# Patient Record
Sex: FEMALE | Race: WHITE | NOT HISPANIC OR LATINO | Employment: OTHER | URBAN - METROPOLITAN AREA
[De-identification: names, ages, dates, MRNs, and addresses within clinical notes are randomized per-mention and may not be internally consistent; named-entity substitution may affect disease eponyms.]

---

## 2017-01-03 RX ORDER — NADOLOL 40 MG/1
TABLET ORAL
Qty: 180 TABLET | Refills: 2 | Status: SHIPPED | OUTPATIENT
Start: 2017-01-03 | End: 2017-01-11 | Stop reason: ALTCHOICE

## 2017-01-11 ENCOUNTER — OFFICE VISIT (OUTPATIENT)
Dept: INTERNAL MEDICINE | Facility: CLINIC | Age: 67
End: 2017-01-11
Payer: MEDICARE

## 2017-01-11 ENCOUNTER — HOSPITAL ENCOUNTER (OUTPATIENT)
Dept: CARDIOLOGY | Facility: CLINIC | Age: 67
Discharge: HOME | End: 2017-01-11
Payer: MEDICARE

## 2017-01-11 VITALS
TEMPERATURE: 97 F | WEIGHT: 180.31 LBS | BODY MASS INDEX: 31.95 KG/M2 | OXYGEN SATURATION: 96 % | DIASTOLIC BLOOD PRESSURE: 75 MMHG | RESPIRATION RATE: 18 BRPM | HEART RATE: 53 BPM | SYSTOLIC BLOOD PRESSURE: 177 MMHG

## 2017-01-11 DIAGNOSIS — R00.2 PALPITATIONS: ICD-10-CM

## 2017-01-11 DIAGNOSIS — R00.2 PALPITATION: ICD-10-CM

## 2017-01-11 DIAGNOSIS — M35.3 POLYMYALGIA: ICD-10-CM

## 2017-01-11 DIAGNOSIS — I10 ESSENTIAL HYPERTENSION: ICD-10-CM

## 2017-01-11 DIAGNOSIS — E28.319 PREMATURE MENOPAUSE: ICD-10-CM

## 2017-01-11 DIAGNOSIS — H66.001 ACUTE SUPPURATIVE OTITIS MEDIA OF RIGHT EAR WITHOUT SPONTANEOUS RUPTURE OF TYMPANIC MEMBRANE, RECURRENCE NOT SPECIFIED: Primary | ICD-10-CM

## 2017-01-11 PROCEDURE — 99999 PR PBB SHADOW E&M-EST. PATIENT-LVL IV: CPT | Mod: PBBFAC,,, | Performed by: NURSE PRACTITIONER

## 2017-01-11 PROCEDURE — 93227 XTRNL ECG REC<48 HR R&I: CPT | Mod: S$PBB,,, | Performed by: NUCLEAR MEDICINE

## 2017-01-11 PROCEDURE — 99214 OFFICE O/P EST MOD 30 MIN: CPT | Mod: S$PBB,,, | Performed by: NURSE PRACTITIONER

## 2017-01-11 PROCEDURE — 93226 XTRNL ECG REC<48 HR SCAN A/R: CPT | Mod: PBBFAC,PO | Performed by: NUCLEAR MEDICINE

## 2017-01-11 RX ORDER — METOPROLOL TARTRATE 50 MG/1
50 TABLET ORAL 2 TIMES DAILY
Qty: 60 TABLET | Refills: 1 | Status: SHIPPED | OUTPATIENT
Start: 2017-01-11 | End: 2017-02-02 | Stop reason: SDUPTHER

## 2017-01-11 RX ORDER — DULOXETIN HYDROCHLORIDE 20 MG/1
20 CAPSULE, DELAYED RELEASE ORAL DAILY
Qty: 90 CAPSULE | Refills: 0 | Status: SHIPPED | OUTPATIENT
Start: 2017-01-11 | End: 2017-02-03 | Stop reason: SDUPTHER

## 2017-01-11 RX ORDER — AMOXICILLIN 875 MG/1
875 TABLET, FILM COATED ORAL 2 TIMES DAILY
Qty: 20 TABLET | Refills: 0 | Status: SHIPPED | OUTPATIENT
Start: 2017-01-11 | End: 2017-01-21

## 2017-01-11 NOTE — PATIENT INSTRUCTIONS
1. Call insurance company. Use the number for pharmacy benefits on your card    2. Tell them Janumet is too expensive and ask them what they do cover that is a cheaper option.     3. Call our office and let us know which medications they suggest      [facility]

## 2017-01-11 NOTE — MR AVS SNAPSHOT
[facility]  [address] [address] [address]  Phone: [phone]                  Kelsie Bustamante   2017 9:20 AM   Office Visit    Description:  Female : 1950   Provider:  LATESHA Rocha,YI-C   Department:  [facility]           Reason for Visit     Otalgia           Diagnoses this Visit        Comments    Acute suppurative otitis media of right ear without spontaneous rupture of tympanic membrane, recurrence not specified    -  Primary     Essential hypertension         Palpitations         Polymyalgia         Premature menopause                To Do List           Future Appointments        Provider Department Dept Phone    2017 1:30 PM [facility] BMD1 [facility] [phone]    3/20/2017 8:40 AM IB LABORATORY [facility] [phone]    3/20/2017 8:50 AM [facility] SPECIMEN LABORATORY [facility] [phone]      Goals (5 Years of Data)     None       These Medications        Disp Refills Start End    amoxicillin (AMOXIL) 875 MG tablet 20 tablet 0 2017    Take 1 tablet (875 mg total) by mouth 2 (two) times daily. - Oral    Pharmacy: [facility] [place] [address] AT [facility] Ph #: [phone]       duloxetine (CYMBALTA) 20 MG capsule 90 capsule 0 2017    Take 1 capsule (20 mg total) by mouth once daily. - Oral    Pharmacy: [facility] [address] [place] - [address] AT [facility] Ph #: [phone]       metoprolol tartrate (LOPRESSOR) 50 MG tablet 60 tablet 1 2017    Take 1 tablet (50 mg total) by mouth 2 (two) times daily. - Oral    Pharmacy: [facility] [address] [place] - [address] AT [facility] Ph #: [phone]         [facility]     [facility] Care [facility] -  Assistance  Registered nurses in the [facility] On Call  Center provide clinical advisement, health education, appointment booking, and other advisory services.  Call for this free service at [phone].             Medications           START taking these NEW medications        Refills    amoxicillin (AMOXIL) 875 MG tablet 0    Sig: Take 1 tablet (875 mg total) by mouth 2 (two) times daily.    Class: Normal    Route: Oral    metoprolol tartrate (LOPRESSOR) 50 MG tablet 1    Sig: Take 1 tablet (50 mg total) by mouth 2 (two) times daily.    Class: Normal    Route: Oral      STOP taking these medications     nadolol (CORGARD) 40 MG tablet TAKE 1 TABLET TWICE A DAY           Verify that the below list of medications is an accurate representation of the medications you are currently taking.  If none reported, the list may be blank. If incorrect, please contact your healthcare provider. Carry this list with you in case of emergency.           Current Medications     amoxicillin (AMOXIL) 875 MG tablet Take 1 tablet (875 mg total) by mouth 2 (two) times daily.    aspirin (ECOTRIN) 81 MG EC tablet Take 81 mg by mouth once daily.    blood sugar diagnostic (BLOOD GLUCOSE TEST) Strp Test 2 times daily    blood-glucose meter kit Test twice daily    budesonide (PULMICORT) 0.5 mg/2 mL nebulizer solution     duloxetine (CYMBALTA) 20 MG capsule Take 1 capsule (20 mg total) by mouth once daily.    lancets Misc Test twice daily    losartan-hydrochlorothiazide 50-12.5 mg (HYZAAR) 50-12.5 mg per tablet Take 1 tablet by mouth once daily.    metoprolol tartrate (LOPRESSOR) 50 MG tablet Take 1 tablet (50 mg total) by mouth 2 (two) times daily.    montelukast (SINGULAIR) 10 mg tablet Take 1 tablet (10 mg total) by mouth every evening.    omeprazole (PRILOSEC) 20 MG capsule Take 1 capsule (20 mg total) by mouth 2 (two) times daily.    SITagliptan-metformin (JANUMET) 50-1,000 mg per tablet Take 1 tablet by mouth 2 (two) times daily with meals.    zolpidem (AMBIEN) 10 mg Tab 1/2 to 1 qhs prn            Clinical Reference Information           Vital Signs - Last Recorded  Most recent update: 1/11/2017  9:17 AM by Charles Cross MA    BP Pulse Temp Resp    (!) 177/75 (BP Location: Left arm, Patient Position: Sitting, BP Method: Automatic) (!) 53 96.9 °F (36.1 °C) (Tympanic) 18    Wt SpO2 BMI    81.8 kg (180 lb 5.4 oz) 96% 31.95 kg/m2      Blood Pressure          Most Recent Value    BP  (!)  177/75      Allergies as of 1/11/2017     Floxin [Ofloxacin]    Fluvastatin    Pravastatin      Immunizations Administered on Date of Encounter - 1/11/2017     None      Orders Placed During Today's Visit     Future Labs/Procedures Expected by Expires    DXA Bone Density Spine And Hip  1/11/2017 1/11/2018      Instructions    1. Call insurance company. Use the number for pharmacy benefits on your card    2. Tell them Janumet is too expensive and ask them what they do cover that is a cheaper option.     3. Call our office and let us know which medications they suggest      Shingles shot at [facility]

## 2017-01-11 NOTE — PROGRESS NOTES
Subjective:       Patient ID: Kelsie Bustamante is a 66 y.o. female.    Chief Complaint: Otalgia (right )    Otalgia    There is pain in the right ear. This is a new problem. The current episode started yesterday. The problem occurs constantly. The problem has been gradually worsening. There has been no fever. The pain is moderate. Associated symptoms include headaches (right side). Pertinent negatives include no coughing, ear discharge, hearing loss, rhinorrhea or sore throat. She has tried nothing for the symptoms.   Palpitations    This is a chronic problem. Episode onset: over 20 years. The problem occurs 2 to 4 times per day. The problem has been unchanged. Nothing aggravates the symptoms. Associated symptoms include near-syncope (in past, not recently). Pertinent negatives include no anxiety, chest pain, coughing, diaphoresis, fever, shortness of breath or weakness. She has tried beta blockers (nadalol working ok, but is too expensive with new insurance plan. Needs to change to cheaper alternative) for the symptoms. The treatment provided significant relief. Risk factors include obesity, diabetes mellitus and dyslipidemia.     Review of Systems   Constitutional: Negative for chills, diaphoresis, fatigue and fever.   HENT: Positive for ear pain. Negative for ear discharge, hearing loss, postnasal drip, rhinorrhea, sinus pressure and sore throat.    Eyes: Negative.    Respiratory: Negative for cough, chest tightness, shortness of breath and wheezing.    Cardiovascular: Positive for palpitations and near-syncope (in past, not recently). Negative for chest pain.   Neurological: Positive for headaches (right side). Negative for weakness.   Psychiatric/Behavioral: Negative for dysphoric mood and sleep disturbance. The patient is not nervous/anxious.        Objective:      Physical Exam   Constitutional: She is oriented to person, place, and time.   HENT:   Head: Normocephalic and atraumatic.   Right Ear: Hearing,  external ear and ear canal normal. Tympanic membrane is erythematous and bulging.   Left Ear: Hearing, tympanic membrane, external ear and ear canal normal.   Nose: Nose normal.   Mouth/Throat: Oropharynx is clear and moist. No oropharyngeal exudate.   Eyes: Conjunctivae and EOM are normal. Pupils are equal, round, and reactive to light.   Cardiovascular: Normal rate, regular rhythm, normal heart sounds and intact distal pulses.    Pulmonary/Chest: Effort normal and breath sounds normal.   Neurological: She is alert and oriented to person, place, and time.   Skin: Skin is warm and dry.   Psychiatric: She has a normal mood and affect. Her behavior is normal. Judgment and thought content normal.       Assessment:       1. Acute suppurative otitis media of right ear without spontaneous rupture of tympanic membrane, recurrence not specified    2. Essential hypertension    3. Palpitations    4. Polymyalgia    5. Premature menopause        Plan:       *Acute suppurative otitis media of right ear without spontaneous rupture of tympanic membrane, recurrence not specified  -     amoxicillin (AMOXIL) 875 MG tablet; Take 1 tablet (875 mg total) by mouth 2 (two) times daily.  Dispense: 20 tablet; Refill: 0  - F/u if not improving over next 2 days  - Take tylenol or ibp sparingly for pain    Essential hypertension  -     metoprolol tartrate (LOPRESSOR) 50 MG tablet; Take 1 tablet (50 mg total) by mouth 2 (two) times daily.  Dispense: 60 tablet; Refill: 1  - Has not taken meds this AM, has been normal at home    Palpitations  -     metoprolol tartrate (LOPRESSOR) 50 MG tablet; Take 1 tablet (50 mg total) by mouth 2 (two) times daily.  Dispense: 60 tablet; Refill: 1  - Needs to change nadalol r/t cost    Polymyalgia  -     duloxetine (CYMBALTA) 20 MG capsule; Take 1 capsule (20 mg total) by mouth once daily.  Dispense: 90 capsule; Refill: 0  - Working well for muscle and joint pain    Premature menopause  -     DXA Bone Density  Spine And Hip; Future; Expected date: 1/11/17    **  shingles shot at [employer] due to cost  F/u with Dr Parks for [facility] per [facility]

## 2017-01-25 ENCOUNTER — APPOINTMENT (OUTPATIENT)
Dept: RADIOLOGY | Facility: CLINIC | Age: 67
End: 2017-01-25
Attending: NURSE PRACTITIONER
Payer: MEDICARE

## 2017-01-25 DIAGNOSIS — E28.319 PREMATURE MENOPAUSE: ICD-10-CM

## 2017-01-25 PROCEDURE — 77080 DXA BONE DENSITY AXIAL: CPT | Mod: 26,,, | Performed by: INTERNAL MEDICINE

## 2017-01-25 PROCEDURE — 77080 DXA BONE DENSITY AXIAL: CPT | Mod: TC,PO

## 2017-01-31 NOTE — TELEPHONE ENCOUNTER
----- Message from Jossy Rudd sent at 1/31/2017  1:12 PM CST -----  Please call pt back at [phone] in regards to a prescription for janumet for 90 day supply.and also a refill on zoltidem 90 day supply and also a refill on [facility]elukast [facility]

## 2017-02-02 DIAGNOSIS — I10 ESSENTIAL HYPERTENSION: ICD-10-CM

## 2017-02-02 DIAGNOSIS — R00.2 PALPITATIONS: ICD-10-CM

## 2017-02-02 RX ORDER — MONTELUKAST SODIUM 10 MG/1
10 TABLET ORAL NIGHTLY
Qty: 90 TABLET | Refills: 3 | Status: SHIPPED | OUTPATIENT
Start: 2017-02-02 | End: 2018-01-09 | Stop reason: SDUPTHER

## 2017-02-02 RX ORDER — ZOLPIDEM TARTRATE 10 MG/1
TABLET ORAL
Qty: 90 TABLET | Refills: 1 | Status: SHIPPED | OUTPATIENT
Start: 2017-02-02 | End: 2018-01-09

## 2017-02-03 DIAGNOSIS — R00.2 PALPITATIONS: ICD-10-CM

## 2017-02-03 DIAGNOSIS — I10 ESSENTIAL HYPERTENSION: ICD-10-CM

## 2017-02-03 DIAGNOSIS — M35.3 POLYMYALGIA: ICD-10-CM

## 2017-02-03 RX ORDER — METOPROLOL TARTRATE 50 MG/1
50 TABLET ORAL 2 TIMES DAILY
Qty: 180 TABLET | Refills: 3 | Status: SHIPPED | OUTPATIENT
Start: 2017-02-03 | End: 2018-04-09 | Stop reason: SDUPTHER

## 2017-02-03 RX ORDER — METOPROLOL TARTRATE 50 MG/1
TABLET ORAL
Qty: 180 TABLET | Refills: 1 | Status: SHIPPED | OUTPATIENT
Start: 2017-02-03 | End: 2017-02-03 | Stop reason: SDUPTHER

## 2017-02-03 RX ORDER — DULOXETIN HYDROCHLORIDE 20 MG/1
20 CAPSULE, DELAYED RELEASE ORAL DAILY
Qty: 90 CAPSULE | Refills: 3 | Status: SHIPPED | OUTPATIENT
Start: 2017-02-03 | End: 2018-04-09 | Stop reason: SDUPTHER

## 2017-02-03 RX ORDER — METOPROLOL TARTRATE 50 MG/1
50 TABLET ORAL 2 TIMES DAILY
Qty: 180 TABLET | Refills: 1 | Status: CANCELLED | OUTPATIENT
Start: 2017-02-03

## 2017-02-10 ENCOUNTER — TELEPHONE (OUTPATIENT)
Dept: INTERNAL MEDICINE | Facility: CLINIC | Age: 67
End: 2017-02-10

## 2017-02-10 NOTE — TELEPHONE ENCOUNTER
----- Message from Jimmie Durham sent at 2/10/2017  9:18 AM CST -----  Pt is requesting a call from nurse to discuss a prior authorization             Please call pt back at [phone]

## 2017-03-21 ENCOUNTER — TELEPHONE (OUTPATIENT)
Dept: INTERNAL MEDICINE | Facility: CLINIC | Age: 67
End: 2017-03-21

## 2017-03-21 NOTE — TELEPHONE ENCOUNTER
----- Message from Kasia Hartman sent at 3/21/2017 10:06 AM CDT -----  Contact: pt  Please call pt @ [phone] regarding lab/urine order

## 2017-03-22 ENCOUNTER — TELEPHONE (OUTPATIENT)
Dept: INTERNAL MEDICINE | Facility: CLINIC | Age: 67
End: 2017-03-22

## 2017-03-22 NOTE — TELEPHONE ENCOUNTER
----- Message from Bonnie Garcia sent at 3/21/2017  4:08 PM CDT -----  Contact: Pt   States she is calling to speak with Ariadna hayes an appt and can be reached at [phone]//chandra/dbady

## 2017-03-26 DIAGNOSIS — M35.3 POLYMYALGIA: ICD-10-CM

## 2017-03-27 DIAGNOSIS — M35.3 POLYMYALGIA: ICD-10-CM

## 2017-03-27 RX ORDER — DULOXETIN HYDROCHLORIDE 20 MG/1
CAPSULE, DELAYED RELEASE ORAL
Qty: 90 CAPSULE | Refills: 0 | OUTPATIENT
Start: 2017-03-27

## 2017-03-27 RX ORDER — DULOXETIN HYDROCHLORIDE 20 MG/1
CAPSULE, DELAYED RELEASE ORAL
Qty: 90 CAPSULE | Refills: 0 | Status: SHIPPED | OUTPATIENT
Start: 2017-03-27 | End: 2017-05-09 | Stop reason: SDUPTHER

## 2017-03-27 RX ORDER — DULOXETIN HYDROCHLORIDE 20 MG/1
CAPSULE, DELAYED RELEASE ORAL
Qty: 90 CAPSULE | Refills: 0 | Status: SHIPPED | OUTPATIENT
Start: 2017-03-27 | End: 2017-03-27

## 2017-04-05 ENCOUNTER — TELEPHONE (OUTPATIENT)
Dept: INTERNAL MEDICINE | Facility: CLINIC | Age: 67
End: 2017-04-05

## 2017-04-05 NOTE — TELEPHONE ENCOUNTER
----- Message from Maryan Roach sent at 4/5/2017 12:52 PM CDT -----  Contact: Patient  Patient stated she received a call that Dr Parks had several openings tomorrow, patient needs a pre-op clearance, but we don't show any openings until 5/18/17. Please call patient back at [phone]. Thank you

## 2017-04-05 NOTE — TELEPHONE ENCOUNTER
----- Message from Khushbu Hammer sent at 4/5/2017 11:13 AM CDT -----  Contact: pt  Pt states needs to get in to see the dr at either location for eye surgery clearance,the surgery is 4/20 so needs to seen as soon as possible.Will be interested this Monday in the afternoon if can....[phone] (zqfe)

## 2017-04-06 ENCOUNTER — LAB VISIT (OUTPATIENT)
Dept: LAB | Facility: HOSPITAL | Age: 67
End: 2017-04-06
Attending: FAMILY MEDICINE
Payer: MEDICARE

## 2017-04-06 ENCOUNTER — OFFICE VISIT (OUTPATIENT)
Dept: INTERNAL MEDICINE | Facility: CLINIC | Age: 67
End: 2017-04-06
Payer: MEDICARE

## 2017-04-06 VITALS
SYSTOLIC BLOOD PRESSURE: 128 MMHG | DIASTOLIC BLOOD PRESSURE: 70 MMHG | HEART RATE: 60 BPM | WEIGHT: 175.69 LBS | OXYGEN SATURATION: 96 % | BODY MASS INDEX: 31.13 KG/M2 | HEIGHT: 63 IN | TEMPERATURE: 97 F

## 2017-04-06 DIAGNOSIS — E04.1 THYROID NODULE: ICD-10-CM

## 2017-04-06 DIAGNOSIS — I10 ESSENTIAL HYPERTENSION: ICD-10-CM

## 2017-04-06 DIAGNOSIS — E11.49 TYPE 2 DIABETES WITH NEURAL COMPLICATION: ICD-10-CM

## 2017-04-06 DIAGNOSIS — E11.49 TYPE 2 DIABETES WITH NEURAL COMPLICATION: Primary | ICD-10-CM

## 2017-04-06 LAB
ANION GAP SERPL CALC-SCNC: 8 MMOL/L
BUN SERPL-MCNC: 13 MG/DL
CALCIUM SERPL-MCNC: 9.3 MG/DL
CHLORIDE SERPL-SCNC: 102 MMOL/L
CHOLEST/HDLC SERPL: 4.9 {RATIO}
CO2 SERPL-SCNC: 30 MMOL/L
CREAT SERPL-MCNC: 1.1 MG/DL
EST. GFR  (AFRICAN AMERICAN): >60 ML/MIN/1.73 M^2
EST. GFR  (NON AFRICAN AMERICAN): 52.8 ML/MIN/1.73 M^2
GLUCOSE SERPL-MCNC: 151 MG/DL
HDL/CHOLESTEROL RATIO: 20.3 %
HDLC SERPL-MCNC: 177 MG/DL
HDLC SERPL-MCNC: 36 MG/DL
LDLC SERPL CALC-MCNC: 110.4 MG/DL
NONHDLC SERPL-MCNC: 141 MG/DL
POTASSIUM SERPL-SCNC: 4.1 MMOL/L
SODIUM SERPL-SCNC: 140 MMOL/L
TRIGL SERPL-MCNC: 153 MG/DL

## 2017-04-06 PROCEDURE — 99499 UNLISTED E&M SERVICE: CPT | Mod: S$PBB,,, | Performed by: FAMILY MEDICINE

## 2017-04-06 PROCEDURE — 99999 PR PBB SHADOW E&M-EST. PATIENT-LVL III: CPT | Mod: PBBFAC,,, | Performed by: FAMILY MEDICINE

## 2017-04-06 PROCEDURE — 99214 OFFICE O/P EST MOD 30 MIN: CPT | Mod: S$PBB,,, | Performed by: FAMILY MEDICINE

## 2017-04-06 PROCEDURE — 83036 HEMOGLOBIN GLYCOSYLATED A1C: CPT

## 2017-04-06 PROCEDURE — 80048 BASIC METABOLIC PNL TOTAL CA: CPT

## 2017-04-06 PROCEDURE — 36415 COLL VENOUS BLD VENIPUNCTURE: CPT | Mod: PO

## 2017-04-06 PROCEDURE — 80061 LIPID PANEL: CPT

## 2017-04-06 NOTE — PROGRESS NOTES
Subjective:       Patient ID: Kelsie Bustamante is a female.    Chief Complaint: Multiple issues see below    HPI Type 2 diabetesw neurl manif: a1c pnd. Tolerating medicine. 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  Hypertension: blood pressures at home normal. Tolerating medicine.   Hypercholesterolemia: controlled. .stopped statin sec to aches 2 mos after starting. Ok w trying crestor but didn't start  Arthralgias much less with cymbalta          Thy nod spring 16 subcm due couple yr    Past Medical History   Diagnosis Date    Hypertension     Morbid obesity     Gastroesophageal reflux     Sinusitis     Hiatal hernia     Type 2 diabetes mellitus with neurological manifestations     Peripheral neuropathy     Achalasia     Postmenopausal HRT (hormone replacement therapy)      failed tapering off     Past Surgical History   Procedure Laterality Date    Hysterectomy       nonca    Tonsillectomy      Cholecystectomy       section       Family History   Problem Relation Age of Onset    Diabetes Father     Coronary artery disease Father     Aneurysm Sister     Coronary artery disease Brother     Parkinsonism Brother      History     Social History    Marital Status:      Spouse Name: N/A     Number of Children: N/A    Years of Education: N/A     Social History Main Topics    Smoking status: Never Smoker     Smokeless tobacco: Never Used    Alcohol Use: No    Drug Use: No    Sexual Activity: None     Other Topics Concern    None     Social History Narrative         Cardiovascular: no chest pain  Chest: no shortness of breath  Abd: no abd pain  Remainder review of systems negative      Objective:      Physical Exam   Constitutional: She is oriented to person, place, and time. She appears well-developed and well-nourished.   HENT:    Head: Normocephalic and atraumatic.   Neck: Normal range of motion. Neck supple. Carotid bruit is not present.   Cardiovascular: Normal rate and regular rhythm.    Pulmonary/Chest: Effort normal and breath sounds normal.   Lymphadenopathy:     She has no cervical adenopathy.   Neurological: She is alert and oriented to person, place, and time.   Skin: Skin is warm and dry.   Psychiatric: She has a normal mood and affect. Her behavior is normal. Judgment normal.   Nursing note and vitals reviewed.      Assessment:     type 2 dm w neural complic   hyperchol   2. Essential hypertension      statin side eff  Plan:    *  Lab 3 m andf/u ; monitor wt loss  D/w crestor more at a f/u-has been reluct sec to other statin myalgias  Clear for cataract surg. Form given to her  Type 2 diabetes with neural complication  -     Hemoglobin A1c; Future; Expected date: 7/5/17    Essential hypertension    Thyroid nodule

## 2017-04-06 NOTE — MR AVS SNAPSHOT
[facility]  [address] [facility] [place] [address]  Phone: [phone]  Fax: [phone]                  Kelsie Bustamante   2017 8:20 AM   Office Visit    Description:  Female : 1950   Provider:  Cody Parks MD   Department:  [facility]           Diagnoses this Visit        Comments    Type 2 diabetes with neural complication    -  Primary     Essential hypertension         Thyroid nodule                To Do List           Future Appointments        Provider Department Dept Phone    2017 11:05 AM LABORATORY, [facility] [phone]    2017 11:20 AM SPECIMEN, [facility] [phone]    2017 9:40 AM LABORATORY, [facility] [phone]    2017 9:00 AM Cody Parks MD [facility] [place] [phone]      Goals (5 Years of Data)     None      Follow-Up and Disposition     Return in about 3 months (around 2017).    Follow-up and Disposition History      [facility]     [facility] On Call Nurse Care Line -  Assistance  Unless otherwise directed by your provider, please contact [facility], our nurse care line that is available for  assistance.     Registered nurses in the [facility] Center provide: appointment scheduling, clinical advisement, health education, and other advisory services.  Call: [phone] (toll free)               Medications                Verify that the below list of medications is an accurate representation of the medications you are currently taking.  If none reported, the list may be blank. If incorrect, please contact your healthcare provider. Carry this list with you in case of emergency.           Current Medications     aspirin (ECOTRIN) 81 MG EC tablet Take 81 mg by mouth once daily.    blood sugar diagnostic (BLOOD GLUCOSE TEST) Strp Test 2 times daily    blood-glucose meter kit Test twice daily    budesonide  "(PULMICORT) 0.5 mg/2 mL nebulizer solution     duloxetine (CYMBALTA) 20 MG capsule Take 1 capsule (20 mg total) by mouth once daily.    duloxetine (CYMBALTA) 20 MG capsule TAKE 1 CAPSULE BY MOUTH ONCE DAILY    lancets Misc Test twice daily    losartan-hydrochlorothiazide 50-12.5 mg (HYZAAR) 50-12.5 mg per tablet Take 1 tablet by mouth once daily.    metoprolol tartrate (LOPRESSOR) 50 MG tablet Take 1 tablet (50 mg total) by mouth 2 (two) times daily.    montelukast (SINGULAIR) 10 mg tablet Take 1 tablet (10 mg total) by mouth every evening.    omeprazole (PRILOSEC) 20 MG capsule Take 1 capsule (20 mg total) by mouth 2 (two) times daily.    SITagliptan-metformin (JANUMET) 50-1,000 mg per tablet Take 1 tablet by mouth 2 (two) times daily with meals.    zolpidem (AMBIEN) 10 mg Tab 1/2 to 1 qhs prn           Clinical Reference Information           Your Vitals Were     BP Pulse Temp Height    128/70 (BP Location: Right arm, Patient Position: Sitting, BP Method: Manual) 60 96.6 °F (35.9 °C) (Tympanic) 5' 3" (1.6 m)    Weight SpO2 BMI    79.7 kg (175 lb 11.3 oz) 96% 31.13 kg/m2      Blood Pressure          Most Recent Value    BP  128/70      Allergies as of 4/6/2017     Floxin [Ofloxacin]    Fluvastatin    Pravastatin      Immunizations Administered on Date of Encounter - 4/6/2017     None      Orders Placed During Today's Visit     Future Labs/Procedures Expected by Expires    Hemoglobin A1c  7/5/2017 4/6/2018      Language Assistance Services     ATTENTION: Language assistance services are available, free of charge. Please call [phone].      ATENCIÓN: Si habla jessica, tiene a candelario disposición servicios gratuitos de asistencia lingüística. Llame al [phone].     CHÚ Ý: N?u b?n nói Ti?ng Vi?t, có các d?ch v? h? tr? ngôn ng? mi?n phí dành cho b?n. G?i s? [phone].         [facility] complies with applicable Federal civil rights laws and does not discriminate on the basis of race, color, " national origin, age, disability, or sex.

## 2017-04-07 LAB
ESTIMATED AVG GLUCOSE: 137 MG/DL
HBA1C MFR BLD HPLC: 6.4 %

## 2017-05-09 ENCOUNTER — OFFICE VISIT (OUTPATIENT)
Dept: INTERNAL MEDICINE | Facility: CLINIC | Age: 67
End: 2017-05-09
Payer: MEDICARE

## 2017-05-09 VITALS
SYSTOLIC BLOOD PRESSURE: 120 MMHG | TEMPERATURE: 98 F | BODY MASS INDEX: 31.02 KG/M2 | HEIGHT: 63 IN | DIASTOLIC BLOOD PRESSURE: 76 MMHG | WEIGHT: 175.06 LBS

## 2017-05-09 DIAGNOSIS — L29.8 CHRONIC PRURITIC RASH IN ADULT: Primary | ICD-10-CM

## 2017-05-09 PROBLEM — E66.9 OBESITY, CLASS I, BMI 30-34.9: Status: ACTIVE | Noted: 2017-05-09

## 2017-05-09 PROBLEM — E66.811 OBESITY, CLASS I, BMI 30-34.9: Status: ACTIVE | Noted: 2017-05-09

## 2017-05-09 PROCEDURE — 99999 PR PBB SHADOW E&M-EST. PATIENT-LVL III: CPT | Mod: PBBFAC,,, | Performed by: PHYSICIAN ASSISTANT

## 2017-05-09 PROCEDURE — 99213 OFFICE O/P EST LOW 20 MIN: CPT | Mod: S$PBB,,, | Performed by: PHYSICIAN ASSISTANT

## 2017-05-09 PROCEDURE — 99213 OFFICE O/P EST LOW 20 MIN: CPT | Mod: PBBFAC,PO | Performed by: PHYSICIAN ASSISTANT

## 2017-05-09 RX ORDER — CEFADROXIL 1000 MG/1
1 TABLET ORAL DAILY
Qty: 21 TABLET | Refills: 0 | Status: SHIPPED | OUTPATIENT
Start: 2017-05-09 | End: 2017-07-13

## 2017-05-09 RX ORDER — HYDROXYZINE HYDROCHLORIDE 10 MG/1
TABLET, FILM COATED ORAL
Qty: 40 TABLET | Refills: 2 | Status: SHIPPED | OUTPATIENT
Start: 2017-05-09 | End: 2017-07-13

## 2017-05-09 NOTE — PROGRESS NOTES
Subjective:       Patient ID: Kelsie Bustamante is a 66 y.o.W/ female.    Chief Complaint: Rash (in various spots)    HPI         She comes in today by herself and has the above problem.  In December 2016 a broom handle cut her across the left lower pretibial surface.  In the healing process she developed some little red bumps surrounding it.  Then about 3 weeks later she fell and abraded her left knee causing it to bleed.  Then she developed red bumps all around it and he's stayed that way for almost 4 months.  She has also developed lesions underneath her breasts on both of her forearms and also her right leg.  It seems to be highly present in her groin.  It itches horribly and she can't seem to hardly control it.  Hot baths make it feel worse.  There does not seem to be any specific time of day when the itch is worse and she hasn't had any problems with nighttime itching being worse than the daytime.  She also has not had any webspace involvement in either upper or lower extremity.  Her  does not have any rash at all but they don't have all that much physical contact either.  He is away a lot at their camp doing repair work.  She has tried using a triple OTC antibiotic cream on this but it doesn't seem to help.        Her blood sugars have been running 150-160 and have never been higher except around the holiday season.  Her most recent tetanus shot was July 2016 so she is up-to-date on that.    Review of Systems    Otherwise negative concerning the INTEGUMENT, INFECTIOUS DISEASE, IMMUNE, system review.    Objective:      Physical Exam    LEFT LEG: She doesn't seem to have much redness or bumpiness in the site of the original injury on the lower pretibial surface.  The left knee is heavily involved with red bumps and some scabbing and it measures about 3.5 cm diameter.  There is no lymphangitis present on any of her lesions.  The rest of the spots seem to be very faint in her upper extremities and also her  right leg.    Assessment:       1. Chronic pruritic rash in adult        Plan:     1.  It is possible she is having a scabies problem but I will first treat her as if this is a bacterial spread infection.  Start with Atarax 10 mg to be taken 1-3 tablets every 6 hours when necessary itch.  She is also not to get exposed to heat and humidity in the form of the outside whether or her bathtub.  2.  Start Duricef 1 g daily for the next 20 days.  Report back here if the rash hasn't sufficiently disappeared by that time.  At that time we'll probably treat her for scabies.

## 2017-07-06 ENCOUNTER — LAB VISIT (OUTPATIENT)
Dept: LAB | Facility: HOSPITAL | Age: 67
End: 2017-07-06
Attending: FAMILY MEDICINE
Payer: MEDICARE

## 2017-07-06 ENCOUNTER — OFFICE VISIT (OUTPATIENT)
Dept: INTERNAL MEDICINE | Facility: CLINIC | Age: 67
End: 2017-07-06
Payer: MEDICARE

## 2017-07-06 VITALS
DIASTOLIC BLOOD PRESSURE: 60 MMHG | WEIGHT: 171.75 LBS | TEMPERATURE: 98 F | BODY MASS INDEX: 30.43 KG/M2 | HEART RATE: 60 BPM | OXYGEN SATURATION: 98 % | HEIGHT: 63 IN | SYSTOLIC BLOOD PRESSURE: 98 MMHG

## 2017-07-06 DIAGNOSIS — E11.49 TYPE 2 DIABETES WITH NEURAL COMPLICATION: ICD-10-CM

## 2017-07-06 DIAGNOSIS — J01.90 ACUTE SINUSITIS, RECURRENCE NOT SPECIFIED, UNSPECIFIED LOCATION: ICD-10-CM

## 2017-07-06 DIAGNOSIS — R23.8 VESICULAR RASH: Primary | ICD-10-CM

## 2017-07-06 DIAGNOSIS — B37.2 CANDIDIASIS, INTERTRIGO: ICD-10-CM

## 2017-07-06 PROCEDURE — 99999 PR PBB SHADOW E&M-EST. PATIENT-LVL V: CPT | Mod: PBBFAC,,, | Performed by: PHYSICIAN ASSISTANT

## 2017-07-06 PROCEDURE — 99215 OFFICE O/P EST HI 40 MIN: CPT | Mod: PBBFAC,PO | Performed by: PHYSICIAN ASSISTANT

## 2017-07-06 PROCEDURE — 1159F MED LIST DOCD IN RCRD: CPT | Mod: ,,, | Performed by: PHYSICIAN ASSISTANT

## 2017-07-06 PROCEDURE — 99213 OFFICE O/P EST LOW 20 MIN: CPT | Mod: S$PBB,,, | Performed by: PHYSICIAN ASSISTANT

## 2017-07-06 PROCEDURE — 1126F AMNT PAIN NOTED NONE PRSNT: CPT | Mod: ,,, | Performed by: PHYSICIAN ASSISTANT

## 2017-07-06 RX ORDER — CICLOPIROX OLAMINE 7.7 MG/G
CREAM TOPICAL 2 TIMES DAILY
Qty: 30 G | Refills: 0 | Status: SHIPPED | OUTPATIENT
Start: 2017-07-06 | End: 2017-08-10

## 2017-07-06 RX ORDER — DOXYCYCLINE 100 MG/1
100 CAPSULE ORAL 2 TIMES DAILY
Qty: 20 CAPSULE | Refills: 0 | Status: SHIPPED | OUTPATIENT
Start: 2017-07-06 | End: 2017-07-16

## 2017-07-06 NOTE — PROGRESS NOTES
Subjective:      Patient ID: Kelsie Bustamante is a 67 y.o. female.    Chief Complaint: Fever; Sore Throat; and Generalized Body Aches    URI    This is a recurrent problem. The current episode started in the past 7 days. The problem has been gradually worsening. Maximum temperature: subjective. Associated symptoms include congestion, coughing, headaches, joint pain, neck pain, a plugged ear sensation, a rash, rhinorrhea, sinus pain, sneezing and a sore throat. Pertinent negatives include no abdominal pain, chest pain, diarrhea, dysuria, ear pain, joint swelling, nausea, swollen glands, vomiting or wheezing. Treatments tried: nyquil. The treatment provided moderate relief.   Rash   This is a new problem. Episode onset: 7 months. The problem has been waxing and waning since onset. Location: bilateral lower legs, left arm, bilateral hands. The rash is characterized by itchiness, redness, swelling and blistering. She was exposed to nothing. Associated symptoms include congestion, coughing, fatigue, joint pain, rhinorrhea and a sore throat. Pertinent negatives include no anorexia, diarrhea, facial edema, fever, shortness of breath or vomiting. Past treatments include antibiotics. The treatment provided significant relief. There is no history of allergies, asthma, eczema or varicella.   Rash is always in the same spots, never has cleared up completely. Has been treated with cephalosporin and ancef. Which did cause significant improvement but the rash never completely went away.   Trauma does initiate the rash.     Review of Systems   Constitutional: Positive for fatigue. Negative for activity change, appetite change, chills, diaphoresis, fever and unexpected weight change.   HENT: Positive for congestion, rhinorrhea, sneezing and sore throat. Negative for ear pain, hearing loss, postnasal drip, trouble swallowing and voice change.    Eyes: Negative.  Negative for visual disturbance.   Respiratory: Positive for cough.  "Negative for choking, chest tightness, shortness of breath and wheezing.    Cardiovascular: Negative for chest pain, palpitations and leg swelling.   Gastrointestinal: Negative for abdominal distention, abdominal pain, anorexia, blood in stool, constipation, diarrhea, nausea and vomiting.   Endocrine: Negative for cold intolerance, heat intolerance, polydipsia and polyuria.   Genitourinary: Negative.  Negative for difficulty urinating, dysuria and frequency.   Musculoskeletal: Positive for joint pain and neck pain. Negative for arthralgias, back pain, gait problem, joint swelling and myalgias.   Skin: Positive for rash. Negative for color change, pallor and wound.   Neurological: Positive for headaches. Negative for dizziness, tremors, weakness, light-headedness and numbness.   Hematological: Negative for adenopathy.   Psychiatric/Behavioral: Negative for behavioral problems, confusion, self-injury, sleep disturbance and suicidal ideas. The patient is not nervous/anxious.      Objective:   BP 98/60   Pulse 60   Temp 97.6 °F (36.4 °C) (Tympanic)   Ht 5' 3" (1.6 m)   Wt 77.9 kg (171 lb 11.8 oz)   SpO2 98%   BMI 30.42 kg/m²     Physical Exam   Constitutional: She is oriented to person, place, and time. She appears well-developed and well-nourished.   HENT:   Head: Normocephalic and atraumatic.   Right Ear: Hearing, tympanic membrane, external ear and ear canal normal. No tenderness.   Left Ear: Hearing, tympanic membrane, external ear and ear canal normal. No tenderness.   Nose: Mucosal edema and rhinorrhea present. No sinus tenderness. Right sinus exhibits maxillary sinus tenderness and frontal sinus tenderness. Left sinus exhibits maxillary sinus tenderness and frontal sinus tenderness.   Mouth/Throat: Uvula is midline and mucous membranes are normal. No oral lesions. Normal dentition. No dental abscesses, uvula swelling or dental caries. Oropharyngeal exudate and posterior oropharyngeal erythema present. No " posterior oropharyngeal edema or tonsillar abscesses. No tonsillar exudate.   Eyes: Conjunctivae and EOM are normal. Pupils are equal, round, and reactive to light. Right eye exhibits no discharge. Left eye exhibits no discharge.   Neck: Normal range of motion. Neck supple.   Cardiovascular: Normal rate, regular rhythm and normal heart sounds.  Exam reveals no gallop and no friction rub.    No murmur heard.  Pulmonary/Chest: Effort normal and breath sounds normal. No respiratory distress. She has no wheezes. She has no rales.   Lymphadenopathy:     She has no cervical adenopathy.   Neurological: She is alert and oriented to person, place, and time.   Skin: Skin is warm. Capillary refill takes less than 2 seconds. Rash noted. Rash is vesicular and urticarial. No erythema. No pallor.        Psychiatric: She has a normal mood and affect. Her behavior is normal. Judgment and thought content normal.   Nursing note and vitals reviewed.              Assessment:     1. Vesicular rash    2. Acute sinusitis, recurrence not specified, unspecified location    3. Candidiasis, intertrigo      Plan:   Vesicular rash  -     Ambulatory referral to Dermatology  -     CBC auto differential; Future; Expected date: 07/06/2017  -     doxycycline (MONODOX) 100 MG capsule; Take 1 capsule (100 mg total) by mouth 2 (two) times daily. Do not take with milk or yogurt  Dispense: 20 capsule; Refill: 0    Acute sinusitis, recurrence not specified, unspecified location  -     doxycycline (MONODOX) 100 MG capsule; Take 1 capsule (100 mg total) by mouth 2 (two) times daily. Do not take with milk or yogurt  Dispense: 20 capsule; Refill: 0    Candidiasis, intertrigo  -     ciclopirox (LOPROX) 0.77 % Crea; Apply topically 2 (two) times daily.  Dispense: 30 g; Refill: 0     -Educational handout on over-the-counter medications and at-home conservative care, pertinent to the patients diagnosis today, was handed to the patient and discussed in  detail.    Return if symptoms worsen or fail to improve.

## 2017-07-06 NOTE — PATIENT INSTRUCTIONS
-Over-the-counter supportive tx for colds and cough:   -start flonase nasal spray (fluticasone) 1 spray each nostril once/day. Continue use for 2-3 weeks.   - refrain from smoking, drink plenty of fluids, hot tea with honey, rest, take medications as prescribed, and use a humidifier or steam in the bathroom.   -Shower in the morning and evening to wash away any allergens and help reduce the production of mucus.   -Try OTC saline nasal spray or nedi-pot using warm filtered water.   -Take tylenol or Advil for fever, sore throat, and muscle aches.  -warm salt water gargles or Listerine gargles for sore throat frequently throughout the day.  - Try OTC Mucinex (guafenesin) for cough with thick mucous.   - Zinc lozenge, Emergen-C, or Airborne,  to boost immune system.     -No more than 10 in 24 hours.  -Phenylephrine/pseudophedrine or anything labeled with a D after it is for congestion.   Avoid decongestants if diagnosed with hypertension or arrhythmias. To avoid rebound congestion, refrain from taking decongestant for longer than 5 day.    -DM is for dextromethorphan. This is a cough suppressant.   -For prevention,extremely important to quit smoking, get annual influenza vaccines, reduce exposure to air pollution, and to frequently wash hands to avoid spread of infection.       Candida Skin Infection (Adult)  Candida is type of yeast. It grows naturally on the skin and in the mouth. If it grows out of control, it can cause an infection. Candida can cause infections in the genital area, skin folds, in the mouth, and under the breasts. Anyone can get this infection. It is more common in a person with a weak immune system, such as from diabetes, HIV, or cancer. Its also more common in someone who has been on antibiotic therapy. And its more common people who are overweight or who have incontinence. Wearing tight-fitting clothing and taking part in activities with lots of skin-to-skin contact can also put you at  risk.  Candida causes the skin to become bright red and inflamed. The border of the infected part of the skin is often raised. The infection causes pain and itching. Sometimes the skin peels and bleeds. In the mouth, candida is called thrush, and may cause white thickened areas.  A Candida rash is most often treated with an antifungal cream or ointment. The rash will clear a few days after starting the medicine. Infections that dont go away may need a prescription medicine. In rare cases, a bacterial infection can also occur.  Home care  Your healthcare provider will recommend an antifungal cream or ointment for the rash. He or she may also prescribe a medicine for the itch. Follow all instructions for using these medicines. Dont use cornstarch powder. Cornstarch can cause the Candida infection to get worse.  General care:  · Keep your skin clean by washing the area twice a day.  · Use the cream as directed until your rash is gone. Once the skin has healed, keep it dry to prevent another infection.   · If you are overweight, talk with your healthcare provider about a plan to lose excess weight.  · Avoid clothes that fit tightly.  Follow-up care  Follow up with your healthcare provider, or as advised. Your rash will clear in 7 to 14 days. Call your healthcare provider if the rash is not gone after 14 days.  When to seek medical advice  Call your healthcare provider right away if any of these occur:  · Pain or redness that gets worse or spreads  · Fluid coming from the skin  · Yellow crusts on the skin  · Fever of 100.4°F (38°C) or higher, or as directed by your healthcare provider  Date Last Reviewed: 9/1/2016 © 2000-2016 [employer]. [address] [place] [address], [place] [address]. All rights reserved. This information is not intended as a substitute for professional medical care. Always follow your healthcare professional's instructions.        Fungal Skin Infection (Tinea)  A fungal infection is when  too much fungus grows on or in the body. Fungus normally lives on the skin in small amounts and does not cause harm. But when too much grows on the skin, it causes an infection. This is also known as tinea. Fungal skin infections are common and not often serious.  The infection often starts as a small red area the size of a pea. The skin may turn dry and flaky. The area may itch. As the fungus grows, it spreads out in a red [place]. Because of how it looks, fungal skin infection is often called ringworm, but it is not caused by a worm. Fungal skin infections can occur on many parts of the body. They can grow on the head, chest, arms, or legs. They can occur on the buttocks. On the feet, fungal infection is known as athletes foot. It causes itchy, sometimes painful sores between the toes and the bottom or sides of the feet. In the groin, the rash is called jock itch.  People with weakened immune systems can get a fungal infection more easily. This includes people with diabetes or HIV, or who are being treated for cancer. In these cases, the fungal infection can spread and cause severe illness. Fungal infections are also more common in people who are obese.  In most cases, treatment is done with antifungal cream or ointment. If the infection is on your scalp, you may take oral medication. In some cases, a tiny piece of the skin (biopsy) may be taken. This is so it can be tested in a lab.  Common fungal infections are treated with creams on the skin or oral medicine.  Home care  Follow all instructions when using antifungal cream or ointment on your skin. The health care provider may advise using cornstarch powder to keep your skin dry or petroleum jelly to provide a barrier.  General care:  · If you were prescribed an oral medicine, read the patient information. Talk with the health care provider about the risks and side effects.  · Let your skin dry completely after bathing. Carefully dry your feet and between  your toes.  · Dress in loose cotton clothing.  · Dont scratch the affected area. This can delay healing and may spread the infection. It can also cause a bacterial infection.  · Keep your skin clean, but dont wash the skin too much. This can irritate your skin.  · Keep in mind that it may take a week before the fungus starts to go away. It can take 2 to 4 weeks to fully clear. To prevent it from coming back, use the medicine until the rash is all gone.  Follow-up care  Follow up with your health care provider if the rash does not get better after 10 days of treatment. Also follow up if the rash spreads to other parts of your body.  When to seek medical advice  Call your health care provider right away if any of these occur:  · Fever of 100.4°F (38°C) or higher  · Redness or swelling that gets worse  · Pain that gets worse  · Foul-smelling fluid leaking from the skin  Date Last Reviewed: 7/23/2014  © 3380-9281 [employer]. [address] [place] [address], [place] [address]. All rights reserved. This information is not intended as a substitute for professional medical care. Always follow your healthcare professional's instructions.

## 2017-07-07 LAB
ESTIMATED AVG GLUCOSE: 128 MG/DL
HBA1C MFR BLD HPLC: 6.1 %

## 2017-07-13 ENCOUNTER — OFFICE VISIT (OUTPATIENT)
Dept: INTERNAL MEDICINE | Facility: CLINIC | Age: 67
End: 2017-07-13
Payer: MEDICARE

## 2017-07-13 VITALS
WEIGHT: 171.06 LBS | OXYGEN SATURATION: 96 % | HEART RATE: 54 BPM | HEIGHT: 63 IN | TEMPERATURE: 96 F | SYSTOLIC BLOOD PRESSURE: 118 MMHG | DIASTOLIC BLOOD PRESSURE: 72 MMHG | BODY MASS INDEX: 30.31 KG/M2

## 2017-07-13 DIAGNOSIS — R05.9 COUGH: Primary | ICD-10-CM

## 2017-07-13 PROCEDURE — 99213 OFFICE O/P EST LOW 20 MIN: CPT | Mod: PBBFAC,PO | Performed by: FAMILY MEDICINE

## 2017-07-13 PROCEDURE — 1159F MED LIST DOCD IN RCRD: CPT | Mod: ,,, | Performed by: FAMILY MEDICINE

## 2017-07-13 PROCEDURE — 1126F AMNT PAIN NOTED NONE PRSNT: CPT | Mod: ,,, | Performed by: FAMILY MEDICINE

## 2017-07-13 PROCEDURE — 99499 UNLISTED E&M SERVICE: CPT | Mod: S$PBB,,, | Performed by: FAMILY MEDICINE

## 2017-07-13 PROCEDURE — 99214 OFFICE O/P EST MOD 30 MIN: CPT | Mod: S$PBB,,, | Performed by: FAMILY MEDICINE

## 2017-07-13 PROCEDURE — 99999 PR PBB SHADOW E&M-EST. PATIENT-LVL III: CPT | Mod: PBBFAC,,, | Performed by: FAMILY MEDICINE

## 2017-07-13 RX ORDER — BUDESONIDE 0.5 MG/2ML
INHALANT ORAL
Qty: 60 ML | Refills: 5 | Status: SHIPPED | OUTPATIENT
Start: 2017-07-13 | End: 2017-10-03

## 2017-07-13 RX ORDER — MUPIROCIN 20 MG/G
OINTMENT TOPICAL 2 TIMES DAILY
Qty: 1 TUBE | Refills: 2 | Status: SHIPPED | OUTPATIENT
Start: 2017-07-13 | End: 2017-07-23

## 2017-07-13 NOTE — PROGRESS NOTES
Subjective:       Patient ID: Kelsie Bustamante is a female.    Chief Complaint: Multiple issues see below    HPI Type 2 diabetesw neurl manif: a1c6.1. Tolerating medicine. 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 She notices appet decreases temporarily after janumet. She likes it but more costly;wt loss started after starting     Hypertension: blood pressures at home normal. Tolerating medicine.   Hypercholesterolemia: controlled. .stopped statin sec to aches 2 mos after starting. Ok w trying crestor but didn't start  Arthralgias much less with cymbalta    Few months pruritic rash left forearm , left antknee and left ant lower ext; no steroids tried. Oral abx did almost resolve but returned after d/c;has late aug derm appt      Couple months clear nasal ellie, nonprod cough subj fver . No sob. intermitt achey; no sneezing;'no hx similar ;no sinus pressure(no change in sympt w duracef and currenty on doxycy);mostly cough occurs overnight. Thru day ok;no reflux;no longer taking pulmicort;last yr very similar symptoms dr rodriguez and pulmicort neb resolved;diff with neb machine working    Thy nod spring 16 subcm due couple yr    Past Medical History   Diagnosis Date    Hypertension     Morbid obesity     Gastroesophageal reflux     Sinusitis     Hiatal hernia     Type 2 diabetes mellitus with neurological manifestations     Peripheral neuropathy     Achalasia     Postmenopausal HRT (hormone replacement therapy)      failed tapering off     Past Surgical History   Procedure Laterality Date    Hysterectomy       nonca    Tonsillectomy      Cholecystectomy       section       Family History   Problem Relation Age of Onset    Diabetes Father     Coronary artery disease Father     Aneurysm Sister     Coronary artery disease Brother      Parkinsonism Brother      History     Social History    Marital Status:      Spouse Name: N/A     Number of Children: N/A    Years of Education: N/A     Social History Main Topics    Smoking status: Never Smoker     Smokeless tobacco: Never Used    Alcohol Use: No    Drug Use: No    Sexual Activity: None     Other Topics Concern    None     Social History Narrative         Cardiovascular: no chest pain  Chest: no shortness of breath  Abd: no abd pain  Remainder review of systems negative      Objective:      Physical Exam   Constitutional: She is oriented to person, place, and time. She appears well-developed and well-nourished.   HENT: o/p and ears clear  Head: Normocephalic and atraumatic.   Neck: Normal range of motion. Neck supple. Carotid bruit is not present.   Cardiovascular: Normal rate and regular rhythm.    Pulmonary/Chest: Effort normal and breath sounds normal.   Lymphadenopathy:     She has no cervical adenopathy.   Neurological: She is alert and oriented to person, place, and time.   Skin: Skin is warm and dry. left forearm , left antknee and left ant lower ext withpapular (few follicular areas) rash  Psychiatric: She has a normal mood and affect. Her behavior is normal. Judgment normal.   Nursing note and vitals reviewed.    Bilateral feet with decreased monofilament testing and no lesions.    Assessment:     type 2 dm w neural complic   hyperchol   2. Essential hypertension      statin side eff  rash  Plan:    *since responded to po abx can try bactroban until sees derm w/in next few weeks  Complete abx  Can try otc alleg  Check temp at home. Notify if over 100  Cough  -     Cancel: X-Ray Chest PA And Lateral; Future; Expected date: 07/13/2017  -     NEBULIZER FOR HOME USE  -     NEBULIZER KIT (SUPPLIES) FOR HOME USE    Other orders  -     mupirocin (BACTROBAN) 2 % ointment; Apply topically 2 (two) times daily.  Dispense: 1 Tube; Refill: 2  -     budesonide (PULMICORT) 0.5 mg/2 mL  nebulizer solution; 2 mls (0.5 mg)  twice a day via nebulizer  Dispense: 60 mL; Refill: 5      F/u 4 weeks:check cough/wt   sched kathleen a1c after that

## 2017-07-19 ENCOUNTER — TELEPHONE (OUTPATIENT)
Dept: INTERNAL MEDICINE | Facility: CLINIC | Age: 67
End: 2017-07-19

## 2017-07-19 NOTE — TELEPHONE ENCOUNTER
----- Message from Samanta Hussein sent at 7/19/2017  2:43 PM CDT -----  Contact: the Dermatology clinic   Call caller regarding questions about one of pt med. Caller states that they need to see when pt started Janumet cause she might be having a allergic reaction to it.    ..call caller at [phone] and have them to savana [facility].

## 2017-07-21 ENCOUNTER — TELEPHONE (OUTPATIENT)
Dept: INTERNAL MEDICINE | Facility: CLINIC | Age: 67
End: 2017-07-21

## 2017-07-21 NOTE — TELEPHONE ENCOUNTER
----- Message from Giulia Alvarez sent at 7/21/2017 10:25 AM CDT -----  1. What is the name of the medication you are requesting? Janumet  2. What is the dose? 50/1000  3. How do you take the medication? Orally, topically, etc? orally  4. How often do you take this medication? Takes twice daily  5. Do you need a 30 day or 90 day supply? 90 day  6. How many refills are you requesting? 3  7. What is your preferred pharmacy and location of the pharmacy? [facility]  Fax is [phone]  8. Who can we contact with further questions? Her at [phone]

## 2017-07-21 NOTE — TELEPHONE ENCOUNTER
Last visit 4/6/17. Last refill 2/2/17. Pt needs refill for Janumet but needs printed prescription to be faxed to [facility] online. Told pt that I would discuss with on call doctor and return her call. Pt verbalized understanding.

## 2017-07-24 ENCOUNTER — TELEPHONE (OUTPATIENT)
Dept: INTERNAL MEDICINE | Facility: CLINIC | Age: 67
End: 2017-07-24

## 2017-07-24 NOTE — TELEPHONE ENCOUNTER
----- Message from Payal Gracia sent at 7/24/2017 12:41 PM CDT -----  Contact: pt  The pt states she needs a 3 mth prescription on Janumet faxed to [facility] at [phone] order# [id number], pt can be reached at

## 2017-08-10 ENCOUNTER — OFFICE VISIT (OUTPATIENT)
Dept: INTERNAL MEDICINE | Facility: CLINIC | Age: 67
End: 2017-08-10
Payer: MEDICARE

## 2017-08-10 VITALS
DIASTOLIC BLOOD PRESSURE: 60 MMHG | BODY MASS INDEX: 30.2 KG/M2 | HEART RATE: 53 BPM | TEMPERATURE: 97 F | SYSTOLIC BLOOD PRESSURE: 110 MMHG | WEIGHT: 170.44 LBS | HEIGHT: 63 IN

## 2017-08-10 DIAGNOSIS — E04.1 THYROID NODULE: ICD-10-CM

## 2017-08-10 DIAGNOSIS — E11.49 TYPE 2 DIABETES WITH NEURAL COMPLICATION: Primary | Chronic | ICD-10-CM

## 2017-08-10 DIAGNOSIS — I10 ESSENTIAL HYPERTENSION: Chronic | ICD-10-CM

## 2017-08-10 PROCEDURE — 99214 OFFICE O/P EST MOD 30 MIN: CPT | Mod: S$PBB,,, | Performed by: FAMILY MEDICINE

## 2017-08-10 PROCEDURE — 1159F MED LIST DOCD IN RCRD: CPT | Mod: ,,, | Performed by: FAMILY MEDICINE

## 2017-08-10 PROCEDURE — 3074F SYST BP LT 130 MM HG: CPT | Mod: ,,, | Performed by: FAMILY MEDICINE

## 2017-08-10 PROCEDURE — 4010F ACE/ARB THERAPY RXD/TAKEN: CPT | Mod: ,,, | Performed by: FAMILY MEDICINE

## 2017-08-10 PROCEDURE — 3044F HG A1C LEVEL LT 7.0%: CPT | Mod: ,,, | Performed by: FAMILY MEDICINE

## 2017-08-10 PROCEDURE — 99499 UNLISTED E&M SERVICE: CPT | Mod: S$PBB,,, | Performed by: FAMILY MEDICINE

## 2017-08-10 PROCEDURE — 3078F DIAST BP <80 MM HG: CPT | Mod: ,,, | Performed by: FAMILY MEDICINE

## 2017-08-10 PROCEDURE — 1126F AMNT PAIN NOTED NONE PRSNT: CPT | Mod: ,,, | Performed by: FAMILY MEDICINE

## 2017-08-10 PROCEDURE — 99999 PR PBB SHADOW E&M-EST. PATIENT-LVL III: CPT | Mod: PBBFAC,,, | Performed by: FAMILY MEDICINE

## 2017-08-10 PROCEDURE — 3008F BODY MASS INDEX DOCD: CPT | Mod: ,,, | Performed by: FAMILY MEDICINE

## 2017-08-10 PROCEDURE — 99213 OFFICE O/P EST LOW 20 MIN: CPT | Mod: PBBFAC,PO | Performed by: FAMILY MEDICINE

## 2017-08-10 RX ORDER — TRIAMCINOLONE ACETONIDE 1 MG/G
CREAM TOPICAL
Refills: 1 | COMMUNITY
Start: 2017-07-19 | End: 2017-10-03 | Stop reason: DRUGHIGH

## 2017-08-10 NOTE — PROGRESS NOTES
Subjective:       Patient ID: Kelsie Bustamante is a female.    Chief Complaint: Multiple issues see below    HPI Type 2 diabetesw neurl manif: a1cok. Tolerating medicine. 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 She notices appet decreases temporarily after janumet. She likes it but more costly;wt loss started after starting janumt    Hypertension: blood pressures at home normal. Tolerating medicine.   Hypercholesterolemia: controlled. .stopped statin sec to aches 2 mos after starting. Ok w trying crestor but didn't start  Arthralgias much less with cymbalta    Rash better thought to be allergy. Saw dr chacon derm and bx done    Cough resolved          Thy nod spring 16 subcm due couple yr    Past Medical History   Diagnosis Date    Hypertension     Morbid obesity     Gastroesophageal reflux     Sinusitis     Hiatal hernia     Type 2 diabetes mellitus with neurological manifestations     Peripheral neuropathy     Achalasia     Postmenopausal HRT (hormone replacement therapy)      failed tapering off     Past Surgical History   Procedure Laterality Date    Hysterectomy       nonca    Tonsillectomy      Cholecystectomy       section       Family History   Problem Relation Age of Onset    Diabetes Father     Coronary artery disease Father     Aneurysm Sister     Coronary artery disease Brother     Parkinsonism Brother      History     Social History    Marital Status:      Spouse Name: N/A     Number of Children: N/A    Years of Education: N/A     Social History Main Topics    Smoking status: Never Smoker     Smokeless tobacco: Never Used    Alcohol Use: No    Drug Use: No    Sexual Activity: None     Other Topics Concern    None     Social History Narrative         Cardiovascular: no chest pain  Chest: no shortness of  breath  Abd: no abd pain  Remainder review of systems negative      Objective:      Physical Exam   Constitutional: She is oriented to person, place, and time. She appears well-developed and well-nourished.   HENT: o/p and ears clear  Head: Normocephalic and atraumatic.   Neck: Normal range of motion. Neck supple. Carotid bruit is not present.   Cardiovascular: Normal rate and regular rhythm.    Pulmonary/Chest: Effort normal and breath sounds normal.   Lymphadenopathy:     She has no cervical adenopathy.   Neurological: She is alert and oriented to person, place, and time.   Skin: Skin is warm and dry.   Psychiatric: She has a normal mood and affect. Her behavior is normal. Judgment normal.   Nursing note and vitals reviewed.        Assessment:     type 2 dm w neural complic   hyperchol   2. Essential hypertension      statin side eff  Stable wt loss  Plan:    has appt dr bucio  Lab and follow up after in 6 months  Nurse wt check 8 weeks  Type 2 diabetes with neural complication  -     Lipid panel; Future; Expected date: 02/06/2018  -     Basic metabolic panel; Future; Expected date: 02/06/2018  -     Microalbumin/creatinine urine ratio; Future; Expected date: 02/06/2018  -     Hemoglobin A1c; Future; Expected date: 02/06/2018    Essential hypertension    Thyroid nodule

## 2017-09-18 ENCOUNTER — OFFICE VISIT (OUTPATIENT)
Dept: INTERNAL MEDICINE | Facility: CLINIC | Age: 67
End: 2017-09-18
Payer: MEDICARE

## 2017-09-18 VITALS
HEIGHT: 63 IN | DIASTOLIC BLOOD PRESSURE: 69 MMHG | RESPIRATION RATE: 16 BRPM | TEMPERATURE: 97 F | SYSTOLIC BLOOD PRESSURE: 163 MMHG | OXYGEN SATURATION: 100 % | HEART RATE: 57 BPM | WEIGHT: 170.19 LBS | BODY MASS INDEX: 30.16 KG/M2

## 2017-09-18 DIAGNOSIS — M54.6 ACUTE RIGHT-SIDED THORACIC BACK PAIN: Primary | ICD-10-CM

## 2017-09-18 DIAGNOSIS — H81.11 BPPV (BENIGN PAROXYSMAL POSITIONAL VERTIGO), RIGHT: ICD-10-CM

## 2017-09-18 DIAGNOSIS — I10 ESSENTIAL HYPERTENSION: Chronic | ICD-10-CM

## 2017-09-18 PROCEDURE — 99999 PR PBB SHADOW E&M-EST. PATIENT-LVL III: CPT | Mod: PBBFAC,,, | Performed by: FAMILY MEDICINE

## 2017-09-18 PROCEDURE — 3077F SYST BP >= 140 MM HG: CPT | Mod: ,,, | Performed by: FAMILY MEDICINE

## 2017-09-18 PROCEDURE — 3078F DIAST BP <80 MM HG: CPT | Mod: ,,, | Performed by: FAMILY MEDICINE

## 2017-09-18 PROCEDURE — 99499 UNLISTED E&M SERVICE: CPT | Mod: S$PBB,,, | Performed by: FAMILY MEDICINE

## 2017-09-18 PROCEDURE — 1159F MED LIST DOCD IN RCRD: CPT | Mod: ,,, | Performed by: FAMILY MEDICINE

## 2017-09-18 PROCEDURE — 99214 OFFICE O/P EST MOD 30 MIN: CPT | Mod: S$PBB,,, | Performed by: FAMILY MEDICINE

## 2017-09-18 PROCEDURE — 1125F AMNT PAIN NOTED PAIN PRSNT: CPT | Mod: ,,, | Performed by: FAMILY MEDICINE

## 2017-09-18 PROCEDURE — 99213 OFFICE O/P EST LOW 20 MIN: CPT | Mod: PBBFAC,PO | Performed by: FAMILY MEDICINE

## 2017-09-18 RX ORDER — MECLIZINE HCL 12.5 MG 12.5 MG/1
12.5 TABLET ORAL 3 TIMES DAILY PRN
Qty: 30 TABLET | Refills: 1 | Status: SHIPPED | OUTPATIENT
Start: 2017-09-18 | End: 2018-05-29

## 2017-09-18 RX ORDER — NAPROXEN 500 MG/1
500 TABLET ORAL 2 TIMES DAILY WITH MEALS
Qty: 14 TABLET | Refills: 0 | Status: SHIPPED | OUTPATIENT
Start: 2017-09-18 | End: 2017-09-25

## 2017-09-18 RX ORDER — ESTERIFIED ESTROGEN AND METHYLTESTOSTERONE .625; 1.25 MG/1; MG/1
TABLET ORAL
Qty: 90 TABLET | Refills: 1 | Status: SHIPPED | OUTPATIENT
Start: 2017-09-18 | End: 2017-09-29

## 2017-09-18 NOTE — PATIENT INSTRUCTIONS
If not improving after a week, please let us know. Would consider xray      Benign Paroxysmal Positional Vertigo     Your health care provider may move your head in certain ways to treat your BPPV.     Benign paroxysmal positional vertigo (BPPV) is a problem with the inner ear. The inner ear contains the vestibular system. This system is what helps you keep your balance. BPPV causes a feeling of spinning. It is a common problem of the vestibular system.  Understanding the vestibular system  The vestibular system of the ear is made up of very tiny parts. They include the utricle, saccule, and semicircular canals. The utricle is a tiny organ that contains calcium crystals. In some people, the crystals can move into the semicircular canals. When this happens, the system no longer works as it should. This causes BPPV. Benign means it is not life-threatening. Paroxysmal means it happens suddenly. Positional means that it happens when you move your head. Vertigo is a feeling of spinning.  What causes BPPV?  Causes include injury to your head or neck. Other problems with the vestibular system may cause BPPV. In many people, the cause of BPPV is not known.  Symptoms of BPPV  You many have repeated feelings of spinning (vertigo). The vertigo usually lasts less than 1 minute. Some movements, suchas rolling over in bed, can bring on vertigo.  Diagnosing BPPV  Your primary health care provider may diagnose and treat your BPPV. Or you may see an ear, nose, and throat doctor (otolaryngologist). In some cases, you may see a nervous system doctor (neurologist).  The health care provider will ask about your symptoms and your medical history. He or she will examine you. You may have hearing and balance tests. As part of the exam, your health care provider may have you move your head and body in certain ways. If you have BPPV, the movements can bring on vertigo. Your provider will also look for abnormal movements of your eyes. You may  have other tests to check your vestibular or nervous systems.  Treatment for BPPV  Your health care provider may try to move the calcium crystals. This is done by having you move your head and neck in certain ways. This treatment is safe and often works well. You may also be told to do these movements at home. You may still have vertigo for a few weeks. Your health care provider will recheck your symptoms, usually in about a month. Special physical therapy may also be part of treatment. In rare cases surgery may be needed for BPPV that does not go away.     When to call the health care provider  Call your health care provider right away if you have any of these:  · Symptoms that do not go away with treatment  · Symptoms that get worse  · New symptoms   Date Last Reviewed: 3/19/2015  © 5680-9914 [employer]. [address] [place] [address], [place] [address]. All rights reserved. This information is not intended as a substitute for professional medical care. Always follow your healthcare professional's instructions.

## 2017-09-18 NOTE — PROGRESS NOTES
Subjective:       Patient ID: Kelsie Bustamante is a 67 y.o. female.    Chief Complaint: Medication Refill; Dizziness; and Back Pain    HPI  Here today for concerns in regards to back pain and that started suddenly about 3 weeks ago.  She did not have any particular injury or other inciting event.  Since that time she has been doing some heavy lifting to help clear out a camper but she was having issues before that.  She has tried heating pad and ibuprofen which has not helped very much.  She says that the pain is in her middle back and sometimes will shoot laterally towards the right side.  Not have any numbness or tingling nor radiation down her legs.  Also for the last 3 weeks has been having intermittent vertigo.  The first day that it started she was having sensation that the room was spinning but since that time has not had the severity of symptoms but when changing position or turning her head to the right side she has worsening of symptoms.  She had a previous episode of vertigo about 20 years ago which was severe enough that she had to go to the emergency room.    Of note, she has been doing very well with Cymbalta which was started last year for diffuse muscle aches and joint aches.  She is very thankful for this medication.    Family History   Problem Relation Age of Onset    Aneurysm Sister     Diabetes Father     Coronary artery disease Father     Coronary artery disease Brother     Parkinsonism Brother        Current Outpatient Prescriptions:     aspirin (ECOTRIN) 81 MG EC tablet, Take 81 mg by mouth once daily., Disp: , Rfl:     blood sugar diagnostic (BLOOD GLUCOSE TEST) Strp, Test 2 times daily, Disp: 200 strip, Rfl: 3    blood-glucose meter kit, Test twice daily, Disp: 1 each, Rfl: 0    duloxetine (CYMBALTA) 20 MG capsule, Take 1 capsule (20 mg total) by mouth once daily., Disp: 90 capsule, Rfl: 3    lancets Misc, Test twice daily, Disp: 200 each, Rfl: 3    losartan-hydrochlorothiazide  50-12.5 mg (HYZAAR) 50-12.5 mg per tablet, Take 1 tablet by mouth once daily., Disp: 90 tablet, Rfl: 3    metoprolol tartrate (LOPRESSOR) 50 MG tablet, Take 1 tablet (50 mg total) by mouth 2 (two) times daily., Disp: 180 tablet, Rfl: 3    SITagliptan-metformin (JANUMET) 50-1,000 mg per tablet, Take 1 tablet by mouth 2 (two) times daily with meals., Disp: 180 tablet, Rfl: 0    zolpidem (AMBIEN) 10 mg Tab, 1/2 to 1 qhs prn (Patient taking differently: Take 10 mg by mouth every evening. 1/2 to 1 qhs prn), Disp: 90 tablet, Rfl: 1    budesonide (PULMICORT) 0.5 mg/2 mL nebulizer solution, 2 mls (0.5 mg)  twice a day via nebulizer, Disp: 60 mL, Rfl: 5    estrogens,conjugated,-methyltestosterone 0.625-1.25mg (ESTRATEST HS) 0.625-1.25 mg per tablet, Take one tablet 3 times per week, Disp: 90 tablet, Rfl: 1    meclizine (ANTIVERT) 12.5 mg tablet, Take 1 tablet (12.5 mg total) by mouth 3 (three) times daily as needed for Dizziness., Disp: 30 tablet, Rfl: 1    montelukast (SINGULAIR) 10 mg tablet, Take 1 tablet (10 mg total) by mouth every evening., Disp: 90 tablet, Rfl: 3    naproxen (EC NAPROSYN) 500 MG EC tablet, Take 1 tablet (500 mg total) by mouth 2 (two) times daily with meals., Disp: 14 tablet, Rfl: 0    omeprazole (PRILOSEC) 20 MG capsule, Take 1 capsule (20 mg total) by mouth 2 (two) times daily., Disp: 90 capsule, Rfl: 3    triamcinolone acetonide 0.1% (KENALOG) 0.1 % cream, APPLY ON THE SKIN BID FOR 2 WEEKS DO NOT APPLY TO FACE OR GROIN, Disp: , Rfl: 1    Review of Systems   Constitutional: Negative for chills and fever.   Respiratory: Negative for cough and shortness of breath.    Cardiovascular: Negative for chest pain.   Gastrointestinal: Negative for abdominal pain.   Musculoskeletal: Positive for back pain.   Skin: Negative for rash.   Neurological: Positive for dizziness.       Objective:   BP (!) 163/69 (BP Location: Left arm, Patient Position: Sitting, BP Method: Large (Automatic))   Pulse (!) 57   " Temp 96.7 °F (35.9 °C) (Tympanic)   Resp 16   Ht 5' 3" (1.6 m)   Wt 77.2 kg (170 lb 3.1 oz)   SpO2 100%   BMI 30.15 kg/m²      Physical Exam   Constitutional: She is oriented to person, place, and time. She appears well-developed and well-nourished.   HENT:   Head: Normocephalic and atraumatic.   Eyes: Conjunctivae are normal.   Cardiovascular: Normal rate.    Pulmonary/Chest: Effort normal. No respiratory distress.   Musculoskeletal: She exhibits no edema.        Back:    Neurological: She is alert and oriented to person, place, and time. Coordination normal.   Lateral gaze test positive for the right side.   Skin: Skin is warm and dry. No rash noted.   Psychiatric: She has a normal mood and affect. Her behavior is normal.   Vitals reviewed.      Assessment & Plan     1. Acute right-sided thoracic back pain  Her symptoms seem consistent with lower thoracic facet inflammation or something else that is causing the shooting pain in certain positions.  I recommended a short course of naproxen to be taken along with omeprazole.  If symptoms not improving at all would consider plain film imaging initially.  Recommended she do range of motion exercises and gentle stretches to prevent stiffness    2. BPPV (benign paroxysmal positional vertigo), right  Demonstrated how to do Epley maneuver and provided her with hand out to be taken at home.  Also recommended use of meclizine for the next few days.    3. Essential hypertension  Later today however she is having acute back pain.  Was well-controlled a few weeks ago with Dr. Parks      "

## 2017-09-19 ENCOUNTER — TELEPHONE (OUTPATIENT)
Dept: INTERNAL MEDICINE | Facility: CLINIC | Age: 67
End: 2017-09-19

## 2017-09-19 NOTE — TELEPHONE ENCOUNTER
Spoke with pt and she said that her pharmacy wants to charge her $400 for the estrogen that  prescribed her and she cant pay that. She found a [facility] pharmacy that said she can get a Rx called eshari .625mg which is like the generic version of premarin that will be $39 for a 3 month supply. The pt does not know the name of the pharmacy and went off to a camp. She will try to call us back and give us more info on the pharmacy.

## 2017-09-19 NOTE — TELEPHONE ENCOUNTER
----- Message from Randy Arellano sent at 9/19/2017 12:17 PM CDT -----  Contact: Pt   Pt called in regards to prescription that she received on yesterday pt states she can not get it filled would like call back to discuss...[phone]

## 2017-09-28 ENCOUNTER — TELEPHONE (OUTPATIENT)
Dept: INTERNAL MEDICINE | Facility: CLINIC | Age: 67
End: 2017-09-28

## 2017-09-28 NOTE — TELEPHONE ENCOUNTER
----- Message from Giulia Alvarez sent at 9/28/2017 12:12 PM CDT -----  Pt at [phone]//states she was given a prescription that her insurance does not cover///med is Estrogen////would like to change script to Premarin .625mg//will use a [facility]//fax is [phone]//the order number is [id number]//please call//rosanna/marbin

## 2017-09-29 ENCOUNTER — TELEPHONE (OUTPATIENT)
Dept: INTERNAL MEDICINE | Facility: CLINIC | Age: 67
End: 2017-09-29

## 2017-09-29 NOTE — TELEPHONE ENCOUNTER
We are not allowed to send rxs to a [place] pharmacy.   I can print the premarin and we can give to her  I also recommend trying 1/2 tab of the 0.625  To see if that prevents her postmenopausal symptoms

## 2017-09-29 NOTE — TELEPHONE ENCOUNTER
I don't know if I have ever discussed the use of this medication with Kelsie. I am reluctant to refill it due to known potential for negative side effects from long term use.   Additionally, I would not support getting it from [facility] pharmacy. She should come in for further discussion so we can come up with a plan.

## 2017-09-29 NOTE — TELEPHONE ENCOUNTER
Pt ask if you can send rx to [facility] premarin 0.625 [id number] order #[id number]. Pt no longer take estratest HS because the [facility] pharmacy did not have it. pls print the new rx so it can be faxed.

## 2017-09-29 NOTE — TELEPHONE ENCOUNTER
----- Message from Talon Yun sent at 9/29/2017 10:17 AM CDT -----  Contact: Self- [phone]  Pt would like a Rx for  premarin and would like to consult with the nurse, please call  Back at [phone]. Thx-AMH

## 2017-09-29 NOTE — TELEPHONE ENCOUNTER
Spoke with pt and informed her per       He does not know if I have ever discussed the use of this medication with her. He is reluctant to refill it due to known potential for negative side effects from long term use.   Additionally, He would not support getting it from a [facility] pharmacy. She should come in for further discussion so we can come up with a plan.           Pt said she is upset with  that he does not want to use the [facility] pharmacy that it cost $400 dollars here for her Rx. That she has been taking Premarin since she was 33 years old. She said she wanted to see . I told her his next appt was 10/27/17. She might want to call his office. Pt said she would.

## 2017-10-02 NOTE — TELEPHONE ENCOUNTER
----- Message from Julio Gordon sent at 10/2/2017 10:52 AM CDT -----  Contact: [phone]  Would like to consult with nurse about script Hillary .625 was faxed to [facility]. Would like script to be sent to [facility] online. Please call pt back at [phone]. To confirm new pharmacy. Thx. lj

## 2017-10-02 NOTE — TELEPHONE ENCOUNTER
Spoke with pt and will get her rx at nurse appt tomorrow. Pt did state she only take the premarin 3 days a week.

## 2017-10-03 ENCOUNTER — OFFICE VISIT (OUTPATIENT)
Dept: INTERNAL MEDICINE | Facility: CLINIC | Age: 67
End: 2017-10-03
Payer: MEDICARE

## 2017-10-03 ENCOUNTER — TELEPHONE (OUTPATIENT)
Dept: INTERNAL MEDICINE | Facility: CLINIC | Age: 67
End: 2017-10-03

## 2017-10-03 ENCOUNTER — HOSPITAL ENCOUNTER (OUTPATIENT)
Dept: RADIOLOGY | Facility: HOSPITAL | Age: 67
Discharge: HOME | End: 2017-10-03
Attending: FAMILY MEDICINE
Payer: MEDICARE

## 2017-10-03 ENCOUNTER — CLINICAL SUPPORT (OUTPATIENT)
Dept: INTERNAL MEDICINE | Facility: CLINIC | Age: 67
End: 2017-10-03
Payer: MEDICARE

## 2017-10-03 VITALS
BODY MASS INDEX: 29.54 KG/M2 | TEMPERATURE: 98 F | RESPIRATION RATE: 16 BRPM | OXYGEN SATURATION: 95 % | HEART RATE: 65 BPM | SYSTOLIC BLOOD PRESSURE: 151 MMHG | WEIGHT: 166.69 LBS | DIASTOLIC BLOOD PRESSURE: 67 MMHG | HEIGHT: 63 IN

## 2017-10-03 VITALS — WEIGHT: 166.69 LBS | BODY MASS INDEX: 29.52 KG/M2

## 2017-10-03 DIAGNOSIS — J40 BRONCHITIS: Primary | ICD-10-CM

## 2017-10-03 DIAGNOSIS — J40 BRONCHITIS: ICD-10-CM

## 2017-10-03 DIAGNOSIS — L30.9 DERMATITIS: ICD-10-CM

## 2017-10-03 PROCEDURE — 99999 PR PBB SHADOW E&M-EST. PATIENT-LVL III: CPT | Mod: PBBFAC,,, | Performed by: FAMILY MEDICINE

## 2017-10-03 PROCEDURE — 99214 OFFICE O/P EST MOD 30 MIN: CPT | Mod: S$PBB,,, | Performed by: FAMILY MEDICINE

## 2017-10-03 PROCEDURE — 99213 OFFICE O/P EST LOW 20 MIN: CPT | Mod: PBBFAC,25,PO | Performed by: FAMILY MEDICINE

## 2017-10-03 PROCEDURE — 71020 XR CHEST PA AND LATERAL: CPT | Mod: 26,,, | Performed by: RADIOLOGY

## 2017-10-03 PROCEDURE — 71020 XR CHEST PA AND LATERAL: CPT | Mod: TC,PO

## 2017-10-03 RX ORDER — ALBUTEROL SULFATE 90 UG/1
2 AEROSOL, METERED RESPIRATORY (INHALATION) EVERY 6 HOURS PRN
Qty: 18 G | Refills: 0 | Status: SHIPPED | OUTPATIENT
Start: 2017-10-03 | End: 2018-08-13

## 2017-10-03 RX ORDER — TRIAMCINOLONE ACETONIDE 5 MG/G
CREAM TOPICAL 2 TIMES DAILY
Qty: 15 G | Refills: 1 | Status: CANCELLED | OUTPATIENT
Start: 2017-10-03 | End: 2017-10-13

## 2017-10-03 RX ORDER — METHYLPREDNISOLONE 4 MG/1
TABLET ORAL
Qty: 1 PACKAGE | Refills: 0 | Status: SHIPPED | OUTPATIENT
Start: 2017-10-03 | End: 2017-10-24

## 2017-10-03 RX ORDER — TRIAMCINOLONE ACETONIDE 5 MG/G
OINTMENT TOPICAL 2 TIMES DAILY
Qty: 15 G | Refills: 0 | Status: SHIPPED | OUTPATIENT
Start: 2017-10-03 | End: 2018-05-29 | Stop reason: SDUPTHER

## 2017-10-03 NOTE — TELEPHONE ENCOUNTER
----- Message from Pat Hooker LPN sent at 10/3/2017  9:56 AM CDT -----  Pt came in for weight check. Pt weight 75.6kg (166lbs)

## 2017-10-03 NOTE — TELEPHONE ENCOUNTER
Spoke with pt and informed her per  that her xray is normal and he sent in the medrol dosepack and inhaler to her pharmcy.Pt verbalized understanding

## 2017-10-03 NOTE — MEDICAL/APP STUDENT
Subjective:       Patient ID: Kelsie Bustamante is a 67 y.o. female.    Chief Complaint: Cough; Nasal Congestion; and Fever    Pt presents with complaints of fevers (101), chills, malaise, congestion, slightly productive cough, sore throat, shortness of breath, wheezing - predominantly at night, general fatigue and weakness with headache and decreased activity. Patient has tried mucinex, ibuprofen, and nyquil - has helped some but patient is still feeling poor.     Skin rash with ulcerations and pruritis on anterior lower left leg. Pt previously had success with area after using triamcinolone ointment 0.1%      Review of Systems   Constitutional: Positive for activity change, appetite change, chills, diaphoresis, fatigue and fever.   HENT: Positive for congestion, rhinorrhea and sore throat. Negative for postnasal drip, sinus pain, sinus pressure and sneezing.    Eyes: Negative for photophobia, pain and itching.   Respiratory: Positive for cough, shortness of breath and wheezing.    Cardiovascular: Negative for chest pain and palpitations.   Gastrointestinal: Negative for abdominal pain, constipation, diarrhea, nausea and vomiting.   Genitourinary: Negative for dysuria, frequency and urgency.   Musculoskeletal: Positive for myalgias. Negative for arthralgias.   Neurological: Positive for weakness and headaches.       Objective:      Physical Exam   Constitutional: She is oriented to person, place, and time. She appears well-developed and well-nourished.   HENT:   Head: Normocephalic and atraumatic.   Eyes: EOM are normal. Pupils are equal, round, and reactive to light.   Neck: Normal range of motion. Neck supple.   Cardiovascular: Normal rate, regular rhythm and normal heart sounds.    Pulmonary/Chest: Effort normal. She has wheezes.   Abdominal: Soft. Bowel sounds are normal.   Musculoskeletal: Normal range of motion.   Neurological: She is alert and oriented to person, place, and time.   Skin: Skin is warm and  dry. Rash noted.   Psychiatric: She has a normal mood and affect. Her behavior is normal. Thought content normal.       Assessment:       No diagnosis found.    Plan:       # Bronchitis  - Chest XR  - Albuterol   - Zyrtec and Flonase     # Skin Rash  - Triamcinolone     # Health Maintenance  - Flu vaccination once symptoms resolve      Pt discussed with and seen by Dr. Arlene Ernst, MS4

## 2017-10-03 NOTE — PROGRESS NOTES
Subjective:       Patient ID: Kelsie Bustamante is a 67 y.o. female.    Chief Complaint: Cough; Nasal Congestion; and Fever    Cough   This is a new problem. The current episode started in the past 7 days. The problem has been waxing and waning. The problem occurs every few minutes. The cough is productive of sputum. Associated symptoms include a fever, a rash, shortness of breath and wheezing. Pertinent negatives include no chest pain or chills. Exacerbated by: deep breathing. She has tried OTC cough suppressant for the symptoms. The treatment provided no relief. Her past medical history is significant for bronchitis.     Patient also has a rash on her left lower leg.  She has been treating this in the past with some triamcinolone and had mild improvement with this.  Unfortunately she ran out of the medication so did not get very long.  Was seen by dermatologist and had a biopsy done but there was no definitive diagnosis after that.    Family History   Problem Relation Age of Onset    Aneurysm Sister     Diabetes Father     Coronary artery disease Father     Coronary artery disease Brother     Parkinsonism Brother        Current Outpatient Prescriptions:     aspirin (ECOTRIN) 81 MG EC tablet, Take 81 mg by mouth once daily., Disp: , Rfl:     blood sugar diagnostic (BLOOD GLUCOSE TEST) Strp, Test 2 times daily, Disp: 200 strip, Rfl: 3    blood-glucose meter kit, Test twice daily, Disp: 1 each, Rfl: 0    budesonide (PULMICORT) 0.5 mg/2 mL nebulizer solution, 2 mls (0.5 mg)  twice a day via nebulizer, Disp: 60 mL, Rfl: 5    duloxetine (CYMBALTA) 20 MG capsule, Take 1 capsule (20 mg total) by mouth once daily., Disp: 90 capsule, Rfl: 3    estrogens, conjugated, (PREMARIN) 0.625 MG tablet, Take 1 tablet (0.625 mg total) by mouth once daily., Disp: 90 tablet, Rfl: 3    lancets Misc, Test twice daily, Disp: 200 each, Rfl: 3    losartan-hydrochlorothiazide 50-12.5 mg (HYZAAR) 50-12.5 mg per tablet, Take 1  "tablet by mouth once daily., Disp: 90 tablet, Rfl: 3    meclizine (ANTIVERT) 12.5 mg tablet, Take 1 tablet (12.5 mg total) by mouth 3 (three) times daily as needed for Dizziness., Disp: 30 tablet, Rfl: 1    metoprolol tartrate (LOPRESSOR) 50 MG tablet, Take 1 tablet (50 mg total) by mouth 2 (two) times daily., Disp: 180 tablet, Rfl: 3    montelukast (SINGULAIR) 10 mg tablet, Take 1 tablet (10 mg total) by mouth every evening., Disp: 90 tablet, Rfl: 3    omeprazole (PRILOSEC) 20 MG capsule, Take 1 capsule (20 mg total) by mouth 2 (two) times daily., Disp: 90 capsule, Rfl: 3    SITagliptan-metformin (JANUMET) 50-1,000 mg per tablet, Take 1 tablet by mouth 2 (two) times daily with meals., Disp: 180 tablet, Rfl: 0    zolpidem (AMBIEN) 10 mg Tab, 1/2 to 1 qhs prn (Patient taking differently: Take 10 mg by mouth every evening. 1/2 to 1 qhs prn), Disp: 90 tablet, Rfl: 1    Review of Systems   Constitutional: Positive for fatigue and fever. Negative for chills.   Respiratory: Positive for cough, shortness of breath and wheezing.    Cardiovascular: Negative for chest pain.   Gastrointestinal: Negative for abdominal pain.   Skin: Positive for rash.   Neurological: Negative for dizziness.       Objective:   BP (!) 151/67 (BP Location: Left arm, Patient Position: Sitting, BP Method: Medium (Automatic))   Pulse 65   Temp 98.2 °F (36.8 °C) (Oral)   Resp 16   Ht 5' 3" (1.6 m)   Wt 75.6 kg (166 lb 10.7 oz)   SpO2 95%   BMI 29.52 kg/m²      Physical Exam   Constitutional: She is oriented to person, place, and time. She appears well-developed and well-nourished.   HENT:   Head: Normocephalic and atraumatic.   Nose: Mucosal edema present.   Eyes: Conjunctivae are normal.   Cardiovascular: Normal rate.    Pulmonary/Chest: Effort normal. No respiratory distress. She has wheezes. She has rales.   Musculoskeletal: She exhibits no edema.   Neurological: She is alert and oriented to person, place, and time. Coordination normal. "   Skin: Skin is warm and dry. No rash noted.   Psychiatric: She has a normal mood and affect. Her behavior is normal.   Vitals reviewed.      Assessment & Plan     1. Bronchitis  Opted to do a chest x-ray because she is going out of town and she has had fever recently.  Wanted to rule out pneumonia, which upon review of x-ray, there was no signs of that.  We'll treat with albuterol as needed for wheezing and cough.  Also recommended using Flonase and Zyrtec for upper respiratory symptoms.  Provided with a Medrol Dosepak to help with symptoms.  Encouraged hydration and to follow-up if symptoms are worsening  - X-Ray Chest PA And Lateral; Future    2. Dermatitis  Refilled the triamcinolone ointment since that was helpful in the past.  I recommended that she use it twice a day for the next couple weeks.  If her symptoms are not improving completely would also consider possible fungal source since she does have some itching.  We'll consider using Chlortrimazole or other antifungal if not improving.

## 2017-10-16 NOTE — TELEPHONE ENCOUNTER
----- Message from Suzie Rodriguez sent at 10/16/2017  2:17 PM CDT -----  Would like a new rx. Please call back at [phone]. thanks

## 2017-10-21 RX ORDER — LOSARTAN POTASSIUM AND HYDROCHLOROTHIAZIDE 12.5; 5 MG/1; MG/1
TABLET ORAL
Qty: 90 TABLET | Refills: 2 | Status: SHIPPED | OUTPATIENT
Start: 2017-10-21 | End: 2018-07-16 | Stop reason: SDUPTHER

## 2017-11-02 ENCOUNTER — TELEPHONE (OUTPATIENT)
Dept: INTERNAL MEDICINE | Facility: CLINIC | Age: 67
End: 2017-11-02

## 2017-11-02 NOTE — TELEPHONE ENCOUNTER
Pt cancelled weight check.  Stated the [facility] is too out of her way and will try to get appt in [place].

## 2017-11-02 NOTE — TELEPHONE ENCOUNTER
----- Message from Amber Bautista sent at 11/2/2017 10:18 AM CDT -----  Contact: PT   Pt needs reschedule her appointment for nuha. .[phone] (home)

## 2017-12-21 ENCOUNTER — OFFICE VISIT (OUTPATIENT)
Dept: INTERNAL MEDICINE | Facility: CLINIC | Age: 67
End: 2017-12-21
Payer: MEDICARE

## 2017-12-21 ENCOUNTER — LAB VISIT (OUTPATIENT)
Dept: LAB | Facility: HOSPITAL | Age: 67
End: 2017-12-21
Attending: FAMILY MEDICINE
Payer: MEDICARE

## 2017-12-21 ENCOUNTER — PATIENT OUTREACH (OUTPATIENT)
Dept: ADMINISTRATIVE | Facility: HOSPITAL | Age: 67
End: 2017-12-21

## 2017-12-21 VITALS
SYSTOLIC BLOOD PRESSURE: 120 MMHG | WEIGHT: 163.56 LBS | HEART RATE: 59 BPM | DIASTOLIC BLOOD PRESSURE: 70 MMHG | HEIGHT: 63 IN | BODY MASS INDEX: 28.98 KG/M2 | RESPIRATION RATE: 18 BRPM | OXYGEN SATURATION: 98 % | TEMPERATURE: 97 F

## 2017-12-21 DIAGNOSIS — M10.9 ACUTE GOUT OF RIGHT WRIST, UNSPECIFIED CAUSE: ICD-10-CM

## 2017-12-21 DIAGNOSIS — I10 ESSENTIAL HYPERTENSION: Chronic | ICD-10-CM

## 2017-12-21 DIAGNOSIS — M10.9 ACUTE GOUT OF RIGHT WRIST, UNSPECIFIED CAUSE: Primary | ICD-10-CM

## 2017-12-21 DIAGNOSIS — Z12.31 ENCOUNTER FOR SCREENING MAMMOGRAM FOR BREAST CANCER: ICD-10-CM

## 2017-12-21 DIAGNOSIS — E11.49 TYPE 2 DIABETES WITH NEURAL COMPLICATION: Chronic | ICD-10-CM

## 2017-12-21 PROBLEM — E66.811 OBESITY, CLASS I, BMI 30-34.9: Status: RESOLVED | Noted: 2017-05-09 | Resolved: 2017-12-21

## 2017-12-21 PROBLEM — E66.9 OBESITY, CLASS I, BMI 30-34.9: Status: RESOLVED | Noted: 2017-05-09 | Resolved: 2017-12-21

## 2017-12-21 LAB — URATE SERPL-MCNC: 7.1 MG/DL

## 2017-12-21 PROCEDURE — 99213 OFFICE O/P EST LOW 20 MIN: CPT | Mod: PBBFAC,PO | Performed by: FAMILY MEDICINE

## 2017-12-21 PROCEDURE — 84550 ASSAY OF BLOOD/URIC ACID: CPT | Mod: PO

## 2017-12-21 PROCEDURE — 99999 PR PBB SHADOW E&M-EST. PATIENT-LVL III: CPT | Mod: PBBFAC,,, | Performed by: FAMILY MEDICINE

## 2017-12-21 PROCEDURE — 99214 OFFICE O/P EST MOD 30 MIN: CPT | Mod: S$PBB,,, | Performed by: FAMILY MEDICINE

## 2017-12-21 PROCEDURE — 36415 COLL VENOUS BLD VENIPUNCTURE: CPT | Mod: PO

## 2017-12-21 PROCEDURE — G0008 ADMIN INFLUENZA VIRUS VAC: HCPCS | Mod: PBBFAC,PO

## 2017-12-21 RX ORDER — METHYLPREDNISOLONE 4 MG/1
TABLET ORAL
Qty: 1 PACKAGE | Refills: 0 | Status: SHIPPED | OUTPATIENT
Start: 2017-12-21 | End: 2018-01-09 | Stop reason: ALTCHOICE

## 2017-12-21 NOTE — PROGRESS NOTES
Subjective:       Patient ID: Kelsie Bustamante is a 67 y.o. female.    Chief Complaint: Joint Swelling (rt- with pain )    AIDA Iglesias presents today with acute onset of right-sided wrist pain that started 2 days ago.  She did not have any trauma and has no history of previous problems with her wrist.  There is swelling and redness to her wrist and the pain causes discomfort whenever she moves her finger and radiates up her arm.  She has started taking ibuprofen and has done so the last couple days at a dose of 800 mg every 8 hours.  This has not given her much relief.  In regards to diabetes, she is up to date and has labs coming up in January ordered by Dr. Parks    Family History   Problem Relation Age of Onset    Aneurysm Sister     Diabetes Father     Coronary artery disease Father     Coronary artery disease Brother     Parkinsonism Brother        Current Outpatient Prescriptions:     albuterol 90 mcg/actuation inhaler, Inhale 2 puffs into the lungs every 6 (six) hours as needed for Wheezing. Rescue, Disp: 18 g, Rfl: 0    aspirin (ECOTRIN) 81 MG EC tablet, Take 81 mg by mouth once daily., Disp: , Rfl:     blood sugar diagnostic (BLOOD GLUCOSE TEST) Strp, Test 2 times daily, Disp: 200 strip, Rfl: 3    blood-glucose meter kit, Test twice daily, Disp: 1 each, Rfl: 0    duloxetine (CYMBALTA) 20 MG capsule, Take 1 capsule (20 mg total) by mouth once daily., Disp: 90 capsule, Rfl: 3    estrogens, conjugated, (PREMARIN) 0.625 MG tablet, Take 1 tablet (0.625 mg total) by mouth once daily., Disp: 90 tablet, Rfl: 3    lancets Misc, Test twice daily, Disp: 200 each, Rfl: 3    losartan-hydrochlorothiazide 50-12.5 mg (HYZAAR) 50-12.5 mg per tablet, TAKE 1 TABLET DAILY, Disp: 90 tablet, Rfl: 2    metoprolol tartrate (LOPRESSOR) 50 MG tablet, Take 1 tablet (50 mg total) by mouth 2 (two) times daily., Disp: 180 tablet, Rfl: 3    montelukast (SINGULAIR) 10 mg tablet, Take 1 tablet (10 mg total) by mouth every  "evening., Disp: 90 tablet, Rfl: 3    omeprazole (PRILOSEC) 20 MG capsule, Take 1 capsule (20 mg total) by mouth 2 (two) times daily., Disp: 90 capsule, Rfl: 3    SITagliptan-metformin (JANUMET) 50-1,000 mg per tablet, Take 1 tablet by mouth 2 (two) times daily with meals., Disp: 180 tablet, Rfl: 1    zolpidem (AMBIEN) 10 mg Tab, 1/2 to 1 qhs prn (Patient taking differently: Take 10 mg by mouth every evening. 1/2 to 1 qhs prn), Disp: 90 tablet, Rfl: 1    meclizine (ANTIVERT) 12.5 mg tablet, Take 1 tablet (12.5 mg total) by mouth 3 (three) times daily as needed for Dizziness., Disp: 30 tablet, Rfl: 1    methylPREDNISolone (MEDROL DOSEPACK) 4 mg tablet, use as directed, Disp: 1 Package, Rfl: 0    triamcinolone (KENALOG) 0.5 % ointment, Apply topically 2 (two) times daily., Disp: 15 g, Rfl: 0    Review of Systems   Constitutional: Negative for chills and fever.   Respiratory: Negative for cough and shortness of breath.    Cardiovascular: Negative for chest pain.   Gastrointestinal: Negative for abdominal pain.   Musculoskeletal: Positive for arthralgias.   Skin: Negative for rash.   Neurological: Negative for dizziness.       Objective:   /70 (BP Location: Left arm, Patient Position: Sitting, BP Method: Large (Manual))   Pulse (!) 59   Temp 96.7 °F (35.9 °C) (Tympanic)   Resp 18   Ht 5' 3" (1.6 m)   Wt 74.2 kg (163 lb 9.3 oz)   SpO2 98%   BMI 28.98 kg/m²      Physical Exam   Constitutional: She is oriented to person, place, and time. She appears well-developed and well-nourished.   HENT:   Head: Normocephalic and atraumatic.   Eyes: Conjunctivae are normal.   Cardiovascular: Normal rate.    Pulmonary/Chest: Effort normal. No respiratory distress.   Musculoskeletal: She exhibits no edema.        Hands:  Neurological: She is alert and oriented to person, place, and time. Coordination normal.   Skin: Skin is warm and dry. No rash noted.   Psychiatric: She has a normal mood and affect. Her behavior is " normal.   Vitals reviewed.      Assessment & Plan   Acute gout of right wrist  Her symptoms and presentation are consistent with gout.  I recommended that she continue using ibuprofen 800 mg 3 times a day and to take omeprazole while she is on this strong anti-inflammatory.  Also given a Medrol Dosepak given that she has not had significant relief with ibuprofen so far.    Encounter for screening mammogram for breast cancer  Due for repeat mammogram.    Hypertension  Currently well controlled.  Continue same medication that she is on.     Type 2 diabetes with neural complication  Last A1c was at goal.  Flu shot given today.        Return if symptoms worsen or fail to improve.

## 2017-12-21 NOTE — ASSESSMENT & PLAN NOTE
Her symptoms and presentation are consistent with gout.  I recommended that she continue using ibuprofen 800 mg 3 times a day and to take omeprazole while she is on this strong anti-inflammatory.  Also given a Medrol Dosepak given that she has not had significant relief with ibuprofen so far.

## 2017-12-22 ENCOUNTER — TELEPHONE (OUTPATIENT)
Dept: INTERNAL MEDICINE | Facility: CLINIC | Age: 67
End: 2017-12-22

## 2017-12-22 NOTE — TELEPHONE ENCOUNTER
----- Message from Jw Jacobs DO sent at 12/21/2017 12:57 PM CST -----  Elevated uric acid level supports diagnosis of gout.  May want to consider starting on allopurinol for prevention.  This should not be done during an acute flareup but we can discuss this at the next visit or when he see Dr. Parks.

## 2017-12-22 NOTE — TELEPHONE ENCOUNTER
Pt contacted, results given as per Dr. Jacobs. Pt voiced understanding and will discuss with Dr. Parks

## 2018-01-09 ENCOUNTER — OFFICE VISIT (OUTPATIENT)
Dept: INTERNAL MEDICINE | Facility: CLINIC | Age: 68
End: 2018-01-09
Payer: MEDICARE

## 2018-01-09 ENCOUNTER — HOSPITAL ENCOUNTER (OUTPATIENT)
Dept: RADIOLOGY | Facility: HOSPITAL | Age: 68
Discharge: HOME | End: 2018-01-09
Attending: FAMILY MEDICINE
Payer: MEDICARE

## 2018-01-09 VITALS
BODY MASS INDEX: 28.82 KG/M2 | HEART RATE: 61 BPM | RESPIRATION RATE: 16 BRPM | OXYGEN SATURATION: 98 % | SYSTOLIC BLOOD PRESSURE: 118 MMHG | TEMPERATURE: 98 F | DIASTOLIC BLOOD PRESSURE: 64 MMHG | WEIGHT: 162.69 LBS | HEIGHT: 63 IN

## 2018-01-09 DIAGNOSIS — G47.09 OTHER INSOMNIA: ICD-10-CM

## 2018-01-09 DIAGNOSIS — Z12.31 ENCOUNTER FOR SCREENING MAMMOGRAM FOR BREAST CANCER: ICD-10-CM

## 2018-01-09 DIAGNOSIS — N30.01 ACUTE CYSTITIS WITH HEMATURIA: Primary | ICD-10-CM

## 2018-01-09 LAB
BILIRUB SERPL-MCNC: 1 MG/DL
BLOOD URINE, POC: 250
COLOR, POC UA: ABNORMAL
GLUCOSE UR QL STRIP: NORMAL
KETONES UR QL STRIP: NEGATIVE
LEUKOCYTE ESTERASE URINE, POC: 2
NITRITE, POC UA: NEGATIVE
PH, POC UA: 9
PROTEIN, POC: 30
SPECIFIC GRAVITY, POC UA: 1
UROBILINOGEN, POC UA: NORMAL

## 2018-01-09 PROCEDURE — 81002 URINALYSIS NONAUTO W/O SCOPE: CPT | Mod: PBBFAC,PO | Performed by: FAMILY MEDICINE

## 2018-01-09 PROCEDURE — 77067 SCR MAMMO BI INCL CAD: CPT | Mod: 26,,, | Performed by: RADIOLOGY

## 2018-01-09 PROCEDURE — 77063 BREAST TOMOSYNTHESIS BI: CPT | Mod: 26,,, | Performed by: RADIOLOGY

## 2018-01-09 PROCEDURE — 99213 OFFICE O/P EST LOW 20 MIN: CPT | Mod: PBBFAC,PO | Performed by: FAMILY MEDICINE

## 2018-01-09 PROCEDURE — 99214 OFFICE O/P EST MOD 30 MIN: CPT | Mod: S$PBB,,, | Performed by: FAMILY MEDICINE

## 2018-01-09 PROCEDURE — 77067 SCR MAMMO BI INCL CAD: CPT | Mod: TC,PO

## 2018-01-09 PROCEDURE — 99999 PR PBB SHADOW E&M-EST. PATIENT-LVL III: CPT | Mod: PBBFAC,,, | Performed by: FAMILY MEDICINE

## 2018-01-09 RX ORDER — MONTELUKAST SODIUM 10 MG/1
10 TABLET ORAL NIGHTLY
Qty: 90 TABLET | Refills: 3 | Status: SHIPPED | OUTPATIENT
Start: 2018-01-09 | End: 2019-01-03

## 2018-01-09 RX ORDER — PHENAZOPYRIDINE HYDROCHLORIDE 100 MG/1
100 TABLET, FILM COATED ORAL 3 TIMES DAILY PRN
Qty: 9 TABLET | Refills: 2 | Status: SHIPPED | OUTPATIENT
Start: 2018-01-09 | End: 2018-01-12

## 2018-01-09 RX ORDER — NITROFURANTOIN 25; 75 MG/1; MG/1
100 CAPSULE ORAL 2 TIMES DAILY
Qty: 10 CAPSULE | Refills: 0 | Status: SHIPPED | OUTPATIENT
Start: 2018-01-09 | End: 2018-01-14

## 2018-01-09 RX ORDER — TRAZODONE HYDROCHLORIDE 50 MG/1
50 TABLET ORAL NIGHTLY
Qty: 30 TABLET | Refills: 11 | Status: SHIPPED | OUTPATIENT
Start: 2018-01-09 | End: 2018-05-29

## 2018-01-09 NOTE — ASSESSMENT & PLAN NOTE
Symptoms as well as urine dipstick are consistent with bladder infection.  We'll treat with Macrobid for 5 days twice daily.  Also recommended her using Pyridium since she is having pretty significant symptoms.  If anything worsens or is not improving we'll have her come back in for urine culture.  Has not had any bladder infection nor antibiotics recently at all.

## 2018-01-09 NOTE — ASSESSMENT & PLAN NOTE
Has been using Ambien 10 mg for quite some time now, was informed that her insurance is normal at 1 to cover this.  I recommended a trial of trazodone 50 mg nightly but she may titrate up to 100 mg if she feels that the 50 is not acceptable.

## 2018-01-09 NOTE — PROGRESS NOTES
Subjective:       Patient ID: Kelsie Bustamante is a 67 y.o. female.    Chief Complaint: Dysuria and Hematuria    Dysuria    This is a new problem. The current episode started in the past 7 days. The problem occurs every urination. The problem has been gradually worsening. The quality of the pain is described as aching and burning. The pain is moderate. There has been no fever. There is no history of pyelonephritis. Associated symptoms include hematuria, nausea and urgency. Pertinent negatives include no chills, discharge, flank pain, sweats, vomiting, rash or withholding. Treatments tried: cranberry. The treatment provided no relief.       Family History   Problem Relation Age of Onset    Aneurysm Sister     Diabetes Father     Coronary artery disease Father     Coronary artery disease Brother     Parkinsonism Brother        Current Outpatient Prescriptions:     albuterol 90 mcg/actuation inhaler, Inhale 2 puffs into the lungs every 6 (six) hours as needed for Wheezing. Rescue, Disp: 18 g, Rfl: 0    aspirin (ECOTRIN) 81 MG EC tablet, Take 81 mg by mouth once daily., Disp: , Rfl:     blood sugar diagnostic (BLOOD GLUCOSE TEST) Strp, Test 2 times daily, Disp: 200 strip, Rfl: 3    blood-glucose meter kit, Test twice daily, Disp: 1 each, Rfl: 0    duloxetine (CYMBALTA) 20 MG capsule, Take 1 capsule (20 mg total) by mouth once daily., Disp: 90 capsule, Rfl: 3    estrogens, conjugated, (PREMARIN) 0.625 MG tablet, Take 1 tablet (0.625 mg total) by mouth once daily., Disp: 90 tablet, Rfl: 3    lancets Misc, Test twice daily, Disp: 200 each, Rfl: 3    losartan-hydrochlorothiazide 50-12.5 mg (HYZAAR) 50-12.5 mg per tablet, TAKE 1 TABLET DAILY, Disp: 90 tablet, Rfl: 2    metoprolol tartrate (LOPRESSOR) 50 MG tablet, Take 1 tablet (50 mg total) by mouth 2 (two) times daily., Disp: 180 tablet, Rfl: 3    montelukast (SINGULAIR) 10 mg tablet, Take 1 tablet (10 mg total) by mouth every evening., Disp: 90 tablet,  "Rfl: 3    omeprazole (PRILOSEC) 20 MG capsule, Take 1 capsule (20 mg total) by mouth 2 (two) times daily., Disp: 90 capsule, Rfl: 3    SITagliptan-metformin (JANUMET) 50-1,000 mg per tablet, Take 1 tablet by mouth 2 (two) times daily with meals., Disp: 180 tablet, Rfl: 1    meclizine (ANTIVERT) 12.5 mg tablet, Take 1 tablet (12.5 mg total) by mouth 3 (three) times daily as needed for Dizziness., Disp: 30 tablet, Rfl: 1    nitrofurantoin, macrocrystal-monohydrate, (MACROBID) 100 MG capsule, Take 1 capsule (100 mg total) by mouth 2 (two) times daily., Disp: 10 capsule, Rfl: 0    phenazopyridine (PYRIDIUM) 100 MG tablet, Take 1 tablet (100 mg total) by mouth 3 (three) times daily as needed for Pain., Disp: 9 tablet, Rfl: 2    traZODone (DESYREL) 50 MG tablet, Take 1 tablet (50 mg total) by mouth every evening., Disp: 30 tablet, Rfl: 11    triamcinolone (KENALOG) 0.5 % ointment, Apply topically 2 (two) times daily., Disp: 15 g, Rfl: 0    Review of Systems   Constitutional: Negative for chills and fever.   Respiratory: Negative for cough and shortness of breath.    Cardiovascular: Negative for chest pain.   Gastrointestinal: Positive for nausea. Negative for abdominal pain and vomiting.   Genitourinary: Positive for dysuria, hematuria and urgency. Negative for flank pain.   Skin: Negative for rash.   Neurological: Negative for dizziness.       Objective:   /64   Pulse 61   Temp 97.9 °F (36.6 °C) (Tympanic)   Resp 16   Ht 5' 3" (1.6 m)   Wt 73.8 kg (162 lb 11.2 oz)   SpO2 98%   BMI 28.82 kg/m²      Physical Exam   Constitutional: She is oriented to person, place, and time. She appears well-developed and well-nourished.   HENT:   Head: Normocephalic and atraumatic.   Eyes: Conjunctivae are normal.   Cardiovascular: Normal rate.    Pulmonary/Chest: Effort normal. No respiratory distress.   Abdominal: She exhibits no distension. There is no tenderness.   Musculoskeletal: She exhibits no edema. "   Neurological: She is alert and oriented to person, place, and time. Coordination normal.   Skin: Skin is warm and dry. No rash noted.   Psychiatric: She has a normal mood and affect. Her behavior is normal.   Vitals reviewed.      Assessment & Plan     Problem List Items Addressed This Visit        Renal/    Encounter for screening mammogram for breast cancer    Relevant Orders    Mammo Digital Screening Bilat with CAD    Acute cystitis with hematuria - Primary    Current Assessment & Plan     Symptoms as well as urine dipstick are consistent with bladder infection.  We'll treat with Macrobid for 5 days twice daily.  Also recommended her using Pyridium since she is having pretty significant symptoms.  If anything worsens or is not improving we'll have her come back in for urine culture.  Has not had any bladder infection nor antibiotics recently at all.         Relevant Orders    POCT URINE DIPSTICK WITHOUT MICROSCOPE (Completed)       Other    Other insomnia    Current Assessment & Plan     Has been using Ambien 10 mg for quite some time now, was informed that her insurance is normal at 1 to cover this.  I recommended a trial of trazodone 50 mg nightly but she may titrate up to 100 mg if she feels that the 50 is not acceptable.                 Return if symptoms worsen or fail to improve.

## 2018-02-02 ENCOUNTER — LAB VISIT (OUTPATIENT)
Dept: LAB | Facility: HOSPITAL | Age: 68
End: 2018-02-02
Attending: FAMILY MEDICINE
Payer: MEDICARE

## 2018-02-02 DIAGNOSIS — E11.49 TYPE 2 DIABETES WITH NEURAL COMPLICATION: Chronic | ICD-10-CM

## 2018-02-02 LAB
ANION GAP SERPL CALC-SCNC: 9 MMOL/L
BUN SERPL-MCNC: 17 MG/DL
CALCIUM SERPL-MCNC: 9.6 MG/DL
CHLORIDE SERPL-SCNC: 105 MMOL/L
CHOLEST SERPL-MCNC: 190 MG/DL
CHOLEST/HDLC SERPL: 3.9 {RATIO}
CO2 SERPL-SCNC: 27 MMOL/L
CREAT SERPL-MCNC: 0.9 MG/DL
EST. GFR  (AFRICAN AMERICAN): >60 ML/MIN/1.73 M^2
EST. GFR  (NON AFRICAN AMERICAN): >60 ML/MIN/1.73 M^2
ESTIMATED AVG GLUCOSE: 114 MG/DL
GLUCOSE SERPL-MCNC: 114 MG/DL
HBA1C MFR BLD HPLC: 5.6 %
HDLC SERPL-MCNC: 49 MG/DL
HDLC SERPL: 25.8 %
LDLC SERPL CALC-MCNC: 105 MG/DL
NONHDLC SERPL-MCNC: 141 MG/DL
POTASSIUM SERPL-SCNC: 4.6 MMOL/L
SODIUM SERPL-SCNC: 141 MMOL/L
TRIGL SERPL-MCNC: 180 MG/DL

## 2018-02-02 PROCEDURE — 83036 HEMOGLOBIN GLYCOSYLATED A1C: CPT

## 2018-02-02 PROCEDURE — 80061 LIPID PANEL: CPT

## 2018-02-02 PROCEDURE — 36415 COLL VENOUS BLD VENIPUNCTURE: CPT | Mod: PO

## 2018-02-02 PROCEDURE — 80048 BASIC METABOLIC PNL TOTAL CA: CPT

## 2018-04-09 DIAGNOSIS — M35.3 POLYMYALGIA: ICD-10-CM

## 2018-04-09 DIAGNOSIS — R00.2 PALPITATIONS: ICD-10-CM

## 2018-04-09 DIAGNOSIS — I10 ESSENTIAL HYPERTENSION: ICD-10-CM

## 2018-04-09 RX ORDER — METOPROLOL TARTRATE 50 MG/1
TABLET ORAL
Qty: 180 TABLET | Refills: 0 | Status: SHIPPED | OUTPATIENT
Start: 2018-04-09 | End: 2018-07-16 | Stop reason: SDUPTHER

## 2018-04-09 RX ORDER — DULOXETIN HYDROCHLORIDE 20 MG/1
CAPSULE, DELAYED RELEASE ORAL
Qty: 90 CAPSULE | Refills: 0 | Status: SHIPPED | OUTPATIENT
Start: 2018-04-09 | End: 2018-07-16 | Stop reason: SDUPTHER

## 2018-04-09 NOTE — TELEPHONE ENCOUNTER
Pt requested refills. Pt is due for a follow up. 30 days supply was sent to pharmacy. Pt will need to f/u with PCP for additional refills. Please help pt schedule.

## 2018-04-11 RX ORDER — SITAGLIPTIN AND METFORMIN HYDROCHLORIDE 1000; 50 MG/1; MG/1
TABLET, FILM COATED ORAL
Qty: 180 TABLET | Refills: 3 | Status: SHIPPED | OUTPATIENT
Start: 2018-04-11 | End: 2018-10-30 | Stop reason: SDUPTHER

## 2018-05-29 ENCOUNTER — OFFICE VISIT (OUTPATIENT)
Dept: INTERNAL MEDICINE | Facility: CLINIC | Age: 68
End: 2018-05-29
Payer: MEDICARE

## 2018-05-29 VITALS
DIASTOLIC BLOOD PRESSURE: 60 MMHG | TEMPERATURE: 97 F | WEIGHT: 169.31 LBS | HEIGHT: 63 IN | BODY MASS INDEX: 30 KG/M2 | OXYGEN SATURATION: 97 % | SYSTOLIC BLOOD PRESSURE: 112 MMHG | HEART RATE: 59 BPM

## 2018-05-29 DIAGNOSIS — Z78.9 STATIN INTOLERANCE: ICD-10-CM

## 2018-05-29 DIAGNOSIS — E04.1 THYROID NODULE: ICD-10-CM

## 2018-05-29 DIAGNOSIS — I10 ESSENTIAL HYPERTENSION: Chronic | ICD-10-CM

## 2018-05-29 DIAGNOSIS — E11.49 TYPE 2 DIABETES WITH NEURAL COMPLICATION: Primary | Chronic | ICD-10-CM

## 2018-05-29 PROCEDURE — 99214 OFFICE O/P EST MOD 30 MIN: CPT | Mod: S$PBB,,, | Performed by: FAMILY MEDICINE

## 2018-05-29 PROCEDURE — 99213 OFFICE O/P EST LOW 20 MIN: CPT | Mod: PBBFAC,PO | Performed by: FAMILY MEDICINE

## 2018-05-29 PROCEDURE — 99999 PR PBB SHADOW E&M-EST. PATIENT-LVL III: CPT | Mod: PBBFAC,,, | Performed by: FAMILY MEDICINE

## 2018-05-29 RX ORDER — TRIAMCINOLONE ACETONIDE 1 MG/G
CREAM TOPICAL
Refills: 1 | COMMUNITY
Start: 2018-04-28 | End: 2018-05-29

## 2018-05-29 RX ORDER — ROSUVASTATIN CALCIUM 5 MG/1
5 TABLET, COATED ORAL DAILY
Qty: 30 TABLET | Refills: 11 | Status: SHIPPED | OUTPATIENT
Start: 2018-05-29 | End: 2019-01-03 | Stop reason: SDUPTHER

## 2018-05-29 RX ORDER — TRIAMCINOLONE ACETONIDE 5 MG/G
OINTMENT TOPICAL 2 TIMES DAILY
Qty: 15 G | Refills: 5 | Status: SHIPPED | OUTPATIENT
Start: 2018-05-29 | End: 2019-01-21 | Stop reason: SDUPTHER

## 2018-05-29 NOTE — PROGRESS NOTES
Subjective:       Patient ID: Kelsie Bustamante is a female.    Chief Complaint: Multiple issues see below    HPI Type 2 diabetesw neurl manif: a1cok. Tolerating medicine. No hypoglycemia;;Hypertension: blood pressures at home normal. Tolerating medicine.   Hypercholesterolemia: controlled. .intol was . Ok w trying crestor but didn't start  Arthralgias much less with cymbalta    Chronic insomnia: medicare no longer covered;tried traz and revved her up; melatonin no help;has been using husb prn zolpidem. Has tried otc sleep aids no help          Thy nod spring 16 subcm kathleen    Past Medical History   Diagnosis Date    Hypertension     Morbid obesity     Gastroesophageal reflux     Sinusitis     Hiatal hernia     Type 2 diabetes mellitus with neurological manifestations     Peripheral neuropathy     Achalasia     Postmenopausal HRT (hormone replacement therapy)      failed tapering off     Past Surgical History   Procedure Laterality Date    Hysterectomy       nonca    Tonsillectomy      Cholecystectomy       section       Family History   Problem Relation Age of Onset    Diabetes Father     Coronary artery disease Father     Aneurysm Sister     Coronary artery disease Brother     Parkinsonism Brother      History     Social History    Marital Status:      Spouse Name: N/A     Number of Children: N/A    Years of Education: N/A     Social History Main Topics    Smoking status: Never Smoker     Smokeless tobacco: Never Used    Alcohol Use: No    Drug Use: No    Sexual Activity: None     Other Topics Concern    None     Social History Narrative         Cardiovascular: no chest pain  Chest: no shortness of breath  Abd: no abd pain  Remainder review of systems negative      Objective:      Physical Exam   Constitutional: She is oriented to person, place, and time. She appears well-developed and well-nourished.   HENT: o/p and ears clear  Head: Normocephalic and atraumatic.   Neck:  Normal range of motion. Neck supple. Carotid bruit is not present.   Cardiovascular: Normal rate and regular rhythm.    Pulmonary/Chest: Effort normal and breath sounds normal.   Lymphadenopathy:     She has no cervical adenopathy.   Neurological: She is alert and oriented to person, place, and time.   Skin: Skin is warm and dry.   Psychiatric: She has a normal mood and affect. Her behavior is normal. Judgment normal.   Nursing note and vitals reviewed.    Bilateral feet with normal monofilament testing and no lesions.      Assessment:     type 2 dm w neural complic   hyperchol   2. Essential hypertension      statin side eff    Plan:    Lab and follow up after in 6 months  Shingrix new shingles vaccine  via a pharmacy  F/u eye dr tomlin. She will call    She will n otify me which sleep aid is covered w her plan    Plans to try crestor. Myalgia precaut  Type 2 diabetes with neural complication  -     Hemoglobin A1c; Future; Expected date: 11/25/2018    Statin intolerance    Essential hypertension    Thyroid nodule  -     US Soft Tissue Head Neck Thyroid; Future; Expected date: 05/29/2018    Other orders  -     triamcinolone (KENALOG) 0.5 % ointment; Apply topically 2 (two) times daily.  Dispense: 15 g; Refill: 5  -     rosuvastatin (CRESTOR) 5 MG tablet; Take 1 tablet (5 mg total) by mouth once daily.  Dispense: 30 tablet; Refill: 11

## 2018-06-06 ENCOUNTER — HOSPITAL ENCOUNTER (OUTPATIENT)
Dept: RADIOLOGY | Facility: HOSPITAL | Age: 68
Discharge: HOME | End: 2018-06-06
Attending: FAMILY MEDICINE
Payer: MEDICARE

## 2018-06-06 DIAGNOSIS — E04.1 THYROID NODULE: ICD-10-CM

## 2018-06-06 PROCEDURE — 76536 US EXAM OF HEAD AND NECK: CPT | Mod: 26,,, | Performed by: RADIOLOGY

## 2018-06-06 PROCEDURE — 76536 US EXAM OF HEAD AND NECK: CPT | Mod: TC,PO

## 2018-07-16 DIAGNOSIS — R00.2 PALPITATIONS: ICD-10-CM

## 2018-07-16 DIAGNOSIS — I10 ESSENTIAL HYPERTENSION: ICD-10-CM

## 2018-07-16 DIAGNOSIS — M35.3 POLYMYALGIA: ICD-10-CM

## 2018-07-16 RX ORDER — METOPROLOL TARTRATE 50 MG/1
TABLET ORAL
Qty: 180 TABLET | Refills: 0 | Status: SHIPPED | OUTPATIENT
Start: 2018-07-16 | End: 2018-10-30 | Stop reason: SDUPTHER

## 2018-07-16 RX ORDER — LOSARTAN POTASSIUM AND HYDROCHLOROTHIAZIDE 12.5; 5 MG/1; MG/1
TABLET ORAL
Qty: 90 TABLET | Refills: 2 | Status: SHIPPED | OUTPATIENT
Start: 2018-07-16 | End: 2018-08-22 | Stop reason: ALTCHOICE

## 2018-07-16 RX ORDER — DULOXETIN HYDROCHLORIDE 20 MG/1
CAPSULE, DELAYED RELEASE ORAL
Qty: 90 CAPSULE | Refills: 0 | Status: SHIPPED | OUTPATIENT
Start: 2018-07-16 | End: 2018-10-03 | Stop reason: SDUPTHER

## 2018-08-10 ENCOUNTER — TELEPHONE (OUTPATIENT)
Dept: INTERNAL MEDICINE | Facility: CLINIC | Age: 68
End: 2018-08-10

## 2018-08-10 NOTE — TELEPHONE ENCOUNTER
----- Message from Samanta Hussein sent at 8/10/2018 11:49 AM CDT -----  Contact: pt   Call pt regarding getting fitted in to see the doctor. Pt states that she has been having a lot of dizzy spells and need some test ran.    [phone] (home)

## 2018-08-13 ENCOUNTER — OFFICE VISIT (OUTPATIENT)
Dept: INTERNAL MEDICINE | Facility: CLINIC | Age: 68
End: 2018-08-13
Payer: MEDICARE

## 2018-08-13 ENCOUNTER — LAB VISIT (OUTPATIENT)
Dept: LAB | Facility: HOSPITAL | Age: 68
End: 2018-08-13
Attending: FAMILY MEDICINE
Payer: MEDICARE

## 2018-08-13 VITALS
DIASTOLIC BLOOD PRESSURE: 72 MMHG | HEIGHT: 63 IN | BODY MASS INDEX: 30.59 KG/M2 | TEMPERATURE: 98 F | HEART RATE: 68 BPM | WEIGHT: 172.63 LBS | OXYGEN SATURATION: 94 % | SYSTOLIC BLOOD PRESSURE: 116 MMHG

## 2018-08-13 DIAGNOSIS — E11.49 TYPE 2 DIABETES WITH NEURAL COMPLICATION: Chronic | ICD-10-CM

## 2018-08-13 DIAGNOSIS — R00.0 TACHYCARDIA: ICD-10-CM

## 2018-08-13 DIAGNOSIS — R53.83 FATIGUE, UNSPECIFIED TYPE: ICD-10-CM

## 2018-08-13 DIAGNOSIS — R55 VASOVAGAL SYNCOPE: Primary | ICD-10-CM

## 2018-08-13 LAB
T3 SERPL-MCNC: 115 NG/DL
T4 FREE SERPL-MCNC: 1.01 NG/DL
TSH SERPL DL<=0.005 MIU/L-ACNC: 2.32 UIU/ML

## 2018-08-13 PROCEDURE — 36415 COLL VENOUS BLD VENIPUNCTURE: CPT | Mod: PO

## 2018-08-13 PROCEDURE — 99214 OFFICE O/P EST MOD 30 MIN: CPT | Mod: S$PBB,,, | Performed by: FAMILY MEDICINE

## 2018-08-13 PROCEDURE — 84480 ASSAY TRIIODOTHYRONINE (T3): CPT

## 2018-08-13 PROCEDURE — 84439 ASSAY OF FREE THYROXINE: CPT

## 2018-08-13 PROCEDURE — 84443 ASSAY THYROID STIM HORMONE: CPT

## 2018-08-13 PROCEDURE — 99999 PR PBB SHADOW E&M-EST. PATIENT-LVL V: CPT | Mod: PBBFAC,,, | Performed by: FAMILY MEDICINE

## 2018-08-13 PROCEDURE — 99215 OFFICE O/P EST HI 40 MIN: CPT | Mod: PBBFAC,PO | Performed by: FAMILY MEDICINE

## 2018-08-13 NOTE — PATIENT INSTRUCTIONS
Fainting: Vagal Reaction  Fainting (syncope) is a temporary loss of consciousness that is associated with a loss of postural tone. Its also called passing out. It occurs when blood flow to the brain is less than normal. Your healthcare provider believes that your fainting was because of a vagal reaction. This condition is not a sign of serious disease.  A vagal reaction is a response in your body that causes your pulse to slow down or the blood vessels to expand. This causes your blood pressure to fall. And this sends less blood to your brain if you are standing or sitting. That results in dizziness, near-fainting, or fainting. Lying down usually stops the reaction within 60 seconds.  This response can occur during sudden fear, severe pain, emotional stress, overexertion, overheating, hunger, nausea or vomiting, prolonged standing, or standing up after sitting or lying for a long time.  Home care  Follow these guidelines when caring for yourself at home:  · Rest today. Go back to your normal activities as soon as you are feeling back to normal.  · Stay hydrated and avoid skipping meals.  · If you feel lightheaded or dizzy, lie down right away. Or sit with your head lowered between your knees.  Follow-up care  Follow up with your healthcare provider, or as advised.  When to seek medical advice  Call your healthcare provider right away if any of these occur:  · Another fainting spell thats not explained by the common causes listed above  · Pain in your chest, arm, neck, jaw, back, or abdomen  · Shortness of breath  · Severe headache or seizure  · Your heart beats very rapidly, very slowly, or irregularly (palpitations)  Date Last Reviewed: 12/1/2016 © 2000-2017 [employer]. [address] [place] [address], [place] [address]. All rights reserved. This information is not intended as a substitute for professional medical care. Always follow your healthcare professional's instructions.

## 2018-08-13 NOTE — PROGRESS NOTES
Subjective:       Patient ID: Kelsie Bustamante is a 68 y.o. female.    Chief Complaint: Dizziness    67 yo female here with complaint of increased frequency of chronic issue of vasovagal syncope diagnosed about 25 years ago with tilt table test. She states that over the past 2 months it is getting much worse. She tells me that she had 1 syncopal episode about 2 weeks ago just walking from her living room to her kitchen. She tells me that lately she feels lightheaded every time she goes from sitting to standing. She does not have a cardiologist. Her BP has been stable and no recent medication changes. She complains of feeling like she has no energy. Denies feeling short of breath. Denies any chest pain, diaphoresis. She is on metoprolol 50 BID and tells me she never misses a dose because if she does miss it,her heart begins racing and she does not feel well. +FH of heart disease. Mother  at 69 with a massive MI; brother had heart disease diagnosed in his early 60s, sister  at 44 due to aneurysm in brain.     She also complains of increased frequency of headaches. Headaches are accompanied by nausea and photophobia. No recent illnesses; no systemic symptoms, fever, chills, dysuria, cough, GI disturbance.      does not have any pertinent problems on file.  Past Medical History:   Diagnosis Date    Achalasia     demetrius    Cataract     Gastroesophageal reflux     Hiatal hernia     Hypertension     Peripheral neuropathy     Postmenopausal HRT (hormone replacement therapy)     failed tapering off    Sinusitis     Thyroid nodule 3/16 subcm; kathleen 2 yrs    Type 2 diabetes mellitus with neurological manifestations      Past Surgical History:   Procedure Laterality Date    cataract Left      SECTION      CHOLECYSTECTOMY      HYSTERECTOMY      nonca    OOPHORECTOMY      TONSILLECTOMY       Family History   Problem Relation Age of Onset    Aneurysm Sister     Diabetes Father     Coronary  artery disease Father     Coronary artery disease Brother     Parkinsonism Brother      Social History     Socioeconomic History    Marital status:      Spouse name: Not on file    Number of children: 2    Years of education: Not on file    Highest education level: Not on file   Social Needs    Financial resource strain: Not on file    Food insecurity - worry: Not on file    Food insecurity - inability: Not on file    Transportation needs - medical: Not on file    Transportation needs - non-medical: Not on file   Occupational History    Not on file   Tobacco Use    Smoking status: Never Smoker    Smokeless tobacco: Never Used   Substance and Sexual Activity    Alcohol use: No    Drug use: No    Sexual activity: No   Other Topics Concern    Not on file   Social History Narrative    Not on file     Review of Systems   Constitutional: Positive for activity change. Negative for appetite change, fatigue and unexpected weight change.   HENT: Negative for hearing loss and sore throat.    Eyes: Negative for pain and visual disturbance.   Respiratory: Negative for cough and shortness of breath.    Cardiovascular: Negative for chest pain, palpitations and leg swelling.        Tachycardia   Gastrointestinal: Negative for abdominal pain, constipation and diarrhea.   Genitourinary: Negative for difficulty urinating, dysuria and vaginal discharge.   Musculoskeletal: Negative for arthralgias and myalgias.   Skin: Negative for rash and wound.   Neurological: Positive for syncope and light-headedness. Negative for dizziness, tremors, seizures, speech difficulty, weakness, numbness and headaches.   Hematological: Negative.    Psychiatric/Behavioral: Negative.        Objective:     Vitals:    08/13/18 1003   BP: 116/72   Pulse: 68   Temp: 98.2 °F (36.8 °C)        Physical Exam   Constitutional: She is oriented to person, place, and time. She appears well-developed and well-nourished.   HENT:   Head:  Normocephalic and atraumatic.   Eyes: EOM are normal. Pupils are equal, round, and reactive to light.   Neck: Normal range of motion. Neck supple. No thyromegaly present.   Cardiovascular: Normal rate, regular rhythm, normal heart sounds and intact distal pulses. Exam reveals no friction rub.   No murmur heard.  Pulmonary/Chest: Effort normal and breath sounds normal. No respiratory distress. She has no wheezes. She has no rales.   Musculoskeletal: Normal range of motion. She exhibits no deformity.   Lymphadenopathy:     She has no cervical adenopathy.   Neurological: She is alert and oriented to person, place, and time.   Skin: Skin is warm and dry.   Psychiatric: She has a normal mood and affect. Her behavior is normal.   Nursing note and vitals reviewed.      Assessment:       1. Vasovagal syncope    2. Tachycardia    3. Fatigue, unspecified type    4. Type 2 diabetes with neural complication        Plan:           Problem List Items Addressed This Visit     Type 2 diabetes with neural complication (Chronic)      Other Visit Diagnoses     Vasovagal syncope    -  Primary    Relevant Orders    Ambulatory referral to Cardiology    Tachycardia        Relevant Orders    TSH    T3    T4, free    Fatigue, unspecified type        Relevant Orders    TSH    T3    T4, free      Referral made to cardiology for syncope work up as well as risk assessment in light of family history. Thyroid labs ordered for eval of tachycardia symptoms; last done in 2016 wnl.

## 2018-08-22 ENCOUNTER — CLINICAL SUPPORT (OUTPATIENT)
Dept: CARDIOLOGY | Facility: CLINIC | Age: 68
End: 2018-08-22
Payer: MEDICARE

## 2018-08-22 ENCOUNTER — OFFICE VISIT (OUTPATIENT)
Dept: CARDIOLOGY | Facility: CLINIC | Age: 68
End: 2018-08-22
Payer: MEDICARE

## 2018-08-22 VITALS
WEIGHT: 169 LBS | SYSTOLIC BLOOD PRESSURE: 122 MMHG | DIASTOLIC BLOOD PRESSURE: 74 MMHG | HEIGHT: 63 IN | HEART RATE: 64 BPM | BODY MASS INDEX: 29.95 KG/M2

## 2018-08-22 DIAGNOSIS — K21.9 GASTROESOPHAGEAL REFLUX DISEASE WITHOUT ESOPHAGITIS: ICD-10-CM

## 2018-08-22 DIAGNOSIS — I10 ESSENTIAL HYPERTENSION: Chronic | ICD-10-CM

## 2018-08-22 DIAGNOSIS — E11.49 TYPE 2 DIABETES WITH NEURAL COMPLICATION: Chronic | ICD-10-CM

## 2018-08-22 DIAGNOSIS — I10 ESSENTIAL HYPERTENSION, MALIGNANT: ICD-10-CM

## 2018-08-22 DIAGNOSIS — R42 DIZZINESS: Primary | ICD-10-CM

## 2018-08-22 DIAGNOSIS — R55 VASODEPRESSOR SYNCOPE: ICD-10-CM

## 2018-08-22 DIAGNOSIS — R00.2 PALPITATIONS: ICD-10-CM

## 2018-08-22 DIAGNOSIS — I10 ESSENTIAL HYPERTENSION, MALIGNANT: Primary | ICD-10-CM

## 2018-08-22 DIAGNOSIS — Z78.9 STATIN INTOLERANCE: ICD-10-CM

## 2018-08-22 PROCEDURE — 93010 ELECTROCARDIOGRAM REPORT: CPT | Mod: S$PBB,,, | Performed by: INTERNAL MEDICINE

## 2018-08-22 PROCEDURE — 99204 OFFICE O/P NEW MOD 45 MIN: CPT | Mod: S$PBB,,, | Performed by: INTERNAL MEDICINE

## 2018-08-22 PROCEDURE — 93005 ELECTROCARDIOGRAM TRACING: CPT | Mod: PBBFAC,PO | Performed by: INTERNAL MEDICINE

## 2018-08-22 PROCEDURE — 99999 PR PBB SHADOW E&M-EST. PATIENT-LVL III: CPT | Mod: PBBFAC,,, | Performed by: INTERNAL MEDICINE

## 2018-08-22 PROCEDURE — 99213 OFFICE O/P EST LOW 20 MIN: CPT | Mod: PBBFAC,PO,25 | Performed by: INTERNAL MEDICINE

## 2018-08-22 RX ORDER — LOSARTAN POTASSIUM 50 MG/1
50 TABLET ORAL DAILY
Qty: 30 TABLET | Refills: 11 | Status: SHIPPED | OUTPATIENT
Start: 2018-08-22 | End: 2019-01-03

## 2018-08-22 NOTE — PROGRESS NOTES
Subjective:   Patient ID:  Kelsie Bustamante is a 68 y.o. female who presents for evaluation of Loss of Consciousness (PASSED OUT 2 WEEKS AGO) and Dizziness      HPI  A 67 yo female with vasodepressor syncope more than 15 years ago on b blockers  Peripheral neuropathy diabetes is here for evaluation of dizziness. The patient  Has noticed over the past few months dizziness occurring when she tries to stand up from sitting position and walks few steps she feels dizzy and had passed out one time when she was trying to reach above her head. She feels weak usually feels her heart erratic her legs are going to go out underneath her. dwaine turpin tried to get off b blockers but cannot tolerate it due to palpitation. She  has no chest pain. No shortness of breath. She usually aborts these episodes by sitting down  And get her head down.she does not exercise.she has no leg swelling she drinks water but probably not enough she drinks coffee once daily. she drinks two glasses of tea daily.    Past Medical History:   Diagnosis Date    Achalasia     demetrius    Cataract     Gastroesophageal reflux     Hiatal hernia     Hypertension     Peripheral neuropathy     Postmenopausal HRT (hormone replacement therapy)     failed tapering off    Sinusitis     Thyroid nodule 3/16 subcm; kathleen 2 yrs    Type 2 diabetes mellitus with neurological manifestations     Vasodepressor syncope 2018       Past Surgical History:   Procedure Laterality Date    cataract Left      SECTION      CHOLECYSTECTOMY      HYSTERECTOMY      nonca    OOPHORECTOMY      TONSILLECTOMY         Social History     Tobacco Use    Smoking status: Never Smoker    Smokeless tobacco: Never Used   Substance Use Topics    Alcohol use: No    Drug use: No       Family History   Problem Relation Age of Onset    Aneurysm Sister     Diabetes Father     Coronary artery disease Father     Coronary artery disease Brother     Parkinsonism Brother         Current Outpatient Medications   Medication Sig    aspirin (ECOTRIN) 81 MG EC tablet Take 81 mg by mouth once daily.    DULoxetine (CYMBALTA) 20 MG capsule TAKE 1 CAPSULE DAILY    estrogens, conjugated, (PREMARIN) 0.625 MG tablet Take 1 tablet (0.625 mg total) by mouth once daily.    JANUMET 50-1,000 mg per tablet TAKE 1 TABLET TWICE A DAY WITH MEALS    losartan-hydrochlorothiazide 50-12.5 mg (HYZAAR) 50-12.5 mg per tablet TAKE 1 TABLET DAILY    metoprolol tartrate (LOPRESSOR) 50 MG tablet TAKE 1 TABLET TWICE A DAY    montelukast (SINGULAIR) 10 mg tablet Take 1 tablet (10 mg total) by mouth every evening.    rosuvastatin (CRESTOR) 5 MG tablet Take 1 tablet (5 mg total) by mouth once daily.    blood sugar diagnostic (BLOOD GLUCOSE TEST) Strp Test 2 times daily    blood-glucose meter kit Test twice daily    lancets Misc Test twice daily    triamcinolone (KENALOG) 0.5 % ointment Apply topically 2 (two) times daily.     No current facility-administered medications for this visit.      Current Outpatient Medications on File Prior to Visit   Medication Sig    aspirin (ECOTRIN) 81 MG EC tablet Take 81 mg by mouth once daily.    DULoxetine (CYMBALTA) 20 MG capsule TAKE 1 CAPSULE DAILY    estrogens, conjugated, (PREMARIN) 0.625 MG tablet Take 1 tablet (0.625 mg total) by mouth once daily.    JANUMET 50-1,000 mg per tablet TAKE 1 TABLET TWICE A DAY WITH MEALS    losartan-hydrochlorothiazide 50-12.5 mg (HYZAAR) 50-12.5 mg per tablet TAKE 1 TABLET DAILY    metoprolol tartrate (LOPRESSOR) 50 MG tablet TAKE 1 TABLET TWICE A DAY    montelukast (SINGULAIR) 10 mg tablet Take 1 tablet (10 mg total) by mouth every evening.    rosuvastatin (CRESTOR) 5 MG tablet Take 1 tablet (5 mg total) by mouth once daily.    blood sugar diagnostic (BLOOD GLUCOSE TEST) Strp Test 2 times daily    blood-glucose meter kit Test twice daily    lancets Misc Test twice daily    triamcinolone (KENALOG) 0.5 % ointment Apply  topically 2 (two) times daily.     No current facility-administered medications on file prior to visit.        Review of patient's allergies indicates:   Allergen Reactions    Floxin [ofloxacin]     Fluvastatin     Pravastatin Other (See Comments)     myalgias    Trazodone      Racing heart       Review of Systems   Constitution: Negative for diaphoresis, weakness, malaise/fatigue and weight gain.   HENT: Negative for hoarse voice.    Eyes: Negative for double vision and visual disturbance.   Cardiovascular: Positive for palpitations. Negative for chest pain, claudication, cyanosis, dyspnea on exertion, irregular heartbeat, leg swelling, near-syncope, orthopnea, paroxysmal nocturnal dyspnea and syncope.   Respiratory: Negative for cough, hemoptysis, shortness of breath and snoring.    Hematologic/Lymphatic: Negative for bleeding problem. Does not bruise/bleed easily.   Skin: Negative for color change and poor wound healing.   Musculoskeletal: Negative for muscle cramps, muscle weakness and myalgias.   Gastrointestinal: Negative for bloating, abdominal pain, change in bowel habit, diarrhea, heartburn, hematemesis, hematochezia, melena and nausea.   Neurological: Positive for dizziness and light-headedness. Negative for excessive daytime sleepiness, headaches, loss of balance and numbness.   Psychiatric/Behavioral: Negative for memory loss. The patient does not have insomnia.    Allergic/Immunologic: Negative for hives.       Objective:   Physical Exam   Constitutional: She is oriented to person, place, and time. She appears well-developed and well-nourished. She does not appear ill. No distress.   HENT:   Head: Normocephalic and atraumatic.   Eyes: EOM are normal. Pupils are equal, round, and reactive to light. No scleral icterus.   Neck: Normal range of motion. Neck supple. Normal carotid pulses, no hepatojugular reflux and no JVD present. Carotid bruit is not present. No tracheal deviation present. No  "thyromegaly present.   Cardiovascular: Normal rate, regular rhythm, normal heart sounds and normal pulses. Exam reveals no gallop and no friction rub.   No murmur heard.  Pulses:       Carotid pulses are 2+ on the right side, and 2+ on the left side.       Radial pulses are 2+ on the right side, and 2+ on the left side.        Femoral pulses are 2+ on the right side, and 2+ on the left side.       Popliteal pulses are 2+ on the right side, and 2+ on the left side.        Dorsalis pedis pulses are 2+ on the right side, and 2+ on the left side.        Posterior tibial pulses are 2+ on the right side, and 2+ on the left side.   Pulmonary/Chest: Effort normal and breath sounds normal. No respiratory distress. She has no wheezes. She has no rhonchi. She has no rales. She exhibits no tenderness.   Abdominal: Soft. Normal appearance, normal aorta and bowel sounds are normal. She exhibits no abdominal bruit, no ascites and no pulsatile midline mass. There is no hepatomegaly. There is no tenderness.   Musculoskeletal: She exhibits no edema.        Right shoulder: She exhibits no deformity.   Neurological: She is alert and oriented to person, place, and time. She has normal strength. No cranial nerve deficit. Coordination normal.   Skin: Skin is warm and dry. No rash noted. She is not diaphoretic. No cyanosis or erythema. Nails show no clubbing.   Psychiatric: She has a normal mood and affect. Her speech is normal and behavior is normal.   Nursing note and vitals reviewed.    Vitals:    08/22/18 1516 08/22/18 1517   BP: 126/70 122/74   BP Location: Right arm Left arm   Patient Position: Sitting Sitting   BP Method: Medium (Manual) Medium (Manual)   Pulse: 64    Weight: 76.7 kg (169 lb)    Height: 5' 3" (1.6 m)      Lab Results   Component Value Date    CHOL 190 02/02/2018    CHOL 177 04/06/2017    CHOL 171 10/17/2016     Lab Results   Component Value Date    HDL 49 02/02/2018    HDL 36 (L) 04/06/2017    HDL 36 (L) 10/17/2016 "     Lab Results   Component Value Date    LDLCALC 105.0 02/02/2018    LDLCALC 110.4 04/06/2017    LDLCALC 76.2 10/17/2016     Lab Results   Component Value Date    TRIG 180 (H) 02/02/2018    TRIG 153 (H) 04/06/2017    TRIG 294 (H) 10/17/2016     Lab Results   Component Value Date    CHOLHDL 25.8 02/02/2018    CHOLHDL 20.3 04/06/2017    CHOLHDL 21.1 10/17/2016       Chemistry        Component Value Date/Time     02/02/2018 0757    K 4.6 02/02/2018 0757     02/02/2018 0757    CO2 27 02/02/2018 0757    BUN 17 02/02/2018 0757    CREATININE 0.9 02/02/2018 0757     (H) 02/02/2018 0757        Component Value Date/Time    CALCIUM 9.6 02/02/2018 0757    ALKPHOS 88 04/09/2014 0930    AST 24 04/09/2014 0930    ALT 26 05/11/2016 0741    BILITOT 0.3 04/09/2014 0930    ESTGFRAFRICA >60.0 02/02/2018 0757    EGFRNONAA >60.0 02/02/2018 0757          Lab Results   Component Value Date    TSH 2.319 08/13/2018     No results found for: INR, PROTIME  Lab Results   Component Value Date    WBC 10.62 07/06/2017    HGB 13.0 07/06/2017    HCT 39.4 07/06/2017    MCV 90 07/06/2017     07/06/2017     BNP  @LABRCNTIP(BNP,BNPTRIAGEBLO)@  CrCl cannot be calculated (Patient's most recent lab result is older than the maximum 7 days allowed.).    holter monitor from last year 2017 no significant arruythmias.  Assessment:     1. Dizziness    2. Essential hypertension    3. Type 2 diabetes with neural complication    4. Gastroesophageal reflux disease without esophagitis    5. Palpitations    6. Statin intolerance    7. Vasodepressor syncope      Her symptoms are suggestive of autonomic insufficiency  vs orthostatic hypotension. She has a drop of 18 mmhg between sitting and standing position .  Plan:   Stop hctz and keep losartan only   Tilt test   Lower body strengthening exercise   Support stocking   F/u in 1 month.

## 2018-08-22 NOTE — LETTER
August 22, 2018      Christina Tillman MD  [address] [facility] [place] [address]           [facility] - Cardiology  [address] [place] [address]  Phone: [phone]  Fax: [phone]          Patient: Kelsie Bustamante   MR Number: [id number]   YOB: 1950   Date of Visit: 8/22/2018       Dear Dr. Christina Tillman:    Thank you for referring Kelsie Bustamante to me for evaluation. Attached you will find relevant portions of my assessment and plan of care.    If you have questions, please do not hesitate to call me. I look forward to following Kelsie Bustamante along with you.    Sincerely,    Joshua Tim MD    Enclosure  CC:  No Recipients    If you would like to receive this communication electronically, please contact [email or web address] or [phone] to request more information on [employer] access.    For providers and/or their staff who would like to refer a patient to [facility], please contact us through our one-stop-shop provider referral line, [facility], at [phone].    If you feel you have received this communication in error or would no longer like to receive these types of communications, please e-mail [email or web address]

## 2018-08-28 ENCOUNTER — TELEPHONE (OUTPATIENT)
Dept: ELECTROPHYSIOLOGY | Facility: CLINIC | Age: 68
End: 2018-08-28

## 2018-08-28 NOTE — PROGRESS NOTES
TILT TABLE TEST EDUCATION CHECKLIST    10-31-18 @ 7 AM  REPORT TO CARDIOLOGY WAITING ROOM ON 3RD FLOOR OF HOSPITAL  (DO NOT REPORT TO CLINIC)  If you park in the Parking Garage:  Take elevators to the 2nd floor  Walk up ramp and turn right by Gold Elevators  Take elevator to the 3rd floor  Upon exiting the elevator, turn away from the clinic areas  Walk long bucio around to front of hospital to area with windows overlooking [facility]  Check in at Reception Desk  OR  If family is dropping you off:  Have them drop you off at the front of the Hospital  (Near the ER, where all the flags are hung).  Take the E elevators to the 3rd floor.  Check in at the Reception Desk in the waiting room.        DO NOT EAT OR DRINK ANYTHING AFTER MIDNIGHT ON THE NIGHT BEFORE YOUR TEST    DO NOT TAKE JANUMET ON THE MORNING OF YOUR PROCEDURE(10-31-18)  YOU SHOULD TAKE ALL YOUR OTHER USUAL MORNING MEDICATIONS WITH A SIP OF WATER    YOU WILL NEED SOMEONE TO DRIVE YOU HOME AFTER YOUR TEST    THE ABOVE INSTRUCTIONS WERE GIVEN TO THE PATIENT VERBALLY AND THEY VERBALIZED UNDERSTANDING. THEY DO NOT REQUIRE ANY SPECIAL NEEDS AND DO NOT HAVE ANY LEARNING BARRIERS.     Any need to reschedule or cancel procedures, or any questions regarding your procedures should be addressed directly with the Arrhythmia Department Nurses at the following phone number: [phone]

## 2018-08-28 NOTE — TELEPHONE ENCOUNTER
Spoke with patient provided potential date for EP procedure. Patient in agreement. Will sent instructions via portal and mail.     Mallory SHANKAR CCM

## 2018-09-24 ENCOUNTER — TELEPHONE (OUTPATIENT)
Dept: ELECTROPHYSIOLOGY | Facility: CLINIC | Age: 68
End: 2018-09-24

## 2018-09-24 NOTE — TELEPHONE ENCOUNTER
----- Message from Ale Wilkes sent at 9/24/2018 10:44 AM CDT -----  Contact: pt  Pt is returning your call to r/s her procedure and can be reached at [phone].    Thank you

## 2018-09-24 NOTE — TELEPHONE ENCOUNTER
Attempt to call patient, called all phone numbers listed on the patient's demographics, no answer- left a message notifying that we need to re-schedule Tilt table test.    Mallory SHANKAR CCM

## 2018-09-24 NOTE — PROGRESS NOTES
TILT TABLE TEST EDUCATION CHECKLIST     10-25-18 @ 7 AM  REPORT TO CARDIOLOGY WAITING ROOM ON 3RD FLOOR OF HOSPITAL--[facility] [address] [facility] [address]  (DO NOT REPORT TO CLINIC)  If you park in the Parking Garage:  Take elevators to the 2nd floor  Walk up ramp and turn right by Gold Elevators  Take elevator to the 3rd floor  Upon exiting the elevator, turn away from the clinic areas  Walk long bucio around to front of hospital to area with windows overlooking [facility]  Check in at Reception Desk  OR  If family is dropping you off:  Have them drop you off at the front of the Hospital  (Near the ER, where all the flags are hung).  Take the E elevators to the 3rd floor.  Check in at the Reception Desk in the waiting room.           DO NOT EAT OR DRINK ANYTHING AFTER MIDNIGHT ON THE NIGHT BEFORE YOUR TEST     DO NOT TAKE JANUMET ON THE MORNING OF YOUR PROCEDURE(10-25-18)  YOU SHOULD TAKE ALL YOUR OTHER USUAL MORNING MEDICATIONS WITH A SIP OF WATER     YOU WILL NEED SOMEONE TO DRIVE YOU HOME AFTER YOUR TEST     THE ABOVE INSTRUCTIONS WERE GIVEN TO THE PATIENT VERBALLY AND THEY VERBALIZED UNDERSTANDING. THEY DO NOT REQUIRE ANY SPECIAL NEEDS AND DO NOT HAVE ANY LEARNING BARRIERS.      Any need to reschedule or cancel procedures, or any questions regarding your procedures should be addressed directly with the Arrhythmia Department Nurses at the following phone number: [phone]

## 2018-09-24 NOTE — TELEPHONE ENCOUNTER
Patient agrees to new Tilt table test date of 10-25-18. Instruciotns to follow.      Mallory SHANKAR CCM

## 2018-10-03 DIAGNOSIS — M35.3 POLYMYALGIA: ICD-10-CM

## 2018-10-04 RX ORDER — DULOXETIN HYDROCHLORIDE 20 MG/1
CAPSULE, DELAYED RELEASE ORAL
Qty: 90 CAPSULE | Refills: 0 | Status: SHIPPED | OUTPATIENT
Start: 2018-10-04 | End: 2018-10-30 | Stop reason: SDUPTHER

## 2018-10-24 ENCOUNTER — TELEPHONE (OUTPATIENT)
Dept: ELECTROPHYSIOLOGY | Facility: CLINIC | Age: 68
End: 2018-10-24

## 2018-10-24 NOTE — TELEPHONE ENCOUNTER
Called pt to review HUT instructions for tomorrow AM. No answer. Left voicemail requesting pt call back to clinic.

## 2018-10-25 ENCOUNTER — HOSPITAL ENCOUNTER (OUTPATIENT)
Facility: HOSPITAL | Age: 68
Discharge: HOME | End: 2018-10-25
Attending: INTERNAL MEDICINE | Admitting: INTERNAL MEDICINE
Payer: MEDICARE

## 2018-10-25 DIAGNOSIS — R55 VASODEPRESSOR SYNCOPE: Primary | ICD-10-CM

## 2018-10-25 DIAGNOSIS — R55 SYNCOPE: ICD-10-CM

## 2018-10-25 PROCEDURE — 93660 TILT TABLE EVALUATION: CPT

## 2018-10-25 PROCEDURE — 93660 TILT TABLE EVALUATION: CPT | Mod: 26,,, | Performed by: INTERNAL MEDICINE

## 2018-10-25 RX ORDER — SODIUM CHLORIDE 9 MG/ML
INJECTION, SOLUTION INTRAVENOUS CONTINUOUS
Status: DISCONTINUED | OUTPATIENT
Start: 2018-10-25 | End: 2018-10-25 | Stop reason: HOSPADM

## 2018-10-30 ENCOUNTER — TELEPHONE (OUTPATIENT)
Dept: CARDIOLOGY | Facility: CLINIC | Age: 68
End: 2018-10-30

## 2018-10-30 DIAGNOSIS — I10 ESSENTIAL HYPERTENSION: ICD-10-CM

## 2018-10-30 DIAGNOSIS — M35.3 POLYMYALGIA: ICD-10-CM

## 2018-10-30 DIAGNOSIS — R00.2 PALPITATIONS: ICD-10-CM

## 2018-10-30 NOTE — TELEPHONE ENCOUNTER
----- Message from Neha Powell sent at 10/30/2018  1:54 PM CDT -----  Contact: pt  1. What is the name of the medication you are requesting? Duloxetine  2. What is the dose? 20  3. How do you take the medication? Orally, topically, etc? mouth  4. How often do you take this medication? Once daily  5. Do you need a 30 day or 90 day supply? 90  6. How many refills are you requesting? 1  7. What is your preferred pharmacy and location of the pharmacy?  [facility] fax # [phone] and ph# [phone]  8. Who can we contact with further questions?   [phone] (home)       1. What is the name of the medication you are requesting? Sitheliptin Phos/ Metformin  2. What is the dose? 50/1000  3. How do you take the medication? Orally, topically, etc? mouth  4. How often do you take this medication? Twice daily  5. Do you need a 30 day or 90 day supply? 90  6. How many refills are you requesting? 1  7. What is your preferred pharmacy and location of the pharmacy?   [facility] fax # [phone] and ph# [phone]    8. Who can we contact with further questions? [phone] (home)     1. What is the name of the medication you are requesting? Metoprolol  2. What is the dose? 50mg  3. How do you take the medication? Orally, topically, etc? mouth  4. How often do you take this medication? twice daily  5. Do you need a 30 day or 90 day supply? 90  6. How many refills are you requesting? 1  7. What is your preferred pharmacy and location of the pharmacy?  [facility] fax # [phone] and ph# [phone]    8. Who can we contact with further questions? [phone] (home)

## 2018-10-30 NOTE — TELEPHONE ENCOUNTER
----- Message from Joshua Tim MD sent at 10/29/2018 10:22 AM CDT -----  The tilt test come out satisfactory she just needs office f/u.

## 2018-10-31 RX ORDER — DULOXETIN HYDROCHLORIDE 20 MG/1
20 CAPSULE, DELAYED RELEASE ORAL DAILY
Qty: 90 CAPSULE | Refills: 3 | Status: SHIPPED | OUTPATIENT
Start: 2018-10-31 | End: 2019-01-03 | Stop reason: SDUPTHER

## 2018-10-31 RX ORDER — METOPROLOL TARTRATE 50 MG/1
50 TABLET ORAL 2 TIMES DAILY
Qty: 180 TABLET | Refills: 0 | Status: SHIPPED | OUTPATIENT
Start: 2018-10-31 | End: 2019-01-03 | Stop reason: SDUPTHER

## 2018-11-09 ENCOUNTER — TELEPHONE (OUTPATIENT)
Dept: CARDIOLOGY | Facility: CLINIC | Age: 68
End: 2018-11-09

## 2018-11-09 NOTE — TELEPHONE ENCOUNTER
Returned call to patient after receiving voicemail requesting call back regarding Tilt test.  Patient expressed complaints and dissatisfaction with lack of thoroughness or challenge from Tilt test process also from not receiving her results.  Scheduled appointment to come in and discuss with Dr. Tim on 12/5/18.

## 2018-11-12 ENCOUNTER — TELEPHONE (OUTPATIENT)
Dept: INTERNAL MEDICINE | Facility: CLINIC | Age: 68
End: 2018-11-12

## 2018-11-12 NOTE — TELEPHONE ENCOUNTER
----- Message from July Busby LPN sent at 11/9/2018  4:07 PM CST -----  Contact: pt      ----- Message -----  From: Isela Stockton MA  Sent: 11/9/2018  10:33 AM  To: July Busby LPN    I didn't handle this prescription, what would you like me to do with this  ----- Message -----  From: Cody Parks MD  Sent: 11/6/2018   5:08 PM  To: July Busby LPN, Isela Stockton MA     The next step would be a [facility] contact [facility] to explain error to see what can be done to remedy this situation. July if you have any other ideas.  ----- Message -----  From: Isela Stockton MA  Sent: 11/5/2018   4:13 PM  To: Cody Parks MD        ----- Message -----  From: Kasia Hartman  Sent: 11/5/2018  11:20 AM  To: Charly ALVARADO Staff    Please call pt @ [phone] regarding medication, pt states meds was sent to wrong pharmacy, pt states pharmacy have charge credit card over $400.00, pt did not want meds sent there, pt states she gave nurse pharmacy and fax number, pt need to know why [facility] to [facility], pt states it should have went to [facility]. Pt states she can not afford med again, pt need to know who going to fix this issue.

## 2018-11-15 ENCOUNTER — PATIENT OUTREACH (OUTPATIENT)
Dept: ADMINISTRATIVE | Facility: HOSPITAL | Age: 68
End: 2018-11-15

## 2018-11-15 NOTE — PROGRESS NOTES
Health Maintenance reviewed. E-Faxed record request for patient medical records sent to [facility]od[facility]. PREVISIT CHART AUDIT LETTER SENT VIA PATIENT PORTAL

## 2018-11-15 NOTE — LETTER
November 15, 2018        We are seeing Kelsie Bustamante, 1950, at [facility]. Cody Parks MD is their primary care physician. To help with our [place] maintenance records could you please send the following:     A copy of the most current Diabetic/Insulin Resistant Eye Exam sheet/report,   (including clear determination of Retinopathy status)    Please fax to [facility] at [phone], attention Ilsa Gordon LPN.    Thank-you in advance for your assistance. If you have any questions or concerns please contact me at [phone].     Ilsa Gordon LPN  Care Coordination Department  [facility]

## 2018-11-16 ENCOUNTER — TELEPHONE (OUTPATIENT)
Dept: INTERNAL MEDICINE | Facility: CLINIC | Age: 68
End: 2018-11-16

## 2018-11-16 NOTE — TELEPHONE ENCOUNTER
Please check with patient on status of this and if not fixed then please contact her mail order pharmacy to explain rx should have gone to local pharmacy and if she can be refunded

## 2018-11-16 NOTE — TELEPHONE ENCOUNTER
----- Message from Isela Stockton MA sent at 11/5/2018  4:13 PM CST -----  Contact: pt      ----- Message -----  From: Kasia Hartman  Sent: 11/5/2018  11:20 AM  To: Charly ALVARADO Staff    Please call pt @ [phone] regarding medication, pt states meds was sent to wrong pharmacy, pt states pharmacy have charge credit card over $400.00, pt did not want meds sent there, pt states she gave nurse pharmacy and fax number, pt need to know why [facility] was [facility], pt states it should have went to [facility]. Pt states she can not afford med again, pt need to know who going to fix this issue.

## 2018-12-05 ENCOUNTER — OFFICE VISIT (OUTPATIENT)
Dept: CARDIOLOGY | Facility: CLINIC | Age: 68
End: 2018-12-05
Payer: MEDICARE

## 2018-12-05 VITALS
WEIGHT: 171.5 LBS | HEART RATE: 66 BPM | SYSTOLIC BLOOD PRESSURE: 122 MMHG | DIASTOLIC BLOOD PRESSURE: 66 MMHG | HEIGHT: 63 IN | BODY MASS INDEX: 30.39 KG/M2

## 2018-12-05 DIAGNOSIS — E66.9 OBESITY, CLASS I, BMI 30-34.9: ICD-10-CM

## 2018-12-05 DIAGNOSIS — Z78.9 STATIN INTOLERANCE: ICD-10-CM

## 2018-12-05 DIAGNOSIS — E11.49 TYPE 2 DIABETES WITH NEURAL COMPLICATION: Chronic | ICD-10-CM

## 2018-12-05 DIAGNOSIS — R55 VASODEPRESSOR SYNCOPE: Primary | ICD-10-CM

## 2018-12-05 DIAGNOSIS — R00.2 PALPITATIONS: ICD-10-CM

## 2018-12-05 DIAGNOSIS — I10 ESSENTIAL HYPERTENSION: Chronic | ICD-10-CM

## 2018-12-05 PROCEDURE — 99214 OFFICE O/P EST MOD 30 MIN: CPT | Mod: S$PBB,,, | Performed by: INTERNAL MEDICINE

## 2018-12-05 PROCEDURE — 99999 PR PBB SHADOW E&M-EST. PATIENT-LVL III: CPT | Mod: PBBFAC,,, | Performed by: INTERNAL MEDICINE

## 2018-12-05 PROCEDURE — 99213 OFFICE O/P EST LOW 20 MIN: CPT | Mod: PBBFAC,PO | Performed by: INTERNAL MEDICINE

## 2018-12-05 RX ORDER — LEVOFLOXACIN 750 MG/1
750 TABLET ORAL DAILY
COMMUNITY
End: 2019-01-03

## 2018-12-05 RX ORDER — MIDODRINE HYDROCHLORIDE 2.5 MG/1
2.5 TABLET ORAL 3 TIMES DAILY
Qty: 90 TABLET | Refills: 11 | Status: SHIPPED | OUTPATIENT
Start: 2018-12-05 | End: 2019-01-03

## 2018-12-05 NOTE — PROGRESS NOTES
Subjective:   Patient ID:  Kelsie Bustamante is a 68 y.o. female who presents for follow up of Loss of Consciousness and Hypertension      HPI  A 67 yo female with neuropathy diabetes h/o vasodepressor syncope is here for f/u had a tilt test unrevealing . She ahs been having very frequent body postion changes form lyin g down to upright episodes of dizziness near faintin g happening when standing up and trying to walk.has no lightheadedness otherwise. Has no vertigo. She is wearing stockings w/o any effect on the symptoms. She is staying well hydrated. She ahs orthostatic drop in bp to 90 systolic reproducing her symptoms. She eats salt she has a [place] she is on antibiotics.she ahs a cough wheezes she feels better however./   Past Medical History:   Diagnosis Date    Achalasia     demetrius    Cataract     Gastroesophageal reflux     Hiatal hernia     Hypertension     Peripheral neuropathy     Postmenopausal HRT (hormone replacement therapy)     failed tapering off    Sinusitis     Thyroid nodule 3/16 subcm; kathleen 2 yrs    Type 2 diabetes mellitus with neurological manifestations     Vasodepressor syncope 2018       Past Surgical History:   Procedure Laterality Date    cataract Left      SECTION      CHOLECYSTECTOMY      COLONOSCOPY N/A 8/15/2014    Performed by Osman Multani MD at [facility] ENDO    ESOPHAGOGASTRODUODENOSCOPY (EGD) N/A 8/15/2014    Performed by Osman Multani MD at [facility] ENDO    HYSTERECTOMY      nonca    MANOMETRY-ESOPHAGEAL-HIGH RESOLUTION N/A 10/9/2014    Performed by Yoli Mayorga RN at [facility] ENDO    OOPHORECTOMY      TILT TABLE TEST N/A 10/25/2018    Procedure: TILT TABLE TEST;  Surgeon: Daryl Walker MD;  Location: [facility] CATH LAB;  Service: Cardiology;  Laterality: N/A;  VAS.DEP.SYN,HUT,FAS,3PREP    TILT TABLE TEST N/A 10/25/2018    Performed by Daryl Walker MD at [facility] CATH LAB     TONSILLECTOMY         Social History     Tobacco Use    Smoking status: Never Smoker    Smokeless tobacco: Never Used   Substance Use Topics    Alcohol use: No    Drug use: No       Family History   Problem Relation Age of Onset    Aneurysm Sister     Diabetes Father     Coronary artery disease Father     Coronary artery disease Brother     Parkinsonism Brother        Current Outpatient Medications   Medication Sig    aspirin (ECOTRIN) 81 MG EC tablet Take 81 mg by mouth once daily.    blood sugar diagnostic (BLOOD GLUCOSE TEST) Strp Test 2 times daily    blood-glucose meter kit Test twice daily    DULoxetine (CYMBALTA) 20 MG capsule Take 1 capsule (20 mg total) by mouth once daily.    lancets Misc Test twice daily    levoFLOXacin (LEVAQUIN) 750 MG tablet Take 750 mg by mouth once daily.    losartan (COZAAR) 50 MG tablet Take 1 tablet (50 mg total) by mouth once daily.    metoprolol tartrate (LOPRESSOR) 50 MG tablet Take 1 tablet (50 mg total) by mouth 2 (two) times daily.    montelukast (SINGULAIR) 10 mg tablet Take 1 tablet (10 mg total) by mouth every evening.    rosuvastatin (CRESTOR) 5 MG tablet Take 1 tablet (5 mg total) by mouth once daily.    SITagliptan-metformin (JANUMET) 50-1,000 mg per tablet Take 1 tablet by mouth 2 (two) times daily with meals.    estrogens, conjugated, (PREMARIN) 0.625 MG tablet Take 1 tablet (0.625 mg total) by mouth once daily.    triamcinolone (KENALOG) 0.5 % ointment Apply topically 2 (two) times daily.     No current facility-administered medications for this visit.      Current Outpatient Medications on File Prior to Visit   Medication Sig    aspirin (ECOTRIN) 81 MG EC tablet Take 81 mg by mouth once daily.    blood sugar diagnostic (BLOOD GLUCOSE TEST) Strp Test 2 times daily    blood-glucose meter kit Test twice daily    DULoxetine (CYMBALTA) 20 MG capsule Take 1 capsule (20 mg total) by mouth once daily.    lancets Misc Test twice daily     levoFLOXacin (LEVAQUIN) 750 MG tablet Take 750 mg by mouth once daily.    losartan (COZAAR) 50 MG tablet Take 1 tablet (50 mg total) by mouth once daily.    metoprolol tartrate (LOPRESSOR) 50 MG tablet Take 1 tablet (50 mg total) by mouth 2 (two) times daily.    montelukast (SINGULAIR) 10 mg tablet Take 1 tablet (10 mg total) by mouth every evening.    rosuvastatin (CRESTOR) 5 MG tablet Take 1 tablet (5 mg total) by mouth once daily.    SITagliptan-metformin (JANUMET) 50-1,000 mg per tablet Take 1 tablet by mouth 2 (two) times daily with meals.    estrogens, conjugated, (PREMARIN) 0.625 MG tablet Take 1 tablet (0.625 mg total) by mouth once daily.    triamcinolone (KENALOG) 0.5 % ointment Apply topically 2 (two) times daily.     No current facility-administered medications on file prior to visit.        Review of Systems   Constitution: Negative for diaphoresis, weakness, malaise/fatigue and weight gain.   HENT: Negative for hoarse voice.    Eyes: Negative for double vision and visual disturbance.   Cardiovascular: Positive for near-syncope. Negative for chest pain, claudication, cyanosis, dyspnea on exertion, irregular heartbeat, leg swelling, orthopnea, palpitations, paroxysmal nocturnal dyspnea and syncope.   Respiratory: Positive for cough and wheezing. Negative for hemoptysis, shortness of breath and snoring.    Hematologic/Lymphatic: Negative for bleeding problem. Does not bruise/bleed easily.   Skin: Negative for color change and poor wound healing.   Musculoskeletal: Negative for muscle cramps, muscle weakness and myalgias.   Gastrointestinal: Negative for bloating, abdominal pain, change in bowel habit, diarrhea, heartburn, hematemesis, hematochezia, melena and nausea.   Neurological: Positive for dizziness and light-headedness. Negative for excessive daytime sleepiness, headaches, loss of balance and numbness.   Psychiatric/Behavioral: Negative for memory loss. The patient does not have insomnia.   "  Allergic/Immunologic: Negative for hives.       Objective:   Physical Exam   Constitutional: She is oriented to person, place, and time. She appears well-developed and well-nourished. She does not appear ill. No distress.   HENT:   Head: Normocephalic and atraumatic.   Eyes: EOM are normal. Pupils are equal, round, and reactive to light. No scleral icterus.   Neck: Normal range of motion. Neck supple. Normal carotid pulses, no hepatojugular reflux and no JVD present. Carotid bruit is not present. No tracheal deviation present. No thyromegaly present.   Cardiovascular: Normal rate, regular rhythm, normal heart sounds, intact distal pulses and normal pulses. Exam reveals no gallop and no friction rub.   No murmur heard.  Pulmonary/Chest: Effort normal. No respiratory distress. She has wheezes. She has no rhonchi. She has no rales. She exhibits no tenderness.   Abdominal: Soft. Normal appearance, normal aorta and bowel sounds are normal. She exhibits no distension, no abdominal bruit, no ascites and no pulsatile midline mass. There is no hepatomegaly. There is no tenderness.   Musculoskeletal: She exhibits no edema.        Right shoulder: She exhibits no deformity.   Neurological: She is alert and oriented to person, place, and time. She has normal strength. No cranial nerve deficit. Coordination normal.   Skin: Skin is warm and dry. No rash noted. She is not diaphoretic. No cyanosis or erythema. Nails show no clubbing.   Psychiatric: She has a normal mood and affect. Her speech is normal and behavior is normal.   Nursing note and vitals reviewed.    Vitals:    12/05/18 1456   BP: 122/66   Pulse: 66   Weight: 77.8 kg (171 lb 8.3 oz)   Height: 5' 3" (1.6 m)     Lab Results   Component Value Date    CHOL 190 02/02/2018    CHOL 177 04/06/2017    CHOL 171 10/17/2016     Lab Results   Component Value Date    HDL 49 02/02/2018    HDL 36 (L) 04/06/2017    HDL 36 (L) 10/17/2016     Lab Results   Component Value Date    " LDLCALC 105.0 02/02/2018    LDLCALC 110.4 04/06/2017    LDLCALC 76.2 10/17/2016     Lab Results   Component Value Date    TRIG 180 (H) 02/02/2018    TRIG 153 (H) 04/06/2017    TRIG 294 (H) 10/17/2016     Lab Results   Component Value Date    CHOLHDL 25.8 02/02/2018    CHOLHDL 20.3 04/06/2017    CHOLHDL 21.1 10/17/2016       Chemistry        Component Value Date/Time     02/02/2018 0757    K 4.6 02/02/2018 0757     02/02/2018 0757    CO2 27 02/02/2018 0757    BUN 17 02/02/2018 0757    CREATININE 0.9 02/02/2018 0757     (H) 02/02/2018 0757        Component Value Date/Time    CALCIUM 9.6 02/02/2018 0757    ALKPHOS 88 04/09/2014 0930    AST 24 04/09/2014 0930    ALT 26 05/11/2016 0741    BILITOT 0.3 04/09/2014 0930    ESTGFRAFRICA >60.0 02/02/2018 0757    EGFRNONAA >60.0 02/02/2018 0757          Lab Results   Component Value Date    TSH 2.319 08/13/2018     No results found for: INR, PROTIME  Lab Results   Component Value Date    WBC 10.62 07/06/2017    HGB 13.0 07/06/2017    HCT 39.4 07/06/2017    MCV 90 07/06/2017     07/06/2017     BMP  Sodium   Date Value Ref Range Status   02/02/2018 141 136 - 145 mmol/L Final     Potassium   Date Value Ref Range Status   02/02/2018 4.6 3.5 - 5.1 mmol/L Final     Chloride   Date Value Ref Range Status   02/02/2018 105 95 - 110 mmol/L Final     CO2   Date Value Ref Range Status   02/02/2018 27 23 - 29 mmol/L Final     BUN, Bld   Date Value Ref Range Status   02/02/2018 17 8 - 23 mg/dL Final     Creatinine   Date Value Ref Range Status   02/02/2018 0.9 0.5 - 1.4 mg/dL Final     Calcium   Date Value Ref Range Status   02/02/2018 9.6 8.7 - 10.5 mg/dL Final     Anion Gap   Date Value Ref Range Status   02/02/2018 9 8 - 16 mmol/L Final     eGFR if    Date Value Ref Range Status   02/02/2018 >60.0 >60 mL/min/1.73 m^2 Final     eGFR if non    Date Value Ref Range Status   02/02/2018 >60.0 >60 mL/min/1.73 m^2 Final     Comment:      Calculation used to obtain the estimated glomerular filtration  rate (eGFR) is the CKD-EPI equation.        CrCl cannot be calculated (Patient's most recent lab result is older than the maximum 7 days allowed.).    Assessment:     1. Vasodepressor syncope    2. Type 2 diabetes with neural complication    3. Essential hypertension    4. Palpitations    5. Statin intolerance    6. Obesity, Class I, BMI 30-34.9      Reviewed her tilt. I think she has an element of autonomic insufficieny counseled about smoking cessation hydration compression stockings and midodrine  Plan:     Midodrine 2.5 mg po bid   Hydration   Stockings   If worse symptoms go to er   F/u in 2 weeks

## 2018-12-18 ENCOUNTER — TELEPHONE (OUTPATIENT)
Dept: INTERNAL MEDICINE | Facility: CLINIC | Age: 68
End: 2018-12-18

## 2018-12-18 NOTE — TELEPHONE ENCOUNTER
----- Message from Flores Izquierdo LPN sent at 12/18/2018 10:32 AM CST -----  Contact: sarkis       ----- Message -----  From: Portia Alarcon  Sent: 12/18/2018  10:18 AM  To: [facility] Staff    Requesting a call back regarding rx medication.she is switching pharmacies and would like Dr. Parks to rewrite all rx's.she also has questions about medication she is concerned about.please call back at [phone].      Thanks,  Portia Alarcon

## 2019-01-03 ENCOUNTER — OFFICE VISIT (OUTPATIENT)
Dept: INTERNAL MEDICINE | Facility: CLINIC | Age: 69
End: 2019-01-03
Payer: MEDICARE

## 2019-01-03 VITALS
HEART RATE: 55 BPM | SYSTOLIC BLOOD PRESSURE: 128 MMHG | HEIGHT: 63 IN | DIASTOLIC BLOOD PRESSURE: 64 MMHG | WEIGHT: 173.94 LBS | BODY MASS INDEX: 30.82 KG/M2 | TEMPERATURE: 98 F | OXYGEN SATURATION: 98 %

## 2019-01-03 DIAGNOSIS — E04.1 THYROID NODULE: ICD-10-CM

## 2019-01-03 DIAGNOSIS — E11.49 TYPE 2 DIABETES WITH NEURAL COMPLICATION: Primary | Chronic | ICD-10-CM

## 2019-01-03 DIAGNOSIS — R00.2 PALPITATIONS: ICD-10-CM

## 2019-01-03 DIAGNOSIS — Z12.31 ENCOUNTER FOR SCREENING MAMMOGRAM FOR MALIGNANT NEOPLASM OF BREAST: ICD-10-CM

## 2019-01-03 DIAGNOSIS — M35.3 POLYMYALGIA: ICD-10-CM

## 2019-01-03 DIAGNOSIS — I10 ESSENTIAL HYPERTENSION: ICD-10-CM

## 2019-01-03 PROCEDURE — 99213 OFFICE O/P EST LOW 20 MIN: CPT | Mod: PBBFAC,PO | Performed by: FAMILY MEDICINE

## 2019-01-03 PROCEDURE — 99999 PR PBB SHADOW E&M-EST. PATIENT-LVL III: CPT | Mod: PBBFAC,,, | Performed by: FAMILY MEDICINE

## 2019-01-03 PROCEDURE — 99999 PR PBB SHADOW E&M-EST. PATIENT-LVL III: ICD-10-PCS | Mod: PBBFAC,,, | Performed by: FAMILY MEDICINE

## 2019-01-03 PROCEDURE — 99214 OFFICE O/P EST MOD 30 MIN: CPT | Mod: S$PBB,,, | Performed by: FAMILY MEDICINE

## 2019-01-03 PROCEDURE — 99214 PR OFFICE/OUTPT VISIT, EST, LEVL IV, 30-39 MIN: ICD-10-PCS | Mod: S$PBB,,, | Performed by: FAMILY MEDICINE

## 2019-01-03 RX ORDER — ZOLPIDEM TARTRATE 10 MG/1
10 TABLET ORAL NIGHTLY PRN
Qty: 90 TABLET | Refills: 1 | Status: SHIPPED | OUTPATIENT
Start: 2019-01-03 | End: 2019-06-25 | Stop reason: SDUPTHER

## 2019-01-03 RX ORDER — ACETAMINOPHEN AND PHENYLEPHRINE HCL 325; 5 MG/1; MG/1
1 TABLET ORAL DAILY
Status: ON HOLD | COMMUNITY
End: 2022-10-19 | Stop reason: HOSPADM

## 2019-01-03 RX ORDER — ASPIRIN 81 MG/1
81 TABLET ORAL DAILY
Qty: 90 TABLET | Refills: 3 | Status: SHIPPED | OUTPATIENT
Start: 2019-01-03 | End: 2020-01-23 | Stop reason: SDUPTHER

## 2019-01-03 RX ORDER — WITCH HAZEL 50 %
2500 PADS, MEDICATED (EA) TOPICAL DAILY
COMMUNITY
End: 2020-01-16

## 2019-01-03 RX ORDER — METOPROLOL TARTRATE 50 MG/1
50 TABLET ORAL 2 TIMES DAILY
Qty: 180 TABLET | Refills: 3 | Status: SHIPPED | OUTPATIENT
Start: 2019-01-03 | End: 2020-01-20 | Stop reason: SDUPTHER

## 2019-01-03 RX ORDER — DULOXETIN HYDROCHLORIDE 20 MG/1
20 CAPSULE, DELAYED RELEASE ORAL DAILY
Qty: 90 CAPSULE | Refills: 3 | Status: SHIPPED | OUTPATIENT
Start: 2019-01-03 | End: 2020-01-20 | Stop reason: SDUPTHER

## 2019-01-03 RX ORDER — ZOLPIDEM TARTRATE 10 MG/1
10 TABLET ORAL NIGHTLY PRN
COMMUNITY
End: 2019-01-03 | Stop reason: SDUPTHER

## 2019-01-03 RX ORDER — ROSUVASTATIN CALCIUM 5 MG/1
5 TABLET, COATED ORAL DAILY
Qty: 90 TABLET | Refills: 3 | Status: SHIPPED | OUTPATIENT
Start: 2019-01-03 | End: 2020-01-20 | Stop reason: SDUPTHER

## 2019-01-03 NOTE — PATIENT INSTRUCTIONS
Try stopping daily midodrine and losartan  Allow a couple weeks to see if blood pressure stays normal without feeling dizzy standing up

## 2019-01-03 NOTE — PROGRESS NOTES
Subjective:       Patient ID: Kelsie Bustamante is a female.    Chief Complaint: Multiple issues see below    HPI Type 2 diabetesw neurl manif: a1c due. Tolerating medicine. No hypoglycemia  ;;Hypertension: blood pressures at home normal. Tolerating medicine.   Hypercholesterolemia: controlled. Doing ok on crestor  Arthralgias much less with cymbalta    Chronic insomnia: medicare no longer covered;tried traz and revved her up; melatonin no help;had been using husb prn zolpidem/needs rf. Has tried otc sleep aids no help      Orthostasis:saw dr saucedo and tilt table ok; los/hctz chnged to just losart and sympt cont. metoprol cannot be decreased per patient b/c palpit will come back;currently taking just one midodrine    Thy nod hx    Past Medical History   Diagnosis Date    Hypertension     Morbid obesity     Gastroesophageal reflux     Sinusitis     Hiatal hernia     Type 2 diabetes mellitus with neurological manifestations     Peripheral neuropathy     Achalasia     Postmenopausal HRT (hormone replacement therapy)      failed tapering off     Past Surgical History   Procedure Laterality Date    Hysterectomy       nonca    Tonsillectomy      Cholecystectomy       section       Family History   Problem Relation Age of Onset    Diabetes Father     Coronary artery disease Father     Aneurysm Sister     Coronary artery disease Brother     Parkinsonism Brother      History     Social History    Marital Status:      Spouse Name: N/A     Number of Children: N/A    Years of Education: N/A     Social History Main Topics    Smoking status: Never Smoker     Smokeless tobacco: Never Used    Alcohol Use: No    Drug Use: No    Sexual Activity: None     Other Topics Concern    None     Social History Narrative         Cardiovascular: no chest pain  Chest: no shortness of breath  Abd: no abd pain  Remainder review of systems negative      Objective:      Physical Exam   Constitutional: She is  oriented to person, place, and time. She appears well-developed and well-nourished.   HENT: o/p and ears clear  Head: Normocephalic and atraumatic.   Neck: Normal range of motion. Neck supple. Carotid bruit is not present.   Cardiovascular: Normal rate and regular rhythm.    Pulmonary/Chest: Effort normal and breath sounds normal.   Lymphadenopathy:     She has no cervical adenopathy.   Neurological: She is alert and oriented to person, place, and time.   Skin: Skin is warm and dry.   Psychiatric: She has a normal mood and affect. Her behavior is normal. Judgment normal.   Nursing note and vitals reviewed.          Assessment:     type 2 dm w neural complic   hyperchol   2. Essential hypertension      statin side eff  orthostasis  Thy nodule hx  Plan:   Try stopping daily midodrine and losartan;notify if bp too high  Allow a couple weeks to see if blood pressure stays normal without feeling dizzy standing up    F/u couple weeks    Check thy u/s status at f/u    Update hm at f/u      Type 2 diabetes with neural complication  -     Comprehensive metabolic panel; Future; Expected date: 01/03/2019  -     TSH; Future; Expected date: 01/03/2019  -     Lipid panel; Future; Expected date: 01/03/2019  -     Microalbumin/creatinine urine ratio; Future; Expected date: 01/03/2019  -     Hemoglobin A1c; Future; Expected date: 01/03/2019    Polymyalgia  -     DULoxetine (CYMBALTA) 20 MG capsule; Take 1 capsule (20 mg total) by mouth once daily.  Dispense: 90 capsule; Refill: 3    Essential hypertension  -     metoprolol tartrate (LOPRESSOR) 50 MG tablet; Take 1 tablet (50 mg total) by mouth 2 (two) times daily.  Dispense: 180 tablet; Refill: 3    Palpitations  -     metoprolol tartrate (LOPRESSOR) 50 MG tablet; Take 1 tablet (50 mg total) by mouth 2 (two) times daily.  Dispense: 180 tablet; Refill: 3    Thyroid nodule    Encounter for screening mammogram for malignant neoplasm of breast  -     Mammo Digital Screening Bilat;  Future; Expected date: 01/03/2019    Other orders  -     estrogens, conjugated, (PREMARIN) 0.625 MG tablet; Take 1 tablet (0.625 mg total) by mouth once daily.  Dispense: 90 tablet; Refill: 3  -     rosuvastatin (CRESTOR) 5 MG tablet; Take 1 tablet (5 mg total) by mouth once daily.  Dispense: 90 tablet; Refill: 3  -     SITagliptan-metformin (JANUMET) 50-1,000 mg per tablet; Take 1 tablet by mouth 2 (two) times daily with meals.  Dispense: 180 tablet; Refill: 3  -     zolpidem (AMBIEN) 10 mg Tab; Take 1 tablet (10 mg total) by mouth nightly as needed.  Dispense: 90 tablet; Refill: 1  -     aspirin (ECOTRIN) 81 MG EC tablet; Take 1 tablet (81 mg total) by mouth once daily.  Dispense: 90 tablet; Refill: 3

## 2019-01-10 ENCOUNTER — OFFICE VISIT (OUTPATIENT)
Dept: INTERNAL MEDICINE | Facility: CLINIC | Age: 69
End: 2019-01-10
Payer: MEDICARE

## 2019-01-10 ENCOUNTER — LAB VISIT (OUTPATIENT)
Dept: LAB | Facility: HOSPITAL | Age: 69
End: 2019-01-10
Attending: FAMILY MEDICINE
Payer: MEDICARE

## 2019-01-10 VITALS
HEIGHT: 63 IN | RESPIRATION RATE: 18 BRPM | OXYGEN SATURATION: 98 % | HEART RATE: 65 BPM | SYSTOLIC BLOOD PRESSURE: 134 MMHG | WEIGHT: 175.5 LBS | DIASTOLIC BLOOD PRESSURE: 65 MMHG | TEMPERATURE: 96 F | BODY MASS INDEX: 31.1 KG/M2

## 2019-01-10 DIAGNOSIS — H92.01 OTALGIA OF RIGHT EAR: Primary | ICD-10-CM

## 2019-01-10 DIAGNOSIS — E11.49 TYPE 2 DIABETES WITH NEURAL COMPLICATION: Chronic | ICD-10-CM

## 2019-01-10 LAB
ALBUMIN SERPL BCP-MCNC: 3.9 G/DL
ALP SERPL-CCNC: 60 U/L
ALT SERPL W/O P-5'-P-CCNC: 15 U/L
ANION GAP SERPL CALC-SCNC: 9 MMOL/L
AST SERPL-CCNC: 18 U/L
BILIRUB SERPL-MCNC: 0.2 MG/DL
BUN SERPL-MCNC: 16 MG/DL
CALCIUM SERPL-MCNC: 9.9 MG/DL
CHLORIDE SERPL-SCNC: 105 MMOL/L
CO2 SERPL-SCNC: 27 MMOL/L
CREAT SERPL-MCNC: 0.9 MG/DL
EST. GFR  (AFRICAN AMERICAN): >60 ML/MIN/1.73 M^2
EST. GFR  (NON AFRICAN AMERICAN): >60 ML/MIN/1.73 M^2
GLUCOSE SERPL-MCNC: 127 MG/DL
POTASSIUM SERPL-SCNC: 5 MMOL/L
PROT SERPL-MCNC: 7.1 G/DL
SODIUM SERPL-SCNC: 141 MMOL/L
TSH SERPL DL<=0.005 MIU/L-ACNC: 2.28 UIU/ML

## 2019-01-10 PROCEDURE — 99999 PR PBB SHADOW E&M-EST. PATIENT-LVL V: ICD-10-PCS | Mod: PBBFAC,,, | Performed by: NURSE PRACTITIONER

## 2019-01-10 PROCEDURE — 80053 COMPREHEN METABOLIC PANEL: CPT | Mod: PO,ER

## 2019-01-10 PROCEDURE — 99213 OFFICE O/P EST LOW 20 MIN: CPT | Mod: S$PBB,,, | Performed by: NURSE PRACTITIONER

## 2019-01-10 PROCEDURE — 99215 OFFICE O/P EST HI 40 MIN: CPT | Mod: PBBFAC,PO | Performed by: NURSE PRACTITIONER

## 2019-01-10 PROCEDURE — 84443 ASSAY THYROID STIM HORMONE: CPT | Mod: PO

## 2019-01-10 PROCEDURE — 99213 PR OFFICE/OUTPT VISIT, EST, LEVL III, 20-29 MIN: ICD-10-PCS | Mod: S$PBB,,, | Performed by: NURSE PRACTITIONER

## 2019-01-10 PROCEDURE — 99999 PR PBB SHADOW E&M-EST. PATIENT-LVL V: CPT | Mod: PBBFAC,,, | Performed by: NURSE PRACTITIONER

## 2019-01-10 PROCEDURE — 36415 COLL VENOUS BLD VENIPUNCTURE: CPT | Mod: PO,ER

## 2019-01-10 PROCEDURE — 80061 LIPID PANEL: CPT

## 2019-01-10 RX ORDER — OFLOXACIN 3 MG/ML
5 SOLUTION AURICULAR (OTIC) DAILY
Qty: 5 ML | Refills: 0 | Status: SHIPPED | OUTPATIENT
Start: 2019-01-10 | End: 2019-01-17

## 2019-01-10 NOTE — PROGRESS NOTES
"Subjective:       Patient ID: Kelsie Bustamante is a 68 y.o. female.    Chief Complaint: Otalgia  pt reports to clinic with chief complaint of intermittent right ear pain.  Onset yesterday.  Reports sharp pain lasting for seconds.  Denies tinnitus.  Reports water entering ear with showering, "and I tried to get it out".  Notes mild rhinorrhea. No fever or ear drainage. No sore throat.   Otalgia    There is pain in the right ear. The current episode started yesterday. There has been no fever. Associated symptoms include rhinorrhea. Pertinent negatives include no coughing, ear discharge or sore throat. She has tried nothing for the symptoms.     Review of Systems   Constitutional: Negative for chills and fever.   HENT: Positive for ear pain and rhinorrhea. Negative for ear discharge and sore throat.    Respiratory: Negative for cough and shortness of breath.    Cardiovascular: Negative.    Gastrointestinal: Negative.    Genitourinary: Negative.        Objective:      Physical Exam   Constitutional: She is oriented to person, place, and time. She appears well-developed and well-nourished.   HENT:   Head: Normocephalic.   Right Ear: Tympanic membrane normal. There is mastoid tenderness. Tympanic membrane is not erythematous. No middle ear effusion.   Left Ear: Tympanic membrane normal. Tympanic membrane is not erythematous.  No middle ear effusion.   Small cerumen right canal   Eyes: EOM are normal.   Neck: Neck supple.   Cardiovascular: Normal rate and normal heart sounds.   Pulmonary/Chest: Effort normal and breath sounds normal.   Neurological: She is alert and oriented to person, place, and time.   Vitals reviewed.      Assessment:       1. Otalgia of right ear        Plan:   Otalgia of right ear  -     ofloxacin (FLOXIN) 0.3 % otic solution; Place 5 drops into the right ear once daily. for 7 days  Dispense: 5 mL; Refill: 0      No Follow-up on file.    "

## 2019-01-11 LAB
CHOLEST SERPL-MCNC: 149 MG/DL
CHOLEST/HDLC SERPL: 2.4 {RATIO}
HDLC SERPL-MCNC: 62 MG/DL
HDLC SERPL: 41.6 %
LDLC SERPL CALC-MCNC: 57.6 MG/DL
NONHDLC SERPL-MCNC: 87 MG/DL
TRIGL SERPL-MCNC: 147 MG/DL

## 2019-01-21 ENCOUNTER — LAB VISIT (OUTPATIENT)
Dept: LAB | Facility: HOSPITAL | Age: 69
End: 2019-01-21
Attending: FAMILY MEDICINE
Payer: MEDICARE

## 2019-01-21 ENCOUNTER — PATIENT MESSAGE (OUTPATIENT)
Dept: INTERNAL MEDICINE | Facility: CLINIC | Age: 69
End: 2019-01-21

## 2019-01-21 ENCOUNTER — OFFICE VISIT (OUTPATIENT)
Dept: INTERNAL MEDICINE | Facility: CLINIC | Age: 69
End: 2019-01-21
Payer: MEDICARE

## 2019-01-21 VITALS
HEIGHT: 63 IN | WEIGHT: 176.38 LBS | HEART RATE: 68 BPM | BODY MASS INDEX: 31.25 KG/M2 | DIASTOLIC BLOOD PRESSURE: 60 MMHG | SYSTOLIC BLOOD PRESSURE: 114 MMHG | TEMPERATURE: 98 F | OXYGEN SATURATION: 97 %

## 2019-01-21 DIAGNOSIS — I10 ESSENTIAL HYPERTENSION: Chronic | ICD-10-CM

## 2019-01-21 DIAGNOSIS — E11.49 TYPE 2 DIABETES WITH NEURAL COMPLICATION: Chronic | ICD-10-CM

## 2019-01-21 DIAGNOSIS — E11.9 TYPE 2 DIABETES MELLITUS WITHOUT COMPLICATION, WITHOUT LONG-TERM CURRENT USE OF INSULIN: Primary | ICD-10-CM

## 2019-01-21 DIAGNOSIS — Z12.31 ENCOUNTER FOR SCREENING MAMMOGRAM FOR MALIGNANT NEOPLASM OF BREAST: Primary | ICD-10-CM

## 2019-01-21 DIAGNOSIS — E04.1 THYROID NODULE: ICD-10-CM

## 2019-01-21 LAB
ESTIMATED AVG GLUCOSE: 123 MG/DL
HBA1C MFR BLD HPLC: 5.9 %

## 2019-01-21 PROCEDURE — 36415 COLL VENOUS BLD VENIPUNCTURE: CPT

## 2019-01-21 PROCEDURE — 99214 OFFICE O/P EST MOD 30 MIN: CPT | Mod: S$PBB,,, | Performed by: FAMILY MEDICINE

## 2019-01-21 PROCEDURE — 83036 HEMOGLOBIN GLYCOSYLATED A1C: CPT

## 2019-01-21 PROCEDURE — 99999 PR PBB SHADOW E&M-EST. PATIENT-LVL III: CPT | Mod: PBBFAC,,, | Performed by: FAMILY MEDICINE

## 2019-01-21 PROCEDURE — 90662 IIV NO PRSV INCREASED AG IM: CPT | Mod: PBBFAC,PN

## 2019-01-21 PROCEDURE — 99213 OFFICE O/P EST LOW 20 MIN: CPT | Mod: PBBFAC,PN | Performed by: FAMILY MEDICINE

## 2019-01-21 PROCEDURE — 99214 PR OFFICE/OUTPT VISIT, EST, LEVL IV, 30-39 MIN: ICD-10-PCS | Mod: S$PBB,,, | Performed by: FAMILY MEDICINE

## 2019-01-21 PROCEDURE — 99999 PR PBB SHADOW E&M-EST. PATIENT-LVL III: ICD-10-PCS | Mod: PBBFAC,,, | Performed by: FAMILY MEDICINE

## 2019-01-21 RX ORDER — TRIAMCINOLONE ACETONIDE 5 MG/G
OINTMENT TOPICAL 2 TIMES DAILY
Qty: 15 G | Refills: 5 | Status: SHIPPED | OUTPATIENT
Start: 2019-01-21 | End: 2019-06-11 | Stop reason: SDUPTHER

## 2019-01-21 NOTE — PROGRESS NOTES
Subjective:       Patient ID: Kelsie Bustamante is a female.    Chief Complaint: Multiple issues see below    HPI Type 2 diabetesw neurl manif: a1c due. Tolerating medicine. No hypoglycemia  ;;Hypertension: blood pressures at home normal. Off midodrine and losart. Orthostasis:saw dr suacedo and tilt table ok; los/hctz chnged to just losart and sympt cont. metoprol cannot be decreased per patient b/c palpit will come back  Hypercholesterolemia: controlled. Doing ok on crestor  Arthralgias much less with cymbalta    Chronic insomnia: medicare no longer covered;tried traz and revved her up; melatonin no help;had been using husb prn zolpidem/needs rf. Has tried otc sleep aids no help          Thy nod hx    Past Medical History   Diagnosis Date    Hypertension     Morbid obesity     Gastroesophageal reflux     Sinusitis     Hiatal hernia     Type 2 diabetes mellitus with neurological manifestations     Peripheral neuropathy     Achalasia     Postmenopausal HRT (hormone replacement therapy)      failed tapering off     Past Surgical History   Procedure Laterality Date    Hysterectomy       nonca    Tonsillectomy      Cholecystectomy       section       Family History   Problem Relation Age of Onset    Diabetes Father     Coronary artery disease Father     Aneurysm Sister     Coronary artery disease Brother     Parkinsonism Brother      History     Social History    Marital Status:      Spouse Name: N/A     Number of Children: N/A    Years of Education: N/A     Social History Main Topics    Smoking status: Never Smoker     Smokeless tobacco: Never Used    Alcohol Use: No    Drug Use: No    Sexual Activity: None     Other Topics Concern    None     Social History Narrative         Cardiovascular: no chest pain  Chest: no shortness of breath  Abd: no abd pain  Remainder review of systems negative      Objective:      Physical Exam   Constitutional: She is oriented to person, place, and  time. She appears well-developed and well-nourished.   HENT: o/p and ears clear  Head: Normocephalic and atraumatic.   Neck: Normal range of motion. Neck supple. Carotid bruit is not present.   Cardiovascular: Normal rate and regular rhythm.    Pulmonary/Chest: Effort normal and breath sounds normal.   Lymphadenopathy:     She has no cervical adenopathy.   Neurological: She is alert and oriented to person, place, and time.   Skin: Skin is warm and dry.   Psychiatric: She has a normal mood and affect. Her behavior is normal. Judgment normal.   Nursing note and vitals reviewed.          Assessment:     type 2 dm w neural complic   hyperchol   2. Essential hypertension      statin side eff    Thy nodule hx  Plan:   Lab today and fu per likely 6 months (minda, ambien)    Check thy u/s status at f/u    She will sched [facility] dr [place] for screening mammogram for malignant neoplasm of breast  -     Mammo Digital Screening Bilat; Future; Expected date: 01/21/2019    Type 2 diabetes with neural complication  -     Hemoglobin A1c; Future; Expected date: 01/21/2019  -     Microalbumin/creatinine urine ratio; Future; Expected date: 01/21/2019    Essential hypertension    Thyroid nodule    Other orders  -     Influenza - High Dose (65+) (PF) (IM)  -     triamcinolone (KENALOG) 0.5 % ointment; Apply topically 2 (two) times daily. for 10 days  Dispense: 15 g; Refill: 5;for intermitt eczema shins    [facility]

## 2019-05-01 ENCOUNTER — PATIENT OUTREACH (OUTPATIENT)
Dept: ADMINISTRATIVE | Facility: HOSPITAL | Age: 69
End: 2019-05-01

## 2019-05-07 ENCOUNTER — OFFICE VISIT (OUTPATIENT)
Dept: OPHTHALMOLOGY | Facility: CLINIC | Age: 69
End: 2019-05-07
Payer: MEDICARE

## 2019-05-07 DIAGNOSIS — Z96.1 PSEUDOPHAKIA OF BOTH EYES: ICD-10-CM

## 2019-05-07 DIAGNOSIS — E11.9 TYPE 2 DIABETES MELLITUS WITHOUT RETINOPATHY: Primary | ICD-10-CM

## 2019-05-07 DIAGNOSIS — H52.223 REGULAR ASTIGMATISM OF BOTH EYES: ICD-10-CM

## 2019-05-07 PROCEDURE — 92004 PR EYE EXAM, NEW PATIENT,COMPREHESV: ICD-10-PCS | Mod: S$PBB,,, | Performed by: OPTOMETRIST

## 2019-05-07 PROCEDURE — 92004 COMPRE OPH EXAM NEW PT 1/>: CPT | Mod: S$PBB,,, | Performed by: OPTOMETRIST

## 2019-05-07 PROCEDURE — 99211 OFF/OP EST MAY X REQ PHY/QHP: CPT | Mod: PBBFAC,PN | Performed by: OPTOMETRIST

## 2019-05-07 PROCEDURE — 99999 PR PBB SHADOW E&M-EST. PATIENT-LVL I: CPT | Mod: PBBFAC,,, | Performed by: OPTOMETRIST

## 2019-05-07 PROCEDURE — 99999 PR PBB SHADOW E&M-EST. PATIENT-LVL I: ICD-10-PCS | Mod: PBBFAC,,, | Performed by: OPTOMETRIST

## 2019-05-07 PROCEDURE — 99499 UNLISTED E&M SERVICE: CPT | Mod: S$PBB,,, | Performed by: OPTOMETRIST

## 2019-05-07 PROCEDURE — 99499 RISK ADDL DX/OHS AUDIT: ICD-10-PCS | Mod: S$PBB,,, | Performed by: OPTOMETRIST

## 2019-05-07 NOTE — PROGRESS NOTES
HPI     PTs last exam was 2 years ago. PT c/o blurred near [place] and wears otc   readers prn.   PCIOL OU per pt      Diabetic eye exam  Diagnosed with diabetes in 5+years  Recent vision fluctuations no  Blood sugar: 5.9  Any vision changes since last exam: no  Eye pain: no  Other ocular symptoms: no    Do you wear currently wear glasses or contacts? otc readers    Interested in contacts today? no    Do you plan on getting new glasses today? If needed      Last edited by Dalia Gonzalez MA on 5/7/2019  1:25 PM. (History)            Assessment /Plan     For exam results, see Encounter Report.    Type 2 diabetes mellitus without retinopathy    Pseudophakia of both eyes    Regular astigmatism of both eyes      No diabetic retinopathy OD, OS  Continue close care with PCP  Monitor 12 months      Pt does not want spec rx at this time, ok to c/w OTC readers    RTC 1 yr for dilated eye exam or PRN if any problems.   Discussed above and answered questions.

## 2019-05-07 NOTE — LETTER
May 7, 2019      Cody Parks MD  [address] [facility]  [address]  [place] [address]  Phone: [phone]  Fax: [phone]          Patient: Kelsie Bustamante   MR Number: [id number]   YOB: 1950   Date of Visit: 5/7/2019       Dear Dr. Cody Parks:    Thank you for referring Kelsie Bustamante to me for evaluation. Attached you will find relevant portions of my assessment and plan of care.    If you have questions, please do not hesitate to call me. I look forward to following Kelsie Bustamante along with you.    Sincerely,    Paul George, OD    Enclosure  CC:  No Recipients    If you would like to receive this communication electronically, please contact [email or web address] or [phone] to request more information on [employer] access.    For providers and/or their staff who would like to refer a patient to [facility], please contact us through our one-stop-shop provider referral line, [facility], at [phone].    If you feel you have received this communication in error or would no longer like to receive these types of communications, please e-mail [email or web address]

## 2019-05-24 ENCOUNTER — HOSPITAL ENCOUNTER (OUTPATIENT)
Dept: RADIOLOGY | Facility: HOSPITAL | Age: 69
Discharge: HOME | End: 2019-05-24
Attending: FAMILY MEDICINE
Payer: MEDICARE

## 2019-05-24 ENCOUNTER — OFFICE VISIT (OUTPATIENT)
Dept: INTERNAL MEDICINE | Facility: CLINIC | Age: 69
End: 2019-05-24
Payer: MEDICARE

## 2019-05-24 VITALS
HEART RATE: 58 BPM | SYSTOLIC BLOOD PRESSURE: 151 MMHG | WEIGHT: 177.94 LBS | DIASTOLIC BLOOD PRESSURE: 66 MMHG | BODY MASS INDEX: 31.53 KG/M2 | TEMPERATURE: 97 F | RESPIRATION RATE: 17 BRPM | OXYGEN SATURATION: 95 % | HEIGHT: 63 IN

## 2019-05-24 DIAGNOSIS — J40 BRONCHITIS: ICD-10-CM

## 2019-05-24 DIAGNOSIS — J40 BRONCHITIS: Primary | ICD-10-CM

## 2019-05-24 PROCEDURE — 71046 X-RAY EXAM CHEST 2 VIEWS: CPT | Mod: 26,,, | Performed by: RADIOLOGY

## 2019-05-24 PROCEDURE — 94640 AIRWAY INHALATION TREATMENT: CPT | Mod: PBBFAC,59,PO

## 2019-05-24 PROCEDURE — 99214 PR OFFICE/OUTPT VISIT, EST, LEVL IV, 30-39 MIN: ICD-10-PCS | Mod: S$PBB,,, | Performed by: FAMILY MEDICINE

## 2019-05-24 PROCEDURE — 71046 X-RAY EXAM CHEST 2 VIEWS: CPT | Mod: TC,PO

## 2019-05-24 PROCEDURE — 71046 XR CHEST PA AND LATERAL: ICD-10-PCS | Mod: 26,,, | Performed by: RADIOLOGY

## 2019-05-24 PROCEDURE — G0009 ADMIN PNEUMOCOCCAL VACCINE: HCPCS | Mod: PBBFAC,59,PO

## 2019-05-24 PROCEDURE — 99999 PR PBB SHADOW E&M-EST. PATIENT-LVL V: ICD-10-PCS | Mod: PBBFAC,,, | Performed by: FAMILY MEDICINE

## 2019-05-24 PROCEDURE — 99999 PR PBB SHADOW E&M-EST. PATIENT-LVL V: CPT | Mod: PBBFAC,,, | Performed by: FAMILY MEDICINE

## 2019-05-24 PROCEDURE — 99215 OFFICE O/P EST HI 40 MIN: CPT | Mod: PBBFAC,25,PO | Performed by: FAMILY MEDICINE

## 2019-05-24 PROCEDURE — 99214 OFFICE O/P EST MOD 30 MIN: CPT | Mod: S$PBB,,, | Performed by: FAMILY MEDICINE

## 2019-05-24 PROCEDURE — 96372 THER/PROPH/DIAG INJ SC/IM: CPT | Mod: PBBFAC,PO

## 2019-05-24 RX ORDER — PROMETHAZINE HYDROCHLORIDE AND DEXTROMETHORPHAN HYDROBROMIDE 6.25; 15 MG/5ML; MG/5ML
5 SYRUP ORAL NIGHTLY
Qty: 118 ML | Refills: 0 | Status: SHIPPED | OUTPATIENT
Start: 2019-05-24 | End: 2019-06-25 | Stop reason: SDUPTHER

## 2019-05-24 RX ORDER — ALBUTEROL SULFATE 0.83 MG/ML
2.5 SOLUTION RESPIRATORY (INHALATION)
Status: COMPLETED | OUTPATIENT
Start: 2019-05-24 | End: 2019-05-24

## 2019-05-24 RX ORDER — BENZONATATE 100 MG/1
100 CAPSULE ORAL 3 TIMES DAILY PRN
Qty: 30 CAPSULE | Refills: 0 | Status: SHIPPED | OUTPATIENT
Start: 2019-05-24 | End: 2019-06-03

## 2019-05-24 RX ORDER — IPRATROPIUM BROMIDE 0.5 MG/2.5ML
0.5 SOLUTION RESPIRATORY (INHALATION)
Status: COMPLETED | OUTPATIENT
Start: 2019-05-24 | End: 2019-05-24

## 2019-05-24 RX ORDER — BETAMETHASONE SODIUM PHOSPHATE AND BETAMETHASONE ACETATE 3; 3 MG/ML; MG/ML
6 INJECTION, SUSPENSION INTRA-ARTICULAR; INTRALESIONAL; INTRAMUSCULAR; SOFT TISSUE
Status: COMPLETED | OUTPATIENT
Start: 2019-05-24 | End: 2019-05-24

## 2019-05-24 RX ADMIN — ALBUTEROL SULFATE 2.5 MG: 2.5 SOLUTION INTRABRONCHIAL at 11:05

## 2019-05-24 RX ADMIN — BETAMETHASONE SODIUM PHOSPHATE AND BETAMETHASONE ACETATE 6 MG: 3; 3 INJECTION, SUSPENSION INTRA-ARTICULAR; INTRALESIONAL; INTRAMUSCULAR at 11:05

## 2019-05-24 RX ADMIN — IPRATROPIUM BROMIDE 0.5 MG: 0.5 SOLUTION RESPIRATORY (INHALATION) at 11:05

## 2019-05-24 NOTE — PROGRESS NOTES
Results have been reviewed . All labs are within normal range.   If you have any questions please feel free to contact me.        Stable chest radiograph.  No acute disease.

## 2019-05-24 NOTE — PROGRESS NOTES
Subjective:       Patient ID: Kelsie Bustamante is a 68 y.o. female.    Chief Complaint: Bronchitis    URI    This is a new problem. The current episode started more than 1 month ago. The problem has been gradually worsening. Maximum temperature: Fever 7-10 d ago. Associated symptoms include congestion, coughing, sinus pain and wheezing. Pertinent negatives include no abdominal pain, chest pain, headaches, nausea or vomiting. She has tried nothing for the symptoms. The treatment provided no relief.     Review of Systems   HENT: Positive for congestion and sinus pain.    Respiratory: Positive for cough and wheezing.    Cardiovascular: Negative for chest pain.   Gastrointestinal: Negative for abdominal pain, nausea and vomiting.   Neurological: Negative for headaches.       Objective:      Physical Exam   Constitutional: She appears well-developed and well-nourished. No distress.   HENT:   Head: Normocephalic and atraumatic.   Mouth/Throat: No oropharyngeal exudate.   Nasal congestion   Cardiovascular: Normal rate, regular rhythm, normal heart sounds and intact distal pulses.   Pulmonary/Chest: Effort normal. No stridor. No respiratory distress. She has wheezes.   Skin: Skin is warm and dry. No rash noted. She is not diaphoretic. No erythema.   Nursing note and vitals reviewed.      Assessment:       1. Bronchitis        Plan:     Problem List Items Addressed This Visit        Pulmonary    Bronchitis - Primary    Relevant Medications    promethazine-dextromethorphan (PROMETHAZINE-DM) 6.25-15 mg/5 mL Syrp    benzonatate (TESSALON) 100 MG capsule    betamethasone acetate-betamethasone sodium phosphate injection 6 mg (Start on 5/24/2019 11:15 AM)    albuterol nebulizer solution 2.5 mg (Start on 5/24/2019 11:15 AM)    ipratropium 0.02 % nebulizer solution 0.5 mg (Start on 5/24/2019 11:15 AM)    Other Relevant Orders    Pneumococcal Polysaccharide Vaccine (23 Valent) (SQ/IM)    X-Ray Chest PA And Lateral

## 2019-06-11 RX ORDER — TRIAMCINOLONE ACETONIDE 5 MG/G
OINTMENT TOPICAL 2 TIMES DAILY
Qty: 15 G | Refills: 5 | Status: SHIPPED | OUTPATIENT
Start: 2019-06-11 | End: 2021-04-15

## 2019-06-11 NOTE — TELEPHONE ENCOUNTER
----- Message from Heriberto Luu sent at 6/11/2019  8:22 AM CDT -----  Contact: self  Type:  RX Refill Request    Who Called: pt  Refill or New Rx:refill  RX Name and Strength:triamcinolone cream  How is the patient currently taking it? (ex. 1XDay):as needed  Is this a 30 day or 90 day RX:n/a  Preferred Pharmacy with phone number:  [facility] [address]  [address]  [address]  [place] [address]  Phone: [phone] Fax: [phone]  Local or Mail Order:local  Ordering Provider:jagruti  Would the patient rather a call back or a response via [facility]? Call back  Best Call Back Number:[phone]  Additional Information: pt is requesting cream refill before 11 due to going out of town.    Thanks,  Heriberto Luu

## 2019-06-11 NOTE — TELEPHONE ENCOUNTER
----- Message from Heather Uribe sent at 6/11/2019 10:24 AM CDT -----  Contact: self  duplicate message regarding cream refill, please send into [place] [place] [facility], phone is [phone], patient call back is [phone] (leaving to go out of town at 11)

## 2019-06-25 DIAGNOSIS — J40 BRONCHITIS: ICD-10-CM

## 2019-06-25 RX ORDER — PROMETHAZINE HYDROCHLORIDE AND DEXTROMETHORPHAN HYDROBROMIDE 6.25; 15 MG/5ML; MG/5ML
5 SYRUP ORAL NIGHTLY
Qty: 118 ML | Refills: 0 | Status: SHIPPED | OUTPATIENT
Start: 2019-06-25 | End: 2019-09-18 | Stop reason: SDUPTHER

## 2019-06-25 RX ORDER — ZOLPIDEM TARTRATE 10 MG/1
10 TABLET ORAL NIGHTLY PRN
Qty: 90 TABLET | Refills: 1 | Status: SHIPPED | OUTPATIENT
Start: 2019-06-25 | End: 2019-09-26 | Stop reason: SDUPTHER

## 2019-07-08 ENCOUNTER — PATIENT OUTREACH (OUTPATIENT)
Dept: ADMINISTRATIVE | Facility: HOSPITAL | Age: 69
End: 2019-07-08

## 2019-07-08 NOTE — PROGRESS NOTES
Health maintenance reviewed. Immunizations reviewed and reconciled.  PREVISIT CHART AUDIT LETTER SENT VIA PATIENT PORTAL

## 2019-07-30 ENCOUNTER — LAB VISIT (OUTPATIENT)
Dept: LAB | Facility: HOSPITAL | Age: 69
End: 2019-07-30
Attending: FAMILY MEDICINE
Payer: MEDICARE

## 2019-07-30 DIAGNOSIS — E11.9 TYPE 2 DIABETES MELLITUS WITHOUT COMPLICATION, WITHOUT LONG-TERM CURRENT USE OF INSULIN: ICD-10-CM

## 2019-07-30 LAB
ESTIMATED AVG GLUCOSE: 134 MG/DL (ref 68–131)
HBA1C MFR BLD HPLC: 6.3 % (ref 4–5.6)

## 2019-07-30 PROCEDURE — 83036 HEMOGLOBIN GLYCOSYLATED A1C: CPT

## 2019-07-30 PROCEDURE — 36415 COLL VENOUS BLD VENIPUNCTURE: CPT

## 2019-08-06 ENCOUNTER — PATIENT MESSAGE (OUTPATIENT)
Dept: ADMINISTRATIVE | Facility: OTHER | Age: 69
End: 2019-08-06

## 2019-08-06 ENCOUNTER — OFFICE VISIT (OUTPATIENT)
Dept: INTERNAL MEDICINE | Facility: CLINIC | Age: 69
End: 2019-08-06
Payer: MEDICARE

## 2019-08-06 VITALS
HEIGHT: 63 IN | OXYGEN SATURATION: 95 % | WEIGHT: 178.38 LBS | BODY MASS INDEX: 31.61 KG/M2 | HEART RATE: 72 BPM | SYSTOLIC BLOOD PRESSURE: 130 MMHG | TEMPERATURE: 96 F | DIASTOLIC BLOOD PRESSURE: 82 MMHG

## 2019-08-06 DIAGNOSIS — G47.30 SLEEP APNEA, UNSPECIFIED TYPE: ICD-10-CM

## 2019-08-06 DIAGNOSIS — E11.49 TYPE 2 DIABETES WITH NEURAL COMPLICATION: Primary | Chronic | ICD-10-CM

## 2019-08-06 DIAGNOSIS — I10 ESSENTIAL HYPERTENSION: Chronic | ICD-10-CM

## 2019-08-06 DIAGNOSIS — E04.1 THYROID NODULE: ICD-10-CM

## 2019-08-06 DIAGNOSIS — Z12.31 ENCOUNTER FOR SCREENING MAMMOGRAM FOR MALIGNANT NEOPLASM OF BREAST: ICD-10-CM

## 2019-08-06 PROCEDURE — 99214 OFFICE O/P EST MOD 30 MIN: CPT | Mod: PBBFAC | Performed by: FAMILY MEDICINE

## 2019-08-06 PROCEDURE — 99999 PR PBB SHADOW E&M-EST. PATIENT-LVL IV: CPT | Mod: PBBFAC,,, | Performed by: FAMILY MEDICINE

## 2019-08-06 PROCEDURE — 99499 UNLISTED E&M SERVICE: CPT | Mod: S$PBB,,, | Performed by: FAMILY MEDICINE

## 2019-08-06 PROCEDURE — 99214 PR OFFICE/OUTPT VISIT, EST, LEVL IV, 30-39 MIN: ICD-10-PCS | Mod: S$PBB,,, | Performed by: FAMILY MEDICINE

## 2019-08-06 PROCEDURE — 99214 OFFICE O/P EST MOD 30 MIN: CPT | Mod: S$PBB,,, | Performed by: FAMILY MEDICINE

## 2019-08-06 PROCEDURE — 99499 RISK ADDL DX/OHS AUDIT: ICD-10-PCS | Mod: S$PBB,,, | Performed by: FAMILY MEDICINE

## 2019-08-06 PROCEDURE — 99999 PR PBB SHADOW E&M-EST. PATIENT-LVL IV: ICD-10-PCS | Mod: PBBFAC,,, | Performed by: FAMILY MEDICINE

## 2019-08-06 RX ORDER — OMEPRAZOLE 40 MG/1
40 CAPSULE, DELAYED RELEASE ORAL NIGHTLY
Qty: 30 CAPSULE | Refills: 11 | Status: SHIPPED | OUTPATIENT
Start: 2019-08-06 | End: 2020-01-27 | Stop reason: SDUPTHER

## 2019-08-06 RX ORDER — ALBUTEROL SULFATE 90 UG/1
2 AEROSOL, METERED RESPIRATORY (INHALATION) EVERY 6 HOURS PRN
Qty: 1 EACH | Refills: 11 | Status: SHIPPED | OUTPATIENT
Start: 2019-08-06 | End: 2021-03-03 | Stop reason: SDUPTHER

## 2019-08-07 ENCOUNTER — PATIENT OUTREACH (OUTPATIENT)
Dept: OTHER | Facility: OTHER | Age: 69
End: 2019-08-07

## 2019-08-07 DIAGNOSIS — E11.9 TYPE 2 DIABETES MELLITUS: ICD-10-CM

## 2019-08-07 NOTE — PROGRESS NOTES
Last 5 Patient Entered Readings                                      Current 30 Day Average: 148/80     Recent Readings 8/6/2019 8/6/2019    SBP (mmHg) 148 205    DBP (mmHg) 79 80    Pulse 60 64

## 2019-08-08 NOTE — PROGRESS NOTES
Subjective:       Patient ID: Kelsie Bustamante is a female.    Chief Complaint: Multiple issues see below    HPI Type 2 diabetesw neurl manif: a1cok. Tolerating medicine. [facility]  ;;Hypertension: blood pressures at home normal. Off midodrine and losart. Orthostasis:saw dr saucedo and tilt table ok; los/hctz chnged to just losart and sympt cont. metoprol cannot be decreased per patient b/c palpit will come back  Hypercholesterolemia: controlled. Doing ok on crestor  Arthralgias much less with cymbalta    Chronic insomnia: medicare no longer covered;tried traz and revved her up; melatonin no help;had been using husb prn zolpidem/needs rf. Has tried otc sleep aids no help          Thy nod hx; 208 subcm and smaller than prior      Over 2 years awakened by cough w clear phlegm and gasping/sob. No leg swelling or sob while awake. W/u prev with nl bnp. Saw dr rodriguez and some thought related to reflux , bronchospasm. albut qhs did help reduce. So might have ppi. No current reflux. D/wd poss relationship; she also has daytime somnolence; no witnessed snoring but no one looking for    rcent nl cardiac w/u dr brown          Cardiovascular: no chest pain  Chest: no santos  Abd: no abd pain  Remainder review of systems negative      Objective:      Physical Exam   Constitutional: She is oriented to person, place, and time. She appears well-developed and well-nourished.   HENT: o/p and ears clear  Head: Normocephalic and atraumatic.   Neck: Normal range of motion. Neck supple. Carotid bruit is not present.   Cardiovascular: Normal rate and regular rhythm.    Pulmonary/Chest: Effort normal and breath sounds normal.   Lymphadenopathy:     She has no cervical adenopathy.   Neurological: She is alert and oriented to person, place, and time.   Skin: Skin is warm and dry.   Ext no edema  Psychiatric: She has a normal mood and affect. Her behavior is normal. Judgment normal.   Nursing note and vitals reviewed.    EPWORTH  SLEEPINESS SCALE 8/6/2019   Sitting and reading 3   Watching TV 0   Sitting, inactive in a public place (e.g. a theatre or a meeting) 0   As a passenger in a car for an hour without a break 3   Lying down to rest in the afternoon when circumstances permit 3   Sitting and talking to someone 0   Sitting quietly after a lunch without alcohol 0   In a car, while stopped for a few minutes in traffic 0   Total score 9           Assessment:     type 2 dm w neural complic   hyperchol   2. Essential hypertension    Poss mp  Poss reflux relatd bronchospasm       Plan:   Lab today and fu 6 months (dm, ambien)  Resume ppi    F/u michelle one month: nocturnal issue s/p resuming ppi and psg          Type 2 diabetes with neural complication  -     Comprehensive metabolic panel; Future; Expected date: 02/02/2020  -     Lipid panel; Future; Expected date: 02/02/2020  -     TSH; Future; Expected date: 02/02/2020  -     Hemoglobin A1c; Future; Expected date: 02/02/2020  -     Diabetes Digital Medicine (DDMP) Enrollment Order    Essential hypertension  -     Hypertension Digital Medicine (HDMP) Enrollment Order    Thyroid nodule    Sleep apnea, unspecified type  -     Home Sleep Studies; Future  -     Ambulatory consult to Pulmonology    Encounter for screening mammogram for malignant neoplasm of breast  -     Mammo Digital Screening Bilat; Future; Expected date: 08/06/2019    Other orders  -     omeprazole (PRILOSEC) 40 MG capsule; Take 1 capsule (40 mg total) by mouth every evening.  Dispense: 30 capsule; Refill: 11  -     albuterol (PROVENTIL/VENTOLIN HFA) 90 mcg/actuation inhaler; Inhale 2 puffs into the lungs every 6 (six) hours as needed for Wheezing.  Dispense: 1 each; Refill: 11

## 2019-08-20 ENCOUNTER — HOSPITAL ENCOUNTER (OUTPATIENT)
Dept: RADIOLOGY | Facility: HOSPITAL | Age: 69
Discharge: HOME | End: 2019-08-20
Attending: FAMILY MEDICINE
Payer: MEDICARE

## 2019-08-20 VITALS — WEIGHT: 178 LBS | HEIGHT: 63 IN | BODY MASS INDEX: 31.54 KG/M2

## 2019-08-20 DIAGNOSIS — Z12.31 ENCOUNTER FOR SCREENING MAMMOGRAM FOR MALIGNANT NEOPLASM OF BREAST: ICD-10-CM

## 2019-08-20 PROCEDURE — 77063 BREAST TOMOSYNTHESIS BI: CPT | Mod: 26,,, | Performed by: RADIOLOGY

## 2019-08-20 PROCEDURE — 77067 SCR MAMMO BI INCL CAD: CPT | Mod: 26,,, | Performed by: RADIOLOGY

## 2019-08-20 PROCEDURE — 77063 MAMMO DIGITAL SCREENING BILAT WITH TOMOSYNTHESIS_CAD: ICD-10-PCS | Mod: 26,,, | Performed by: RADIOLOGY

## 2019-08-20 PROCEDURE — 77067 SCR MAMMO BI INCL CAD: CPT | Mod: TC,PO

## 2019-08-20 PROCEDURE — 77067 MAMMO DIGITAL SCREENING BILAT WITH TOMOSYNTHESIS_CAD: ICD-10-PCS | Mod: 26,,, | Performed by: RADIOLOGY

## 2019-08-23 ENCOUNTER — PATIENT MESSAGE (OUTPATIENT)
Dept: RADIOLOGY | Facility: HOSPITAL | Age: 69
End: 2019-08-23

## 2019-09-05 ENCOUNTER — TELEPHONE (OUTPATIENT)
Dept: RADIOLOGY | Facility: HOSPITAL | Age: 69
End: 2019-09-05

## 2019-09-06 ENCOUNTER — HOSPITAL ENCOUNTER (OUTPATIENT)
Dept: RADIOLOGY | Facility: HOSPITAL | Age: 69
Discharge: HOME | End: 2019-09-06
Attending: FAMILY MEDICINE
Payer: MEDICARE

## 2019-09-06 DIAGNOSIS — R92.8 ABNORMAL MAMMOGRAM: ICD-10-CM

## 2019-09-06 PROCEDURE — 76642 ULTRASOUND BREAST LIMITED: CPT | Mod: TC,LT

## 2019-09-06 PROCEDURE — 76642 ULTRASOUND BREAST LIMITED: CPT | Mod: 26,LT,, | Performed by: RADIOLOGY

## 2019-09-06 PROCEDURE — 77061 BREAST TOMOSYNTHESIS UNI: CPT | Mod: 26,LT,, | Performed by: RADIOLOGY

## 2019-09-06 PROCEDURE — 76642 US BREAST LEFT LIMITED: ICD-10-PCS | Mod: 26,LT,, | Performed by: RADIOLOGY

## 2019-09-06 PROCEDURE — 77061 BREAST TOMOSYNTHESIS UNI: CPT | Mod: TC,LT

## 2019-09-06 PROCEDURE — 77065 DX MAMMO INCL CAD UNI: CPT | Mod: 26,LT,, | Performed by: RADIOLOGY

## 2019-09-06 PROCEDURE — 77061 MAMMO DIGITAL DIAGNOSTIC LEFT WITH TOMOSYNTHESIS_CAD: ICD-10-PCS | Mod: 26,LT,, | Performed by: RADIOLOGY

## 2019-09-06 PROCEDURE — 77065 DX MAMMO INCL CAD UNI: CPT | Mod: TC,LT

## 2019-09-06 PROCEDURE — 77065 MAMMO DIGITAL DIAGNOSTIC LEFT WITH TOMOSYNTHESIS_CAD: ICD-10-PCS | Mod: 26,LT,, | Performed by: RADIOLOGY

## 2019-09-18 ENCOUNTER — PATIENT MESSAGE (OUTPATIENT)
Dept: INTERNAL MEDICINE | Facility: CLINIC | Age: 69
End: 2019-09-18

## 2019-09-18 DIAGNOSIS — J40 BRONCHITIS: ICD-10-CM

## 2019-09-19 RX ORDER — PROMETHAZINE HYDROCHLORIDE AND DEXTROMETHORPHAN HYDROBROMIDE 6.25; 15 MG/5ML; MG/5ML
SYRUP ORAL
Qty: 118 ML | Refills: 0 | Status: SHIPPED | OUTPATIENT
Start: 2019-09-19 | End: 2020-08-10 | Stop reason: SDUPTHER

## 2019-09-25 ENCOUNTER — PATIENT MESSAGE (OUTPATIENT)
Dept: INTERNAL MEDICINE | Facility: CLINIC | Age: 69
End: 2019-09-25

## 2019-09-27 RX ORDER — ZOLPIDEM TARTRATE 10 MG/1
10 TABLET ORAL NIGHTLY PRN
Qty: 90 TABLET | Refills: 1 | Status: SHIPPED | OUTPATIENT
Start: 2019-09-27 | End: 2020-01-23 | Stop reason: SDUPTHER

## 2019-09-27 NOTE — TELEPHONE ENCOUNTER
----- Message from Lissy Noel sent at 9/27/2019 11:09 AM CDT -----  Contact: Pt   Type:  Patient Returning Call    Who Called:Pt   Who Left Message for Patient: antione   Does the patient know what this is regarding?:yes   Would the patient rather a call back or a response via [facility]? Call back   Best Call Back Number: [phone] ([place])   Additional Information: n/a

## 2019-10-03 RX ORDER — ZOLPIDEM TARTRATE 10 MG/1
10 TABLET ORAL NIGHTLY PRN
Qty: 90 TABLET | Refills: 1 | OUTPATIENT
Start: 2019-10-03

## 2019-10-07 NOTE — PROGRESS NOTES
In attempt to complete the enrollment call the pt states that she feels her BP is controlled and that she does not need to monitor so often. She states that she does not wish to participate in the HDMP or DDMP at this time. Pt advised that she may re-enroll at any time by contacting her MD or the digital medicine team directly. Removing pt from [employer] medicine registry.

## 2019-10-10 ENCOUNTER — PATIENT MESSAGE (OUTPATIENT)
Dept: INTERNAL MEDICINE | Facility: CLINIC | Age: 69
End: 2019-10-10

## 2019-10-10 DIAGNOSIS — R21 SKIN RASH: Primary | ICD-10-CM

## 2019-10-10 NOTE — TELEPHONE ENCOUNTER
See pt's mychart request, derm referral sent to Dr. Parks for authorization.    LV 08/06/19  [place] 02/06/20

## 2019-10-28 ENCOUNTER — PATIENT MESSAGE (OUTPATIENT)
Dept: DERMATOLOGY | Facility: CLINIC | Age: 69
End: 2019-10-28

## 2019-10-31 ENCOUNTER — EXTERNAL CHRONIC CARE MANAGEMENT (OUTPATIENT)
Dept: PRIMARY CARE CLINIC | Facility: CLINIC | Age: 69
End: 2019-10-31
Payer: MEDICARE

## 2019-10-31 PROCEDURE — 99490 CHRNC CARE MGMT STAFF 1ST 20: CPT | Mod: S$PBB,,, | Performed by: FAMILY MEDICINE

## 2019-10-31 PROCEDURE — 99490 CHRNC CARE MGMT STAFF 1ST 20: CPT | Mod: PBBFAC | Performed by: FAMILY MEDICINE

## 2019-10-31 PROCEDURE — 99490 PR CHRONIC CARE MGMT, 1ST 20 MIN: ICD-10-PCS | Mod: S$PBB,,, | Performed by: FAMILY MEDICINE

## 2019-11-30 ENCOUNTER — EXTERNAL CHRONIC CARE MANAGEMENT (OUTPATIENT)
Dept: PRIMARY CARE CLINIC | Facility: CLINIC | Age: 69
End: 2019-11-30
Payer: MEDICARE

## 2019-11-30 PROCEDURE — 99490 CHRNC CARE MGMT STAFF 1ST 20: CPT | Mod: PBBFAC | Performed by: FAMILY MEDICINE

## 2019-11-30 PROCEDURE — 99490 PR CHRONIC CARE MGMT, 1ST 20 MIN: ICD-10-PCS | Mod: S$PBB,,, | Performed by: FAMILY MEDICINE

## 2019-11-30 PROCEDURE — 99490 CHRNC CARE MGMT STAFF 1ST 20: CPT | Mod: S$PBB,,, | Performed by: FAMILY MEDICINE

## 2019-12-05 ENCOUNTER — IMMUNIZATION (OUTPATIENT)
Dept: INTERNAL MEDICINE | Facility: CLINIC | Age: 69
End: 2019-12-05
Payer: MEDICARE

## 2019-12-05 ENCOUNTER — OFFICE VISIT (OUTPATIENT)
Dept: DERMATOLOGY | Facility: CLINIC | Age: 69
End: 2019-12-05
Payer: MEDICARE

## 2019-12-05 DIAGNOSIS — L30.9 DERMATITIS: Primary | ICD-10-CM

## 2019-12-05 PROCEDURE — 99212 OFFICE O/P EST SF 10 MIN: CPT | Mod: PBBFAC,25 | Performed by: STUDENT IN AN ORGANIZED HEALTH CARE EDUCATION/TRAINING PROGRAM

## 2019-12-05 PROCEDURE — 99202 PR OFFICE/OUTPT VISIT, NEW, LEVL II, 15-29 MIN: ICD-10-PCS | Mod: S$PBB,,, | Performed by: STUDENT IN AN ORGANIZED HEALTH CARE EDUCATION/TRAINING PROGRAM

## 2019-12-05 PROCEDURE — 90662 IIV NO PRSV INCREASED AG IM: CPT | Mod: PBBFAC

## 2019-12-05 PROCEDURE — 1159F MED LIST DOCD IN RCRD: CPT | Mod: ,,, | Performed by: STUDENT IN AN ORGANIZED HEALTH CARE EDUCATION/TRAINING PROGRAM

## 2019-12-05 PROCEDURE — 99999 PR PBB SHADOW E&M-EST. PATIENT-LVL II: CPT | Mod: PBBFAC,,, | Performed by: STUDENT IN AN ORGANIZED HEALTH CARE EDUCATION/TRAINING PROGRAM

## 2019-12-05 PROCEDURE — 1126F PR PAIN SEVERITY QUANTIFIED, NO PAIN PRESENT: ICD-10-PCS | Mod: ,,, | Performed by: STUDENT IN AN ORGANIZED HEALTH CARE EDUCATION/TRAINING PROGRAM

## 2019-12-05 PROCEDURE — 1159F PR MEDICATION LIST DOCUMENTED IN MEDICAL RECORD: ICD-10-PCS | Mod: ,,, | Performed by: STUDENT IN AN ORGANIZED HEALTH CARE EDUCATION/TRAINING PROGRAM

## 2019-12-05 PROCEDURE — 99202 OFFICE O/P NEW SF 15 MIN: CPT | Mod: S$PBB,,, | Performed by: STUDENT IN AN ORGANIZED HEALTH CARE EDUCATION/TRAINING PROGRAM

## 2019-12-05 PROCEDURE — 1126F AMNT PAIN NOTED NONE PRSNT: CPT | Mod: ,,, | Performed by: STUDENT IN AN ORGANIZED HEALTH CARE EDUCATION/TRAINING PROGRAM

## 2019-12-05 PROCEDURE — 99999 PR PBB SHADOW E&M-EST. PATIENT-LVL II: ICD-10-PCS | Mod: PBBFAC,,, | Performed by: STUDENT IN AN ORGANIZED HEALTH CARE EDUCATION/TRAINING PROGRAM

## 2019-12-05 RX ORDER — BETAMETHASONE VALERATE 1.2 MG/G
CREAM TOPICAL 2 TIMES DAILY
Qty: 45 G | Refills: 1 | Status: SHIPPED | OUTPATIENT
Start: 2019-12-05 | End: 2021-05-11

## 2019-12-05 NOTE — PROGRESS NOTES
Subjective:       Patient ID:  Kelsie Bustamante is a 69 y.o. female who presents for   Chief Complaint   Patient presents with    Rash     On leg for about 2-3 years, pt states it itches and burns. Pt has tried topical creams. pt states rash is starting to spread     History of Present Illness: The patient presents with chief complaint of a rash.  Location: right lower anterior leg  Duration: 2-3 years  Signs/Symptoms: redness, itching, burning    Prior treatments: TAC cream, multiple OTC creams. Reports initally had rash on left leg, but it resolved, but the rash on the right leg has been persistent.         Review of Systems   Constitutional: Negative for fever and chills.        Objective:    Physical Exam   Constitutional: She appears well-developed and well-nourished. No distress.   Neurological: She is alert and oriented to person, place, and time. She is not disoriented.   Psychiatric: She has a normal mood and affect.   Skin:   Areas Examined (abnormalities noted in diagram):   Head / Face Inspection Performed  Neck Inspection Performed  RLE Inspected  LLE Inspection Performed              Diagram Legend     Erythematous scaling macule/papule c/w actinic keratosis       Vascular papule c/w angioma      Pigmented verrucoid papule/plaque c/w seborrheic keratosis      Yellow umbilicated papule c/w sebaceous hyperplasia      Irregularly shaped tan macule c/w lentigo     1-2 mm smooth white papules consistent with Milia      Movable subcutaneous cyst with punctum c/w epidermal inclusion cyst      Subcutaneous movable cyst c/w pilar cyst      Firm pink to brown papule c/w dermatofibroma      Pedunculated fleshy papule(s) c/w skin tag(s)      Evenly pigmented macule c/w junctional nevus     Mildly variegated pigmented, slightly irregular-bordered macule c/w mildly atypical nevus      Flesh colored to evenly pigmented papule c/w intradermal nevus       Pink pearly papule/plaque c/w basal cell carcinoma       Erythematous hyperkeratotic cursted plaque c/w SCC      Surgical scar with no sign of skin cancer recurrence      Open and closed comedones      Inflammatory papules and pustules      Verrucoid papule consistent consistent with wart     Erythematous eczematous patches and plaques     Dystrophic onycholytic nail with subungual debris c/w onychomycosis     Umbilicated papule    Erythematous-base heme-crusted tan verrucoid plaque consistent with inflamed seborrheic keratosis     Erythematous Silvery Scaling Plaque c/w Psoriasis     See annotation      Assessment / Plan:        Dermatitis  -     betamethasone valerate 0.1% (VALISONE) 0.1 % Crea; Apply topically 2 (two) times daily.  Dispense: 45 g; Refill: 1  -     Counseled patient on gentle skin care regimen, including need for sensitive soaps/detergents, as well as need for frequent use of sensitize moisturizers.                Follow up in about 6 weeks (around 1/16/2020).

## 2019-12-31 ENCOUNTER — EXTERNAL CHRONIC CARE MANAGEMENT (OUTPATIENT)
Dept: PRIMARY CARE CLINIC | Facility: CLINIC | Age: 69
End: 2019-12-31
Payer: MEDICARE

## 2019-12-31 PROCEDURE — 99490 CHRNC CARE MGMT STAFF 1ST 20: CPT | Mod: S$PBB,,, | Performed by: FAMILY MEDICINE

## 2019-12-31 PROCEDURE — 99490 CHRNC CARE MGMT STAFF 1ST 20: CPT | Mod: PBBFAC | Performed by: FAMILY MEDICINE

## 2019-12-31 PROCEDURE — 99490 PR CHRONIC CARE MGMT, 1ST 20 MIN: ICD-10-PCS | Mod: S$PBB,,, | Performed by: FAMILY MEDICINE

## 2020-01-16 ENCOUNTER — OFFICE VISIT (OUTPATIENT)
Dept: DERMATOLOGY | Facility: CLINIC | Age: 70
End: 2020-01-16
Payer: MEDICARE

## 2020-01-16 DIAGNOSIS — L29.9 PRURITUS: Primary | ICD-10-CM

## 2020-01-16 PROCEDURE — 1159F MED LIST DOCD IN RCRD: CPT | Mod: ,,, | Performed by: STUDENT IN AN ORGANIZED HEALTH CARE EDUCATION/TRAINING PROGRAM

## 2020-01-16 PROCEDURE — 99999 PR PBB SHADOW E&M-EST. PATIENT-LVL II: CPT | Mod: PBBFAC,,, | Performed by: STUDENT IN AN ORGANIZED HEALTH CARE EDUCATION/TRAINING PROGRAM

## 2020-01-16 PROCEDURE — 99213 OFFICE O/P EST LOW 20 MIN: CPT | Mod: S$PBB,,, | Performed by: STUDENT IN AN ORGANIZED HEALTH CARE EDUCATION/TRAINING PROGRAM

## 2020-01-16 PROCEDURE — 99212 OFFICE O/P EST SF 10 MIN: CPT | Mod: PBBFAC | Performed by: STUDENT IN AN ORGANIZED HEALTH CARE EDUCATION/TRAINING PROGRAM

## 2020-01-16 PROCEDURE — 1126F AMNT PAIN NOTED NONE PRSNT: CPT | Mod: ,,, | Performed by: STUDENT IN AN ORGANIZED HEALTH CARE EDUCATION/TRAINING PROGRAM

## 2020-01-16 PROCEDURE — 99213 PR OFFICE/OUTPT VISIT, EST, LEVL III, 20-29 MIN: ICD-10-PCS | Mod: S$PBB,,, | Performed by: STUDENT IN AN ORGANIZED HEALTH CARE EDUCATION/TRAINING PROGRAM

## 2020-01-16 PROCEDURE — 99999 PR PBB SHADOW E&M-EST. PATIENT-LVL II: ICD-10-PCS | Mod: PBBFAC,,, | Performed by: STUDENT IN AN ORGANIZED HEALTH CARE EDUCATION/TRAINING PROGRAM

## 2020-01-16 PROCEDURE — 1159F PR MEDICATION LIST DOCUMENTED IN MEDICAL RECORD: ICD-10-PCS | Mod: ,,, | Performed by: STUDENT IN AN ORGANIZED HEALTH CARE EDUCATION/TRAINING PROGRAM

## 2020-01-16 PROCEDURE — 1126F PR PAIN SEVERITY QUANTIFIED, NO PAIN PRESENT: ICD-10-PCS | Mod: ,,, | Performed by: STUDENT IN AN ORGANIZED HEALTH CARE EDUCATION/TRAINING PROGRAM

## 2020-01-16 RX ORDER — IBUPROFEN 800 MG/1
800 TABLET ORAL
Refills: 0 | Status: ON HOLD | COMMUNITY
Start: 2019-11-25 | End: 2022-10-19 | Stop reason: HOSPADM

## 2020-01-16 NOTE — PROGRESS NOTES
Subjective:       Patient ID:  Kelsie Bustamante is a 69 y.o. female who presents for   Chief Complaint   Patient presents with    Rash     History of Present Illness: The patient presents for follow up of dermatitis. Last seen on 12/5/19 where she was started on betamethasone with eczematous dermatitis on the right lower leg. Reports much improvement in the redness and dryness of the skin in the area, however, continues to have itching of the area. Denies any rash elsewhere or other concerning skin lesions.       Review of Systems   Constitutional: Negative for fever and chills.   Skin: Positive for itching. Negative for rash.        Objective:    Physical Exam   Constitutional: She appears well-developed and well-nourished. No distress.   Neurological: She is alert and oriented to person, place, and time. She is not disoriented.   Psychiatric: She has a normal mood and affect.   Skin:   Areas Examined (abnormalities noted in diagram):   Head / Face Inspection Performed  Neck Inspection Performed  RLE Inspected  LLE Inspection Performed              Diagram Legend     Erythematous scaling macule/papule c/w actinic keratosis       Vascular papule c/w angioma      Pigmented verrucoid papule/plaque c/w seborrheic keratosis      Yellow umbilicated papule c/w sebaceous hyperplasia      Irregularly shaped tan macule c/w lentigo     1-2 mm smooth white papules consistent with Milia      Movable subcutaneous cyst with punctum c/w epidermal inclusion cyst      Subcutaneous movable cyst c/w pilar cyst      Firm pink to brown papule c/w dermatofibroma      Pedunculated fleshy papule(s) c/w skin tag(s)      Evenly pigmented macule c/w junctional nevus     Mildly variegated pigmented, slightly irregular-bordered macule c/w mildly atypical nevus      Flesh colored to evenly pigmented papule c/w intradermal nevus       Pink pearly papule/plaque c/w basal cell carcinoma      Erythematous hyperkeratotic cursted plaque c/w SCC       Surgical scar with no sign of skin cancer recurrence      Open and closed comedones      Inflammatory papules and pustules      Verrucoid papule consistent consistent with wart     Erythematous eczematous patches and plaques     Dystrophic onycholytic nail with subungual debris c/w onychomycosis     Umbilicated papule    Erythematous-base heme-crusted tan verrucoid plaque consistent with inflamed seborrheic keratosis     Erythematous Silvery Scaling Plaque c/w Psoriasis     See annotation      Assessment / Plan:        Pruritus and dermatitis - significant improvement in symptoms since being on betamethasone, however, still with pruritus. Will add pramosone to regimen.   -     hydrocortisone-pramoxine (PRAMOSONE) 2.5-1 % Lotn lotion; Apply topically 2 (two) times daily.  Dispense: 118 mL; Refill: 1  -     Counseled patient on gentle skin care regimen, including need for sensitive soaps/detergents, as well as need for frequent use of sensitize moisturizers.                Follow up in about 8 weeks (around 3/12/2020).

## 2020-01-17 ENCOUNTER — TELEPHONE (OUTPATIENT)
Dept: INTERNAL MEDICINE | Facility: CLINIC | Age: 70
End: 2020-01-17

## 2020-01-17 NOTE — TELEPHONE ENCOUNTER
----- Message from Heriberto Luu sent at 1/16/2020  4:42 PM CST -----  Contact: self  Requesting call back regarding questions about scheduling mammogram. Pt states she was told by nurse she needed to have another one done in January due to her first one was abnormal. Please call back at [phone].    Thanks,  Heriberto Luu

## 2020-01-20 DIAGNOSIS — R00.2 PALPITATIONS: ICD-10-CM

## 2020-01-20 DIAGNOSIS — M35.3 POLYMYALGIA: ICD-10-CM

## 2020-01-20 DIAGNOSIS — J40 BRONCHITIS: ICD-10-CM

## 2020-01-20 DIAGNOSIS — I10 ESSENTIAL HYPERTENSION: ICD-10-CM

## 2020-01-22 RX ORDER — PROMETHAZINE HYDROCHLORIDE AND DEXTROMETHORPHAN HYDROBROMIDE 6.25; 15 MG/5ML; MG/5ML
SYRUP ORAL
Qty: 118 ML | Refills: 0 | OUTPATIENT
Start: 2020-01-22

## 2020-01-22 RX ORDER — DULOXETIN HYDROCHLORIDE 20 MG/1
20 CAPSULE, DELAYED RELEASE ORAL DAILY
Qty: 90 CAPSULE | Refills: 3 | Status: SHIPPED | OUTPATIENT
Start: 2020-01-22 | End: 2020-01-23 | Stop reason: SDUPTHER

## 2020-01-22 RX ORDER — ROSUVASTATIN CALCIUM 5 MG/1
5 TABLET, COATED ORAL DAILY
Qty: 90 TABLET | Refills: 3 | Status: SHIPPED | OUTPATIENT
Start: 2020-01-22 | End: 2020-01-23 | Stop reason: SDUPTHER

## 2020-01-22 RX ORDER — METOPROLOL TARTRATE 50 MG/1
50 TABLET ORAL 2 TIMES DAILY
Qty: 180 TABLET | Refills: 3 | Status: SHIPPED | OUTPATIENT
Start: 2020-01-22 | End: 2020-01-23 | Stop reason: SDUPTHER

## 2020-01-23 DIAGNOSIS — R00.2 PALPITATIONS: ICD-10-CM

## 2020-01-23 DIAGNOSIS — M35.3 POLYMYALGIA: ICD-10-CM

## 2020-01-23 DIAGNOSIS — I10 ESSENTIAL HYPERTENSION: ICD-10-CM

## 2020-01-24 ENCOUNTER — TELEPHONE (OUTPATIENT)
Dept: DERMATOLOGY | Facility: CLINIC | Age: 70
End: 2020-01-24

## 2020-01-24 ENCOUNTER — TELEPHONE (OUTPATIENT)
Dept: INTERNAL MEDICINE | Facility: CLINIC | Age: 70
End: 2020-01-24

## 2020-01-24 RX ORDER — METOPROLOL TARTRATE 50 MG/1
50 TABLET ORAL 2 TIMES DAILY
Qty: 180 TABLET | Refills: 3 | Status: SHIPPED | OUTPATIENT
Start: 2020-01-24 | End: 2020-01-27 | Stop reason: SDUPTHER

## 2020-01-24 RX ORDER — ROSUVASTATIN CALCIUM 5 MG/1
5 TABLET, COATED ORAL DAILY
Qty: 90 TABLET | Refills: 3 | Status: SHIPPED | OUTPATIENT
Start: 2020-01-24 | End: 2020-01-27 | Stop reason: SDUPTHER

## 2020-01-24 RX ORDER — ASPIRIN 81 MG/1
81 TABLET ORAL DAILY
Qty: 90 TABLET | Refills: 3 | Status: SHIPPED | OUTPATIENT
Start: 2020-01-24 | End: 2021-05-25

## 2020-01-24 RX ORDER — DULOXETIN HYDROCHLORIDE 20 MG/1
20 CAPSULE, DELAYED RELEASE ORAL DAILY
Qty: 90 CAPSULE | Refills: 3 | Status: SHIPPED | OUTPATIENT
Start: 2020-01-24 | End: 2020-01-27 | Stop reason: SDUPTHER

## 2020-01-24 RX ORDER — ZOLPIDEM TARTRATE 10 MG/1
10 TABLET ORAL NIGHTLY PRN
Qty: 90 TABLET | Refills: 1 | Status: SHIPPED | OUTPATIENT
Start: 2020-01-24 | End: 2020-01-27 | Stop reason: SDUPTHER

## 2020-01-24 NOTE — TELEPHONE ENCOUNTER
----- Message from Nadja Jarvis sent at 1/24/2020 12:47 PM CST -----  Contact: self [phone]  States that her medications was sent to the wrong pharm. States that she uses [facility]. States that she would like a message confirming that the medication was sent the [facility] on the [address]. Please call back at [phone]//thank you acc

## 2020-01-24 NOTE — TELEPHONE ENCOUNTER
----- Message from Nadja Jarvis sent at 1/24/2020 12:51 PM CST -----  Contact: self [phone]  States that she is calling to speak to nurse regarding the medication that was called in for her rash. States that the wrong medication was called in for her. Please call back at [phone]//thank you acc

## 2020-01-27 ENCOUNTER — TELEPHONE (OUTPATIENT)
Dept: INTERNAL MEDICINE | Facility: CLINIC | Age: 70
End: 2020-01-27

## 2020-01-27 DIAGNOSIS — L29.9 PRURITUS: ICD-10-CM

## 2020-01-27 DIAGNOSIS — R00.2 PALPITATIONS: ICD-10-CM

## 2020-01-27 DIAGNOSIS — M35.3 POLYMYALGIA: ICD-10-CM

## 2020-01-27 DIAGNOSIS — I10 ESSENTIAL HYPERTENSION: ICD-10-CM

## 2020-01-27 NOTE — TELEPHONE ENCOUNTER
I left the pt a vm to call the office back about her medications being sent to the wrong pharmacy.//LB----- Message from Jossy Marie sent at 1/27/2020 10:00 AM CST -----  Contact: pt  Pt states her medication was sent to the wrong pharmacy. Needs to be sent to [facility] pharmacy.

## 2020-01-28 ENCOUNTER — TELEPHONE (OUTPATIENT)
Dept: INTERNAL MEDICINE | Facility: CLINIC | Age: 70
End: 2020-01-28

## 2020-01-28 ENCOUNTER — PATIENT MESSAGE (OUTPATIENT)
Dept: DERMATOLOGY | Facility: CLINIC | Age: 70
End: 2020-01-28

## 2020-01-28 RX ORDER — METOPROLOL TARTRATE 50 MG/1
50 TABLET ORAL 2 TIMES DAILY
Qty: 180 TABLET | Refills: 3 | Status: SHIPPED | OUTPATIENT
Start: 2020-01-28 | End: 2021-02-19 | Stop reason: SDUPTHER

## 2020-01-28 RX ORDER — ROSUVASTATIN CALCIUM 5 MG/1
5 TABLET, COATED ORAL DAILY
Qty: 90 TABLET | Refills: 3 | Status: SHIPPED | OUTPATIENT
Start: 2020-01-28 | End: 2021-02-19 | Stop reason: SDUPTHER

## 2020-01-28 RX ORDER — OMEPRAZOLE 40 MG/1
40 CAPSULE, DELAYED RELEASE ORAL NIGHTLY
Qty: 30 CAPSULE | Refills: 11 | Status: ON HOLD | OUTPATIENT
Start: 2020-01-28 | End: 2022-10-19 | Stop reason: HOSPADM

## 2020-01-28 RX ORDER — ZOLPIDEM TARTRATE 10 MG/1
10 TABLET ORAL NIGHTLY PRN
Qty: 90 TABLET | Refills: 1 | Status: SHIPPED | OUTPATIENT
Start: 2020-01-28 | End: 2021-02-19 | Stop reason: SDUPTHER

## 2020-01-28 RX ORDER — DULOXETIN HYDROCHLORIDE 20 MG/1
20 CAPSULE, DELAYED RELEASE ORAL DAILY
Qty: 90 CAPSULE | Refills: 3 | Status: SHIPPED | OUTPATIENT
Start: 2020-01-28 | End: 2021-02-19 | Stop reason: SDUPTHER

## 2020-01-29 RX ORDER — HYDROCORTISONE ACETATE, PRAMOXINE HCL 2.5; 1 G/100G; G/100G
CREAM TOPICAL 2 TIMES DAILY
Qty: 28 G | Refills: 1 | Status: SHIPPED | OUTPATIENT
Start: 2020-01-29 | End: 2021-02-19

## 2020-01-30 ENCOUNTER — LAB VISIT (OUTPATIENT)
Dept: LAB | Facility: HOSPITAL | Age: 70
End: 2020-01-30
Attending: FAMILY MEDICINE
Payer: MEDICARE

## 2020-01-30 DIAGNOSIS — E11.49 TYPE 2 DIABETES WITH NEURAL COMPLICATION: Chronic | ICD-10-CM

## 2020-01-30 LAB
ALBUMIN SERPL BCP-MCNC: 3.6 G/DL (ref 3.5–5.2)
ALP SERPL-CCNC: 56 U/L (ref 55–135)
ALT SERPL W/O P-5'-P-CCNC: 15 U/L (ref 10–44)
ANION GAP SERPL CALC-SCNC: 9 MMOL/L (ref 8–16)
AST SERPL-CCNC: 22 U/L (ref 10–40)
BILIRUB SERPL-MCNC: 0.2 MG/DL (ref 0.1–1)
BUN SERPL-MCNC: 11 MG/DL (ref 8–23)
CALCIUM SERPL-MCNC: 9.3 MG/DL (ref 8.7–10.5)
CHLORIDE SERPL-SCNC: 106 MMOL/L (ref 95–110)
CHOLEST SERPL-MCNC: 136 MG/DL (ref 120–199)
CHOLEST/HDLC SERPL: 2.6 {RATIO} (ref 2–5)
CO2 SERPL-SCNC: 28 MMOL/L (ref 23–29)
CREAT SERPL-MCNC: 1 MG/DL (ref 0.5–1.4)
EST. GFR  (AFRICAN AMERICAN): >60 ML/MIN/1.73 M^2
EST. GFR  (NON AFRICAN AMERICAN): 58 ML/MIN/1.73 M^2
ESTIMATED AVG GLUCOSE: 131 MG/DL (ref 68–131)
GLUCOSE SERPL-MCNC: 131 MG/DL (ref 70–110)
HBA1C MFR BLD HPLC: 6.2 % (ref 4–5.6)
HDLC SERPL-MCNC: 53 MG/DL (ref 40–75)
HDLC SERPL: 39 % (ref 20–50)
LDLC SERPL CALC-MCNC: 48.8 MG/DL (ref 63–159)
NONHDLC SERPL-MCNC: 83 MG/DL
POTASSIUM SERPL-SCNC: 4.7 MMOL/L (ref 3.5–5.1)
PROT SERPL-MCNC: 6.7 G/DL (ref 6–8.4)
SODIUM SERPL-SCNC: 143 MMOL/L (ref 136–145)
TRIGL SERPL-MCNC: 171 MG/DL (ref 30–150)
TSH SERPL DL<=0.005 MIU/L-ACNC: 2.37 UIU/ML (ref 0.4–4)

## 2020-01-30 PROCEDURE — 84443 ASSAY THYROID STIM HORMONE: CPT

## 2020-01-30 PROCEDURE — 80053 COMPREHEN METABOLIC PANEL: CPT

## 2020-01-30 PROCEDURE — 36415 COLL VENOUS BLD VENIPUNCTURE: CPT

## 2020-01-30 PROCEDURE — 83036 HEMOGLOBIN GLYCOSYLATED A1C: CPT

## 2020-01-30 PROCEDURE — 80061 LIPID PANEL: CPT

## 2020-01-31 ENCOUNTER — EXTERNAL CHRONIC CARE MANAGEMENT (OUTPATIENT)
Dept: PRIMARY CARE CLINIC | Facility: CLINIC | Age: 70
End: 2020-01-31
Payer: MEDICARE

## 2020-01-31 PROCEDURE — 99490 CHRNC CARE MGMT STAFF 1ST 20: CPT | Mod: S$PBB,,, | Performed by: FAMILY MEDICINE

## 2020-01-31 PROCEDURE — 99490 CHRNC CARE MGMT STAFF 1ST 20: CPT | Mod: PBBFAC | Performed by: FAMILY MEDICINE

## 2020-01-31 PROCEDURE — 99490 PR CHRONIC CARE MGMT, 1ST 20 MIN: ICD-10-PCS | Mod: S$PBB,,, | Performed by: FAMILY MEDICINE

## 2020-02-03 ENCOUNTER — TELEPHONE (OUTPATIENT)
Dept: INTERNAL MEDICINE | Facility: CLINIC | Age: 70
End: 2020-02-03

## 2020-02-03 NOTE — TELEPHONE ENCOUNTER
I tried to reach the pt at 3:07pm and there was no answer the call was put through [phone] and her husbands mobile on file of [phone] there was no answer on either and her vm was full . //kah

## 2020-02-04 ENCOUNTER — TELEPHONE (OUTPATIENT)
Dept: INTERNAL MEDICINE | Facility: CLINIC | Age: 70
End: 2020-02-04

## 2020-02-04 NOTE — TELEPHONE ENCOUNTER
----- Message from Portia Alarcon sent at 2/3/2020 12:40 PM CST -----  Contact: patient   Pt requesting a call back regarding letter sent.PLease call back at [phone].      Thanks,  Portia Alarcon

## 2020-02-29 ENCOUNTER — EXTERNAL CHRONIC CARE MANAGEMENT (OUTPATIENT)
Dept: PRIMARY CARE CLINIC | Facility: CLINIC | Age: 70
End: 2020-02-29
Payer: MEDICARE

## 2020-02-29 PROCEDURE — 99490 PR CHRONIC CARE MGMT, 1ST 20 MIN: ICD-10-PCS | Mod: S$PBB,,, | Performed by: FAMILY MEDICINE

## 2020-02-29 PROCEDURE — 99490 CHRNC CARE MGMT STAFF 1ST 20: CPT | Mod: PBBFAC | Performed by: FAMILY MEDICINE

## 2020-02-29 PROCEDURE — 99490 CHRNC CARE MGMT STAFF 1ST 20: CPT | Mod: S$PBB,,, | Performed by: FAMILY MEDICINE

## 2020-03-04 ENCOUNTER — TELEPHONE (OUTPATIENT)
Dept: RADIOLOGY | Facility: HOSPITAL | Age: 70
End: 2020-03-04

## 2020-03-05 ENCOUNTER — HOSPITAL ENCOUNTER (OUTPATIENT)
Dept: RADIOLOGY | Facility: HOSPITAL | Age: 70
Discharge: HOME | End: 2020-03-05
Attending: FAMILY MEDICINE
Payer: MEDICARE

## 2020-03-05 VITALS — BODY MASS INDEX: 31.53 KG/M2 | WEIGHT: 177.94 LBS | HEIGHT: 63 IN

## 2020-03-05 DIAGNOSIS — R92.8 ABNORMAL MAMMOGRAM: ICD-10-CM

## 2020-03-05 PROCEDURE — 77061 MAMMO DIGITAL DIAGNOSTIC LEFT WITH TOMOSYNTHESIS_CAD: ICD-10-PCS | Mod: 26,LT,, | Performed by: RADIOLOGY

## 2020-03-05 PROCEDURE — 76642 ULTRASOUND BREAST LIMITED: CPT | Mod: TC,LT

## 2020-03-05 PROCEDURE — 77065 DX MAMMO INCL CAD UNI: CPT | Mod: TC,LT

## 2020-03-05 PROCEDURE — 77065 MAMMO DIGITAL DIAGNOSTIC LEFT WITH TOMOSYNTHESIS_CAD: ICD-10-PCS | Mod: 26,LT,, | Performed by: RADIOLOGY

## 2020-03-05 PROCEDURE — 77061 BREAST TOMOSYNTHESIS UNI: CPT | Mod: 26,LT,, | Performed by: RADIOLOGY

## 2020-03-05 PROCEDURE — 77065 DX MAMMO INCL CAD UNI: CPT | Mod: 26,LT,, | Performed by: RADIOLOGY

## 2020-03-05 PROCEDURE — 76642 US BREAST LEFT LIMITED: ICD-10-PCS | Mod: 26,LT,, | Performed by: RADIOLOGY

## 2020-03-05 PROCEDURE — 77061 BREAST TOMOSYNTHESIS UNI: CPT | Mod: TC,LT

## 2020-03-05 PROCEDURE — 76642 ULTRASOUND BREAST LIMITED: CPT | Mod: 26,LT,, | Performed by: RADIOLOGY

## 2020-03-11 ENCOUNTER — TELEPHONE (OUTPATIENT)
Dept: DERMATOLOGY | Facility: CLINIC | Age: 70
End: 2020-03-11

## 2020-03-23 ENCOUNTER — TELEPHONE (OUTPATIENT)
Dept: ADMINISTRATIVE | Facility: HOSPITAL | Age: 70
End: 2020-03-23

## 2020-03-23 NOTE — TELEPHONE ENCOUNTER
Due to Covid-19 left VM box message that appt has been canceled and to return call and reschedule at [facility] convience for Dr next available date and time slot. lw

## 2020-03-23 NOTE — TELEPHONE ENCOUNTER
Due to Covid-19 left VM box message that appt has been canceled and to return call and reschedule at [facility] convience for Dr next available date and time slot. lw

## 2020-03-31 ENCOUNTER — EXTERNAL CHRONIC CARE MANAGEMENT (OUTPATIENT)
Dept: PRIMARY CARE CLINIC | Facility: CLINIC | Age: 70
End: 2020-03-31
Payer: MEDICARE

## 2020-03-31 PROCEDURE — 99490 CHRNC CARE MGMT STAFF 1ST 20: CPT | Mod: PBBFAC | Performed by: FAMILY MEDICINE

## 2020-03-31 PROCEDURE — 99490 CHRNC CARE MGMT STAFF 1ST 20: CPT | Mod: S$PBB,,, | Performed by: FAMILY MEDICINE

## 2020-03-31 PROCEDURE — 99490 PR CHRONIC CARE MGMT, 1ST 20 MIN: ICD-10-PCS | Mod: S$PBB,,, | Performed by: FAMILY MEDICINE

## 2020-04-30 ENCOUNTER — EXTERNAL CHRONIC CARE MANAGEMENT (OUTPATIENT)
Dept: PRIMARY CARE CLINIC | Facility: CLINIC | Age: 70
End: 2020-04-30
Payer: MEDICARE

## 2020-04-30 PROCEDURE — 99490 CHRNC CARE MGMT STAFF 1ST 20: CPT | Mod: S$PBB,,, | Performed by: FAMILY MEDICINE

## 2020-04-30 PROCEDURE — 99490 CHRNC CARE MGMT STAFF 1ST 20: CPT | Mod: PBBFAC | Performed by: FAMILY MEDICINE

## 2020-04-30 PROCEDURE — 99490 PR CHRONIC CARE MGMT, 1ST 20 MIN: ICD-10-PCS | Mod: S$PBB,,, | Performed by: FAMILY MEDICINE

## 2020-05-12 ENCOUNTER — PATIENT OUTREACH (OUTPATIENT)
Dept: ADMINISTRATIVE | Facility: OTHER | Age: 70
End: 2020-05-12

## 2020-05-12 NOTE — PROGRESS NOTES
Patient's chart was reviewed.   Requested updates within Care Everywhere.  Immunizations reconciled.    Health Maintenance was updated.  Updated patient history.

## 2020-05-18 ENCOUNTER — PATIENT OUTREACH (OUTPATIENT)
Dept: ADMINISTRATIVE | Facility: OTHER | Age: 70
End: 2020-05-18

## 2020-05-18 ENCOUNTER — OFFICE VISIT (OUTPATIENT)
Dept: DERMATOLOGY | Facility: CLINIC | Age: 70
End: 2020-05-18
Payer: MEDICARE

## 2020-05-18 DIAGNOSIS — L30.9 DERMATITIS: Primary | ICD-10-CM

## 2020-05-18 DIAGNOSIS — L29.9 PRURITUS: ICD-10-CM

## 2020-05-18 DIAGNOSIS — D48.9 NEOPLASM OF UNCERTAIN BEHAVIOR: ICD-10-CM

## 2020-05-18 PROCEDURE — 88305 TISSUE EXAM BY PATHOLOGIST: CPT | Mod: 26,,, | Performed by: PATHOLOGY

## 2020-05-18 PROCEDURE — 11102 PR TANGENTIAL BIOPSY, SKIN, SINGLE LESION: ICD-10-PCS | Mod: S$PBB,,, | Performed by: PHYSICIAN ASSISTANT

## 2020-05-18 PROCEDURE — 99999 PR PBB SHADOW E&M-EST. PATIENT-LVL III: CPT | Mod: PBBFAC,,, | Performed by: PHYSICIAN ASSISTANT

## 2020-05-18 PROCEDURE — 99213 OFFICE O/P EST LOW 20 MIN: CPT | Mod: 25,S$PBB,, | Performed by: PHYSICIAN ASSISTANT

## 2020-05-18 PROCEDURE — 11102 TANGNTL BX SKIN SINGLE LES: CPT | Mod: PBBFAC | Performed by: PHYSICIAN ASSISTANT

## 2020-05-18 PROCEDURE — 11103 TANGNTL BX SKIN EA SEP/ADDL: CPT | Mod: PBBFAC | Performed by: PHYSICIAN ASSISTANT

## 2020-05-18 PROCEDURE — 11103 PR TANGENTIAL BIOPSY, SKIN, EA ADDTL LESION: ICD-10-PCS | Mod: S$PBB,,, | Performed by: PHYSICIAN ASSISTANT

## 2020-05-18 PROCEDURE — 11102 TANGNTL BX SKIN SINGLE LES: CPT | Mod: S$PBB,,, | Performed by: PHYSICIAN ASSISTANT

## 2020-05-18 PROCEDURE — 11103 TANGNTL BX SKIN EA SEP/ADDL: CPT | Mod: S$PBB,,, | Performed by: PHYSICIAN ASSISTANT

## 2020-05-18 PROCEDURE — 88305 TISSUE EXAM BY PATHOLOGIST: CPT | Performed by: PATHOLOGY

## 2020-05-18 PROCEDURE — 99213 PR OFFICE/OUTPT VISIT, EST, LEVL III, 20-29 MIN: ICD-10-PCS | Mod: 25,S$PBB,, | Performed by: PHYSICIAN ASSISTANT

## 2020-05-18 PROCEDURE — 88305 TISSUE EXAM BY PATHOLOGIST: ICD-10-PCS | Mod: 26,,, | Performed by: PATHOLOGY

## 2020-05-18 PROCEDURE — 99999 PR PBB SHADOW E&M-EST. PATIENT-LVL III: ICD-10-PCS | Mod: PBBFAC,,, | Performed by: PHYSICIAN ASSISTANT

## 2020-05-18 PROCEDURE — 99213 OFFICE O/P EST LOW 20 MIN: CPT | Mod: PBBFAC,25 | Performed by: PHYSICIAN ASSISTANT

## 2020-05-18 RX ORDER — PIMECROLIMUS 10 MG/G
CREAM TOPICAL 2 TIMES DAILY
Qty: 60 G | Refills: 2 | Status: SHIPPED | OUTPATIENT
Start: 2020-05-18 | End: 2020-05-18

## 2020-05-18 RX ORDER — TACROLIMUS 1 MG/G
OINTMENT TOPICAL
Qty: 60 G | Refills: 2 | Status: SHIPPED | OUTPATIENT
Start: 2020-05-18 | End: 2020-05-28 | Stop reason: SDUPTHER

## 2020-05-18 RX ORDER — CLOBETASOL PROPIONATE 0.5 MG/G
OINTMENT TOPICAL
Qty: 60 G | Refills: 0 | Status: SHIPPED | OUTPATIENT
Start: 2020-05-18 | End: 2020-05-28 | Stop reason: SDUPTHER

## 2020-05-18 NOTE — PROGRESS NOTES
Subjective:       Patient ID:  Kelsie Bustamante is a 69 y.o. female who presents for   Chief Complaint   Patient presents with    Rash     forehead and leg      HPI     History of Present Illness: The patient presents with chief complaint of rash.  Location: forehead and right leg  Duration: several years  Signs/Symptoms: itching and redness    Prior treatments: prescription and otc hydrocortisone creams      Review of Systems     Objective:    Physical Exam       Diagram Legend     Erythematous scaling macule/papule c/w actinic keratosis       Vascular papule c/w angioma      Pigmented verrucoid papule/plaque c/w seborrheic keratosis      Yellow umbilicated papule c/w sebaceous hyperplasia      Irregularly shaped tan macule c/w lentigo     1-2 mm smooth white papules consistent with Milia      Movable subcutaneous cyst with punctum c/w epidermal inclusion cyst      Subcutaneous movable cyst c/w pilar cyst      Firm pink to brown papule c/w dermatofibroma      Pedunculated fleshy papule(s) c/w skin tag(s)      Evenly pigmented macule c/w junctional nevus     Mildly variegated pigmented, slightly irregular-bordered macule c/w mildly atypical nevus      Flesh colored to evenly pigmented papule c/w intradermal nevus       Pink pearly papule/plaque c/w basal cell carcinoma      Erythematous hyperkeratotic cursted plaque c/w SCC      Surgical scar with no sign of skin cancer recurrence      Open and closed comedones      Inflammatory papules and pustules      Verrucoid papule consistent consistent with wart     Erythematous eczematous patches and plaques     Dystrophic onycholytic nail with subungual debris c/w onychomycosis     Umbilicated papule    Erythematous-base heme-crusted tan verrucoid plaque consistent with inflamed seborrheic keratosis     Erythematous Silvery Scaling Plaque c/w Psoriasis     See annotation      Assessment / Plan:        There are no diagnoses linked to this encounter.         No  follow-ups on file.

## 2020-05-18 NOTE — PATIENT INSTRUCTIONS
New Skin Care Regimen  Soap: Dove sensitive skin bar   Moisturizer: ceraVe or cetaphil cream  Detergent: Tide Free, All Free, or Cheer Free   Fabric softener: do not use  Colognes/Perfumes/Fragrances: do not use  Bathing: shower or bathe with lukewarm water < 10 minutes     Shave Biopsy Wound Care    Your PA has performed a shave biopsy today.  A band aid and vaseline ointment has been placed over the site.  This should remain in place for 24 hours.  It is recommended that you keep the area dry for the first 24 hours.  After 24 hours, you may remove the band aid and wash the area with warm soap and water and apply Vaseline jelly.  Many patients prefer to use Neosporin or Bacitracin ointment.  This is acceptable; however, know that you can develop an allergy to this medication even if you have used it safely for years.  It is important to keep the area moist.  Letting it dry out and get air slows healing time, and will worsen the scar.  Band aid is optional after first 24 hours.      If you notice increasing redness, tenderness, pain, or yellow drainage at the biopsy site, please notify your doctor.  These are signs of an infection.    If your biopsy site is bleeding, apply firm pressure for 15 minutes straight.  Repeat for another 15 minutes, if it is still bleeding.   If the surgical site continues to bleed, then please contact your doctor.      [facility]   DERMATOLOGY  [address]  [facility] [address]  Dept: [phone]   Dept Fax: [phone]

## 2020-05-18 NOTE — PROGRESS NOTES
"  Subjective:       Patient ID:  Kelsie Bustamante is a 69 y.o. female who presents for   Chief Complaint   Patient presents with    Rash     forehead and right leg      Hx of dermatitis and pruritus of right leg, last seen by Dr. Hedrick 1/16/2020, previous tx: betamethasone ointment with improvement, started on pramasone at last visit (no longer using). States it improves, but always has some pink discoloration. Today is 'the best it looks" but it itches intensely intermittently. Not using emollients. Uses body wash with fragrance. Denies any other rashes.      New c/o today.  History of Present Illness: The patient presents with chief complaint of rash.  Location: forehead   Duration: several years  Signs/Symptoms: itching and redness     Prior treatments: prescription and otc hydrocortisone creams    Denies FHX or personal hx of skin cancer or melanoma         Review of Systems     Objective:    Physical Exam   Constitutional: She appears well-developed and well-nourished. No distress.   Neurological: She is alert and oriented to person, place, and time. She is not disoriented.   Psychiatric: She has a normal mood and affect.   Skin:   Areas Examined (abnormalities noted in diagram):   Head / Face Inspection Performed  Neck Inspection Performed  Chest / Axilla Inspection Performed  Back Inspection Performed  RUE Inspected  LUE Inspection Performed                   Diagram Legend     Erythematous scaling macule/papule c/w actinic keratosis       Vascular papule c/w angioma      Pigmented verrucoid papule/plaque c/w seborrheic keratosis      Yellow umbilicated papule c/w sebaceous hyperplasia      Irregularly shaped tan macule c/w lentigo     1-2 mm smooth white papules consistent with Milia      Movable subcutaneous cyst with punctum c/w epidermal inclusion cyst      Subcutaneous movable cyst c/w pilar cyst      Firm pink to brown papule c/w dermatofibroma      Pedunculated fleshy papule(s) c/w skin tag(s)      " Evenly pigmented macule c/w junctional nevus     Mildly variegated pigmented, slightly irregular-bordered macule c/w mildly atypical nevus      Flesh colored to evenly pigmented papule c/w intradermal nevus       Pink pearly papule/plaque c/w basal cell carcinoma      Erythematous hyperkeratotic cursted plaque c/w SCC      Surgical scar with no sign of skin cancer recurrence      Open and closed comedones      Inflammatory papules and pustules      Verrucoid papule consistent consistent with wart     Erythematous eczematous patches and plaques     Dystrophic onycholytic nail with subungual debris c/w onychomycosis     Umbilicated papule    Erythematous-base heme-crusted tan verrucoid plaque consistent with inflamed seborrheic keratosis     Erythematous Silvery Scaling Plaque c/w Psoriasis     See annotation      Assessment / Plan:      Pathology Orders:     Normal Orders This Visit    Specimen to Pathology, Dermatology     Questions:    Procedure Type:  Dermatology and skin neoplasms    Number of Specimens:  2    ------------------------:  -------------------------    Spec 1 Procedure:  Biopsy    Spec 1 Clinical Impression:  r/o ISK vs. NMSC    Spec 1 Source:  forehead    ------------------------:  -------------------------    Spec 2 Procedure:  Biopsy    Spec 2 Clinical Impression:  r/o ISK vs. prurigo vs. NMSC    Spec 2 Source:  right leg    Clinical Information:  scaly hyperkeratotic plaque of the righ leg        Dermatitis  Pruritus  -     clobetasol 0.05% (TEMOVATE) 0.05 % Oint; AAA of rash of leg bid prn flares. Steroid- do NOT apply to face or folds of skin.  Dispense: 60 g; Refill: 0  -     tacrolimus (PROTOPIC) 0.1 % ointment; AAA bid prn rash or itching. Non-steroid med.  Dispense: 60 g;   Refill: 2  Ddx: [facility]   Encouraged use of emollients and sensitive soap without fragrance. Recommend trial of Dove soap and Cetaphil cream. Trial of clobetasol ointment and protopic bid prn flares. Discussed steroid vs.  Non steroid.     Neoplasm of uncertain behavior  -     Specimen to Pathology, Dermatology  PROCEDURE NOTE - SHAVE BIOPSY   Location: forehead    After risk, benefits, and alternatives were discussed with the patient, the patient agrees to the procedure by verbal informed consent.  The area(s) were cleansed with alcohol. 2 cc of lidocaine 1% with epinephrine was injected for local anesthesia into each lesion(s).  A sharp dermablade was used to remove part or all of the lesion(s).  The specimen(s) will be sent for tissue pathology.  Hemostasis was obtained with aluminum chloride and/or hyfrecation.  The area(s) were dressed with vaseline ointment and bandaged.  The patient tolerated the procedure well without adverse events.  Wound care instructions were given to the patient on the AVS.  The patient will be notified of pathology results once available. Results will also be available in Epic.    PROCEDURE NOTE - SHAVE BIOPSY   Location: right leg    After risk, benefits, and alternatives were discussed with the patient, the patient agrees to the procedure by verbal informed consent.  The area(s) were cleansed with alcohol. 1.5 cc of lidocaine 1% with epinephrine was injected for local anesthesia into each lesion(s).  A sharp dermablade was used to remove part or all of the lesion(s).  The specimen(s) will be sent for tissue pathology.  Hemostasis was obtained with aluminum chloride and/or hyfrecation.  The area(s) were dressed with vaseline ointment and bandaged.  The patient tolerated the procedure well without adverse events.  Wound care instructions were given to the patient on the AVS.  The patient will be notified of pathology results once available. Results will also be available in Epic.             Follow up in about 6 weeks (around 6/29/2020).

## 2020-05-19 ENCOUNTER — PATIENT MESSAGE (OUTPATIENT)
Dept: DERMATOLOGY | Facility: CLINIC | Age: 70
End: 2020-05-19

## 2020-05-21 ENCOUNTER — PATIENT MESSAGE (OUTPATIENT)
Dept: DERMATOLOGY | Facility: CLINIC | Age: 70
End: 2020-05-21

## 2020-05-21 LAB
FINAL PATHOLOGIC DIAGNOSIS: NORMAL
GROSS: NORMAL

## 2020-05-22 ENCOUNTER — TELEPHONE (OUTPATIENT)
Dept: DERMATOLOGY | Facility: CLINIC | Age: 70
End: 2020-05-22

## 2020-05-22 NOTE — TELEPHONE ENCOUNTER
----- Message from Micki Bautista sent at 5/22/2020 10:29 AM CDT -----  Contact: Pt  Patient is returning call, please call back at [phone] (voii)

## 2020-05-25 ENCOUNTER — PATIENT OUTREACH (OUTPATIENT)
Dept: ADMINISTRATIVE | Facility: HOSPITAL | Age: 70
End: 2020-05-25

## 2020-05-25 DIAGNOSIS — E11.49 TYPE 2 DIABETES WITH NEURAL COMPLICATION: Primary | Chronic | ICD-10-CM

## 2020-05-25 NOTE — PROGRESS NOTES
Outreached to patient regarding upcoming appt with PCP and overdue HM:  No answer, left message informing micro scheduled on same day as appt with Dr. Parks      [facility] Due   Topic    Colonoscopy     Urine Microalbumin     DEXA SCAN     Eye Exam

## 2020-05-26 ENCOUNTER — TELEPHONE (OUTPATIENT)
Dept: DERMATOLOGY | Facility: CLINIC | Age: 70
End: 2020-05-26

## 2020-05-26 NOTE — TELEPHONE ENCOUNTER
----- Message from Kelsie Wing sent at 5/26/2020 10:21 AM CDT -----  Contact: pt  Type:  Patient Returning Call    Who Called:pt  Who Left Message for Patient:jarred  Does the patient know what this is regarding?:  Would the patient rather a call back or a response via [facility]? Call back  Best Call Back Number:[phone]  Additional Information:

## 2020-05-27 ENCOUNTER — PATIENT MESSAGE (OUTPATIENT)
Dept: DERMATOLOGY | Facility: CLINIC | Age: 70
End: 2020-05-27

## 2020-05-27 ENCOUNTER — TELEPHONE (OUTPATIENT)
Dept: DERMATOLOGY | Facility: CLINIC | Age: 70
End: 2020-05-27

## 2020-05-27 NOTE — TELEPHONE ENCOUNTER
Attempted to contact pt on several occassions, it rings once then goes to voicemail. Left voicemail as well.

## 2020-05-27 NOTE — TELEPHONE ENCOUNTER
----- Message from Lissy Corona sent at 5/27/2020  2:36 PM CDT -----  Contact: PT   The patient is calling in regards to not receiving a call back for the last few days and needs immediate assistance, please advise ph#[phone] (home)

## 2020-05-28 ENCOUNTER — TELEPHONE (OUTPATIENT)
Dept: DERMATOLOGY | Facility: CLINIC | Age: 70
End: 2020-05-28

## 2020-05-28 DIAGNOSIS — L29.9 PRURITUS: ICD-10-CM

## 2020-05-28 DIAGNOSIS — L30.9 DERMATITIS: ICD-10-CM

## 2020-05-28 RX ORDER — CLOBETASOL PROPIONATE 0.5 MG/G
OINTMENT TOPICAL
Qty: 60 G | Refills: 0 | Status: SHIPPED | OUTPATIENT
Start: 2020-05-28 | End: 2021-05-11

## 2020-05-28 RX ORDER — TACROLIMUS 1 MG/G
OINTMENT TOPICAL
Qty: 60 G | Refills: 2 | Status: SHIPPED | OUTPATIENT
Start: 2020-05-28 | End: 2021-04-15

## 2020-05-28 NOTE — TELEPHONE ENCOUNTER
Pt notified of biopsy results for forehead lesion c/w AK with some lichenoid inflammation.  She understands the lesion is precancerous and requires additional treatment. She is willing to follow up with Dr. Carranza in 1 month to recheck the area and to discuss additional tx options with Picato vs. Efudex.  All patient questions were answered.

## 2020-05-28 NOTE — TELEPHONE ENCOUNTER
----- Message from Barbara Durham sent at 5/28/2020 11:02 AM CDT -----  Contact: Self- [phone]  .Type:  Patient Returning Call    Who Called:Kelsie Bustamante    Who Left Message for Patient:Unsure   Does the patient know what this is regarding?:yes   Would the patient rather a call back or a response via [facility]? Call back   Best Call Back Number:.[phone] (home)   Additional Information:       Thank You,   Barbara Durham

## 2020-05-28 NOTE — PROGRESS NOTES
Subjective:       Patient ID:  Kelsie Bustamante is a 69 y.o. female who presents for No chief complaint on file.    HPI    Review of Systems     Objective:    Physical Exam       Diagram Legend     Erythematous scaling macule/papule c/w actinic keratosis       Vascular papule c/w angioma      Pigmented verrucoid papule/plaque c/w seborrheic keratosis      Yellow umbilicated papule c/w sebaceous hyperplasia      Irregularly shaped tan macule c/w lentigo     1-2 mm smooth white papules consistent with Milia      Movable subcutaneous cyst with punctum c/w epidermal inclusion cyst      Subcutaneous movable cyst c/w pilar cyst      Firm pink to brown papule c/w dermatofibroma      Pedunculated fleshy papule(s) c/w skin tag(s)      Evenly pigmented macule c/w junctional nevus     Mildly variegated pigmented, slightly irregular-bordered macule c/w mildly atypical nevus      Flesh colored to evenly pigmented papule c/w intradermal nevus       Pink pearly papule/plaque c/w basal cell carcinoma      Erythematous hyperkeratotic cursted plaque c/w SCC      Surgical scar with no sign of skin cancer recurrence      Open and closed comedones      Inflammatory papules and pustules      Verrucoid papule consistent consistent with wart     Erythematous eczematous patches and plaques     Dystrophic onycholytic nail with subungual debris c/w onychomycosis     Umbilicated papule    Erythematous-base heme-crusted tan verrucoid plaque consistent with inflamed seborrheic keratosis     Erythematous Silvery Scaling Plaque c/w Psoriasis     See annotation      Assessment / Plan:        There are no diagnoses linked to this encounter.         No follow-ups on file.

## 2020-05-28 NOTE — TELEPHONE ENCOUNTER
----- Message from Lubna Hussein sent at 5/28/2020  3:23 PM CDT -----  Contact: Patient   Kelsie miss a call back and would like another call back at [phone].    Thanks  Td

## 2020-05-31 ENCOUNTER — EXTERNAL CHRONIC CARE MANAGEMENT (OUTPATIENT)
Dept: PRIMARY CARE CLINIC | Facility: CLINIC | Age: 70
End: 2020-05-31
Payer: MEDICARE

## 2020-05-31 PROCEDURE — 99490 CHRNC CARE MGMT STAFF 1ST 20: CPT | Mod: S$PBB,,, | Performed by: FAMILY MEDICINE

## 2020-05-31 PROCEDURE — 99490 PR CHRONIC CARE MGMT, 1ST 20 MIN: ICD-10-PCS | Mod: S$PBB,,, | Performed by: FAMILY MEDICINE

## 2020-05-31 PROCEDURE — 99490 CHRNC CARE MGMT STAFF 1ST 20: CPT | Mod: PBBFAC | Performed by: FAMILY MEDICINE

## 2020-06-02 ENCOUNTER — TELEPHONE (OUTPATIENT)
Dept: DERMATOLOGY | Facility: CLINIC | Age: 70
End: 2020-06-02

## 2020-06-02 NOTE — TELEPHONE ENCOUNTER
Pt talked with pharmacy and they state that they do not have the Pramosone and she has used it in the past and it has not worked for her, she would like another cream sent to [facility] by [facility].

## 2020-06-29 ENCOUNTER — OFFICE VISIT (OUTPATIENT)
Dept: DERMATOLOGY | Facility: CLINIC | Age: 70
End: 2020-06-29
Payer: MEDICARE

## 2020-06-29 ENCOUNTER — PATIENT OUTREACH (OUTPATIENT)
Dept: ADMINISTRATIVE | Facility: OTHER | Age: 70
End: 2020-06-29

## 2020-06-29 DIAGNOSIS — L57.0 ACTINIC KERATOSES: Primary | ICD-10-CM

## 2020-06-29 DIAGNOSIS — L82.1 SEBORRHEIC KERATOSIS: ICD-10-CM

## 2020-06-29 PROCEDURE — 99213 OFFICE O/P EST LOW 20 MIN: CPT | Mod: S$PBB,,, | Performed by: DERMATOLOGY

## 2020-06-29 PROCEDURE — 99213 OFFICE O/P EST LOW 20 MIN: CPT | Mod: PBBFAC | Performed by: DERMATOLOGY

## 2020-06-29 PROCEDURE — 99999 PR PBB SHADOW E&M-EST. PATIENT-LVL III: CPT | Mod: PBBFAC,,, | Performed by: DERMATOLOGY

## 2020-06-29 PROCEDURE — 99213 PR OFFICE/OUTPT VISIT, EST, LEVL III, 20-29 MIN: ICD-10-PCS | Mod: S$PBB,,, | Performed by: DERMATOLOGY

## 2020-06-29 PROCEDURE — 99999 PR PBB SHADOW E&M-EST. PATIENT-LVL III: ICD-10-PCS | Mod: PBBFAC,,, | Performed by: DERMATOLOGY

## 2020-06-29 RX ORDER — FLUOROURACIL 50 MG/G
CREAM TOPICAL
Qty: 40 G | Refills: 1 | Status: SHIPPED | OUTPATIENT
Start: 2020-06-29 | End: 2021-04-15

## 2020-06-29 NOTE — PROGRESS NOTES
Requested updates within Care Everywhere.  Patient's chart was reviewed for overdue ENE topics.  Immunizations reconciled.

## 2020-06-29 NOTE — PROGRESS NOTES
Subjective:       Patient ID:  Kelsie Bustamante is a 70 y.o. female who presents for   Chief Complaint   Patient presents with    Spot     Pt complains of spots on her face that she wants looked at      VV of the right leg and AK of the forehead (s/p biopsy on 5/18/20), here for f/u and definitive tx.  Pt c/o several scaly areas of the forehead and lateral cheeks.       Review of Systems   Constitutional: Negative for fever and chills.   Gastrointestinal: Negative for nausea and vomiting.   Skin: Positive for activity-related sunscreen use. Negative for daily sunscreen use and recent sunburn.   Hematologic/Lymphatic: Does not bruise/bleed easily.        Objective:    Physical Exam   Constitutional: She appears well-developed and well-nourished. No distress.   Neurological: She is alert and oriented to person, place, and time. She is not disoriented.   Psychiatric: She has a normal mood and affect.   Skin:   Areas Examined (abnormalities noted in diagram):   Head / Face Inspection Performed  Neck Inspection Performed  RUE Inspected  LUE Inspection Performed  Nails and Digits Inspection Performed            1.  Skin, forehead, shave biopsy:   - ACTINIC KERATOSIS WITH LICHENOID INFLAMMATION.   - THE ATYPICAL SQUAMOUS EPITHELIUM INVOLVING THE HAIR FOLLICLES EXTENDS TO   THE BASE OF THE BIOPSY, AND AN UNDERLYING INVASIVE SQUAMOUS CELL CARCINOMA   CANNOT BE ENTIRELY EXCLUDED.   MICROSCOPIC DESCRIPTION: Sections show atypia and maturation disarray within   the lowermost epidermal layers associated with hyper- and parakeratosis, a   band-like lymphocytic infiltrate producing variable epidermal injury and   solar elastosis.  The changes extended into the hair follicles, and the   atypical squamous epithelium is focally transected within the involved   follicular epithelium at the base of the biopsy, and therefore invasive   squamous cell carcinoma cannot be entirely excluded.   2.  Skin, right leg, shave biopsy:   -  VERRUCA VULGARIS WITH ADJACENT FEATURES OF LICHEN SIMPLEX CHRONICUS/   PRURIGO NODULARIS.   MICROSCOPIC DESCRIPTION: Sections show a verrucous keratosis with acanthosis,   papillomatosis, hyperkeratosis, hypergranulosis and focal suggestive   koilocytotic changes.  Adjacent areas show a zone of irregular epidermal   hyperplasia with hypergranulosis, hyperkeratosis and underlying, vertically   oriented papillary dermal fibrosis.     Assessment / Plan:        Actinic keratoses  -     fluorouraciL (EFUDEX) 5 % cream; AAA bid x 2 weeks  Dispense: 40 g; Refill: 1  -     Of the forehead, underlying invasive SCC could not be excluded per bx report. Will begin treatment with efudex applied to the efudex BID for 2 weeks.  Side effects discussed including blistering, irritation and redness.  If patient develops blisters, patient instructed to hold efudex and use cortisone cream BID x 2-3 days until blister resolves.  Once blister resolves, pt should resume treatment with efudex.  Patient acknowledged understanding.  AVS given with written instructions for efudex use.    Seborrheic keratosis  Reassurance given. Discussed diagnosis and that lesions are benign.  AAD handout given.              Follow up in about 2 months (around 8/29/2020).

## 2020-06-29 NOTE — PATIENT INSTRUCTIONS
How to use Efudex (5% fluorouracil cream)    · Apply a thin layer twice a day to the forehead for 2 weeks.  Avoid getting efudex on the eyelids or near the eyes.  · Efudex may increase your sensitivity to sunlight, so you should wear a daily sunscreen with SPF of 30 or greater while using efudex.    · If severe blistering and pain, then hold efudex for 2-3 days and use OTC cortisone cream twice a day.  Once blisters and pain resolve, restart using efudex for a full 2 week course of treatment.  · Full healing may take an additional 1-2 weeks after stopping efudex.    Please call the [facility] office with any questions or concerns.    [facility]  [address] [place] [id number]  Dept: [phone]  Dept Fax: [phone]      Patient Education     Fluorouracil Topical cream     What is this medicine?   FLUOROURACIL, 5-FU (flure oh YOOR a benson) is a chemotherapy agent. It is used on the skin to treat skin cancer and skin conditions that could become cancer.   This medicine may be used for other purposes; ask your health care provider or pharmacist if you have questions.     What should I tell my health care provider before I take this medicine?   They need to know if you have any of these conditions:   DPD enzyme deficiency   recent or current radiation therapy   swelling or open sores at the treatment site   an unusual or allergic reaction to fluorouracil, other chemotherapy, other medicines, foods, dyes, or preservatives   pregnant or trying to get pregnant   breast-feeding    How should I use this medicine?   This medicine is only for use on the skin. Follow the directions on the prescription label. Wash hands before and after use. Wash affected area and gently pat dry. To apply this medicine use a cotton-tipped applicator, or use gloves if applying with fingertips. If applied with unprotected fingertips, it is very important to wash your hands  well after you apply this medicine. Avoid applying to the eyes, nose, or mouth. Apply enough medicine to cover the affected area. You can cover the area with a light gauze dressing, but do not use tight or air-tight dressings. Finish the full course prescribed by your doctor or health care professional, even if you think your condition is better. Do not stop taking except on the advice of your doctor or health care professional.   Talk to your pediatrician regarding the use of this medicine in children. Special care may be needed.   Overdosage: If you think you have taken too much of this medicine contact a poison control center or emergency room at once.   NOTE: This medicine is only for you. Do not share this medicine with others.     What if I miss a dose?   If you miss a dose, apply it as soon as you can. If it is almost time for your next dose, only use that dose. Do not apply extra doses.     What may interact with this medicine?   Interactions are not expected. Do not use any other skin products without telling your doctor or health care professional.   This list may not describe all possible interactions. Give your health care provider a list of all the medicines, herbs, non-prescription drugs, or dietary supplements you use. Also tell them if you smoke, drink alcohol, or use illegal drugs. Some items may interact with your medicine.     What should I watch for while using this medicine?   Visit your doctor or health care professional for checks on your progress. You will need to use this medicine for 2 to 6 weeks. This may be longer depending on the condition being treated. You may not see full healing for another 1 to 2 months after you stop using the medicine.   Treated areas of skin can look unsightly during and for several weeks after treatment with this medicine.   This medicine can make you more sensitive to the sun. Keep out of the sun. If you cannot avoid being in the sun, wear protective clothing  and use sunscreen. Do not use sun lamps or tanning beds/booths.     What side effects may I notice from receiving this medicine?   Side effects that you should report to your doctor or health care professional as soon as possible:   severe redness and swelling of normal skin  Side effects that usually do not require medical attention (report to your doctor or health care professional if they continue or are bothersome):   dark colored skin   eye irritation including burning, itching, sensitivity, stinging, or watering   increased sensitivity of the skin to sun and ultraviolet light   pain and burning of the affected area   scaling or swelling of the affected area   skin rash, itching of the affected area   tenderness  This list may not describe all possible side effects. Call your doctor for medical advice about side effects. You may report side effects to FDA at 9-659-FDA-1152.     Where should I keep my medicine?   Keep out of the reach of children.   Store at room temperature between 15 and 30 degrees C (59 and 86 degrees F). Do not freeze. Keep container tightly closed. Throw away any unused medicine after the expiration date.   NOTE:This sheet is a summary. It may not cover all possible information. If you have questions about this medicine, talk to your doctor, pharmacist, or health care provider. Copyright© 2014 [employer]

## 2020-06-30 ENCOUNTER — EXTERNAL CHRONIC CARE MANAGEMENT (OUTPATIENT)
Dept: PRIMARY CARE CLINIC | Facility: CLINIC | Age: 70
End: 2020-06-30
Payer: MEDICARE

## 2020-06-30 PROCEDURE — 99490 CHRNC CARE MGMT STAFF 1ST 20: CPT | Mod: S$PBB,,, | Performed by: FAMILY MEDICINE

## 2020-06-30 PROCEDURE — 99490 PR CHRONIC CARE MGMT, 1ST 20 MIN: ICD-10-PCS | Mod: S$PBB,,, | Performed by: FAMILY MEDICINE

## 2020-06-30 PROCEDURE — 99490 CHRNC CARE MGMT STAFF 1ST 20: CPT | Mod: PBBFAC | Performed by: FAMILY MEDICINE

## 2020-07-31 ENCOUNTER — EXTERNAL CHRONIC CARE MANAGEMENT (OUTPATIENT)
Dept: PRIMARY CARE CLINIC | Facility: CLINIC | Age: 70
End: 2020-07-31
Payer: MEDICARE

## 2020-07-31 PROCEDURE — 99490 PR CHRONIC CARE MGMT, 1ST 20 MIN: ICD-10-PCS | Mod: S$PBB,,, | Performed by: FAMILY MEDICINE

## 2020-07-31 PROCEDURE — 99490 CHRNC CARE MGMT STAFF 1ST 20: CPT | Mod: PBBFAC | Performed by: FAMILY MEDICINE

## 2020-07-31 PROCEDURE — 99490 CHRNC CARE MGMT STAFF 1ST 20: CPT | Mod: S$PBB,,, | Performed by: FAMILY MEDICINE

## 2020-08-10 ENCOUNTER — LAB VISIT (OUTPATIENT)
Dept: LAB | Facility: HOSPITAL | Age: 70
End: 2020-08-10
Attending: FAMILY MEDICINE
Payer: MEDICARE

## 2020-08-10 ENCOUNTER — OFFICE VISIT (OUTPATIENT)
Dept: INTERNAL MEDICINE | Facility: CLINIC | Age: 70
End: 2020-08-10
Payer: MEDICARE

## 2020-08-10 VITALS
HEIGHT: 63 IN | OXYGEN SATURATION: 97 % | BODY MASS INDEX: 29.77 KG/M2 | TEMPERATURE: 97 F | DIASTOLIC BLOOD PRESSURE: 70 MMHG | WEIGHT: 168 LBS | HEART RATE: 63 BPM | SYSTOLIC BLOOD PRESSURE: 128 MMHG

## 2020-08-10 DIAGNOSIS — Z12.11 SCREEN FOR COLON CANCER: ICD-10-CM

## 2020-08-10 DIAGNOSIS — I10 ESSENTIAL HYPERTENSION: ICD-10-CM

## 2020-08-10 DIAGNOSIS — E11.49 TYPE 2 DIABETES WITH NEURAL COMPLICATION: Primary | ICD-10-CM

## 2020-08-10 DIAGNOSIS — R53.83 FATIGUE, UNSPECIFIED TYPE: ICD-10-CM

## 2020-08-10 DIAGNOSIS — E11.49 TYPE 2 DIABETES WITH NEURAL COMPLICATION: ICD-10-CM

## 2020-08-10 DIAGNOSIS — J40 BRONCHITIS: ICD-10-CM

## 2020-08-10 DIAGNOSIS — Z78.0 ASYMPTOMATIC POSTMENOPAUSAL STATUS: ICD-10-CM

## 2020-08-10 PROCEDURE — 36415 COLL VENOUS BLD VENIPUNCTURE: CPT

## 2020-08-10 PROCEDURE — 99214 PR OFFICE/OUTPT VISIT, EST, LEVL IV, 30-39 MIN: ICD-10-PCS | Mod: S$PBB,,, | Performed by: FAMILY MEDICINE

## 2020-08-10 PROCEDURE — 99999 PR PBB SHADOW E&M-EST. PATIENT-LVL III: CPT | Mod: PBBFAC,,, | Performed by: FAMILY MEDICINE

## 2020-08-10 PROCEDURE — 99214 OFFICE O/P EST MOD 30 MIN: CPT | Mod: S$PBB,,, | Performed by: FAMILY MEDICINE

## 2020-08-10 PROCEDURE — 99213 OFFICE O/P EST LOW 20 MIN: CPT | Mod: PBBFAC | Performed by: FAMILY MEDICINE

## 2020-08-10 PROCEDURE — 84443 ASSAY THYROID STIM HORMONE: CPT

## 2020-08-10 PROCEDURE — 80053 COMPREHEN METABOLIC PANEL: CPT

## 2020-08-10 PROCEDURE — 83036 HEMOGLOBIN GLYCOSYLATED A1C: CPT

## 2020-08-10 PROCEDURE — 85025 COMPLETE CBC W/AUTO DIFF WBC: CPT

## 2020-08-10 PROCEDURE — 99999 PR PBB SHADOW E&M-EST. PATIENT-LVL III: ICD-10-PCS | Mod: PBBFAC,,, | Performed by: FAMILY MEDICINE

## 2020-08-10 RX ORDER — PROMETHAZINE HYDROCHLORIDE AND DEXTROMETHORPHAN HYDROBROMIDE 6.25; 15 MG/5ML; MG/5ML
SYRUP ORAL
Qty: 118 ML | Refills: 0 | Status: SHIPPED | OUTPATIENT
Start: 2020-08-10 | End: 2020-10-05

## 2020-08-10 NOTE — PROGRESS NOTES
Subjective:       Patient ID: Kelsie Bustamante is a female.    Chief Complaint: Multiple issues see below    HPI Type 2 diabetesw neurl manif: a1cdue Tolerating medicine. [facility]  ;;Hypertension: blood pressures at home normal. Off midodrine and losart. Orthostasis:saw dr saucedo and tilt table ok; los/hctz chnged to just losart and sympt cont. metoprol cannot be decreased per patient b/c palpit will come back  Hypercholesterolemia: controlled. Doing ok on crestor  Arthralgias much less with cymbalta    Chronic insomnia: medicare no longer covered;tried traz and revved her up; melatonin no help;had been using husb prn zolpidem/needs rf. Has tried otc sleep aids no help    Some loss wt; bit dec appet. Mood ok. No abd pain or diff eating      Thy nod hx; 2018 subcm and smaller than prior      Return of cough.resolved on reqular ppi last fall but prn only now Last yr had had Over 2 years awakened by cough w clear phlegm and gasping/sob. No leg swelling or sob while awake. . Saw dr rodriguez and some thought related to reflux , bronchospasm. albut qhs did help reduce.occas reflux. D/wd poss relationship; she also has daytime somnolence; no witnessed snoring;l she deferred psg from last yr  t nl cardiac w/u dr brown      Past Medical History:   Diagnosis Date    Achalasia     demetrius    Cataract     Colon polyp     Gastroesophageal reflux     Hiatal hernia     Hx of colonic polyps 08/15/2014    per colonoscopy in      Hypertension     Peripheral neuropathy     Postmenopausal HRT (hormone replacement therapy)     failed tapering off    Sinusitis     Thyroid nodule 3/16 subcm; kathleen 2 yrs    Type 2 diabetes mellitus with neurological manifestations     Vasodepressor syncope 2018     Past Surgical History:   Procedure Laterality Date    cataract Left      SECTION      CHOLECYSTECTOMY      HYSTERECTOMY      nonca    OOPHORECTOMY      TILT TABLE TEST N/A 10/25/2018    Procedure: TILT  TABLE TEST;  Surgeon: Daryl Walker MD;  Location: [facility] CATH LAB;  Service: Cardiology;  Laterality: N/A;  VAS.DEP.SYN,HUT,FAS,3PREP    TONSILLECTOMY       Family History   Problem Relation Age of Onset    Aneurysm Sister     Heart disease Mother     Diabetes Father     Coronary artery disease Father     Coronary artery disease Brother     Parkinsonism Brother      Social History     Socioeconomic History    Marital status:      Spouse name: Not on file    Number of children: 2    Years of education: Not on file    Highest education level: Not on file   Occupational History    Not on file   Social Needs    Financial resource strain: Not very hard    Food insecurity     Worry: Never true     Inability: Never true    Transportation needs     Medical: No     Non-medical: No   Tobacco Use    Smoking status: Never Smoker    Smokeless tobacco: Never Used   Substance and Sexual Activity    Alcohol use: No     Frequency: Monthly or less     Drinks per session: 1 or 2     Binge frequency: Never    Drug use: No    Sexual activity: Never   Lifestyle    Physical activity     Days per week: 1 day     Minutes per session: 0 min    Stress: To some extent   Relationships    Social connections     Talks on phone: More than three times a week     Gets together: Twice a week     Attends Yazidi service: Not on file     Active member of club or organization: No     Attends meetings of clubs or organizations: Never     Relationship status:    Other Topics Concern    Not on file   Social History Narrative    Not on file             Cardiovascular: no chest pain  Chest: no santos  Abd: no abd pain  Remainder review of systems negative      Objective:      Physical Exam   Constitutional: She is oriented to person, place, and time. She appears well-developed and well-nourished.   HENT: o/p and ears clear  Head: Normocephalic and atraumatic.   Neck: Normal range of motion. Neck supple. Carotid  bruit is not present.   Cardiovascular: Normal rate and regular rhythm.    Pulmonary/Chest: Effort normal and breath sounds normal.   Lymphadenopathy:     She has no cervical adenopathy.   Neurological: She is alert and oriented to person, place, and time.   Skin: Skin is warm and dry.   Ext no edema  Psychiatric: She has a normal mood and affect. Her behavior is normal. Judgment normal.   Nursing note and vitals reviewed.    Bilateral feet with dec monofilament testing and no lesions.      EPWORTH SLEEPINESS SCALE 8/6/2019   Sitting and reading 3   Watching TV 0   Sitting, inactive in a public place (e.g. a theatre or a meeting) 0   As a passenger in a car for an hour without a break 3   Lying down to rest in the afternoon when circumstances permit 3   Sitting and talking to someone 0   Sitting quietly after a lunch without alcohol 0   In a car, while stopped for a few minutes in traffic 0   Total score 9           Assessment:     type 2 dm w neural complic   hyperchol   2. Essential hypertension    Poss mp  Poss reflux relatd bronchospasm   Wt loss  fatigue    Plan:   Lab today and fu 6 months (dm, ambien)  Resume every evening omeprazole for 8 weeks then can try as needed nightly    F/u  [facility] same day dr bucio later   this month;to f/u on nocturnal cough and mp eval: if cough gone and fatigue resolved then defer home sleep study. If cough gone but fatigue/sleepines cont then home sleep study and any other eval    F/u  [facility] same day dr bucio later   this month  Lab today   Also lab 6 months and f/u    Type 2 diabetes with neural complication  -     Hemoglobin A1C; Future; Expected date: 08/10/2020  -     Comprehensive metabolic panel; Future; Expected date: 08/10/2020  -     CBC auto differential; Future; Expected date: 08/10/2020  -     Hemoglobin A1C; Future; Expected date: 02/06/2021  -     Lipid Panel; Future; Expected date: 02/06/2021    Bronchitis  -     promethazine-dextromethorphan (PROMETHAZINE-DM)  6.25-15 mg/5 mL Syrp; TAKE 5 ML BY MOUTH  IN THE EVENING  Dispense: 118 mL; Refill: 0    Essential hypertension    Fatigue, unspecified type  -     TSH; Future; Expected date: 08/10/2020    Asymptomatic postmenopausal status  -     DXA Bone Density Spine And Hip; Future; Expected date: 08/10/2020    Screen for colon cancer  -     Case request GI: COLONOSCOPY

## 2020-08-11 LAB
ALBUMIN SERPL BCP-MCNC: 3.7 G/DL (ref 3.5–5.2)
ALP SERPL-CCNC: 61 U/L (ref 55–135)
ALT SERPL W/O P-5'-P-CCNC: 17 U/L (ref 10–44)
ANION GAP SERPL CALC-SCNC: 9 MMOL/L (ref 8–16)
AST SERPL-CCNC: 19 U/L (ref 10–40)
BASOPHILS # BLD AUTO: 0.04 K/UL (ref 0–0.2)
BASOPHILS NFR BLD: 0.4 % (ref 0–1.9)
BILIRUB SERPL-MCNC: 0.3 MG/DL (ref 0.1–1)
BUN SERPL-MCNC: 12 MG/DL (ref 8–23)
CALCIUM SERPL-MCNC: 9.9 MG/DL (ref 8.7–10.5)
CHLORIDE SERPL-SCNC: 105 MMOL/L (ref 95–110)
CO2 SERPL-SCNC: 27 MMOL/L (ref 23–29)
CREAT SERPL-MCNC: 1 MG/DL (ref 0.5–1.4)
DIFFERENTIAL METHOD: ABNORMAL
EOSINOPHIL # BLD AUTO: 0.4 K/UL (ref 0–0.5)
EOSINOPHIL NFR BLD: 3.4 % (ref 0–8)
ERYTHROCYTE [DISTWIDTH] IN BLOOD BY AUTOMATED COUNT: 12.4 % (ref 11.5–14.5)
EST. GFR  (AFRICAN AMERICAN): >60 ML/MIN/1.73 M^2
EST. GFR  (NON AFRICAN AMERICAN): 57.6 ML/MIN/1.73 M^2
ESTIMATED AVG GLUCOSE: 128 MG/DL (ref 68–131)
GLUCOSE SERPL-MCNC: 91 MG/DL (ref 70–110)
HBA1C MFR BLD HPLC: 6.1 % (ref 4–5.6)
HCT VFR BLD AUTO: 40 % (ref 37–48.5)
HGB BLD-MCNC: 12.3 G/DL (ref 12–16)
IMM GRANULOCYTES # BLD AUTO: 0.05 K/UL (ref 0–0.04)
IMM GRANULOCYTES NFR BLD AUTO: 0.5 % (ref 0–0.5)
LYMPHOCYTES # BLD AUTO: 2.5 K/UL (ref 1–4.8)
LYMPHOCYTES NFR BLD: 23.6 % (ref 18–48)
MCH RBC QN AUTO: 29.4 PG (ref 27–31)
MCHC RBC AUTO-ENTMCNC: 30.8 G/DL (ref 32–36)
MCV RBC AUTO: 96 FL (ref 82–98)
MONOCYTES # BLD AUTO: 0.7 K/UL (ref 0.3–1)
MONOCYTES NFR BLD: 6.8 % (ref 4–15)
NEUTROPHILS # BLD AUTO: 6.8 K/UL (ref 1.8–7.7)
NEUTROPHILS NFR BLD: 65.3 % (ref 38–73)
NRBC BLD-RTO: 0 /100 WBC
PLATELET # BLD AUTO: 332 K/UL (ref 150–350)
PMV BLD AUTO: 9.7 FL (ref 9.2–12.9)
POTASSIUM SERPL-SCNC: 4.6 MMOL/L (ref 3.5–5.1)
PROT SERPL-MCNC: 7.1 G/DL (ref 6–8.4)
RBC # BLD AUTO: 4.18 M/UL (ref 4–5.4)
SODIUM SERPL-SCNC: 141 MMOL/L (ref 136–145)
TSH SERPL DL<=0.005 MIU/L-ACNC: 2.15 UIU/ML (ref 0.4–4)
WBC # BLD AUTO: 10.45 K/UL (ref 3.9–12.7)

## 2020-08-30 ENCOUNTER — PATIENT OUTREACH (OUTPATIENT)
Dept: ADMINISTRATIVE | Facility: OTHER | Age: 70
End: 2020-08-30

## 2020-08-31 ENCOUNTER — OFFICE VISIT (OUTPATIENT)
Dept: DERMATOLOGY | Facility: CLINIC | Age: 70
End: 2020-08-31
Payer: MEDICARE

## 2020-08-31 ENCOUNTER — EXTERNAL CHRONIC CARE MANAGEMENT (OUTPATIENT)
Dept: PRIMARY CARE CLINIC | Facility: CLINIC | Age: 70
End: 2020-08-31
Payer: MEDICARE

## 2020-08-31 ENCOUNTER — HOSPITAL ENCOUNTER (OUTPATIENT)
Dept: RADIOLOGY | Facility: HOSPITAL | Age: 70
Discharge: HOME | End: 2020-08-31
Attending: PHYSICIAN ASSISTANT
Payer: MEDICARE

## 2020-08-31 ENCOUNTER — OFFICE VISIT (OUTPATIENT)
Dept: INTERNAL MEDICINE | Facility: CLINIC | Age: 70
End: 2020-08-31
Payer: MEDICARE

## 2020-08-31 VITALS
SYSTOLIC BLOOD PRESSURE: 110 MMHG | OXYGEN SATURATION: 96 % | DIASTOLIC BLOOD PRESSURE: 60 MMHG | WEIGHT: 169.06 LBS | TEMPERATURE: 98 F | BODY MASS INDEX: 29.95 KG/M2 | HEART RATE: 56 BPM | HEIGHT: 63 IN

## 2020-08-31 DIAGNOSIS — M25.552 HIP PAIN, ACUTE, LEFT: ICD-10-CM

## 2020-08-31 DIAGNOSIS — L57.0 ACTINIC KERATOSES: Primary | ICD-10-CM

## 2020-08-31 DIAGNOSIS — I10 ESSENTIAL HYPERTENSION: Chronic | ICD-10-CM

## 2020-08-31 DIAGNOSIS — K21.9 GASTROESOPHAGEAL REFLUX DISEASE WITHOUT ESOPHAGITIS: ICD-10-CM

## 2020-08-31 DIAGNOSIS — E11.49 TYPE 2 DIABETES WITH NEURAL COMPLICATION: Chronic | ICD-10-CM

## 2020-08-31 DIAGNOSIS — G47.09 OTHER INSOMNIA: ICD-10-CM

## 2020-08-31 DIAGNOSIS — M25.552 HIP PAIN, ACUTE, LEFT: Primary | ICD-10-CM

## 2020-08-31 PROCEDURE — 99490 PR CHRONIC CARE MGMT, 1ST 20 MIN: ICD-10-PCS | Mod: S$PBB,,, | Performed by: FAMILY MEDICINE

## 2020-08-31 PROCEDURE — 99999 PR PBB SHADOW E&M-EST. PATIENT-LVL V: CPT | Mod: PBBFAC,,, | Performed by: PHYSICIAN ASSISTANT

## 2020-08-31 PROCEDURE — 73502 X-RAY EXAM HIP UNI 2-3 VIEWS: CPT | Mod: TC,LT

## 2020-08-31 PROCEDURE — 99999 PR PBB SHADOW E&M-EST. PATIENT-LVL III: CPT | Mod: PBBFAC,,, | Performed by: DERMATOLOGY

## 2020-08-31 PROCEDURE — 73502 X-RAY EXAM HIP UNI 2-3 VIEWS: CPT | Mod: 26,LT,, | Performed by: RADIOLOGY

## 2020-08-31 PROCEDURE — 99499 UNLISTED E&M SERVICE: CPT | Mod: S$PBB,,, | Performed by: DERMATOLOGY

## 2020-08-31 PROCEDURE — 99490 CHRNC CARE MGMT STAFF 1ST 20: CPT | Mod: S$PBB,,, | Performed by: FAMILY MEDICINE

## 2020-08-31 PROCEDURE — 99999 PR PBB SHADOW E&M-EST. PATIENT-LVL V: ICD-10-PCS | Mod: PBBFAC,,, | Performed by: PHYSICIAN ASSISTANT

## 2020-08-31 PROCEDURE — 99999 PR PBB SHADOW E&M-EST. PATIENT-LVL III: ICD-10-PCS | Mod: PBBFAC,,, | Performed by: DERMATOLOGY

## 2020-08-31 PROCEDURE — 73502 XR PELVIS 3 VIEW INC HIP 2 VIEW LEFT: ICD-10-PCS | Mod: 26,LT,, | Performed by: RADIOLOGY

## 2020-08-31 PROCEDURE — 99499 NO LOS: ICD-10-PCS | Mod: S$PBB,,, | Performed by: DERMATOLOGY

## 2020-08-31 PROCEDURE — 99490 CHRNC CARE MGMT STAFF 1ST 20: CPT | Mod: PBBFAC | Performed by: FAMILY MEDICINE

## 2020-08-31 PROCEDURE — 99214 PR OFFICE/OUTPT VISIT, EST, LEVL IV, 30-39 MIN: ICD-10-PCS | Mod: S$PBB,,, | Performed by: PHYSICIAN ASSISTANT

## 2020-08-31 PROCEDURE — 99214 OFFICE O/P EST MOD 30 MIN: CPT | Mod: S$PBB,,, | Performed by: PHYSICIAN ASSISTANT

## 2020-08-31 PROCEDURE — 99215 OFFICE O/P EST HI 40 MIN: CPT | Mod: PBBFAC,25 | Performed by: PHYSICIAN ASSISTANT

## 2020-08-31 PROCEDURE — 99213 OFFICE O/P EST LOW 20 MIN: CPT | Mod: PBBFAC,25,27 | Performed by: DERMATOLOGY

## 2020-08-31 NOTE — PROGRESS NOTES
AK of the forehead c/ invasive SCC could not be r/o (s/p biopsy on 5/18/20 by PRITESH Oden), last seen in clinic on 6/29/20. Pt instructed to use efudex bid x 2 weeks at last visit Pt scheduled for appt today, however called pt since pt had to leave appt prior to being seen.  Pt states she completed 3 weeks of efudex.  Pt dc'd due to irritation.  She has allowed the area to heal and states the lesion has completely resolved.  Recommend pt send photo via pt portal.  The patient acknowledged understanding. Will send appt reminder for skin check in 3-4 months. The patient acknowledged understanding.

## 2020-08-31 NOTE — PROGRESS NOTES
Subjective:      Patient ID: Kelsie Bustamante is a 70 y.o. female.    Chief Complaint: Follow-up    Ms. Bustamante is here today for a follow up regarding her sleep issues and chronic cough. Sleep and cough have iproved with omeprazole and promethazine DM. Today she would like to discuss a new problem. She has been having severe left groin pain for the past week. The pain is worse by deep palpation and certain movements.   Groin Pain  The patient's primary symptoms include pelvic pain. The patient's pertinent negatives include no vaginal discharge. The current episode started in the past 7 days. The problem has been waxing and waning. The problem affects the left side. Associated symptoms include back pain. Pertinent negatives include no abdominal pain, chills, constipation, diarrhea, dysuria, fever, flank pain, frequency, headaches, hematuria, nausea, rash, sore throat, urgency or vomiting. Associated symptoms comments: Radiating towards bladder. There is no history of an abdominal surgery, a  section, an ectopic pregnancy, endometriosis, herpes simplex, metrorrhagia, miscarriage, ovarian cysts, perineal abscess, PID, an STD, a terminated pregnancy or vaginosis.   Felt feverish one day last week (Wednesday).     Denies any nausea, vomiting, abdominal pain, diarrhea, constipation, or blood in the stool. Denies any vaginal discharge or bleeding.   Has had a complete hysterectomy.   Denies any trauma or rashes       Patient Active Problem List   Diagnosis    Hypertension    Type 2 diabetes with neural complication    Peripheral neuropathy    Carpal tunnel syndrome    Localized primary carpometacarpal osteoarthritis    Synovitis of right ankle    Right ankle pain    Gastroesophageal reflux disease without esophagitis    Thyroid nodule    Premature menopause (surgery in 30s)    Palpitations    Encounter for screening mammogram for breast cancer    Other insomnia    Statin intolerance       Review  "of Systems   Constitutional: Negative for activity change, appetite change, chills, diaphoresis, fatigue, fever and unexpected weight change.   HENT: Negative.  Negative for congestion, hearing loss, postnasal drip, rhinorrhea, sore throat, trouble swallowing and voice change.    Eyes: Negative.  Negative for visual disturbance.   Respiratory: Negative.  Negative for cough, choking, chest tightness and shortness of breath.    Cardiovascular: Negative for chest pain, palpitations and leg swelling.   Gastrointestinal: Negative for abdominal distention, abdominal pain, blood in stool, constipation, diarrhea, nausea and vomiting.   Endocrine: Negative for cold intolerance, heat intolerance, polydipsia and polyuria.   Genitourinary: Positive for pelvic pain. Negative for decreased urine volume, difficulty urinating, dysuria, flank pain, frequency, genital sores, hematuria, menstrual problem, urgency, vaginal bleeding and vaginal discharge.   Musculoskeletal: Positive for arthralgias, back pain, gait problem and myalgias. Negative for joint swelling, neck pain and neck stiffness.   Skin: Negative for color change, pallor, rash and wound.   Neurological: Negative for dizziness, tremors, weakness, light-headedness, numbness and headaches.   Hematological: Negative for adenopathy.   Psychiatric/Behavioral: Negative for behavioral problems, confusion, self-injury, sleep disturbance and suicidal ideas. The patient is not nervous/anxious.      Objective:   /60 (BP Location: Right arm, Patient Position: Sitting, BP Method: Large (Manual))   Pulse (!) 56   Temp 98 °F (36.7 °C) (Tympanic)   Ht 5' 3" (1.6 m)   Wt 76.7 kg (169 lb 1.5 oz)   SpO2 96%   BMI 29.95 kg/m²     Physical Exam  Vitals signs reviewed.   Constitutional:       Appearance: She is well-developed.   HENT:      Head: Normocephalic and atraumatic.      Right Ear: External ear normal.      Left Ear: External ear normal.      Nose: Nose normal.   Eyes:      " Conjunctiva/sclera: Conjunctivae normal.      Pupils: Pupils are equal, round, and reactive to light.   Neck:      Musculoskeletal: Normal range of motion and neck supple.   Cardiovascular:      Rate and Rhythm: Normal rate and regular rhythm.      Heart sounds: Normal heart sounds. No murmur. No friction rub. No gallop.    Pulmonary:      Effort: Pulmonary effort is normal. No respiratory distress.      Breath sounds: Normal breath sounds. No wheezing or rales.   Chest:      Chest wall: No tenderness.   Abdominal:      General: There is no distension.      Palpations: Abdomen is soft.      Tenderness: There is abdominal tenderness in the suprapubic area. There is no guarding or rebound. Negative signs include Ugalde's sign and Rovsing's sign.   Musculoskeletal:      Left hip: She exhibits decreased range of motion (severe pain with left hip abduction) and tenderness. She exhibits normal strength, no bony tenderness, no swelling, no crepitus, no deformity and no laceration.   Lymphadenopathy:      Cervical: No cervical adenopathy.   Skin:     General: Skin is warm and dry.   Neurological:      Mental Status: She is alert and oriented to person, place, and time.   Psychiatric:         Behavior: Behavior normal.         Thought Content: Thought content normal.         Judgment: Judgment normal.       Lab Results   Component Value Date    HGBA1C 6.1 (H) 08/10/2020       Assessment:     1. Hip pain, acute, left    2. Essential hypertension    3. Type 2 diabetes with neural complication    4. Gastroesophageal reflux disease without esophagitis    5. Other insomnia      Plan:   Hip pain, acute, left  -     X-Ray Pelvis 3 View inc Hip 2 view Left; Future; Expected date: 08/31/2020  -     Urinalysis; Future; Expected date: 08/31/2020  -rest, alternate ice and heat  -pain seems to be coming from her left hip. Possible left hip abductor strain. Will do xray to rule out osteoarthritis/avn/fracture.     Essential  hypertension  -Stable and controlled. Continue current treatment plan as previously prescribed with your PCP.     Type 2 diabetes with neural complication  -Stable and controlled. Continue current treatment plan as previously prescribed with your PCP.     Gastroesophageal reflux disease without esophagitis  -Stable and controlled on omeprazole. Continue current treatment plan as previously prescribed with your PCP.     Other insomnia  -improved with promethazine DM and omeprazole      Follow up if symptoms worsen or fail to improve.

## 2020-08-31 NOTE — PATIENT INSTRUCTIONS
Hip Strain    You have a strain of the muscles around the hip joint. A muscle strain is a stretching or tearing of muscle fibers. This causes pain, especially when you move that muscle. There may also be some swelling and bruising.  Home care  · Stay off the injured leg as much as possible until you can walk on it without pain. If you have a lot of pain with walking, crutches or a walker may be prescribed. These can be rented or purchased at many pharmacies and surgical or orthopedic supply stores. Follow your healthcare provider's advice regarding when to begin putting weight on that leg.  · Apply an ice pack over the injured area for 15 to 20 minutes every 3 to 6 hours. You should do this for the first 24 to 48 hours. You can make an ice pack by filling a plastic bag that seals at the top with ice cubes and then wrapping it with a thin towel. Be careful not to injure your skin with the ice treatments. Ice should never be applied directly to skin. Continue the use of ice packs for relief of pain and swelling as needed. After 48 hours, apply heat (warm shower or warm bath) for 15 to 20 minutes several times a day, or alternate ice and heat.  · You may use over-the-counter pain medicine to control pain, unless another pain medicine was prescribed. If you have chronic liver or kidney disease or ever had a stomach ulcer or GI bleeding, talk with your healthcare provider beforeusing these medicines.  · If you play sports, you may resume these activities when you are able to hop and run on the injured leg without pain.  Follow-up care  Follow up with your healthcare provider, or as advised. If your symptoms do not begin to get better after a week, more tests may be needed.  If X-rays were taken, you will be told of any new findings that may affect your care.  When to seek medical advice  Call your healthcare provider right away if any of these occur:  · Increased swelling or bruising  · Increased pain  · Losing the  ability to put weight on the injured side  Date Last Reviewed: 11/19/2015  © 9635-9137 [employer]. [address] [place] [address], [place] [address]. All rights reserved. This information is not intended as a substitute for professional medical care. Always follow your healthcare professional's instructions.        Understanding Osteoarthritis of the Hip    A joint is a place where two bones meet. The hip is a ball-and-socket joint. It is formed where the ball at the top of the thighbone (femur) rests in the socket in the pelvic bone. The hip joint allows the leg to move freely in many directions.  Hip osteoarthritis is a condition where parts of the hip joint wear out. It can lead to pain, stiffness, and limited movement.   What is osteoarthritis?  All joints contain a smooth tissue called cartilage. Cartilage cushions the ends of bones, helping them glide against each other. Hip osteoarthritis occurs when cartilage in the hip joint begins to break down and wear away. The ball and socket bones may then become exposed and rub together. The cartilage may become rough and pitted and may begin to wear away. This prevents smooth movement of the joint and can lead to pain.  Causes of osteoarthritis of the hip  Causes can include:  · Wear and tear from normal use of the hip over time  · Overuse of the hip during sports or work activities  · Being overweight. This increases stress on the hip joint.  · Injury to the hip  · Infection of the hip joint  Symptoms of osteoarthritis of the hip  The main symptom of hip osteoarthritis is pain. The pain is most often felt in the groin area. It may also travel down the leg to the knee or to the back of the hip. The pain usually gets worse with activity, such as walking or climbing stairs. The pain may get better with rest. The joint may also be stiff first thing in the morning or after periods of sitting or lying down. Stiffness usually gets better with movement.  Treating  osteoarthritis of the hip  Osteoarthritis is a long-term condition. Treatment usually focuses on managing symptoms. Treatment may include:  · Over-the-counter or prescription medicines taken by mouth to help relieve pain and swelling  · Injections of medicine into the joint to help relieve symptoms for a time  · A weight-loss plan if you are overweight  · A plan of physical therapy and exercises to improve strength and flexibility around the joint  · Cold or heat packs help relieve pain and stiffness  · Assistive devices that help with movement, such as a cane or a walker  · Assistive devices that make activities of daily life easier, such as raised toilet seats or shower bars  If other treatments dont do enough to relieve severe symptoms, you may need surgery to replace the joint. This surgery replaces the hip joint with an artificial joint. It can help relieve pain and stiffness and improve function of the hip.     When to call your healthcare provider  Call your healthcare provider right away if you have any of these:  · Fever of 100.4°F (38°C) or higher, or as directed  · Symptoms that dont get better with prescribed medicines or get worse  · New symptoms   Date Last Reviewed: 3/10/2016  © 2018-4672 [employer]. [address] [place] [address], [place] [address]. All rights reserved. This information is not intended as a substitute for professional medical care. Always follow your healthcare professional's instructions.

## 2020-09-01 DIAGNOSIS — N30.90 CYSTITIS: Primary | ICD-10-CM

## 2020-09-01 DIAGNOSIS — M25.552 PAIN OF LEFT HIP JOINT: ICD-10-CM

## 2020-09-01 RX ORDER — NITROFURANTOIN 25; 75 MG/1; MG/1
100 CAPSULE ORAL 2 TIMES DAILY
Qty: 14 CAPSULE | Refills: 0 | Status: SHIPPED | OUTPATIENT
Start: 2020-09-01 | End: 2020-09-08

## 2020-09-01 RX ORDER — PREDNISONE 20 MG/1
TABLET ORAL
Qty: 9 TABLET | Refills: 0 | Status: SHIPPED | OUTPATIENT
Start: 2020-09-01 | End: 2020-10-20 | Stop reason: ALTCHOICE

## 2020-09-09 ENCOUNTER — HOSPITAL ENCOUNTER (OUTPATIENT)
Dept: RADIOLOGY | Facility: HOSPITAL | Age: 70
Discharge: HOME | End: 2020-09-09
Attending: FAMILY MEDICINE
Payer: MEDICARE

## 2020-09-09 VITALS — WEIGHT: 168.88 LBS | BODY MASS INDEX: 29.92 KG/M2 | HEIGHT: 63 IN

## 2020-09-09 DIAGNOSIS — R92.8 ABNORMAL MAMMOGRAM: ICD-10-CM

## 2020-09-09 PROCEDURE — 77066 MAMMO DIGITAL DIAGNOSTIC BILAT WITH TOMOSYNTHESIS_CAD: ICD-10-PCS | Mod: 26,,, | Performed by: RADIOLOGY

## 2020-09-09 PROCEDURE — 77066 DX MAMMO INCL CAD BI: CPT | Mod: 26,,, | Performed by: RADIOLOGY

## 2020-09-09 PROCEDURE — 77062 BREAST TOMOSYNTHESIS BI: CPT | Mod: TC

## 2020-09-09 PROCEDURE — 77062 BREAST TOMOSYNTHESIS BI: CPT | Mod: 26,,, | Performed by: RADIOLOGY

## 2020-09-09 PROCEDURE — 76642 ULTRASOUND BREAST LIMITED: CPT | Mod: TC,LT

## 2020-09-09 PROCEDURE — 76642 US BREAST LEFT LIMITED: ICD-10-PCS | Mod: 26,LT,, | Performed by: RADIOLOGY

## 2020-09-09 PROCEDURE — 76642 ULTRASOUND BREAST LIMITED: CPT | Mod: 26,LT,, | Performed by: RADIOLOGY

## 2020-09-09 PROCEDURE — 77062 MAMMO DIGITAL DIAGNOSTIC BILAT WITH TOMOSYNTHESIS_CAD: ICD-10-PCS | Mod: 26,,, | Performed by: RADIOLOGY

## 2020-09-29 ENCOUNTER — PATIENT MESSAGE (OUTPATIENT)
Dept: OTHER | Facility: OTHER | Age: 70
End: 2020-09-29

## 2020-09-30 ENCOUNTER — EXTERNAL CHRONIC CARE MANAGEMENT (OUTPATIENT)
Dept: PRIMARY CARE CLINIC | Facility: CLINIC | Age: 70
End: 2020-09-30
Payer: MEDICARE

## 2020-09-30 PROCEDURE — 99490 CHRNC CARE MGMT STAFF 1ST 20: CPT | Mod: PBBFAC | Performed by: FAMILY MEDICINE

## 2020-09-30 PROCEDURE — 99490 PR CHRONIC CARE MGMT, 1ST 20 MIN: ICD-10-PCS | Mod: S$PBB,,, | Performed by: FAMILY MEDICINE

## 2020-09-30 PROCEDURE — 99490 CHRNC CARE MGMT STAFF 1ST 20: CPT | Mod: S$PBB,,, | Performed by: FAMILY MEDICINE

## 2020-10-20 ENCOUNTER — OFFICE VISIT (OUTPATIENT)
Dept: INTERNAL MEDICINE | Facility: CLINIC | Age: 70
End: 2020-10-20
Payer: MEDICARE

## 2020-10-20 VITALS
OXYGEN SATURATION: 97 % | BODY MASS INDEX: 30.16 KG/M2 | HEIGHT: 63 IN | DIASTOLIC BLOOD PRESSURE: 70 MMHG | SYSTOLIC BLOOD PRESSURE: 132 MMHG | WEIGHT: 170.19 LBS | HEART RATE: 81 BPM | TEMPERATURE: 96 F

## 2020-10-20 DIAGNOSIS — M54.2 NECK PAIN ON RIGHT SIDE: ICD-10-CM

## 2020-10-20 DIAGNOSIS — I10 ESSENTIAL HYPERTENSION: Chronic | ICD-10-CM

## 2020-10-20 PROBLEM — Z12.31 ENCOUNTER FOR SCREENING MAMMOGRAM FOR BREAST CANCER: Status: RESOLVED | Noted: 2017-12-21 | Resolved: 2020-10-20

## 2020-10-20 PROCEDURE — 99999 PR PBB SHADOW E&M-EST. PATIENT-LVL IV: ICD-10-PCS | Mod: PBBFAC,,, | Performed by: NURSE PRACTITIONER

## 2020-10-20 PROCEDURE — 99214 OFFICE O/P EST MOD 30 MIN: CPT | Mod: PBBFAC,PO | Performed by: NURSE PRACTITIONER

## 2020-10-20 PROCEDURE — 99214 OFFICE O/P EST MOD 30 MIN: CPT | Mod: S$PBB,,, | Performed by: NURSE PRACTITIONER

## 2020-10-20 PROCEDURE — 99214 PR OFFICE/OUTPT VISIT, EST, LEVL IV, 30-39 MIN: ICD-10-PCS | Mod: S$PBB,,, | Performed by: NURSE PRACTITIONER

## 2020-10-20 PROCEDURE — 99999 PR PBB SHADOW E&M-EST. PATIENT-LVL IV: CPT | Mod: PBBFAC,,, | Performed by: NURSE PRACTITIONER

## 2020-10-20 RX ORDER — ORPHENADRINE CITRATE 100 MG/1
100 TABLET, EXTENDED RELEASE ORAL 2 TIMES DAILY
Qty: 20 TABLET | Refills: 0 | Status: SHIPPED | OUTPATIENT
Start: 2020-10-20 | End: 2020-10-30

## 2020-10-20 NOTE — PATIENT INSTRUCTIONS
Neck Exercises: Active Neck Rotation    To start, lie on your back, knees bent and feet flat on the floor. Keep your ears, shoulders, and hips aligned, but dont press your lower back to the floor. Rest your hands on your pelvis. Breathe deeply and relax.  · Use your neck muscles to turn your head to one side until you feel a stretch in the muscles.  · Hold for 5 seconds. Then turn to the other side.  · Repeat 5 times on each side.  Note: Keep your shoulders on the floor. Dont lift or tuck your chin as you turn your head.  Date Last Reviewed: 8/16/2015 © 2000-2017 [employer]. [address] [place] [address]. All rights reserved. This information is not intended as a substitute for professional medical care. Always follow your healthcare professional's instructions.        Neck Exercises: Passive Neck Rotation    To start, lie on your back, knees bent and feet flat on the floor. Keep your ears, shoulders, and hips aligned, but dont press your lower back to the floor. Rest your hands on your pelvis. Breathe deeply and relax.  · With your neck relaxed, place the palm of one hand on your forehead. Use your hand to turn your head to one side until you feel a stretch in the neck muscles. Do not push through pain.  · Hold for 5 seconds. Then turn to the other side.  · Repeat 5 times on each side.   Note: Keep your shoulders on the floor. Dont lift your chin as you turn your head.  Date Last Reviewed: 8/16/2015 © 2000-2017 [employer]. [address] [place] [address]. All rights reserved. This information is not intended as a substitute for professional medical care. Always follow your healthcare professional's instructions.        Exercises: Neck Isometrics  To start, sit in a chair with your feet flat on the floor. Your weight should be slightly forward so that youre balanced evenly on your buttocks. Relax your shoulders and keep your head level. Using a chair with  arms may help you keep your balance.       1. Press your palm against your forehead. Resist with your neck muscles. Hold for 10 seconds. Relax. Repeat 5 times.  2. Do the exercise again, pressing on the side of your head. Repeat 5 times. Switch sides.  3. Do the exercise again, pressing on the back of your head. Repeat 5 times.  For your safety, check with your healthcare provider before starting an exercise program.   Date Last Reviewed: 8/16/2015 © 2000-2017 [employer]. [address] [place] [address]. All rights reserved. This information is not intended as a substitute for professional medical care. Always follow your healthcare professional's instructions.        Neck Pain    There are several possible causes of neck pain when there is no injury:  · You can get a minor ligament sprain or muscle strain from a sudden minor neck movement. Sleeping with your neck in an awkward position can also cause this.  · Some people respond to emotional stress by tensing the muscles of their neck, shoulders, and upper back. Chronic spasm in these muscles can cause neck pain and sometimes headaches.  · Gradual wear and tear of the joints in the spine can cause degenerative arthritis. This can be a source of occasional or chronic neck pain.  · The spinal disks may bulge and put pressure on a nearby spinal nerve. This can happen as a natural result of aging or repeated small injuries to the neck. The spinal disks are the cushions between each spinal bone. This causes tingling, pain, or numbness that spreads from the neck to the shoulder, arm, or hand on one side.  Acute neck pain usually gets better in 1 to 2 weeks. Neck pain related to disk disease, arthritis in the spinal joints, or spinal stenosis can become chronic and last for months or years. Spinal stenosis is narrowing of the spinal canal.  X-rays are usually not ordered for the initial evaluation of neck pain. However, X-rays may be done if you  had a forceful physical injury, such as a car accident or fall. If pain continues and doesnt respond to medical treatment, X-rays and other tests may be done at a later time.  Home care  · Rest and relax the muscles. Use a comfortable pillow that supports the head. It should also help keep the spine in a neutral position. The position of the head should not be tilted forward or backward. A rolled up towel may help for a custom fit.  · Some people find relief with heat. Heat can be applied with either a warm shower or bath or a moist towel heated in the microwave and massage. Others prefer cold packs. You can make an ice pack by filling a plastic bag that seals at the top with ice cubes or crushed ice and then wrapping it with a thin towel. Try both and use the method that feels best for 15 to 20 minutes, several times a day.  · Whether using ice or heat, be careful that you do not injure your skin. Never put ice directly on the skin. Always wrap the ice in a towel or other type of cloth.This is very important, especially in people with poor skin sensations.   · Try to reduce your stress level. Emotional stress can lead to neck muscle tension and get in the way of or delay the healing process.  · You may use over-the-counter pain medicine to control pain, unless another medicine was prescribed. If you have chronic liver or kidney disease or ever had a stomach ulcer or GI bleeding, talk with your healthcare provider before using these medicines.  Follow-up care  Follow up with your healthcare provider if your symptoms do not show signs of improvement after one week. Physical therapy or further tests may be needed.  If X-rays, CT scans, or MRI scans were taken, you will be told of any new findings that may affect your care.  Call 911  Call 911 if you have:  · Sudden weakness or numbness in one or both arms  · Neck swelling, difficulty or painful swallowing  · Difficulty breathing  · Chest pain  When to seek medical  advice  Call your healthcare provider right away if any of these occur:  · Pain becomes worse or spreads into one or both arm  · Increasing headache  · Fever of 100.4°F (38°C) or above lasting for 24 to 48 hours  Date Last Reviewed: 7/1/2016  © 7854-7823 [employer]. [address] [place] [address]. All rights reserved. This information is not intended as a substitute for professional medical care. Always follow your healthcare professional's instructions.

## 2020-10-31 ENCOUNTER — EXTERNAL CHRONIC CARE MANAGEMENT (OUTPATIENT)
Dept: PRIMARY CARE CLINIC | Facility: CLINIC | Age: 70
End: 2020-10-31
Payer: MEDICARE

## 2020-10-31 PROCEDURE — 99490 CHRNC CARE MGMT STAFF 1ST 20: CPT | Mod: PBBFAC | Performed by: FAMILY MEDICINE

## 2020-10-31 PROCEDURE — 99490 PR CHRONIC CARE MGMT, 1ST 20 MIN: ICD-10-PCS | Mod: S$PBB,,, | Performed by: FAMILY MEDICINE

## 2020-10-31 PROCEDURE — 99490 CHRNC CARE MGMT STAFF 1ST 20: CPT | Mod: S$PBB,,, | Performed by: FAMILY MEDICINE

## 2020-11-30 ENCOUNTER — EXTERNAL CHRONIC CARE MANAGEMENT (OUTPATIENT)
Dept: PRIMARY CARE CLINIC | Facility: CLINIC | Age: 70
End: 2020-11-30
Payer: MEDICARE

## 2020-11-30 PROCEDURE — 99490 PR CHRONIC CARE MGMT, 1ST 20 MIN: ICD-10-PCS | Mod: S$PBB,,, | Performed by: FAMILY MEDICINE

## 2020-11-30 PROCEDURE — 99490 CHRNC CARE MGMT STAFF 1ST 20: CPT | Mod: S$PBB,,, | Performed by: FAMILY MEDICINE

## 2020-11-30 PROCEDURE — 99490 CHRNC CARE MGMT STAFF 1ST 20: CPT | Mod: PBBFAC | Performed by: FAMILY MEDICINE

## 2020-12-11 ENCOUNTER — PATIENT MESSAGE (OUTPATIENT)
Dept: OTHER | Facility: OTHER | Age: 70
End: 2020-12-11

## 2020-12-21 ENCOUNTER — TELEPHONE (OUTPATIENT)
Dept: INTERNAL MEDICINE | Facility: CLINIC | Age: 70
End: 2020-12-21

## 2020-12-21 NOTE — TELEPHONE ENCOUNTER
Received below info from care harmony nurse:     Pharmacy: [facility] - [phone] [facility] [phone] (Phone)   [phone] (Fax)   Lab: [facility]     Good morning,   This patient sees Dr. Parks. [facility] services spoke to the pt today for their monthly health check. The pt c/o arthritis pain in NERY thumbs which has gotten increasingly worse over the last 6 months. The pt has tried heat/ice therapy and Motrin 400mg at a time which the pt reports is ineffective. The pt is curious if there is something else they could try for the pain as it is starting to disrupt their ADL's.  If necessary you can contact the pt at the above number, or you can contact me via Epic messages or at the phone number and ext below.   Thank you,   GIOVANNI Riley   Care Coordinator   [facility]   [phone] ext [phone]       Sent to PCP and staff for review

## 2020-12-21 NOTE — TELEPHONE ENCOUNTER
Notified patient of results, medication, and recommendations. Patient verbalized understanding.Notified patient of results, medication, and recommendations. Patient verbalized understanding.//ddw

## 2020-12-31 ENCOUNTER — EXTERNAL CHRONIC CARE MANAGEMENT (OUTPATIENT)
Dept: PRIMARY CARE CLINIC | Facility: CLINIC | Age: 70
End: 2020-12-31
Payer: MEDICARE

## 2020-12-31 PROCEDURE — G2058 PR CHRON CARE MGMT, EA ADDTL 20 MINS: ICD-10-PCS | Mod: S$PBB,,, | Performed by: FAMILY MEDICINE

## 2020-12-31 PROCEDURE — 99490 PR CHRONIC CARE MGMT, 1ST 20 MIN: ICD-10-PCS | Mod: S$PBB,,, | Performed by: FAMILY MEDICINE

## 2020-12-31 PROCEDURE — 99490 CHRNC CARE MGMT STAFF 1ST 20: CPT | Mod: PBBFAC | Performed by: FAMILY MEDICINE

## 2020-12-31 PROCEDURE — G2058 CCM ADD 20MIN: HCPCS | Mod: PBBFAC | Performed by: FAMILY MEDICINE

## 2020-12-31 PROCEDURE — G2058 CCM ADD 20MIN: HCPCS | Mod: S$PBB,,, | Performed by: FAMILY MEDICINE

## 2020-12-31 PROCEDURE — 99490 CHRNC CARE MGMT STAFF 1ST 20: CPT | Mod: S$PBB,,, | Performed by: FAMILY MEDICINE

## 2021-01-08 ENCOUNTER — TELEPHONE (OUTPATIENT)
Dept: ADMINISTRATIVE | Facility: HOSPITAL | Age: 71
End: 2021-01-08

## 2021-01-15 ENCOUNTER — PES CALL (OUTPATIENT)
Dept: ADMINISTRATIVE | Facility: CLINIC | Age: 71
End: 2021-01-15

## 2021-01-21 ENCOUNTER — TELEPHONE (OUTPATIENT)
Dept: INTERNAL MEDICINE | Facility: CLINIC | Age: 71
End: 2021-01-21

## 2021-01-21 ENCOUNTER — TELEPHONE (OUTPATIENT)
Dept: ORTHOPEDICS | Facility: CLINIC | Age: 71
End: 2021-01-21

## 2021-01-21 DIAGNOSIS — M79.641 PAIN IN BOTH HANDS: Primary | ICD-10-CM

## 2021-01-21 DIAGNOSIS — M25.531 PAIN IN BOTH WRISTS: ICD-10-CM

## 2021-01-21 DIAGNOSIS — M25.532 PAIN IN BOTH WRISTS: ICD-10-CM

## 2021-01-21 DIAGNOSIS — M79.642 PAIN IN BOTH HANDS: Primary | ICD-10-CM

## 2021-01-21 DIAGNOSIS — M79.643 PAIN OF HAND, UNSPECIFIED LATERALITY: Primary | ICD-10-CM

## 2021-01-22 ENCOUNTER — HOSPITAL ENCOUNTER (OUTPATIENT)
Dept: RADIOLOGY | Facility: HOSPITAL | Age: 71
Discharge: HOME | End: 2021-01-22
Attending: STUDENT IN AN ORGANIZED HEALTH CARE EDUCATION/TRAINING PROGRAM
Payer: MEDICARE

## 2021-01-22 ENCOUNTER — OFFICE VISIT (OUTPATIENT)
Dept: ORTHOPEDICS | Facility: CLINIC | Age: 71
End: 2021-01-22
Payer: MEDICARE

## 2021-01-22 ENCOUNTER — PATIENT OUTREACH (OUTPATIENT)
Dept: ADMINISTRATIVE | Facility: OTHER | Age: 71
End: 2021-01-22

## 2021-01-22 VITALS — WEIGHT: 170 LBS | HEIGHT: 63 IN | BODY MASS INDEX: 30.12 KG/M2

## 2021-01-22 DIAGNOSIS — M79.643 PAIN OF HAND, UNSPECIFIED LATERALITY: ICD-10-CM

## 2021-01-22 DIAGNOSIS — M79.641 PAIN IN BOTH HANDS: ICD-10-CM

## 2021-01-22 DIAGNOSIS — Z12.11 SPECIAL SCREENING FOR MALIGNANT NEOPLASM OF COLON: Primary | ICD-10-CM

## 2021-01-22 DIAGNOSIS — M25.531 PAIN IN BOTH WRISTS: ICD-10-CM

## 2021-01-22 DIAGNOSIS — M18.0 ARTHRITIS OF CARPOMETACARPAL (CMC) JOINT OF BOTH THUMBS: Primary | ICD-10-CM

## 2021-01-22 DIAGNOSIS — M79.642 PAIN IN BOTH HANDS: ICD-10-CM

## 2021-01-22 DIAGNOSIS — M25.532 PAIN IN BOTH WRISTS: ICD-10-CM

## 2021-01-22 PROCEDURE — 99214 OFFICE O/P EST MOD 30 MIN: CPT | Mod: PBBFAC,25 | Performed by: STUDENT IN AN ORGANIZED HEALTH CARE EDUCATION/TRAINING PROGRAM

## 2021-01-22 PROCEDURE — 73130 X-RAY EXAM OF HAND: CPT | Mod: TC,50

## 2021-01-22 PROCEDURE — 99203 OFFICE O/P NEW LOW 30 MIN: CPT | Mod: S$PBB,,, | Performed by: STUDENT IN AN ORGANIZED HEALTH CARE EDUCATION/TRAINING PROGRAM

## 2021-01-22 PROCEDURE — 73110 X-RAY EXAM OF WRIST: CPT | Mod: TC,50

## 2021-01-22 PROCEDURE — 99999 PR PBB SHADOW E&M-EST. PATIENT-LVL IV: ICD-10-PCS | Mod: PBBFAC,,, | Performed by: STUDENT IN AN ORGANIZED HEALTH CARE EDUCATION/TRAINING PROGRAM

## 2021-01-22 PROCEDURE — 73110 X-RAY EXAM OF WRIST: CPT | Mod: 26,50,, | Performed by: RADIOLOGY

## 2021-01-22 PROCEDURE — 99999 PR PBB SHADOW E&M-EST. PATIENT-LVL IV: CPT | Mod: PBBFAC,,, | Performed by: STUDENT IN AN ORGANIZED HEALTH CARE EDUCATION/TRAINING PROGRAM

## 2021-01-22 PROCEDURE — 73130 X-RAY EXAM OF HAND: CPT | Mod: 26,50,, | Performed by: RADIOLOGY

## 2021-01-22 PROCEDURE — 99203 PR OFFICE/OUTPT VISIT, NEW, LEVL III, 30-44 MIN: ICD-10-PCS | Mod: S$PBB,,, | Performed by: STUDENT IN AN ORGANIZED HEALTH CARE EDUCATION/TRAINING PROGRAM

## 2021-01-22 PROCEDURE — 73110 XR WRIST COMPLETE 3 VIEWS BILATERAL: ICD-10-PCS | Mod: 26,50,, | Performed by: RADIOLOGY

## 2021-01-22 PROCEDURE — 73130 XR HAND COMPLETE 3 VIEWS BILATERAL: ICD-10-PCS | Mod: 26,50,, | Performed by: RADIOLOGY

## 2021-01-22 RX ORDER — INFLUENZA A VIRUS A/MICHIGAN/45/2015 X-275 (H1N1) ANTIGEN (FORMALDEHYDE INACTIVATED), INFLUENZA A VIRUS A/SINGAPORE/INFIMH-16-0019/2016 IVR-186 (H3N2) ANTIGEN (FORMALDEHYDE INACTIVATED), INFLUENZA B VIRUS B/PHUKET/3073/2013 ANTIGEN (FORMALDEHYDE INACTIVATED), AND INFLUENZA B VIRUS B/MARYLAND/15/2016 BX-69A ANTIGEN (FORMALDEHYDE INACTIVATED) 60; 60; 60; 60 UG/.7ML; UG/.7ML; UG/.7ML; UG/.7ML
INJECTION, SUSPENSION INTRAMUSCULAR
Status: ON HOLD | COMMUNITY
Start: 2020-11-14 | End: 2022-10-19 | Stop reason: HOSPADM

## 2021-01-22 RX ORDER — NITROFURANTOIN 25; 75 MG/1; MG/1
CAPSULE ORAL
COMMUNITY
Start: 2020-10-31 | End: 2021-02-19

## 2021-01-22 RX ORDER — NITROFURANTOIN 25; 75 MG/1; MG/1
1 CAPSULE ORAL
COMMUNITY
Start: 2020-10-31 | End: 2021-02-19

## 2021-01-31 ENCOUNTER — EXTERNAL CHRONIC CARE MANAGEMENT (OUTPATIENT)
Dept: PRIMARY CARE CLINIC | Facility: CLINIC | Age: 71
End: 2021-01-31
Payer: MEDICARE

## 2021-01-31 PROCEDURE — 99490 PR CHRONIC CARE MGMT, 1ST 20 MIN: ICD-10-PCS | Mod: S$PBB,,, | Performed by: FAMILY MEDICINE

## 2021-01-31 PROCEDURE — 99490 CHRNC CARE MGMT STAFF 1ST 20: CPT | Mod: PBBFAC | Performed by: FAMILY MEDICINE

## 2021-01-31 PROCEDURE — 99490 CHRNC CARE MGMT STAFF 1ST 20: CPT | Mod: S$PBB,,, | Performed by: FAMILY MEDICINE

## 2021-02-10 ENCOUNTER — LAB VISIT (OUTPATIENT)
Dept: LAB | Facility: HOSPITAL | Age: 71
End: 2021-02-10
Attending: FAMILY MEDICINE
Payer: MEDICARE

## 2021-02-10 DIAGNOSIS — I10 ESSENTIAL HYPERTENSION: ICD-10-CM

## 2021-02-10 DIAGNOSIS — R00.2 PALPITATIONS: ICD-10-CM

## 2021-02-10 DIAGNOSIS — E11.49 TYPE 2 DIABETES WITH NEURAL COMPLICATION: ICD-10-CM

## 2021-02-10 LAB
CHOLEST SERPL-MCNC: 122 MG/DL (ref 120–199)
CHOLEST/HDLC SERPL: 2.1 {RATIO} (ref 2–5)
ESTIMATED AVG GLUCOSE: 120 MG/DL (ref 68–131)
HBA1C MFR BLD: 5.8 % (ref 4–5.6)
HDLC SERPL-MCNC: 58 MG/DL (ref 40–75)
HDLC SERPL: 47.5 % (ref 20–50)
LDLC SERPL CALC-MCNC: 37 MG/DL (ref 63–159)
NONHDLC SERPL-MCNC: 64 MG/DL
TRIGL SERPL-MCNC: 135 MG/DL (ref 30–150)

## 2021-02-10 PROCEDURE — 36415 COLL VENOUS BLD VENIPUNCTURE: CPT

## 2021-02-10 PROCEDURE — 83036 HEMOGLOBIN GLYCOSYLATED A1C: CPT

## 2021-02-10 PROCEDURE — 80061 LIPID PANEL: CPT

## 2021-02-10 RX ORDER — METOPROLOL TARTRATE 50 MG/1
50 TABLET ORAL 2 TIMES DAILY
Qty: 180 TABLET | Refills: 3 | Status: CANCELLED | OUTPATIENT
Start: 2021-02-10

## 2021-02-19 ENCOUNTER — OFFICE VISIT (OUTPATIENT)
Dept: INTERNAL MEDICINE | Facility: CLINIC | Age: 71
End: 2021-02-19
Payer: MEDICARE

## 2021-02-19 VITALS
BODY MASS INDEX: 30.04 KG/M2 | WEIGHT: 169.56 LBS | SYSTOLIC BLOOD PRESSURE: 116 MMHG | HEIGHT: 63 IN | DIASTOLIC BLOOD PRESSURE: 62 MMHG | HEART RATE: 55 BPM | OXYGEN SATURATION: 97 % | TEMPERATURE: 98 F

## 2021-02-19 DIAGNOSIS — E11.49 TYPE 2 DIABETES WITH NEURAL COMPLICATION: Chronic | ICD-10-CM

## 2021-02-19 DIAGNOSIS — I10 ESSENTIAL HYPERTENSION: ICD-10-CM

## 2021-02-19 DIAGNOSIS — Z00.00 ROUTINE MEDICAL EXAM: Primary | ICD-10-CM

## 2021-02-19 DIAGNOSIS — M35.3 POLYMYALGIA: ICD-10-CM

## 2021-02-19 DIAGNOSIS — R00.2 PALPITATIONS: ICD-10-CM

## 2021-02-19 PROCEDURE — 99999 PR PBB SHADOW E&M-EST. PATIENT-LVL IV: ICD-10-PCS | Mod: PBBFAC,,, | Performed by: NURSE PRACTITIONER

## 2021-02-19 PROCEDURE — 99214 OFFICE O/P EST MOD 30 MIN: CPT | Mod: PBBFAC | Performed by: NURSE PRACTITIONER

## 2021-02-19 PROCEDURE — 99214 PR OFFICE/OUTPT VISIT, EST, LEVL IV, 30-39 MIN: ICD-10-PCS | Mod: S$PBB,,, | Performed by: NURSE PRACTITIONER

## 2021-02-19 PROCEDURE — 99999 PR PBB SHADOW E&M-EST. PATIENT-LVL IV: CPT | Mod: PBBFAC,,, | Performed by: NURSE PRACTITIONER

## 2021-02-19 PROCEDURE — 99214 OFFICE O/P EST MOD 30 MIN: CPT | Mod: S$PBB,,, | Performed by: NURSE PRACTITIONER

## 2021-02-19 RX ORDER — METOPROLOL TARTRATE 50 MG/1
50 TABLET ORAL 2 TIMES DAILY
Qty: 180 TABLET | Refills: 3 | OUTPATIENT
Start: 2021-02-19 | End: 2021-02-19 | Stop reason: SDUPTHER

## 2021-02-19 RX ORDER — ZOLPIDEM TARTRATE 10 MG/1
10 TABLET ORAL NIGHTLY PRN
Qty: 90 TABLET | Refills: 1 | Status: SHIPPED | OUTPATIENT
Start: 2021-02-19 | End: 2021-02-19 | Stop reason: SDUPTHER

## 2021-02-19 RX ORDER — METOPROLOL TARTRATE 50 MG/1
50 TABLET ORAL 2 TIMES DAILY
Qty: 14 TABLET | Refills: 0 | Status: SHIPPED | OUTPATIENT
Start: 2021-02-19 | End: 2021-02-23 | Stop reason: SDUPTHER

## 2021-02-19 RX ORDER — ZOLPIDEM TARTRATE 10 MG/1
10 TABLET ORAL NIGHTLY PRN
Qty: 90 TABLET | Refills: 1 | Status: SHIPPED | OUTPATIENT
Start: 2021-02-19 | End: 2022-04-06 | Stop reason: SDUPTHER

## 2021-02-19 RX ORDER — DULOXETIN HYDROCHLORIDE 20 MG/1
20 CAPSULE, DELAYED RELEASE ORAL DAILY
Qty: 90 CAPSULE | Refills: 3 | Status: SHIPPED | OUTPATIENT
Start: 2021-02-19 | End: 2022-04-06 | Stop reason: SDUPTHER

## 2021-02-19 RX ORDER — ROSUVASTATIN CALCIUM 5 MG/1
5 TABLET, COATED ORAL DAILY
Qty: 90 TABLET | Refills: 3 | Status: SHIPPED | OUTPATIENT
Start: 2021-02-19 | End: 2022-04-15 | Stop reason: SDUPTHER

## 2021-02-19 RX ORDER — SITAGLIPTIN AND METFORMIN HYDROCHLORIDE 1000; 50 MG/1; MG/1
1 TABLET, FILM COATED ORAL 2 TIMES DAILY WITH MEALS
Qty: 180 TABLET | Refills: 3 | Status: SHIPPED | OUTPATIENT
Start: 2021-02-19 | End: 2022-04-06 | Stop reason: SDUPTHER

## 2021-02-23 DIAGNOSIS — R00.2 PALPITATIONS: ICD-10-CM

## 2021-02-23 DIAGNOSIS — I10 ESSENTIAL HYPERTENSION: ICD-10-CM

## 2021-02-23 RX ORDER — METOPROLOL TARTRATE 50 MG/1
50 TABLET ORAL 2 TIMES DAILY
Qty: 180 TABLET | Refills: 3 | Status: SHIPPED | OUTPATIENT
Start: 2021-02-23 | End: 2021-03-03 | Stop reason: SDUPTHER

## 2021-02-28 ENCOUNTER — EXTERNAL CHRONIC CARE MANAGEMENT (OUTPATIENT)
Dept: PRIMARY CARE CLINIC | Facility: CLINIC | Age: 71
End: 2021-02-28
Payer: MEDICARE

## 2021-02-28 PROCEDURE — 99490 CHRNC CARE MGMT STAFF 1ST 20: CPT | Mod: S$PBB,,, | Performed by: FAMILY MEDICINE

## 2021-02-28 PROCEDURE — 99490 PR CHRONIC CARE MGMT, 1ST 20 MIN: ICD-10-PCS | Mod: S$PBB,,, | Performed by: FAMILY MEDICINE

## 2021-02-28 PROCEDURE — 99490 CHRNC CARE MGMT STAFF 1ST 20: CPT | Mod: PBBFAC | Performed by: FAMILY MEDICINE

## 2021-03-03 ENCOUNTER — PATIENT MESSAGE (OUTPATIENT)
Dept: CARDIOLOGY | Facility: HOSPITAL | Age: 71
End: 2021-03-03

## 2021-03-03 DIAGNOSIS — J40 BRONCHITIS: ICD-10-CM

## 2021-03-03 DIAGNOSIS — I10 ESSENTIAL HYPERTENSION: ICD-10-CM

## 2021-03-03 DIAGNOSIS — R00.2 PALPITATIONS: ICD-10-CM

## 2021-03-03 RX ORDER — ALBUTEROL SULFATE 90 UG/1
2 AEROSOL, METERED RESPIRATORY (INHALATION) EVERY 6 HOURS PRN
Qty: 6.7 G | Refills: 5 | Status: SHIPPED | OUTPATIENT
Start: 2021-03-03 | End: 2023-06-23 | Stop reason: SDUPTHER

## 2021-03-03 RX ORDER — METOPROLOL TARTRATE 50 MG/1
50 TABLET ORAL 2 TIMES DAILY
Qty: 14 TABLET | Refills: 0 | Status: ON HOLD | OUTPATIENT
Start: 2021-03-03 | End: 2021-05-13 | Stop reason: HOSPADM

## 2021-03-03 RX ORDER — PROMETHAZINE HYDROCHLORIDE AND DEXTROMETHORPHAN HYDROBROMIDE 6.25; 15 MG/5ML; MG/5ML
SYRUP ORAL
Qty: 118 ML | Refills: 0 | Status: SHIPPED | OUTPATIENT
Start: 2021-03-03 | End: 2021-03-23 | Stop reason: SDUPTHER

## 2021-03-09 ENCOUNTER — TELEPHONE (OUTPATIENT)
Dept: ADMINISTRATIVE | Facility: CLINIC | Age: 71
End: 2021-03-09

## 2021-03-17 ENCOUNTER — TELEPHONE (OUTPATIENT)
Dept: ADMINISTRATIVE | Facility: CLINIC | Age: 71
End: 2021-03-17

## 2021-03-22 ENCOUNTER — TELEPHONE (OUTPATIENT)
Dept: ADMINISTRATIVE | Facility: CLINIC | Age: 71
End: 2021-03-22

## 2021-03-23 ENCOUNTER — PATIENT MESSAGE (OUTPATIENT)
Dept: INTERNAL MEDICINE | Facility: CLINIC | Age: 71
End: 2021-03-23

## 2021-03-23 DIAGNOSIS — J40 BRONCHITIS: ICD-10-CM

## 2021-03-26 RX ORDER — PROMETHAZINE HYDROCHLORIDE AND DEXTROMETHORPHAN HYDROBROMIDE 6.25; 15 MG/5ML; MG/5ML
SYRUP ORAL
Qty: 118 ML | Refills: 0 | Status: SHIPPED | OUTPATIENT
Start: 2021-03-26 | End: 2021-05-11 | Stop reason: SDUPTHER

## 2021-03-29 ENCOUNTER — PATIENT MESSAGE (OUTPATIENT)
Dept: INTERNAL MEDICINE | Facility: CLINIC | Age: 71
End: 2021-03-29

## 2021-03-29 ENCOUNTER — OFFICE VISIT (OUTPATIENT)
Dept: INTERNAL MEDICINE | Facility: CLINIC | Age: 71
End: 2021-03-29
Payer: MEDICARE

## 2021-03-29 ENCOUNTER — HOSPITAL ENCOUNTER (OUTPATIENT)
Dept: RADIOLOGY | Facility: HOSPITAL | Age: 71
Discharge: HOME | End: 2021-03-29
Attending: PHYSICIAN ASSISTANT
Payer: MEDICARE

## 2021-03-29 VITALS
OXYGEN SATURATION: 95 % | DIASTOLIC BLOOD PRESSURE: 86 MMHG | BODY MASS INDEX: 29.3 KG/M2 | WEIGHT: 165.38 LBS | HEART RATE: 87 BPM | TEMPERATURE: 97 F | SYSTOLIC BLOOD PRESSURE: 132 MMHG | HEIGHT: 63 IN

## 2021-03-29 DIAGNOSIS — J18.9 PNEUMONIA OF BOTH LUNGS DUE TO INFECTIOUS ORGANISM, UNSPECIFIED PART OF LUNG: ICD-10-CM

## 2021-03-29 DIAGNOSIS — R06.02 SHORTNESS OF BREATH: Primary | ICD-10-CM

## 2021-03-29 DIAGNOSIS — I10 ESSENTIAL HYPERTENSION: Chronic | ICD-10-CM

## 2021-03-29 DIAGNOSIS — J18.9 PNEUMONIA OF BOTH LUNGS DUE TO INFECTIOUS ORGANISM, UNSPECIFIED PART OF LUNG: Primary | ICD-10-CM

## 2021-03-29 PROCEDURE — 71046 XR CHEST PA AND LATERAL: ICD-10-PCS | Mod: 26,,, | Performed by: RADIOLOGY

## 2021-03-29 PROCEDURE — 99214 PR OFFICE/OUTPT VISIT, EST, LEVL IV, 30-39 MIN: ICD-10-PCS | Mod: S$PBB,,, | Performed by: PHYSICIAN ASSISTANT

## 2021-03-29 PROCEDURE — 99215 OFFICE O/P EST HI 40 MIN: CPT | Mod: PBBFAC,25 | Performed by: PHYSICIAN ASSISTANT

## 2021-03-29 PROCEDURE — 71046 X-RAY EXAM CHEST 2 VIEWS: CPT | Mod: 26,,, | Performed by: RADIOLOGY

## 2021-03-29 PROCEDURE — 99214 OFFICE O/P EST MOD 30 MIN: CPT | Mod: S$PBB,,, | Performed by: PHYSICIAN ASSISTANT

## 2021-03-29 PROCEDURE — 99999 PR PBB SHADOW E&M-EST. PATIENT-LVL V: CPT | Mod: PBBFAC,,, | Performed by: PHYSICIAN ASSISTANT

## 2021-03-29 PROCEDURE — 71046 X-RAY EXAM CHEST 2 VIEWS: CPT | Mod: TC

## 2021-03-29 PROCEDURE — 99999 PR PBB SHADOW E&M-EST. PATIENT-LVL V: ICD-10-PCS | Mod: PBBFAC,,, | Performed by: PHYSICIAN ASSISTANT

## 2021-03-29 RX ORDER — AZITHROMYCIN 250 MG/1
TABLET, FILM COATED ORAL
Qty: 6 TABLET | Refills: 0 | Status: SHIPPED | OUTPATIENT
Start: 2021-03-29 | End: 2021-05-11

## 2021-03-29 RX ORDER — CODEINE PHOSPHATE AND GUAIFENESIN 10; 100 MG/5ML; MG/5ML
10 SOLUTION ORAL 3 TIMES DAILY PRN
COMMUNITY
End: 2021-05-11

## 2021-03-29 RX ORDER — AMOXICILLIN AND CLAVULANATE POTASSIUM 875; 125 MG/1; MG/1
1 TABLET, FILM COATED ORAL 2 TIMES DAILY
COMMUNITY
End: 2021-05-11

## 2021-03-30 ENCOUNTER — HOSPITAL ENCOUNTER (OUTPATIENT)
Dept: RADIOLOGY | Facility: HOSPITAL | Age: 71
Discharge: HOME | End: 2021-03-30
Attending: PHYSICIAN ASSISTANT
Payer: MEDICARE

## 2021-03-30 DIAGNOSIS — R79.89 ELEVATED D-DIMER: ICD-10-CM

## 2021-03-30 DIAGNOSIS — R07.9 ACUTE CHEST PAIN: ICD-10-CM

## 2021-03-30 DIAGNOSIS — R79.89 ELEVATED D-DIMER: Primary | ICD-10-CM

## 2021-03-30 PROCEDURE — 71275 CTA CHEST NON CORONARY: ICD-10-PCS | Mod: 26,,, | Performed by: RADIOLOGY

## 2021-03-30 PROCEDURE — 25500020 PHARM REV CODE 255: Mod: PO | Performed by: PHYSICIAN ASSISTANT

## 2021-03-30 PROCEDURE — 71275 CT ANGIOGRAPHY CHEST: CPT | Mod: 26,,, | Performed by: RADIOLOGY

## 2021-03-30 PROCEDURE — 71275 CT ANGIOGRAPHY CHEST: CPT | Mod: TC,PO

## 2021-03-30 RX ADMIN — IOHEXOL 100 ML: 350 INJECTION, SOLUTION INTRAVENOUS at 04:03

## 2021-03-31 ENCOUNTER — EXTERNAL CHRONIC CARE MANAGEMENT (OUTPATIENT)
Dept: PRIMARY CARE CLINIC | Facility: CLINIC | Age: 71
End: 2021-03-31
Payer: MEDICARE

## 2021-03-31 ENCOUNTER — PES CALL (OUTPATIENT)
Dept: ADMINISTRATIVE | Facility: CLINIC | Age: 71
End: 2021-03-31

## 2021-03-31 PROCEDURE — 99490 CHRNC CARE MGMT STAFF 1ST 20: CPT | Mod: S$PBB,,, | Performed by: FAMILY MEDICINE

## 2021-03-31 PROCEDURE — 99490 CHRNC CARE MGMT STAFF 1ST 20: CPT | Mod: PBBFAC | Performed by: FAMILY MEDICINE

## 2021-03-31 PROCEDURE — 99490 PR CHRONIC CARE MGMT, 1ST 20 MIN: ICD-10-PCS | Mod: S$PBB,,, | Performed by: FAMILY MEDICINE

## 2021-04-12 ENCOUNTER — TELEPHONE (OUTPATIENT)
Dept: INTERNAL MEDICINE | Facility: CLINIC | Age: 71
End: 2021-04-12

## 2021-04-15 ENCOUNTER — OFFICE VISIT (OUTPATIENT)
Dept: INTERNAL MEDICINE | Facility: CLINIC | Age: 71
End: 2021-04-15
Payer: MEDICARE

## 2021-04-15 ENCOUNTER — HOSPITAL ENCOUNTER (OUTPATIENT)
Dept: RADIOLOGY | Facility: HOSPITAL | Age: 71
Discharge: HOME | End: 2021-04-15
Attending: FAMILY MEDICINE
Payer: MEDICARE

## 2021-04-15 VITALS
HEIGHT: 63 IN | DIASTOLIC BLOOD PRESSURE: 70 MMHG | SYSTOLIC BLOOD PRESSURE: 150 MMHG | WEIGHT: 161.81 LBS | TEMPERATURE: 98 F | BODY MASS INDEX: 28.67 KG/M2 | OXYGEN SATURATION: 96 % | HEART RATE: 69 BPM | RESPIRATION RATE: 20 BRPM

## 2021-04-15 DIAGNOSIS — J40 BRONCHITIS: ICD-10-CM

## 2021-04-15 DIAGNOSIS — J40 BRONCHITIS: Primary | ICD-10-CM

## 2021-04-15 DIAGNOSIS — R06.01 ORTHOPNEA: ICD-10-CM

## 2021-04-15 PROCEDURE — 99214 PR OFFICE/OUTPT VISIT, EST, LEVL IV, 30-39 MIN: ICD-10-PCS | Mod: S$PBB,,, | Performed by: FAMILY MEDICINE

## 2021-04-15 PROCEDURE — 99215 OFFICE O/P EST HI 40 MIN: CPT | Mod: PBBFAC,25,PO | Performed by: FAMILY MEDICINE

## 2021-04-15 PROCEDURE — 99214 OFFICE O/P EST MOD 30 MIN: CPT | Mod: S$PBB,,, | Performed by: FAMILY MEDICINE

## 2021-04-15 PROCEDURE — 71046 X-RAY EXAM CHEST 2 VIEWS: CPT | Mod: 26,,, | Performed by: RADIOLOGY

## 2021-04-15 PROCEDURE — 71046 XR CHEST PA AND LATERAL: ICD-10-PCS | Mod: 26,,, | Performed by: RADIOLOGY

## 2021-04-15 PROCEDURE — 99999 PR PBB SHADOW E&M-EST. PATIENT-LVL V: CPT | Mod: PBBFAC,,, | Performed by: FAMILY MEDICINE

## 2021-04-15 PROCEDURE — 96372 THER/PROPH/DIAG INJ SC/IM: CPT | Mod: PBBFAC,PO

## 2021-04-15 PROCEDURE — 99999 PR PBB SHADOW E&M-EST. PATIENT-LVL V: ICD-10-PCS | Mod: PBBFAC,,, | Performed by: FAMILY MEDICINE

## 2021-04-15 PROCEDURE — 71046 X-RAY EXAM CHEST 2 VIEWS: CPT | Mod: TC,PO

## 2021-04-15 RX ORDER — BENZONATATE 100 MG/1
100 CAPSULE ORAL 3 TIMES DAILY PRN
Qty: 30 CAPSULE | Refills: 1 | Status: SHIPPED | OUTPATIENT
Start: 2021-04-15 | End: 2021-04-25

## 2021-04-15 RX ORDER — PREDNISONE 20 MG/1
40 TABLET ORAL DAILY
Qty: 10 TABLET | Refills: 0 | Status: SHIPPED | OUTPATIENT
Start: 2021-04-15 | End: 2021-04-20

## 2021-04-15 RX ORDER — BENZONATATE 100 MG/1
100 CAPSULE ORAL 3 TIMES DAILY PRN
Qty: 30 CAPSULE | Refills: 1 | Status: SHIPPED | OUTPATIENT
Start: 2021-04-15 | End: 2021-04-15 | Stop reason: SDUPTHER

## 2021-04-15 RX ORDER — PREDNISONE 20 MG/1
40 TABLET ORAL DAILY
Qty: 10 TABLET | Refills: 0 | Status: SHIPPED | OUTPATIENT
Start: 2021-04-15 | End: 2021-04-15 | Stop reason: SDUPTHER

## 2021-04-15 RX ADMIN — METHYLPREDNISOLONE SODIUM SUCCINATE 125 MG: 125 INJECTION, POWDER, FOR SOLUTION INTRAMUSCULAR; INTRAVENOUS at 10:04

## 2021-04-27 ENCOUNTER — HOSPITAL ENCOUNTER (EMERGENCY)
Facility: HOSPITAL | Age: 71
Discharge: HOME | End: 2021-04-27
Attending: EMERGENCY MEDICINE
Payer: MEDICARE

## 2021-04-27 ENCOUNTER — PATIENT OUTREACH (OUTPATIENT)
Dept: ADMINISTRATIVE | Facility: OTHER | Age: 71
End: 2021-04-27

## 2021-04-27 ENCOUNTER — OFFICE VISIT (OUTPATIENT)
Dept: OTOLARYNGOLOGY | Facility: CLINIC | Age: 71
End: 2021-04-27
Payer: MEDICARE

## 2021-04-27 ENCOUNTER — TELEPHONE (OUTPATIENT)
Dept: INTERNAL MEDICINE | Facility: CLINIC | Age: 71
End: 2021-04-27

## 2021-04-27 VITALS — HEIGHT: 63 IN | TEMPERATURE: 98 F | BODY MASS INDEX: 28.82 KG/M2 | WEIGHT: 162.69 LBS

## 2021-04-27 VITALS
SYSTOLIC BLOOD PRESSURE: 110 MMHG | HEIGHT: 63 IN | DIASTOLIC BLOOD PRESSURE: 53 MMHG | WEIGHT: 160.69 LBS | OXYGEN SATURATION: 96 % | HEART RATE: 58 BPM | RESPIRATION RATE: 20 BRPM | TEMPERATURE: 100 F | BODY MASS INDEX: 28.47 KG/M2

## 2021-04-27 DIAGNOSIS — K12.2 ABSCESS OF ORAL SPACE: ICD-10-CM

## 2021-04-27 DIAGNOSIS — M86.9 OSTEOMYELITIS OF OTHER SITE, UNSPECIFIED TYPE: Primary | ICD-10-CM

## 2021-04-27 DIAGNOSIS — L02.01 FACIAL ABSCESS: Primary | ICD-10-CM

## 2021-04-27 DIAGNOSIS — R68.83 CHILLS: ICD-10-CM

## 2021-04-27 DIAGNOSIS — J34.89 SINUS PAIN: ICD-10-CM

## 2021-04-27 LAB
ALBUMIN SERPL BCP-MCNC: 3.6 G/DL (ref 3.5–5.2)
ALP SERPL-CCNC: 100 U/L (ref 55–135)
ALT SERPL W/O P-5'-P-CCNC: 54 U/L (ref 10–44)
ANION GAP SERPL CALC-SCNC: 12 MMOL/L (ref 8–16)
AST SERPL-CCNC: 46 U/L (ref 10–40)
BASOPHILS # BLD AUTO: 0.04 K/UL (ref 0–0.2)
BASOPHILS NFR BLD: 0.2 % (ref 0–1.9)
BILIRUB SERPL-MCNC: 0.6 MG/DL (ref 0.1–1)
BUN SERPL-MCNC: 10 MG/DL (ref 8–23)
CALCIUM SERPL-MCNC: 9.3 MG/DL (ref 8.7–10.5)
CHLORIDE SERPL-SCNC: 99 MMOL/L (ref 95–110)
CO2 SERPL-SCNC: 22 MMOL/L (ref 23–29)
CREAT SERPL-MCNC: 0.9 MG/DL (ref 0.5–1.4)
CTP QC/QA: YES
DIFFERENTIAL METHOD: ABNORMAL
EOSINOPHIL # BLD AUTO: 0.1 K/UL (ref 0–0.5)
EOSINOPHIL NFR BLD: 0.3 % (ref 0–8)
ERYTHROCYTE [DISTWIDTH] IN BLOOD BY AUTOMATED COUNT: 12.7 % (ref 11.5–14.5)
EST. GFR  (AFRICAN AMERICAN): >60 ML/MIN/1.73 M^2
EST. GFR  (NON AFRICAN AMERICAN): >60 ML/MIN/1.73 M^2
GLUCOSE SERPL-MCNC: 183 MG/DL (ref 70–110)
HCT VFR BLD AUTO: 37.2 % (ref 37–48.5)
HGB BLD-MCNC: 12.5 G/DL (ref 12–16)
IMM GRANULOCYTES # BLD AUTO: 0.14 K/UL (ref 0–0.04)
IMM GRANULOCYTES NFR BLD AUTO: 0.6 % (ref 0–0.5)
LACTATE SERPL-SCNC: 2.2 MMOL/L (ref 0.5–2.2)
LYMPHOCYTES # BLD AUTO: 0.8 K/UL (ref 1–4.8)
LYMPHOCYTES NFR BLD: 3.4 % (ref 18–48)
MCH RBC QN AUTO: 29.8 PG (ref 27–31)
MCHC RBC AUTO-ENTMCNC: 33.6 G/DL (ref 32–36)
MCV RBC AUTO: 89 FL (ref 82–98)
MONOCYTES # BLD AUTO: 0.9 K/UL (ref 0.3–1)
MONOCYTES NFR BLD: 3.9 % (ref 4–15)
NEUTROPHILS # BLD AUTO: 20.3 K/UL (ref 1.8–7.7)
NEUTROPHILS NFR BLD: 91.6 % (ref 38–73)
NRBC BLD-RTO: 0 /100 WBC
PLATELET # BLD AUTO: 291 K/UL (ref 150–450)
PMV BLD AUTO: 9.4 FL (ref 9.2–12.9)
POCT GLUCOSE: 130 MG/DL (ref 70–110)
POTASSIUM SERPL-SCNC: 4.2 MMOL/L (ref 3.5–5.1)
PROT SERPL-MCNC: 7 G/DL (ref 6–8.4)
RBC # BLD AUTO: 4.2 M/UL (ref 4–5.4)
SARS-COV-2 RDRP RESP QL NAA+PROBE: NEGATIVE
SODIUM SERPL-SCNC: 133 MMOL/L (ref 136–145)
WBC # BLD AUTO: 22.15 K/UL (ref 3.9–12.7)

## 2021-04-27 PROCEDURE — 25500020 PHARM REV CODE 255: Mod: ER | Performed by: EMERGENCY MEDICINE

## 2021-04-27 PROCEDURE — 25000003 PHARM REV CODE 250: Mod: ER | Performed by: EMERGENCY MEDICINE

## 2021-04-27 PROCEDURE — 99999 PR PBB SHADOW E&M-EST. PATIENT-LVL IV: CPT | Mod: PBBFAC,,, | Performed by: PHYSICIAN ASSISTANT

## 2021-04-27 PROCEDURE — 87040 BLOOD CULTURE FOR BACTERIA: CPT | Performed by: EMERGENCY MEDICINE

## 2021-04-27 PROCEDURE — 99213 PR OFFICE/OUTPT VISIT, EST, LEVL III, 20-29 MIN: ICD-10-PCS | Mod: S$PBB,ICN,, | Performed by: PHYSICIAN ASSISTANT

## 2021-04-27 PROCEDURE — 99291 CRITICAL CARE FIRST HOUR: CPT | Mod: 25,ER

## 2021-04-27 PROCEDURE — 80053 COMPREHEN METABOLIC PANEL: CPT | Mod: ER | Performed by: EMERGENCY MEDICINE

## 2021-04-27 PROCEDURE — 96365 THER/PROPH/DIAG IV INF INIT: CPT | Mod: ER

## 2021-04-27 PROCEDURE — 63600175 PHARM REV CODE 636 W HCPCS: Mod: ER | Performed by: EMERGENCY MEDICINE

## 2021-04-27 PROCEDURE — 83605 ASSAY OF LACTIC ACID: CPT | Mod: ER | Performed by: EMERGENCY MEDICINE

## 2021-04-27 PROCEDURE — 82962 GLUCOSE BLOOD TEST: CPT | Mod: ER

## 2021-04-27 PROCEDURE — 99214 OFFICE O/P EST MOD 30 MIN: CPT | Mod: PBBFAC | Performed by: PHYSICIAN ASSISTANT

## 2021-04-27 PROCEDURE — 85025 COMPLETE CBC W/AUTO DIFF WBC: CPT | Mod: ER | Performed by: EMERGENCY MEDICINE

## 2021-04-27 PROCEDURE — 99213 OFFICE O/P EST LOW 20 MIN: CPT | Mod: S$PBB,ICN,, | Performed by: PHYSICIAN ASSISTANT

## 2021-04-27 PROCEDURE — 99999 PR PBB SHADOW E&M-EST. PATIENT-LVL IV: ICD-10-PCS | Mod: PBBFAC,,, | Performed by: PHYSICIAN ASSISTANT

## 2021-04-27 PROCEDURE — U0002 COVID-19 LAB TEST NON-CDC: HCPCS | Mod: ER | Performed by: EMERGENCY MEDICINE

## 2021-04-27 RX ORDER — PENICILLIN V POTASSIUM 500 MG/1
500 TABLET, FILM COATED ORAL 4 TIMES DAILY
COMMUNITY
Start: 2021-04-24 | End: 2021-05-11

## 2021-04-27 RX ORDER — HYDROCODONE BITARTRATE AND ACETAMINOPHEN 10; 325 MG/1; MG/1
TABLET ORAL
COMMUNITY
Start: 2021-04-26 | End: 2021-05-11

## 2021-04-27 RX ORDER — ACETAMINOPHEN 325 MG/1
650 TABLET ORAL
Status: COMPLETED | OUTPATIENT
Start: 2021-04-27 | End: 2021-04-27

## 2021-04-27 RX ADMIN — IOHEXOL 75 ML: 350 INJECTION, SOLUTION INTRAVENOUS at 02:04

## 2021-04-27 RX ADMIN — PIPERACILLIN AND TAZOBACTAM 4.5 G: 4; .5 INJECTION, POWDER, LYOPHILIZED, FOR SOLUTION INTRAVENOUS; PARENTERAL at 03:04

## 2021-04-27 RX ADMIN — ACETAMINOPHEN 650 MG: 325 TABLET ORAL at 03:04

## 2021-04-30 ENCOUNTER — EXTERNAL CHRONIC CARE MANAGEMENT (OUTPATIENT)
Dept: PRIMARY CARE CLINIC | Facility: CLINIC | Age: 71
End: 2021-04-30
Payer: MEDICARE

## 2021-04-30 PROCEDURE — 99490 PR CHRONIC CARE MGMT, 1ST 20 MIN: ICD-10-PCS | Mod: S$PBB,,, | Performed by: FAMILY MEDICINE

## 2021-04-30 PROCEDURE — 99490 CHRNC CARE MGMT STAFF 1ST 20: CPT | Mod: PBBFAC | Performed by: FAMILY MEDICINE

## 2021-04-30 PROCEDURE — 99490 CHRNC CARE MGMT STAFF 1ST 20: CPT | Mod: S$PBB,,, | Performed by: FAMILY MEDICINE

## 2021-05-03 LAB
BACTERIA BLD CULT: NORMAL
BACTERIA BLD CULT: NORMAL

## 2021-05-10 ENCOUNTER — TELEPHONE (OUTPATIENT)
Dept: PREADMISSION TESTING | Facility: HOSPITAL | Age: 71
End: 2021-05-10

## 2021-05-10 DIAGNOSIS — Z01.818 PRE-OP TESTING: ICD-10-CM

## 2021-05-11 ENCOUNTER — OFFICE VISIT (OUTPATIENT)
Dept: INTERNAL MEDICINE | Facility: CLINIC | Age: 71
DRG: 310 | End: 2021-05-11
Payer: MEDICARE

## 2021-05-11 ENCOUNTER — HOSPITAL ENCOUNTER (INPATIENT)
Facility: HOSPITAL | Age: 71
LOS: 1 days | Discharge: HOME | DRG: 310 | End: 2021-05-13
Attending: EMERGENCY MEDICINE | Admitting: FAMILY MEDICINE
Payer: MEDICARE

## 2021-05-11 VITALS
SYSTOLIC BLOOD PRESSURE: 98 MMHG | OXYGEN SATURATION: 92 % | HEART RATE: 143 BPM | DIASTOLIC BLOOD PRESSURE: 68 MMHG | BODY MASS INDEX: 27.54 KG/M2 | HEIGHT: 63 IN | TEMPERATURE: 96 F | WEIGHT: 155.44 LBS

## 2021-05-11 DIAGNOSIS — J40 BRONCHITIS: ICD-10-CM

## 2021-05-11 DIAGNOSIS — I48.92 ATRIAL FLUTTER: ICD-10-CM

## 2021-05-11 DIAGNOSIS — R00.0 TACHYCARDIA: Primary | ICD-10-CM

## 2021-05-11 DIAGNOSIS — I48.91 ATRIAL FIBRILLATION WITH RVR: Primary | ICD-10-CM

## 2021-05-11 DIAGNOSIS — I48.91 ATRIAL FIBRILLATION WITH RVR: ICD-10-CM

## 2021-05-11 DIAGNOSIS — R07.9 CHEST PAIN: ICD-10-CM

## 2021-05-11 DIAGNOSIS — I48.0 PAF (PAROXYSMAL ATRIAL FIBRILLATION): ICD-10-CM

## 2021-05-11 DIAGNOSIS — I48.91 A-FIB: ICD-10-CM

## 2021-05-11 PROBLEM — D64.9 NORMOCYTIC ANEMIA: Status: ACTIVE | Noted: 2021-05-11

## 2021-05-11 LAB
ABO + RH BLD: NORMAL
ALBUMIN SERPL BCP-MCNC: 2.6 G/DL (ref 3.5–5.2)
ALP SERPL-CCNC: 95 U/L (ref 55–135)
ALT SERPL W/O P-5'-P-CCNC: 20 U/L (ref 10–44)
ANION GAP SERPL CALC-SCNC: 9 MMOL/L (ref 8–16)
ANISOCYTOSIS BLD QL SMEAR: SLIGHT
AORTIC ROOT ANNULUS: 2.64 CM
ASCENDING AORTA: 2.3 CM
AST SERPL-CCNC: 26 U/L (ref 10–40)
AV INDEX (PROSTH): 0.94
AV MEAN GRADIENT: 3 MMHG
AV PEAK GRADIENT: 5 MMHG
AV VALVE AREA: 2.8 CM2
AV VELOCITY RATIO: 0.85
BASOPHILS # BLD AUTO: 0.02 K/UL (ref 0–0.2)
BASOPHILS # BLD AUTO: 0.03 K/UL (ref 0–0.2)
BASOPHILS NFR BLD: 0.2 % (ref 0–1.9)
BASOPHILS NFR BLD: 0.5 % (ref 0–1.9)
BILIRUB SERPL-MCNC: 0.2 MG/DL (ref 0.1–1)
BILIRUB UR QL STRIP: NEGATIVE
BLD GP AB SCN CELLS X3 SERPL QL: NORMAL
BNP SERPL-MCNC: 180 PG/ML (ref 0–99)
BSA FOR ECHO PROCEDURE: 1.68 M2
BUN SERPL-MCNC: 7 MG/DL (ref 8–23)
CALCIUM SERPL-MCNC: 9.1 MG/DL (ref 8.7–10.5)
CHLORIDE SERPL-SCNC: 108 MMOL/L (ref 95–110)
CLARITY UR: CLEAR
CO2 SERPL-SCNC: 27 MMOL/L (ref 23–29)
COLOR UR: YELLOW
CREAT SERPL-MCNC: 0.8 MG/DL (ref 0.5–1.4)
CTP QC/QA: YES
CV ECHO LV RWT: 0.33 CM
DIFFERENTIAL METHOD: ABNORMAL
DIFFERENTIAL METHOD: ABNORMAL
DOP CALC AO PEAK VEL: 1.17 M/S
DOP CALC AO VTI: 22.03 CM
DOP CALC LVOT AREA: 3 CM2
DOP CALC LVOT DIAMETER: 1.95 CM
DOP CALC LVOT PEAK VEL: 1 M/S
DOP CALC LVOT STROKE VOLUME: 61.67 CM3
DOP CALCLVOT PEAK VEL VTI: 20.66 CM
E WAVE DECELERATION TIME: 202.22 MSEC
E/A RATIO: 1.33
E/E' RATIO: 11.22 M/S
ECHO LV POSTERIOR WALL: 0.78 CM (ref 0.6–1.1)
EJECTION FRACTION: 55 %
EOSINOPHIL # BLD AUTO: 0.3 K/UL (ref 0–0.5)
EOSINOPHIL # BLD AUTO: 0.3 K/UL (ref 0–0.5)
EOSINOPHIL NFR BLD: 3.3 % (ref 0–8)
EOSINOPHIL NFR BLD: 4.3 % (ref 0–8)
ERYTHROCYTE [DISTWIDTH] IN BLOOD BY AUTOMATED COUNT: 12.9 % (ref 11.5–14.5)
ERYTHROCYTE [DISTWIDTH] IN BLOOD BY AUTOMATED COUNT: 13 % (ref 11.5–14.5)
EST. GFR  (AFRICAN AMERICAN): >60 ML/MIN/1.73 M^2
EST. GFR  (NON AFRICAN AMERICAN): >60 ML/MIN/1.73 M^2
FRACTIONAL SHORTENING: 32 % (ref 28–44)
GLUCOSE SERPL-MCNC: 118 MG/DL (ref 70–110)
GLUCOSE UR QL STRIP: NEGATIVE
HCT VFR BLD AUTO: 16.5 % (ref 37–48.5)
HCT VFR BLD AUTO: 36.3 % (ref 37–48.5)
HGB BLD-MCNC: 11.4 G/DL (ref 12–16)
HGB BLD-MCNC: 5.2 G/DL (ref 12–16)
HGB UR QL STRIP: NEGATIVE
HYPOCHROMIA BLD QL SMEAR: ABNORMAL
IMM GRANULOCYTES # BLD AUTO: 0.15 K/UL (ref 0–0.04)
IMM GRANULOCYTES # BLD AUTO: 0.26 K/UL (ref 0–0.04)
IMM GRANULOCYTES NFR BLD AUTO: 2.6 % (ref 0–0.5)
IMM GRANULOCYTES NFR BLD AUTO: 2.9 % (ref 0–0.5)
INTERVENTRICULAR SEPTUM: 0.55 CM (ref 0.6–1.1)
KETONES UR QL STRIP: NEGATIVE
LA MAJOR: 4.74 CM
LA MINOR: 4.7 CM
LA WIDTH: 3.5 CM
LACTATE SERPL-SCNC: 1.7 MMOL/L (ref 0.5–2.2)
LEFT ATRIUM SIZE: 3.88 CM
LEFT ATRIUM VOLUME INDEX: 32.8 ML/M2
LEFT ATRIUM VOLUME: 54.48 CM3
LEFT INTERNAL DIMENSION IN SYSTOLE: 3.2 CM (ref 2.1–4)
LEFT VENTRICLE DIASTOLIC VOLUME INDEX: 61.29 ML/M2
LEFT VENTRICLE DIASTOLIC VOLUME: 101.74 ML
LEFT VENTRICLE MASS INDEX: 58 G/M2
LEFT VENTRICLE SYSTOLIC VOLUME INDEX: 24.7 ML/M2
LEFT VENTRICLE SYSTOLIC VOLUME: 40.99 ML
LEFT VENTRICULAR INTERNAL DIMENSION IN DIASTOLE: 4.69 CM (ref 3.5–6)
LEFT VENTRICULAR MASS: 96.28 G
LEUKOCYTE ESTERASE UR QL STRIP: NEGATIVE
LIPASE SERPL-CCNC: 22 U/L (ref 4–60)
LV LATERAL E/E' RATIO: 10.1 M/S
LV SEPTAL E/E' RATIO: 12.63 M/S
LYMPHOCYTES # BLD AUTO: 1.5 K/UL (ref 1–4.8)
LYMPHOCYTES # BLD AUTO: 2.1 K/UL (ref 1–4.8)
LYMPHOCYTES NFR BLD: 23.8 % (ref 18–48)
LYMPHOCYTES NFR BLD: 24.8 % (ref 18–48)
MCH RBC QN AUTO: 28.4 PG (ref 27–31)
MCH RBC QN AUTO: 28.9 PG (ref 27–31)
MCHC RBC AUTO-ENTMCNC: 31.4 G/DL (ref 32–36)
MCHC RBC AUTO-ENTMCNC: 31.5 G/DL (ref 32–36)
MCV RBC AUTO: 91 FL (ref 82–98)
MCV RBC AUTO: 92 FL (ref 82–98)
MONOCYTES # BLD AUTO: 0.6 K/UL (ref 0.3–1)
MONOCYTES # BLD AUTO: 1 K/UL (ref 0.3–1)
MONOCYTES NFR BLD: 10.8 % (ref 4–15)
MONOCYTES NFR BLD: 11 % (ref 4–15)
MV PEAK A VEL: 0.76 M/S
MV PEAK E VEL: 1.01 M/S
MV STENOSIS PRESSURE HALF TIME: 58.64 MS
MV VALVE AREA P 1/2 METHOD: 3.75 CM2
NEUTROPHILS # BLD AUTO: 3.3 K/UL (ref 1.8–7.7)
NEUTROPHILS # BLD AUTO: 5.3 K/UL (ref 1.8–7.7)
NEUTROPHILS NFR BLD: 56.8 % (ref 38–73)
NEUTROPHILS NFR BLD: 59 % (ref 38–73)
NITRITE UR QL STRIP: NEGATIVE
NRBC BLD-RTO: 0 /100 WBC
NRBC BLD-RTO: 0 /100 WBC
OB PNL STL: NEGATIVE
PH UR STRIP: 6 [PH] (ref 5–8)
PISA TR MAX VEL: 2.69 M/S
PLATELET # BLD AUTO: 420 K/UL (ref 150–450)
PLATELET # BLD AUTO: 617 K/UL (ref 150–450)
PLATELET BLD QL SMEAR: ABNORMAL
PLATELET BLD QL SMEAR: ABNORMAL
PMV BLD AUTO: 8.7 FL (ref 9.2–12.9)
PMV BLD AUTO: 8.9 FL (ref 9.2–12.9)
POCT GLUCOSE: 139 MG/DL (ref 70–110)
POCT GLUCOSE: 145 MG/DL (ref 70–110)
POIKILOCYTOSIS BLD QL SMEAR: SLIGHT
POLYCHROMASIA BLD QL SMEAR: ABNORMAL
POTASSIUM SERPL-SCNC: 4 MMOL/L (ref 3.5–5.1)
PROT SERPL-MCNC: 6.4 G/DL (ref 6–8.4)
PROT UR QL STRIP: NEGATIVE
PULM VEIN S/D RATIO: 0.71
PV PEAK D VEL: 0.69 M/S
PV PEAK S VEL: 0.49 M/S
PV PEAK VELOCITY: 0.83 CM/S
RA MAJOR: 4.35 CM
RA PRESSURE: 3 MMHG
RA WIDTH: 3.42 CM
RBC # BLD AUTO: 1.8 M/UL (ref 4–5.4)
RBC # BLD AUTO: 4.01 M/UL (ref 4–5.4)
SARS-COV-2 RDRP RESP QL NAA+PROBE: NEGATIVE
SINUS: 2.66 CM
SODIUM SERPL-SCNC: 144 MMOL/L (ref 136–145)
SP GR UR STRIP: <=1.005 (ref 1–1.03)
STJ: 2.25 CM
TDI LATERAL: 0.1 M/S
TDI SEPTAL: 0.08 M/S
TDI: 0.09 M/S
TR MAX PG: 29 MMHG
TRICUSPID ANNULAR PLANE SYSTOLIC EXCURSION: 2.16 CM
TROPONIN I SERPL DL<=0.01 NG/ML-MCNC: 0.01 NG/ML (ref 0–0.03)
TROPONIN I SERPL DL<=0.01 NG/ML-MCNC: 0.01 NG/ML (ref 0–0.03)
TSH SERPL DL<=0.005 MIU/L-ACNC: 0.84 UIU/ML (ref 0.4–4)
TV REST PULMONARY ARTERY PRESSURE: 32 MMHG
URN SPEC COLLECT METH UR: ABNORMAL
UROBILINOGEN UR STRIP-ACNC: NEGATIVE EU/DL
WBC # BLD AUTO: 5.84 K/UL (ref 3.9–12.7)
WBC # BLD AUTO: 8.91 K/UL (ref 3.9–12.7)

## 2021-05-11 PROCEDURE — 83605 ASSAY OF LACTIC ACID: CPT | Performed by: EMERGENCY MEDICINE

## 2021-05-11 PROCEDURE — 25000003 PHARM REV CODE 250: Performed by: NURSE PRACTITIONER

## 2021-05-11 PROCEDURE — 96376 TX/PRO/DX INJ SAME DRUG ADON: CPT

## 2021-05-11 PROCEDURE — 86900 BLOOD TYPING SEROLOGIC ABO: CPT | Performed by: EMERGENCY MEDICINE

## 2021-05-11 PROCEDURE — 99223 1ST HOSP IP/OBS HIGH 75: CPT | Mod: ,,, | Performed by: INTERNAL MEDICINE

## 2021-05-11 PROCEDURE — U0002 COVID-19 LAB TEST NON-CDC: HCPCS | Performed by: EMERGENCY MEDICINE

## 2021-05-11 PROCEDURE — 82272 OCCULT BLD FECES 1-3 TESTS: CPT | Performed by: EMERGENCY MEDICINE

## 2021-05-11 PROCEDURE — 99213 OFFICE O/P EST LOW 20 MIN: CPT | Mod: PBBFAC,25 | Performed by: FAMILY MEDICINE

## 2021-05-11 PROCEDURE — 93010 EKG 12-LEAD: ICD-10-PCS | Mod: ,,, | Performed by: INTERNAL MEDICINE

## 2021-05-11 PROCEDURE — G0378 HOSPITAL OBSERVATION PER HR: HCPCS

## 2021-05-11 PROCEDURE — 93005 ELECTROCARDIOGRAM TRACING: CPT

## 2021-05-11 PROCEDURE — 85025 COMPLETE CBC W/AUTO DIFF WBC: CPT | Mod: 91 | Performed by: EMERGENCY MEDICINE

## 2021-05-11 PROCEDURE — 99999 PR PBB SHADOW E&M-EST. PATIENT-LVL III: CPT | Mod: PBBFAC,,, | Performed by: FAMILY MEDICINE

## 2021-05-11 PROCEDURE — 96375 TX/PRO/DX INJ NEW DRUG ADDON: CPT

## 2021-05-11 PROCEDURE — 83690 ASSAY OF LIPASE: CPT | Performed by: EMERGENCY MEDICINE

## 2021-05-11 PROCEDURE — 87040 BLOOD CULTURE FOR BACTERIA: CPT | Performed by: EMERGENCY MEDICINE

## 2021-05-11 PROCEDURE — 86920 COMPATIBILITY TEST SPIN: CPT | Performed by: EMERGENCY MEDICINE

## 2021-05-11 PROCEDURE — 84443 ASSAY THYROID STIM HORMONE: CPT | Performed by: FAMILY MEDICINE

## 2021-05-11 PROCEDURE — 99215 PR OFFICE/OUTPT VISIT, EST, LEVL V, 40-54 MIN: ICD-10-PCS | Mod: S$PBB,,, | Performed by: FAMILY MEDICINE

## 2021-05-11 PROCEDURE — 81003 URINALYSIS AUTO W/O SCOPE: CPT | Performed by: EMERGENCY MEDICINE

## 2021-05-11 PROCEDURE — 99223 PR INITIAL HOSPITAL CARE,LEVL III: ICD-10-PCS | Mod: ,,, | Performed by: INTERNAL MEDICINE

## 2021-05-11 PROCEDURE — 99291 CRITICAL CARE FIRST HOUR: CPT | Mod: 25

## 2021-05-11 PROCEDURE — 83880 ASSAY OF NATRIURETIC PEPTIDE: CPT | Performed by: EMERGENCY MEDICINE

## 2021-05-11 PROCEDURE — 25000003 PHARM REV CODE 250: Performed by: EMERGENCY MEDICINE

## 2021-05-11 PROCEDURE — 83036 HEMOGLOBIN GLYCOSYLATED A1C: CPT | Performed by: NURSE PRACTITIONER

## 2021-05-11 PROCEDURE — 93010 ELECTROCARDIOGRAM REPORT: CPT | Mod: ,,, | Performed by: INTERNAL MEDICINE

## 2021-05-11 PROCEDURE — 80053 COMPREHEN METABOLIC PANEL: CPT | Performed by: EMERGENCY MEDICINE

## 2021-05-11 PROCEDURE — 84484 ASSAY OF TROPONIN QUANT: CPT | Performed by: EMERGENCY MEDICINE

## 2021-05-11 PROCEDURE — 99999 PR PBB SHADOW E&M-EST. PATIENT-LVL III: ICD-10-PCS | Mod: PBBFAC,,, | Performed by: FAMILY MEDICINE

## 2021-05-11 PROCEDURE — 99215 OFFICE O/P EST HI 40 MIN: CPT | Mod: S$PBB,,, | Performed by: FAMILY MEDICINE

## 2021-05-11 RX ORDER — ZOLPIDEM TARTRATE 5 MG/1
10 TABLET ORAL NIGHTLY PRN
Status: DISCONTINUED | OUTPATIENT
Start: 2021-05-11 | End: 2021-05-13 | Stop reason: HOSPADM

## 2021-05-11 RX ORDER — WITCH HAZEL 50 %
2500 PADS, MEDICATED (EA) TOPICAL
Status: ON HOLD | COMMUNITY
End: 2022-10-19 | Stop reason: HOSPADM

## 2021-05-11 RX ORDER — BENZONATATE 100 MG/1
100 CAPSULE ORAL
COMMUNITY
Start: 2021-04-15 | End: 2021-05-11

## 2021-05-11 RX ORDER — DILTIAZEM HYDROCHLORIDE 5 MG/ML
20 INJECTION INTRAVENOUS
Status: COMPLETED | OUTPATIENT
Start: 2021-05-11 | End: 2021-05-11

## 2021-05-11 RX ORDER — ASPIRIN 81 MG/1
81 TABLET ORAL DAILY
Status: DISCONTINUED | OUTPATIENT
Start: 2021-05-12 | End: 2021-05-13 | Stop reason: HOSPADM

## 2021-05-11 RX ORDER — HYDROCODONE BITARTRATE AND ACETAMINOPHEN 500; 5 MG/1; MG/1
TABLET ORAL
Status: DISCONTINUED | OUTPATIENT
Start: 2021-05-11 | End: 2021-05-13 | Stop reason: HOSPADM

## 2021-05-11 RX ORDER — HYDRALAZINE HYDROCHLORIDE 20 MG/ML
10 INJECTION INTRAMUSCULAR; INTRAVENOUS EVERY 6 HOURS PRN
Status: DISCONTINUED | OUTPATIENT
Start: 2021-05-11 | End: 2021-05-13 | Stop reason: HOSPADM

## 2021-05-11 RX ORDER — CHLORHEXIDINE GLUCONATE ORAL RINSE 1.2 MG/ML
SOLUTION DENTAL
Status: ON HOLD | COMMUNITY
Start: 2021-04-30 | End: 2022-10-19 | Stop reason: HOSPADM

## 2021-05-11 RX ORDER — DILTIAZEM HCL/D5W 125 MG/125
5 PLASTIC BAG, INJECTION (ML) INTRAVENOUS CONTINUOUS
Status: DISCONTINUED | OUTPATIENT
Start: 2021-05-11 | End: 2021-05-12

## 2021-05-11 RX ORDER — PROMETHAZINE HYDROCHLORIDE AND DEXTROMETHORPHAN HYDROBROMIDE 6.25; 15 MG/5ML; MG/5ML
SYRUP ORAL
Qty: 118 ML | Refills: 0 | Status: SHIPPED | OUTPATIENT
Start: 2021-05-11 | End: 2021-05-11

## 2021-05-11 RX ORDER — ASPIRIN 325 MG
325 TABLET ORAL
Status: DISCONTINUED | OUTPATIENT
Start: 2021-05-11 | End: 2021-05-11

## 2021-05-11 RX ORDER — GLUCAGON 1 MG
1 KIT INJECTION
Status: DISCONTINUED | OUTPATIENT
Start: 2021-05-11 | End: 2021-05-13 | Stop reason: HOSPADM

## 2021-05-11 RX ORDER — SALINE NASAL SPRAY 1.5 OZ
SOLUTION NASAL
Status: ON HOLD | COMMUNITY
Start: 2021-04-30 | End: 2022-10-19 | Stop reason: HOSPADM

## 2021-05-11 RX ORDER — DULOXETIN HYDROCHLORIDE 20 MG/1
20 CAPSULE, DELAYED RELEASE ORAL DAILY
Status: DISCONTINUED | OUTPATIENT
Start: 2021-05-12 | End: 2021-05-13 | Stop reason: HOSPADM

## 2021-05-11 RX ORDER — INSULIN ASPART 100 [IU]/ML
0-5 INJECTION, SOLUTION INTRAVENOUS; SUBCUTANEOUS
Status: DISCONTINUED | OUTPATIENT
Start: 2021-05-11 | End: 2021-05-13 | Stop reason: HOSPADM

## 2021-05-11 RX ORDER — IBUPROFEN 200 MG
24 TABLET ORAL
Status: DISCONTINUED | OUTPATIENT
Start: 2021-05-11 | End: 2021-05-13 | Stop reason: HOSPADM

## 2021-05-11 RX ORDER — ACETAMINOPHEN 325 MG/1
650 TABLET ORAL EVERY 6 HOURS PRN
Status: DISCONTINUED | OUTPATIENT
Start: 2021-05-11 | End: 2021-05-13 | Stop reason: HOSPADM

## 2021-05-11 RX ORDER — IBUPROFEN 200 MG
16 TABLET ORAL
Status: DISCONTINUED | OUTPATIENT
Start: 2021-05-11 | End: 2021-05-13 | Stop reason: HOSPADM

## 2021-05-11 RX ORDER — PANTOPRAZOLE SODIUM 40 MG/1
40 TABLET, DELAYED RELEASE ORAL
COMMUNITY
Start: 2021-05-07 | End: 2021-05-11

## 2021-05-11 RX ORDER — CHLORHEXIDINE GLUCONATE ORAL RINSE 1.2 MG/ML
15 SOLUTION DENTAL 2 TIMES DAILY
Status: DISCONTINUED | OUTPATIENT
Start: 2021-05-11 | End: 2021-05-13 | Stop reason: HOSPADM

## 2021-05-11 RX ORDER — ROSUVASTATIN CALCIUM 5 MG/1
5 TABLET, COATED ORAL DAILY
Status: DISCONTINUED | OUTPATIENT
Start: 2021-05-12 | End: 2021-05-13 | Stop reason: HOSPADM

## 2021-05-11 RX ORDER — KETOROLAC TROMETHAMINE 30 MG/ML
15 INJECTION, SOLUTION INTRAMUSCULAR; INTRAVENOUS EVERY 6 HOURS PRN
Status: DISCONTINUED | OUTPATIENT
Start: 2021-05-11 | End: 2021-05-13 | Stop reason: HOSPADM

## 2021-05-11 RX ORDER — ONDANSETRON 2 MG/ML
4 INJECTION INTRAMUSCULAR; INTRAVENOUS EVERY 6 HOURS PRN
Status: DISCONTINUED | OUTPATIENT
Start: 2021-05-11 | End: 2021-05-13 | Stop reason: HOSPADM

## 2021-05-11 RX ORDER — DILTIAZEM HYDROCHLORIDE 5 MG/ML
10 INJECTION INTRAVENOUS ONCE
Status: COMPLETED | OUTPATIENT
Start: 2021-05-11 | End: 2021-05-11

## 2021-05-11 RX ADMIN — Medication 5 MG/HR: at 10:05

## 2021-05-11 RX ADMIN — DILTIAZEM HYDROCHLORIDE 20 MG: 5 INJECTION INTRAVENOUS at 02:05

## 2021-05-11 RX ADMIN — ACETAMINOPHEN 650 MG: 325 TABLET ORAL at 07:05

## 2021-05-11 RX ADMIN — DILTIAZEM HYDROCHLORIDE 10 MG: 5 INJECTION INTRAVENOUS at 08:05

## 2021-05-11 RX ADMIN — ZOLPIDEM TARTRATE 10 MG: 5 TABLET ORAL at 10:05

## 2021-05-11 RX ADMIN — DILTIAZEM HYDROCHLORIDE 20 MG: 5 INJECTION INTRAVENOUS at 01:05

## 2021-05-11 RX ADMIN — 0.12% CHLORHEXIDINE GLUCONATE 15 ML: 1.2 RINSE ORAL at 08:05

## 2021-05-12 PROBLEM — E87.6 HYPOKALEMIA: Status: ACTIVE | Noted: 2021-05-12

## 2021-05-12 LAB
ALBUMIN SERPL BCP-MCNC: 2.7 G/DL (ref 3.5–5.2)
ALP SERPL-CCNC: 88 U/L (ref 55–135)
ALT SERPL W/O P-5'-P-CCNC: 19 U/L (ref 10–44)
ANION GAP SERPL CALC-SCNC: 13 MMOL/L (ref 8–16)
AST SERPL-CCNC: 25 U/L (ref 10–40)
BASOPHILS # BLD AUTO: 0.03 K/UL (ref 0–0.2)
BASOPHILS NFR BLD: 0.3 % (ref 0–1.9)
BILIRUB SERPL-MCNC: 0.2 MG/DL (ref 0.1–1)
BUN SERPL-MCNC: 6 MG/DL (ref 8–23)
CALCIUM SERPL-MCNC: 8.6 MG/DL (ref 8.7–10.5)
CHLORIDE SERPL-SCNC: 106 MMOL/L (ref 95–110)
CO2 SERPL-SCNC: 23 MMOL/L (ref 23–29)
CREAT SERPL-MCNC: 0.8 MG/DL (ref 0.5–1.4)
DIFFERENTIAL METHOD: ABNORMAL
EOSINOPHIL # BLD AUTO: 0.2 K/UL (ref 0–0.5)
EOSINOPHIL NFR BLD: 2.6 % (ref 0–8)
ERYTHROCYTE [DISTWIDTH] IN BLOOD BY AUTOMATED COUNT: 12.7 % (ref 11.5–14.5)
EST. GFR  (AFRICAN AMERICAN): >60 ML/MIN/1.73 M^2
EST. GFR  (NON AFRICAN AMERICAN): >60 ML/MIN/1.73 M^2
ESTIMATED AVG GLUCOSE: 131 MG/DL (ref 68–131)
GLUCOSE SERPL-MCNC: 112 MG/DL (ref 70–110)
HBA1C MFR BLD: 6.2 % (ref 4–5.6)
HCT VFR BLD AUTO: 33.9 % (ref 37–48.5)
HGB BLD-MCNC: 10.9 G/DL (ref 12–16)
IMM GRANULOCYTES # BLD AUTO: 0.13 K/UL (ref 0–0.04)
IMM GRANULOCYTES NFR BLD AUTO: 1.4 % (ref 0–0.5)
LYMPHOCYTES # BLD AUTO: 1.7 K/UL (ref 1–4.8)
LYMPHOCYTES NFR BLD: 18.6 % (ref 18–48)
MAGNESIUM SERPL-MCNC: 1.8 MG/DL (ref 1.6–2.6)
MCH RBC QN AUTO: 28.7 PG (ref 27–31)
MCHC RBC AUTO-ENTMCNC: 32.2 G/DL (ref 32–36)
MCV RBC AUTO: 89 FL (ref 82–98)
MONOCYTES # BLD AUTO: 0.8 K/UL (ref 0.3–1)
MONOCYTES NFR BLD: 8.1 % (ref 4–15)
NEUTROPHILS # BLD AUTO: 6.4 K/UL (ref 1.8–7.7)
NEUTROPHILS NFR BLD: 69 % (ref 38–73)
NRBC BLD-RTO: 0 /100 WBC
PLATELET # BLD AUTO: 448 K/UL (ref 150–450)
PMV BLD AUTO: 8.7 FL (ref 9.2–12.9)
POCT GLUCOSE: 122 MG/DL (ref 70–110)
POCT GLUCOSE: 144 MG/DL (ref 70–110)
POCT GLUCOSE: 176 MG/DL (ref 70–110)
POCT GLUCOSE: 189 MG/DL (ref 70–110)
POTASSIUM SERPL-SCNC: 3.4 MMOL/L (ref 3.5–5.1)
PROT SERPL-MCNC: 6.2 G/DL (ref 6–8.4)
RBC # BLD AUTO: 3.8 M/UL (ref 4–5.4)
SODIUM SERPL-SCNC: 142 MMOL/L (ref 136–145)
WBC # BLD AUTO: 9.29 K/UL (ref 3.9–12.7)

## 2021-05-12 PROCEDURE — 93010 EKG 12-LEAD: ICD-10-PCS | Mod: 76,,, | Performed by: INTERNAL MEDICINE

## 2021-05-12 PROCEDURE — 93005 ELECTROCARDIOGRAM TRACING: CPT

## 2021-05-12 PROCEDURE — 94761 N-INVAS EAR/PLS OXIMETRY MLT: CPT

## 2021-05-12 PROCEDURE — 25000003 PHARM REV CODE 250: Performed by: FAMILY MEDICINE

## 2021-05-12 PROCEDURE — 80053 COMPREHEN METABOLIC PANEL: CPT | Performed by: NURSE PRACTITIONER

## 2021-05-12 PROCEDURE — 85025 COMPLETE CBC W/AUTO DIFF WBC: CPT | Performed by: NURSE PRACTITIONER

## 2021-05-12 PROCEDURE — 93010 ELECTROCARDIOGRAM REPORT: CPT | Mod: ,,, | Performed by: INTERNAL MEDICINE

## 2021-05-12 PROCEDURE — 96365 THER/PROPH/DIAG IV INF INIT: CPT

## 2021-05-12 PROCEDURE — 25000003 PHARM REV CODE 250: Performed by: NURSE PRACTITIONER

## 2021-05-12 PROCEDURE — 99900035 HC TECH TIME PER 15 MIN (STAT)

## 2021-05-12 PROCEDURE — 99233 SBSQ HOSP IP/OBS HIGH 50: CPT | Mod: ,,, | Performed by: INTERNAL MEDICINE

## 2021-05-12 PROCEDURE — 63600175 PHARM REV CODE 636 W HCPCS: Performed by: NURSE PRACTITIONER

## 2021-05-12 PROCEDURE — 21400001 HC TELEMETRY ROOM

## 2021-05-12 PROCEDURE — 36415 COLL VENOUS BLD VENIPUNCTURE: CPT | Performed by: NURSE PRACTITIONER

## 2021-05-12 PROCEDURE — 83735 ASSAY OF MAGNESIUM: CPT | Performed by: NURSE PRACTITIONER

## 2021-05-12 PROCEDURE — 96366 THER/PROPH/DIAG IV INF ADDON: CPT

## 2021-05-12 PROCEDURE — 99233 PR SUBSEQUENT HOSPITAL CARE,LEVL III: ICD-10-PCS | Mod: ,,, | Performed by: INTERNAL MEDICINE

## 2021-05-12 PROCEDURE — 93010 ELECTROCARDIOGRAM REPORT: CPT | Mod: 76,,, | Performed by: INTERNAL MEDICINE

## 2021-05-12 RX ORDER — DILTIAZEM HYDROCHLORIDE 180 MG/1
180 CAPSULE, COATED, EXTENDED RELEASE ORAL DAILY
Status: DISCONTINUED | OUTPATIENT
Start: 2021-05-12 | End: 2021-05-12

## 2021-05-12 RX ORDER — ALPRAZOLAM 0.5 MG/1
0.5 TABLET ORAL 2 TIMES DAILY PRN
Status: DISCONTINUED | OUTPATIENT
Start: 2021-05-12 | End: 2021-05-13 | Stop reason: HOSPADM

## 2021-05-12 RX ORDER — DILTIAZEM HCL/D5W 125 MG/125
15 PLASTIC BAG, INJECTION (ML) INTRAVENOUS CONTINUOUS
Status: DISCONTINUED | OUTPATIENT
Start: 2021-05-12 | End: 2021-05-12

## 2021-05-12 RX ORDER — METOPROLOL TARTRATE 50 MG/1
100 TABLET ORAL 2 TIMES DAILY
Status: DISCONTINUED | OUTPATIENT
Start: 2021-05-12 | End: 2021-05-13 | Stop reason: HOSPADM

## 2021-05-12 RX ORDER — DILTIAZEM HYDROCHLORIDE 240 MG/1
240 CAPSULE, COATED, EXTENDED RELEASE ORAL DAILY
Status: DISCONTINUED | OUTPATIENT
Start: 2021-05-12 | End: 2021-05-13 | Stop reason: HOSPADM

## 2021-05-12 RX ORDER — POTASSIUM CHLORIDE 20 MEQ/1
40 TABLET, EXTENDED RELEASE ORAL ONCE
Status: COMPLETED | OUTPATIENT
Start: 2021-05-12 | End: 2021-05-12

## 2021-05-12 RX ORDER — DILTIAZEM HCL/D5W 125 MG/125
5 PLASTIC BAG, INJECTION (ML) INTRAVENOUS CONTINUOUS
Status: DISCONTINUED | OUTPATIENT
Start: 2021-05-12 | End: 2021-05-13

## 2021-05-12 RX ADMIN — METOPROLOL TARTRATE 100 MG: 50 TABLET, FILM COATED ORAL at 09:05

## 2021-05-12 RX ADMIN — GUAIFENESIN AND DEXTROMETHORPHAN HYDROBROMIDE 1 TABLET: 600; 30 TABLET, EXTENDED RELEASE ORAL at 09:05

## 2021-05-12 RX ADMIN — KETOROLAC TROMETHAMINE 15 MG: 30 INJECTION, SOLUTION INTRAMUSCULAR at 09:05

## 2021-05-12 RX ADMIN — APIXABAN 5 MG: 2.5 TABLET, FILM COATED ORAL at 12:05

## 2021-05-12 RX ADMIN — POTASSIUM CHLORIDE 40 MEQ: 1500 TABLET, EXTENDED RELEASE ORAL at 11:05

## 2021-05-12 RX ADMIN — DULOXETINE HYDROCHLORIDE 20 MG: 20 CAPSULE, DELAYED RELEASE ORAL at 10:05

## 2021-05-12 RX ADMIN — APIXABAN 5 MG: 2.5 TABLET, FILM COATED ORAL at 09:05

## 2021-05-12 RX ADMIN — ASPIRIN 81 MG: 81 TABLET, COATED ORAL at 09:05

## 2021-05-12 RX ADMIN — GUAIFENESIN AND DEXTROMETHORPHAN HYDROBROMIDE 1 TABLET: 600; 30 TABLET, EXTENDED RELEASE ORAL at 10:05

## 2021-05-12 RX ADMIN — DILTIAZEM HYDROCHLORIDE 240 MG: 240 CAPSULE, COATED, EXTENDED RELEASE ORAL at 12:05

## 2021-05-12 RX ADMIN — 0.12% CHLORHEXIDINE GLUCONATE 15 ML: 1.2 RINSE ORAL at 09:05

## 2021-05-12 RX ADMIN — ROSUVASTATIN CALCIUM 5 MG: 5 TABLET, FILM COATED ORAL at 09:05

## 2021-05-12 RX ADMIN — Medication 15 MG/HR: at 11:05

## 2021-05-12 RX ADMIN — Medication 10 MG/HR: at 11:05

## 2021-05-12 RX ADMIN — Medication 10 MG/HR: at 06:05

## 2021-05-12 RX ADMIN — ACETAMINOPHEN 650 MG: 325 TABLET ORAL at 09:05

## 2021-05-12 RX ADMIN — ALPRAZOLAM 0.5 MG: 0.5 TABLET ORAL at 09:05

## 2021-05-12 RX ADMIN — ACETAMINOPHEN 650 MG: 325 TABLET ORAL at 05:05

## 2021-05-13 ENCOUNTER — TELEPHONE (OUTPATIENT)
Dept: ADMINISTRATIVE | Facility: HOSPITAL | Age: 71
End: 2021-05-13

## 2021-05-13 ENCOUNTER — TELEPHONE (OUTPATIENT)
Dept: CARDIOLOGY | Facility: CLINIC | Age: 71
End: 2021-05-13

## 2021-05-13 VITALS
DIASTOLIC BLOOD PRESSURE: 65 MMHG | BODY MASS INDEX: 26.95 KG/M2 | TEMPERATURE: 98 F | SYSTOLIC BLOOD PRESSURE: 143 MMHG | OXYGEN SATURATION: 93 % | HEART RATE: 58 BPM | RESPIRATION RATE: 14 BRPM | HEIGHT: 63 IN | WEIGHT: 152.13 LBS

## 2021-05-13 PROBLEM — I48.91 ATRIAL FIBRILLATION WITH RVR: Status: RESOLVED | Noted: 2021-05-11 | Resolved: 2021-05-13

## 2021-05-13 PROBLEM — E87.6 HYPOKALEMIA: Status: RESOLVED | Noted: 2021-05-12 | Resolved: 2021-05-13

## 2021-05-13 PROCEDURE — 25000003 PHARM REV CODE 250: Performed by: FAMILY MEDICINE

## 2021-05-13 PROCEDURE — 25000003 PHARM REV CODE 250: Performed by: NURSE PRACTITIONER

## 2021-05-13 RX ORDER — DILTIAZEM HYDROCHLORIDE 240 MG/1
240 CAPSULE, COATED, EXTENDED RELEASE ORAL DAILY
Qty: 30 CAPSULE | Refills: 11 | Status: SHIPPED | OUTPATIENT
Start: 2021-05-13 | End: 2021-05-19

## 2021-05-13 RX ORDER — METOPROLOL TARTRATE 100 MG/1
100 TABLET ORAL 2 TIMES DAILY
Qty: 60 TABLET | Refills: 11 | Status: SHIPPED | OUTPATIENT
Start: 2021-05-13 | End: 2021-05-18 | Stop reason: SDUPTHER

## 2021-05-13 RX ADMIN — APIXABAN 5 MG: 2.5 TABLET, FILM COATED ORAL at 08:05

## 2021-05-13 RX ADMIN — METOPROLOL TARTRATE 100 MG: 50 TABLET, FILM COATED ORAL at 08:05

## 2021-05-13 RX ADMIN — GUAIFENESIN AND DEXTROMETHORPHAN HYDROBROMIDE 1 TABLET: 600; 30 TABLET, EXTENDED RELEASE ORAL at 08:05

## 2021-05-13 RX ADMIN — ASPIRIN 81 MG: 81 TABLET, COATED ORAL at 08:05

## 2021-05-13 RX ADMIN — DULOXETINE HYDROCHLORIDE 20 MG: 20 CAPSULE, DELAYED RELEASE ORAL at 08:05

## 2021-05-13 RX ADMIN — 0.12% CHLORHEXIDINE GLUCONATE 15 ML: 1.2 RINSE ORAL at 08:05

## 2021-05-13 RX ADMIN — ACETAMINOPHEN 650 MG: 325 TABLET ORAL at 03:05

## 2021-05-13 RX ADMIN — ROSUVASTATIN CALCIUM 5 MG: 5 TABLET, FILM COATED ORAL at 08:05

## 2021-05-14 ENCOUNTER — TELEPHONE (OUTPATIENT)
Dept: ENDOSCOPY | Facility: HOSPITAL | Age: 71
End: 2021-05-14

## 2021-05-14 ENCOUNTER — PATIENT OUTREACH (OUTPATIENT)
Dept: ADMINISTRATIVE | Facility: CLINIC | Age: 71
End: 2021-05-14

## 2021-05-15 LAB
BLD PROD TYP BPU: NORMAL
BLD PROD TYP BPU: NORMAL
BLOOD UNIT EXPIRATION DATE: NORMAL
BLOOD UNIT EXPIRATION DATE: NORMAL
BLOOD UNIT TYPE CODE: 6200
BLOOD UNIT TYPE CODE: 6200
BLOOD UNIT TYPE: NORMAL
BLOOD UNIT TYPE: NORMAL
CODING SYSTEM: NORMAL
CODING SYSTEM: NORMAL
DISPENSE STATUS: NORMAL
DISPENSE STATUS: NORMAL
NUM UNITS TRANS PACKED RBC: NORMAL
NUM UNITS TRANS PACKED RBC: NORMAL

## 2021-05-17 LAB
BACTERIA BLD CULT: NORMAL
BACTERIA BLD CULT: NORMAL

## 2021-05-18 ENCOUNTER — LAB VISIT (OUTPATIENT)
Dept: LAB | Facility: HOSPITAL | Age: 71
End: 2021-05-18
Attending: NURSE PRACTITIONER
Payer: MEDICARE

## 2021-05-18 ENCOUNTER — TELEPHONE (OUTPATIENT)
Dept: CARDIOLOGY | Facility: CLINIC | Age: 71
End: 2021-05-18

## 2021-05-18 ENCOUNTER — OFFICE VISIT (OUTPATIENT)
Dept: INTERNAL MEDICINE | Facility: CLINIC | Age: 71
End: 2021-05-18
Payer: MEDICARE

## 2021-05-18 VITALS
SYSTOLIC BLOOD PRESSURE: 92 MMHG | TEMPERATURE: 96 F | BODY MASS INDEX: 26.32 KG/M2 | DIASTOLIC BLOOD PRESSURE: 58 MMHG | HEIGHT: 63 IN | WEIGHT: 148.56 LBS | HEART RATE: 61 BPM | OXYGEN SATURATION: 96 %

## 2021-05-18 DIAGNOSIS — I95.9 HYPOTENSION, UNSPECIFIED HYPOTENSION TYPE: ICD-10-CM

## 2021-05-18 DIAGNOSIS — I48.91 ATRIAL FIBRILLATION, UNSPECIFIED TYPE: ICD-10-CM

## 2021-05-18 DIAGNOSIS — R53.83 FATIGUE, UNSPECIFIED TYPE: ICD-10-CM

## 2021-05-18 DIAGNOSIS — Z09 HOSPITAL DISCHARGE FOLLOW-UP: Primary | ICD-10-CM

## 2021-05-18 LAB
ANION GAP SERPL CALC-SCNC: 10 MMOL/L (ref 8–16)
BASOPHILS # BLD AUTO: 0.05 K/UL (ref 0–0.2)
BASOPHILS NFR BLD: 0.4 % (ref 0–1.9)
BUN SERPL-MCNC: 10 MG/DL (ref 8–23)
CALCIUM SERPL-MCNC: 10 MG/DL (ref 8.7–10.5)
CHLORIDE SERPL-SCNC: 103 MMOL/L (ref 95–110)
CO2 SERPL-SCNC: 28 MMOL/L (ref 23–29)
CREAT SERPL-MCNC: 1 MG/DL (ref 0.5–1.4)
DIFFERENTIAL METHOD: ABNORMAL
EOSINOPHIL # BLD AUTO: 0.3 K/UL (ref 0–0.5)
EOSINOPHIL NFR BLD: 2.6 % (ref 0–8)
ERYTHROCYTE [DISTWIDTH] IN BLOOD BY AUTOMATED COUNT: 12.9 % (ref 11.5–14.5)
EST. GFR  (AFRICAN AMERICAN): >60 ML/MIN/1.73 M^2
EST. GFR  (NON AFRICAN AMERICAN): 58 ML/MIN/1.73 M^2
GLUCOSE SERPL-MCNC: 128 MG/DL (ref 70–110)
HCT VFR BLD AUTO: 37.4 % (ref 37–48.5)
HGB BLD-MCNC: 12.1 G/DL (ref 12–16)
IMM GRANULOCYTES # BLD AUTO: 0.16 K/UL (ref 0–0.04)
IMM GRANULOCYTES NFR BLD AUTO: 1.3 % (ref 0–0.5)
LYMPHOCYTES # BLD AUTO: 2.6 K/UL (ref 1–4.8)
LYMPHOCYTES NFR BLD: 21.1 % (ref 18–48)
MCH RBC QN AUTO: 28.3 PG (ref 27–31)
MCHC RBC AUTO-ENTMCNC: 32.4 G/DL (ref 32–36)
MCV RBC AUTO: 88 FL (ref 82–98)
MONOCYTES # BLD AUTO: 0.9 K/UL (ref 0.3–1)
MONOCYTES NFR BLD: 7.1 % (ref 4–15)
NEUTROPHILS # BLD AUTO: 8.4 K/UL (ref 1.8–7.7)
NEUTROPHILS NFR BLD: 67.5 % (ref 38–73)
NRBC BLD-RTO: 0 /100 WBC
PLATELET # BLD AUTO: 586 K/UL (ref 150–450)
PMV BLD AUTO: 8.3 FL (ref 9.2–12.9)
POTASSIUM SERPL-SCNC: 4.9 MMOL/L (ref 3.5–5.1)
RBC # BLD AUTO: 4.27 M/UL (ref 4–5.4)
SODIUM SERPL-SCNC: 141 MMOL/L (ref 136–145)
WBC # BLD AUTO: 12.39 K/UL (ref 3.9–12.7)

## 2021-05-18 PROCEDURE — 99999 PR PBB SHADOW E&M-EST. PATIENT-LVL V: ICD-10-PCS | Mod: PBBFAC,,, | Performed by: NURSE PRACTITIONER

## 2021-05-18 PROCEDURE — 36415 COLL VENOUS BLD VENIPUNCTURE: CPT | Performed by: NURSE PRACTITIONER

## 2021-05-18 PROCEDURE — 85025 COMPLETE CBC W/AUTO DIFF WBC: CPT | Performed by: NURSE PRACTITIONER

## 2021-05-18 PROCEDURE — 99999 PR PBB SHADOW E&M-EST. PATIENT-LVL V: CPT | Mod: PBBFAC,,, | Performed by: NURSE PRACTITIONER

## 2021-05-18 PROCEDURE — 99215 OFFICE O/P EST HI 40 MIN: CPT | Mod: PBBFAC | Performed by: NURSE PRACTITIONER

## 2021-05-18 PROCEDURE — 80048 BASIC METABOLIC PNL TOTAL CA: CPT | Performed by: NURSE PRACTITIONER

## 2021-05-18 PROCEDURE — 99214 PR OFFICE/OUTPT VISIT, EST, LEVL IV, 30-39 MIN: ICD-10-PCS | Mod: S$PBB,,, | Performed by: NURSE PRACTITIONER

## 2021-05-18 PROCEDURE — 99214 OFFICE O/P EST MOD 30 MIN: CPT | Mod: S$PBB,,, | Performed by: NURSE PRACTITIONER

## 2021-05-18 RX ORDER — METOPROLOL TARTRATE 100 MG/1
100 TABLET ORAL 2 TIMES DAILY
Qty: 180 TABLET | Refills: 3 | Status: SHIPPED | OUTPATIENT
Start: 2021-05-18 | End: 2021-05-25 | Stop reason: SDUPTHER

## 2021-05-19 ENCOUNTER — TELEPHONE (OUTPATIENT)
Dept: INTERNAL MEDICINE | Facility: CLINIC | Age: 71
End: 2021-05-19

## 2021-05-19 RX ORDER — DILTIAZEM HYDROCHLORIDE 180 MG/1
180 CAPSULE, COATED, EXTENDED RELEASE ORAL DAILY
Qty: 30 CAPSULE | Refills: 0 | Status: SHIPPED | OUTPATIENT
Start: 2021-05-19 | End: 2021-05-25 | Stop reason: SDUPTHER

## 2021-05-20 DIAGNOSIS — R79.89 ABNORMAL CBC: Primary | ICD-10-CM

## 2021-05-25 ENCOUNTER — LAB VISIT (OUTPATIENT)
Dept: LAB | Facility: HOSPITAL | Age: 71
End: 2021-05-25
Attending: NURSE PRACTITIONER
Payer: MEDICARE

## 2021-05-25 ENCOUNTER — OFFICE VISIT (OUTPATIENT)
Dept: CARDIOLOGY | Facility: CLINIC | Age: 71
End: 2021-05-25
Payer: MEDICARE

## 2021-05-25 VITALS
HEART RATE: 71 BPM | SYSTOLIC BLOOD PRESSURE: 110 MMHG | WEIGHT: 151.25 LBS | OXYGEN SATURATION: 97 % | DIASTOLIC BLOOD PRESSURE: 62 MMHG | RESPIRATION RATE: 16 BRPM | BODY MASS INDEX: 26.8 KG/M2 | HEIGHT: 63 IN

## 2021-05-25 DIAGNOSIS — R79.89 ABNORMAL CBC: ICD-10-CM

## 2021-05-25 DIAGNOSIS — I48.0 PAF (PAROXYSMAL ATRIAL FIBRILLATION): ICD-10-CM

## 2021-05-25 DIAGNOSIS — I10 ESSENTIAL HYPERTENSION: Primary | Chronic | ICD-10-CM

## 2021-05-25 DIAGNOSIS — E11.49 TYPE 2 DIABETES WITH NEURAL COMPLICATION: Chronic | ICD-10-CM

## 2021-05-25 LAB
BASOPHILS # BLD AUTO: 0.06 K/UL (ref 0–0.2)
BASOPHILS NFR BLD: 0.6 % (ref 0–1.9)
DIFFERENTIAL METHOD: ABNORMAL
EOSINOPHIL # BLD AUTO: 0.3 K/UL (ref 0–0.5)
EOSINOPHIL NFR BLD: 3 % (ref 0–8)
ERYTHROCYTE [DISTWIDTH] IN BLOOD BY AUTOMATED COUNT: 13.2 % (ref 11.5–14.5)
HCT VFR BLD AUTO: 37.7 % (ref 37–48.5)
HGB BLD-MCNC: 11.6 G/DL (ref 12–16)
IMM GRANULOCYTES # BLD AUTO: 0.07 K/UL (ref 0–0.04)
IMM GRANULOCYTES NFR BLD AUTO: 0.7 % (ref 0–0.5)
LYMPHOCYTES # BLD AUTO: 2.6 K/UL (ref 1–4.8)
LYMPHOCYTES NFR BLD: 24.8 % (ref 18–48)
MCH RBC QN AUTO: 28 PG (ref 27–31)
MCHC RBC AUTO-ENTMCNC: 30.8 G/DL (ref 32–36)
MCV RBC AUTO: 91 FL (ref 82–98)
MONOCYTES # BLD AUTO: 0.9 K/UL (ref 0.3–1)
MONOCYTES NFR BLD: 8.2 % (ref 4–15)
NEUTROPHILS # BLD AUTO: 6.6 K/UL (ref 1.8–7.7)
NEUTROPHILS NFR BLD: 62.7 % (ref 38–73)
NRBC BLD-RTO: 0 /100 WBC
PLATELET # BLD AUTO: 520 K/UL (ref 150–450)
PMV BLD AUTO: 9.8 FL (ref 9.2–12.9)
RBC # BLD AUTO: 4.15 M/UL (ref 4–5.4)
WBC # BLD AUTO: 10.58 K/UL (ref 3.9–12.7)

## 2021-05-25 PROCEDURE — 99999 PR PBB SHADOW E&M-EST. PATIENT-LVL IV: ICD-10-PCS | Mod: PBBFAC,,, | Performed by: INTERNAL MEDICINE

## 2021-05-25 PROCEDURE — 99214 PR OFFICE/OUTPT VISIT, EST, LEVL IV, 30-39 MIN: ICD-10-PCS | Mod: S$PBB,,, | Performed by: INTERNAL MEDICINE

## 2021-05-25 PROCEDURE — 99214 OFFICE O/P EST MOD 30 MIN: CPT | Mod: S$PBB,,, | Performed by: INTERNAL MEDICINE

## 2021-05-25 PROCEDURE — 36415 COLL VENOUS BLD VENIPUNCTURE: CPT | Performed by: NURSE PRACTITIONER

## 2021-05-25 PROCEDURE — 99999 PR PBB SHADOW E&M-EST. PATIENT-LVL IV: CPT | Mod: PBBFAC,,, | Performed by: INTERNAL MEDICINE

## 2021-05-25 PROCEDURE — 99214 OFFICE O/P EST MOD 30 MIN: CPT | Mod: PBBFAC | Performed by: INTERNAL MEDICINE

## 2021-05-25 PROCEDURE — 85025 COMPLETE CBC W/AUTO DIFF WBC: CPT | Performed by: NURSE PRACTITIONER

## 2021-05-25 RX ORDER — DILTIAZEM HYDROCHLORIDE 120 MG/1
120 CAPSULE, COATED, EXTENDED RELEASE ORAL DAILY
Qty: 90 CAPSULE | Refills: 3 | Status: SHIPPED | OUTPATIENT
Start: 2021-05-25 | End: 2022-03-14

## 2021-05-25 RX ORDER — METOPROLOL TARTRATE 50 MG/1
50 TABLET ORAL 2 TIMES DAILY
Qty: 180 TABLET | Refills: 3 | Status: SHIPPED | OUTPATIENT
Start: 2021-05-25 | End: 2021-12-16

## 2021-05-26 DIAGNOSIS — D75.839 THROMBOCYTOSIS: Primary | ICD-10-CM

## 2021-05-31 ENCOUNTER — EXTERNAL CHRONIC CARE MANAGEMENT (OUTPATIENT)
Dept: PRIMARY CARE CLINIC | Facility: CLINIC | Age: 71
End: 2021-05-31
Payer: MEDICARE

## 2021-05-31 PROCEDURE — 99490 CHRNC CARE MGMT STAFF 1ST 20: CPT | Mod: S$PBB,,, | Performed by: FAMILY MEDICINE

## 2021-05-31 PROCEDURE — 99490 CHRNC CARE MGMT STAFF 1ST 20: CPT | Mod: PBBFAC | Performed by: FAMILY MEDICINE

## 2021-05-31 PROCEDURE — 99490 PR CHRONIC CARE MGMT, 1ST 20 MIN: ICD-10-PCS | Mod: S$PBB,,, | Performed by: FAMILY MEDICINE

## 2021-06-03 ENCOUNTER — TELEPHONE (OUTPATIENT)
Dept: PULMONOLOGY | Facility: CLINIC | Age: 71
End: 2021-06-03

## 2021-06-09 ENCOUNTER — HOSPITAL ENCOUNTER (OUTPATIENT)
Dept: CARDIOLOGY | Facility: HOSPITAL | Age: 71
Discharge: HOME | End: 2021-06-09
Attending: FAMILY MEDICINE
Payer: MEDICARE

## 2021-06-09 DIAGNOSIS — R06.01 ORTHOPNEA: ICD-10-CM

## 2021-06-17 DIAGNOSIS — J40 BRONCHITIS: Primary | ICD-10-CM

## 2021-06-18 ENCOUNTER — HOSPITAL ENCOUNTER (OUTPATIENT)
Dept: RADIOLOGY | Facility: HOSPITAL | Age: 71
Discharge: HOME | End: 2021-06-18
Attending: INTERNAL MEDICINE
Payer: MEDICARE

## 2021-06-18 DIAGNOSIS — J40 BRONCHITIS: ICD-10-CM

## 2021-06-18 PROCEDURE — 71046 X-RAY EXAM CHEST 2 VIEWS: CPT | Mod: 26,,, | Performed by: RADIOLOGY

## 2021-06-18 PROCEDURE — 71046 XR CHEST PA AND LATERAL: ICD-10-PCS | Mod: 26,,, | Performed by: RADIOLOGY

## 2021-06-18 PROCEDURE — 71046 X-RAY EXAM CHEST 2 VIEWS: CPT | Mod: TC

## 2021-06-21 PROBLEM — A41.9 SEPSIS: Status: ACTIVE | Noted: 2021-05-05

## 2021-06-21 PROBLEM — Z87.01 PERSONAL HISTORY OF PNEUMONIA (RECURRENT): Status: ACTIVE | Noted: 2020-10-31

## 2021-06-21 PROBLEM — J98.11 ATELECTASIS: Status: ACTIVE | Noted: 2021-05-05

## 2021-06-21 PROBLEM — K20.90 ESOPHAGITIS: Status: ACTIVE | Noted: 2021-05-05

## 2021-06-21 PROBLEM — R09.1 PLEURISY: Status: ACTIVE | Noted: 2021-05-05

## 2021-06-21 PROBLEM — M79.7 FIBROMYALGIA: Status: ACTIVE | Noted: 2020-10-31

## 2021-06-21 PROBLEM — M79.10 MYALGIA: Status: ACTIVE | Noted: 2021-05-05

## 2021-06-21 PROBLEM — R53.81 DEBILITY: Status: ACTIVE | Noted: 2021-05-07

## 2021-06-21 PROBLEM — M27.40 MAXILLARY CYST: Status: ACTIVE | Noted: 2021-04-27

## 2021-06-21 PROBLEM — Z87.440 PERSONAL HISTORY OF URINARY (TRACT) INFECTIONS: Status: ACTIVE | Noted: 2020-10-31

## 2021-06-21 PROBLEM — E11.9 TYPE 2 DIABETES MELLITUS WITHOUT COMPLICATION, WITHOUT LONG-TERM CURRENT USE OF INSULIN: Status: ACTIVE | Noted: 2020-10-31

## 2021-06-22 ENCOUNTER — TELEPHONE (OUTPATIENT)
Dept: PULMONOLOGY | Facility: CLINIC | Age: 71
End: 2021-06-22

## 2021-06-25 ENCOUNTER — TELEPHONE (OUTPATIENT)
Dept: INTERNAL MEDICINE | Facility: CLINIC | Age: 71
End: 2021-06-25

## 2021-06-30 ENCOUNTER — EXTERNAL CHRONIC CARE MANAGEMENT (OUTPATIENT)
Dept: PRIMARY CARE CLINIC | Facility: CLINIC | Age: 71
End: 2021-06-30
Payer: MEDICARE

## 2021-06-30 PROCEDURE — 99490 PR CHRONIC CARE MGMT, 1ST 20 MIN: ICD-10-PCS | Mod: S$PBB,,, | Performed by: FAMILY MEDICINE

## 2021-06-30 PROCEDURE — 99490 CHRNC CARE MGMT STAFF 1ST 20: CPT | Mod: S$PBB,,, | Performed by: FAMILY MEDICINE

## 2021-06-30 PROCEDURE — 99490 CHRNC CARE MGMT STAFF 1ST 20: CPT | Mod: PBBFAC | Performed by: FAMILY MEDICINE

## 2021-07-02 ENCOUNTER — TELEPHONE (OUTPATIENT)
Dept: INTERNAL MEDICINE | Facility: CLINIC | Age: 71
End: 2021-07-02

## 2021-07-31 ENCOUNTER — EXTERNAL CHRONIC CARE MANAGEMENT (OUTPATIENT)
Dept: PRIMARY CARE CLINIC | Facility: CLINIC | Age: 71
End: 2021-07-31
Payer: MEDICARE

## 2021-07-31 PROCEDURE — 99490 PR CHRONIC CARE MGMT, 1ST 20 MIN: ICD-10-PCS | Mod: S$PBB,,, | Performed by: FAMILY MEDICINE

## 2021-07-31 PROCEDURE — 99490 CHRNC CARE MGMT STAFF 1ST 20: CPT | Mod: PBBFAC | Performed by: FAMILY MEDICINE

## 2021-07-31 PROCEDURE — 99490 CHRNC CARE MGMT STAFF 1ST 20: CPT | Mod: S$PBB,,, | Performed by: FAMILY MEDICINE

## 2021-08-12 ENCOUNTER — TELEPHONE (OUTPATIENT)
Dept: ADMINISTRATIVE | Facility: HOSPITAL | Age: 71
End: 2021-08-12

## 2021-08-16 ENCOUNTER — LAB VISIT (OUTPATIENT)
Dept: LAB | Facility: HOSPITAL | Age: 71
End: 2021-08-16
Attending: FAMILY MEDICINE
Payer: MEDICARE

## 2021-08-16 DIAGNOSIS — D75.839 THROMBOCYTOSIS: ICD-10-CM

## 2021-08-16 DIAGNOSIS — I10 ESSENTIAL HYPERTENSION: ICD-10-CM

## 2021-08-16 DIAGNOSIS — E11.49 TYPE 2 DIABETES WITH NEURAL COMPLICATION: Chronic | ICD-10-CM

## 2021-08-16 LAB
ALBUMIN SERPL BCP-MCNC: 3.2 G/DL (ref 3.5–5.2)
ALP SERPL-CCNC: 59 U/L (ref 55–135)
ALT SERPL W/O P-5'-P-CCNC: 15 U/L (ref 10–44)
ANION GAP SERPL CALC-SCNC: 7 MMOL/L (ref 8–16)
AST SERPL-CCNC: 17 U/L (ref 10–40)
BILIRUB SERPL-MCNC: 0.2 MG/DL (ref 0.1–1)
BUN SERPL-MCNC: 12 MG/DL (ref 8–23)
CALCIUM SERPL-MCNC: 9.8 MG/DL (ref 8.7–10.5)
CHLORIDE SERPL-SCNC: 103 MMOL/L (ref 95–110)
CO2 SERPL-SCNC: 29 MMOL/L (ref 23–29)
CREAT SERPL-MCNC: 1 MG/DL (ref 0.5–1.4)
EST. GFR  (AFRICAN AMERICAN): >60 ML/MIN/1.73 M^2
EST. GFR  (NON AFRICAN AMERICAN): 57.2 ML/MIN/1.73 M^2
ESTIMATED AVG GLUCOSE: 120 MG/DL (ref 68–131)
GLUCOSE SERPL-MCNC: 129 MG/DL (ref 70–110)
HBA1C MFR BLD: 5.8 % (ref 4–5.6)
PLATELET # BLD AUTO: 288 K/UL (ref 150–450)
PMV BLD AUTO: 9.7 FL (ref 9.2–12.9)
POTASSIUM SERPL-SCNC: 4.5 MMOL/L (ref 3.5–5.1)
PROT SERPL-MCNC: 6.3 G/DL (ref 6–8.4)
SODIUM SERPL-SCNC: 139 MMOL/L (ref 136–145)
TSH SERPL DL<=0.005 MIU/L-ACNC: 3.53 UIU/ML (ref 0.4–4)

## 2021-08-16 PROCEDURE — 80053 COMPREHEN METABOLIC PANEL: CPT | Performed by: NURSE PRACTITIONER

## 2021-08-16 PROCEDURE — 36415 COLL VENOUS BLD VENIPUNCTURE: CPT | Performed by: NURSE PRACTITIONER

## 2021-08-16 PROCEDURE — 84443 ASSAY THYROID STIM HORMONE: CPT | Performed by: NURSE PRACTITIONER

## 2021-08-16 PROCEDURE — 85049 AUTOMATED PLATELET COUNT: CPT | Performed by: NURSE PRACTITIONER

## 2021-08-16 PROCEDURE — 83036 HEMOGLOBIN GLYCOSYLATED A1C: CPT | Performed by: NURSE PRACTITIONER

## 2021-08-31 ENCOUNTER — EXTERNAL CHRONIC CARE MANAGEMENT (OUTPATIENT)
Dept: PRIMARY CARE CLINIC | Facility: CLINIC | Age: 71
End: 2021-08-31
Payer: MEDICARE

## 2021-08-31 PROCEDURE — 99490 CHRNC CARE MGMT STAFF 1ST 20: CPT | Mod: S$PBB,,, | Performed by: FAMILY MEDICINE

## 2021-08-31 PROCEDURE — 99490 CHRNC CARE MGMT STAFF 1ST 20: CPT | Mod: PBBFAC | Performed by: FAMILY MEDICINE

## 2021-08-31 PROCEDURE — 99490 PR CHRONIC CARE MGMT, 1ST 20 MIN: ICD-10-PCS | Mod: S$PBB,,, | Performed by: FAMILY MEDICINE

## 2021-09-06 ENCOUNTER — PATIENT MESSAGE (OUTPATIENT)
Dept: DERMATOLOGY | Facility: CLINIC | Age: 71
End: 2021-09-06

## 2021-09-07 ENCOUNTER — TELEPHONE (OUTPATIENT)
Dept: DERMATOLOGY | Facility: CLINIC | Age: 71
End: 2021-09-07

## 2021-09-08 ENCOUNTER — TELEPHONE (OUTPATIENT)
Dept: ADMINISTRATIVE | Facility: HOSPITAL | Age: 71
End: 2021-09-08

## 2021-09-09 ENCOUNTER — HOSPITAL ENCOUNTER (OUTPATIENT)
Dept: RADIOLOGY | Facility: HOSPITAL | Age: 71
Discharge: HOME | End: 2021-09-09
Attending: FAMILY MEDICINE
Payer: MEDICARE

## 2021-09-09 DIAGNOSIS — R92.8 ABNORMAL MAMMOGRAM: ICD-10-CM

## 2021-09-09 PROCEDURE — 77066 DX MAMMO INCL CAD BI: CPT | Mod: 26,,, | Performed by: RADIOLOGY

## 2021-09-09 PROCEDURE — 77062 BREAST TOMOSYNTHESIS BI: CPT | Mod: TC

## 2021-09-09 PROCEDURE — 77062 MAMMO DIGITAL DIAGNOSTIC BILAT WITH TOMO: ICD-10-PCS | Mod: 26,,, | Performed by: RADIOLOGY

## 2021-09-09 PROCEDURE — 76642 ULTRASOUND BREAST LIMITED: CPT | Mod: TC,LT

## 2021-09-09 PROCEDURE — 77062 BREAST TOMOSYNTHESIS BI: CPT | Mod: 26,,, | Performed by: RADIOLOGY

## 2021-09-09 PROCEDURE — 76642 US BREAST LEFT LIMITED: ICD-10-PCS | Mod: 26,LT,, | Performed by: RADIOLOGY

## 2021-09-09 PROCEDURE — 76642 ULTRASOUND BREAST LIMITED: CPT | Mod: 26,LT,, | Performed by: RADIOLOGY

## 2021-09-09 PROCEDURE — 77066 MAMMO DIGITAL DIAGNOSTIC BILAT WITH TOMO: ICD-10-PCS | Mod: 26,,, | Performed by: RADIOLOGY

## 2021-09-15 ENCOUNTER — PATIENT OUTREACH (OUTPATIENT)
Dept: ADMINISTRATIVE | Facility: OTHER | Age: 71
End: 2021-09-15

## 2021-09-15 DIAGNOSIS — E11.9 TYPE 2 DIABETES MELLITUS WITHOUT COMPLICATION, UNSPECIFIED WHETHER LONG TERM INSULIN USE: Primary | ICD-10-CM

## 2021-09-15 PROBLEM — A41.9 SEPSIS: Status: RESOLVED | Noted: 2021-05-05 | Resolved: 2021-09-15

## 2021-09-16 ENCOUNTER — OFFICE VISIT (OUTPATIENT)
Dept: SURGERY | Facility: CLINIC | Age: 71
End: 2021-09-16
Payer: MEDICARE

## 2021-09-16 VITALS
DIASTOLIC BLOOD PRESSURE: 57 MMHG | TEMPERATURE: 97 F | HEIGHT: 63 IN | BODY MASS INDEX: 28.75 KG/M2 | SYSTOLIC BLOOD PRESSURE: 125 MMHG | HEART RATE: 47 BPM | WEIGHT: 162.25 LBS

## 2021-09-16 DIAGNOSIS — R92.8 ABNORMAL MAMMOGRAM OF LEFT BREAST: Primary | ICD-10-CM

## 2021-09-16 PROCEDURE — 99999 PR PBB SHADOW E&M-EST. PATIENT-LVL IV: ICD-10-PCS | Mod: PBBFAC,,, | Performed by: SURGERY

## 2021-09-16 PROCEDURE — 99204 OFFICE O/P NEW MOD 45 MIN: CPT | Mod: S$PBB,,, | Performed by: SURGERY

## 2021-09-16 PROCEDURE — 99999 PR PBB SHADOW E&M-EST. PATIENT-LVL IV: CPT | Mod: PBBFAC,,, | Performed by: SURGERY

## 2021-09-16 PROCEDURE — 99214 OFFICE O/P EST MOD 30 MIN: CPT | Mod: PBBFAC | Performed by: SURGERY

## 2021-09-16 PROCEDURE — 99204 PR OFFICE/OUTPT VISIT, NEW, LEVL IV, 45-59 MIN: ICD-10-PCS | Mod: S$PBB,,, | Performed by: SURGERY

## 2021-09-22 ENCOUNTER — TELEPHONE (OUTPATIENT)
Dept: ADMINISTRATIVE | Facility: CLINIC | Age: 71
End: 2021-09-22

## 2021-09-29 ENCOUNTER — HOSPITAL ENCOUNTER (OUTPATIENT)
Dept: RADIOLOGY | Facility: HOSPITAL | Age: 71
Discharge: HOME | End: 2021-09-29
Attending: SURGERY
Payer: MEDICARE

## 2021-09-29 DIAGNOSIS — R92.8 ABNORMAL MAMMOGRAM OF LEFT BREAST: ICD-10-CM

## 2021-09-29 PROCEDURE — 19081 BX BREAST 1ST LESION STRTCTC: CPT | Mod: LT,,, | Performed by: RADIOLOGY

## 2021-09-29 PROCEDURE — 88305 TISSUE EXAM BY PATHOLOGIST: ICD-10-PCS | Mod: 26,,, | Performed by: PATHOLOGY

## 2021-09-29 PROCEDURE — 88305 TISSUE EXAM BY PATHOLOGIST: CPT | Performed by: PATHOLOGY

## 2021-09-29 PROCEDURE — 88305 TISSUE EXAM BY PATHOLOGIST: CPT | Mod: 26,,, | Performed by: PATHOLOGY

## 2021-09-29 PROCEDURE — 19081 MAMMO BREAST STEREOTACTIC BREAST BIOPSY LEFT: ICD-10-PCS | Mod: LT,,, | Performed by: RADIOLOGY

## 2021-09-29 PROCEDURE — 19081 BX BREAST 1ST LESION STRTCTC: CPT | Mod: LT

## 2021-09-30 ENCOUNTER — EXTERNAL CHRONIC CARE MANAGEMENT (OUTPATIENT)
Dept: PRIMARY CARE CLINIC | Facility: CLINIC | Age: 71
End: 2021-09-30
Payer: MEDICARE

## 2021-09-30 PROCEDURE — 99490 PR CHRONIC CARE MGMT, 1ST 20 MIN: ICD-10-PCS | Mod: S$PBB,,, | Performed by: FAMILY MEDICINE

## 2021-09-30 PROCEDURE — 99490 CHRNC CARE MGMT STAFF 1ST 20: CPT | Mod: S$PBB,,, | Performed by: FAMILY MEDICINE

## 2021-09-30 PROCEDURE — 99490 CHRNC CARE MGMT STAFF 1ST 20: CPT | Mod: PBBFAC | Performed by: FAMILY MEDICINE

## 2021-10-01 LAB
FINAL PATHOLOGIC DIAGNOSIS: NORMAL
GROSS: NORMAL
Lab: NORMAL

## 2021-10-14 ENCOUNTER — PATIENT MESSAGE (OUTPATIENT)
Dept: SURGERY | Facility: HOSPITAL | Age: 71
End: 2021-10-14

## 2021-10-14 DIAGNOSIS — R92.8 ABNORMAL MAMMOGRAM OF LEFT BREAST: Primary | ICD-10-CM

## 2021-10-15 ENCOUNTER — TELEPHONE (OUTPATIENT)
Dept: SURGERY | Facility: CLINIC | Age: 71
End: 2021-10-15

## 2021-10-18 ENCOUNTER — HOSPITAL ENCOUNTER (EMERGENCY)
Facility: HOSPITAL | Age: 71
Discharge: HOME | End: 2021-10-18
Attending: EMERGENCY MEDICINE
Payer: MEDICARE

## 2021-10-18 VITALS
HEIGHT: 63 IN | TEMPERATURE: 98 F | RESPIRATION RATE: 16 BRPM | DIASTOLIC BLOOD PRESSURE: 70 MMHG | SYSTOLIC BLOOD PRESSURE: 150 MMHG | OXYGEN SATURATION: 97 % | WEIGHT: 161.19 LBS | BODY MASS INDEX: 28.56 KG/M2 | HEART RATE: 71 BPM
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
## 2021-10-18 DIAGNOSIS — S70.02XA CONTUSION OF LEFT HIP, INITIAL ENCOUNTER: ICD-10-CM

## 2021-10-18 PROCEDURE — 99283 EMERGENCY DEPT VISIT LOW MDM: CPT | Mod: 25,ER

## 2021-10-18 RX ORDER — HYDROCODONE BITARTRATE AND ACETAMINOPHEN 5; 325 MG/1; MG/1
1 TABLET ORAL EVERY 4 HOURS PRN
Qty: 7 TABLET | Refills: 0 | Status: ON HOLD | OUTPATIENT
Start: 2021-10-18 | End: 2022-10-19 | Stop reason: HOSPADM

## 2021-10-19 ENCOUNTER — TELEPHONE (OUTPATIENT)
Dept: SURGERY | Facility: CLINIC | Age: 71
End: 2021-10-19

## 2021-10-31 ENCOUNTER — EXTERNAL CHRONIC CARE MANAGEMENT (OUTPATIENT)
Dept: PRIMARY CARE CLINIC | Facility: CLINIC | Age: 71
End: 2021-10-31
Payer: MEDICARE

## 2021-10-31 PROCEDURE — 99490 PR CHRONIC CARE MGMT, 1ST 20 MIN: ICD-10-PCS | Mod: S$PBB,,, | Performed by: FAMILY MEDICINE

## 2021-10-31 PROCEDURE — 99490 CHRNC CARE MGMT STAFF 1ST 20: CPT | Mod: S$PBB,,, | Performed by: FAMILY MEDICINE

## 2021-10-31 PROCEDURE — 99490 CHRNC CARE MGMT STAFF 1ST 20: CPT | Mod: PBBFAC | Performed by: FAMILY MEDICINE

## 2021-11-30 ENCOUNTER — EXTERNAL CHRONIC CARE MANAGEMENT (OUTPATIENT)
Dept: PRIMARY CARE CLINIC | Facility: CLINIC | Age: 71
End: 2021-11-30
Payer: MEDICARE

## 2021-11-30 PROCEDURE — 99490 CHRNC CARE MGMT STAFF 1ST 20: CPT | Mod: PBBFAC | Performed by: FAMILY MEDICINE

## 2021-11-30 PROCEDURE — 99490 CHRNC CARE MGMT STAFF 1ST 20: CPT | Mod: S$PBB,,, | Performed by: FAMILY MEDICINE

## 2021-11-30 PROCEDURE — 99490 PR CHRONIC CARE MGMT, 1ST 20 MIN: ICD-10-PCS | Mod: S$PBB,,, | Performed by: FAMILY MEDICINE

## 2021-12-16 ENCOUNTER — OFFICE VISIT (OUTPATIENT)
Dept: CARDIOLOGY | Facility: CLINIC | Age: 71
End: 2021-12-16
Payer: MEDICARE

## 2021-12-16 ENCOUNTER — OFFICE VISIT (OUTPATIENT)
Dept: INTERNAL MEDICINE | Facility: CLINIC | Age: 71
End: 2021-12-16
Payer: MEDICARE

## 2021-12-16 VITALS
DIASTOLIC BLOOD PRESSURE: 86 MMHG | SYSTOLIC BLOOD PRESSURE: 132 MMHG | HEART RATE: 72 BPM | BODY MASS INDEX: 28.94 KG/M2 | WEIGHT: 163.38 LBS | RESPIRATION RATE: 20 BRPM | TEMPERATURE: 98 F

## 2021-12-16 VITALS
SYSTOLIC BLOOD PRESSURE: 118 MMHG | DIASTOLIC BLOOD PRESSURE: 74 MMHG | HEART RATE: 66 BPM | WEIGHT: 163.38 LBS | BODY MASS INDEX: 28.94 KG/M2 | OXYGEN SATURATION: 94 %

## 2021-12-16 DIAGNOSIS — I10 PRIMARY HYPERTENSION: Chronic | ICD-10-CM

## 2021-12-16 DIAGNOSIS — I48.0 PAF (PAROXYSMAL ATRIAL FIBRILLATION): Primary | ICD-10-CM

## 2021-12-16 DIAGNOSIS — R42 DIZZINESS: ICD-10-CM

## 2021-12-16 DIAGNOSIS — E11.49 TYPE 2 DIABETES WITH NEURAL COMPLICATION: ICD-10-CM

## 2021-12-16 DIAGNOSIS — R00.2 PALPITATIONS: Primary | ICD-10-CM

## 2021-12-16 DIAGNOSIS — I10 ESSENTIAL HYPERTENSION: ICD-10-CM

## 2021-12-16 DIAGNOSIS — I48.0 PAF (PAROXYSMAL ATRIAL FIBRILLATION): ICD-10-CM

## 2021-12-16 PROCEDURE — 99999 PR PBB SHADOW E&M-EST. PATIENT-LVL IV: CPT | Mod: PBBFAC,HCWC,, | Performed by: INTERNAL MEDICINE

## 2021-12-16 PROCEDURE — 3066F NEPHROPATHY DOC TX: CPT | Mod: HCWC,CPTII,S$GLB, | Performed by: INTERNAL MEDICINE

## 2021-12-16 PROCEDURE — 99214 PR OFFICE/OUTPT VISIT, EST, LEVL IV, 30-39 MIN: ICD-10-PCS | Mod: HCWC,S$GLB,, | Performed by: NURSE PRACTITIONER

## 2021-12-16 PROCEDURE — 3066F PR DOCUMENTATION OF TREATMENT FOR NEPHROPATHY: ICD-10-PCS | Mod: HCWC,CPTII,S$GLB, | Performed by: NURSE PRACTITIONER

## 2021-12-16 PROCEDURE — 99999 PR PBB SHADOW E&M-EST. PATIENT-LVL IV: ICD-10-PCS | Mod: PBBFAC,HCWC,, | Performed by: INTERNAL MEDICINE

## 2021-12-16 PROCEDURE — 99214 PR OFFICE/OUTPT VISIT, EST, LEVL IV, 30-39 MIN: ICD-10-PCS | Mod: HCWC,S$GLB,, | Performed by: INTERNAL MEDICINE

## 2021-12-16 PROCEDURE — 3061F PR NEG MICROALBUMINURIA RESULT DOCUMENTED/REVIEW: ICD-10-PCS | Mod: HCWC,CPTII,S$GLB, | Performed by: INTERNAL MEDICINE

## 2021-12-16 PROCEDURE — 3061F NEG MICROALBUMINURIA REV: CPT | Mod: HCWC,CPTII,S$GLB, | Performed by: NURSE PRACTITIONER

## 2021-12-16 PROCEDURE — 3066F PR DOCUMENTATION OF TREATMENT FOR NEPHROPATHY: ICD-10-PCS | Mod: HCWC,CPTII,S$GLB, | Performed by: INTERNAL MEDICINE

## 2021-12-16 PROCEDURE — 99999 PR PBB SHADOW E&M-EST. PATIENT-LVL V: ICD-10-PCS | Mod: PBBFAC,HCWC,, | Performed by: NURSE PRACTITIONER

## 2021-12-16 PROCEDURE — 99214 OFFICE O/P EST MOD 30 MIN: CPT | Mod: HCWC,S$GLB,, | Performed by: NURSE PRACTITIONER

## 2021-12-16 PROCEDURE — 3066F NEPHROPATHY DOC TX: CPT | Mod: HCWC,CPTII,S$GLB, | Performed by: NURSE PRACTITIONER

## 2021-12-16 PROCEDURE — 99999 PR PBB SHADOW E&M-EST. PATIENT-LVL V: CPT | Mod: PBBFAC,HCWC,, | Performed by: NURSE PRACTITIONER

## 2021-12-16 PROCEDURE — 99214 OFFICE O/P EST MOD 30 MIN: CPT | Mod: HCWC,S$GLB,, | Performed by: INTERNAL MEDICINE

## 2021-12-16 PROCEDURE — 3061F NEG MICROALBUMINURIA REV: CPT | Mod: HCWC,CPTII,S$GLB, | Performed by: INTERNAL MEDICINE

## 2021-12-16 PROCEDURE — 3061F PR NEG MICROALBUMINURIA RESULT DOCUMENTED/REVIEW: ICD-10-PCS | Mod: HCWC,CPTII,S$GLB, | Performed by: NURSE PRACTITIONER

## 2021-12-16 RX ORDER — METOPROLOL SUCCINATE 50 MG/1
50 TABLET, EXTENDED RELEASE ORAL DAILY
Qty: 90 TABLET | Refills: 3 | Status: SHIPPED | OUTPATIENT
Start: 2021-12-16 | End: 2022-03-08 | Stop reason: SDUPTHER

## 2021-12-29 ENCOUNTER — IMMUNIZATION (OUTPATIENT)
Dept: INTERNAL MEDICINE | Facility: CLINIC | Age: 71
End: 2021-12-29
Payer: MEDICARE

## 2021-12-29 PROCEDURE — 90694 FLU VACCINE - QUADRIVALENT - ADJUVANTED: ICD-10-PCS | Mod: HCWC,S$GLB,, | Performed by: NURSE PRACTITIONER

## 2021-12-29 PROCEDURE — G0008 ADMIN INFLUENZA VIRUS VAC: HCPCS | Mod: HCWC,S$GLB,, | Performed by: NURSE PRACTITIONER

## 2021-12-29 PROCEDURE — 90694 VACC AIIV4 NO PRSRV 0.5ML IM: CPT | Mod: HCWC,S$GLB,, | Performed by: NURSE PRACTITIONER

## 2021-12-29 PROCEDURE — G0008 FLU VACCINE - QUADRIVALENT - ADJUVANTED: ICD-10-PCS | Mod: HCWC,S$GLB,, | Performed by: NURSE PRACTITIONER

## 2021-12-31 ENCOUNTER — EXTERNAL CHRONIC CARE MANAGEMENT (OUTPATIENT)
Dept: PRIMARY CARE CLINIC | Facility: CLINIC | Age: 71
End: 2021-12-31
Payer: MEDICARE

## 2021-12-31 PROCEDURE — 99490 PR CHRONIC CARE MGMT, 1ST 20 MIN: ICD-10-PCS | Mod: S$GLB,,, | Performed by: FAMILY MEDICINE

## 2021-12-31 PROCEDURE — 99439 CHRNC CARE MGMT STAF EA ADDL: CPT | Mod: S$GLB,,, | Performed by: FAMILY MEDICINE

## 2021-12-31 PROCEDURE — 99490 CHRNC CARE MGMT STAFF 1ST 20: CPT | Mod: S$GLB,,, | Performed by: FAMILY MEDICINE

## 2021-12-31 PROCEDURE — 99439 PR CHRONIC CARE MGMT, EA ADDTL 20 MIN: ICD-10-PCS | Mod: S$GLB,,, | Performed by: FAMILY MEDICINE

## 2022-01-31 ENCOUNTER — EXTERNAL CHRONIC CARE MANAGEMENT (OUTPATIENT)
Dept: PRIMARY CARE CLINIC | Facility: CLINIC | Age: 72
End: 2022-01-31
Payer: MEDICARE

## 2022-01-31 PROCEDURE — 99490 PR CHRONIC CARE MGMT, 1ST 20 MIN: ICD-10-PCS | Mod: S$GLB,,, | Performed by: FAMILY MEDICINE

## 2022-01-31 PROCEDURE — 99490 CHRNC CARE MGMT STAFF 1ST 20: CPT | Mod: S$GLB,,, | Performed by: FAMILY MEDICINE

## 2022-02-17 DIAGNOSIS — E11.9 TYPE 2 DIABETES MELLITUS WITHOUT COMPLICATION: ICD-10-CM

## 2022-02-28 ENCOUNTER — EXTERNAL CHRONIC CARE MANAGEMENT (OUTPATIENT)
Dept: PRIMARY CARE CLINIC | Facility: CLINIC | Age: 72
End: 2022-02-28
Payer: MEDICARE

## 2022-02-28 PROCEDURE — 99490 PR CHRONIC CARE MGMT, 1ST 20 MIN: ICD-10-PCS | Mod: S$GLB,,, | Performed by: FAMILY MEDICINE

## 2022-02-28 PROCEDURE — 99490 CHRNC CARE MGMT STAFF 1ST 20: CPT | Mod: S$GLB,,, | Performed by: FAMILY MEDICINE

## 2022-03-02 DIAGNOSIS — E11.9 TYPE 2 DIABETES MELLITUS WITHOUT COMPLICATION: ICD-10-CM

## 2022-03-08 DIAGNOSIS — I48.0 PAF (PAROXYSMAL ATRIAL FIBRILLATION): ICD-10-CM

## 2022-03-10 RX ORDER — METOPROLOL SUCCINATE 50 MG/1
50 TABLET, EXTENDED RELEASE ORAL DAILY
Qty: 90 TABLET | Refills: 3 | Status: SHIPPED | OUTPATIENT
Start: 2022-03-10 | End: 2022-03-14

## 2022-03-13 ENCOUNTER — PATIENT OUTREACH (OUTPATIENT)
Dept: ADMINISTRATIVE | Facility: OTHER | Age: 72
End: 2022-03-13
Payer: OTHER GOVERNMENT

## 2022-03-14 ENCOUNTER — OFFICE VISIT (OUTPATIENT)
Dept: CARDIOLOGY | Facility: CLINIC | Age: 72
End: 2022-03-14
Payer: MEDICARE

## 2022-03-14 ENCOUNTER — TELEPHONE (OUTPATIENT)
Dept: ADMINISTRATIVE | Facility: HOSPITAL | Age: 72
End: 2022-03-14
Payer: OTHER GOVERNMENT

## 2022-03-14 VITALS
OXYGEN SATURATION: 96 % | WEIGHT: 165.38 LBS | DIASTOLIC BLOOD PRESSURE: 70 MMHG | SYSTOLIC BLOOD PRESSURE: 112 MMHG | BODY MASS INDEX: 29.29 KG/M2 | HEART RATE: 96 BPM

## 2022-03-14 DIAGNOSIS — E11.49 TYPE 2 DIABETES WITH NEURAL COMPLICATION: ICD-10-CM

## 2022-03-14 DIAGNOSIS — G62.9 PERIPHERAL POLYNEUROPATHY: ICD-10-CM

## 2022-03-14 DIAGNOSIS — I10 ESSENTIAL HYPERTENSION: ICD-10-CM

## 2022-03-14 DIAGNOSIS — I48.0 PAF (PAROXYSMAL ATRIAL FIBRILLATION): ICD-10-CM

## 2022-03-14 DIAGNOSIS — I49.1 PAC (PREMATURE ATRIAL CONTRACTION): Primary | ICD-10-CM

## 2022-03-14 DIAGNOSIS — I10 PRIMARY HYPERTENSION: ICD-10-CM

## 2022-03-14 PROCEDURE — 99214 PR OFFICE/OUTPT VISIT, EST, LEVL IV, 30-39 MIN: ICD-10-PCS | Mod: S$GLB,,, | Performed by: INTERNAL MEDICINE

## 2022-03-14 PROCEDURE — 99999 PR PBB SHADOW E&M-EST. PATIENT-LVL IV: CPT | Mod: PBBFAC,,, | Performed by: INTERNAL MEDICINE

## 2022-03-14 PROCEDURE — 99499 UNLISTED E&M SERVICE: CPT | Mod: S$GLB,,, | Performed by: INTERNAL MEDICINE

## 2022-03-14 PROCEDURE — 99214 OFFICE O/P EST MOD 30 MIN: CPT | Mod: PBBFAC | Performed by: INTERNAL MEDICINE

## 2022-03-14 PROCEDURE — 99999 PR PBB SHADOW E&M-EST. PATIENT-LVL IV: ICD-10-PCS | Mod: PBBFAC,,, | Performed by: INTERNAL MEDICINE

## 2022-03-14 PROCEDURE — 99499 RISK ADDL DX/OHS AUDIT: ICD-10-PCS | Mod: S$GLB,,, | Performed by: INTERNAL MEDICINE

## 2022-03-14 PROCEDURE — 99214 OFFICE O/P EST MOD 30 MIN: CPT | Mod: S$GLB,,, | Performed by: INTERNAL MEDICINE

## 2022-03-14 RX ORDER — METOPROLOL SUCCINATE 100 MG/1
100 TABLET, EXTENDED RELEASE ORAL DAILY
Qty: 90 TABLET | Refills: 3 | Status: SHIPPED | OUTPATIENT
Start: 2022-03-14 | End: 2022-09-23 | Stop reason: SDUPTHER

## 2022-03-14 RX ORDER — FLECAINIDE ACETATE 50 MG/1
50 TABLET ORAL EVERY 12 HOURS
Qty: 60 TABLET | Refills: 11 | Status: SHIPPED | OUTPATIENT
Start: 2022-03-14 | End: 2022-07-07 | Stop reason: SDUPTHER

## 2022-03-23 ENCOUNTER — HOSPITAL ENCOUNTER (OUTPATIENT)
Dept: CARDIOLOGY | Facility: HOSPITAL | Age: 72
Discharge: HOME | End: 2022-03-23
Attending: INTERNAL MEDICINE
Payer: MEDICARE

## 2022-03-23 DIAGNOSIS — I48.0 PAF (PAROXYSMAL ATRIAL FIBRILLATION): ICD-10-CM

## 2022-03-23 DIAGNOSIS — R00.2 PALPITATIONS: ICD-10-CM

## 2022-03-23 PROCEDURE — 93227 HOLTER MONITOR - 48 HOUR (CUPID ONLY): ICD-10-PCS | Mod: ,,, | Performed by: INTERNAL MEDICINE

## 2022-03-23 PROCEDURE — 93225 XTRNL ECG REC<48 HRS REC: CPT | Mod: PO

## 2022-03-23 PROCEDURE — 93227 XTRNL ECG REC<48 HR R&I: CPT | Mod: ,,, | Performed by: INTERNAL MEDICINE

## 2022-03-25 ENCOUNTER — CLINICAL SUPPORT (OUTPATIENT)
Dept: INTERNAL MEDICINE | Facility: CLINIC | Age: 72
End: 2022-03-25
Payer: OTHER GOVERNMENT

## 2022-03-25 LAB
OHS CV EVENT MONITOR DAY: 0
OHS CV HOLTER LENGTH DECIMAL HOURS: 48
OHS CV HOLTER LENGTH HOURS: 48
OHS CV HOLTER LENGTH MINUTES: 0
OHS CV HOLTER SINUS AVERAGE HR: 62
OHS CV HOLTER SINUS MAX HR: 105
OHS CV HOLTER SINUS MIN HR: 44

## 2022-03-25 PROCEDURE — 99999 PR PBB SHADOW E&M-EST. PATIENT-LVL I: CPT | Mod: PBBFAC,,,

## 2022-03-25 PROCEDURE — 99211 OFF/OP EST MAY X REQ PHY/QHP: CPT | Mod: PBBFAC,PO

## 2022-03-25 PROCEDURE — 99999 PR PBB SHADOW E&M-EST. PATIENT-LVL I: ICD-10-PCS | Mod: PBBFAC,,,

## 2022-03-31 ENCOUNTER — EXTERNAL CHRONIC CARE MANAGEMENT (OUTPATIENT)
Dept: PRIMARY CARE CLINIC | Facility: CLINIC | Age: 72
End: 2022-03-31
Payer: OTHER GOVERNMENT

## 2022-03-31 ENCOUNTER — PATIENT MESSAGE (OUTPATIENT)
Dept: CARDIOLOGY | Facility: CLINIC | Age: 72
End: 2022-03-31
Payer: OTHER GOVERNMENT

## 2022-03-31 PROCEDURE — 99490 CHRNC CARE MGMT STAFF 1ST 20: CPT | Mod: PBBFAC | Performed by: FAMILY MEDICINE

## 2022-03-31 PROCEDURE — 99490 PR CHRONIC CARE MGMT, 1ST 20 MIN: ICD-10-PCS | Mod: S$PBB,,, | Performed by: FAMILY MEDICINE

## 2022-03-31 PROCEDURE — 99490 CHRNC CARE MGMT STAFF 1ST 20: CPT | Mod: S$PBB,,, | Performed by: FAMILY MEDICINE

## 2022-04-05 DIAGNOSIS — Z71.89 COMPLEX CARE COORDINATION: ICD-10-CM

## 2022-04-06 ENCOUNTER — PATIENT MESSAGE (OUTPATIENT)
Dept: CARDIOLOGY | Facility: CLINIC | Age: 72
End: 2022-04-06
Payer: OTHER GOVERNMENT

## 2022-04-06 ENCOUNTER — TELEPHONE (OUTPATIENT)
Dept: ADMINISTRATIVE | Facility: CLINIC | Age: 72
End: 2022-04-06
Payer: OTHER GOVERNMENT

## 2022-04-06 DIAGNOSIS — I49.3 PVC (PREMATURE VENTRICULAR CONTRACTION): ICD-10-CM

## 2022-04-06 DIAGNOSIS — I49.1 PAC (PREMATURE ATRIAL CONTRACTION): Primary | ICD-10-CM

## 2022-04-11 ENCOUNTER — PATIENT MESSAGE (OUTPATIENT)
Dept: CARDIOLOGY | Facility: CLINIC | Age: 72
End: 2022-04-11
Payer: OTHER GOVERNMENT

## 2022-04-14 NOTE — TELEPHONE ENCOUNTER
Care Due:                  Date            Visit Type   Department     Provider  --------------------------------------------------------------------------------                                MYCHART                              FOLLOWUP/OF  [facility] INTERNAL  Last Visit: 05-      [facility] VISIT   MEDICINE       Cody Parks  Next Visit: None Scheduled  None         None Found                                                            Last  Test          Frequency    Reason                     Performed    Due Date  --------------------------------------------------------------------------------    Office Visit  12 months..  DULoxetine,                05- 05-                             SITagliptan-metformin....    HBA1C.......  6 months...  SITagliptan-metformin....  08- 02-    Powered by [email or web address]. Reference number: [id number].   4/14/2022 4:21:14 PM CDT

## 2022-04-18 RX ORDER — ZOLPIDEM TARTRATE 10 MG/1
TABLET ORAL
Qty: 90 TABLET | Refills: 0 | Status: SHIPPED | OUTPATIENT
Start: 2022-04-18 | End: 2022-06-25 | Stop reason: SDUPTHER

## 2022-04-18 RX ORDER — ROSUVASTATIN CALCIUM 5 MG/1
5 TABLET, COATED ORAL DAILY
Qty: 90 TABLET | Refills: 3 | Status: SHIPPED | OUTPATIENT
Start: 2022-04-18 | End: 2023-07-28 | Stop reason: SDUPTHER

## 2022-04-26 ENCOUNTER — PATIENT MESSAGE (OUTPATIENT)
Dept: ADMINISTRATIVE | Facility: HOSPITAL | Age: 72
End: 2022-04-26
Payer: OTHER GOVERNMENT

## 2022-04-30 ENCOUNTER — EXTERNAL CHRONIC CARE MANAGEMENT (OUTPATIENT)
Dept: PRIMARY CARE CLINIC | Facility: CLINIC | Age: 72
End: 2022-04-30
Payer: MEDICARE

## 2022-04-30 PROCEDURE — 99490 CHRNC CARE MGMT STAFF 1ST 20: CPT | Mod: S$PBB,,, | Performed by: FAMILY MEDICINE

## 2022-04-30 PROCEDURE — 99490 PR CHRONIC CARE MGMT, 1ST 20 MIN: ICD-10-PCS | Mod: S$PBB,,, | Performed by: FAMILY MEDICINE

## 2022-04-30 PROCEDURE — 99490 CHRNC CARE MGMT STAFF 1ST 20: CPT | Mod: PBBFAC | Performed by: FAMILY MEDICINE

## 2022-05-18 DIAGNOSIS — I48.0 PAF (PAROXYSMAL ATRIAL FIBRILLATION): Primary | ICD-10-CM

## 2022-05-23 ENCOUNTER — TELEPHONE (OUTPATIENT)
Dept: CARDIOLOGY | Facility: CLINIC | Age: 72
End: 2022-05-23
Payer: OTHER GOVERNMENT

## 2022-05-31 ENCOUNTER — EXTERNAL CHRONIC CARE MANAGEMENT (OUTPATIENT)
Dept: PRIMARY CARE CLINIC | Facility: CLINIC | Age: 72
End: 2022-05-31
Payer: MEDICARE

## 2022-05-31 PROCEDURE — 99487 CPLX CHRNC CARE 1ST 60 MIN: CPT | Mod: PBBFAC | Performed by: FAMILY MEDICINE

## 2022-05-31 PROCEDURE — 99489 PR COMPLX CHRON CARE MGMT, EA ADDTL 30 MIN, PER MONTH: ICD-10-PCS | Mod: S$GLB,,, | Performed by: FAMILY MEDICINE

## 2022-05-31 PROCEDURE — 99489 CPLX CHRNC CARE EA ADDL 30: CPT | Mod: PBBFAC | Performed by: FAMILY MEDICINE

## 2022-05-31 PROCEDURE — 99487 CPLX CHRNC CARE 1ST 60 MIN: CPT | Mod: S$GLB,,, | Performed by: FAMILY MEDICINE

## 2022-05-31 PROCEDURE — 99489 CPLX CHRNC CARE EA ADDL 30: CPT | Mod: S$GLB,,, | Performed by: FAMILY MEDICINE

## 2022-05-31 PROCEDURE — 99487 PR COMPLX CHRON CARE MGMT, 1ST HR, PER MONTH: ICD-10-PCS | Mod: S$GLB,,, | Performed by: FAMILY MEDICINE

## 2022-06-14 ENCOUNTER — HOSPITAL ENCOUNTER (OUTPATIENT)
Dept: RADIOLOGY | Facility: HOSPITAL | Age: 72
Discharge: HOME | End: 2022-06-14
Attending: SURGERY
Payer: MEDICARE

## 2022-06-14 DIAGNOSIS — R92.8 ABNORMAL MAMMOGRAM OF LEFT BREAST: ICD-10-CM

## 2022-06-14 PROCEDURE — 77065 DX MAMMO INCL CAD UNI: CPT | Mod: TC,LT

## 2022-06-14 PROCEDURE — 77061 BREAST TOMOSYNTHESIS UNI: CPT | Mod: 26,LT,, | Performed by: RADIOLOGY

## 2022-06-14 PROCEDURE — 77065 MAMMO DIGITAL DIAGNOSTIC LEFT WITH TOMO: ICD-10-PCS | Mod: 26,LT,, | Performed by: RADIOLOGY

## 2022-06-14 PROCEDURE — 77065 DX MAMMO INCL CAD UNI: CPT | Mod: 26,LT,, | Performed by: RADIOLOGY

## 2022-06-14 PROCEDURE — 77061 MAMMO DIGITAL DIAGNOSTIC LEFT WITH TOMO: ICD-10-PCS | Mod: 26,LT,, | Performed by: RADIOLOGY

## 2022-06-24 ENCOUNTER — HOSPITAL ENCOUNTER (OUTPATIENT)
Facility: HOSPITAL | Age: 72
Discharge: HOME | End: 2022-06-25
Attending: EMERGENCY MEDICINE | Admitting: INTERNAL MEDICINE
Payer: MEDICARE

## 2022-06-24 DIAGNOSIS — U07.1 COVID-19: Primary | ICD-10-CM

## 2022-06-24 DIAGNOSIS — R09.02 HYPOXEMIA: ICD-10-CM

## 2022-06-24 PROBLEM — J96.00 ACUTE RESPIRATORY FAILURE DUE TO COVID-19: Status: ACTIVE | Noted: 2022-06-24

## 2022-06-24 PROBLEM — E78.5 HYPERLIPIDEMIA: Status: ACTIVE | Noted: 2022-06-24

## 2022-06-24 PROBLEM — I48.0 PAF (PAROXYSMAL ATRIAL FIBRILLATION): Chronic | Status: ACTIVE | Noted: 2021-05-25

## 2022-06-24 LAB
ALBUMIN SERPL BCP-MCNC: 3 G/DL (ref 3.5–5.2)
ALLENS TEST: ABNORMAL
ALP SERPL-CCNC: 59 U/L (ref 55–135)
ALT SERPL W/O P-5'-P-CCNC: 17 U/L (ref 10–44)
ANION GAP SERPL CALC-SCNC: 13 MMOL/L (ref 8–16)
APTT BLDCRRT: 31.5 SEC (ref 21–32)
AST SERPL-CCNC: 21 U/L (ref 10–40)
BASOPHILS # BLD AUTO: 0.01 K/UL (ref 0–0.2)
BASOPHILS NFR BLD: 0.2 % (ref 0–1.9)
BILIRUB SERPL-MCNC: 0.3 MG/DL (ref 0.1–1)
BNP SERPL-MCNC: 37 PG/ML (ref 0–99)
BUN SERPL-MCNC: 10 MG/DL (ref 8–23)
CALCIUM SERPL-MCNC: 9 MG/DL (ref 8.7–10.5)
CHLORIDE SERPL-SCNC: 102 MMOL/L (ref 95–110)
CK SERPL-CCNC: 58 U/L (ref 20–180)
CO2 SERPL-SCNC: 23 MMOL/L (ref 23–29)
CREAT SERPL-MCNC: 1 MG/DL (ref 0.5–1.4)
CRP SERPL-MCNC: 115.2 MG/L (ref 0–8.2)
CTP QC/QA: YES
CTP QC/QA: YES
D DIMER PPP IA.FEU-MCNC: 0.36 MG/L FEU
DELSYS: ABNORMAL
DIFFERENTIAL METHOD: ABNORMAL
EOSINOPHIL # BLD AUTO: 0.2 K/UL (ref 0–0.5)
EOSINOPHIL NFR BLD: 4.2 % (ref 0–8)
ERYTHROCYTE [DISTWIDTH] IN BLOOD BY AUTOMATED COUNT: 11.9 % (ref 11.5–14.5)
EST. GFR  (AFRICAN AMERICAN): >60 ML/MIN/1.73 M^2
EST. GFR  (NON AFRICAN AMERICAN): 56.8 ML/MIN/1.73 M^2
GLUCOSE SERPL-MCNC: 156 MG/DL (ref 70–110)
HCO3 UR-SCNC: 26.7 MMOL/L (ref 24–28)
HCT VFR BLD AUTO: 35.8 % (ref 37–48.5)
HGB BLD-MCNC: 11.7 G/DL (ref 12–16)
IMM GRANULOCYTES # BLD AUTO: 0.02 K/UL (ref 0–0.04)
IMM GRANULOCYTES NFR BLD AUTO: 0.4 % (ref 0–0.5)
INR PPP: 0.9 (ref 0.8–1.2)
LACTATE SERPL-SCNC: 1.1 MMOL/L (ref 0.5–2.2)
LDH SERPL L TO P-CCNC: 163 U/L (ref 110–260)
LYMPHOCYTES # BLD AUTO: 0.8 K/UL (ref 1–4.8)
LYMPHOCYTES NFR BLD: 18.3 % (ref 18–48)
MCH RBC QN AUTO: 29.5 PG (ref 27–31)
MCHC RBC AUTO-ENTMCNC: 32.7 G/DL (ref 32–36)
MCV RBC AUTO: 90 FL (ref 82–98)
MODE: ABNORMAL
MONOCYTES # BLD AUTO: 0.4 K/UL (ref 0.3–1)
MONOCYTES NFR BLD: 8.7 % (ref 4–15)
NEUTROPHILS # BLD AUTO: 3.1 K/UL (ref 1.8–7.7)
NEUTROPHILS NFR BLD: 68.2 % (ref 38–73)
NRBC BLD-RTO: 0 /100 WBC
PCO2 BLDA: 32.5 MMHG (ref 35–45)
PH SMN: 7.52 [PH] (ref 7.35–7.45)
PLATELET # BLD AUTO: 179 K/UL (ref 150–450)
PMV BLD AUTO: 9.3 FL (ref 9.2–12.9)
PO2 BLDA: 79 MMHG (ref 80–100)
POC BE: 4 MMOL/L
POC MOLECULAR INFLUENZA A AGN: NEGATIVE
POC MOLECULAR INFLUENZA B AGN: NEGATIVE
POC SATURATED O2: 97 % (ref 95–100)
POCT GLUCOSE: 157 MG/DL (ref 70–110)
POTASSIUM SERPL-SCNC: 4.2 MMOL/L (ref 3.5–5.1)
PROCALCITONIN SERPL IA-MCNC: 0.07 NG/ML
PROT SERPL-MCNC: 6.5 G/DL (ref 6–8.4)
PROTHROMBIN TIME: 9.8 SEC (ref 9–12.5)
RBC # BLD AUTO: 3.96 M/UL (ref 4–5.4)
SAMPLE: ABNORMAL
SARS-COV-2 RDRP RESP QL NAA+PROBE: POSITIVE
SITE: ABNORMAL
SODIUM SERPL-SCNC: 138 MMOL/L (ref 136–145)
TROPONIN I SERPL DL<=0.01 NG/ML-MCNC: <0.006 NG/ML (ref 0–0.03)
WBC # BLD AUTO: 4.48 K/UL (ref 3.9–12.7)

## 2022-06-24 PROCEDURE — 93005 ELECTROCARDIOGRAM TRACING: CPT | Mod: ER

## 2022-06-24 PROCEDURE — 85652 RBC SED RATE AUTOMATED: CPT | Performed by: NURSE PRACTITIONER

## 2022-06-24 PROCEDURE — U0002 COVID-19 LAB TEST NON-CDC: HCPCS | Mod: ER | Performed by: EMERGENCY MEDICINE

## 2022-06-24 PROCEDURE — G0378 HOSPITAL OBSERVATION PER HR: HCPCS

## 2022-06-24 PROCEDURE — 85025 COMPLETE CBC W/AUTO DIFF WBC: CPT | Mod: ER | Performed by: EMERGENCY MEDICINE

## 2022-06-24 PROCEDURE — 96365 THER/PROPH/DIAG IV INF INIT: CPT

## 2022-06-24 PROCEDURE — 84484 ASSAY OF TROPONIN QUANT: CPT | Mod: ER | Performed by: EMERGENCY MEDICINE

## 2022-06-24 PROCEDURE — 94761 N-INVAS EAR/PLS OXIMETRY MLT: CPT

## 2022-06-24 PROCEDURE — 25000242 PHARM REV CODE 250 ALT 637 W/ HCPCS: Performed by: NURSE PRACTITIONER

## 2022-06-24 PROCEDURE — 85730 THROMBOPLASTIN TIME PARTIAL: CPT | Performed by: STUDENT IN AN ORGANIZED HEALTH CARE EDUCATION/TRAINING PROGRAM

## 2022-06-24 PROCEDURE — 85379 FIBRIN DEGRADATION QUANT: CPT | Mod: ER | Performed by: NURSE PRACTITIONER

## 2022-06-24 PROCEDURE — G0378 HOSPITAL OBSERVATION PER HR: HCPCS | Mod: ER

## 2022-06-24 PROCEDURE — 27000221 HC OXYGEN, UP TO 24 HOURS: Mod: ER

## 2022-06-24 PROCEDURE — 94799 UNLISTED PULMONARY SVC/PX: CPT

## 2022-06-24 PROCEDURE — 94640 AIRWAY INHALATION TREATMENT: CPT

## 2022-06-24 PROCEDURE — 25000003 PHARM REV CODE 250: Mod: ER | Performed by: EMERGENCY MEDICINE

## 2022-06-24 PROCEDURE — 82803 BLOOD GASES ANY COMBINATION: CPT | Mod: ER

## 2022-06-24 PROCEDURE — 36600 WITHDRAWAL OF ARTERIAL BLOOD: CPT | Mod: ER

## 2022-06-24 PROCEDURE — 82550 ASSAY OF CK (CPK): CPT | Mod: ER | Performed by: EMERGENCY MEDICINE

## 2022-06-24 PROCEDURE — 99900035 HC TECH TIME PER 15 MIN (STAT)

## 2022-06-24 PROCEDURE — 99900031 HC PATIENT EDUCATION (STAT)

## 2022-06-24 PROCEDURE — 96375 TX/PRO/DX INJ NEW DRUG ADDON: CPT

## 2022-06-24 PROCEDURE — 80053 COMPREHEN METABOLIC PANEL: CPT | Mod: ER | Performed by: EMERGENCY MEDICINE

## 2022-06-24 PROCEDURE — 83880 ASSAY OF NATRIURETIC PEPTIDE: CPT | Mod: ER | Performed by: NURSE PRACTITIONER

## 2022-06-24 PROCEDURE — 83615 LACTATE (LD) (LDH) ENZYME: CPT | Mod: ER | Performed by: EMERGENCY MEDICINE

## 2022-06-24 PROCEDURE — 84145 PROCALCITONIN (PCT): CPT | Mod: ER | Performed by: NURSE PRACTITIONER

## 2022-06-24 PROCEDURE — 83605 ASSAY OF LACTIC ACID: CPT | Mod: ER | Performed by: EMERGENCY MEDICINE

## 2022-06-24 PROCEDURE — 93010 EKG 12-LEAD: ICD-10-PCS | Mod: ,,, | Performed by: INTERNAL MEDICINE

## 2022-06-24 PROCEDURE — 99900035 HC TECH TIME PER 15 MIN (STAT): Mod: ER

## 2022-06-24 PROCEDURE — 93010 ELECTROCARDIOGRAM REPORT: CPT | Mod: ,,, | Performed by: INTERNAL MEDICINE

## 2022-06-24 PROCEDURE — 87502 INFLUENZA DNA AMP PROBE: CPT | Mod: ER

## 2022-06-24 PROCEDURE — 36415 COLL VENOUS BLD VENIPUNCTURE: CPT | Performed by: STUDENT IN AN ORGANIZED HEALTH CARE EDUCATION/TRAINING PROGRAM

## 2022-06-24 PROCEDURE — 86140 C-REACTIVE PROTEIN: CPT | Mod: ER | Performed by: EMERGENCY MEDICINE

## 2022-06-24 PROCEDURE — 82728 ASSAY OF FERRITIN: CPT | Performed by: EMERGENCY MEDICINE

## 2022-06-24 PROCEDURE — 63600175 PHARM REV CODE 636 W HCPCS: Performed by: NURSE PRACTITIONER

## 2022-06-24 PROCEDURE — 85610 PROTHROMBIN TIME: CPT | Performed by: STUDENT IN AN ORGANIZED HEALTH CARE EDUCATION/TRAINING PROGRAM

## 2022-06-24 PROCEDURE — 25000003 PHARM REV CODE 250: Performed by: NURSE PRACTITIONER

## 2022-06-24 PROCEDURE — 99285 EMERGENCY DEPT VISIT HI MDM: CPT | Mod: 25,ER

## 2022-06-24 RX ORDER — ACETAMINOPHEN 325 MG/1
650 TABLET ORAL
Status: COMPLETED | OUTPATIENT
Start: 2022-06-24 | End: 2022-06-24

## 2022-06-24 RX ORDER — SODIUM CHLORIDE 0.9 % (FLUSH) 0.9 %
10 SYRINGE (ML) INJECTION
Status: DISCONTINUED | OUTPATIENT
Start: 2022-06-24 | End: 2022-06-24

## 2022-06-24 RX ORDER — DULOXETIN HYDROCHLORIDE 20 MG/1
20 CAPSULE, DELAYED RELEASE ORAL DAILY
Status: DISCONTINUED | OUTPATIENT
Start: 2022-06-25 | End: 2022-06-25 | Stop reason: HOSPADM

## 2022-06-24 RX ORDER — ATORVASTATIN CALCIUM 10 MG/1
20 TABLET, FILM COATED ORAL DAILY
Refills: 3 | Status: DISCONTINUED | OUTPATIENT
Start: 2022-06-25 | End: 2022-06-25 | Stop reason: HOSPADM

## 2022-06-24 RX ORDER — INSULIN ASPART 100 [IU]/ML
0-5 INJECTION, SOLUTION INTRAVENOUS; SUBCUTANEOUS
Status: DISCONTINUED | OUTPATIENT
Start: 2022-06-24 | End: 2022-06-25 | Stop reason: HOSPADM

## 2022-06-24 RX ORDER — ACETAMINOPHEN 325 MG/1
650 TABLET ORAL EVERY 6 HOURS PRN
Status: DISCONTINUED | OUTPATIENT
Start: 2022-06-24 | End: 2022-06-25 | Stop reason: HOSPADM

## 2022-06-24 RX ORDER — BISACODYL 10 MG
10 SUPPOSITORY, RECTAL RECTAL DAILY PRN
Status: DISCONTINUED | OUTPATIENT
Start: 2022-06-24 | End: 2022-06-25 | Stop reason: HOSPADM

## 2022-06-24 RX ORDER — DEXAMETHASONE SODIUM PHOSPHATE 4 MG/ML
8 INJECTION, SOLUTION INTRA-ARTICULAR; INTRALESIONAL; INTRAMUSCULAR; INTRAVENOUS; SOFT TISSUE
Status: COMPLETED | OUTPATIENT
Start: 2022-06-24 | End: 2022-06-24

## 2022-06-24 RX ORDER — METOPROLOL SUCCINATE 50 MG/1
100 TABLET, EXTENDED RELEASE ORAL DAILY
Status: DISCONTINUED | OUTPATIENT
Start: 2022-06-25 | End: 2022-06-25 | Stop reason: HOSPADM

## 2022-06-24 RX ORDER — CHOLECALCIFEROL (VITAMIN D3) 25 MCG
1000 TABLET ORAL DAILY
Status: DISCONTINUED | OUTPATIENT
Start: 2022-06-25 | End: 2022-06-25 | Stop reason: HOSPADM

## 2022-06-24 RX ORDER — FLECAINIDE ACETATE 50 MG/1
50 TABLET ORAL EVERY 12 HOURS
Status: DISCONTINUED | OUTPATIENT
Start: 2022-06-24 | End: 2022-06-25 | Stop reason: HOSPADM

## 2022-06-24 RX ORDER — ZINC SULFATE 50(220)MG
220 CAPSULE ORAL DAILY
Status: DISCONTINUED | OUTPATIENT
Start: 2022-06-25 | End: 2022-06-25 | Stop reason: HOSPADM

## 2022-06-24 RX ORDER — SODIUM CHLORIDE 0.9 % (FLUSH) 0.9 %
10 SYRINGE (ML) INJECTION
Status: DISCONTINUED | OUTPATIENT
Start: 2022-06-24 | End: 2022-06-25 | Stop reason: HOSPADM

## 2022-06-24 RX ORDER — ASCORBIC ACID 500 MG
500 TABLET ORAL 2 TIMES DAILY
Status: DISCONTINUED | OUTPATIENT
Start: 2022-06-24 | End: 2022-06-25 | Stop reason: HOSPADM

## 2022-06-24 RX ORDER — ONDANSETRON 2 MG/ML
4 INJECTION INTRAMUSCULAR; INTRAVENOUS EVERY 8 HOURS PRN
Status: DISCONTINUED | OUTPATIENT
Start: 2022-06-24 | End: 2022-06-25 | Stop reason: HOSPADM

## 2022-06-24 RX ORDER — ALBUTEROL SULFATE 90 UG/1
2 AEROSOL, METERED RESPIRATORY (INHALATION) EVERY 6 HOURS
Status: DISCONTINUED | OUTPATIENT
Start: 2022-06-24 | End: 2022-06-25 | Stop reason: HOSPADM

## 2022-06-24 RX ORDER — IBUPROFEN 200 MG
24 TABLET ORAL
Status: DISCONTINUED | OUTPATIENT
Start: 2022-06-24 | End: 2022-06-25 | Stop reason: HOSPADM

## 2022-06-24 RX ORDER — LOPERAMIDE HYDROCHLORIDE 2 MG/1
2 CAPSULE ORAL EVERY 6 HOURS PRN
Status: DISCONTINUED | OUTPATIENT
Start: 2022-06-24 | End: 2022-06-25 | Stop reason: HOSPADM

## 2022-06-24 RX ORDER — GLUCAGON 1 MG
1 KIT INJECTION
Status: DISCONTINUED | OUTPATIENT
Start: 2022-06-24 | End: 2022-06-25 | Stop reason: HOSPADM

## 2022-06-24 RX ORDER — PROMETHAZINE HYDROCHLORIDE 25 MG/1
25 TABLET ORAL EVERY 6 HOURS PRN
Status: DISCONTINUED | OUTPATIENT
Start: 2022-06-24 | End: 2022-06-25 | Stop reason: HOSPADM

## 2022-06-24 RX ORDER — IBUPROFEN 200 MG
16 TABLET ORAL
Status: DISCONTINUED | OUTPATIENT
Start: 2022-06-24 | End: 2022-06-25 | Stop reason: HOSPADM

## 2022-06-24 RX ORDER — GUAIFENESIN/DEXTROMETHORPHAN 100-10MG/5
10 SYRUP ORAL EVERY 4 HOURS PRN
Status: DISCONTINUED | OUTPATIENT
Start: 2022-06-24 | End: 2022-06-25 | Stop reason: HOSPADM

## 2022-06-24 RX ORDER — TALC
6 POWDER (GRAM) TOPICAL NIGHTLY PRN
Status: DISCONTINUED | OUTPATIENT
Start: 2022-06-24 | End: 2022-06-25 | Stop reason: HOSPADM

## 2022-06-24 RX ADMIN — ALBUTEROL SULFATE 2 PUFF: 90 AEROSOL, METERED RESPIRATORY (INHALATION) at 08:06

## 2022-06-24 RX ADMIN — ACETAMINOPHEN 650 MG: 325 TABLET ORAL at 06:06

## 2022-06-24 RX ADMIN — OXYCODONE HYDROCHLORIDE AND ACETAMINOPHEN 500 MG: 500 TABLET ORAL at 10:06

## 2022-06-24 RX ADMIN — DEXAMETHASONE SODIUM PHOSPHATE 8 MG: 4 INJECTION INTRA-ARTICULAR; INTRALESIONAL; INTRAMUSCULAR; INTRAVENOUS; SOFT TISSUE at 09:06

## 2022-06-24 RX ADMIN — REMDESIVIR 200 MG: 100 INJECTION, POWDER, LYOPHILIZED, FOR SOLUTION INTRAVENOUS at 10:06

## 2022-06-24 NOTE — ED PROVIDER NOTES
Encounter Date: 6/24/2022       History     Chief Complaint   Patient presents with    Fever     Fever, congestion, and cough for the last 3 days.     Today is day 5 of symptoms, she is testing positive for COVID-19 on arrival.  Never previously tested positive.  Two doses of COVID-19 vaccine, both over a year ago.  No history of lung disease, never a smoker.  Underlying history of hypertension, gastroesophageal reflux, diabetes, atrial fibrillation on chronic Eliquis, multiple incidental surgeries, others as listed.  Symptoms include fever, congestion, cough, small amounts of hemoptysis, generalized weakness, dizziness, a sense of wheezing and a sense of dyspnea.  No known ill contacts or COVID-19 exposures.  Initial pulse ox 95% in triage. At the beginning of my evaluation, 92% on room air witnessed; with further deep breathing - brief oxygen saturation noted between 94 & 97% on room air, but subsequently persistently 87-91% for well over an hour while resting.   No other specific complaints.      The history is provided by the patient. No  was used.     Review of patient's allergies indicates:   Allergen Reactions    Floxin [ofloxacin]     Fluvastatin     Pravastatin Other (See Comments)     myalgias    Trazodone      Racing heart     Past Medical History:   Diagnosis Date    Achalasia     demetrius    Atrial fibrillation with RVR 5/11/2021    Cataract     Colon polyp     Gastroesophageal reflux     Hiatal hernia     Hx of colonic polyps 08/15/2014    per colonoscopy in      Hypertension     Peripheral neuropathy     Postmenopausal HRT (hormone replacement therapy)     failed tapering off    Sepsis 5/5/2021    Last Assessment & Plan:  Formatting of this note might be different from the original. History & Physical Patient meets sepsis criteria with a white cell count of 15, and tachypnea.  Source of infection not clear at this time.  No evidence for meningitis.  Cover with  broad-spectrum antibiotics especially given invasive dental procedure done recently.  Check blood cultures and monitor for fever.  R    Sinusitis     Thyroid nodule 3/16 subcm; kathleen 2 yrs    Type 2 diabetes mellitus with neurological manifestations     Vasodepressor syncope 2018     Past Surgical History:   Procedure Laterality Date    BREAST BIOPSY Left 2021    cataract Left      SECTION      CHOLECYSTECTOMY      [facility]    HYSTERECTOMY      nonca    OOPHORECTOMY      TILT TABLE TEST N/A 10/25/2018    Procedure: TILT TABLE TEST;  Surgeon: Daryl Walker MD;  [facility] C[facility] LAB;  Service: Cardiology;  Laterality: N/A;  VAS.DEP.SYN,HUT,FAS,3PREP    TONSILLECTOMY       Family History   Problem Relation Age of Onset    Aneurysm Sister     Heart disease Mother     Diabetes Father     Coronary artery disease Father     Coronary artery disease Brother     Parkinsonism Brother      Social History     Tobacco Use    Smoking status: Never Smoker    Smokeless tobacco: Never Used   Substance Use Topics    Alcohol use: No    Drug use: No     Review of Systems   Constitutional: Negative for activity change, fatigue and fever.   HENT: Negative for congestion, ear pain, facial swelling, nosebleeds, sinus pressure and sore throat.    Eyes: Negative for pain, discharge, redness and visual disturbance.   Respiratory: Positive for cough and shortness of breath. Negative for choking, chest tightness and wheezing.    Cardiovascular: Negative for chest pain, palpitations and leg swelling.   Gastrointestinal: Negative for abdominal distention, abdominal pain, nausea and vomiting.   Endocrine: Negative for heat intolerance, polydipsia and polyuria.   Genitourinary: Negative for difficulty urinating, dysuria, flank pain, hematuria and urgency.   Musculoskeletal: Negative for back pain, gait problem, joint swelling and myalgias.    Skin: Negative for color change and rash.   Allergic/Immunologic: Negative for environmental allergies and food allergies.   Neurological: Positive for dizziness and weakness. Negative for numbness and headaches.   Hematological: Negative for adenopathy. Does not bruise/bleed easily.   Psychiatric/Behavioral: Negative for agitation and behavioral problems. The patient is not nervous/anxious.    All other systems reviewed and are negative.      Physical Exam     Initial Vitals [06/24/22 1312]   BP Pulse Resp Temp SpO2   (!) 115/51 62 18 98.4 °F (36.9 °C) 95 %      MAP       --         Physical Exam    Nursing note and vitals reviewed.  Constitutional: She appears well-developed and well-nourished. She is not diaphoretic. She appears distressed.   Intermittent coarse cough, mildly ill-appearing, initial pulse ox noted 92% on room air but corrects without intervention to 98 or 99%.   HENT:   Head: Normocephalic and atraumatic.   Mouth/Throat: No oropharyngeal exudate.   Eyes: Conjunctivae and EOM are normal. Pupils are equal, round, and reactive to light. Right eye exhibits no discharge. Left eye exhibits no discharge. No scleral icterus.   Neck: Neck supple. No thyromegaly present. No tracheal deviation present. No JVD present.   Normal range of motion.  Cardiovascular: Normal rate, regular rhythm, normal heart sounds and intact distal pulses. Exam reveals no gallop and no friction rub.    No murmur heard.  Pulmonary/Chest: No stridor. No respiratory distress. She has wheezes. She has rhonchi. She has no rales. She exhibits no tenderness.   Mild to moderate scattered coarse rhonchi and wheezing with mildly diminished air entry throughout, mild to moderate cough at times during exam, no visualized sputum or hemoptysis   Abdominal: Abdomen is soft. Bowel sounds are normal. She exhibits no distension and no mass. There is no abdominal tenderness. There is no rebound and no guarding.   Musculoskeletal:         General:  No tenderness or edema. Normal range of motion.      Cervical back: Normal range of motion and neck supple.     Neurological: She is alert and oriented to person, place, and time. She has normal strength.   Skin: Skin is warm and dry. No rash and no abscess noted. No erythema.   Psychiatric: She has a normal mood and affect. Her behavior is normal. Judgment and thought content normal.         ED Course   Procedures  Labs Reviewed   CBC W/ AUTO DIFFERENTIAL - Abnormal; Notable for the following components:       Result Value    RBC 3.96 (*)     Hemoglobin 11.7 (*)     Hematocrit 35.8 (*)     Lymph # 0.8 (*)     All other components within normal limits   COMPREHENSIVE METABOLIC PANEL - Abnormal; Notable for the following components:    Glucose 156 (*)     Albumin 3.0 (*)     eGFR if non  56.8 (*)     All other components within normal limits   C-REACTIVE PROTEIN - Abnormal; Notable for the following components:    .2 (*)     All other components within normal limits   SARS-COV-2 RDRP GENE - Abnormal; Notable for the following components:    POC Rapid COVID Positive (*)     All other components within normal limits   ISTAT PROCEDURE - Abnormal; Notable for the following components:    POC PH 7.523 (*)     POC PCO2 32.5 (*)     POC PO2 79 (*)     All other components within normal limits   LACTATE DEHYDROGENASE   CK   LACTIC ACID, PLASMA   TROPONIN I   FERRITIN   POCT INFLUENZA A/B MOLECULAR     EKG Readings: (Independently Interpreted)   Initial Reading: No STEMI. Rhythm: Normal Sinus Rhythm. Heart Rate: 61. Ectopy: PACs.   Normal sinus rhythm with PACs with aberrant conduction, slightly noisy baseline, no acute concerning change     ECG Results          EKG 12-lead (In process)  Result time 06/24/22 14:21:08    In process by Interface, Lab In [facility] (06/24/22 14:21:08)                 Narrative:    Test Reason : U07.1,    Vent. Rate : 061 BPM     Atrial Rate : 061 BPM     P-R Int : 160 ms           QRS Dur : 086 ms      QT Int : 426 ms       P-R-T Axes : 040 036 044 degrees     QTc Int : 428 ms    Sinus rhythm with Premature atrial complexes with Aberrant conduction  Otherwise normal ECG  When compared with ECG of 12-MAY-2021 09:01,  Sinus rhythm has replaced Atrial fibrillation  Vent. rate has decreased  BPM  ST no longer depressed in Inferior leads  ST no longer depressed in Anterior-lateral leads  T wave inversion no longer evident in Inferior leads  T wave inversion no longer evident in Anterior-lateral leads    Referred By: [email or web address]   SELF           Confirmed By:                             Imaging Results          X-Ray Chest AP Portable (Final result)  Result time 06/24/22 14:07:15    Final result by Herminio Hawkins III, MD (06/24/22 14:07:15)                 Impression:      No acute findings.      Electronically signed by: Herminio Hawkins MD  Date:    06/24/2022  Time:    14:07             Narrative:    EXAMINATION:  XR CHEST AP PORTABLE    CLINICAL HISTORY:  COVID-19;    FINDINGS:  Comparison is made with the most recent prior chest x-ray.  The cardiomediastinal silhouette is within normal limits for AP technique. The lungs appear clear of active disease. No acute appearing infiltrate, pleural effusion or pneumothorax identified.                              2:17 PM ABG reveals nearly normal oxygenation associated with mild hyperventilation and hypocarbia, respiratory alkalosis. O2 sat on RA noted to vary between 87% and 97%.    3:01 PM Oxygen saturation predominantly ranging in the upper 80s to low 90s.  Adding supplemental oxygen.    3:52 PM Maintaining normal oxygen saturation with 2 L nasal cannula.  Discussed with patient and Hospital Medicine, admit for supplemental oxygen and further evaluation.    All historical, clinical, radiographic, and laboratory findings were reviewed with the patient/family in detail along with the indications for transport to the facility in [place] in  order to receive Hospital Medicine evaluation and treatment.  All remaining questions and concerns were addressed at this time and the patient/family communicates understanding and agrees to proceed accordingly.  Similarly, all pertinent details of the encounter were discussed with Dr. Quintero who agrees to receive the patient at [facility] for further care as outlined above.  The patient will be transferred by [facility] secondary to a need for ongoing  O2/ monitoring en route.  Herminio Austin MD  3:53 PM            Medications - No data to display                       Clinical Impression:   Final diagnoses:  [U07.1] COVID-19 (Primary)  [R09.02] Hypoxemia          ED Disposition Condition    Observation               Herminio Austin MD  06/24/22 6025

## 2022-06-25 VITALS
BODY MASS INDEX: 29.69 KG/M2 | WEIGHT: 167.56 LBS | OXYGEN SATURATION: 91 % | HEART RATE: 70 BPM | HEIGHT: 63 IN | RESPIRATION RATE: 18 BRPM | SYSTOLIC BLOOD PRESSURE: 139 MMHG | DIASTOLIC BLOOD PRESSURE: 66 MMHG | TEMPERATURE: 98 F

## 2022-06-25 DIAGNOSIS — U07.1 COVID-19 VIRUS DETECTED: ICD-10-CM

## 2022-06-25 PROBLEM — J20.8 ACUTE BRONCHITIS DUE TO COVID-19 VIRUS: Status: ACTIVE | Noted: 2022-06-25

## 2022-06-25 LAB
ALBUMIN SERPL BCP-MCNC: 3.1 G/DL (ref 3.5–5.2)
ALP SERPL-CCNC: 70 U/L (ref 55–135)
ALT SERPL W/O P-5'-P-CCNC: 27 U/L (ref 10–44)
ANION GAP SERPL CALC-SCNC: 13 MMOL/L (ref 8–16)
AST SERPL-CCNC: 36 U/L (ref 10–40)
BASOPHILS # BLD AUTO: 0.01 K/UL (ref 0–0.2)
BASOPHILS NFR BLD: 0.3 % (ref 0–1.9)
BILIRUB SERPL-MCNC: 0.2 MG/DL (ref 0.1–1)
BUN SERPL-MCNC: 10 MG/DL (ref 8–23)
CALCIUM SERPL-MCNC: 8.8 MG/DL (ref 8.7–10.5)
CHLORIDE SERPL-SCNC: 106 MMOL/L (ref 95–110)
CO2 SERPL-SCNC: 20 MMOL/L (ref 23–29)
CREAT SERPL-MCNC: 1 MG/DL (ref 0.5–1.4)
DIFFERENTIAL METHOD: ABNORMAL
EOSINOPHIL # BLD AUTO: 0 K/UL (ref 0–0.5)
EOSINOPHIL NFR BLD: 0.3 % (ref 0–8)
ERYTHROCYTE [DISTWIDTH] IN BLOOD BY AUTOMATED COUNT: 11.9 % (ref 11.5–14.5)
ERYTHROCYTE [SEDIMENTATION RATE] IN BLOOD BY PHOTOMETRIC METHOD: 67 MM/HR (ref 0–36)
EST. GFR  (AFRICAN AMERICAN): >60 ML/MIN/1.73 M^2
EST. GFR  (NON AFRICAN AMERICAN): 57 ML/MIN/1.73 M^2
FERRITIN SERPL-MCNC: 226 NG/ML (ref 20–300)
GLUCOSE SERPL-MCNC: 285 MG/DL (ref 70–110)
HCT VFR BLD AUTO: 36.1 % (ref 37–48.5)
HGB BLD-MCNC: 11.9 G/DL (ref 12–16)
IMM GRANULOCYTES # BLD AUTO: 0.05 K/UL (ref 0–0.04)
IMM GRANULOCYTES NFR BLD AUTO: 1.5 % (ref 0–0.5)
LYMPHOCYTES # BLD AUTO: 0.7 K/UL (ref 1–4.8)
LYMPHOCYTES NFR BLD: 19.1 % (ref 18–48)
MAGNESIUM SERPL-MCNC: 1.9 MG/DL (ref 1.6–2.6)
MCH RBC QN AUTO: 29.6 PG (ref 27–31)
MCHC RBC AUTO-ENTMCNC: 33 G/DL (ref 32–36)
MCV RBC AUTO: 90 FL (ref 82–98)
MONOCYTES # BLD AUTO: 0.1 K/UL (ref 0.3–1)
MONOCYTES NFR BLD: 2.9 % (ref 4–15)
NEUTROPHILS # BLD AUTO: 2.6 K/UL (ref 1.8–7.7)
NEUTROPHILS NFR BLD: 75.9 % (ref 38–73)
NRBC BLD-RTO: 0 /100 WBC
PHOSPHATE SERPL-MCNC: 2.3 MG/DL (ref 2.7–4.5)
PLATELET # BLD AUTO: 189 K/UL (ref 150–450)
PMV BLD AUTO: 9.3 FL (ref 9.2–12.9)
POCT GLUCOSE: 255 MG/DL (ref 70–110)
POTASSIUM SERPL-SCNC: 5.2 MMOL/L (ref 3.5–5.1)
PROT SERPL-MCNC: 6.5 G/DL (ref 6–8.4)
RBC # BLD AUTO: 4.02 M/UL (ref 4–5.4)
SODIUM SERPL-SCNC: 139 MMOL/L (ref 136–145)
WBC # BLD AUTO: 3.4 K/UL (ref 3.9–12.7)

## 2022-06-25 PROCEDURE — 85025 COMPLETE CBC W/AUTO DIFF WBC: CPT | Performed by: NURSE PRACTITIONER

## 2022-06-25 PROCEDURE — 63600175 PHARM REV CODE 636 W HCPCS: Mod: TB | Performed by: NURSE PRACTITIONER

## 2022-06-25 PROCEDURE — 94640 AIRWAY INHALATION TREATMENT: CPT | Mod: XB

## 2022-06-25 PROCEDURE — 99900035 HC TECH TIME PER 15 MIN (STAT)

## 2022-06-25 PROCEDURE — 84100 ASSAY OF PHOSPHORUS: CPT | Performed by: NURSE PRACTITIONER

## 2022-06-25 PROCEDURE — 83735 ASSAY OF MAGNESIUM: CPT | Performed by: NURSE PRACTITIONER

## 2022-06-25 PROCEDURE — G0378 HOSPITAL OBSERVATION PER HR: HCPCS

## 2022-06-25 PROCEDURE — 25000003 PHARM REV CODE 250: Performed by: NURSE PRACTITIONER

## 2022-06-25 PROCEDURE — 80053 COMPREHEN METABOLIC PANEL: CPT | Performed by: NURSE PRACTITIONER

## 2022-06-25 PROCEDURE — 36415 COLL VENOUS BLD VENIPUNCTURE: CPT | Performed by: NURSE PRACTITIONER

## 2022-06-25 PROCEDURE — 94799 UNLISTED PULMONARY SVC/PX: CPT

## 2022-06-25 PROCEDURE — 96372 THER/PROPH/DIAG INJ SC/IM: CPT | Performed by: NURSE PRACTITIONER

## 2022-06-25 PROCEDURE — 94640 AIRWAY INHALATION TREATMENT: CPT

## 2022-06-25 PROCEDURE — 96366 THER/PROPH/DIAG IV INF ADDON: CPT

## 2022-06-25 RX ORDER — DEXAMETHASONE 6 MG/1
6 TABLET ORAL DAILY
Qty: 8 TABLET | Refills: 0 | Status: SHIPPED | OUTPATIENT
Start: 2022-06-26 | End: 2022-07-06

## 2022-06-25 RX ORDER — ASCORBIC ACID 500 MG
500 TABLET ORAL 2 TIMES DAILY
Status: ON HOLD
Start: 2022-06-25 | End: 2022-10-19 | Stop reason: HOSPADM

## 2022-06-25 RX ADMIN — INSULIN ASPART 2 UNITS: 100 INJECTION, SOLUTION INTRAVENOUS; SUBCUTANEOUS at 06:06

## 2022-06-25 RX ADMIN — FLECAINIDE ACETATE 50 MG: 50 TABLET ORAL at 08:06

## 2022-06-25 RX ADMIN — METOPROLOL SUCCINATE 100 MG: 50 TABLET, EXTENDED RELEASE ORAL at 08:06

## 2022-06-25 RX ADMIN — APIXABAN 5 MG: 2.5 TABLET, FILM COATED ORAL at 08:06

## 2022-06-25 RX ADMIN — DEXAMETHASONE 6 MG: 4 TABLET ORAL at 08:06

## 2022-06-25 RX ADMIN — ZINC SULFATE 220 MG (50 MG) CAPSULE 220 MG: CAPSULE at 08:06

## 2022-06-25 RX ADMIN — OXYCODONE HYDROCHLORIDE AND ACETAMINOPHEN 500 MG: 500 TABLET ORAL at 08:06

## 2022-06-25 RX ADMIN — DULOXETINE HYDROCHLORIDE 20 MG: 20 CAPSULE, DELAYED RELEASE ORAL at 08:06

## 2022-06-25 RX ADMIN — THERA TABS 1 TABLET: TAB at 08:06

## 2022-06-25 RX ADMIN — ATORVASTATIN CALCIUM 20 MG: 10 TABLET, FILM COATED ORAL at 08:06

## 2022-06-25 RX ADMIN — REMDESIVIR 100 MG: 100 INJECTION, POWDER, LYOPHILIZED, FOR SOLUTION INTRAVENOUS at 08:06

## 2022-06-25 RX ADMIN — ALBUTEROL SULFATE 2 PUFF: 90 AEROSOL, METERED RESPIRATORY (INHALATION) at 07:06

## 2022-06-25 RX ADMIN — ALBUTEROL SULFATE 2 PUFF: 90 AEROSOL, METERED RESPIRATORY (INHALATION) at 01:06

## 2022-06-25 RX ADMIN — Medication 1000 UNITS: at 08:06

## 2022-06-25 NOTE — DISCHARGE SUMMARY
[facility]  Discharge Summary      Patient Name: Kelsie Bustamante  MRN: [id number]  Patient Class: OP- Observation  Admission Date: 6/24/2022  Hospital Length of Stay: 0 days  Discharge Date and Time:  06/25/2022 12:24 PM  Attending Physician: No att. providers found   Discharging Provider: Teddy Piedra NP  Primary Care Provider: Cody Parks MD      HPI:   Ms. Bustamante is a 71 yo female with a PMHx of DM II, GERD, HLD, HTN, fibromyalgia, and PAF on Eliquis, who presented to [facility] ED in [facility] with c/o nonproductive cough x 3-4 days. Associated mild SOB, congestion, wheezing, subjective fever, dizziness, and generalized weakness. No aggravating or alleviating factors. Denies any CP, palpitations, rhinorrhea, sore throat, orthopnea, edema, ABD pain, N/V/D, HA, AMS, lightheadedness, syncope, myalgias, or chills. Patient was fully vaccinated in 3/2021, no booster. While in the ED, patient became hypoxic with O2 sat of 87% on RA. She was placed on 2L NC, which improved O2 sat to >92%. Work-up in the ED showed: Positive COVID-19, normal WBC and lactic, procalcitonin 0.07, , , CPK 58, troponin <0.006, BNP 37, D-Dimer 0.36. CXR was unremarkable. EKG unrevealing. ABG resulted pH 7.523, pCO2 32.5, pO2 79, and SaO2 97 on RA. Patient was transferred to [facility] and placed in Observation under Hospital Medicine services.    Patient is a Full Code. Her spouse is SDM.      * No surgery found *      Hospital Course:   72 year old female who was placed in observation for COVID-19. CXR did not show pneumonia. Was on 2L nasal cannula and weaned to room air. She received Remdesivir x 2 doses and dexamthesone for treatment. States she felt better with treatment. Hyperkalemia noted. Suspect hemolysis given normal BUN and creatinine. She will continue steroids x 8 days. Asked to continue to MVI, Zinc, And Vitamin C as well. She was asked to follow up with PCP in 3 days.  Stable for discharge.        Goals of Care Treatment Preferences:  Code Status: Full Code      Consults:     No new Assessment & Plan notes have been filed under this hospital service since the last note was generated.  Service: Hospital Medicine    Final Active Diagnoses:    Diagnosis Date Noted POA    PRINCIPAL PROBLEM:  Acute respiratory failure due to COVID-19 with acute bronchitis [U07.1, J96.00] 06/24/2022 Yes    PAF (paroxysmal atrial fibrillation) [I48.0] 05/25/2021 Yes     Chronic    Type 2 diabetes mellitus, without long-term current use of insulin [E11.9]  Yes     Chronic    Primary hypertension [I10]  Yes     Chronic      Problems Resolved During this Admission:       Discharged Condition: stable    Disposition: Home or Self Care    Follow Up:    Patient Instructions:   No discharge procedures on file.    Significant Diagnostic Studies: Labs:   CMP   Recent Labs   Lab 06/24/22  1413 06/25/22  0357    139   K 4.2 5.2*    106   CO2 23 20*   * 285*   BUN 10 10   CREATININE 1.0 1.0   CALCIUM 9.0 8.8   PROT 6.5 6.5   ALBUMIN 3.0* 3.1*   BILITOT 0.3 0.2   ALKPHOS 59 70   AST 21 36   ALT 17 27   ANIONGAP 13 13   ESTGFRAFRICA >60.0 >60   EGFRNONAA 56.8* 57*    and CBC   Recent Labs   Lab 06/24/22  1413 06/25/22  0504   WBC 4.48 3.40*   HGB 11.7* 11.9*   HCT 35.8* 36.1*    189       Pending Diagnostic Studies:     None         Medications:  Reconciled Home Medications:      Medication List      START taking these medications    ascorbic acid (vitamin C) 500 MG tablet  Commonly known as: VITAMIN C  Take 1 tablet (500 mg total) by mouth 2 (two) times daily.     dexAMETHasone 6 MG tablet  Commonly known as: DECADRON  Take 1 tablet (6 mg total) by mouth once daily. for 10 days  Start taking on: June 26, 2022        CONTINUE taking these medications    albuterol 90 mcg/actuation inhaler  Commonly known as: PROVENTIL/VENTOLIN HFA  Inhale 2 puffs into the lungs every 6 (six) hours as needed  for Wheezing.     apixaban 5 mg Tab  Commonly known as: ELIQUIS  Take 1 tablet (5 mg total) by mouth 2 (two) times daily.     biotin 10,000 mcg Cap  Take 1 capsule by mouth once daily.     blood sugar diagnostic Strp  Commonly known as: BLOOD GLUCOSE TEST  Test 2 times daily     blood-glucose meter kit  Test twice daily     chlorhexidine 0.12 % solution  Commonly known as: PERIDEX  SWISH AND SPIT 15MLS BY MOUTH TWICE DAILY     cyanocobalamin 2000 MCG tablet  Take 2,500 mcg by mouth.     DEEP SEA NASAL 0.65 % nasal spray  Generic drug: sodium chloride     DULoxetine 20 MG capsule  Commonly known as: CYMBALTA  Take 1 capsule (20 mg total) by mouth once daily.     estrogens (conjugated) 0.625 MG tablet  Commonly known as: PREMARIN  Take 1 tablet (0.625 mg total) by mouth once daily.     flecainide 50 MG Tab  Commonly known as: TAMBOCOR  Take 1 tablet (50 mg total) by mouth every 12 (twelve) hours.     FLUZONE HIGHDOSE QUAD 20-21  mcg/0.7 mL Syrg  Generic drug: flu vacc [id number](65yr up)-PF  ADM 0.7ML IM UTD     HYDROcodone-acetaminophen 5-325 mg per tablet  Commonly known as: NORCO  Take 1 tablet by mouth every 4 (four) hours as needed for Pain.     ibuprofen 800 MG tablet  Commonly known as: ADVIL,MOTRIN  Take 800 mg by mouth every 4 to 6 hours as needed.     JANUMET 50-1,000 mg per tablet  Generic drug: SITagliptan-metformin  Take 1 tablet by mouth 2 (two) times daily with meals.     lancets Misc  Test twice daily     levalbuterol 1.25 mg/3 mL nebulizer solution  Commonly known as: XOPENEX  USE 1 VIAL IN NEBULIZER THREE TIMES DAILY AS NEEDED FOR WHEEZING FOR 5 DAYS     metoprolol succinate 100 MG 24 hr tablet  Commonly known as: TOPROL-XL  Take 1 tablet (100 mg total) by mouth once daily.     omeprazole 40 MG capsule  Commonly known as: PRILOSEC  Take 1 capsule (40 mg total) by mouth every evening.     promethazine-dextromethorphan 6.25-15 mg/5 mL Syrp  Commonly known as: PROMETHAZINE-DM  TAKE 5 ML BY MOUTH IN  THE EVENING     rosuvastatin 5 MG tablet  Commonly known as: CRESTOR  Take 1 tablet (5 mg total) by mouth once daily.     zolpidem 10 mg Tab  Commonly known as: AMBIEN  1/2 po qhs prn insomnia            Indwelling Lines/Drains at time of discharge:   Lines/Drains/Airways     None                 Time spent on the discharge of patient: >35 minutes         Teddy Piedra NP  Department of Hospital Medicine  [facility] - Telemetry ([facility])

## 2022-06-25 NOTE — ASSESSMENT & PLAN NOTE
- Hold Janumet during hospital stay.   - Accuchecks with low dose SSI.  - Diabetic diet.  - Previous HbA1c of 5.8.

## 2022-06-25 NOTE — ASSESSMENT & PLAN NOTE
- O2 sat stable on 2 L NC. Continue oxygen supplementation as needed to keep sat >92%.  - Start Remdesivir.  - PO dexamethasone 6 mg daily x 10 days.  - Albuterol MDI Q6.  - Will hold off on antibiotics given normal procalcitonin.   - D-Dimer normal. Continue home Eliquis for AC.   - Antipyretics and antitussives as needed.  - MV, ascorbic acid, zinc, vitamin D.  - Continue isolation precautions.     - COVID-19 testing   - Infection Control notified     - Isolation:   - Airborne, Contact and Droplet Precautions  - Cohort patients into COVID units  - N95 mask, wear eye protection  - 20 second hand hygiene              - Limit visitors per hospital policy              - Consolidating lab draws, nursing care, provider visits, and interventions    - Diagnostics: (leukopenia, hyponatremia, hyperferritinemia, elevated troponin, elevated d-dimer, age, and comorbidities are significant predictors of poor clinical outcome)  CBC, CMP, Procalcitonin, Ferritin, CRP, LDH, BNP, Troponin, ECG, Rapid Flu and Portable CXR    - Management:  Supplemental O2 to maintain SpO2 >92%  Telemetry  Continuous/intermittent Pulse Ox  Albuterol treatment PRN

## 2022-06-25 NOTE — PLAN OF CARE
[facility] - Telemetry (Hospital)  Discharge Final Note    Primary Care Provider: Cody Parks MD    Expected Discharge Date: 6/25/2022    Final Discharge Note (most recent)     Final Note - 06/25/22 0943        Final Note    Assessment Type Final Discharge Note     Anticipated Discharge Disposition Home or Self Care        Post-Acute Status    Discharge Delays None known at this time               No needs at the time of chart review. KM,MSW    Important Message from Medicare

## 2022-06-25 NOTE — H&P
[facility]  History & Physical    Patient Name: Kelsie Bustamante  MRN: [id number]  Patient Class: OP- Observation  Admission Date: 6/24/2022  Attending Physician: Tl Gaspar MD   Primary Care Provider: Cody Parks MD         Patient information was obtained from patient, past medical records and ER records.     Subjective:     Principal Problem:Acute respiratory failure due to COVID-19    Chief Complaint:   Chief Complaint   Patient presents with    Fever     Fever, congestion, and cough for the last 3 days.    Cough        HPI: Ms. Bustamante is a 71 yo female with a PMHx of DM II, GERD, HLD, HTN, fibromyalgia, and PAF on Eliquis, who presented to [facility] ED in [facility] with c/o nonproductive cough x 3-4 days. Associated mild SOB, congestion, wheezing, subjective fever, dizziness, and generalized weakness. No aggravating or alleviating factors. Denies any CP, palpitations, rhinorrhea, sore throat, orthopnea, edema, ABD pain, N/V/D, HA, AMS, lightheadedness, syncope, myalgias, or chills. Patient was fully vaccinated in 3/2021, no booster. While in the ED, patient became hypoxic with O2 sat of 87% on RA. She was placed on 2L NC, which improved O2 sat to >92%. Work-up in the ED showed: Positive COVID-19, normal WBC and lactic, procalcitonin 0.07, , , CPK 58, troponin <0.006, BNP 37, D-Dimer 0.36. CXR was unremarkable. EKG unrevealing. ABG resulted pH 7.523, pCO2 32.5, pO2 79, and SaO2 97 on RA. Patient was transferred to [facility] and placed in Observation under Hospital Medicine services.    Patient is a Full Code. Her spouse is SDM.      Past Medical History:   Diagnosis Date    Achalasia     demetrius    Atrial fibrillation with RVR 5/11/2021    Cataract     Colon polyp     Gastroesophageal reflux     Hiatal hernia     Hx of colonic polyps 08/15/2014    per colonoscopy in      Hypertension     Peripheral neuropathy     Postmenopausal HRT (hormone  replacement therapy)     failed tapering off    Sepsis 2021    Last Assessment & Plan:  Formatting of this note might be different from the original. History & Physical Patient meets sepsis criteria with a white cell count of 15, and tachypnea.  Source of infection not clear at this time.  No evidence for meningitis.  Cover with broad-spectrum antibiotics especially given invasive dental procedure done recently.  Check blood cultures and monitor for fever.  R    Sinusitis     Thyroid nodule 3/16 subcm; kathleen 2 yrs    Type 2 diabetes mellitus with neurological manifestations     Vasodepressor syncope 2018       Past Surgical History:   Procedure Laterality Date    BREAST BIOPSY Left 2021    cataract Left      SECTION      CHOLECYSTECTOMY      CYST REMOVAL Right 2021    [facility] in [place]    HYSTERECTOMY      nonca    OOPHORECTOMY      TILT TABLE TEST N/A 10/25/2018    Procedure: TILT TABLE TEST;  Surgeon: Daryl Walker MD;  Location: [facility] CATH LAB;  Service: Cardiology;  Laterality: N/A;  VAS.DEP.SYN,HUT,FAS,3PREP    TONSILLECTOMY         Review of patient's allergies indicates:   Allergen Reactions    Floxin [ofloxacin]     Fluvastatin     Pravastatin Other (See Comments)     myalgias    Trazodone      Racing heart       No current facility-administered medications on file prior to encounter.     Current Outpatient Medications on File Prior to Encounter   Medication Sig    apixaban (ELIQUIS) 5 mg Tab Take 1 tablet (5 mg total) by mouth 2 (two) times daily.    biotin 10,000 mcg Cap Take 1 capsule by mouth once daily.    DULoxetine (CYMBALTA) 20 MG capsule Take 1 capsule (20 mg total) by mouth once daily.    estrogens, conjugated, (PREMARIN) 0.625 MG tablet Take 1 tablet (0.625 mg total) by mouth once daily.    flecainide (TAMBOCOR) 50 MG Tab Take 1 tablet (50 mg total) by mouth every 12 (twelve) hours.    metoprolol succinate (TOPROL-XL) 100  MG 24 hr tablet Take 1 tablet (100 mg total) by mouth once daily.    rosuvastatin (CRESTOR) 5 MG tablet Take 1 tablet (5 mg total) by mouth once daily.    SITagliptan-metformin (JANUMET) 50-1,000 mg per tablet Take 1 tablet by mouth 2 (two) times daily with meals.    zolpidem (AMBIEN) 10 mg Tab 1/2 po qhs prn insomnia    albuterol (PROVENTIL/VENTOLIN HFA) 90 mcg/actuation inhaler Inhale 2 puffs into the lungs every 6 (six) hours as needed for Wheezing.    blood sugar diagnostic (BLOOD GLUCOSE TEST) Strp Test 2 times daily    blood-glucose meter kit Test twice daily    chlorhexidine (PERIDEX) 0.12 % solution SWISH AND SPIT 15MLS BY MOUTH TWICE DAILY    cyanocobalamin 2000 MCG tablet Take 2,500 mcg by mouth.    DEEP SEA NASAL 0.65 % nasal spray     FLUZONE HIGHDOSE QUAD 20-21  mcg/0.7 mL Syrg ADM 0.7ML IM UTD    HYDROcodone-acetaminophen (NORCO) 5-325 mg per tablet Take 1 tablet by mouth every 4 (four) hours as needed for Pain.    ibuprofen (ADVIL,MOTRIN) 800 MG tablet Take 800 mg by mouth every 4 to 6 hours as needed.    lancets Misc Test twice daily    levalbuterol (XOPENEX) 1.25 mg/3 mL nebulizer solution USE 1 VIAL IN NEBULIZER THREE TIMES DAILY AS NEEDED FOR WHEEZING FOR 5 DAYS    omeprazole (PRILOSEC) 40 MG capsule Take 1 capsule (40 mg total) by mouth every evening.    promethazine-dextromethorphan (PROMETHAZINE-DM) 6.25-15 mg/5 mL Syrp TAKE 5 ML BY MOUTH IN THE EVENING     Family History       Problem Relation (Age of Onset)    Aneurysm Sister    Coronary artery disease Father, Brother    Diabetes Father    Heart disease Mother    Parkinsonism Brother          Tobacco Use    Smoking status: Never Smoker    Smokeless tobacco: Never Used   Substance and Sexual Activity    Alcohol use: No    Drug use: No    Sexual activity: Never     Review of Systems   Constitutional:  Positive for fever. Negative for chills, diaphoresis and fatigue.   HENT:  Positive for congestion. Negative for  postnasal drip, rhinorrhea, sinus pressure and sore throat.    Respiratory:  Positive for cough, shortness of breath and wheezing. Negative for chest tightness.    Cardiovascular:  Negative for chest pain, palpitations and leg swelling.   Gastrointestinal:  Negative for abdominal pain, constipation, diarrhea, nausea and vomiting.   Genitourinary:  Negative for dysuria and hematuria.   Musculoskeletal:  Negative for arthralgias, back pain and myalgias.   Skin:  Negative for pallor and rash.   Neurological:  Positive for dizziness and weakness. Negative for syncope, light-headedness, numbness and headaches.   Psychiatric/Behavioral:  Negative for confusion. The patient is not nervous/anxious.    All other systems reviewed and are negative.  Objective:     Vital Signs (Most Recent):  Temp: 98.7 °F (37.1 °C) (06/24/22 2025)  Pulse: 66 (06/24/22 2025)  Resp: 19 (06/24/22 2025)  BP: 110/76 (06/24/22 2025)  SpO2: 97 % (06/24/22 2025) Vital Signs (24h Range):  Temp:  [98.4 °F (36.9 °C)-98.7 °F (37.1 °C)] 98.7 °F (37.1 °C)  Pulse:  [51-76] 66  Resp:  [18-19] 19  SpO2:  [87 %-100 %] 97 %  BP: (110-156)/(51-76) 110/76     Weight: 74.1 kg (163 lb 5.8 oz)  Body mass index is 28.94 kg/m².    Physical Exam  Vitals and nursing note reviewed.   Constitutional:       General: She is awake. She is not in acute distress.     Appearance: Normal appearance. She is well-developed. She is not diaphoretic.   HENT:      Head: Normocephalic and atraumatic.   Eyes:      Conjunctiva/sclera: Conjunctivae normal.      Comments: PERRL; EOM intact.   Cardiovascular:      Rate and Rhythm: Normal rate and regular rhythm. No extrasystoles are present.     Heart sounds: S1 normal and S2 normal. No murmur heard.    No friction rub. No gallop.   Pulmonary:      Effort: Pulmonary effort is normal. No tachypnea, accessory muscle usage or respiratory distress.      Breath sounds: Normal air entry. Wheezing and rhonchi present. No rales.      Comments:  Scattered wheezes and rhonchi bilaterally.   Abdominal:      General: Bowel sounds are normal. There is no distension.      Palpations: Abdomen is soft.      Tenderness: There is no abdominal tenderness.   Musculoskeletal:         General: No tenderness or deformity. Normal range of motion.      Cervical back: Normal range of motion and neck supple.      Right lower leg: No edema.      Left lower leg: No edema.   Skin:     General: Skin is warm and dry.      Capillary Refill: Capillary refill takes less than 2 seconds.      Findings: No erythema or rash.   Neurological:      General: No focal deficit present.      Mental Status: She is alert and oriented to person, place, and time.   Psychiatric:         Mood and Affect: Mood and affect normal.         Behavior: Behavior normal. Behavior is cooperative.           Significant Labs:  Results for orders placed or performed during the hospital encounter of 06/24/22   CBC auto differential   Result Value Ref Range    WBC 4.48 3.90 - 12.70 K/uL    RBC 3.96 (L) 4.00 - 5.40 M/uL    Hemoglobin 11.7 (L) 12.0 - 16.0 g/dL    Hematocrit 35.8 (L) 37.0 - 48.5 %    MCV 90 82 - 98 fL    MCH 29.5 27.0 - 31.0 pg    MCHC 32.7 32.0 - 36.0 g/dL    RDW 11.9 11.5 - 14.5 %    Platelets 179 150 - 450 K/uL    MPV 9.3 9.2 - 12.9 fL    Immature Granulocytes 0.4 0.0 - 0.5 %    Gran # (ANC) 3.1 1.8 - 7.7 K/uL    Immature Grans (Abs) 0.02 0.00 - 0.04 K/uL    Lymph # 0.8 (L) 1.0 - 4.8 K/uL    Mono # 0.4 0.3 - 1.0 K/uL    Eos # 0.2 0.0 - 0.5 K/uL    Baso # 0.01 0.00 - 0.20 K/uL    nRBC 0 0 /100 WBC    Gran % 68.2 38.0 - 73.0 %    Lymph % 18.3 18.0 - 48.0 %    Mono % 8.7 4.0 - 15.0 %    Eosinophil % 4.2 0.0 - 8.0 %    Basophil % 0.2 0.0 - 1.9 %    Differential Method Automated    Comprehensive metabolic panel   Result Value Ref Range    Sodium 138 136 - 145 mmol/L    Potassium 4.2 3.5 - 5.1 mmol/L    Chloride 102 95 - 110 mmol/L    CO2 23 23 - 29 mmol/L    Glucose 156 (H) 70 - 110 mg/dL    BUN 10 8 -  23 mg/dL    Creatinine 1.0 0.5 - 1.4 mg/dL    Calcium 9.0 8.7 - 10.5 mg/dL    Total Protein 6.5 6.0 - 8.4 g/dL    Albumin 3.0 (L) 3.5 - 5.2 g/dL    Total Bilirubin 0.3 0.1 - 1.0 mg/dL    Alkaline Phosphatase 59 55 - 135 U/L    AST 21 10 - 40 U/L    ALT 17 10 - 44 U/L    Anion Gap 13 8 - 16 mmol/L    eGFR if African American >60.0 >60 mL/min/1.73 m^2    eGFR if non  56.8 (A) >60 mL/min/1.73 m^2   C-Reactive Protein   Result Value Ref Range    .2 (H) 0.0 - 8.2 mg/L   Lactate Dehydrogenase   Result Value Ref Range     110 - 260 U/L   CK   Result Value Ref Range    CPK 58 20 - 180 U/L   Lactic Acid, Plasma   Result Value Ref Range    Lactate (Lactic Acid) 1.1 0.5 - 2.2 mmol/L   Troponin I   Result Value Ref Range    Troponin I <0.006 0.000 - 0.026 ng/mL   Brain natriuretic peptide   Result Value Ref Range    BNP 37 0 - 99 pg/mL   D dimer, quantitative   Result Value Ref Range    D-Dimer 0.36 <0.50 mg/L FEU   Procalcitonin   Result Value Ref Range    Procalcitonin 0.07 <0.25 ng/mL   POCT COVID-19 Rapid Screening   Result Value Ref Range    POC Rapid COVID Positive (A) Negative     Acceptable Yes    POCT Influenza A/B Molecular   Result Value Ref Range    POC Molecular Influenza A Ag Negative Negative, Not Reported    POC Molecular Influenza B Ag Negative Negative, Not Reported     Acceptable Yes    ISTAT PROCEDURE   Result Value Ref Range    POC PH 7.523 (H) 7.35 - 7.45    POC PCO2 32.5 (L) 35 - 45 mmHg    POC PO2 79 (L) 80 - 100 mmHg    POC HCO3 26.7 24 - 28 mmol/L    POC BE 4 -2 to 2 mmol/L    POC SATURATED O2 97 95 - 100 %    Sample ARTERIAL     Site LR     Allens Test Pass     DelSys Room Air     Mode SPONT    POCT glucose   Result Value Ref Range    POCT Glucose 157 (H) 70 - 110 mg/dL      All pertinent labs within the past 24 hours have been reviewed.    Significant Imaging:  Imaging Results              X-Ray Chest AP Portable (Final result)  Result time  06/24/22 14:07:15      Final result by Herminio Hawkins III, MD (06/24/22 14:07:15)                   Impression:      No acute findings.      Electronically signed by: Herminio Hawkins MD  Date:    06/24/2022  Time:    14:07               Narrative:    EXAMINATION:  XR CHEST AP PORTABLE    CLINICAL HISTORY:  COVID-19;    FINDINGS:  Comparison is made with the most recent prior chest x-ray.  The cardiomediastinal silhouette is within normal limits for AP technique. The lungs appear clear of active disease. No acute appearing infiltrate, pleural effusion or pneumothorax identified.                                     I have reviewed all pertinent imaging results/findings within the past 24 hours.      Results for orders placed or performed during the hospital encounter of 06/24/22   EKG 12-lead    Collection Time: 06/24/22  1:50 PM    Narrative    Test Reason : U07.1,    Vent. Rate : 061 BPM     Atrial Rate : 061 BPM     P-R Int : 160 ms          QRS Dur : 086 ms      QT Int : 426 ms       P-R-T Axes : 040 036 044 degrees     QTc Int : 428 ms    Sinus rhythm with Premature atrial complexes with Aberrant conduction  Otherwise normal ECG  When compared with ECG of 12-MAY-2021 09:01,  Sinus rhythm has replaced Atrial fibrillation  Vent. rate has decreased  BPM  ST no longer depressed in Inferior leads  ST no longer depressed in Anterior-lateral leads  T wave inversion no longer evident in Inferior leads  T wave inversion no longer evident in Anterior-lateral leads    Referred By: [email or web address]   SELF           Confirmed By:                  Assessment/Plan:     * Acute respiratory failure due to COVID-19 with acute bronchitis  - O2 sat stable on 2 L NC. Continue oxygen supplementation as needed to keep sat >92%.  - Start Remdesivir.  - PO dexamethasone 6 mg daily x 10 days.  - Albuterol MDI Q6.  - Will hold off on antibiotics given normal procalcitonin.   - D-Dimer normal. Continue home Eliquis for AC.   - Antipyretics  and antitussives as needed.  - MV, ascorbic acid, zinc, vitamin D.  - Continue isolation precautions.     - COVID-19 testing   - Infection Control notified     - Isolation:   - Airborne, Contact and Droplet Precautions  - Cohort patients into COVID units  - N95 mask, wear eye protection  - 20 second hand hygiene              - Limit visitors per hospital policy              - Consolidating lab draws, nursing care, provider visits, and interventions    - Diagnostics: (leukopenia, hyponatremia, hyperferritinemia, elevated troponin, elevated d-dimer, age, and comorbidities are significant predictors of poor clinical outcome)  CBC, CMP, Procalcitonin, Ferritin, CRP, LDH, BNP, Troponin, ECG, Rapid Flu and Portable CXR    - Management:  Supplemental O2 to maintain SpO2 >92%  Telemetry  Continuous/intermittent Pulse Ox  Albuterol treatment PRN    PAF (paroxysmal atrial fibrillation)  - Remains in sinus rhythm on admission. Monitor telemetry.   - HR borderline low, but stable. Continue home BB and flecainide with HR parameters to prevent bradycardia.   - Continue Eliquis for AC.     Type 2 diabetes mellitus, without long-term current use of insulin  - Hold Janumet during hospital stay.   - Accuchecks with low dose SSI.  - Diabetic diet.  - Previous HbA1c of 5.8.     Primary hypertension  - Continue home antihypertensives.       VTE Risk Mitigation (From admission, onward)         Ordered     apixaban tablet 5 mg  2 times daily         06/24/22 1928     IP VTE HIGH RISK PATIENT  Once         06/24/22 1928     Place sequential compression device  Until discontinued         06/24/22 1928                   Yancy Cummins NP  Department of Hospital Medicine   [facility] - Telemetry ([facility])

## 2022-06-25 NOTE — HOSPITAL COURSE
72 year old female who was placed in observation for COVID-19. CXR did not show pneumonia. Was on 2L nasal cannula and weaned to room air. She received Remdesivir x 2 doses and dexamthesone for treatment. States she felt better with treatment. Hyperkalemia noted. Suspect hemolysis given normal BUN and creatinine. She will continue steroids x 8 days. Asked to continue to MVI, Zinc, And Vitamin C as well. She was asked to follow up with PCP in 3 days. Stable for discharge.

## 2022-06-25 NOTE — HPI
Ms. Bustamante is a 71 yo female with a PMHx of DM II, GERD, HLD, HTN, fibromyalgia, and PAF on Eliquis, who presented to [facility] ED in [facility] with c/o nonproductive cough x 3-4 days. Associated mild SOB, congestion, wheezing, subjective fever, dizziness, and generalized weakness. No aggravating or alleviating factors. Denies any CP, palpitations, rhinorrhea, sore throat, orthopnea, edema, ABD pain, N/V/D, HA, AMS, lightheadedness, syncope, myalgias, or chills. Patient was fully vaccinated in 3/2021, no booster. While in the ED, patient became hypoxic with O2 sat of 87% on RA. She was placed on 2L NC, which improved O2 sat to >92%. Work-up in the ED showed: Positive COVID-19, normal WBC and lactic, procalcitonin 0.07, , , CPK 58, troponin <0.006, BNP 37, D-Dimer 0.36. CXR was unremarkable. EKG unrevealing. ABG resulted pH 7.523, pCO2 32.5, pO2 79, and SaO2 97 on RA. Patient was transferred to [facility] and placed in Observation under Hospital Medicine services.    Patient is a Full Code. Her spouse is SDM.

## 2022-06-25 NOTE — SUBJECTIVE & OBJECTIVE
Past Medical History:   Diagnosis Date    Achalasia     demetrius    Atrial fibrillation with RVR 2021    Cataract     Colon polyp     Gastroesophageal reflux     Hiatal hernia     Hx of colonic polyps 08/15/2014    per colonoscopy in      Hypertension     Peripheral neuropathy     Postmenopausal HRT (hormone replacement therapy)     failed tapering off    Sepsis 2021    Last Assessment & Plan:  Formatting of this note might be different from the original. History & Physical Patient meets sepsis criteria with a white cell count of 15, and tachypnea.  Source of infection not clear at this time.  No evidence for meningitis.  Cover with broad-spectrum antibiotics especially given invasive dental procedure done recently.  Check blood cultures and monitor for fever.  R    Sinusitis     Thyroid nodule 3/16 subcm; kathleen 2 yrs    Type 2 diabetes mellitus with neurological manifestations     Vasodepressor syncope 2018       Past Surgical History:   Procedure Laterality Date    BREAST BIOPSY Left 2021    cataract Left      SECTION      CHOLECYSTECTOMY      CYST REMOVAL Right 2021    [facility] in [place]    HYSTERECTOMY      nonca    OOPHORECTOMY      TILT TABLE TEST N/A 10/25/2018    Procedure: TILT TABLE TEST;  Surgeon: Daryl Walker MD;  Location: [facility] CATH LAB;  Service: Cardiology;  Laterality: N/A;  VAS.DEP.SYN,HUT,FAS,3PREP    TONSILLECTOMY         Review of patient's allergies indicates:   Allergen Reactions    Floxin [ofloxacin]     Fluvastatin     Pravastatin Other (See Comments)     myalgias    Trazodone      Racing heart       No current facility-administered medications on file prior to encounter.     Current Outpatient Medications on File Prior to Encounter   Medication Sig    apixaban (ELIQUIS) 5 mg Tab Take 1 tablet (5 mg total) by mouth 2 (two) times daily.    biotin 10,000 mcg Cap Take 1 capsule by mouth once daily.    DULoxetine (CYMBALTA) 20 MG capsule  Take 1 capsule (20 mg total) by mouth once daily.    estrogens, conjugated, (PREMARIN) 0.625 MG tablet Take 1 tablet (0.625 mg total) by mouth once daily.    flecainide (TAMBOCOR) 50 MG Tab Take 1 tablet (50 mg total) by mouth every 12 (twelve) hours.    metoprolol succinate (TOPROL-XL) 100 MG 24 hr tablet Take 1 tablet (100 mg total) by mouth once daily.    rosuvastatin (CRESTOR) 5 MG tablet Take 1 tablet (5 mg total) by mouth once daily.    SITagliptan-metformin (JANUMET) 50-1,000 mg per tablet Take 1 tablet by mouth 2 (two) times daily with meals.    zolpidem (AMBIEN) 10 mg Tab 1/2 po qhs prn insomnia    albuterol (PROVENTIL/VENTOLIN HFA) 90 mcg/actuation inhaler Inhale 2 puffs into the lungs every 6 (six) hours as needed for Wheezing.    blood sugar diagnostic (BLOOD GLUCOSE TEST) Strp Test 2 times daily    blood-glucose meter kit Test twice daily    chlorhexidine (PERIDEX) 0.12 % solution SWISH AND SPIT 15MLS BY MOUTH TWICE DAILY    cyanocobalamin 2000 MCG tablet Take 2,500 mcg by mouth.    DEEP SEA NASAL 0.65 % nasal spray     FLUZONE HIGHDOSE QUAD 20-21  mcg/0.7 mL Syrg ADM 0.7ML IM UTD    HYDROcodone-acetaminophen (NORCO) 5-325 mg per tablet Take 1 tablet by mouth every 4 (four) hours as needed for Pain.    ibuprofen (ADVIL,MOTRIN) 800 MG tablet Take 800 mg by mouth every 4 to 6 hours as needed.    lancets Misc Test twice daily    levalbuterol (XOPENEX) 1.25 mg/3 mL nebulizer solution USE 1 VIAL IN NEBULIZER THREE TIMES DAILY AS NEEDED FOR WHEEZING FOR 5 DAYS    omeprazole (PRILOSEC) 40 MG capsule Take 1 capsule (40 mg total) by mouth every evening.    promethazine-dextromethorphan (PROMETHAZINE-DM) 6.25-15 mg/5 mL Syrp TAKE 5 ML BY MOUTH IN THE EVENING     Family History       Problem Relation (Age of Onset)    Aneurysm Sister    Coronary artery disease Father, Brother    Diabetes Father    Heart disease Mother    Parkinsonism Brother          Tobacco Use    Smoking status: Never Smoker    Smokeless  tobacco: Never Used   Substance and Sexual Activity    Alcohol use: No    Drug use: No    Sexual activity: Never     Review of Systems   Constitutional:  Positive for fever. Negative for chills, diaphoresis and fatigue.   HENT:  Positive for congestion. Negative for postnasal drip, rhinorrhea, sinus pressure and sore throat.    Respiratory:  Positive for cough, shortness of breath and wheezing. Negative for chest tightness.    Cardiovascular:  Negative for chest pain, palpitations and leg swelling.   Gastrointestinal:  Negative for abdominal pain, constipation, diarrhea, nausea and vomiting.   Genitourinary:  Negative for dysuria and hematuria.   Musculoskeletal:  Negative for arthralgias, back pain and myalgias.   Skin:  Negative for pallor and rash.   Neurological:  Positive for dizziness and weakness. Negative for syncope, light-headedness, numbness and headaches.   Psychiatric/Behavioral:  Negative for confusion. The patient is not nervous/anxious.    All other systems reviewed and are negative.  Objective:     Vital Signs (Most Recent):  Temp: 98.7 °F (37.1 °C) (06/24/22 2025)  Pulse: 66 (06/24/22 2025)  Resp: 19 (06/24/22 2025)  BP: 110/76 (06/24/22 2025)  SpO2: 97 % (06/24/22 2025) Vital Signs (24h Range):  Temp:  [98.4 °F (36.9 °C)-98.7 °F (37.1 °C)] 98.7 °F (37.1 °C)  Pulse:  [51-76] 66  Resp:  [18-19] 19  SpO2:  [87 %-100 %] 97 %  BP: (110-156)/(51-76) 110/76     Weight: 74.1 kg (163 lb 5.8 oz)  Body mass index is 28.94 kg/m².    Physical Exam  Vitals and nursing note reviewed.   Constitutional:       General: She is awake. She is not in acute distress.     Appearance: Normal appearance. She is well-developed. She is not diaphoretic.   HENT:      Head: Normocephalic and atraumatic.   Eyes:      Conjunctiva/sclera: Conjunctivae normal.      Comments: PERRL; EOM intact.   Cardiovascular:      Rate and Rhythm: Normal rate and regular rhythm. No extrasystoles are present.     Heart sounds: S1 normal and S2  normal. No murmur heard.    No friction rub. No gallop.   Pulmonary:      Effort: Pulmonary effort is normal. No tachypnea, accessory muscle usage or respiratory distress.      Breath sounds: Normal air entry. Wheezing and rhonchi present. No rales.      Comments: Scattered wheezes and rhonchi bilaterally.   Abdominal:      General: Bowel sounds are normal. There is no distension.      Palpations: Abdomen is soft.      Tenderness: There is no abdominal tenderness.   Musculoskeletal:         General: No tenderness or deformity. Normal range of motion.      Cervical back: Normal range of motion and neck supple.      Right lower leg: No edema.      Left lower leg: No edema.   Skin:     General: Skin is warm and dry.      Capillary Refill: Capillary refill takes less than 2 seconds.      Findings: No erythema or rash.   Neurological:      General: No focal deficit present.      Mental Status: She is alert and oriented to person, place, and time.   Psychiatric:         Mood and Affect: Mood and affect normal.         Behavior: Behavior normal. Behavior is cooperative.           Significant Labs:  Results for orders placed or performed during the hospital encounter of 06/24/22   CBC auto differential   Result Value Ref Range    WBC 4.48 3.90 - 12.70 K/uL    RBC 3.96 (L) 4.00 - 5.40 M/uL    Hemoglobin 11.7 (L) 12.0 - 16.0 g/dL    Hematocrit 35.8 (L) 37.0 - 48.5 %    MCV 90 82 - 98 fL    MCH 29.5 27.0 - 31.0 pg    MCHC 32.7 32.0 - 36.0 g/dL    RDW 11.9 11.5 - 14.5 %    Platelets 179 150 - 450 K/uL    MPV 9.3 9.2 - 12.9 fL    Immature Granulocytes 0.4 0.0 - 0.5 %    Gran # (ANC) 3.1 1.8 - 7.7 K/uL    Immature Grans (Abs) 0.02 0.00 - 0.04 K/uL    Lymph # 0.8 (L) 1.0 - 4.8 K/uL    Mono # 0.4 0.3 - 1.0 K/uL    Eos # 0.2 0.0 - 0.5 K/uL    Baso # 0.01 0.00 - 0.20 K/uL    nRBC 0 0 /100 WBC    Gran % 68.2 38.0 - 73.0 %    Lymph % 18.3 18.0 - 48.0 %    Mono % 8.7 4.0 - 15.0 %    Eosinophil % 4.2 0.0 - 8.0 %    Basophil % 0.2 0.0 -  1.9 %    Differential Method Automated    Comprehensive metabolic panel   Result Value Ref Range    Sodium 138 136 - 145 mmol/L    Potassium 4.2 3.5 - 5.1 mmol/L    Chloride 102 95 - 110 mmol/L    CO2 23 23 - 29 mmol/L    Glucose 156 (H) 70 - 110 mg/dL    BUN 10 8 - 23 mg/dL    Creatinine 1.0 0.5 - 1.4 mg/dL    Calcium 9.0 8.7 - 10.5 mg/dL    Total Protein 6.5 6.0 - 8.4 g/dL    Albumin 3.0 (L) 3.5 - 5.2 g/dL    Total Bilirubin 0.3 0.1 - 1.0 mg/dL    Alkaline Phosphatase 59 55 - 135 U/L    AST 21 10 - 40 U/L    ALT 17 10 - 44 U/L    Anion Gap 13 8 - 16 mmol/L    eGFR if African American >60.0 >60 mL/min/1.73 m^2    eGFR if non  56.8 (A) >60 mL/min/1.73 m^2   C-Reactive Protein   Result Value Ref Range    .2 (H) 0.0 - 8.2 mg/L   Lactate Dehydrogenase   Result Value Ref Range     110 - 260 U/L   CK   Result Value Ref Range    CPK 58 20 - 180 U/L   Lactic Acid, Plasma   Result Value Ref Range    Lactate (Lactic Acid) 1.1 0.5 - 2.2 mmol/L   Troponin I   Result Value Ref Range    Troponin I <0.006 0.000 - 0.026 ng/mL   Brain natriuretic peptide   Result Value Ref Range    BNP 37 0 - 99 pg/mL   D dimer, quantitative   Result Value Ref Range    D-Dimer 0.36 <0.50 mg/L FEU   Procalcitonin   Result Value Ref Range    Procalcitonin 0.07 <0.25 ng/mL   POCT COVID-19 Rapid Screening   Result Value Ref Range    POC Rapid COVID Positive (A) Negative     Acceptable Yes    POCT Influenza A/B Molecular   Result Value Ref Range    POC Molecular Influenza A Ag Negative Negative, Not Reported    POC Molecular Influenza B Ag Negative Negative, Not Reported     Acceptable Yes    ISTAT PROCEDURE   Result Value Ref Range    POC PH 7.523 (H) 7.35 - 7.45    POC PCO2 32.5 (L) 35 - 45 mmHg    POC PO2 79 (L) 80 - 100 mmHg    POC HCO3 26.7 24 - 28 mmol/L    POC BE 4 -2 to 2 mmol/L    POC SATURATED O2 97 95 - 100 %    Sample ARTERIAL     Site LR     Allens Test Pass     DelSys Room Air      Mode SPONT    POCT glucose   Result Value Ref Range    POCT Glucose 157 (H) 70 - 110 mg/dL      All pertinent labs within the past 24 hours have been reviewed.    Significant Imaging:  Imaging Results              X-Ray Chest AP Portable (Final result)  Result time 06/24/22 14:07:15      Final result by Herminio Hawkins III, MD (06/24/22 14:07:15)                   Impression:      No acute findings.      Electronically signed by: Herminio Hawkins MD  Date:    06/24/2022  Time:    14:07               Narrative:    EXAMINATION:  XR CHEST AP PORTABLE    CLINICAL HISTORY:  COVID-19;    FINDINGS:  Comparison is made with the most recent prior chest x-ray.  The cardiomediastinal silhouette is within normal limits for AP technique. The lungs appear clear of active disease. No acute appearing infiltrate, pleural effusion or pneumothorax identified.                                     I have reviewed all pertinent imaging results/findings within the past 24 hours.      Results for orders placed or performed during the hospital encounter of 06/24/22   EKG 12-lead    Collection Time: 06/24/22  1:50 PM    Narrative    Test Reason : U07.1,    Vent. Rate : 061 BPM     Atrial Rate : 061 BPM     P-R Int : 160 ms          QRS Dur : 086 ms      QT Int : 426 ms       P-R-T Axes : 040 036 044 degrees     QTc Int : 428 ms    Sinus rhythm with Premature atrial complexes with Aberrant conduction  Otherwise normal ECG  When compared with ECG of 12-MAY-2021 09:01,  Sinus rhythm has replaced Atrial fibrillation  Vent. rate has decreased  BPM  ST no longer depressed in Inferior leads  ST no longer depressed in Anterior-lateral leads  T wave inversion no longer evident in Inferior leads  T wave inversion no longer evident in Anterior-lateral leads    Referred By: [email or web address]   SELF           Confirmed By:

## 2022-06-25 NOTE — ASSESSMENT & PLAN NOTE
- Remains in sinus rhythm on admission. Monitor telemetry.   - HR borderline low, but stable. Continue home BB and flecainide with HR parameters to prevent bradycardia.   - Continue Eliquis for AC.

## 2022-06-25 NOTE — NURSING
Discharge orders received, AVS reviewed with pt and spouse. IV removed from RAC, tip intact. Tele monitor removed and returned. Denies any pain, no further questions at this time. Escorted to front lobby via wheelchair by staff. Stable at time of discharge.

## 2022-06-26 NOTE — TELEPHONE ENCOUNTER
Care Due:                  Date            Visit Type   Department     Provider  --------------------------------------------------------------------------------                                MYCHART                              FOLLOWUP/OF  [facility] INTERNAL  Last Visit: 05-      FICE VISIT   MEDICINE       Cody Parks  Next Visit: None Scheduled  None         None Found                                                            Last  Test          Frequency    Reason                     Performed    Due Date  --------------------------------------------------------------------------------    Office Visit  12 months..  DULoxetine,                05- 05-                             SITagliptan-metformin,                             rosuvastatin.............    HBA1C.......  6 months...  SITagliptan-metformin....  08- 02-    Lipid Panel.  12 months..  rosuvastatin.............  02-   02-    [facility] Embedded Care Gaps. Reference number: [id number]. 6/25/2022   9:41:09 PM CDT

## 2022-06-28 ENCOUNTER — TELEPHONE (OUTPATIENT)
Dept: INTERNAL MEDICINE | Facility: CLINIC | Age: 72
End: 2022-06-28
Payer: OTHER GOVERNMENT

## 2022-06-28 RX ORDER — ZOLPIDEM TARTRATE 10 MG/1
TABLET ORAL
Qty: 90 TABLET | Refills: 1 | Status: SHIPPED | OUTPATIENT
Start: 2022-06-28 | End: 2022-11-16 | Stop reason: SDUPTHER

## 2022-06-28 NOTE — TELEPHONE ENCOUNTER
Medicine approved for one REFILL. Needs follow up appointment with me or MANUEL in person or virtually

## 2022-07-01 ENCOUNTER — PATIENT MESSAGE (OUTPATIENT)
Dept: SURGERY | Facility: CLINIC | Age: 72
End: 2022-07-01
Payer: OTHER GOVERNMENT

## 2022-07-07 ENCOUNTER — OFFICE VISIT (OUTPATIENT)
Dept: CARDIOLOGY | Facility: CLINIC | Age: 72
End: 2022-07-07
Payer: OTHER GOVERNMENT

## 2022-07-07 VITALS
SYSTOLIC BLOOD PRESSURE: 136 MMHG | HEIGHT: 63 IN | HEART RATE: 58 BPM | BODY MASS INDEX: 28.29 KG/M2 | RESPIRATION RATE: 18 BRPM | DIASTOLIC BLOOD PRESSURE: 66 MMHG | WEIGHT: 159.63 LBS

## 2022-07-07 DIAGNOSIS — I49.1 PAC (PREMATURE ATRIAL CONTRACTION): ICD-10-CM

## 2022-07-07 DIAGNOSIS — I48.0 PAF (PAROXYSMAL ATRIAL FIBRILLATION): ICD-10-CM

## 2022-07-07 DIAGNOSIS — I49.3 PVC (PREMATURE VENTRICULAR CONTRACTION): Primary | ICD-10-CM

## 2022-07-07 DIAGNOSIS — M79.7 FIBROMYALGIA: ICD-10-CM

## 2022-07-07 DIAGNOSIS — E78.5 HYPERLIPIDEMIA, UNSPECIFIED HYPERLIPIDEMIA TYPE: ICD-10-CM

## 2022-07-07 DIAGNOSIS — I10 PRIMARY HYPERTENSION: ICD-10-CM

## 2022-07-07 PROCEDURE — 99215 OFFICE O/P EST HI 40 MIN: CPT | Mod: PBBFAC | Performed by: INTERNAL MEDICINE

## 2022-07-07 PROCEDURE — 99214 OFFICE O/P EST MOD 30 MIN: CPT | Mod: S$GLB,,, | Performed by: INTERNAL MEDICINE

## 2022-07-07 PROCEDURE — 99214 PR OFFICE/OUTPT VISIT, EST, LEVL IV, 30-39 MIN: ICD-10-PCS | Mod: S$GLB,,, | Performed by: INTERNAL MEDICINE

## 2022-07-07 PROCEDURE — 99499 RISK ADDL DX/OHS AUDIT: ICD-10-PCS | Mod: S$GLB,,, | Performed by: INTERNAL MEDICINE

## 2022-07-07 PROCEDURE — 99999 PR PBB SHADOW E&M-EST. PATIENT-LVL V: CPT | Mod: PBBFAC,,, | Performed by: INTERNAL MEDICINE

## 2022-07-07 PROCEDURE — 99499 UNLISTED E&M SERVICE: CPT | Mod: S$GLB,,, | Performed by: INTERNAL MEDICINE

## 2022-07-07 PROCEDURE — 99999 PR PBB SHADOW E&M-EST. PATIENT-LVL V: ICD-10-PCS | Mod: PBBFAC,,, | Performed by: INTERNAL MEDICINE

## 2022-07-07 RX ORDER — FLECAINIDE ACETATE 100 MG/1
100 TABLET ORAL EVERY 12 HOURS
Qty: 60 TABLET | Refills: 11 | Status: SHIPPED | OUTPATIENT
Start: 2022-07-07 | End: 2022-09-23 | Stop reason: SDUPTHER

## 2022-07-07 NOTE — PROGRESS NOTES
Subjective:   Patient ID:  Kelsie Bustamante is a 72 y.o. female who presents for follow up of Follow-up and Dizziness  7.7.2022  She had COVID 2 weeks ago.  She took steroids.  She states that her palpitations are better but she takes Toprol 100 mg in the morning and 50 mg in the evening.  Still on flecainide 50 mg b.i.d..  She states that yesterday she felt weak when she stood up and felt dizzy and had to sit down.  She denies any syncope however.    3.14.2022   She comes in still having a palpitations.  She states that she is having them all day.  She did not get her Holter monitor she thought that will be an event monitor or a bar the and she does not want to have a sticker on her chest as she states she had bad reaction to it in the past.  Patient homeless after conversation to get her Holter monitor.  On exam she has frequent PACs she has a regular rhythm with PACs.  She is taking Toprol XL 50 mg in the morning and metoprolol tartrate 50 mg in the evening.  She is not taking Cardizem.  She denies any chest pains, dyspnea, lightheadedness, weakness,.  She is compliant with her medications.  She is taking her Eliquis no bleeding.  She had a Holter monitor in the past which showed rare PACs.  And 2017    12.16.2021   1 cup of coffee, no alcohol , no smoking   Palpitations, 2 mins twice a day , mostly during the day between 10-2 pm   No chest pain, no santos,   Never smoker   States get her palpitaitons before it is time to take the next dose of toprol xl   NO CHEST PAIN   Normal ETT 2 years ago   Seen Dr Aguilar and other providers see notes below     5.2021  69 yo female,discharge f/u.  PMH PAF HTN DM HLD  [facility] with RVR. Converted to SR after IV cardizem,  troponin negative x2, Cr 0.8 and H&H 11/36.  Echo showed normal biv function.  F/u with Dr. Tim in 2018 for positional dizziness and subsequent tilt table test negative    Today states that   Occasional dizziness and faint at  home  Some SOB while doing something  No chest pain   Some coughing and fatigue  No active bleeding         Follow-up  Pertinent negatives include no abdominal pain, chest pain, coughing, diaphoresis, fever, headaches, joint swelling, nausea, neck pain, numbness, rash, vomiting or weakness.   Dizziness:    Associated symptoms: palpitations.no fever, no headaches, no nausea, no vomiting, no diaphoresis, no weakness, no light-headedness, no syncope and no chest pain.    8/2019   Resting HR: 77 bpm    Target HR: 129 bpm   Resting BP: 136/70 mmHg     Max HR: 135 bpm % Target: 109%   Max Systolic: 212 mmHg   Max Diastolic: 72 mmHg   Exercise Duration: 04:51 min:sec    METS: 6.2 METS     The test was terminated due to the patient requesting to stop;   experiencing fatigue and shortness of breath; .     The patient reported fatigue; shortness of breath; headache; no chest   pain; .     FINDINGS   Baseline ECG: Normal sinus rhythm     Stress ECG: Sinus tachycardia, no ischemic changes     Arrhythmias: No significant arrhythmias, 2 couplets were noted     CONCLUSION   1. Low risk treadmill stress electrocardiogram.   2. Normal blood pressure response.   3. Above Average exercise tolerance for age and gender.       [facility]  Specimen Collected: -- Last Resulted: 08/06/19 10:55 AM   Received From: [facility]  Result Received: 09/09/21  9:29 AM       Past Medical History:   Diagnosis Date    Achalasia     demetrius    Atrial fibrillation with RVR 5/11/2021    Cataract     Colon polyp     Gastroesophageal reflux     Hiatal hernia     Hx of colonic polyps 08/15/2014    per colonoscopy in      Hyperlipidemia 6/24/2022    Hypertension     Peripheral neuropathy     Postmenopausal HRT (hormone replacement therapy)     failed tapering off    Sepsis 5/5/2021    Last Assessment & Plan:  Formatting of this note might be different  from the original. History & Physical Patient meets sepsis criteria with a white cell count of 15, and tachypnea.  Source of infection not clear at this time.  No evidence for meningitis.  Cover with broad-spectrum antibiotics especially given invasive dental procedure done recently.  Check blood cultures and monitor for fever.  R    Sinusitis     Thyroid nodule 3/16 subcm; kathleen 2 yrs    Type 2 diabetes mellitus with neurological manifestations     Vasodepressor syncope 2018       Past Surgical History:   Procedure Laterality Date    BREAST BIOPSY Left 2021    cataract Left      SECTION      CHOLECYSTECTOMY      [facility] REMOVAL Right [facility]    [facility]    HYSTERECTOMY      nonca    OOPHORECTOMY      TILT TABLE TEST N/A 10/25/2018    Procedure: TILT TABLE TEST;  Surgeon: Daryl Walker MD;  Location: [facility] CATH LAB;  Service: Cardiology;  Laterality: N/A;  VAS.DEP.SYN,HUT,FAS,3PREP    TONSILLECTOMY         Social History     Tobacco Use    Smoking status: Never Smoker    Smokeless tobacco: Never Used   Substance Use Topics    Alcohol use: No    Drug use: No       Family History   Problem Relation Age of Onset    Aneurysm Sister     Heart disease Mother     Diabetes Father     Coronary artery disease Father     Coronary artery disease Brother     Parkinsonism Brother            Review of Systems   Constitutional: Positive for malaise/fatigue. Negative for diaphoresis and fever.   HENT: Negative for nosebleeds.    Eyes: Negative for blurred vision and double vision.   Respiratory: Negative for cough and shortness of breath.    Cardiovascular: Positive for palpitations. Negative for chest pain, orthopnea, claudication, leg swelling, syncope and PND.   Gastrointestinal: Negative for abdominal pain, heartburn, nausea and vomiting.   Genitourinary: Negative for dysuria, frequency and hematuria.   Musculoskeletal: Negative for back pain, falls, joint  pain, joint swelling and neck pain.   Skin: Negative for rash.   Neurological: Positive for dizziness. Negative for focal weakness, seizures, weakness, light-headedness, numbness and headaches.   Endo/Heme/Allergies: Does not bruise/bleed easily.   Psychiatric/Behavioral: Negative for depression, memory loss and substance abuse. The patient does not have insomnia.        Objective:   Physical Exam  HENT:      Head: Normocephalic.   Eyes:      Pupils: Pupils are equal, round, and reactive to light.   Neck:      Thyroid: No thyromegaly.      Vascular: Normal carotid pulses. No carotid bruit or JVD.   Cardiovascular:      Rate and Rhythm: Normal rate and regular rhythm.  No extrasystoles are present.     Chest Wall: PMI is not displaced.      Pulses: Normal pulses.      Heart sounds: Normal heart sounds. No murmur heard.    No gallop. No S3 sounds.   Pulmonary:      Effort: No respiratory distress.      Breath sounds: Normal breath sounds. No stridor.   Abdominal:      General: Bowel sounds are normal.      Palpations: Abdomen is soft.      Tenderness: There is no abdominal tenderness. There is no rebound.   Musculoskeletal:         General: Normal range of motion.   Skin:     Findings: No rash.   Neurological:      Mental Status: She is alert and oriented to person, place, and time.   Psychiatric:         Behavior: Behavior normal.         Lab Results   Component Value Date    CHOL 122 02/10/2021    CHOL 136 01/30/2020    CHOL 149 01/10/2019     Lab Results   Component Value Date    HDL 58 02/10/2021    HDL 53 01/30/2020    HDL 62 01/10/2019     Lab Results   Component Value Date    LDLCALC 37.0 (L) 02/10/2021    LDLCALC 48.8 (L) 01/30/2020    LDLCALC 57.6 (L) 01/10/2019     Lab Results   Component Value Date    TRIG 135 02/10/2021    TRIG 171 (H) 01/30/2020    TRIG 147 01/10/2019     Lab Results   Component Value Date    CHOLHDL 47.5 02/10/2021    CHOLHDL 39.0 01/30/2020    CHOLHDL 41.6 01/10/2019       Chemistry         Component Value Date/Time     06/25/2022 0357    K 5.2 (H) 06/25/2022 0357     06/25/2022 0357    CO2 20 (L) 06/25/2022 0357    BUN 10 06/25/2022 0357    CREATININE 1.0 06/25/2022 0357     (H) 06/25/2022 0357        Component Value Date/Time    CALCIUM 8.8 06/25/2022 0357    ALKPHOS 70 06/25/2022 0357    AST 36 06/25/2022 0357    ALT 27 06/25/2022 0357    BILITOT 0.2 06/25/2022 0357    ESTGFRAFRICA >60 06/25/2022 0357    EGFRNONAA 57 (A) 06/25/2022 0357          Lab Results   Component Value Date    HGBA1C 5.8 (H) 08/16/2021     Lab Results   Component Value Date    TSH 3.533 08/16/2021     Lab Results   Component Value Date    INR 0.9 06/24/2022     Lab Results   Component Value Date    WBC 3.40 (L) 06/25/2022    HGB 11.9 (L) 06/25/2022    HCT 36.1 (L) 06/25/2022    MCV 90 06/25/2022     06/25/2022     BMP  Sodium   Date Value Ref Range Status   06/25/2022 139 136 - 145 mmol/L Final     Potassium   Date Value Ref Range Status   06/25/2022 5.2 (H) 3.5 - 5.1 mmol/L Final     Chloride   Date Value Ref Range Status   06/25/2022 106 95 - 110 mmol/L Final     CO2   Date Value Ref Range Status   06/25/2022 20 (L) 23 - 29 mmol/L Final     BUN   Date Value Ref Range Status   06/25/2022 10 8 - 23 mg/dL Final     Creatinine   Date Value Ref Range Status   06/25/2022 1.0 0.5 - 1.4 mg/dL Final     Calcium   Date Value Ref Range Status   06/25/2022 8.8 8.7 - 10.5 mg/dL Final     Anion Gap   Date Value Ref Range Status   06/25/2022 13 8 - 16 mmol/L Final     eGFR if    Date Value Ref Range Status   06/25/2022 >60 >60 mL/min/1.73 m^2 Final     eGFR if non    Date Value Ref Range Status   06/25/2022 57 (A) >60 mL/min/1.73 m^2 Final     Comment:     Calculation used to obtain the estimated glomerular filtration  rate (eGFR) is the CKD-EPI equation.        BNP  @LABRCNTIP(BNP,BNPTRIAGEBLO)@  @LABRCNTIP(troponini)@  CrCl cannot be calculated (Patient's most recent lab result  is older than the maximum 7 days allowed.).  No results found in the last 24 hours.  No results found in the last 24 hours.  No results found in the last 24 hours.    Assessment:      1. PVC (premature ventricular contraction)    2. PAC (premature atrial contraction)    3. Primary hypertension    4. Hyperlipidemia, unspecified hyperlipidemia type    5. PAF (paroxysmal atrial fibrillation)    6. Fibromyalgia        Plan:   Decrease Toprol to 50 b.i.d and increase flecainide.  Given her borderline bradycardia and orthostatic dizziness.  More hydration given recent COVID.  Repeat Holter monitor for check on burden of PVCs after above adjustments.  ETT normal 2 years ago.  Continue eliquis 5 mg bid and Statin  Reviewed all tests and above medical conditions with patient in detail and formulated treatment plan.  Risk factor modification discussed.   Cardiac low salt diet discussed.  Maintaining healthy weight and weight loss goals were discussed in clinic.    rtc in 6 months

## 2022-07-13 ENCOUNTER — PATIENT OUTREACH (OUTPATIENT)
Dept: ADMINISTRATIVE | Facility: HOSPITAL | Age: 72
End: 2022-07-13
Payer: MEDICARE

## 2022-07-13 DIAGNOSIS — E11.9 TYPE 2 DIABETES MELLITUS WITHOUT COMPLICATION, WITHOUT LONG-TERM CURRENT USE OF INSULIN: Primary | ICD-10-CM

## 2022-07-13 NOTE — PROGRESS NOTES
DM Report: Called pt to schedule diabetic labs no answer, LVM. HA1c & microalbumin added to appt next week. Unable to schedule Fasting labs at this time.

## 2022-08-09 ENCOUNTER — TELEPHONE (OUTPATIENT)
Dept: PREADMISSION TESTING | Facility: HOSPITAL | Age: 72
End: 2022-08-09
Payer: MEDICARE

## 2022-08-09 ENCOUNTER — OFFICE VISIT (OUTPATIENT)
Dept: GASTROENTEROLOGY | Facility: CLINIC | Age: 72
End: 2022-08-09
Payer: OTHER GOVERNMENT

## 2022-08-09 VITALS
BODY MASS INDEX: 29.26 KG/M2 | SYSTOLIC BLOOD PRESSURE: 122 MMHG | HEIGHT: 63 IN | OXYGEN SATURATION: 99 % | DIASTOLIC BLOOD PRESSURE: 70 MMHG | HEART RATE: 60 BPM | WEIGHT: 165.13 LBS

## 2022-08-09 DIAGNOSIS — Z86.010 PERSONAL HISTORY OF COLONIC POLYPS: Primary | ICD-10-CM

## 2022-08-09 DIAGNOSIS — R13.10 ODYNOPHAGIA: Primary | ICD-10-CM

## 2022-08-09 DIAGNOSIS — R13.10 DYSPHAGIA, UNSPECIFIED TYPE: Primary | ICD-10-CM

## 2022-08-09 DIAGNOSIS — Z86.010 HISTORY OF COLON POLYPS: ICD-10-CM

## 2022-08-09 DIAGNOSIS — R13.10 DYSPHAGIA, UNSPECIFIED TYPE: ICD-10-CM

## 2022-08-09 DIAGNOSIS — R13.10 ODYNOPHAGIA: ICD-10-CM

## 2022-08-09 DIAGNOSIS — R11.10 REGURGITATION OF FOOD: ICD-10-CM

## 2022-08-09 PROCEDURE — 99499 UNLISTED E&M SERVICE: CPT | Mod: S$GLB,,, | Performed by: PHYSICIAN ASSISTANT

## 2022-08-09 PROCEDURE — 99999 PR PBB SHADOW E&M-EST. PATIENT-LVL V: ICD-10-PCS | Mod: PBBFAC,,, | Performed by: PHYSICIAN ASSISTANT

## 2022-08-09 PROCEDURE — 99215 OFFICE O/P EST HI 40 MIN: CPT | Mod: PBBFAC | Performed by: PHYSICIAN ASSISTANT

## 2022-08-09 PROCEDURE — 99204 OFFICE O/P NEW MOD 45 MIN: CPT | Mod: S$GLB,,, | Performed by: PHYSICIAN ASSISTANT

## 2022-08-09 PROCEDURE — 99204 PR OFFICE/OUTPT VISIT, NEW, LEVL IV, 45-59 MIN: ICD-10-PCS | Mod: S$GLB,,, | Performed by: PHYSICIAN ASSISTANT

## 2022-08-09 PROCEDURE — 99999 PR PBB SHADOW E&M-EST. PATIENT-LVL V: CPT | Mod: PBBFAC,,, | Performed by: PHYSICIAN ASSISTANT

## 2022-08-09 PROCEDURE — 99499 RISK ADDL DX/OHS AUDIT: ICD-10-PCS | Mod: S$GLB,,, | Performed by: PHYSICIAN ASSISTANT

## 2022-08-09 RX ORDER — POLYETHYLENE GLYCOL 3350, SODIUM SULFATE ANHYDROUS, SODIUM BICARBONATE, SODIUM CHLORIDE, POTASSIUM CHLORIDE 236; 22.74; 6.74; 5.86; 2.97 G/4L; G/4L; G/4L; G/4L; G/4L
4 POWDER, FOR SOLUTION ORAL ONCE
Qty: 4000 ML | Refills: 0 | Status: SHIPPED | OUTPATIENT
Start: 2022-08-09 | End: 2022-08-09

## 2022-08-09 NOTE — TELEPHONE ENCOUNTER
Please advise if patient may hold Eliquis 2 days prior to endoscopy procedure. Upon your approval, our team will inform patient of medication clearance prior to procedure.  Procedure is scheduled for 9-.  Thanks for your time.

## 2022-08-17 ENCOUNTER — PATIENT MESSAGE (OUTPATIENT)
Dept: PREADMISSION TESTING | Facility: HOSPITAL | Age: 72
End: 2022-08-17
Payer: MEDICARE

## 2022-08-31 NOTE — PROGRESS NOTES
Clinic Consult:  [facility] Consultation Note    Reason for Consult:  The primary encounter diagnosis was Dysphagia, unspecified type. Diagnoses of History of colon polyps, Odynophagia, and Regurgitation of food were also pertinent to this visit.    PCP: Cody Parks   No address on file    CC: Dysphagia      HPI:  This is a 72 y.o. female here for evaluation of   Food getting hung up at the base of the esophagus. Began years ago but progressing. Can't tolerate it anymore. Now causing pain when food gets stuck, will often regurgitate. Does not feel reflux and indigestion. Occurs with liquids and solids. Tries to drink something but this can make it worse. Nothing makes it better until it decides to digest. Not taking her Omeprazole. Reports she has taken it in the past and it does not help this issue at all. It does help her indigestion. Had an EGD years ago - had hypertonic LES. She was dilated. Last colonoscopy 2014 with 5 yr recall.    Review of Systems   Constitutional: Negative for activity change, appetite change, chills, diaphoresis, fatigue, fever and unexpected weight change.   HENT: Positive for trouble swallowing. Negative for sore throat and voice change.    Respiratory: Negative for cough and shortness of breath.    Cardiovascular: Negative for chest pain.   Gastrointestinal: Negative for abdominal distention, abdominal pain, anal bleeding, blood in stool, constipation, diarrhea, nausea and vomiting.   Genitourinary: Negative for dysuria and hematuria.   Musculoskeletal: Negative for back pain.   Skin: Negative for color change and pallor.   Neurological: Negative for dizziness, weakness and light-headedness.   Psychiatric/Behavioral: Negative for dysphoric mood. The patient is not nervous/anxious.         Medical History:   Past Medical History:   Diagnosis Date    Achalasia     demetrius    Atrial fibrillation with RVR 5/11/2021    Cataract     Colon polyp     Gastroesophageal  Plan of care reviewed with patient, discharge criteria met and instructions given. Verbalized understanding.    reflux     Hiatal hernia     Hx of colonic polyps 08/15/2014    per colonoscopy in      Hyperlipidemia 2022    Hypertension     Peripheral neuropathy     Postmenopausal HRT (hormone replacement therapy)     failed tapering off    Sepsis 2021    Last Assessment & Plan:  Formatting of this note might be different from the original. History & Physical Patient meets sepsis criteria with a white cell count of 15, and tachypnea.  Source of infection not clear at this time.  No evidence for meningitis.  Cover with broad-spectrum antibiotics especially given invasive dental procedure done recently.  Check blood cultures and monitor for fever.  R    Sinusitis     Thyroid nodule 3/16 subcm; kathleen 2 yrs    Type 2 diabetes mellitus with neurological manifestations     Vasodepressor syncope 2018       Surgical History:   Past Surgical History:   Procedure Laterality Date    BREAST BIOPSY Left 2021    cataract Left      SECTION      CHOLECYSTECTOMY      [facility]    HYSTERECTOMY      nonca    OOPHORECTOMY      TILT TABLE TEST N/A 10/25/2018    Procedure: TILT TABLE TEST;  Surgeon: Daryl Walker MD;  Location: [facility] CATH LAB;  Service: Cardiology;  Laterality: N/A;  VAS.DEP.SYN,HUT,FAS,3PREP    TONSILLECTOMY         Family History:    Family History   Problem Relation Age of Onset    Aneurysm Sister     Heart disease Mother     Diabetes Father     Coronary artery disease Father     Coronary artery disease Brother     Parkinsonism Brother        Social History:   Social History     Socioeconomic History    Marital status:     Number of children: 2   Tobacco Use    Smoking status: Never Smoker    Smokeless tobacco: Never Used   Substance and Sexual Activity    Alcohol use: No    Drug use: No    Sexual activity: Never     Social Determinants of Health     Financial Resource Strain: Medium Risk     Difficulty of Paying Living Expenses: Somewhat hard   Food Insecurity: No Food Insecurity    Worried About Running Out of Food in the Last Year: Never true    Ran Out of Food in the Last Year: Never true   Transportation Needs: No Transportation Needs    Lack of Transportation (Medical): No    Lack of Transportation (Non-Medical): No   Physical Activity: Unknown    Days of Exercise per Week: Patient refused   Stress: Stress Concern Present    Feeling of Stress : To some extent   Social Connections: Unknown    Frequency of Communication with Friends and Family: More than three times a week    Frequency of Social Gatherings with Friends and Family: Once a week    Active Member of Clubs or Organizations: No    Attends Club or Organization Meetings: Never    Marital Status:    Housing Stability: Low Risk     Unable to Pay for Housing in the Last Year: No    Number of Places Lived in the Last Year: 1    Unstable Housing in the Last Year: No       Allergies:   Review of patient's allergies indicates:   Allergen Reactions    Floxin [ofloxacin]     Fluvastatin     Pravastatin Other (See Comments)     myalgias    Trazodone      Racing heart       Home Medications:   Current Outpatient Medications on File Prior to Visit   Medication Sig Dispense Refill    albuterol (PROVENTIL/VENTOLIN HFA) 90 mcg/actuation inhaler Inhale 2 puffs into the lungs every 6 (six) hours as needed for Wheezing. 6.7 g 5    apixaban (ELIQUIS) 5 mg Tab Take 1 tablet (5 mg total) by mouth 2 (two) times daily. 180 tablet 0    ascorbic acid, vitamin C, (VITAMIN C) 500 MG tablet Take 1 tablet (500 mg total) by mouth 2 (two) times daily.      biotin 10,000 mcg Cap Take 1 capsule by mouth once daily.      blood sugar diagnostic (BLOOD GLUCOSE TEST) Strp Test 2 times daily 200 strip 3    blood-glucose meter kit Test twice daily 1 each 0    DULoxetine (CYMBALTA) 20 MG capsule Take 1 capsule (20 mg total) by mouth once daily. 90  "capsule 3    estrogens, conjugated, (PREMARIN) 0.625 MG tablet Take 1 tablet (0.625 mg total) by mouth once daily. 90 tablet 3    flecainide (TAMBOCOR) 100 MG Tab Take 1 tablet (100 mg total) by mouth every 12 (twelve) hours. 60 tablet 11    lancets Misc Test twice daily 200 each 3    metoprolol succinate (TOPROL-XL) 100 MG 24 hr tablet Take 1 tablet (100 mg total) by mouth once daily. 90 tablet 3    rosuvastatin (CRESTOR) 5 MG tablet Take 1 tablet (5 mg total) by mouth once daily. 90 tablet 3    SITagliptan-metformin (JANUMET) 50-1,000 mg per tablet Take 1 tablet by mouth 2 (two) times daily with meals. 180 tablet 0    zolpidem (AMBIEN) 10 mg Tab 1/2 po qhs prn insomnia 90 tablet 1    chlorhexidine (PERIDEX) 0.12 % solution SWISH AND SPIT 15MLS BY MOUTH TWICE DAILY      cyanocobalamin 2000 MCG tablet Take 2,500 mcg by mouth.      DEEP SEA NASAL 0.65 % nasal spray       FLUZONE HIGHDOSE QUAD 20-21  mcg/0.7 mL Syrg ADM 0.7ML IM UTD      HYDROcodone-acetaminophen (NORCO) 5-325 mg per tablet Take 1 tablet by mouth every 4 (four) hours as needed for Pain. 7 tablet 0    ibuprofen (ADVIL,MOTRIN) 800 MG tablet Take 800 mg by mouth every 4 to 6 hours as needed.  0    levalbuterol (XOPENEX) 1.25 mg/3 mL nebulizer solution USE 1 VIAL IN NEBULIZER THREE TIMES DAILY AS NEEDED FOR WHEEZING FOR 5 DAYS      omeprazole (PRILOSEC) 40 MG capsule Take 1 capsule (40 mg total) by mouth every evening. (Patient not taking: Reported on 8/9/2022) 30 capsule 11    promethazine-dextromethorphan (PROMETHAZINE-DM) 6.25-15 mg/5 mL Syrp TAKE 5 ML BY MOUTH IN THE EVENING       No current facility-administered medications on file prior to visit.       Physical Exam:  /70   Pulse 60   Ht 5' 3" (1.6 m)   Wt 74.9 kg (165 lb 2 oz)   SpO2 99%   BMI 29.25 kg/m²   Body mass index is 29.25 kg/m².  Physical Exam  Constitutional:       General: She is not in acute distress.     Appearance: Normal appearance. She is not " ill-appearing, toxic-appearing or diaphoretic.   HENT:      Head: Normocephalic and atraumatic.   Eyes:      General: No scleral icterus.     Extraocular Movements: Extraocular movements intact.   Cardiovascular:      Rate and Rhythm: Normal rate and regular rhythm.   Pulmonary:      Effort: Pulmonary effort is normal. No respiratory distress.      Breath sounds: Normal breath sounds.   Abdominal:      General: Bowel sounds are normal. There is no distension.      Palpations: Abdomen is soft.      Tenderness: There is no abdominal tenderness. There is no guarding.   Musculoskeletal:         General: Normal range of motion.      Cervical back: Normal range of motion.   Skin:     General: Skin is warm and dry.      Coloration: Skin is not pale.   Neurological:      Mental Status: She is alert and oriented to person, place, and time.           Labs: Pertinent labs reviewed.  Endoscopy:   CRC Screenin  Anticoagulation: Eliquis    Assessment:  1. Dysphagia, unspecified type    2. History of colon polyps    3. Odynophagia    4. Regurgitation of food         Recommendations:  -Recommend EGD and colonoscopy. EGD due to previous history of dysphagia requiring dilation due to hypertonic LES.   -Colonoscopy for surveillance.  -May require dilation on EGD.  -Omeprazole not helpful.  -Follow up after scopes.  -dysphagia precautions.  -will need cardiac clearance due to Eliquis for Afib.    Dysphagia, unspecified type  -     Ambulatory referral/consult to Endo Procedure ; Future; Expected date: 08/10/2022    History of colon polyps  -     Ambulatory referral/consult to Endo Procedure ; Future; Expected date: 08/10/2022    Odynophagia  -     Ambulatory referral/consult to Endo Procedure ; Future; Expected date: 08/10/2022    Regurgitation of food        No follow-ups on file.    Thank you so much for allowing me to participate in the care of Kelsie LEON Yoder PA-C

## 2022-09-14 ENCOUNTER — TELEPHONE (OUTPATIENT)
Dept: PREADMISSION TESTING | Facility: HOSPITAL | Age: 72
End: 2022-09-14
Payer: MEDICARE

## 2022-09-14 PROBLEM — R13.10 DYSPHAGIA: Status: ACTIVE | Noted: 2022-09-14

## 2022-09-19 DIAGNOSIS — K22.0 ACHALASIA: Primary | ICD-10-CM

## 2022-09-19 DIAGNOSIS — R13.19 ESOPHAGEAL DYSPHAGIA: ICD-10-CM

## 2022-09-20 ENCOUNTER — E-CONSULT (OUTPATIENT)
Dept: CARDIOLOGY | Facility: CLINIC | Age: 72
End: 2022-09-20
Payer: MEDICARE

## 2022-09-20 ENCOUNTER — TELEPHONE (OUTPATIENT)
Dept: ENDOSCOPY | Facility: HOSPITAL | Age: 72
End: 2022-09-20
Payer: MEDICARE

## 2022-09-20 DIAGNOSIS — I48.0 PAF (PAROXYSMAL ATRIAL FIBRILLATION): Chronic | ICD-10-CM

## 2022-09-20 DIAGNOSIS — Z01.810 PREOP CARDIOVASCULAR EXAM: Primary | ICD-10-CM

## 2022-09-20 PROCEDURE — 99451 PR INTERPROF, PHONE/INTERNET/EHR, CONSULT, >= 5MINS: ICD-10-PCS | Mod: S$PBB,,, | Performed by: INTERNAL MEDICINE

## 2022-09-20 PROCEDURE — 99451 NTRPROF PH1/NTRNET/EHR 5/>: CPT | Mod: S$PBB,,, | Performed by: INTERNAL MEDICINE

## 2022-09-20 NOTE — TELEPHONE ENCOUNTER
This patient is being scheduled for one of the following procedures: Endoscopy    Please indicate if the patient is cleared for surgery from a cardiac standpoint.    Patient gave verbal consent for e-consult Yes    Cardiac PMHx: A. Fib, w/ RVR,     Last Echo: Results for orders placed during the hospital encounter of 05/11/21    Echo Color Flow Doppler? Yes    Interpretation Summary  · Normal systolic function.  · The estimated ejection fraction is 55%.  · Normal left ventricular diastolic function.  · With normal right ventricular systolic function.  · Normal central venous pressure (3 mmHg).  · The estimated PA systolic pressure is 32 mmHg.      Anticoagulants: jazmine anticoag list: Eliquis (APIXABAN)  *    If you have questions or concerns, please call us at [phone].

## 2022-09-20 NOTE — PROGRESS NOTES
[facility]  Response for E-Consult     Patient Name: Kelsie Bustamante  MRN: [id number]  Primary Care Provider: Cody Parks MD   Requesting Provider: Herminio Pugh RN      Findings:   73 yo female, E consult for preop clearance of EGD.  PMH PMH PAF HTN DM HLD  The chart reviewed and f/u with Dr. Quintana at cardiology clinic of [facility].   ekg NSR and PVC   HOLTER SR frequent PACs and PVCs   echo showed normal biv function    Plan  Elevated periop risk of CV events for low risk procedure.  Ok to proceed the scheduled surgery without further cardiac study.  OK to hold Eliquis 2 days before the procedure and resume postop.      I did not speak to the requesting provider verbally about this.     Total time of Consultation: 21 minute    Percentage of time spent on verbal/written discussion: 50%     Thank you for your consult.     Brad Aguilar MD  [facility]

## 2022-09-23 ENCOUNTER — OFFICE VISIT (OUTPATIENT)
Dept: CARDIOLOGY | Facility: CLINIC | Age: 72
End: 2022-09-23
Payer: MEDICARE

## 2022-09-23 DIAGNOSIS — I48.0 PAF (PAROXYSMAL ATRIAL FIBRILLATION): ICD-10-CM

## 2022-09-23 DIAGNOSIS — R00.2 PALPITATION: Primary | ICD-10-CM

## 2022-09-23 DIAGNOSIS — I49.1 PAC (PREMATURE ATRIAL CONTRACTION): ICD-10-CM

## 2022-09-23 DIAGNOSIS — I10 PRIMARY HYPERTENSION: Chronic | ICD-10-CM

## 2022-09-23 PROCEDURE — 99214 PR OFFICE/OUTPT VISIT, EST, LEVL IV, 30-39 MIN: ICD-10-PCS | Mod: 95,,, | Performed by: INTERNAL MEDICINE

## 2022-09-23 PROCEDURE — 99214 OFFICE O/P EST MOD 30 MIN: CPT | Mod: 95,,, | Performed by: INTERNAL MEDICINE

## 2022-09-23 RX ORDER — METOPROLOL SUCCINATE 100 MG/1
100 TABLET, EXTENDED RELEASE ORAL 2 TIMES DAILY
Qty: 180 TABLET | Refills: 3 | Status: ON HOLD
Start: 2022-09-23 | End: 2022-10-19 | Stop reason: SDUPTHER

## 2022-09-23 RX ORDER — FLECAINIDE ACETATE 150 MG/1
150 TABLET ORAL EVERY 12 HOURS
Qty: 60 TABLET | Refills: 11 | Status: ON HOLD | OUTPATIENT
Start: 2022-09-23 | End: 2022-10-19 | Stop reason: HOSPADM

## 2022-09-23 NOTE — PROGRESS NOTES
Subjective:   Patient ID:  Kelsie Bustamante is a 72 y.o. female who presents for follow up of No chief complaint on file.      Tele visit  71 yo female called on for palpitation  PMH PAF HTn HLD and DM. F/u at cardiology service of [facility]   HOLTER NSR   echo normal EF.    Her /80 mmHG and pulse 90 bpm  C/o palpitation SOB at exertion today  No dizziness chest pain leg swelling, and orthopnea              Past Medical History:   Diagnosis Date    Achalasia     demetrius    Atrial fibrillation with RVR 2021    Cataract     Colon polyp     Gastroesophageal reflux     Hiatal hernia     Hx of colonic polyps 08/15/2014    per colonoscopy in      Hyperlipidemia 2022    Hypertension     Peripheral neuropathy     Postmenopausal HRT (hormone replacement therapy)     failed tapering off    Sepsis 2021    Last Assessment & Plan:  Formatting of this note might be different from the original. History & Physical Patient meets sepsis criteria with a white cell count of 15, and tachypnea.  Source of infection not clear at this time.  No evidence for meningitis.  Cover with broad-spectrum antibiotics especially given invasive dental procedure done recently.  Check blood cultures and monitor for fever.  R    Sinusitis     Thyroid nodule 3/16 subcm; kathleen 2 yrs    Type 2 diabetes mellitus with neurological manifestations     Vasodepressor syncope 2018       Past Surgical History:   Procedure Laterality Date    BREAST BIOPSY Left 2021    cataract Left      SECTION      CHOLECYSTECTOMY      CYST REMOVAL Right 2021    [facility]    HYSTERECTOMY      nonca    OOPHORECTOMY      TILT TABLE TEST N/A 10/25/2018    Procedure: TILT TABLE TEST;  Surgeon: Daryl Walker MD;  Location: [facility] CATH LAB;  Service: Cardiology;  Laterality: N/A;  VAS.DEP.SYN,HUT,FAS,3PREP    TONSILLECTOMY         Social History     Tobacco Use    Smoking status: Never    Smokeless  tobacco: Never   Substance Use Topics    Alcohol use: No    Drug use: No       Family History   Problem Relation Age of Onset    Aneurysm Sister     Heart disease Mother     Diabetes Father     Coronary artery disease Father     Coronary artery disease Brother     Parkinsonism Brother          ROS    Objective:   Physical Exam    Lab Results   Component Value Date    CHOL 122 02/10/2021    CHOL 136 01/30/2020    CHOL 149 01/10/2019     Lab Results   Component Value Date    HDL 58 02/10/2021    HDL 53 01/30/2020    HDL 62 01/10/2019     Lab Results   Component Value Date    LDLCALC 37.0 (L) 02/10/2021    LDLCALC 48.8 (L) 01/30/2020    LDLCALC 57.6 (L) 01/10/2019     Lab Results   Component Value Date    TRIG 135 02/10/2021    TRIG 171 (H) 01/30/2020    TRIG 147 01/10/2019     Lab Results   Component Value Date    CHOLHDL 47.5 02/10/2021    CHOLHDL 39.0 01/30/2020    CHOLHDL 41.6 01/10/2019       Chemistry        Component Value Date/Time     06/25/2022 0357    K 5.2 (H) 06/25/2022 0357     06/25/2022 0357    CO2 20 (L) 06/25/2022 0357    BUN 10 06/25/2022 0357    CREATININE 1.0 06/25/2022 0357     (H) 06/25/2022 0357        Component Value Date/Time    CALCIUM 8.8 06/25/2022 0357    ALKPHOS 70 06/25/2022 0357    AST 36 06/25/2022 0357    ALT 27 06/25/2022 0357    BILITOT 0.2 06/25/2022 0357    ESTGFRAFRICA >60 06/25/2022 0357    EGFRNONAA 57 (A) 06/25/2022 0357          Lab Results   Component Value Date    HGBA1C 5.8 (H) 08/16/2021     Lab Results   Component Value Date    TSH 3.533 08/16/2021     Lab Results   Component Value Date    INR 0.9 06/24/2022     Lab Results   Component Value Date    WBC 3.40 (L) 06/25/2022    HGB 11.9 (L) 06/25/2022    HCT 36.1 (L) 06/25/2022    MCV 90 06/25/2022     06/25/2022     BMP  Sodium   Date Value Ref Range Status   06/25/2022 139 136 - 145 mmol/L Final     Potassium   Date Value Ref Range Status   06/25/2022 5.2 (H) 3.5 - 5.1 mmol/L Final     Chloride    Date Value Ref Range Status   06/25/2022 106 95 - 110 mmol/L Final     CO2   Date Value Ref Range Status   06/25/2022 20 (L) 23 - 29 mmol/L Final     BUN   Date Value Ref Range Status   06/25/2022 10 8 - 23 mg/dL Final     Creatinine   Date Value Ref Range Status   06/25/2022 1.0 0.5 - 1.4 mg/dL Final     Calcium   Date Value Ref Range Status   06/25/2022 8.8 8.7 - 10.5 mg/dL Final     Anion Gap   Date Value Ref Range Status   06/25/2022 13 8 - 16 mmol/L Final     eGFR if    Date Value Ref Range Status   06/25/2022 >60 >60 mL/min/1.73 m^2 Final     eGFR if non    Date Value Ref Range Status   06/25/2022 57 (A) >60 mL/min/1.73 m^2 Final     Comment:     Calculation used to obtain the estimated glomerular filtration  rate (eGFR) is the CKD-EPI equation.        BNP  @LABRCNTIP(BNP,BNPTRIAGEBLO)@  @LABRCNTIP(troponini)@  CrCl cannot be calculated (Patient's most recent lab result is older than the maximum 7 days allowed.).  No results found in the last 24 hours.  No results found in the last 24 hours.  No results found in the last 24 hours.    Assessment:      1. Palpitation    2. PAF (paroxysmal atrial fibrillation)    3. PAC (premature atrial contraction)    4. Primary hypertension        Plan:   Increase Flecainide from 100 mg bid to 150 mg bid for palpitation   Holter for afib load  Continue torpolXL 100 mg bid and Eliquis for HTN and PAF  Avoidc affeine and alcohol  I have reviewed all pertinent labs and cardiac studies independently. Plans and recommendations have been formulated under my direct supervision. All questions answered and patient voiced understanding.   If symptoms persist go to the ED  RTC as needed    The patient location is: home  The chief complaint leading to consultation is: PAF and palpitation    Visit type: audiovisual    Face to Face time with patient: 18 minutes of total time spent on the encounter, which includes face to face time and non-face to face time  preparing to see the patient (eg, review of tests), Obtaining and/or reviewing separately obtained history, Documenting clinical information in the electronic or other health record, Independently interpreting results (not separately reported) and communicating results to the patient/family/caregiver, or Care coordination (not separately reported).         Each patient to whom he or she provides medical services by telemedicine is:  (1) informed of the relationship between the physician and patient and the respective role of any other health care provider with respect to management of the patient; and (2) notified that he or she may decline to receive medical services by telemedicine and may withdraw from such care at any time.    Notes:

## 2022-09-29 ENCOUNTER — PATIENT OUTREACH (OUTPATIENT)
Dept: ADMINISTRATIVE | Facility: HOSPITAL | Age: 72
End: 2022-09-29
Payer: MEDICARE

## 2022-10-05 ENCOUNTER — ANESTHESIA EVENT (OUTPATIENT)
Dept: ENDOSCOPY | Facility: HOSPITAL | Age: 72
End: 2022-10-05
Payer: MEDICARE

## 2022-10-05 NOTE — ANESTHESIA PREPROCEDURE EVALUATION
10/05/2022  Kelsie Bustamante is a 72 y.o., female.  Echo 5/2021  Summary     Normal systolic function.   The estimated ejection fraction is 55%.   Normal left ventricular diastolic function.   With normal right ventricular systolic function.   Normal central venous pressure (3 mmHg).   The estimated PA systolic pressure is 32 mmHg.       Sinus rhythm with Premature atrial complexes with Aberrant conduction   Otherwise normal ECG   When compared with ECG of 12-MAY-2021 09:01,   Sinus rhythm has replaced Atrial fibrillation   Vent. rate has decreased  BPM   ST no longer depressed in Inferior leads   ST no longer depressed in Anterior-lateral leads   T wave inversion no longer evident in Inferior leads   T wave inversion no longer evident in Anterior-lateral leads   Confirmed by NAYLA NIETO, MILTON AVILA ([id number]) on 6/25/2022 10:16:36 AM   Past Medical History:   Diagnosis Date    Achalasia     demetrius    Atrial fibrillation with RVR 5/11/2021    Cataract     Colon polyp     Gastroesophageal reflux     Hiatal hernia     Hx of colonic polyps 08/15/2014    per colonoscopy in      Hyperlipidemia 6/24/2022    Hypertension     Peripheral neuropathy     Postmenopausal HRT (hormone replacement therapy)     failed tapering off    Sepsis 5/5/2021    Last Assessment & Plan:  Formatting of this note might be different from the original. History & Physical Patient meets sepsis criteria with a white cell count of 15, and tachypnea.  Source of infection not clear at this time.  No evidence for meningitis.  Cover with broad-spectrum antibiotics especially given invasive dental procedure done recently.  Check blood cultures and monitor for fever.  R    Sinusitis     Thyroid nodule 3/16 subcm; kathleen 2 yrs    Type 2 diabetes mellitus with neurological manifestations     Vasodepressor syncope 8/22/2018      Past Surgical History:   Procedure Laterality Date    BREAST BIOPSY Left 2021    cataract Left      SECTION      CHOLECYSTECTOMY      [facility]    HYSTERECTOMY      nonca    OOPHORECTOMY      TILT TABLE TEST N/A 10/25/2018    Procedure: TILT TABLE TEST;  Surgeon: Daryl Walker MD;  Location: [facility] CATH LAB;  Service: Cardiology;  Laterality: N/A;  VAS.DEP.SYN,HUT,FAS,3PREP    TONSILLECTOMY       Current Outpatient Medications   Medication Instructions    albuterol (PROVENTIL/VENTOLIN HFA) 90 mcg/actuation inhaler 2 puffs, Inhalation, Every 6 hours PRN    apixaban (ELIQUIS) 5 mg, Oral, 2 times daily    ascorbic acid (vitamin C) (VITAMIN C) 500 mg, Oral, 2 times daily    biotin 10,000 mcg Cap 1 capsule, Oral, Daily    blood sugar diagnostic (BLOOD GLUCOSE TEST) Strp Test 2 times daily     blood-glucose meter kit Test twice daily    chlorhexidine (PERIDEX) 0.12 % solution SWISH AND SPIT 15MLS BY MOUTH TWICE DAILY    cyanocobalamin 2,500 mcg, Oral    DEEP SEA NASAL 0.65 % nasal spray No dose, route, or frequency recorded.    DULoxetine (CYMBALTA) 20 mg, Oral, Daily    estrogens (conjugated) (PREMARIN) 0.625 mg, Oral, Daily    flecainide (TAMBOCOR) 150 mg, Oral, Every 12 hours    FLUZONE HIGHDOSE QUAD 20-21  mcg/0.7 mL Syrg ADM 0.7ML IM UTD    HYDROcodone-acetaminophen (NORCO) 5-325 mg per tablet 1 tablet, Oral, Every 4 hours PRN    ibuprofen (ADVIL,MOTRIN) 800 mg, Oral, Every 4-6 hours PRN    lancets Misc Test twice daily    levalbuterol (XOPENEX) 1.25 mg/3 mL nebulizer solution USE 1 VIAL IN NEBULIZER THREE TIMES DAILY AS NEEDED FOR WHEEZING FOR 5 DAYS    metoprolol succinate (TOPROL-XL) 100 mg, Oral, 2 times daily    omeprazole (PRILOSEC) 40 mg, Oral, Nightly    promethazine-dextromethorphan (PROMETHAZINE-DM) 6.25-15 mg/5 mL Syrp TAKE 5 ML BY MOUTH IN THE EVENING    rosuvastatin (CRESTOR) 5 mg, Oral,  Daily    SITagliptan-metformin (JANUMET) 50-1,000 mg per tablet 1 tablet, Oral, 2 times daily with meals    zolpidem (AMBIEN) 10 mg Tab 1/2 po qhs prn insomnia             Pre-op Assessment    I have reviewed the Patient Summary Reports.     I have reviewed the Nursing Notes. I have reviewed the NPO Status.   I have reviewed the Medications.     Review of Systems  Anesthesia Hx:  No problems with previous Anesthesia  Neg history of prior surgery. Denies Family Hx of Anesthesia complications.   Denies Personal Hx of Anesthesia complications.   Social:  Non-Smoker, Social Alcohol Use    Cardiovascular:   Hypertension Dysrhythmias atrial fibrillation Orthopnea hyperlipidemia    Pulmonary:   Shortness of breath    Hepatic/GI:   Hiatal Hernia, GERD    Musculoskeletal:   Arthritis     Neurological:   Neuromuscular Disease,    Endocrine:   Diabetes        Physical Exam  General: Well nourished    Airway:  Mallampati: II / II  Mouth Opening: Normal  TM Distance: Normal  Tongue: Normal  Neck ROM: Normal ROM    Dental:  Intact        Anesthesia Plan  Type of Anesthesia, risks & benefits discussed:    Anesthesia Type: Gen Natural Airway  Intra-op Monitoring Plan: Standard ASA Monitors  Post Op Pain Control Plan: multimodal analgesia  Induction:  IV  Informed Consent: Informed consent signed with the Patient and all parties understand the risks and agree with anesthesia plan.  All questions answered.   ASA Score: 3  Day of Surgery Review of History & Physical: H&P Update referred to the surgeon/provider.    .

## 2022-10-06 ENCOUNTER — HOSPITAL ENCOUNTER (OUTPATIENT)
Facility: HOSPITAL | Age: 72
Discharge: HOME | End: 2022-10-06
Attending: INTERNAL MEDICINE | Admitting: INTERNAL MEDICINE
Payer: MEDICARE

## 2022-10-06 ENCOUNTER — ANESTHESIA (OUTPATIENT)
Dept: ENDOSCOPY | Facility: HOSPITAL | Age: 72
End: 2022-10-06
Payer: MEDICARE

## 2022-10-06 VITALS
TEMPERATURE: 98 F | RESPIRATION RATE: 18 BRPM | HEIGHT: 63 IN | BODY MASS INDEX: 28.64 KG/M2 | HEART RATE: 56 BPM | DIASTOLIC BLOOD PRESSURE: 74 MMHG | SYSTOLIC BLOOD PRESSURE: 182 MMHG | OXYGEN SATURATION: 95 % | WEIGHT: 161.63 LBS

## 2022-10-06 DIAGNOSIS — R13.19 ESOPHAGEAL DYSPHAGIA: Primary | ICD-10-CM

## 2022-10-06 DIAGNOSIS — K22.0 ACHALASIA OF ESOPHAGUS: ICD-10-CM

## 2022-10-06 DIAGNOSIS — R13.10 DYSPHAGIA: ICD-10-CM

## 2022-10-06 PROCEDURE — 43239 EGD BIOPSY SINGLE/MULTIPLE: CPT | Mod: ,,, | Performed by: INTERNAL MEDICINE

## 2022-10-06 PROCEDURE — 91037 ESOPH IMPED FUNCTION TEST: CPT | Mod: TC | Performed by: INTERNAL MEDICINE

## 2022-10-06 PROCEDURE — 43239 PR EGD, FLEX, W/BIOPSY, SGL/MULTI: ICD-10-PCS | Mod: ,,, | Performed by: INTERNAL MEDICINE

## 2022-10-06 PROCEDURE — 63600175 PHARM REV CODE 636 W HCPCS: Performed by: NURSE ANESTHETIST, CERTIFIED REGISTERED

## 2022-10-06 PROCEDURE — 37000009 HC ANESTHESIA EA ADD 15 MINS: Performed by: INTERNAL MEDICINE

## 2022-10-06 PROCEDURE — D9220A PRA ANESTHESIA: Mod: CRNA,,, | Performed by: NURSE ANESTHETIST, CERTIFIED REGISTERED

## 2022-10-06 PROCEDURE — 43239 EGD BIOPSY SINGLE/MULTIPLE: CPT | Performed by: INTERNAL MEDICINE

## 2022-10-06 PROCEDURE — 91010 ESOPHAGUS MOTILITY STUDY: CPT | Mod: TC | Performed by: INTERNAL MEDICINE

## 2022-10-06 PROCEDURE — D9220A PRA ANESTHESIA: ICD-10-PCS | Mod: CRNA,,, | Performed by: NURSE ANESTHETIST, CERTIFIED REGISTERED

## 2022-10-06 PROCEDURE — 88305 TISSUE EXAM BY PATHOLOGIST: ICD-10-PCS | Mod: 26,,, | Performed by: PATHOLOGY

## 2022-10-06 PROCEDURE — 88305 TISSUE EXAM BY PATHOLOGIST: CPT | Mod: 59 | Performed by: PATHOLOGY

## 2022-10-06 PROCEDURE — D9220A PRA ANESTHESIA: Mod: ANES,,, | Performed by: ANESTHESIOLOGY

## 2022-10-06 PROCEDURE — 37000008 HC ANESTHESIA 1ST 15 MINUTES: Performed by: INTERNAL MEDICINE

## 2022-10-06 PROCEDURE — 25000003 PHARM REV CODE 250: Performed by: INTERNAL MEDICINE

## 2022-10-06 PROCEDURE — 27201012 HC FORCEPS, HOT/COLD, DISP: Performed by: INTERNAL MEDICINE

## 2022-10-06 PROCEDURE — 25000003 PHARM REV CODE 250: Performed by: NURSE ANESTHETIST, CERTIFIED REGISTERED

## 2022-10-06 PROCEDURE — 88305 TISSUE EXAM BY PATHOLOGIST: CPT | Mod: 26,,, | Performed by: PATHOLOGY

## 2022-10-06 PROCEDURE — D9220A PRA ANESTHESIA: ICD-10-PCS | Mod: ANES,,, | Performed by: ANESTHESIOLOGY

## 2022-10-06 RX ORDER — PROPOFOL 10 MG/ML
VIAL (ML) INTRAVENOUS
Status: DISCONTINUED | OUTPATIENT
Start: 2022-10-06 | End: 2022-10-06

## 2022-10-06 RX ORDER — LIDOCAINE HYDROCHLORIDE 20 MG/ML
2 SOLUTION OROPHARYNGEAL ONCE
Status: COMPLETED | OUTPATIENT
Start: 2022-10-06 | End: 2022-10-06

## 2022-10-06 RX ORDER — LIDOCAINE HYDROCHLORIDE 20 MG/ML
INJECTION, SOLUTION EPIDURAL; INFILTRATION; INTRACAUDAL; PERINEURAL
Status: DISCONTINUED | OUTPATIENT
Start: 2022-10-06 | End: 2022-10-06

## 2022-10-06 RX ORDER — SODIUM CHLORIDE, SODIUM LACTATE, POTASSIUM CHLORIDE, CALCIUM CHLORIDE 600; 310; 30; 20 MG/100ML; MG/100ML; MG/100ML; MG/100ML
INJECTION, SOLUTION INTRAVENOUS CONTINUOUS
Status: DISCONTINUED | OUTPATIENT
Start: 2022-10-06 | End: 2022-10-06 | Stop reason: HOSPADM

## 2022-10-06 RX ADMIN — LIDOCAINE HYDROCHLORIDE 40 MG: 20 INJECTION, SOLUTION EPIDURAL; INFILTRATION; INTRACAUDAL; PERINEURAL at 12:10

## 2022-10-06 RX ADMIN — PROPOFOL 120 MG: 10 INJECTION, EMULSION INTRAVENOUS at 12:10

## 2022-10-06 RX ADMIN — LIDOCAINE HYDROCHLORIDE 2 ML: 20 SOLUTION ORAL; TOPICAL at 10:10

## 2022-10-06 NOTE — TRANSFER OF CARE
"Anesthesia Transfer of Care Note    Patient: Kelsie Bustamante    Procedure(s) Performed: Procedure(s) (LRB):  EGD (ESOPHAGOGASTRODUODENOSCOPY) (N/A)  MANOMETRY, ESOPHAGUS, WITH IMPEDANCE MEASUREMENT  Cardiac Clearance/Eliquis 2 day hold approval received per Dr. RABAI Aguilar, Cardiology on 09/20/22.  Note in encounters.  LB (N/A)    Patient location: PACU    Anesthesia Type: general    Transport from OR: Transported from OR on room air with adequate spontaneous ventilation    Post pain: adequate analgesia    Post assessment: no apparent anesthetic complications    Post vital signs: stable    Level of consciousness: awake and alert    Nausea/Vomiting: no nausea/vomiting    Complications: none    Transfer of care protocol was followed      Last vitals:   Visit Vitals  /64   Pulse (!) 52   Temp 36.5 °C (97.7 °F) (Temporal)   Resp 16   Ht 5' 3" (1.6 m)   Wt 73.3 kg (161 lb 9.6 oz)   SpO2 95%   Breastfeeding No   BMI 28.63 kg/m²     "

## 2022-10-06 NOTE — ANESTHESIA PREPROCEDURE EVALUATION
10/06/2022  Kelsie Bustamante is a 72 y.o., female.    Past Medical History:   Diagnosis Date    Achalasia     demetrius    Atrial fibrillation with RVR 2021    Cataract     Colon polyp     Gastroesophageal reflux     Hiatal hernia     Hx of colonic polyps 08/15/2014    per colonoscopy in      Hyperlipidemia 2022    Hypertension     Peripheral neuropathy     Postmenopausal HRT (hormone replacement therapy)     failed tapering off    Sepsis 2021    Last Assessment & Plan:  Formatting of this note might be different from the original. History & Physical Patient meets sepsis criteria with a white cell count of 15, and tachypnea.  Source of infection not clear at this time.  No evidence for meningitis.  Cover with broad-spectrum antibiotics especially given invasive dental procedure done recently.  Check blood cultures and monitor for fever.  R    Sinusitis     Thyroid nodule 3/16 subcm; kathleen 2 yrs    Type 2 diabetes mellitus with neurological manifestations     Vasodepressor syncope 2018       Past Surgical History:   Procedure Laterality Date    BREAST BIOPSY Left 2021    cataract Left      SECTION      CHOLECYSTECTOMY      [facility]    HYSTERECTOMY      nonca    OOPHORECTOMY      TILT TABLE TEST N/A 10/25/2018    Procedure: TILT TABLE TEST;  Surgeon: Daryl Walker MD;  Location: [facility] CATH LAB;  Service: Cardiology;  Laterality: N/A;  VAS.DEP.SYN,HUT,FAS,3PREP    TONSILLECTOMY             Pre-op Assessment    I have reviewed the Patient Summary Reports.     I have reviewed the Nursing Notes. I have reviewed the NPO Status.   I have reviewed the Medications.     Review of Systems  Anesthesia Hx:  No problems with previous Anesthesia    Social:  Non-Smoker, No Alcohol Use     Cardiovascular:   Hypertension Orthopnea ECG has been reviewed. PAF, currently on beta blockers, no recent issues    Vent. Rate : 061 BPM     Atrial Rate : 061 BPM      P-R Int : 160 ms          QRS Dur : 086 ms       QT Int : 426 ms       P-R-T Axes : 040 036 044 degrees      QTc Int : 428 ms     Sinus rhythm with Premature atrial complexes with Aberrant conduction   Otherwise normal ECG   When compared with ECG of 12-MAY-2021 09:01,   Sinus rhythm has replaced Atrial fibrillation   Vent. rate has decreased  BPM   ST no longer depressed in Inferior leads   ST no longer depressed in Anterior-lateral leads   T wave inversion no longer evident in Inferior leads   T wave inversion no longer evident in Anterior-lateral leads     ECHO 5/11/21    Vent. Rate : 061 BPM     Atrial Rate : 061 BPM      P-R Int : 160 ms          QRS Dur : 086 ms       QT Int : 426 ms       P-R-T Axes : 040 036 044 degrees      QTc Int : 428 ms     Sinus rhythm with Premature atrial complexes with Aberrant conduction   Otherwise normal ECG   When compared with ECG of 12-MAY-2021 09:01,   Sinus rhythm has replaced Atrial fibrillation   Vent. rate has decreased  BPM   ST no longer depressed in Inferior leads   ST no longer depressed in Anterior-lateral leads   T wave inversion no longer evident in Inferior leads   T wave inversion no longer evident in Anterior-lateral leads    Pulmonary:   Shortness of breath    Hepatic/GI:   Hiatal Hernia, GERD, poorly controlled    Musculoskeletal:   Arthritis     Neurological:   Neuromuscular Disease, fibomyalgia   Endocrine:   Diabetes, type 2        Physical Exam  General: Well nourished, Cooperative, Alert and Oriented    Airway:  Mallampati: II / II  Mouth Opening: Normal  Neck ROM: Normal ROM    Dental:  Dentures  Upper plate removed  Chest/Lungs:  Normal Respiratory Rate    Heart:  Rhythm: Regular Rhythm        Anesthesia Plan  Type of Anesthesia, risks & benefits discussed:    Anesthesia  Type: Gen Natural Airway  Intra-op Monitoring Plan: Standard ASA Monitors  Induction:  IV  Informed Consent: Informed consent signed with the Patient and all parties understand the risks and agree with anesthesia plan.  All questions answered.   ASA Score: 3  Day of Surgery Review of History & Physical: H&P Update referred to the surgeon/provider.I have interviewed and examined the patient. I have reviewed the patient's H&P dated: There are no significant changes.     Ready For Surgery From Anesthesia Perspective.     .

## 2022-10-06 NOTE — [employer] PATIENT INSTRUCTIONS
Discharge Summary/Instructions after an Endoscopic Procedure  Patient Name: Kelsie Bustamante  Patient MRN: [id number]  Patient YOB: 1950 Thursday, October 6, 2022  Bruna Fletcher MD  Dear patient,  As a result of recent federal legislation (The Federal Cures Act), you may   receive lab or pathology results from your procedure in your [facility]   account before your physician is able to contact you. Your physician or   their representative will relay the results to you with their   recommendations at their soonest availability.  Thank you,  RESTRICTIONS:  During your procedure today, you received medications for sedation.  These   medications may affect your judgment, balance and coordination.  Therefore,   for 24 hours, you have the following restrictions:   - DO NOT drive a car, operate machinery, make legal/financial decisions,   sign important papers or drink alcohol.    ACTIVITY:  Today: no heavy lifting, straining or running due to procedural   sedation/anesthesia.  The following day: return to full activity including work.  DIET:  Eat and drink normally unless instructed otherwise.     TREATMENT FOR COMMON SIDE EFFECTS:  - Mild abdominal pain, nausea, belching, bloating or excessive gas:  rest,   eat lightly and use a heating pad.  - Sore Throat: treat with throat lozenges and/or gargle with warm salt   water.  - Because air was used during the procedure, expelling large amounts of air   from your rectum or belching is normal.  - If a bowel prep was taken, you may not have a bowel movement for 1-3 days.    This is normal.  SYMPTOMS TO WATCH FOR AND REPORT TO YOUR PHYSICIAN:  1. Abdominal pain or bloating, other than gas cramps.  2. Chest pain.  3. Back pain.  4. Signs of infection such as: chills or fever occurring within 24 hours   after the procedure.  5. Rectal bleeding, which would show as bright red, maroon, or black stools.   (A tablespoon of blood from the rectum is not serious, especially  if   hemorrhoids are present.)  6. Vomiting.  7. Weakness or dizziness.  GO DIRECTLY TO THE NEAREST EMERGENCY ROOM IF YOU HAVE ANY OF THE FOLLOWING:      Difficulty breathing              Chills and/or fever over 101 F   Persistent vomiting and/or vomiting blood   Severe abdominal pain   Severe chest pain   Black, tarry stools   Bleeding- more than one tablespoon   Any other symptom or condition that you feel may need urgent attention  Your doctor recommends these additional instructions:  If any biopsies were taken, your doctors clinic will contact you in 1 to 2   weeks with any results.  - Discharge patient to home (via wheelchair).   - Full liquid diet.   - Continue present medications.   - Follow-up manometry results  - Referral to surgery service  - Patient has a contact number available for emergencies.  The signs and   symptoms of potential delayed complications were discussed with the   patient.  Return to normal activities tomorrow.  Written discharge   instructions were provided to the patient.   - Resume anticoagulant at prior dose.  For questions, problems or results please call your physician Bruna Fletcher MD at Work:  [phone]  If you have any questions about the above instructions, call the GI   department at [phone] or call the endoscopy unit at [phone]   from 7am until 3 pm.  [facility], EMERGENCY ROOM PHONE NUMBER:   [phone]  IF A COMPLICATION OR EMERGENCY SITUATION ARISES AND YOU ARE UNABLE TO REACH   YOUR PHYSICIAN - GO DIRECTLY TO THE EMERGENCY ROOM.  I have read or have had read to me these discharge instructions for my   procedure and have received a written copy.  I understand these   instructions and will follow-up with my physician if I have any questions.     __________________________________       _____________________________________  Nurse Signature                                          Patient/Designated   Responsible Party  Signature  MD Bruna Stoddard MD  10/6/2022 12:14:26 PM  [employer]

## 2022-10-06 NOTE — DISCHARGE SUMMARY
[facility] - Endoscopy 1st Fl  Discharge Note  Short Stay    Procedure(s) (LRB):  EGD (ESOPHAGOGASTRODUODENOSCOPY) (N/A)  MANOMETRY, ESOPHAGUS, WITH IMPEDANCE MEASUREMENT  Cardiac Clearance/Eliquis 2 day hold approval received per Dr. RABIA Aguilar, Cardiology on 09/20/22.  Note in encounters.  LB (N/A)      OUTCOME: Patient tolerated treatment/procedure well without complication and is now ready for discharge.    DISPOSITION: Home or Self Care    FINAL DIAGNOSIS:  Dysphagia    FOLLOWUP: With primary care provider    DISCHARGE INSTRUCTIONS:    Discharge Procedure Orders   Ambulatory referral/consult to General Surgery   Standing Status: Future   Referral Priority: Routine Referral Type: Consultation   Referral Reason: Specialty Services Required   Requested Specialty: General Surgery   Number of Visits Requested: 1

## 2022-10-06 NOTE — PLAN OF CARE
Carmita STRICKLAND at bedside, gave pt albuterol inhaler. Patient on 5 liters nasal canula, 93% O2 prior to albuterol. 95% O2 post inhaler administration.

## 2022-10-06 NOTE — ANESTHESIA POSTPROCEDURE EVALUATION
Anesthesia Post Evaluation    Patient: Kelsie Bustamante    Procedure(s) Performed: Procedure(s) (LRB):  EGD (ESOPHAGOGASTRODUODENOSCOPY) (N/A)  MANOMETRY, ESOPHAGUS, WITH IMPEDANCE MEASUREMENT  Cardiac Clearance/Eliquis 2 day hold approval received per Dr. RABIA Aguilar, Cardiology on 09/20/22.  Note in encounters.  LB (N/A)    Final Anesthesia Type: general      Patient location during evaluation: PACU  Patient participation: Yes- Able to Participate  Level of consciousness: awake and alert and oriented  Post-procedure vital signs: reviewed and stable  Pain management: adequate  Airway patency: patent    PONV status at discharge: No PONV  Anesthetic complications: no      Cardiovascular status: blood pressure returned to baseline, stable and hemodynamically stable  Respiratory status: unassisted  Hydration status: euvolemic  Follow-up not needed.          Vitals Value Taken Time   /72 10/06/22 1300     10/06/22 1306   Pulse 48 10/06/22 1300   Resp 17 10/06/22 1300   SpO2 93 % 10/06/22 1300         No case tracking events are documented in the log.      Pain/Brandyn Score: Brandyn Score: 10 (10/6/2022 12:45 PM)

## 2022-10-06 NOTE — PLAN OF CARE
EM Vizcarra assessed and auscultated patient's lungs at bedside, OK to discharge patient with 95% O2.

## 2022-10-06 NOTE — PLAN OF CARE
Discharge instructions reviewed with pt and spouse, handouts given, verbalized understanding with no further questions at this time. Dr. Fletcher spoke to pt at bedside, reviewed procedure and answered questions aware they are awaiting biopsy results with MD telephone number provided per AVS sheet. VSS on RA, no pain or nausea noted, tolerating po fluids without difficulty, no other complaints noted. Fall precautions reviewed, consents in chart, PIV to be removed at discharge.

## 2022-10-06 NOTE — H&P
Short Stay Endoscopy History and Physical    PCP - Cody Parks MD    Procedure - EGD  ASA - 2  Mallampati - per anesthesia  History of Anesthesia problems - no  Family history Anesthesia problems -  no     HPI:  This is a 72 y.o. female here for evaluation of :   Active Hospital Problems    Diagnosis  POA    *Dysphagia [R13.10]  Yes    Achalasia of esophagus [K22.0]  Yes      Resolved Hospital Problems   No resolved problems to display.       ROS:  CONSTITUTIONAL: Denies weight change,  fatigue, fevers, chills, night sweats.  CARDIOVASCULAR: Denies chest pain, shortness of breath, orthopnea and edema.  RESPIRATORY: Denies cough, hemoptysis, dyspnea, and wheezing.  GI: See HPI.    Medical History:   Past Medical History:   Diagnosis Date    Achalasia     demetrius    Atrial fibrillation with RVR 2021    Cataract     Colon polyp     Gastroesophageal reflux     Hiatal hernia     Hx of colonic polyps 08/15/2014    per colonoscopy in      Hyperlipidemia 2022    Hypertension     Peripheral neuropathy     Postmenopausal HRT (hormone replacement therapy)     failed tapering off    Sepsis 2021    Last Assessment & Plan:  Formatting of this note might be different from the original. History & Physical Patient meets sepsis criteria with a white cell count of 15, and tachypnea.  Source of infection not clear at this time.  No evidence for meningitis.  Cover with broad-spectrum antibiotics especially given invasive dental procedure done recently.  Check blood cultures and monitor for fever.  R    Sinusitis     Thyroid nodule 3/16 subcm; kathleen 2 yrs    Type 2 diabetes mellitus with neurological manifestations     Vasodepressor syncope 2018       Surgical History:   Past Surgical History:   Procedure Laterality Date    BREAST BIOPSY Left 2021    cataract Left      SECTION      CHOLECYSTECTOMY      CYST REMOVAL Right 2021    [facility]    HYSTERECTOMY      nonca     OOPHORECTOMY      TILT TABLE TEST N/A 10/25/2018    Procedure: TILT TABLE TEST;  Surgeon: Daryl Walker MD;  Location: [facility] CATH LAB;  Service: Cardiology;  Laterality: N/A;  VAS.DEP.SYN,HUT,FAS,3PREP    TONSILLECTOMY         Family History:  Family History   Problem Relation Age of Onset    Aneurysm Sister     Heart disease Mother     Diabetes Father     Coronary artery disease Father     Coronary artery disease Brother     Parkinsonism Brother        Social History:   Social History     Tobacco Use    Smoking status: Never    Smokeless tobacco: Never   Substance Use Topics    Alcohol use: No    Drug use: No       Allergy  Review of patient's allergies indicates:   Allergen Reactions    Floxin [ofloxacin]     Fluvastatin     Pravastatin Other (See Comments)     myalgias    Trazodone      Racing heart       Medications:   No current facility-administered medications on file prior to encounter.     Current Outpatient Medications on File Prior to Encounter   Medication Sig Dispense Refill    ascorbic acid, vitamin C, (VITAMIN C) 500 MG tablet Take 1 tablet (500 mg total) by mouth 2 (two) times daily.      biotin 10,000 mcg Cap Take 1 capsule by mouth once daily.      cyanocobalamin 2000 MCG tablet Take 2,500 mcg by mouth.      DULoxetine (CYMBALTA) 20 MG capsule Take 1 capsule (20 mg total) by mouth once daily. 90 capsule 3    estrogens, conjugated, (PREMARIN) 0.625 MG tablet Take 1 tablet (0.625 mg total) by mouth once daily. 90 tablet 3    omeprazole (PRILOSEC) 40 MG capsule Take 1 capsule (40 mg total) by mouth every evening. 30 capsule 11    rosuvastatin (CRESTOR) 5 MG tablet Take 1 tablet (5 mg total) by mouth once daily. 90 tablet 3    zolpidem (AMBIEN) 10 mg Tab 1/2 po qhs prn insomnia 90 tablet 1    albuterol (PROVENTIL/VENTOLIN HFA) 90 mcg/actuation inhaler Inhale 2 puffs into the lungs every 6 (six) hours as needed for Wheezing. 6.7 g 5    blood sugar diagnostic (BLOOD GLUCOSE TEST) Strp Test 2  times daily 200 strip 3    blood-glucose meter kit Test twice daily 1 each 0    chlorhexidine (PERIDEX) 0.12 % solution SWISH AND SPIT 15MLS BY MOUTH TWICE DAILY      DEEP SEA NASAL 0.65 % nasal spray       FLUZONE HIGHDOSE QUAD 20-21  mcg/0.7 mL Syrg ADM 0.7ML IM UTD      HYDROcodone-acetaminophen (NORCO) 5-325 mg per tablet Take 1 tablet by mouth every 4 (four) hours as needed for Pain. 7 tablet 0    ibuprofen (ADVIL,MOTRIN) 800 MG tablet Take 800 mg by mouth every 4 to 6 hours as needed.  0    lancets Misc Test twice daily 200 each 3    levalbuterol (XOPENEX) 1.25 mg/3 mL nebulizer solution USE 1 VIAL IN NEBULIZER THREE TIMES DAILY AS NEEDED FOR WHEEZING FOR 5 DAYS      promethazine-dextromethorphan (PROMETHAZINE-DM) 6.25-15 mg/5 mL Syrp TAKE 5 ML BY MOUTH IN THE EVENING         Physical Exam:  Vital Signs:   Vitals:    10/06/22 1012   BP: 135/64   Pulse:    Resp:    Temp:      General Appearance: Well appearing in no acute distress  ENT: OP clear  Chest: CTA B  CV: RRR, no m/r/g  Abd: s/nt/nd/nabs  Ext: no edema    Labs:  Reviewed    IMP:  Active Hospital Problems    Diagnosis  POA    *Dysphagia [R13.10]  Yes    Achalasia of esophagus [K22.0]  Yes      Resolved Hospital Problems   No resolved problems to display.         Plan:  I have explained the risks and benefits of endoscopy procedures to the patient including but not limited to bleeding, perforation, infection, and death. The patient wishes to proceed.

## 2022-10-11 LAB
FINAL PATHOLOGIC DIAGNOSIS: NORMAL
GROSS: NORMAL
Lab: NORMAL

## 2022-10-12 ENCOUNTER — HOSPITAL ENCOUNTER (OUTPATIENT)
Dept: CARDIOLOGY | Facility: HOSPITAL | Age: 72
Discharge: HOME | End: 2022-10-12
Attending: INTERNAL MEDICINE
Payer: OTHER GOVERNMENT

## 2022-10-12 DIAGNOSIS — I48.0 PAF (PAROXYSMAL ATRIAL FIBRILLATION): ICD-10-CM

## 2022-10-12 PROCEDURE — 93225 XTRNL ECG REC<48 HRS REC: CPT | Mod: PO

## 2022-10-12 PROCEDURE — 93227 XTRNL ECG REC<48 HR R&I: CPT | Mod: ,,, | Performed by: INTERNAL MEDICINE

## 2022-10-12 PROCEDURE — 93227 HOLTER MONITOR - 48 HOUR (CUPID ONLY): ICD-10-PCS | Mod: ,,, | Performed by: INTERNAL MEDICINE

## 2022-10-13 ENCOUNTER — TELEPHONE (OUTPATIENT)
Dept: GASTROENTEROLOGY | Facility: CLINIC | Age: 72
End: 2022-10-13
Payer: MEDICARE

## 2022-10-13 PROCEDURE — 91037 PR GERD TST W/ NASAL IMPEDENCE ELECTROD: ICD-10-PCS | Mod: 26,ICN,, | Performed by: INTERNAL MEDICINE

## 2022-10-13 PROCEDURE — 91010 PR ESOPHAGEAL MOTILITY STUDY, MA2METRY: ICD-10-PCS | Mod: 26,ICN,, | Performed by: INTERNAL MEDICINE

## 2022-10-13 PROCEDURE — 91010 ESOPHAGUS MOTILITY STUDY: CPT | Mod: 26,ICN,, | Performed by: INTERNAL MEDICINE

## 2022-10-13 PROCEDURE — 91037 ESOPH IMPED FUNCTION TEST: CPT | Mod: 26,ICN,, | Performed by: INTERNAL MEDICINE

## 2022-10-13 NOTE — [employer] PATIENT INSTRUCTIONS
Discharge Summary/Instructions after an Endoscopic Procedure  Patient Name: Kelsie Bustamante  Patient MRN: [id number]  Patient YOB: 1950 Thursday, October 6, 2022 Bruna Fletcher MD  Dear patient,  As a result of recent federal legislation (The Federal Cures Act), you may   receive lab or pathology results from your procedure in your [facility]   account before your physician is able to contact you. Your physician or   their representative will relay the results to you with their   recommendations at their soonest availability.  Thank you,  RESTRICTIONS:  During your procedure today, you received medications for sedation.  These   medications may affect your judgment, balance and coordination.  Therefore,   for 24 hours, you have the following restrictions:   - DO NOT drive a car, operate machinery, make legal/financial decisions,   sign important papers or drink alcohol.    ACTIVITY:  Today: no heavy lifting, straining or running due to procedural   sedation/anesthesia.  The following day: return to full activity including work.  DIET:  Eat and drink normally unless instructed otherwise.     TREATMENT FOR COMMON SIDE EFFECTS:  - Mild abdominal pain, nausea, belching, bloating or excessive gas:  rest,   eat lightly and use a heating pad.  - Sore Throat: treat with throat lozenges and/or gargle with warm salt   water.  - Because air was used during the procedure, expelling large amounts of air   from your rectum or belching is normal.  - If a bowel prep was taken, you may not have a bowel movement for 1-3 days.    This is normal.  SYMPTOMS TO WATCH FOR AND REPORT TO YOUR PHYSICIAN:  1. Abdominal pain or bloating, other than gas cramps.  2. Chest pain.  3. Back pain.  4. Signs of infection such as: chills or fever occurring within 24 hours   after the procedure.  5. Rectal bleeding, which would show as bright red, maroon, or black stools.   (A tablespoon of blood from the rectum is not serious, especially  if   hemorrhoids are present.)  6. Vomiting.  7. Weakness or dizziness.  GO DIRECTLY TO THE NEAREST EMERGENCY ROOM IF YOU HAVE ANY OF THE FOLLOWING:      Difficulty breathing              Chills and/or fever over 101 F   Persistent vomiting and/or vomiting blood   Severe abdominal pain   Severe chest pain   Black, tarry stools   Bleeding- more than one tablespoon   Any other symptom or condition that you feel may need urgent attention  Your doctor recommends these additional instructions:  If any biopsies were taken, your doctors clinic will contact you in 1 to 2   weeks with any results.  - Refer to a surgeon at appointment to be scheduled.   - Full liquid diet.  For questions, problems or results please call your physician Bruna Fletcher MD at Work:  [phone]  If you have any questions about the above instructions, call the GI   department at [phone] or call the endoscopy unit at [phone]   from 7am until 3 pm.  [facility] [place], EMERGENCY ROOM PHONE NUMBER:   [phone]  IF A COMPLICATION OR EMERGENCY SITUATION ARISES AND YOU ARE UNABLE TO REACH   YOUR PHYSICIAN - GO DIRECTLY TO THE EMERGENCY ROOM.  I have read or have had read to me these discharge instructions for my   procedure and have received a written copy.  I understand these   instructions and will follow-up with my physician if I have any questions.     __________________________________       _____________________________________  Nurse Signature                                          Patient/Designated   Responsible Party Signature  MD Bruna Stoddard MD  10/13/2022 2:25:24 PM  [employer]

## 2022-10-13 NOTE — TELEPHONE ENCOUNTER
----- Message from Bruna Fletcher MD sent at 10/13/2022  2:26 PM CDT -----  Regarding: Surgery Referral  Hi,     I've placed a surgery referral for this patient for achalasia. Please assist with scheduling.      Thanks,       Dr. Fletcher

## 2022-10-17 ENCOUNTER — HOSPITAL ENCOUNTER (INPATIENT)
Facility: HOSPITAL | Age: 72
LOS: 2 days | Discharge: HOME | DRG: 310 | End: 2022-10-19
Attending: EMERGENCY MEDICINE | Admitting: INTERNAL MEDICINE
Payer: MEDICARE

## 2022-10-17 DIAGNOSIS — Z86.79 HISTORY OF ATRIAL FIBRILLATION: ICD-10-CM

## 2022-10-17 DIAGNOSIS — I49.1 PAC (PREMATURE ATRIAL CONTRACTION): ICD-10-CM

## 2022-10-17 DIAGNOSIS — R07.9 CHEST PAIN: ICD-10-CM

## 2022-10-17 DIAGNOSIS — I48.0 PAF (PAROXYSMAL ATRIAL FIBRILLATION): ICD-10-CM

## 2022-10-17 DIAGNOSIS — R42 DIZZINESS: ICD-10-CM

## 2022-10-17 DIAGNOSIS — R42 VERTIGO: ICD-10-CM

## 2022-10-17 DIAGNOSIS — R00.1 SYMPTOMATIC SINUS BRADYCARDIA: ICD-10-CM

## 2022-10-17 DIAGNOSIS — Z79.01 CHRONIC ANTICOAGULATION: ICD-10-CM
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
## 2022-10-17 DIAGNOSIS — S09.90XA CLOSED HEAD INJURY, INITIAL ENCOUNTER: ICD-10-CM

## 2022-10-17 DIAGNOSIS — R51.9 ACUTE NONINTRACTABLE HEADACHE, UNSPECIFIED HEADACHE TYPE: ICD-10-CM

## 2022-10-17 DIAGNOSIS — I10 PRIMARY HYPERTENSION: Chronic | ICD-10-CM

## 2022-10-17 DIAGNOSIS — R00.1 BRADYCARDIA: ICD-10-CM

## 2022-10-17 DIAGNOSIS — R00.1 SYMPTOMATIC BRADYCARDIA: Primary | ICD-10-CM

## 2022-10-17 LAB
ALBUMIN SERPL BCP-MCNC: 3.7 G/DL (ref 3.5–5.2)
ALP SERPL-CCNC: 74 U/L (ref 55–135)
ALT SERPL W/O P-5'-P-CCNC: 22 U/L (ref 10–44)
ANION GAP SERPL CALC-SCNC: 13 MMOL/L (ref 8–16)
APTT BLDCRRT: 29.5 SEC (ref 21–32)
AST SERPL-CCNC: 27 U/L (ref 10–40)
BASOPHILS # BLD AUTO: 0.07 K/UL (ref 0–0.2)
BASOPHILS NFR BLD: 0.6 % (ref 0–1.9)
BILIRUB SERPL-MCNC: 0.5 MG/DL (ref 0.1–1)
BNP SERPL-MCNC: 78 PG/ML (ref 0–99)
BUN SERPL-MCNC: 15 MG/DL (ref 8–23)
CALCIUM SERPL-MCNC: 9.5 MG/DL (ref 8.7–10.5)
CHLORIDE SERPL-SCNC: 100 MMOL/L (ref 95–110)
CO2 SERPL-SCNC: 25 MMOL/L (ref 23–29)
CREAT SERPL-MCNC: 1.1 MG/DL (ref 0.5–1.4)
CTP QC/QA: YES
DIFFERENTIAL METHOD: ABNORMAL
EOSINOPHIL # BLD AUTO: 0.4 K/UL (ref 0–0.5)
EOSINOPHIL NFR BLD: 2.9 % (ref 0–8)
ERYTHROCYTE [DISTWIDTH] IN BLOOD BY AUTOMATED COUNT: 12.2 % (ref 11.5–14.5)
EST. GFR  (NO RACE VARIABLE): 53.7 ML/MIN/1.73 M^2
GLUCOSE SERPL-MCNC: 117 MG/DL (ref 70–110)
HCT VFR BLD AUTO: 40.2 % (ref 37–48.5)
HGB BLD-MCNC: 13.3 G/DL (ref 12–16)
IMM GRANULOCYTES # BLD AUTO: 0.06 K/UL (ref 0–0.04)
IMM GRANULOCYTES NFR BLD AUTO: 0.5 % (ref 0–0.5)
INR PPP: 1 (ref 0.8–1.2)
LYMPHOCYTES # BLD AUTO: 2.2 K/UL (ref 1–4.8)
LYMPHOCYTES NFR BLD: 18 % (ref 18–48)
MCH RBC QN AUTO: 29.8 PG (ref 27–31)
MCHC RBC AUTO-ENTMCNC: 33.1 G/DL (ref 32–36)
MCV RBC AUTO: 90 FL (ref 82–98)
MONOCYTES # BLD AUTO: 0.8 K/UL (ref 0.3–1)
MONOCYTES NFR BLD: 6.5 % (ref 4–15)
NEUTROPHILS # BLD AUTO: 8.6 K/UL (ref 1.8–7.7)
NEUTROPHILS NFR BLD: 71.5 % (ref 38–73)
NRBC BLD-RTO: 0 /100 WBC
PLATELET # BLD AUTO: 394 K/UL (ref 150–450)
PMV BLD AUTO: 9.2 FL (ref 9.2–12.9)
POCT GLUCOSE: 91 MG/DL (ref 70–110)
POTASSIUM SERPL-SCNC: 4.7 MMOL/L (ref 3.5–5.1)
PROT SERPL-MCNC: 7.5 G/DL (ref 6–8.4)
PROTHROMBIN TIME: 10.6 SEC (ref 9–12.5)
RBC # BLD AUTO: 4.46 M/UL (ref 4–5.4)
SARS-COV-2 RDRP RESP QL NAA+PROBE: NEGATIVE
SODIUM SERPL-SCNC: 138 MMOL/L (ref 136–145)
TROPONIN I SERPL DL<=0.01 NG/ML-MCNC: <0.006 NG/ML (ref 0–0.03)
TSH SERPL DL<=0.005 MIU/L-ACNC: 1.49 UIU/ML (ref 0.4–4)
WBC # BLD AUTO: 11.96 K/UL (ref 3.9–12.7)

## 2022-10-17 PROCEDURE — 93005 ELECTROCARDIOGRAM TRACING: CPT | Mod: ER

## 2022-10-17 PROCEDURE — 99285 EMERGENCY DEPT VISIT HI MDM: CPT | Mod: 25,ER

## 2022-10-17 PROCEDURE — 85610 PROTHROMBIN TIME: CPT | Performed by: NURSE PRACTITIONER

## 2022-10-17 PROCEDURE — 25000003 PHARM REV CODE 250: Mod: ER | Performed by: EMERGENCY MEDICINE

## 2022-10-17 PROCEDURE — 63600175 PHARM REV CODE 636 W HCPCS: Performed by: NURSE PRACTITIONER

## 2022-10-17 PROCEDURE — 25000003 PHARM REV CODE 250: Performed by: NURSE PRACTITIONER

## 2022-10-17 PROCEDURE — 11000001 HC ACUTE MED/SURG PRIVATE ROOM

## 2022-10-17 PROCEDURE — 80053 COMPREHEN METABOLIC PANEL: CPT | Mod: ER | Performed by: EMERGENCY MEDICINE

## 2022-10-17 PROCEDURE — 63600175 PHARM REV CODE 636 W HCPCS: Mod: ER | Performed by: EMERGENCY MEDICINE

## 2022-10-17 PROCEDURE — 86803 HEPATITIS C AB TEST: CPT | Performed by: EMERGENCY MEDICINE

## 2022-10-17 PROCEDURE — 36415 COLL VENOUS BLD VENIPUNCTURE: CPT | Performed by: NURSE PRACTITIONER

## 2022-10-17 PROCEDURE — 93010 ELECTROCARDIOGRAM REPORT: CPT | Mod: ,,, | Performed by: INTERNAL MEDICINE

## 2022-10-17 PROCEDURE — 84484 ASSAY OF TROPONIN QUANT: CPT | Mod: ER | Performed by: EMERGENCY MEDICINE

## 2022-10-17 PROCEDURE — 87389 HIV-1 AG W/HIV-1&-2 AB AG IA: CPT | Performed by: EMERGENCY MEDICINE

## 2022-10-17 PROCEDURE — 84443 ASSAY THYROID STIM HORMONE: CPT | Mod: ER | Performed by: EMERGENCY MEDICINE

## 2022-10-17 PROCEDURE — 85025 COMPLETE CBC W/AUTO DIFF WBC: CPT | Mod: ER | Performed by: EMERGENCY MEDICINE

## 2022-10-17 PROCEDURE — 83880 ASSAY OF NATRIURETIC PEPTIDE: CPT | Mod: ER | Performed by: EMERGENCY MEDICINE

## 2022-10-17 PROCEDURE — 85730 THROMBOPLASTIN TIME PARTIAL: CPT | Performed by: NURSE PRACTITIONER

## 2022-10-17 PROCEDURE — 93010 EKG 12-LEAD: ICD-10-PCS | Mod: ,,, | Performed by: INTERNAL MEDICINE

## 2022-10-17 PROCEDURE — 87635 SARS-COV-2 COVID-19 AMP PRB: CPT | Mod: ER | Performed by: EMERGENCY MEDICINE

## 2022-10-17 RX ORDER — HEPARIN SODIUM,PORCINE/D5W 25000/250
0-40 INTRAVENOUS SOLUTION INTRAVENOUS CONTINUOUS
Status: DISCONTINUED | OUTPATIENT
Start: 2022-10-17 | End: 2022-10-18

## 2022-10-17 RX ORDER — SODIUM CHLORIDE 0.9 % (FLUSH) 0.9 %
10 SYRINGE (ML) INJECTION EVERY 12 HOURS PRN
Status: DISCONTINUED | OUTPATIENT
Start: 2022-10-17 | End: 2022-10-19 | Stop reason: HOSPADM

## 2022-10-17 RX ORDER — ACETAMINOPHEN 325 MG/1
650 TABLET ORAL EVERY 6 HOURS PRN
Status: DISCONTINUED | OUTPATIENT
Start: 2022-10-17 | End: 2022-10-19 | Stop reason: HOSPADM

## 2022-10-17 RX ORDER — MECLIZINE HYDROCHLORIDE 25 MG/1
25 TABLET ORAL
Status: COMPLETED | OUTPATIENT
Start: 2022-10-17 | End: 2022-10-17

## 2022-10-17 RX ORDER — MAG HYDROX/ALUMINUM HYD/SIMETH 200-200-20
30 SUSPENSION, ORAL (FINAL DOSE FORM) ORAL 4 TIMES DAILY PRN
Status: DISCONTINUED | OUTPATIENT
Start: 2022-10-17 | End: 2022-10-19 | Stop reason: HOSPADM

## 2022-10-17 RX ORDER — IBUPROFEN 200 MG
24 TABLET ORAL
Status: DISCONTINUED | OUTPATIENT
Start: 2022-10-17 | End: 2022-10-19 | Stop reason: HOSPADM

## 2022-10-17 RX ORDER — ONDANSETRON 2 MG/ML
4 INJECTION INTRAMUSCULAR; INTRAVENOUS EVERY 8 HOURS PRN
Status: DISCONTINUED | OUTPATIENT
Start: 2022-10-17 | End: 2022-10-19 | Stop reason: HOSPADM

## 2022-10-17 RX ORDER — IPRATROPIUM BROMIDE AND ALBUTEROL SULFATE 2.5; .5 MG/3ML; MG/3ML
3 SOLUTION RESPIRATORY (INHALATION) EVERY 4 HOURS PRN
Status: DISCONTINUED | OUTPATIENT
Start: 2022-10-17 | End: 2022-10-19 | Stop reason: HOSPADM

## 2022-10-17 RX ORDER — IBUPROFEN 200 MG
16 TABLET ORAL
Status: DISCONTINUED | OUTPATIENT
Start: 2022-10-17 | End: 2022-10-19 | Stop reason: HOSPADM

## 2022-10-17 RX ORDER — SODIUM CHLORIDE, SODIUM LACTATE, POTASSIUM CHLORIDE, CALCIUM CHLORIDE 600; 310; 30; 20 MG/100ML; MG/100ML; MG/100ML; MG/100ML
1000 INJECTION, SOLUTION INTRAVENOUS
Status: COMPLETED | OUTPATIENT
Start: 2022-10-17 | End: 2022-10-17

## 2022-10-17 RX ORDER — INSULIN ASPART 100 [IU]/ML
0-5 INJECTION, SOLUTION INTRAVENOUS; SUBCUTANEOUS
Status: DISCONTINUED | OUTPATIENT
Start: 2022-10-17 | End: 2022-10-19 | Stop reason: HOSPADM

## 2022-10-17 RX ORDER — NALOXONE HCL 0.4 MG/ML
0.02 VIAL (ML) INJECTION
Status: DISCONTINUED | OUTPATIENT
Start: 2022-10-17 | End: 2022-10-19 | Stop reason: HOSPADM

## 2022-10-17 RX ORDER — SODIUM CHLORIDE 9 MG/ML
INJECTION, SOLUTION INTRAVENOUS CONTINUOUS
Status: DISCONTINUED | OUTPATIENT
Start: 2022-10-17 | End: 2022-10-18

## 2022-10-17 RX ORDER — SODIUM CHLORIDE 0.9 % (FLUSH) 0.9 %
10 SYRINGE (ML) INJECTION
Status: DISCONTINUED | OUTPATIENT
Start: 2022-10-17 | End: 2022-10-19 | Stop reason: HOSPADM

## 2022-10-17 RX ORDER — GLUCAGON 1 MG
1 KIT INJECTION
Status: DISCONTINUED | OUTPATIENT
Start: 2022-10-17 | End: 2022-10-19 | Stop reason: HOSPADM

## 2022-10-17 RX ORDER — TALC
6 POWDER (GRAM) TOPICAL NIGHTLY PRN
Status: DISCONTINUED | OUTPATIENT
Start: 2022-10-17 | End: 2022-10-19 | Stop reason: HOSPADM

## 2022-10-17 RX ORDER — DULOXETIN HYDROCHLORIDE 20 MG/1
20 CAPSULE, DELAYED RELEASE ORAL DAILY
Status: DISCONTINUED | OUTPATIENT
Start: 2022-10-18 | End: 2022-10-19 | Stop reason: HOSPADM

## 2022-10-17 RX ORDER — HYDRALAZINE HYDROCHLORIDE 25 MG/1
25 TABLET, FILM COATED ORAL EVERY 8 HOURS PRN
Status: DISCONTINUED | OUTPATIENT
Start: 2022-10-17 | End: 2022-10-18

## 2022-10-17 RX ORDER — ACETAMINOPHEN 500 MG
1000 TABLET ORAL
Status: COMPLETED | OUTPATIENT
Start: 2022-10-17 | End: 2022-10-17

## 2022-10-17 RX ORDER — ATORVASTATIN CALCIUM 10 MG/1
20 TABLET, FILM COATED ORAL DAILY
Status: DISCONTINUED | OUTPATIENT
Start: 2022-10-18 | End: 2022-10-19 | Stop reason: HOSPADM

## 2022-10-17 RX ADMIN — SODIUM CHLORIDE, POTASSIUM CHLORIDE, SODIUM LACTATE AND CALCIUM CHLORIDE 500 ML: 600; 310; 30; 20 INJECTION, SOLUTION INTRAVENOUS at 02:10

## 2022-10-17 RX ADMIN — MECLIZINE HYDROCHLORIDE 25 MG: 25 TABLET ORAL at 04:10

## 2022-10-17 RX ADMIN — ACETAMINOPHEN 650 MG: 325 TABLET ORAL at 10:10

## 2022-10-17 RX ADMIN — ACETAMINOPHEN 1000 MG: 500 TABLET ORAL at 04:10

## 2022-10-17 RX ADMIN — SODIUM CHLORIDE: 9 INJECTION, SOLUTION INTRAVENOUS at 08:10

## 2022-10-17 RX ADMIN — SODIUM CHLORIDE, POTASSIUM CHLORIDE, SODIUM LACTATE AND CALCIUM CHLORIDE 1000 ML: 600; 310; 30; 20 INJECTION, SOLUTION INTRAVENOUS at 03:10

## 2022-10-17 RX ADMIN — HEPARIN SODIUM AND DEXTROSE 12 UNITS/KG/HR: 10000; 5 INJECTION INTRAVENOUS at 10:10

## 2022-10-18 ENCOUNTER — PATIENT MESSAGE (OUTPATIENT)
Dept: ADMINISTRATIVE | Facility: HOSPITAL | Age: 72
End: 2022-10-18
Payer: MEDICARE

## 2022-10-18 LAB
ANION GAP SERPL CALC-SCNC: 8 MMOL/L (ref 8–16)
AORTIC ROOT ANNULUS: 2.57 CM
APTT BLDCRRT: 36.2 SEC (ref 21–32)
APTT BLDCRRT: 36.7 SEC (ref 21–32)
ASCENDING AORTA: 2.78 CM
AV INDEX (PROSTH): 0.79
AV MEAN GRADIENT: 3 MMHG
AV PEAK GRADIENT: 7 MMHG
AV VALVE AREA: 2.24 CM2
AV VELOCITY RATIO: 0.63
BASOPHILS # BLD AUTO: 0.05 K/UL (ref 0–0.2)
BASOPHILS NFR BLD: 0.3 % (ref 0–1.9)
BSA FOR ECHO PROCEDURE: 1.92 M2
BUN SERPL-MCNC: 12 MG/DL (ref 8–23)
CALCIUM SERPL-MCNC: 9.2 MG/DL (ref 8.7–10.5)
CHLORIDE SERPL-SCNC: 106 MMOL/L (ref 95–110)
CO2 SERPL-SCNC: 27 MMOL/L (ref 23–29)
CREAT SERPL-MCNC: 0.9 MG/DL (ref 0.5–1.4)
CV ECHO LV RWT: 0.27 CM
DIFFERENTIAL METHOD: ABNORMAL
DOP CALC AO PEAK VEL: 1.36 M/S
DOP CALC AO VTI: 27.5 CM
DOP CALC LVOT AREA: 2.8 CM2
DOP CALC LVOT DIAMETER: 1.9 CM
DOP CALC LVOT PEAK VEL: 0.85 M/S
DOP CALC LVOT STROKE VOLUME: 61.49 CM3
DOP CALCLVOT PEAK VEL VTI: 21.7 CM
E WAVE DECELERATION TIME: 283.44 MSEC
E/A RATIO: 1.04
E/E' RATIO: 7.79 M/S
ECHO LV POSTERIOR WALL: 0.68 CM (ref 0.6–1.1)
EJECTION FRACTION: 60 %
EOSINOPHIL # BLD AUTO: 0.4 K/UL (ref 0–0.5)
EOSINOPHIL NFR BLD: 2.5 % (ref 0–8)
ERYTHROCYTE [DISTWIDTH] IN BLOOD BY AUTOMATED COUNT: 11.9 % (ref 11.5–14.5)
EST. GFR  (NO RACE VARIABLE): >60 ML/MIN/1.73 M^2
FRACTIONAL SHORTENING: 45 % (ref 28–44)
GLUCOSE SERPL-MCNC: 114 MG/DL (ref 70–110)
HCT VFR BLD AUTO: 38.6 % (ref 37–48.5)
HCV AB SERPL QL IA: NEGATIVE
HEP C VIRUS HOLD SPECIMEN: NORMAL
HGB BLD-MCNC: 13.2 G/DL (ref 12–16)
HIV 1+2 AB+HIV1 P24 AG SERPL QL IA: NEGATIVE
IMM GRANULOCYTES # BLD AUTO: 0.08 K/UL (ref 0–0.04)
IMM GRANULOCYTES NFR BLD AUTO: 0.5 % (ref 0–0.5)
INTERVENTRICULAR SEPTUM: 0.64 CM (ref 0.6–1.1)
IVC DIAMETER: 1.47 CM
IVRT: 131.3 MSEC
LA MAJOR: 5.23 CM
LA MINOR: 5.09 CM
LA WIDTH: 4.1 CM
LEFT ATRIUM SIZE: 3.84 CM
LEFT ATRIUM VOLUME INDEX: 37.1 ML/M2
LEFT ATRIUM VOLUME: 69.04 CM3
LEFT INTERNAL DIMENSION IN SYSTOLE: 2.78 CM (ref 2.1–4)
LEFT VENTRICLE DIASTOLIC VOLUME INDEX: 63.78 ML/M2
LEFT VENTRICLE DIASTOLIC VOLUME: 118.64 ML
LEFT VENTRICLE MASS INDEX: 58 G/M2
LEFT VENTRICLE SYSTOLIC VOLUME INDEX: 15.5 ML/M2
LEFT VENTRICLE SYSTOLIC VOLUME: 28.92 ML
LEFT VENTRICULAR INTERNAL DIMENSION IN DIASTOLE: 5.01 CM (ref 3.5–6)
LEFT VENTRICULAR MASS: 107 G
LV LATERAL E/E' RATIO: 6.73 M/S
LV SEPTAL E/E' RATIO: 9.25 M/S
LVOT MG: 1.59 MMHG
LVOT MV: 0.6 CM/S
LYMPHOCYTES # BLD AUTO: 2.9 K/UL (ref 1–4.8)
LYMPHOCYTES NFR BLD: 19.3 % (ref 18–48)
MAGNESIUM SERPL-MCNC: 1.7 MG/DL (ref 1.6–2.6)
MCH RBC QN AUTO: 29.9 PG (ref 27–31)
MCHC RBC AUTO-ENTMCNC: 34.2 G/DL (ref 32–36)
MCV RBC AUTO: 88 FL (ref 82–98)
MONOCYTES # BLD AUTO: 0.7 K/UL (ref 0.3–1)
MONOCYTES NFR BLD: 4.9 % (ref 4–15)
MV PEAK A VEL: 0.71 M/S
MV PEAK E VEL: 0.74 M/S
MV STENOSIS PRESSURE HALF TIME: 82.2 MS
MV VALVE AREA P 1/2 METHOD: 2.68 CM2
NEUTROPHILS # BLD AUTO: 11 K/UL (ref 1.8–7.7)
NEUTROPHILS NFR BLD: 72.5 % (ref 38–73)
NRBC BLD-RTO: 0 /100 WBC
OHS CV EVENT MONITOR DAY: 0
OHS CV HOLTER LENGTH DECIMAL HOURS: 48
OHS CV HOLTER LENGTH HOURS: 48
OHS CV HOLTER LENGTH MINUTES: 0
OHS CV HOLTER SINUS AVERAGE HR: 52
OHS CV HOLTER SINUS MAX HR: 78
OHS CV HOLTER SINUS MIN HR: 34
PISA TR MAX VEL: 1.05 M/S
PLATELET # BLD AUTO: 335 K/UL (ref 150–450)
PMV BLD AUTO: 9 FL (ref 9.2–12.9)
POCT GLUCOSE: 126 MG/DL (ref 70–110)
POCT GLUCOSE: 126 MG/DL (ref 70–110)
POCT GLUCOSE: 127 MG/DL (ref 70–110)
POCT GLUCOSE: 164 MG/DL (ref 70–110)
POTASSIUM SERPL-SCNC: 4.2 MMOL/L (ref 3.5–5.1)
PULM VEIN S/D RATIO: 0.73
PV MV: 0.51 M/S
PV PEAK D VEL: 0.56 M/S
PV PEAK S VEL: 0.41 M/S
PV PEAK VELOCITY: 0.72 CM/S
RA MAJOR: 5.18 CM
RA PRESSURE: 3 MMHG
RA VOL SYS: 50.7 ML
RA WIDTH: 3.9 CM
RBC # BLD AUTO: 4.41 M/UL (ref 4–5.4)
RIGHT ATRIAL AREA: 17.5 CM2
RIGHT VENTRICULAR LENGTH IN DIASTOLE (APICAL 4-CHAMBER VIEW): 6.82 CM
RV MID DIAMA: 2.9 CM
RV TISSUE DOPPLER FREE WALL SYSTOLIC VELOCITY 1 (APICAL 4 CHAMBER VIEW): 16 CM/S
SINUS: 2.8 CM
SODIUM SERPL-SCNC: 141 MMOL/L (ref 136–145)
STJ: 2.38 CM
TDI LATERAL: 0.11 M/S
TDI SEPTAL: 0.08 M/S
TDI: 0.1 M/S
TR MAX PG: 4 MMHG
TRICUSPID ANNULAR PLANE SYSTOLIC EXCURSION: 2.3 CM
TV REST PULMONARY ARTERY PRESSURE: 7 MMHG
WBC # BLD AUTO: 15.21 K/UL (ref 3.9–12.7)

## 2022-10-18 PROCEDURE — 36415 COLL VENOUS BLD VENIPUNCTURE: CPT | Performed by: INTERNAL MEDICINE

## 2022-10-18 PROCEDURE — 25000003 PHARM REV CODE 250: Performed by: NURSE PRACTITIONER

## 2022-10-18 PROCEDURE — 11000001 HC ACUTE MED/SURG PRIVATE ROOM

## 2022-10-18 PROCEDURE — 36415 COLL VENOUS BLD VENIPUNCTURE: CPT | Performed by: STUDENT IN AN ORGANIZED HEALTH CARE EDUCATION/TRAINING PROGRAM

## 2022-10-18 PROCEDURE — 99223 PR INITIAL HOSPITAL CARE,LEVL III: ICD-10-PCS | Mod: 25,,, | Performed by: INTERNAL MEDICINE

## 2022-10-18 PROCEDURE — 85730 THROMBOPLASTIN TIME PARTIAL: CPT | Performed by: INTERNAL MEDICINE

## 2022-10-18 PROCEDURE — 25000003 PHARM REV CODE 250: Performed by: PHYSICIAN ASSISTANT

## 2022-10-18 PROCEDURE — 85730 THROMBOPLASTIN TIME PARTIAL: CPT | Mod: 91 | Performed by: STUDENT IN AN ORGANIZED HEALTH CARE EDUCATION/TRAINING PROGRAM

## 2022-10-18 PROCEDURE — 99223 1ST HOSP IP/OBS HIGH 75: CPT | Mod: 25,,, | Performed by: INTERNAL MEDICINE

## 2022-10-18 PROCEDURE — 80181 DRUG ASSAY FLECAINIDE: CPT | Performed by: NURSE PRACTITIONER

## 2022-10-18 PROCEDURE — 83735 ASSAY OF MAGNESIUM: CPT | Performed by: NURSE PRACTITIONER

## 2022-10-18 PROCEDURE — 85025 COMPLETE CBC W/AUTO DIFF WBC: CPT | Performed by: NURSE PRACTITIONER

## 2022-10-18 PROCEDURE — 36415 COLL VENOUS BLD VENIPUNCTURE: CPT | Performed by: NURSE PRACTITIONER

## 2022-10-18 PROCEDURE — 80048 BASIC METABOLIC PNL TOTAL CA: CPT | Performed by: NURSE PRACTITIONER

## 2022-10-18 PROCEDURE — 63600175 PHARM REV CODE 636 W HCPCS: Performed by: NURSE PRACTITIONER

## 2022-10-18 PROCEDURE — 21400001 HC TELEMETRY ROOM

## 2022-10-18 RX ORDER — LISINOPRIL 10 MG/1
10 TABLET ORAL DAILY
Status: DISCONTINUED | OUTPATIENT
Start: 2022-10-18 | End: 2022-10-19 | Stop reason: HOSPADM

## 2022-10-18 RX ORDER — HYDRALAZINE HYDROCHLORIDE 20 MG/ML
10 INJECTION INTRAMUSCULAR; INTRAVENOUS EVERY 8 HOURS PRN
Status: DISCONTINUED | OUTPATIENT
Start: 2022-10-18 | End: 2022-10-19 | Stop reason: HOSPADM

## 2022-10-18 RX ADMIN — APIXABAN 5 MG: 2.5 TABLET, FILM COATED ORAL at 09:10

## 2022-10-18 RX ADMIN — ATORVASTATIN CALCIUM 20 MG: 10 TABLET, FILM COATED ORAL at 09:10

## 2022-10-18 RX ADMIN — ACETAMINOPHEN 650 MG: 325 TABLET ORAL at 09:10

## 2022-10-18 RX ADMIN — ACETAMINOPHEN 650 MG: 325 TABLET ORAL at 04:10

## 2022-10-18 RX ADMIN — DULOXETINE HYDROCHLORIDE 20 MG: 20 CAPSULE, DELAYED RELEASE ORAL at 09:10

## 2022-10-18 RX ADMIN — HYDRALAZINE HYDROCHLORIDE 25 MG: 25 TABLET, FILM COATED ORAL at 12:10

## 2022-10-18 RX ADMIN — HYDRALAZINE HYDROCHLORIDE 10 MG: 20 INJECTION, SOLUTION INTRAMUSCULAR; INTRAVENOUS at 03:10

## 2022-10-18 RX ADMIN — LISINOPRIL 10 MG: 10 TABLET ORAL at 09:10

## 2022-10-18 NOTE — HPI
Ms. Bustamante is a 73 yo female with a PMHx of PAF on Eliquis, DM II, GERD, HTN, HLD, and hypothyroidism who presented to [facility] ED in [facility] with c/o generalized weakness, dizziness, near syncope, and possible symptomatic bradycardia. She had a Holter monitor which was completed today. She was returning the monitor to the cardiology clinic today when her symptoms occurred. Patient reports when she got out of the car and walk she felt dizzy, lightheaded, near syncopal, and that her legs were giving out in the space of only about 10 or 12 ft. She did gets somewhat diaphoretic but denies chest pain, dyspnea, palpitations, nausea, or vomiting. She has some degree of vertigo type symptoms with this as well. She has had these symptoms apparently on a somewhat ongoing basis at home as well, not recently evaluated. She sometimes does feel tachycardic palpitations as well. She has not discussed the possibility of a pacemaker as far she can recall but believes that she may need one. She feels better lying still and leaning back. Upon arrival to the ED, EKG revealed marked sinus bradycardia with 1st degree AV block, HR in the mid 30s to mid 40s. She remained hemodynamically stable. Labs resulted stable electrolytes and TSH, negative troponin. Patient is on Toprol-XL and flecainide, both taken this AM. Case discussed with Cardiology, who will see patient in consult. Patient was transferred to [facility] and admitted under Hospital Medicine services.

## 2022-10-18 NOTE — CONSULTS
[facility] S[facility]  Cardiology  Consult Note    Patient Name: Kelsie Bustamante  MRN: [id number]  Admission Date: 10/17/2022  Hospital Length of Stay: 1 days  Code Status: Full Code   Attending Provider: Tl Gaspar MD   Consulting Provider: Suzie Chu NP  Primary Care Physician: Cody Parks MD  Principal Problem:Symptomatic sinus bradycardia    Patient information was obtained from patient and ER records.     Inpatient consult to Cardiology  Consult performed by: Suzie Chu NP  Consult ordered by: Yancy Cummins NP        Subjective:     Chief Complaint:  Near syncope, bradycardia     HPI:   Ms. Bustamante is a 73 yo female with a PMHx of PAF on Eliquis, DM II, GERD, HTN, HLD, and hypothyroidism who presented to [facility] ED in [facility] with c/o generalized weakness, dizziness, near syncope, and possible symptomatic bradycardia. She had a Holter monitor which was completed today. She was returning the monitor to the cardiology clinic today when her symptoms occurred. Patient reports when she got out of the car and walk she felt dizzy, lightheaded, near syncopal, and that her legs were giving out in the space of only about 10 or 12 ft. She did gets somewhat diaphoretic but denies chest pain, dyspnea, palpitations, nausea, or vomiting. She has some degree of vertigo type symptoms with this as well. She has had these symptoms apparently on a somewhat ongoing basis at home as well, not recently evaluated. She sometimes does feel tachycardic palpitations as well. She has not discussed the possibility of a pacemaker as far she can recall but believes that she may need one. She feels better lying still and leaning back. Upon arrival to the ED, EKG revealed marked sinus bradycardia with 1st degree AV block, HR in the mid 30s to mid 40s. She remained hemodynamically stable. Labs resulted stable electrolytes and TSH, negative troponin. Patient is on Toprol-XL and flecainide, both taken this AM.  Case discussed with Cardiology, who will see patient in consult. Patient was transferred to [facility] and admitted under Hospital Medicine services.     Cardiology consulted for symptomatic bradycardia. Pt seen and examined today resting comfortably in bed. No CV complaints at this time. Pt states she has similar syncopal episodes daily but feels they are worse since her Flecanide dose was increased. She reports her HR at home is usually 45-50s. Pt sees Dr. Aguilar and take Toprol XL and Flecanide for Afib. Previous  holter showed PACs and PVCs. Dr. Aguilar increased Flecanide dose to 150mg. Labs reviewed WBC 15.21, EKG reviewed sinus kam w/ AV block,  Echo normal EF        Past Medical History:   Diagnosis Date    Achalasia     demetrius    Atrial fibrillation with RVR 2021    Cataract     Colon polyp     Gastroesophageal reflux     Hiatal hernia     Hx of colonic polyps 08/15/2014    per colonoscopy in      Hyperlipidemia 2022    Hypertension     Peripheral neuropathy     Postmenopausal HRT (hormone replacement therapy)     failed tapering off    Sepsis 2021    Last Assessment & Plan:  Formatting of this note might be different from the original. History & Physical Patient meets sepsis criteria with a white cell count of 15, and tachypnea.  Source of infection not clear at this time.  No evidence for meningitis.  Cover with broad-spectrum antibiotics especially given invasive dental procedure done recently.  Check blood cultures and monitor for fever.  R    Sinusitis     Thyroid nodule 3/16 subcm; kathleen 2 yrs    Type 2 diabetes mellitus with neurological manifestations     Vasodepressor syncope 2018       Past Surgical History:   Procedure Laterality Date    BREAST BIOPSY Left 2021    cataract Left      SECTION      CHOLECYSTECTOMY      CYST REMOVAL Right 2021    [facility] in [place]    ESOPHAGEAL MANOMETRY WITH MEASUREMENT OF IMPEDANCE  N/A 10/6/2022    Procedure: MANOMETRY, ESOPHAGUS, WITH IMPEDANCE MEASUREMENT  Cardiac Clearance/Eliquis 2 day hold approval received per Dr. RABIA Aguilar, Cardiology on 09/20/22.  Note in encounters.  LB;  Surgeon: Bruna Fletcher MD;  Location: [facility];  Service: Endoscopy;  Laterality: N/A;    ESOPHAGOGASTRODUODENOSCOPY N/A 10/6/2022    Procedure: EGD (ESOPHAGOGASTRODUODENOSCOPY);  Surgeon: Bruna Fletcher MD;  Location: [facility] ENDO;  Service: Endoscopy;  Laterality: N/A;    HYSTERECTOMY      nonca    OOPHORECTOMY      TILT TABLE TEST N/A 10/25/2018    Procedure: TILT TABLE TEST;  Surgeon: Daryl Walker MD;  Location: [facility] CATH LAB;  Service: Cardiology;  Laterality: N/A;  VAS.DEP.SYN,HUT,FAS,3PREP    TONSILLECTOMY         Review of patient's allergies indicates:   Allergen Reactions    Floxin [ofloxacin]     Fluvastatin     Pravastatin Other (See Comments)     myalgias    Trazodone      Racing heart       No current facility-administered medications on file prior to encounter.     Current Outpatient Medications on File Prior to Encounter   Medication Sig    albuterol (PROVENTIL/VENTOLIN HFA) 90 mcg/actuation inhaler Inhale 2 puffs into the lungs every 6 (six) hours as needed for Wheezing.    apixaban (ELIQUIS) 5 mg Tab Take 1 tablet (5 mg total) by mouth 2 (two) times daily.    ascorbic acid, vitamin C, (VITAMIN C) 500 MG tablet Take 1 tablet (500 mg total) by mouth 2 (two) times daily.    blood sugar diagnostic (BLOOD GLUCOSE TEST) Strp Test 2 times daily    blood-glucose meter kit Test twice daily    cyanocobalamin 2000 MCG tablet Take 2,500 mcg by mouth.    DULoxetine (CYMBALTA) 20 MG capsule Take 1 capsule (20 mg total) by mouth once daily.    estrogens, conjugated, (PREMARIN) 0.625 MG tablet Take 1 tablet (0.625 mg total) by mouth once daily.    flecainide (TAMBOCOR) 150 MG Tab Take 1 tablet (150 mg total) by mouth every 12 (twelve) hours.    lancets Misc Test twice daily     metoprolol succinate (TOPROL-XL) 100 MG 24 hr tablet Take 1 tablet (100 mg total) by mouth 2 (two) times daily.    rosuvastatin (CRESTOR) 5 MG tablet Take 1 tablet (5 mg total) by mouth once daily.    SITagliptan-metformin (JANUMET) 50-1,000 mg per tablet Take 1 tablet by mouth 2 (two) times daily with meals.    zolpidem (AMBIEN) 10 mg Tab 1/2 po qhs prn insomnia    biotin 10,000 mcg Cap Take 1 capsule by mouth once daily.    chlorhexidine (PERIDEX) 0.12 % solution SWISH AND SPIT 15MLS BY MOUTH TWICE DAILY    DEEP SEA NASAL 0.65 % nasal spray     FLUZONE HIGHDOSE QUAD 20-21  mcg/0.7 mL Syrg ADM 0.7ML IM UTD    HYDROcodone-acetaminophen (NORCO) 5-325 mg per tablet Take 1 tablet by mouth every 4 (four) hours as needed for Pain.    ibuprofen (ADVIL,MOTRIN) 800 MG tablet Take 800 mg by mouth every 4 to 6 hours as needed.    levalbuterol (XOPENEX) 1.25 mg/3 mL nebulizer solution USE 1 VIAL IN NEBULIZER THREE TIMES DAILY AS NEEDED FOR WHEEZING FOR 5 DAYS    omeprazole (PRILOSEC) 40 MG capsule Take 1 capsule (40 mg total) by mouth every evening.    promethazine-dextromethorphan (PROMETHAZINE-DM) 6.25-15 mg/5 mL Syrp TAKE 5 ML BY MOUTH IN THE EVENING     Family History       Problem Relation (Age of Onset)    Aneurysm Sister    Coronary artery disease Father, Brother    Diabetes Father    Heart disease Mother    Parkinsonism Brother          Tobacco Use    Smoking status: Never    Smokeless tobacco: Never   Substance and Sexual Activity    Alcohol use: No    Drug use: No    Sexual activity: Never     Review of Systems   Constitutional: Negative.   HENT: Negative.     Eyes: Negative.    Cardiovascular:  Positive for near-syncope.   Respiratory: Negative.     Skin: Negative.    Musculoskeletal: Negative.    Gastrointestinal: Negative.    Genitourinary: Negative.    Neurological:  Positive for dizziness, light-headedness and weakness.   Psychiatric/Behavioral: Negative.     Objective:     Vital Signs  (Most Recent):  Temp: 98.2 °F (36.8 °C) (10/18/22 0801)  Pulse: (!) 52 (10/18/22 0927)  Resp: 18 (10/18/22 0801)  BP: (!) 182/78 (10/18/22 0801)  SpO2: 96 % (10/18/22 0801)   Vital Signs (24h Range):  Temp:  [97.5 °F (36.4 °C)-98.2 °F (36.8 °C)] 98.2 °F (36.8 °C)  Pulse:  [47-57] 52  Resp:  [17-20] 18  SpO2:  [91 %-97 %] 96 %  BP: (159-208)/(74-90) 182/78     Weight: 82.9 kg (182 lb 12.2 oz)  Body mass index is 32.37 kg/m².    SpO2: 96 %  O2 Device (Oxygen Therapy): room air      Intake/Output Summary (Last 24 hours) at 10/18/2022 1321  Last data filed at 10/18/2022 0618  Gross per 24 hour   Intake 807.6 ml   Output --   Net 807.6 ml       Lines/Drains/Airways       Peripheral Intravenous Line  Duration                  Peripheral IV - Single Lumen 10/17/22 1305 20 G Right Antecubital 1 day                    Physical Exam  Vitals and nursing note reviewed.   Constitutional:       Appearance: Normal appearance.   HENT:      Head: Normocephalic.   Eyes:      Pupils: Pupils are equal, round, and reactive to light.   Cardiovascular:      Rate and Rhythm: Regular rhythm. Bradycardia present.      Heart sounds: Normal heart sounds, S1 normal and S2 normal. No murmur heard.    No S3 or S4 sounds.   Pulmonary:      Effort: Pulmonary effort is normal.      Breath sounds: Normal breath sounds.   Abdominal:      General: Bowel sounds are normal.      Palpations: Abdomen is soft.   Musculoskeletal:         General: Normal range of motion.      Cervical back: Normal range of motion.   Skin:     Capillary Refill: Capillary refill takes less than 2 seconds.   Neurological:      General: No focal deficit present.      Mental Status: She is alert and oriented to person, place, and time.   Psychiatric:         Mood and Affect: Mood normal.         Behavior: Behavior normal.         Thought Content: Thought content normal.       Significant Labs: CMP   Recent Labs   Lab 10/17/22  1308 10/18/22  0427    141   K 4.7 4.2     106   CO2 25 27   * 114*   BUN 15 12   CREATININE 1.1 0.9   CALCIUM 9.5 9.2   PROT 7.5  --    ALBUMIN 3.7  --    BILITOT 0.5  --    ALKPHOS 74  --    AST 27  --    ALT 22  --    ANIONGAP 13 8   , CBC   Recent Labs   Lab 10/17/22  1308 10/18/22  0427   WBC 11.96 15.21*   HGB 13.3 13.2   HCT 40.2 38.6    335   , INR   Recent Labs   Lab 10/17/22  2023   INR 1.0   , Lipid Panel No results for input(s): CHOL, HDL, LDLCALC, TRIG, CHOLHDL in the last 48 hours., Troponin   Recent Labs   Lab 10/17/22  1308   TROPONINI <0.006   , and All pertinent lab results from the last 24 hours have been reviewed.    Significant Imaging: Echocardiogram: Transthoracic echo (TTE) complete (Cupid Only):   Results for orders placed or performed during the hospital encounter of 05/11/21   Echo Color Flow Doppler? Yes   Result Value Ref Range    BSA 1.68 m2    TDI SEPTAL 0.08 m/s    LV LATERAL E/E' RATIO 10.10 m/s    LV SEPTAL E/E' RATIO 12.63 m/s    LA WIDTH 3.50 cm    TDI LATERAL 0.10 m/s    PV PEAK VELOCITY 0.83 cm/s    LVIDd 4.69 3.5 - 6.0 cm    IVS 0.55 (A) 0.6 - 1.1 cm    Posterior Wall 0.78 0.6 - 1.1 cm    Ao root annulus 2.64 cm    LVIDs 3.20 2.1 - 4.0 cm    FS 32 28 - 44 %    LA volume 54.48 cm3    Sinus 2.66 cm    STJ 2.25 cm    Ascending aorta 2.30 cm    LV mass 96.28 g    LA size 3.88 cm    TAPSE 2.16 cm    Left Ventricle Relative Wall Thickness 0.33 cm    AV mean gradient 3 mmHg    AV valve area 2.80 cm2    AV Velocity Ratio 0.85     AV index (prosthetic) 0.94     MV valve area p 1/2 method 3.75 cm2    E/A ratio 1.33     Mean e' 0.09 m/s    E wave deceleration time 202.22 msec    Pulm vein S/D ratio 0.71     LVOT diameter 1.95 cm    LVOT area 3.0 cm2    LVOT peak jagjit 1.00 m/s    LVOT peak VTI 20.66 cm    Ao peak jagjit 1.17 m/s    Ao VTI 22.03 cm    LVOT stroke volume 61.67 cm3    AV peak gradient 5 mmHg    E/E' ratio 11.22 m/s    MV Peak E Jagjit 1.01 m/s    TR Max Jagjit 2.69 m/s    MV stenosis pressure 1/2 time 58.64 ms     MV Peak A Jagjit 0.76 m/s    PV Peak S Jagjit 0.49 m/s    PV Peak D Jagjit 0.69 m/s    LV Systolic Volume 40.99 mL    LV Systolic Volume Index 24.7 mL/m2    LV Diastolic Volume 101.74 mL    LV Diastolic Volume Index 61.29 mL/m2    LA Volume Index 32.8 mL/m2    LV Mass Index 58 g/m2    RA Major Axis 4.35 cm    Left Atrium Minor Axis 4.70 cm    Left Atrium Major Axis 4.74 cm    Triscuspid Valve Regurgitation Peak Gradient 29 mmHg    RA Width 3.42 cm    Right Atrial Pressure (from IVC) 3 mmHg    EF 55 %    TV rest pulmonary artery pressure 32 mmHg    Narrative    · Normal systolic function.  · The estimated ejection fraction is 55%.  · Normal left ventricular diastolic function.  · With normal right ventricular systolic function.  · Normal central venous pressure (3 mmHg).  · The estimated PA systolic pressure is 32 mmHg.      , EKG: reviewed, and Stress Test: reviewed    Assessment and Plan:     * Symptomatic sinus bradycardia  Cont hold Toprol and flecanide  Recommend changing Flecanide dose back to 100mg pending level and HR trend    PAF (paroxysmal atrial fibrillation)  Cont to hold home Toprol and flecanide  Flecanide level pending, patient last took it yesterday morning  Cont hep gtt    Primary hypertension  Lisinopril 10mg        VTE Risk Mitigation (From admission, onward)         Ordered     heparin 25,000 units in dextrose 5% (100 units/ml) IV bolus from bag - ADDITIONAL PRN BOLUS - 60 units/kg  As needed (PRN)        Question:  Heparin Infusion Adjustment (DO NOT MODIFY ANSWER)  Answer:  [email or web address]\Images\Pharmacy\HeparinInfusions\heparin LOW INTENSITY nomogram for [facility] [id number]    10/17/22 1955     heparin 25,000 units in dextrose 5% (100 units/ml) IV bolus from bag - ADDITIONAL PRN BOLUS - 30 units/kg  As needed (PRN)        Question:  Heparin Infusion Adjustment (DO NOT MODIFY ANSWER)  Answer:  \\[email or web address]\Images\Pharmacy\HeparinInfusions\heparin LOW INTENSITY nomogram for [facility] [id number]     10/17/22 1955     heparin 25,000 units in dextrose 5% 250 mL (100 units/mL) infusion LOW INTENSITY nomogram - [facility]  Continuous        Question Answer Comment   Heparin Infusion Adjustment (DO NOT MODIFY ANSWER) [email or web address]\Images\Pharmacy\HeparinInfusions\heparin LOW INTENSITY nomogram for [facility] [id number]    Begin at (in units/kg/hr) 12        10/17/22 1955     IP VTE HIGH RISK PATIENT  Once         10/17/22 1938     Place sequential compression device  Until discontinued         10/17/22 1938                Thank you for your consult. I will follow-up with patient. Please contact us if you have any additional questions.    Suzie Chu, NP  Cardiology   [facility] Med S[facility]

## 2022-10-18 NOTE — ASSESSMENT & PLAN NOTE
Cont to hold home Toprol and flecanide  Flecanide level pending, patient last took it yesterday morning  Cont hep gtt

## 2022-10-18 NOTE — ASSESSMENT & PLAN NOTE
- Asymptomatic and hemodynamically stable on admission.  - Hold home BB and flecainide.  - Monitor telemetry.  - Cardiology consult.

## 2022-10-18 NOTE — ASSESSMENT & PLAN NOTE
- Asymptomatic and hemodynamically stable on admission.  - Hold home BB and flecainide.  - Monitor telemetry.  - Cardiology consult.   -Flecainide level pending

## 2022-10-18 NOTE — ASSESSMENT & PLAN NOTE
- BB and flecainide on hold as above. Monitor telemetry.  - Will hold Eliquis in case PPM is warranted.   -Continue heparin drip for AC.

## 2022-10-18 NOTE — ASSESSMENT & PLAN NOTE
- BB and flecainide on hold as above. Monitor telemetry.  - Will hold Eliquis in case PPM is warranted. Start heparin drip for AC.

## 2022-10-18 NOTE — HPI
Ms. Bustamante is a 73 yo female with a PMHx of PAF on Eliquis, DM II, GERD, HTN, HLD, and hypothyroidism who presented to [facility] in [facility] with c/o generalized weakness, dizziness, near syncope, and possible symptomatic bradycardia. She had a Holter monitor which was completed today. She was returning the monitor to the cardiology clinic today when her symptoms occurred. Patient reports when she got out of the car and walk she felt dizzy, lightheaded, near syncopal, and that her legs were giving out in the space of only about 10 or 12 ft. She did gets somewhat diaphoretic but denies chest pain, dyspnea, palpitations, nausea, or vomiting. She has some degree of vertigo type symptoms with this as well. She has had these symptoms apparently on a somewhat ongoing basis at home as well, not recently evaluated. She sometimes does feel tachycardic palpitations as well. She has not discussed the possibility of a pacemaker as far she can recall but believes that she may need one. She feels better lying still and leaning back. Upon arrival to the ED, EKG revealed marked sinus bradycardia with 1st degree AV block, HR in the mid 30s to mid 40s. She remained hemodynamically stable. Labs resulted stable electrolytes and TSH, negative troponin. Patient is on Toprol-XL and flecainide, both taken this AM. Case discussed with Cardiology, who will see patient in consult. Patient was transferred to [facility] and admitted under Hospital Medicine services.     Cardiology consulted for symptomatic bradycardia. Pt seen and examined today resting comfortably in bed. No CV complaints at this time. Pt states she has similar syncopal episodes daily but feels they are worse since her Flecanide dose was increased. She reports her HR at home is usually 45-50s. Pt sees Dr. Aguilar and take Toprol XL and Flecanide for Afib. Previous 2022 holter showed PACs and PVCs. Dr. Aguilar increased Flecanide dose to 150mg. Labs reviewed WBC 15.21, EKG reviewed  sinus kam w/ AV block, 2021 Echo normal EF

## 2022-10-18 NOTE — SUBJECTIVE & OBJECTIVE
Past Medical History:   Diagnosis Date    Achalasia     demetrius    Atrial fibrillation with RVR 2021    Cataract     Colon polyp     Gastroesophageal reflux     Hiatal hernia     Hx of colonic polyps 08/15/2014    per colonoscopy in      Hyperlipidemia 2022    Hypertension     Peripheral neuropathy     Postmenopausal HRT (hormone replacement therapy)     failed tapering off    Sepsis 2021    Last Assessment & Plan:  Formatting of this note might be different from the original. History & Physical Patient meets sepsis criteria with a white cell count of 15, and tachypnea.  Source of infection not clear at this time.  No evidence for meningitis.  Cover with broad-spectrum antibiotics especially given invasive dental procedure done recently.  Check blood cultures and monitor for fever.  R    Sinusitis     Thyroid nodule 3/16 subcm; kathelen 2 yrs    Type 2 diabetes mellitus with neurological manifestations     Vasodepressor syncope 2018       Past Surgical History:   Procedure Laterality Date    BREAST BIOPSY Left 2021    cataract Left      SECTION      CHOLECYSTECTOMY      CYST REMOVAL Right 2021    [facility] in [place]    ESOPHAGEAL MANOMETRY WITH MEASUREMENT OF IMPEDANCE N/A 10/6/2022    Procedure: MANOMETRY, ESOPHAGUS, WITH IMPEDANCE MEASUREMENT  Cardiac Clearance/Eliquis 2 day hold approval received per Dr. RABIA Aguilar, Cardiology on 22.  Note in encounters.  LB;  Surgeon: Bruna Fletcher MD;  Location: [facility] [place], [place];  Service: Endoscopy;  Laterality: N/A;    ESOPHAGOGASTRODUODENOSCOPY N/A 10/6/2022    Procedure: EGD (ESOPHAGOGASTRODUODENOSCOPY);  Surgeon: Bruna Fletcher MD;  Location: [facility] [place], [place];  Service: Endoscopy;  Laterality: N/A;    HYSTERECTOMY      nonca    OOPHORECTOMY      TILT TABLE TEST N/A 10/25/2018    Procedure: TILT TABLE TEST;  Surgeon: Daryl Walker MD;  Location: [facility] CATH LAB;  Service: Cardiology;  Laterality: N/A;   VAS.DEP.SYN,HUT,FAS,3PREP    TONSILLECTOMY         Review of patient's allergies indicates:   Allergen Reactions    Floxin [ofloxacin]     Fluvastatin     Pravastatin Other (See Comments)     myalgias    Trazodone      Racing heart       No current facility-administered medications on file prior to encounter.     Current Outpatient Medications on File Prior to Encounter   Medication Sig    albuterol (PROVENTIL/VENTOLIN HFA) 90 mcg/actuation inhaler Inhale 2 puffs into the lungs every 6 (six) hours as needed for Wheezing.    apixaban (ELIQUIS) 5 mg Tab Take 1 tablet (5 mg total) by mouth 2 (two) times daily.    ascorbic acid, vitamin C, (VITAMIN C) 500 MG tablet Take 1 tablet (500 mg total) by mouth 2 (two) times daily.    blood sugar diagnostic (BLOOD GLUCOSE TEST) Strp Test 2 times daily    blood-glucose meter kit Test twice daily    cyanocobalamin 2000 MCG tablet Take 2,500 mcg by mouth.    DULoxetine (CYMBALTA) 20 MG capsule Take 1 capsule (20 mg total) by mouth once daily.    estrogens, conjugated, (PREMARIN) 0.625 MG tablet Take 1 tablet (0.625 mg total) by mouth once daily.    flecainide (TAMBOCOR) 150 MG Tab Take 1 tablet (150 mg total) by mouth every 12 (twelve) hours.    lancets Misc Test twice daily    metoprolol succinate (TOPROL-XL) 100 MG 24 hr tablet Take 1 tablet (100 mg total) by mouth 2 (two) times daily.    rosuvastatin (CRESTOR) 5 MG tablet Take 1 tablet (5 mg total) by mouth once daily.    SITagliptan-metformin (JANUMET) 50-1,000 mg per tablet Take 1 tablet by mouth 2 (two) times daily with meals.    zolpidem (AMBIEN) 10 mg Tab 1/2 po qhs prn insomnia    biotin 10,000 mcg Cap Take 1 capsule by mouth once daily.    chlorhexidine (PERIDEX) 0.12 % solution SWISH AND SPIT 15MLS BY MOUTH TWICE DAILY    DEEP SEA NASAL 0.65 % nasal spray     FLUZONE HIGHDOSE QUAD 20-21  mcg/0.7 mL Syrg ADM 0.7ML IM UTD    HYDROcodone-acetaminophen (NORCO) 5-325 mg per tablet Take 1 tablet by mouth every 4 (four)  hours as needed for Pain.    ibuprofen (ADVIL,MOTRIN) 800 MG tablet Take 800 mg by mouth every 4 to 6 hours as needed.    levalbuterol (XOPENEX) 1.25 mg/3 mL nebulizer solution USE 1 VIAL IN NEBULIZER THREE TIMES DAILY AS NEEDED FOR WHEEZING FOR 5 DAYS    omeprazole (PRILOSEC) 40 MG capsule Take 1 capsule (40 mg total) by mouth every evening.    promethazine-dextromethorphan (PROMETHAZINE-DM) 6.25-15 mg/5 mL Syrp TAKE 5 ML BY MOUTH IN THE EVENING     Family History       Problem Relation (Age of Onset)    Aneurysm Sister    Coronary artery disease Father, Brother    Diabetes Father    Heart disease Mother    Parkinsonism Brother          Tobacco Use    Smoking status: Never    Smokeless tobacco: Never   Substance and Sexual Activity    Alcohol use: No    Drug use: No    Sexual activity: Never     Review of Systems   Constitutional: Negative.   HENT: Negative.     Eyes: Negative.    Cardiovascular:  Positive for near-syncope.   Respiratory: Negative.     Skin: Negative.    Musculoskeletal: Negative.    Gastrointestinal: Negative.    Genitourinary: Negative.    Neurological:  Positive for dizziness, light-headedness and weakness.   Psychiatric/Behavioral: Negative.     Objective:     Vital Signs (Most Recent):  Temp: 98.2 °F (36.8 °C) (10/18/22 0801)  Pulse: (!) 52 (10/18/22 0927)  Resp: 18 (10/18/22 0801)  BP: (!) 182/78 (10/18/22 0801)  SpO2: 96 % (10/18/22 0801)   Vital Signs (24h Range):  Temp:  [97.5 °F (36.4 °C)-98.2 °F (36.8 °C)] 98.2 °F (36.8 °C)  Pulse:  [47-57] 52  Resp:  [17-20] 18  SpO2:  [91 %-97 %] 96 %  BP: (159-208)/(74-90) 182/78     Weight: 82.9 kg (182 lb 12.2 oz)  Body mass index is 32.37 kg/m².    SpO2: 96 %  O2 Device (Oxygen Therapy): room air      Intake/Output Summary (Last 24 hours) at 10/18/2022 1321  Last data filed at 10/18/2022 0618  Gross per 24 hour   Intake 807.6 ml   Output --   Net 807.6 ml       Lines/Drains/Airways       Peripheral Intravenous Line  Duration                   Peripheral IV - Single Lumen 10/17/22 1305 20 G Right Antecubital 1 day                    Physical Exam  Vitals and nursing note reviewed.   Constitutional:       Appearance: Normal appearance.   HENT:      Head: Normocephalic.   Eyes:      Pupils: Pupils are equal, round, and reactive to light.   Cardiovascular:      Rate and Rhythm: Regular rhythm. Bradycardia present.      Heart sounds: Normal heart sounds, S1 normal and S2 normal. No murmur heard.    No S3 or S4 sounds.   Pulmonary:      Effort: Pulmonary effort is normal.      Breath sounds: Normal breath sounds.   Abdominal:      General: Bowel sounds are normal.      Palpations: Abdomen is soft.   Musculoskeletal:         General: Normal range of motion.      Cervical back: Normal range of motion.   Skin:     Capillary Refill: Capillary refill takes less than 2 seconds.   Neurological:      General: No focal deficit present.      Mental Status: She is alert and oriented to person, place, and time.   Psychiatric:         Mood and Affect: Mood normal.         Behavior: Behavior normal.         Thought Content: Thought content normal.       Significant Labs: CMP   Recent Labs   Lab 10/17/22  1308 10/18/22  0427    141   K 4.7 4.2    106   CO2 25 27   * 114*   BUN 15 12   CREATININE 1.1 0.9   CALCIUM 9.5 9.2   PROT 7.5  --    ALBUMIN 3.7  --    BILITOT 0.5  --    ALKPHOS 74  --    AST 27  --    ALT 22  --    ANIONGAP 13 8   , CBC   Recent Labs   Lab 10/17/22  1308 10/18/22  0427   WBC 11.96 15.21*   HGB 13.3 13.2   HCT 40.2 38.6    335   , INR   Recent Labs   Lab 10/17/22  2023   INR 1.0   , Lipid Panel No results for input(s): CHOL, HDL, LDLCALC, TRIG, CHOLHDL in the last 48 hours., Troponin   Recent Labs   Lab 10/17/22  1308   TROPONINI <0.006   , and All pertinent lab results from the last 24 hours have been reviewed.    Significant Imaging: Echocardiogram: Transthoracic echo (TTE) complete (Cupid Only):   Results for orders placed  or performed during the hospital encounter of 05/11/21   Echo Color Flow Doppler? Yes   Result Value Ref Range    BSA 1.68 m2    TDI SEPTAL 0.08 m/s    LV LATERAL E/E' RATIO 10.10 m/s    LV SEPTAL E/E' RATIO 12.63 m/s    LA WIDTH 3.50 cm    TDI LATERAL 0.10 m/s    PV PEAK VELOCITY 0.83 cm/s    LVIDd 4.69 3.5 - 6.0 cm    IVS 0.55 (A) 0.6 - 1.1 cm    Posterior Wall 0.78 0.6 - 1.1 cm    Ao root annulus 2.64 cm    LVIDs 3.20 2.1 - 4.0 cm    FS 32 28 - 44 %    LA volume 54.48 cm3    Sinus 2.66 cm    STJ 2.25 cm    Ascending aorta 2.30 cm    LV mass 96.28 g    LA size 3.88 cm    TAPSE 2.16 cm    Left Ventricle Relative Wall Thickness 0.33 cm    AV mean gradient 3 mmHg    AV valve area 2.80 cm2    AV Velocity Ratio 0.85     AV index (prosthetic) 0.94     MV valve area p 1/2 method 3.75 cm2    E/A ratio 1.33     Mean e' 0.09 m/s    E wave deceleration time 202.22 msec    Pulm vein S/D ratio 0.71     LVOT diameter 1.95 cm    LVOT area 3.0 cm2    LVOT peak jagjit 1.00 m/s    LVOT peak VTI 20.66 cm    Ao peak jagjit 1.17 m/s    Ao VTI 22.03 cm    LVOT stroke volume 61.67 cm3    AV peak gradient 5 mmHg    E/E' ratio 11.22 m/s    MV Peak E Jagjit 1.01 m/s    TR Max Jagjit 2.69 m/s    MV stenosis pressure 1/2 time 58.64 ms    MV Peak A Jagjit 0.76 m/s    PV Peak S Jagjit 0.49 m/s    PV Peak D Jagjit 0.69 m/s    LV Systolic Volume 40.99 mL    LV Systolic Volume Index 24.7 mL/m2    LV Diastolic Volume 101.74 mL    LV Diastolic Volume Index 61.29 mL/m2    LA Volume Index 32.8 mL/m2    LV Mass Index 58 g/m2    RA Major Axis 4.35 cm    Left Atrium Minor Axis 4.70 cm    Left Atrium Major Axis 4.74 cm    Triscuspid Valve Regurgitation Peak Gradient 29 mmHg    RA Width 3.42 cm    Right Atrial Pressure (from IVC) 3 mmHg    EF 55 %    TV rest pulmonary artery pressure 32 mmHg    Narrative    · Normal systolic function.  · The estimated ejection fraction is 55%.  · Normal left ventricular diastolic function.  · With normal right ventricular systolic  function.  · Normal central venous pressure (3 mmHg).  · The estimated PA systolic pressure is 32 mmHg.      , EKG: reviewed, and Stress Test: reviewed

## 2022-10-18 NOTE — HOSPITAL COURSE
71 y/o female admitted for symptomatic bradycardia. BB and flecainide on hold and cardiology consulted. Blood pressure elevated, lisinopril 10mg added. Echo pending. 10/19/22: Patient feeling better today. BP fluctuating, better controlled this AM. SR with HR in 60's-70's this AM. BB and Flecainide dosage adjusted. Ok to discharge from cardiology standpoint. Patient seen and examined on the date of discharge and found stable for discharge. Patient to follow-up with PCP and cardiology outpatient.

## 2022-10-18 NOTE — SUBJECTIVE & OBJECTIVE
Past Medical History:   Diagnosis Date    Achalasia     demetrius    Atrial fibrillation with RVR 2021    Cataract     Colon polyp     Gastroesophageal reflux     Hiatal hernia     Hx of colonic polyps 08/15/2014    per colonoscopy in      Hyperlipidemia 2022    Hypertension     Peripheral neuropathy     Postmenopausal HRT (hormone replacement therapy)     failed tapering off    Sepsis 2021    Last Assessment & Plan:  Formatting of this note might be different from the original. History & Physical Patient meets sepsis criteria with a white cell count of 15, and tachypnea.  Source of infection not clear at this time.  No evidence for meningitis.  Cover with broad-spectrum antibiotics especially given invasive dental procedure done recently.  Check blood cultures and monitor for fever.  R    Sinusitis     Thyroid nodule 3/16 subcm; kathleen 2 yrs    Type 2 diabetes mellitus with neurological manifestations     Vasodepressor syncope 2018       Past Surgical History:   Procedure Laterality Date    BREAST BIOPSY Left 2021    cataract Left      SECTION      CHOLECYSTECTOMY      CYST REMOVAL Right 2021    [facility] in [place]    ESOPHAGEAL MANOMETRY WITH MEASUREMENT OF IMPEDANCE N/A 10/6/2022    Procedure: MANOMETRY, ESOPHAGUS, WITH IMPEDANCE MEASUREMENT  Cardiac Clearance/Eliquis 2 day hold approval received per Dr. RABIA Aguilar, Cardiology on 22.  Note in encounters.  LB;  Surgeon: Bruna Fletcher MD;  Location: [facility];  Service: Endoscopy;  Laterality: N/A;    ESOPHAGOGASTRODUODENOSCOPY N/A 10/6/2022    Procedure: EGD (ESOPHAGOGASTRODUODENOSCOPY);  Surgeon: Bruna Fletcher MD;  Location: [facility];  Service: Endoscopy;  Laterality: N/A;    HYSTERECTOMY      nonca    OOPHORECTOMY      TILT TABLE TEST N/A 10/25/2018    Procedure: TILT TABLE TEST;  Surgeon: Daryl Walker MD;  Location: [facility] CATH LAB;  Service: Cardiology;  Laterality: N/A;   VAS.DEP.SYN,HUT,FAS,3PREP    TONSILLECTOMY         Review of patient's allergies indicates:   Allergen Reactions    Floxin [ofloxacin]     Fluvastatin     Pravastatin Other (See Comments)     myalgias    Trazodone      Racing heart       No current facility-administered medications on file prior to encounter.     Current Outpatient Medications on File Prior to Encounter   Medication Sig    albuterol (PROVENTIL/VENTOLIN HFA) 90 mcg/actuation inhaler Inhale 2 puffs into the lungs every 6 (six) hours as needed for Wheezing.    apixaban (ELIQUIS) 5 mg Tab Take 1 tablet (5 mg total) by mouth 2 (two) times daily.    ascorbic acid, vitamin C, (VITAMIN C) 500 MG tablet Take 1 tablet (500 mg total) by mouth 2 (two) times daily.    blood sugar diagnostic (BLOOD GLUCOSE TEST) Strp Test 2 times daily    blood-glucose meter kit Test twice daily    cyanocobalamin 2000 MCG tablet Take 2,500 mcg by mouth.    DULoxetine (CYMBALTA) 20 MG capsule Take 1 capsule (20 mg total) by mouth once daily.    estrogens, conjugated, (PREMARIN) 0.625 MG tablet Take 1 tablet (0.625 mg total) by mouth once daily.    flecainide (TAMBOCOR) 150 MG Tab Take 1 tablet (150 mg total) by mouth every 12 (twelve) hours.    lancets Misc Test twice daily    metoprolol succinate (TOPROL-XL) 100 MG 24 hr tablet Take 1 tablet (100 mg total) by mouth 2 (two) times daily.    rosuvastatin (CRESTOR) 5 MG tablet Take 1 tablet (5 mg total) by mouth once daily.    SITagliptan-metformin (JANUMET) 50-1,000 mg per tablet Take 1 tablet by mouth 2 (two) times daily with meals.    zolpidem (AMBIEN) 10 mg Tab 1/2 po qhs prn insomnia    biotin 10,000 mcg Cap Take 1 capsule by mouth once daily.    chlorhexidine (PERIDEX) 0.12 % solution SWISH AND SPIT 15MLS BY MOUTH TWICE DAILY    DEEP SEA NASAL 0.65 % nasal spray     FLUZONE HIGHDOSE QUAD 20-21  mcg/0.7 mL Syrg ADM 0.7ML IM UTD    HYDROcodone-acetaminophen (NORCO) 5-325 mg per tablet Take 1 tablet by mouth every 4 (four)  hours as needed for Pain.    ibuprofen (ADVIL,MOTRIN) 800 MG tablet Take 800 mg by mouth every 4 to 6 hours as needed.    levalbuterol (XOPENEX) 1.25 mg/3 mL nebulizer solution USE 1 VIAL IN NEBULIZER THREE TIMES DAILY AS NEEDED FOR WHEEZING FOR 5 DAYS    omeprazole (PRILOSEC) 40 MG capsule Take 1 capsule (40 mg total) by mouth every evening.    promethazine-dextromethorphan (PROMETHAZINE-DM) 6.25-15 mg/5 mL Syrp TAKE 5 ML BY MOUTH IN THE EVENING     Family History       Problem Relation (Age of Onset)    Aneurysm Sister    Coronary artery disease Father, Brother    Diabetes Father    Heart disease Mother    Parkinsonism Brother          Tobacco Use    Smoking status: Never    Smokeless tobacco: Never   Substance and Sexual Activity    Alcohol use: No    Drug use: No    Sexual activity: Never     Review of Systems   Constitutional:  Positive for diaphoresis. Negative for chills, fatigue and fever.   HENT:  Negative for congestion and sore throat.    Respiratory:  Negative for cough, shortness of breath and wheezing.    Cardiovascular:  Negative for chest pain, palpitations and leg swelling.   Gastrointestinal:  Negative for abdominal pain, constipation, diarrhea, nausea and vomiting.   Genitourinary:  Negative for dysuria and hematuria.   Musculoskeletal:  Negative for arthralgias, back pain and myalgias.   Skin:  Negative for pallor and rash.   Neurological:  Positive for dizziness, weakness (generalized) and light-headedness. Negative for syncope, numbness and headaches.   Psychiatric/Behavioral:  The patient is not nervous/anxious.    All other systems reviewed and are negative.  Objective:     Vital Signs (Most Recent):  Temp: 98.1 °F (36.7 °C) (10/17/22 1222)  Pulse: (!) 57 (10/17/22 1816)  Resp: 20 (10/17/22 1816)  BP: (!) 198/77 (10/17/22 1733)  SpO2: 95 % (10/17/22 1816)   Vital Signs (24h Range):  Temp:  [98.1 °F (36.7 °C)] 98.1 °F (36.7 °C)  Pulse:  [39-57] 57  Resp:  [16-21] 20  SpO2:  [95 %-97 %] 95  %  BP: (119-198)/(55-90) 198/77     Weight: 72.9 kg (160 lb 11.5 oz)  Body mass index is 28.47 kg/m².    Physical Exam  Vitals and nursing note reviewed.   Constitutional:       General: She is awake. She is not in acute distress.     Appearance: Normal appearance. She is well-developed. She is not diaphoretic.   HENT:      Head: Normocephalic and atraumatic.   Eyes:      Conjunctiva/sclera: Conjunctivae normal.      Comments: PERRL; EOM intact.   Cardiovascular:      Rate and Rhythm: Regular rhythm. Bradycardia present. No extrasystoles are present.     Heart sounds: S1 normal and S2 normal. No murmur heard.  Pulmonary:      Effort: Pulmonary effort is normal. No tachypnea.      Breath sounds: Normal breath sounds and air entry. No wheezing, rhonchi or rales.   Abdominal:      General: Bowel sounds are normal. There is no distension.      Palpations: Abdomen is soft.      Tenderness: There is no abdominal tenderness.   Musculoskeletal:         General: No tenderness or deformity. Normal range of motion.      Cervical back: Normal range of motion and neck supple.      Right lower leg: No edema.      Left lower leg: No edema.   Skin:     General: Skin is warm and dry.      Capillary Refill: Capillary refill takes less than 2 seconds.      Findings: No erythema or rash.   Neurological:      General: No focal deficit present.      Mental Status: She is alert and oriented to person, place, and time.   Psychiatric:         Mood and Affect: Mood and affect normal.         Behavior: Behavior normal. Behavior is cooperative.           Significant Labs:  Results for orders placed or performed during the hospital encounter of 10/17/22   CBC auto differential   Result Value Ref Range    WBC 11.96 3.90 - 12.70 K/uL    RBC 4.46 4.00 - 5.40 M/uL    Hemoglobin 13.3 12.0 - 16.0 g/dL    Hematocrit 40.2 37.0 - 48.5 %    MCV 90 82 - 98 fL    MCH 29.8 27.0 - 31.0 pg    MCHC 33.1 32.0 - 36.0 g/dL    RDW 12.2 11.5 - 14.5 %    Platelets  394 150 - 450 K/uL    MPV 9.2 9.2 - 12.9 fL    Immature Granulocytes 0.5 0.0 - 0.5 %    Gran # (ANC) 8.6 (H) 1.8 - 7.7 K/uL    Immature Grans (Abs) 0.06 (H) 0.00 - 0.04 K/uL    Lymph # 2.2 1.0 - 4.8 K/uL    Mono # 0.8 0.3 - 1.0 K/uL    Eos # 0.4 0.0 - 0.5 K/uL    Baso # 0.07 0.00 - 0.20 K/uL    nRBC 0 0 /100 WBC    Gran % 71.5 38.0 - 73.0 %    Lymph % 18.0 18.0 - 48.0 %    Mono % 6.5 4.0 - 15.0 %    Eosinophil % 2.9 0.0 - 8.0 %    Basophil % 0.6 0.0 - 1.9 %    Differential Method Automated    Comprehensive metabolic panel   Result Value Ref Range    Sodium 138 136 - 145 mmol/L    Potassium 4.7 3.5 - 5.1 mmol/L    Chloride 100 95 - 110 mmol/L    CO2 25 23 - 29 mmol/L    Glucose 117 (H) 70 - 110 mg/dL    BUN 15 8 - 23 mg/dL    Creatinine 1.1 0.5 - 1.4 mg/dL    Calcium 9.5 8.7 - 10.5 mg/dL    Total Protein 7.5 6.0 - 8.4 g/dL    Albumin 3.7 3.5 - 5.2 g/dL    Total Bilirubin 0.5 0.1 - 1.0 mg/dL    Alkaline Phosphatase 74 55 - 135 U/L    AST 27 10 - 40 U/L    ALT 22 10 - 44 U/L    Anion Gap 13 8 - 16 mmol/L    eGFR 53.7 (A) >60 mL/min/1.73 m^2   Troponin I   Result Value Ref Range    Troponin I <0.006 0.000 - 0.026 ng/mL   TSH   Result Value Ref Range    TSH 1.492 0.400 - 4.000 uIU/mL   B-Type natriuretic peptide (BNP)   Result Value Ref Range    BNP 78 0 - 99 pg/mL   POCT COVID-19 Rapid Screening   Result Value Ref Range    POC Rapid COVID Negative Negative     Acceptable Yes       All pertinent labs within the past 24 hours have been reviewed.    Significant Imaging:   Imaging Results              X-Ray Chest AP Portable (Final result)  Result time 10/17/22 13:56:47      Final result by Cody Mena MD (10/17/22 13:56:47)                   Impression:      1.  Negative for acute process involving the chest.    2.  Stable findings as noted above.      Electronically signed by: Cody Mena MD  Date:    10/17/2022  Time:    13:56               Narrative:    EXAMINATION:  XR CHEST AP PORTABLE    CLINICAL  HISTORY:  Chest Pain;    COMPARISON:  Studies dating back to May 24, 2019    FINDINGS:  Life-saving devices overlie the chest.  The lungs are clear. The cardiac silhouette size is normal. The trachea is midline and the mediastinal width is normal. Negative for focal infiltrate, effusion or pneumothorax. Pulmonary vasculature is normal. Negative for osseous abnormalities. Stable mildly tortuous aorta with aortic arch calcifications and degenerative changes of the spine and both shoulder girdles.  Left cardiophrenic fat pad again seen.  Stable eventration of the hemidiaphragms.                                       CT Head Without Contrast (Final result)  Result time 10/17/22 14:05:54      Final result by Blas Qureshi MD (10/17/22 14:05:54)                   Impression:      No CT evidence of acute intracranial abnormality.    All CT scans at this facility are performed  using dose modulation techniques as appropriate to performed exam including the following:  automated exposure control; adjustment of mA and/or kV according to the patients size (this includes techniques or standardized protocols for targeted exams where dose is matched to indication/reason for exam: i.e. extremities or head);  iterative reconstruction technique.      Electronically signed by: Blas Qureshi MD  Date:    10/17/2022  Time:    14:05               Narrative:    EXAMINATION:  CT HEAD WITHOUT CONTRAST    CLINICAL HISTORY:  Head trauma, minor (Age >= 65y);    TECHNIQUE:  Axial CT imaging was performed through the head without intravenous contrast.    COMPARISON:  06/25/2022    FINDINGS:  No hydrocephalus, midline shift, mass effect, or acute intracranial hemorrhage. Bifrontal atrophy.  Basilar cisterns and posterior fossa are normal. The visualized paranasal sinuses and mastoid air cells are clear. The skull is intact.                                     I have reviewed all pertinent imaging results/findings within the past 24  hours.      Results for orders placed or performed during the hospital encounter of 10/17/22   EKG 12-lead    Collection Time: 10/17/22 12:30 PM    Narrative    Test Reason : DIZZINESS    Vent. Rate : 040 BPM     Atrial Rate : 040 BPM     P-R Int : 256 ms          QRS Dur : 128 ms      QT Int : 516 ms       P-R-T Axes : 000 082 080 degrees     QTc Int : 420 ms    Marked sinus bradycardia with sinus arrhythmia with 1st degree A-V block  Nonspecific intraventricular block  Abnormal ECG  When compared with ECG of 20-SEP-2017 08:14,  Sinus rhythm has replaced Atrial fibrillation  Vent. rate has decreased BY  29 BPM  Questionable change in QRS duration    Referred By: [email or web address]   SELF           Confirmed By:

## 2022-10-18 NOTE — PROGRESS NOTES
[facility] Medicine  Progress Note    Patient Name: Kelsie Bustamante  MRN: [id number]  Patient Class: IP- Inpatient   Admission Date: 10/17/2022  Length of Stay: 1 days  Attending Physician: Tl Gaspar MD  Primary Care Provider: Cody Parks MD        Subjective:     Principal Problem:Symptomatic sinus bradycardia        HPI:  Ms. Bustamante is a 71 yo female with a PMHx of PAF on Eliquis, DM II, GERD, HTN, HLD, and hypothyroidism who presented to [facility] ED in [facility] with c/o generalized weakness, dizziness, near syncope, and possible symptomatic bradycardia. She had a Holter monitor which was completed today. She was returning the monitor to the cardiology clinic today when her symptoms occurred. Patient reports when she got out of the car and walk she felt dizzy, lightheaded, near syncopal, and that her legs were giving out in the space of only about 10 or 12 ft. She did gets somewhat diaphoretic but denies chest pain, dyspnea, palpitations, nausea, or vomiting. She has some degree of vertigo type symptoms with this as well. She has had these symptoms apparently on a somewhat ongoing basis at home as well, not recently evaluated. She sometimes does feel tachycardic palpitations as well. She has not discussed the possibility of a pacemaker as far she can recall but believes that she may need one. She feels better lying still and leaning back. Upon arrival to the ED, EKG revealed marked sinus bradycardia with 1st degree AV block, HR in the mid 30s to mid 40s. She remained hemodynamically stable. Labs resulted stable electrolytes and TSH, negative troponin. Patient is on Toprol-XL and flecainide, both taken this AM. Case discussed with Cardiology, who will see patient in consult. Patient was transferred to [facility] and admitted under Hospital Medicine services.       Overview/Hospital Course:  71 y/o female admitted for symptomatic bradycardia. BB and flecainide on hold and cardiology  consulted. Blood pressure elevated, lisinopril 10mg added. Echo pending.           Review of Systems   Constitutional:  Positive for diaphoresis. Negative for chills, fatigue and fever.   HENT:  Negative for congestion and sore throat.    Respiratory:  Negative for cough, shortness of breath and wheezing.    Cardiovascular:  Negative for chest pain, palpitations and leg swelling.   Gastrointestinal:  Negative for abdominal pain, constipation, diarrhea, nausea and vomiting.   Genitourinary:  Negative for dysuria and hematuria.   Musculoskeletal:  Negative for arthralgias, back pain and myalgias.   Skin:  Negative for pallor and rash.   Neurological:  Positive for dizziness, weakness (generalized) and light-headedness. Negative for syncope, numbness and headaches.   Psychiatric/Behavioral:  The patient is not nervous/anxious.    All other systems reviewed and are negative.  Objective:     Vital Signs (Most Recent):  Temp: 98.2 °F (36.8 °C) (10/18/22 0801)  Pulse: (!) 55 (10/18/22 1508)  Resp: 18 (10/18/22 0801)  BP: (!) 173/70 (10/18/22 1508)  SpO2: 96 % (10/18/22 0801)   Vital Signs (24h Range):  Temp:  [97.5 °F (36.4 °C)-98.2 °F (36.8 °C)] 98.2 °F (36.8 °C)  Pulse:  [47-57] 55  Resp:  [17-20] 18  SpO2:  [91 %-97 %] 96 %  BP: (173-208)/(70-90) 173/70     Weight: 82.6 kg (182 lb)  Body mass index is 32.24 kg/m².    Intake/Output Summary (Last 24 hours) at 10/18/2022 1508  Last data filed at 10/18/2022 1442  Gross per 24 hour   Intake 869.36 ml   Output --   Net 869.36 ml      Physical Exam  Vitals and nursing note reviewed.   Constitutional:       General: She is awake. She is not in acute distress.     Appearance: Normal appearance. She is well-developed. She is not diaphoretic.   HENT:      Head: Normocephalic and atraumatic.   Eyes:      Conjunctiva/sclera: Conjunctivae normal.      Comments: PERRL; EOM intact.   Cardiovascular:      Rate and Rhythm: Regular rhythm. Bradycardia present. No extrasystoles are  present.     Heart sounds: S1 normal and S2 normal. No murmur heard.  Pulmonary:      Effort: Pulmonary effort is normal. No tachypnea.      Breath sounds: Normal breath sounds and air entry. No wheezing, rhonchi or rales.   Abdominal:      General: Bowel sounds are normal. There is no distension.      Palpations: Abdomen is soft.      Tenderness: There is no abdominal tenderness.   Musculoskeletal:         General: No tenderness or deformity. Normal range of motion.      Cervical back: Normal range of motion and neck supple.      Right lower leg: No edema.      Left lower leg: No edema.   Skin:     General: Skin is warm and dry.      Capillary Refill: Capillary refill takes less than 2 seconds.      Findings: No erythema or rash.   Neurological:      General: No focal deficit present.      Mental Status: She is alert and oriented to person, place, and time.   Psychiatric:         Mood and Affect: Mood and affect normal.         Behavior: Behavior normal. Behavior is cooperative.       Significant Labs: All pertinent labs within the past 24 hours have been reviewed.  BMP:   Recent Labs   Lab 10/18/22  0427   *      K 4.2      CO2 27   BUN 12   CREATININE 0.9   CALCIUM 9.2   MG 1.7     CBC:   Recent Labs   Lab 10/17/22  1308 10/18/22  0427   WBC 11.96 15.21*   HGB 13.3 13.2   HCT 40.2 38.6    335     CMP:   Recent Labs   Lab 10/17/22  1308 10/18/22  0427    141   K 4.7 4.2    106   CO2 25 27   * 114*   BUN 15 12   CREATININE 1.1 0.9   CALCIUM 9.5 9.2   PROT 7.5  --    ALBUMIN 3.7  --    BILITOT 0.5  --    ALKPHOS 74  --    AST 27  --    ALT 22  --    ANIONGAP 13 8       Significant Imaging: I have reviewed all pertinent imaging results/findings within the past 24 hours.      Assessment/Plan:      * Symptomatic sinus bradycardia  - Asymptomatic and hemodynamically stable on admission.  - Hold home BB and flecainide.  - Monitor telemetry.  - Cardiology consult.   -Flecainide  level pending       PAF (paroxysmal atrial fibrillation)  - BB and flecainide on hold as above. Monitor telemetry.  - Will hold Eliquis in case PPM is warranted.   -Continue heparin drip for AC.      Primary hypertension  Bp elevated   Lisinopril started   IV hydralazine prn           VTE Risk Mitigation (From admission, onward)         Ordered     heparin 25,000 units in dextrose 5% (100 units/ml) IV bolus from bag - ADDITIONAL PRN BOLUS - 60 units/kg  As needed (PRN)        Question:  Heparin Infusion Adjustment (DO NOT MODIFY ANSWER)  Answer:  \\[email or web address]\epic\Images\Pharmacy\HeparinInfusions\heparin LOW INTENSITY nomogram for [facility] [id number]    10/17/22 1955     heparin 25,000 units in dextrose 5% (100 units/ml) IV bolus from bag - ADDITIONAL PRN BOLUS - 30 units/kg  As needed (PRN)        Question:  Heparin Infusion Adjustment (DO NOT MODIFY ANSWER)  Answer:  \\[email or web address]\epic\Images\Pharmacy\HeparinInfusions\heparin LOW INTENSITY nomogram for [facility] [id number]    10/17/22 1955     heparin 25,000 units in dextrose 5% 250 mL (100 units/mL) infusion LOW INTENSITY nomogram - [facility]  Continuous        Question Answer Comment   Heparin Infusion Adjustment (DO NOT MODIFY ANSWER) \\[email or web address]\epic\Images\Pharmacy\HeparinInfusions\heparin LOW INTENSITY nomogram for [facility] [id number]    Begin at (in units/kg/hr) 12        10/17/22 1955     IP VTE HIGH RISK PATIENT  Once         10/17/22 1938     Place sequential compression device  Until discontinued         10/17/22 1938                Discharge Planning   CAM:      Code Status: Full Code   Is the patient medically ready for discharge?:     Reason for patient still in hospital (select all that apply): Patient trending condition, Laboratory test and Treatment                     Kelsi Gordon NP  Department of Hospital Medicine   [facility] - Med Surg

## 2022-10-18 NOTE — H&P
[facility] Medicine  History & Physical    Patient Name: Kelsie Bustamante  MRN: [id number]  Patient Class: IP- Inpatient  Admission Date: 10/17/2022  Attending Physician: Souleymane Villa, *   Primary Care Provider: Cody Parks MD         Patient information was obtained from patient, past medical records and ER records.     Subjective:     Principal Problem:Symptomatic sinus bradycardia    Chief Complaint:   Chief Complaint   Patient presents with    Dizziness     Pt returning holter mon and when arrived became dizzy like room spinning        HPI: Ms. Bustamante is a 73 yo female with a PMHx of PAF on Eliquis, DM II, GERD, HTN, HLD, and hypothyroidism who presented to [facility] ED in [facility] with c/o generalized weakness, dizziness, near syncope, and possible symptomatic bradycardia. She had a Holter monitor which was completed today. She was returning the monitor to the cardiology clinic today when her symptoms occurred. Patient reports when she got out of the car and walk she felt dizzy, lightheaded, near syncopal, and that her legs were giving out in the space of only about 10 or 12 ft. She did gets somewhat diaphoretic but denies chest pain, dyspnea, palpitations, nausea, or vomiting. She has some degree of vertigo type symptoms with this as well. She has had these symptoms apparently on a somewhat ongoing basis at home as well, not recently evaluated. She sometimes does feel tachycardic palpitations as well. She has not discussed the possibility of a pacemaker as far she can recall but believes that she may need one. She feels better lying still and leaning back. Upon arrival to the ED, EKG revealed marked sinus bradycardia with 1st degree AV block, HR in the mid 30s to mid 40s. She remained hemodynamically stable. Labs resulted stable electrolytes and TSH, negative troponin. Patient is on Toprol-XL and flecainide, both taken this AM. Case discussed with Cardiology, who will see  patient in consult. Patient was transferred to [facility] and admitted under Hospital Medicine services.       Past Medical History:   Diagnosis Date    Achalasia     demetrius    Atrial fibrillation with RVR 2021    Cataract     Colon polyp     Gastroesophageal reflux     Hiatal hernia     Hx of colonic polyps 08/15/2014    per colonoscopy in      Hyperlipidemia 2022    Hypertension     Peripheral neuropathy     Postmenopausal HRT (hormone replacement therapy)     failed tapering off    Sepsis 2021    Last Assessment & Plan:  Formatting of this note might be different from the original. History & Physical Patient meets sepsis criteria with a white cell count of 15, and tachypnea.  Source of infection not clear at this time.  No evidence for meningitis.  Cover with broad-spectrum antibiotics especially given invasive dental procedure done recently.  Check blood cultures and monitor for fever.  R    Sinusitis     Thyroid nodule 3/16 subcm; kathleen 2 yrs    Type 2 diabetes mellitus with neurological manifestations     Vasodepressor syncope 2018       Past Surgical History:   Procedure Laterality Date    BREAST BIOPSY Left 2021    cataract Left      SECTION      CHOLECYSTECTOMY      CYST REMOVAL Right 2021    [facility]    ESOPHAGEAL MANOMETRY WITH MEASUREMENT OF IMPEDANCE N/A 10/6/2022    Procedure: MANOMETRY, ESOPHAGUS, WITH IMPEDANCE MEASUREMENT  Cardiac Clearance/Eliquis 2 day hold approval received per Dr. RABIA Aguilar, Cardiology on 22.  Note in encounters.  LB;  Surgeon: Bruna Fletcher MD;  Location: [facility];  Service: Endoscopy;  Laterality: N/A;    ESOPHAGOGASTRODUODENOSCOPY N/A 10/6/2022    Procedure: EGD (ESOPHAGOGASTRODUODENOSCOPY);  Surgeon: Bruna Fletcher MD;  Location: [facility];  Service: Endoscopy;  Laterality: N/A;    HYSTERECTOMY      nonca    OOPHORECTOMY      TILT TABLE TEST N/A 10/25/2018    Procedure:  TILT TABLE TEST;  Surgeon: Daryl Walker MD;  Location: [facility] CATH LAB;  Service: Cardiology;  Laterality: N/A;  VAS.DEP.SYN,HUT,FAS,3PREP    TONSILLECTOMY         Review of patient's allergies indicates:   Allergen Reactions    Floxin [ofloxacin]     Fluvastatin     Pravastatin Other (See Comments)     myalgias    Trazodone      Racing heart       No current facility-administered medications on file prior to encounter.     Current Outpatient Medications on File Prior to Encounter   Medication Sig    albuterol (PROVENTIL/VENTOLIN HFA) 90 mcg/actuation inhaler Inhale 2 puffs into the lungs every 6 (six) hours as needed for Wheezing.    apixaban (ELIQUIS) 5 mg Tab Take 1 tablet (5 mg total) by mouth 2 (two) times daily.    ascorbic acid, vitamin C, (VITAMIN C) 500 MG tablet Take 1 tablet (500 mg total) by mouth 2 (two) times daily.    blood sugar diagnostic (BLOOD GLUCOSE TEST) Strp Test 2 times daily    blood-glucose meter kit Test twice daily    cyanocobalamin 2000 MCG tablet Take 2,500 mcg by mouth.    DULoxetine (CYMBALTA) 20 MG capsule Take 1 capsule (20 mg total) by mouth once daily.    estrogens, conjugated, (PREMARIN) 0.625 MG tablet Take 1 tablet (0.625 mg total) by mouth once daily.    flecainide (TAMBOCOR) 150 MG Tab Take 1 tablet (150 mg total) by mouth every 12 (twelve) hours.    lancets Misc Test twice daily    metoprolol succinate (TOPROL-XL) 100 MG 24 hr tablet Take 1 tablet (100 mg total) by mouth 2 (two) times daily.    rosuvastatin (CRESTOR) 5 MG tablet Take 1 tablet (5 mg total) by mouth once daily.    SITagliptan-metformin (JANUMET) 50-1,000 mg per tablet Take 1 tablet by mouth 2 (two) times daily with meals.    zolpidem (AMBIEN) 10 mg Tab 1/2 po qhs prn insomnia    biotin 10,000 mcg Cap Take 1 capsule by mouth once daily.    chlorhexidine (PERIDEX) 0.12 % solution SWISH AND SPIT 15MLS BY MOUTH TWICE DAILY    DEEP SEA NASAL 0.65 % nasal spray     FLUZONE HIGHDOSE QUAD  20-21  mcg/0.7 mL Syrg ADM 0.7ML IM UTD    HYDROcodone-acetaminophen (NORCO) 5-325 mg per tablet Take 1 tablet by mouth every 4 (four) hours as needed for Pain.    ibuprofen (ADVIL,MOTRIN) 800 MG tablet Take 800 mg by mouth every 4 to 6 hours as needed.    levalbuterol (XOPENEX) 1.25 mg/3 mL nebulizer solution USE 1 VIAL IN NEBULIZER THREE TIMES DAILY AS NEEDED FOR WHEEZING FOR 5 DAYS    omeprazole (PRILOSEC) 40 MG capsule Take 1 capsule (40 mg total) by mouth every evening.    promethazine-dextromethorphan (PROMETHAZINE-DM) 6.25-15 mg/5 mL Syrp TAKE 5 ML BY MOUTH IN THE EVENING     Family History       Problem Relation (Age of Onset)    Aneurysm Sister    Coronary artery disease Father, Brother    Diabetes Father    Heart disease Mother    Parkinsonism Brother          Tobacco Use    Smoking status: Never    Smokeless tobacco: Never   Substance and Sexual Activity    Alcohol use: No    Drug use: No    Sexual activity: Never     Review of Systems   Constitutional:  Positive for diaphoresis. Negative for chills, fatigue and fever.   HENT:  Negative for congestion and sore throat.    Respiratory:  Negative for cough, shortness of breath and wheezing.    Cardiovascular:  Negative for chest pain, palpitations and leg swelling.   Gastrointestinal:  Negative for abdominal pain, constipation, diarrhea, nausea and vomiting.   Genitourinary:  Negative for dysuria and hematuria.   Musculoskeletal:  Negative for arthralgias, back pain and myalgias.   Skin:  Negative for pallor and rash.   Neurological:  Positive for dizziness, weakness (generalized) and light-headedness. Negative for syncope, numbness and headaches.   Psychiatric/Behavioral:  The patient is not nervous/anxious.    All other systems reviewed and are negative.  Objective:     Vital Signs (Most Recent):  Temp: 98.1 °F (36.7 °C) (10/17/22 1222)  Pulse: (!) 57 (10/17/22 1816)  Resp: 20 (10/17/22 1816)  BP: (!) 198/77 (10/17/22 1733)  SpO2: 95 %  (10/17/22 1816)   Vital Signs (24h Range):  Temp:  [98.1 °F (36.7 °C)] 98.1 °F (36.7 °C)  Pulse:  [39-57] 57  Resp:  [16-21] 20  SpO2:  [95 %-97 %] 95 %  BP: (119-198)/(55-90) 198/77     Weight: 72.9 kg (160 lb 11.5 oz)  Body mass index is 28.47 kg/m².    Physical Exam  Vitals and nursing note reviewed.   Constitutional:       General: She is awake. She is not in acute distress.     Appearance: Normal appearance. She is well-developed. She is not diaphoretic.   HENT:      Head: Normocephalic and atraumatic.   Eyes:      Conjunctiva/sclera: Conjunctivae normal.      Comments: PERRL; EOM intact.   Cardiovascular:      Rate and Rhythm: Regular rhythm. Bradycardia present. No extrasystoles are present.     Heart sounds: S1 normal and S2 normal. No murmur heard.  Pulmonary:      Effort: Pulmonary effort is normal. No tachypnea.      Breath sounds: Normal breath sounds and air entry. No wheezing, rhonchi or rales.   Abdominal:      General: Bowel sounds are normal. There is no distension.      Palpations: Abdomen is soft.      Tenderness: There is no abdominal tenderness.   Musculoskeletal:         General: No tenderness or deformity. Normal range of motion.      Cervical back: Normal range of motion and neck supple.      Right lower leg: No edema.      Left lower leg: No edema.   Skin:     General: Skin is warm and dry.      Capillary Refill: Capillary refill takes less than 2 seconds.      Findings: No erythema or rash.   Neurological:      General: No focal deficit present.      Mental Status: She is alert and oriented to person, place, and time.   Psychiatric:         Mood and Affect: Mood and affect normal.         Behavior: Behavior normal. Behavior is cooperative.           Significant Labs:  Results for orders placed or performed during the hospital encounter of 10/17/22   CBC auto differential   Result Value Ref Range    WBC 11.96 3.90 - 12.70 K/uL    RBC 4.46 4.00 - 5.40 M/uL    Hemoglobin 13.3 12.0 - 16.0 g/dL     Hematocrit 40.2 37.0 - 48.5 %    MCV 90 82 - 98 fL    MCH 29.8 27.0 - 31.0 pg    MCHC 33.1 32.0 - 36.0 g/dL    RDW 12.2 11.5 - 14.5 %    Platelets 394 150 - 450 K/uL    MPV 9.2 9.2 - 12.9 fL    Immature Granulocytes 0.5 0.0 - 0.5 %    Gran # (ANC) 8.6 (H) 1.8 - 7.7 K/uL    Immature Grans (Abs) 0.06 (H) 0.00 - 0.04 K/uL    Lymph # 2.2 1.0 - 4.8 K/uL    Mono # 0.8 0.3 - 1.0 K/uL    Eos # 0.4 0.0 - 0.5 K/uL    Baso # 0.07 0.00 - 0.20 K/uL    nRBC 0 0 /100 WBC    Gran % 71.5 38.0 - 73.0 %    Lymph % 18.0 18.0 - 48.0 %    Mono % 6.5 4.0 - 15.0 %    Eosinophil % 2.9 0.0 - 8.0 %    Basophil % 0.6 0.0 - 1.9 %    Differential Method Automated    Comprehensive metabolic panel   Result Value Ref Range    Sodium 138 136 - 145 mmol/L    Potassium 4.7 3.5 - 5.1 mmol/L    Chloride 100 95 - 110 mmol/L    CO2 25 23 - 29 mmol/L    Glucose 117 (H) 70 - 110 mg/dL    BUN 15 8 - 23 mg/dL    Creatinine 1.1 0.5 - 1.4 mg/dL    Calcium 9.5 8.7 - 10.5 mg/dL    Total Protein 7.5 6.0 - 8.4 g/dL    Albumin 3.7 3.5 - 5.2 g/dL    Total Bilirubin 0.5 0.1 - 1.0 mg/dL    Alkaline Phosphatase 74 55 - 135 U/L    AST 27 10 - 40 U/L    ALT 22 10 - 44 U/L    Anion Gap 13 8 - 16 mmol/L    eGFR 53.7 (A) >60 mL/min/1.73 m^2   Troponin I   Result Value Ref Range    Troponin I <0.006 0.000 - 0.026 ng/mL   TSH   Result Value Ref Range    TSH 1.492 0.400 - 4.000 uIU/mL   B-Type natriuretic peptide (BNP)   Result Value Ref Range    BNP 78 0 - 99 pg/mL   POCT COVID-19 Rapid Screening   Result Value Ref Range    POC Rapid COVID Negative Negative     Acceptable Yes       All pertinent labs within the past 24 hours have been reviewed.    Significant Imaging:   Imaging Results              X-Ray Chest AP Portable (Final result)  Result time 10/17/22 13:56:47      Final result by Cody Mena MD (10/17/22 13:56:47)                   Impression:      1.  Negative for acute process involving the chest.    2.  Stable findings as noted  above.      Electronically signed by: Cody Mena MD  Date:    10/17/2022  Time:    13:56               Narrative:    EXAMINATION:  XR CHEST AP PORTABLE    CLINICAL HISTORY:  Chest Pain;    COMPARISON:  Studies dating back to May 24, 2019    FINDINGS:  Life-saving devices overlie the chest.  The lungs are clear. The cardiac silhouette size is normal. The trachea is midline and the mediastinal width is normal. Negative for focal infiltrate, effusion or pneumothorax. Pulmonary vasculature is normal. Negative for osseous abnormalities. Stable mildly tortuous aorta with aortic arch calcifications and degenerative changes of the spine and both shoulder girdles.  Left cardiophrenic fat pad again seen.  Stable eventration of the hemidiaphragms.                                       CT Head Without Contrast (Final result)  Result time 10/17/22 14:05:54      Final result by Blas Qureshi MD (10/17/22 14:05:54)                   Impression:      No CT evidence of acute intracranial abnormality.    All CT scans at this facility are performed  using dose modulation techniques as appropriate to performed exam including the following:  automated exposure control; adjustment of mA and/or kV according to the patients size (this includes techniques or standardized protocols for targeted exams where dose is matched to indication/reason for exam: i.e. extremities or head);  iterative reconstruction technique.      Electronically signed by: Blas Qureshi MD  Date:    10/17/2022  Time:    14:05               Narrative:    EXAMINATION:  CT HEAD WITHOUT CONTRAST    CLINICAL HISTORY:  Head trauma, minor (Age >= 65y);    TECHNIQUE:  Axial CT imaging was performed through the head without intravenous contrast.    COMPARISON:  06/25/2022    FINDINGS:  No hydrocephalus, midline shift, mass effect, or acute intracranial hemorrhage. Bifrontal atrophy.  Basilar cisterns and posterior fossa are normal. The visualized paranasal sinuses and mastoid  air cells are clear. The skull is intact.                                     I have reviewed all pertinent imaging results/findings within the past 24 hours.      Results for orders placed or performed during the hospital encounter of 10/17/22   EKG 12-lead    Collection Time: 10/17/22 12:30 PM    Narrative    Test Reason : DIZZINESS    Vent. Rate : 040 BPM     Atrial Rate : 040 BPM     P-R Int : 256 ms          QRS Dur : 128 ms      QT Int : 516 ms       P-R-T Axes : 000 082 080 degrees     QTc Int : 420 ms    Marked sinus bradycardia with sinus arrhythmia with 1st degree A-V block  Nonspecific intraventricular block  Abnormal ECG  When compared with ECG of 20-SEP-2017 08:14,  Sinus rhythm has replaced Atrial fibrillation  Vent. rate has decreased BY  29 BPM  Questionable change in QRS duration    Referred By: AAAREFCHRISTIAN   SELF           Confirmed By:                  Assessment/Plan:     * Symptomatic sinus bradycardia  - Asymptomatic and hemodynamically stable on admission.  - Hold home BB and flecainide.  - Monitor telemetry.  - Cardiology consult.       PAF (paroxysmal atrial fibrillation)  - BB and flecainide on hold as above. Monitor telemetry.  - Will hold Eliquis in case PPM is warranted. Start heparin drip for AC.      Primary hypertension  - Will hold home antihypertensives to prevent hypotension with bradycardia.         VTE Risk Mitigation (From admission, onward)         Ordered     heparin 25,000 units in dextrose 5% (100 units/ml) IV bolus from bag - ADDITIONAL PRN BOLUS - 60 units/kg  As needed (PRN)        Question:  Heparin Infusion Adjustment (DO NOT MODIFY ANSWER)  Answer:  [email or web address]\epic\Images\Pharmacy\HeparinInfusions\heparin LOW INTENSITY nomogram for [facility] [id number]    10/17/22 1955     heparin 25,000 units in dextrose 5% (100 units/ml) IV bolus from bag - ADDITIONAL PRN BOLUS - 30 units/kg  As needed (PRN)        Question:  Heparin Infusion Adjustment (DO NOT MODIFY ANSWER)  Answer:   \\[email or web address]\epic\Images\Pharmacy\HeparinInfusions\heparin LOW INTENSITY nomogram for [facility] [id number]    10/17/22 1955     heparin 25,000 units in dextrose 5% 250 mL (100 units/mL) infusion LOW INTENSITY nomogram - [facility]  Continuous        Question Answer Comment   Heparin Infusion Adjustment (DO NOT MODIFY ANSWER) \\[email or web address]\epic\Images\Pharmacy\HeparinInfusions\heparin LOW INTENSITY nomogram for [facility] [id number]    Begin at (in units/kg/hr) 12        10/17/22 1955     IP VTE HIGH RISK PATIENT  Once         10/17/22 1938     Place sequential compression device  Until discontinued         10/17/22 1938                   Yancy Cummins NP  Department of Hospital Medicine   [facility] - Med Surg

## 2022-10-18 NOTE — NURSING
Pt does not want to be stuck again. She does not want another IV. I called pharmacy to verify if normal saline and Heparin can run together and was told yes. Informed charge nurse Amada.

## 2022-10-18 NOTE — SUBJECTIVE & OBJECTIVE
Review of Systems   Constitutional:  Positive for diaphoresis. Negative for chills, fatigue and fever.   HENT:  Negative for congestion and sore throat.    Respiratory:  Negative for cough, shortness of breath and wheezing.    Cardiovascular:  Negative for chest pain, palpitations and leg swelling.   Gastrointestinal:  Negative for abdominal pain, constipation, diarrhea, nausea and vomiting.   Genitourinary:  Negative for dysuria and hematuria.   Musculoskeletal:  Negative for arthralgias, back pain and myalgias.   Skin:  Negative for pallor and rash.   Neurological:  Positive for dizziness, weakness (generalized) and light-headedness. Negative for syncope, numbness and headaches.   Psychiatric/Behavioral:  The patient is not nervous/anxious.    All other systems reviewed and are negative.  Objective:     Vital Signs (Most Recent):  Temp: 98.2 °F (36.8 °C) (10/18/22 0801)  Pulse: (!) 55 (10/18/22 1508)  Resp: 18 (10/18/22 0801)  BP: (!) 173/70 (10/18/22 1508)  SpO2: 96 % (10/18/22 0801)   Vital Signs (24h Range):  Temp:  [97.5 °F (36.4 °C)-98.2 °F (36.8 °C)] 98.2 °F (36.8 °C)  Pulse:  [47-57] 55  Resp:  [17-20] 18  SpO2:  [91 %-97 %] 96 %  BP: (173-208)/(70-90) 173/70     Weight: 82.6 kg (182 lb)  Body mass index is 32.24 kg/m².    Intake/Output Summary (Last 24 hours) at 10/18/2022 1508  Last data filed at 10/18/2022 1442  Gross per 24 hour   Intake 869.36 ml   Output --   Net 869.36 ml      Physical Exam  Vitals and nursing note reviewed.   Constitutional:       General: She is awake. She is not in acute distress.     Appearance: Normal appearance. She is well-developed. She is not diaphoretic.   HENT:      Head: Normocephalic and atraumatic.   Eyes:      Conjunctiva/sclera: Conjunctivae normal.      Comments: PERRL; EOM intact.   Cardiovascular:      Rate and Rhythm: Regular rhythm. Bradycardia present. No extrasystoles are present.     Heart sounds: S1 normal and S2 normal. No murmur heard.  Pulmonary:       Effort: Pulmonary effort is normal. No tachypnea.      Breath sounds: Normal breath sounds and air entry. No wheezing, rhonchi or rales.   Abdominal:      General: Bowel sounds are normal. There is no distension.      Palpations: Abdomen is soft.      Tenderness: There is no abdominal tenderness.   Musculoskeletal:         General: No tenderness or deformity. Normal range of motion.      Cervical back: Normal range of motion and neck supple.      Right lower leg: No edema.      Left lower leg: No edema.   Skin:     General: Skin is warm and dry.      Capillary Refill: Capillary refill takes less than 2 seconds.      Findings: No erythema or rash.   Neurological:      General: No focal deficit present.      Mental Status: She is alert and oriented to person, place, and time.   Psychiatric:         Mood and Affect: Mood and affect normal.         Behavior: Behavior normal. Behavior is cooperative.       Significant Labs: All pertinent labs within the past 24 hours have been reviewed.  BMP:   Recent Labs   Lab 10/18/22  0427   *      K 4.2      CO2 27   BUN 12   CREATININE 0.9   CALCIUM 9.2   MG 1.7     CBC:   Recent Labs   Lab 10/17/22  1308 10/18/22  0427   WBC 11.96 15.21*   HGB 13.3 13.2   HCT 40.2 38.6    335     CMP:   Recent Labs   Lab 10/17/22  1308 10/18/22  0427    141   K 4.7 4.2    106   CO2 25 27   * 114*   BUN 15 12   CREATININE 1.1 0.9   CALCIUM 9.5 9.2   PROT 7.5  --    ALBUMIN 3.7  --    BILITOT 0.5  --    ALKPHOS 74  --    AST 27  --    ALT 22  --    ANIONGAP 13 8       Significant Imaging: I have reviewed all pertinent imaging results/findings within the past 24 hours.

## 2022-10-18 NOTE — ASSESSMENT & PLAN NOTE
Cont hold Toprol and flecanide  Recommend changing Flecanide dose back to 100mg pending level and HR trend

## 2022-10-19 VITALS
DIASTOLIC BLOOD PRESSURE: 74 MMHG | TEMPERATURE: 99 F | RESPIRATION RATE: 18 BRPM | HEIGHT: 63 IN | HEART RATE: 72 BPM | BODY MASS INDEX: 31.92 KG/M2 | SYSTOLIC BLOOD PRESSURE: 189 MMHG | OXYGEN SATURATION: 97 % | WEIGHT: 180.13 LBS

## 2022-10-19 LAB
BASOPHILS # BLD AUTO: 0.04 K/UL (ref 0–0.2)
BASOPHILS NFR BLD: 0.4 % (ref 0–1.9)
DIFFERENTIAL METHOD: NORMAL
EOSINOPHIL # BLD AUTO: 0.3 K/UL (ref 0–0.5)
EOSINOPHIL NFR BLD: 3 % (ref 0–8)
ERYTHROCYTE [DISTWIDTH] IN BLOOD BY AUTOMATED COUNT: 12.3 % (ref 11.5–14.5)
HCT VFR BLD AUTO: 37.5 % (ref 37–48.5)
HGB BLD-MCNC: 12.6 G/DL (ref 12–16)
IMM GRANULOCYTES # BLD AUTO: 0.04 K/UL (ref 0–0.04)
IMM GRANULOCYTES NFR BLD AUTO: 0.4 % (ref 0–0.5)
LYMPHOCYTES # BLD AUTO: 2.2 K/UL (ref 1–4.8)
LYMPHOCYTES NFR BLD: 24.7 % (ref 18–48)
MCH RBC QN AUTO: 29.8 PG (ref 27–31)
MCHC RBC AUTO-ENTMCNC: 33.6 G/DL (ref 32–36)
MCV RBC AUTO: 89 FL (ref 82–98)
MONOCYTES # BLD AUTO: 0.7 K/UL (ref 0.3–1)
MONOCYTES NFR BLD: 8 % (ref 4–15)
NEUTROPHILS # BLD AUTO: 5.6 K/UL (ref 1.8–7.7)
NEUTROPHILS NFR BLD: 63.5 % (ref 38–73)
NRBC BLD-RTO: 0 /100 WBC
PLATELET # BLD AUTO: 325 K/UL (ref 150–450)
PMV BLD AUTO: 9.2 FL (ref 9.2–12.9)
POCT GLUCOSE: 109 MG/DL (ref 70–110)
POCT GLUCOSE: 119 MG/DL (ref 70–110)
RBC # BLD AUTO: 4.23 M/UL (ref 4–5.4)
WBC # BLD AUTO: 8.9 K/UL (ref 3.9–12.7)

## 2022-10-19 PROCEDURE — 99232 PR SUBSEQUENT HOSPITAL CARE,LEVL II: ICD-10-PCS | Mod: ,,, | Performed by: PHYSICIAN ASSISTANT

## 2022-10-19 PROCEDURE — 93005 ELECTROCARDIOGRAM TRACING: CPT | Mod: ER

## 2022-10-19 PROCEDURE — 93010 ELECTROCARDIOGRAM REPORT: CPT | Mod: ,,, | Performed by: INTERNAL MEDICINE

## 2022-10-19 PROCEDURE — 25000003 PHARM REV CODE 250: Performed by: NURSE PRACTITIONER

## 2022-10-19 PROCEDURE — 93010 EKG 12-LEAD: ICD-10-PCS | Mod: ,,, | Performed by: INTERNAL MEDICINE

## 2022-10-19 PROCEDURE — 99232 SBSQ HOSP IP/OBS MODERATE 35: CPT | Mod: ,,, | Performed by: PHYSICIAN ASSISTANT

## 2022-10-19 PROCEDURE — 25000003 PHARM REV CODE 250: Performed by: PHYSICIAN ASSISTANT

## 2022-10-19 PROCEDURE — 36415 COLL VENOUS BLD VENIPUNCTURE: CPT | Performed by: NURSE PRACTITIONER

## 2022-10-19 PROCEDURE — 85025 COMPLETE CBC W/AUTO DIFF WBC: CPT | Performed by: NURSE PRACTITIONER

## 2022-10-19 RX ORDER — METOPROLOL SUCCINATE 50 MG/1
100 TABLET, EXTENDED RELEASE ORAL DAILY
Status: DISCONTINUED | OUTPATIENT
Start: 2022-10-19 | End: 2022-10-19 | Stop reason: HOSPADM

## 2022-10-19 RX ORDER — METOPROLOL SUCCINATE 100 MG/1
100 TABLET, EXTENDED RELEASE ORAL DAILY
Qty: 180 TABLET | Refills: 3
Start: 2022-10-19 | End: 2022-12-21 | Stop reason: SDUPTHER

## 2022-10-19 RX ORDER — LISINOPRIL 20 MG/1
20 TABLET ORAL DAILY
Qty: 90 TABLET | Refills: 0 | Status: SHIPPED | OUTPATIENT
Start: 2022-10-19 | End: 2022-11-11

## 2022-10-19 RX ORDER — FLECAINIDE ACETATE 100 MG/1
100 TABLET ORAL EVERY 12 HOURS
Qty: 60 TABLET | Refills: 0 | Status: SHIPPED | OUTPATIENT
Start: 2022-10-19 | End: 2024-02-26

## 2022-10-19 RX ADMIN — APIXABAN 5 MG: 2.5 TABLET, FILM COATED ORAL at 08:10

## 2022-10-19 RX ADMIN — DULOXETINE HYDROCHLORIDE 20 MG: 20 CAPSULE, DELAYED RELEASE ORAL at 08:10

## 2022-10-19 RX ADMIN — LISINOPRIL 10 MG: 10 TABLET ORAL at 08:10

## 2022-10-19 RX ADMIN — METOPROLOL SUCCINATE 100 MG: 50 TABLET, EXTENDED RELEASE ORAL at 02:10

## 2022-10-19 RX ADMIN — ATORVASTATIN CALCIUM 20 MG: 10 TABLET, FILM COATED ORAL at 08:10

## 2022-10-19 RX ADMIN — ACETAMINOPHEN 650 MG: 325 TABLET ORAL at 08:10

## 2022-10-19 NOTE — NURSING
"Informed Robert Diallo NP that pt refusing Heparin drip and that she also pulled out IV. Pt no longer has an IV. Cards was also informed. Pt was educated by myself and the NP about the importance of the medication and the pt stated "I do not want any of it"  "

## 2022-10-19 NOTE — HOSPITAL COURSE
10/19/22-Patient seen and examined today, sitting up in bedside chair. Feels well. No AEON. BP fluctuating, better controlled this AM. SR with HR in 60's-70's this AM. Holter monitor reviewed, 9.9% afib burden , frequent PVC's, PAC's, 2 brief runs of VT. BB and Flecainide dosage adjusted.

## 2022-10-19 NOTE — PLAN OF CARE
[facility]  Initial Discharge Assessment       Primary Care Provider: Cody Parks MD    Admission Diagnosis: Dizziness [R42]  Vertigo [R42]  Chronic anticoagulation [Z79.01]  History of atrial fibrillation [Z86.79]  Symptomatic bradycardia [R00.1]  Closed head injury, initial encounter [S09.90XA]  Fall from standing, initial encounter [W19.XXXA]  Acute nonintractable headache, unspecified headache type [R51.9]    Admission Date: 10/17/2022  Expected Discharge Date:     Discharge Barriers Identified: None    Payor: HU[employer] MANAGED MEDICARE / Plan: HU[employer]NA MEDICARE PPO / Product Type: Medicare Advantage /     Extended Emergency Contact Information  Primary Emergency Contact: Yimi Bustamante  Address: [address] [place] [address] [place] [address] [place]  Mobile Phone: [phone]  Relation: Spouse    Discharge Plan A: Home with family  Discharge Plan B: Home Health      [facility]  [address] [facility] [place] [address]  Phone: [phone] Fax: [phone]    [facility] - [place] BOOKER - [address]  [address]  [place] [id number]  Phone: [phone] Fax: [phone]      Initial Assessment (most recent)       Adult Discharge Assessment - 10/19/22 0851          Discharge Assessment    Assessment Type Discharge Planning Assessment     Confirmed/corrected address, phone number and insurance Yes     Confirmed Demographics Correct on Facesheet     Source of Information patient     Communicated CAM with patient/caregiver Date not available/Unable to determine     Lives With spouse     Do you expect to return to your current living situation? Yes     Do you have help at home or someone to help you manage your care at home? Yes     Who are your caregiver(s) and their phone number(s)? Yimi Iqra () [phone]     Prior to hospitilization cognitive status: Alert/Oriented     Current cognitive status:  Alert/Oriented     Walking or Climbing Stairs Difficulty none     Dressing/Bathing Difficulty none     Home Layout Able to live on 1st floor     Equipment Currently Used at Home rollator     Readmission within 30 days? No     Patient currently being followed by outpatient case management? No     Do you currently have service(s) that help you manage your care at home? No     Do you take prescription medications? Yes     Do you have prescription coverage? Yes     Do you have any problems affording any of your prescribed medications? No     Is the patient taking medications as prescribed? yes     Who is going to help you get home at discharge? Yimi Escalona () [phone]     How do you get to doctors appointments? car, drives self     Are you on dialysis? No     Do you take coumadin? No     Discharge Plan A Home with family     Discharge Plan B Home Health     DME Needed Upon Discharge  none     Discharge Plan discussed with: Patient     Discharge Barriers Identified None        Stress    Do you feel stress - tense, restless, nervous, or anxious, or unable to sleep at night because your mind is troubled all the time - these days? To some extent        Alcohol Use    Q1: How often do you have a drink containing alcohol? Never     Q2: How many drinks containing alcohol do you have on a typical day when you are drinking? Patient does not drink     Q3: How often do you have six or more drinks on one occasion? Never                      Met with pt at [facility] for DC assessment. Pt lives at home with her  and makes occasional use of a rollator. No CM DC needs identified at this time. SWer provided a transitional care folder, information on advanced directives, information on pharmacy bedside delivery, and discharge planning begins on admission with contact information for any needs/questions.    Wily Alvarez LMSW 10/19/2022 8:58 AM

## 2022-10-19 NOTE — PLAN OF CARE
Bed locked, in lowest position. Call bell in reach. Purposeful rounding done every two hours. Blood glucose monitoring. Cardiac monitoring in use. On Heparin drip. Will continue with plan of care.

## 2022-10-19 NOTE — ASSESSMENT & PLAN NOTE
Cont hold Toprol and flecanide  Recommend changing Flecanide dose back to 100mg pending level and HR trend    10/19/22  -Resolved  -Meds adjusted

## 2022-10-19 NOTE — SUBJECTIVE & OBJECTIVE
Review of Systems   Constitutional: Negative.   HENT: Negative.     Eyes: Negative.    Cardiovascular: Negative.    Respiratory: Negative.     Endocrine: Negative.    Hematologic/Lymphatic: Negative.    Skin: Negative.    Musculoskeletal: Negative.    Gastrointestinal: Negative.    Genitourinary: Negative.    Neurological: Negative.    Psychiatric/Behavioral: Negative.     Allergic/Immunologic: Negative.    Objective:     Vital Signs (Most Recent):  Temp: 98.5 °F (36.9 °C) (10/19/22 1109)  Pulse: 72 (10/19/22 1109)  Resp: 18 (10/19/22 1109)  BP: (!) 189/74 (10/19/22 1109)  SpO2: 97 % (10/19/22 1109)   Vital Signs (24h Range):  Temp:  [97.5 °F (36.4 °C)-98.5 °F (36.9 °C)] 98.5 °F (36.9 °C)  Pulse:  [50-72] 72  Resp:  [16-20] 18  SpO2:  [93 %-97 %] 97 %  BP: (129-189)/(59-74) 189/74     Weight: 81.7 kg (180 lb 1.9 oz)  Body mass index is 31.91 kg/m².     SpO2: 97 %  O2 Device (Oxygen Therapy): room air      Intake/Output Summary (Last 24 hours) at 10/19/2022 1457  Last data filed at 10/18/2022 1907  Gross per 24 hour   Intake 54.37 ml   Output --   Net 54.37 ml       Lines/Drains/Airways       None                   Physical Exam  Vitals and nursing note reviewed.   Constitutional:       General: She is not in acute distress.     Appearance: Normal appearance. She is well-developed. She is not diaphoretic.   HENT:      Head: Normocephalic and atraumatic.   Eyes:      General:         Right eye: No discharge.         Left eye: No discharge.      Pupils: Pupils are equal, round, and reactive to light.   Neck:      Thyroid: No thyromegaly.      Vascular: No JVD.      Trachea: No tracheal deviation.   Cardiovascular:      Rate and Rhythm: Normal rate and regular rhythm.      Heart sounds: Normal heart sounds, S1 normal and S2 normal. No murmur heard.  Pulmonary:      Effort: Pulmonary effort is normal. No respiratory distress.      Breath sounds: Normal breath sounds. No wheezing or rales.   Abdominal:      General:  There is no distension.      Palpations: Abdomen is soft.      Tenderness: There is no rebound.   Musculoskeletal:      Cervical back: Neck supple.      Right lower leg: No edema.      Left lower leg: No edema.   Skin:     General: Skin is warm and dry.      Findings: No erythema.   Neurological:      General: No focal deficit present.      Mental Status: She is alert and oriented to person, place, and time.   Psychiatric:         Mood and Affect: Mood normal.         Behavior: Behavior normal.         Thought Content: Thought content normal.       Significant Labs: CMP   Recent Labs   Lab 10/18/22  0427      K 4.2      CO2 27   *   BUN 12   CREATININE 0.9   CALCIUM 9.2   ANIONGAP 8   , CBC   Recent Labs   Lab 10/18/22  0427 10/19/22  0525   WBC 15.21* 8.90   HGB 13.2 12.6   HCT 38.6 37.5    325   , INR   Recent Labs   Lab 10/17/22  2023   INR 1.0   , Troponin No results for input(s): TROPONINI in the last 48 hours., and All pertinent lab results from the last 24 hours have been reviewed.    Significant Imaging: Echocardiogram: Transthoracic echo (TTE) complete (Cupid Only):   Results for orders placed or performed during the hospital encounter of 10/17/22   Echo   Result Value Ref Range    BSA 1.92 m2    TDI SEPTAL 0.08 m/s    LV LATERAL E/E' RATIO 6.73 m/s    LV SEPTAL E/E' RATIO 9.25 m/s    LA WIDTH 4.10 cm    IVC diameter 1.47 cm    RV mid diameter 2.90 cm    Left Ventricular Outflow Tract Mean Velocity 0.60 cm/s    Left Ventricular Outflow Tract Mean Gradient 1.59 mmHg    Pulmonary Valve Mean Velocity 0.51 m/s    TDI LATERAL 0.11 m/s    PV PEAK VELOCITY 0.72 cm/s    LVIDd 5.01 3.5 - 6.0 cm    IVS 0.64 0.6 - 1.1 cm    Posterior Wall 0.68 0.6 - 1.1 cm    Ao root annulus 2.57 cm    LVIDs 2.78 2.1 - 4.0 cm    FS 45 28 - 44 %    LA volume 69.04 cm3    Sinus 2.80 cm    STJ 2.38 cm    Ascending aorta 2.78 cm    LV mass 107.00 g    LA size 3.84 cm    TAPSE 2.30 cm    Right ventricular length  in diastole (apical 4-chamber view) 6.82 cm    RA area 17.5 cm2    Left Ventricle Relative Wall Thickness 0.27 cm    AV mean gradient 3 mmHg    AV valve area 2.24 cm2    AV Velocity Ratio 0.63     AV index (prosthetic) 0.79     MV valve area p 1/2 method 2.68 cm2    E/A ratio 1.04     Mean e' 0.10 m/s    E wave deceleration time 283.44 msec    IVRT 131.30 msec    Pulm vein S/D ratio 0.73     LVOT diameter 1.90 cm    LVOT area 2.8 cm2    LVOT peak jagjit 0.85 m/s    LVOT peak VTI 21.70 cm    Ao peak jagjit 1.36 m/s    Ao VTI 27.5 cm    LVOT stroke volume 61.49 cm3    AV peak gradient 7 mmHg    E/E' ratio 7.79 m/s    MV Peak E Jagjit 0.74 m/s    TR Max Jagjit 1.05 m/s    MV stenosis pressure 1/2 time 82.20 ms    MV Peak A Jagjit 0.71 m/s    PV Peak S Jagjit 0.41 m/s    PV Peak D Jagjit 0.56 m/s    LV Systolic Volume 28.92 mL    LV Systolic Volume Index 15.5 mL/m2    LV Diastolic Volume 118.64 mL    LV Diastolic Volume Index 63.78 mL/m2    LA Volume Index 37.1 mL/m2    LV Mass Index 58 g/m2    Right Atrium Volume Systolic 50.70 mL    RA Major Axis 5.18 cm    Left Atrium Minor Axis 5.09 cm    Left Atrium Major Axis 5.23 cm    Triscuspid Valve Regurgitation Peak Gradient 4 mmHg    RA Width 3.90 cm    RV S' 16 cm/s    Right Atrial Pressure (from IVC) 3 mmHg    EF 60 %    TV rest pulmonary artery pressure 7 mmHg    Narrative    · Mild left atrial enlargement.  · The left ventricle is normal in size with normal systolic function.  · The estimated ejection fraction is 60%.  · Normal left ventricular diastolic function.  · Normal right ventricular size with normal right ventricular systolic   function.  · Normal central venous pressure (3 mmHg).  · The estimated PA systolic pressure is 7 mmHg.      , EKG: Reviewed, and X-Ray: CXR: X-Ray Chest 1 View (CXR): No results found for this visit on 10/17/22. and X-Ray Chest PA and Lateral (CXR): No results found for this visit on 10/17/22.

## 2022-10-19 NOTE — ASSESSMENT & PLAN NOTE
Cont to hold home Toprol and flecanide  Flecanide level pending, patient last took it yesterday morning  Cont hep gtt    10/19/22  -Stable overnight  -HR in 60's-70's this AM  -Resume Toprol XL at 100 mg daily (NOT BID)  -Decrease Flecainide to 100 mg BID  -Continue Eliqiuis for CVA prophylaxis  -OP EP referral

## 2022-10-19 NOTE — PROGRESS NOTES
[facility]  Cardiology  Progress Note    Patient Name: Kelsie Bustamante  MRN: [id number]  Admission Date: 10/17/2022  Hospital Length of Stay: 2 days  Code Status: Full Code   Attending Physician: No att. providers found   Primary Care Physician: Cody Parks MD  Expected Discharge Date: 10/19/2022  Principal Problem:Symptomatic sinus bradycardia    Subjective:   HPI:  Ms. Bustamante is a 73 yo female with a PMHx of PAF on Eliquis, DM II, GERD, HTN, HLD, and hypothyroidism who presented to [facility] ED in [place] with c/o generalized weakness, dizziness, near syncope, and possible symptomatic bradycardia. She had a Holter monitor which was completed today. She was returning the monitor to the cardiology clinic today when her symptoms occurred. Patient reports when she got out of the car and walk she felt dizzy, lightheaded, near syncopal, and that her legs were giving out in the space of only about 10 or 12 ft. She did gets somewhat diaphoretic but denies chest pain, dyspnea, palpitations, nausea, or vomiting. She has some degree of vertigo type symptoms with this as well. She has had these symptoms apparently on a somewhat ongoing basis at home as well, not recently evaluated. She sometimes does feel tachycardic palpitations as well. She has not discussed the possibility of a pacemaker as far she can recall but believes that she may need one. She feels better lying still and leaning back. Upon arrival to the ED, EKG revealed marked sinus bradycardia with 1st degree AV block, HR in the mid 30s to mid 40s. She remained hemodynamically stable. Labs resulted stable electrolytes and TSH, negative troponin. Patient is on Toprol-XL and flecainide, both taken this AM. Case discussed with Cardiology, who will see patient in consult. Patient was transferred to [facility] and admitted under Hospital Medicine services.      Cardiology consulted for symptomatic bradycardia. Pt seen and examined today resting comfortably in  bed. No CV complaints at this time. Pt states she has similar syncopal episodes daily but feels they are worse since her Flecanide dose was increased. She reports her HR at home is usually 45-50s. Pt sees Dr. Aguilar and take Toprol XL and Flecanide for Afib. Previous 2022 holter showed PACs and PVCs. Dr. Aguilar increased Flecanide dose to 150mg. Labs reviewed WBC 15.21, EKG reviewed sinus kam w/ AV block, 2021 Echo normal EF    Hospital Course:   10/19/22-Patient seen and examined today, sitting up in bedside chair. Feels well. No AEON. BP fluctuating, better controlled this AM. SR with HR in 60's-70's this AM. Holter monitor reviewed, 9.9% afib burden , frequent PVC's, PAC's, 2 brief runs of VT. BB and Flecainide dosage adjusted.         Review of Systems   Constitutional: Negative.   HENT: Negative.     Eyes: Negative.    Cardiovascular: Negative.    Respiratory: Negative.     Endocrine: Negative.    Hematologic/Lymphatic: Negative.    Skin: Negative.    Musculoskeletal: Negative.    Gastrointestinal: Negative.    Genitourinary: Negative.    Neurological: Negative.    Psychiatric/Behavioral: Negative.     Allergic/Immunologic: Negative.    Objective:     Vital Signs (Most Recent):  Temp: 98.5 °F (36.9 °C) (10/19/22 1109)  Pulse: 72 (10/19/22 1109)  Resp: 18 (10/19/22 1109)  BP: (!) 189/74 (10/19/22 1109)  SpO2: 97 % (10/19/22 1109)   Vital Signs (24h Range):  Temp:  [97.5 °F (36.4 °C)-98.5 °F (36.9 °C)] 98.5 °F (36.9 °C)  Pulse:  [50-72] 72  Resp:  [16-20] 18  SpO2:  [93 %-97 %] 97 %  BP: (129-189)/(59-74) 189/74     Weight: 81.7 kg (180 lb 1.9 oz)  Body mass index is 31.91 kg/m².     SpO2: 97 %  O2 Device (Oxygen Therapy): room air      Intake/Output Summary (Last 24 hours) at 10/19/2022 7747  Last data filed at 10/18/2022 1907  Gross per 24 hour   Intake 54.37 ml   Output --   Net 54.37 ml       Lines/Drains/Airways       None                   Physical Exam  Vitals and nursing note reviewed.   Constitutional:        General: She is not in acute distress.     Appearance: Normal appearance. She is well-developed. She is not diaphoretic.   HENT:      Head: Normocephalic and atraumatic.   Eyes:      General:         Right eye: No discharge.         Left eye: No discharge.      Pupils: Pupils are equal, round, and reactive to light.   Neck:      Thyroid: No thyromegaly.      Vascular: No JVD.      Trachea: No tracheal deviation.   Cardiovascular:      Rate and Rhythm: Normal rate and regular rhythm.      Heart sounds: Normal heart sounds, S1 normal and S2 normal. No murmur heard.  Pulmonary:      Effort: Pulmonary effort is normal. No respiratory distress.      Breath sounds: Normal breath sounds. No wheezing or rales.   Abdominal:      General: There is no distension.      Palpations: Abdomen is soft.      Tenderness: There is no rebound.   Musculoskeletal:      Cervical back: Neck supple.      Right lower leg: No edema.      Left lower leg: No edema.   Skin:     General: Skin is warm and dry.      Findings: No erythema.   Neurological:      General: No focal deficit present.      Mental Status: She is alert and oriented to person, place, and time.   Psychiatric:         Mood and Affect: Mood normal.         Behavior: Behavior normal.         Thought Content: Thought content normal.       Significant Labs: CMP   Recent Labs   Lab 10/18/22  0427      K 4.2      CO2 27   *   BUN 12   CREATININE 0.9   CALCIUM 9.2   ANIONGAP 8   , CBC   Recent Labs   Lab 10/18/22  0427 10/19/22  0525   WBC 15.21* 8.90   HGB 13.2 12.6   HCT 38.6 37.5    325   , INR   Recent Labs   Lab 10/17/22  2023   INR 1.0   , Troponin No results for input(s): TROPONINI in the last 48 hours., and All pertinent lab results from the last 24 hours have been reviewed.    Significant Imaging: Echocardiogram: Transthoracic echo (TTE) complete (Cupid Only):   Results for orders placed or performed during the hospital encounter of 10/17/22    Echo   Result Value Ref Range    BSA 1.92 m2    TDI SEPTAL 0.08 m/s    LV LATERAL E/E' RATIO 6.73 m/s    LV SEPTAL E/E' RATIO 9.25 m/s    LA WIDTH 4.10 cm    IVC diameter 1.47 cm    RV mid diameter 2.90 cm    Left Ventricular Outflow Tract Mean Velocity 0.60 cm/s    Left Ventricular Outflow Tract Mean Gradient 1.59 mmHg    Pulmonary Valve Mean Velocity 0.51 m/s    TDI LATERAL 0.11 m/s    PV PEAK VELOCITY 0.72 cm/s    LVIDd 5.01 3.5 - 6.0 cm    IVS 0.64 0.6 - 1.1 cm    Posterior Wall 0.68 0.6 - 1.1 cm    Ao root annulus 2.57 cm    LVIDs 2.78 2.1 - 4.0 cm    FS 45 28 - 44 %    LA volume 69.04 cm3    Sinus 2.80 cm    STJ 2.38 cm    Ascending aorta 2.78 cm    LV mass 107.00 g    LA size 3.84 cm    TAPSE 2.30 cm    Right ventricular length in diastole (apical 4-chamber view) 6.82 cm    RA area 17.5 cm2    Left Ventricle Relative Wall Thickness 0.27 cm    AV mean gradient 3 mmHg    AV valve area 2.24 cm2    AV Velocity Ratio 0.63     AV index (prosthetic) 0.79     MV valve area p 1/2 method 2.68 cm2    E/A ratio 1.04     Mean e' 0.10 m/s    E wave deceleration time 283.44 msec    IVRT 131.30 msec    Pulm vein S/D ratio 0.73     LVOT diameter 1.90 cm    LVOT area 2.8 cm2    LVOT peak jagjit 0.85 m/s    LVOT peak VTI 21.70 cm    Ao peak jagjit 1.36 m/s    Ao VTI 27.5 cm    LVOT stroke volume 61.49 cm3    AV peak gradient 7 mmHg    E/E' ratio 7.79 m/s    MV Peak E Jagjit 0.74 m/s    TR Max Jagjit 1.05 m/s    MV stenosis pressure 1/2 time 82.20 ms    MV Peak A Jagjit 0.71 m/s    PV Peak S Jagjit 0.41 m/s    PV Peak D Jagjit 0.56 m/s    LV Systolic Volume 28.92 mL    LV Systolic Volume Index 15.5 mL/m2    LV Diastolic Volume 118.64 mL    LV Diastolic Volume Index 63.78 mL/m2    LA Volume Index 37.1 mL/m2    LV Mass Index 58 g/m2    Right Atrium Volume Systolic 50.70 mL    RA Major Axis 5.18 cm    Left Atrium Minor Axis 5.09 cm    Left Atrium Major Axis 5.23 cm    Triscuspid Valve Regurgitation Peak Gradient 4 mmHg    RA Width 3.90 cm    RV S' 16  cm/s    Right Atrial Pressure (from IVC) 3 mmHg    EF 60 %    TV rest pulmonary artery pressure 7 mmHg    Narrative    · Mild left atrial enlargement.  · The left ventricle is normal in size with normal systolic function.  · The estimated ejection fraction is 60%.  · Normal left ventricular diastolic function.  · Normal right ventricular size with normal right ventricular systolic   function.  · Normal central venous pressure (3 mmHg).  · The estimated PA systolic pressure is 7 mmHg.      , EKG: Reviewed, and X-Ray: CXR: X-Ray Chest 1 View (CXR): No results found for this visit on 10/17/22. and X-Ray Chest PA and Lateral (CXR): No results found for this visit on 10/17/22.    Assessment and Plan:   Patient who presents with bradycardia. Meds adjusted. Stable this AM. Needs OP EP evaluation.    * Symptomatic sinus bradycardia  Cont hold Toprol and flecanide  Recommend changing Flecanide dose back to 100mg pending level and HR trend    10/19/22  -Resolved  -Meds adjusted      PAF (paroxysmal atrial fibrillation)  Cont to hold home Toprol and flecanide  Flecanide level pending, patient last took it yesterday morning  Cont hep gtt    10/19/22  -Stable overnight  -HR in 60's-70's this AM  -Resume Toprol XL at 100 mg daily (NOT BID)  -Decrease Flecainide to 100 mg BID  -Continue Eliqiuis for CVA prophylaxis  -OP EP referral    Primary hypertension  Lisinopril 10mg        VTE Risk Mitigation (From admission, onward)         Ordered     apixaban tablet 5 mg  2 times daily         10/18/22 2128     IP VTE HIGH RISK PATIENT  Once         10/17/22 1938     Place sequential compression device  Until discontinued         10/17/22 1938                Yue Escobedo PA-C  Cardiology  [facility]

## 2022-10-20 LAB — FLECAINIDE SERPL-MCNC: 1.3 MCG/ML

## 2022-10-20 NOTE — DISCHARGE SUMMARY
[facility] Hospital Medicine  Discharge Summary      Patient Name: Kelsie Bustamante  MRN: [id number]  Patient Class: IP- Inpatient  Admission Date: 10/17/2022  Hospital Length of Stay: 2 days  Discharge Date and Time:  10/19/2022 02:25 PM  Attending Physician: No att. providers found   Discharging Provider: Kelsi Gordon NP  Primary Care Provider: Cody Parks MD      HPI:   Ms. Bustamante is a 71 yo female with a PMHx of PAF on Eliquis, DM II, GERD, HTN, HLD, and hypothyroidism who presented to [facility] ED in [facility] with c/o generalized weakness, dizziness, near syncope, and possible symptomatic bradycardia. She had a Holter monitor which was completed today. She was returning the monitor to the cardiology clinic today when her symptoms occurred. Patient reports when she got out of the car and walk she felt dizzy, lightheaded, near syncopal, and that her legs were giving out in the space of only about 10 or 12 ft. She did gets somewhat diaphoretic but denies chest pain, dyspnea, palpitations, nausea, or vomiting. She has some degree of vertigo type symptoms with this as well. She has had these symptoms apparently on a somewhat ongoing basis at home as well, not recently evaluated. She sometimes does feel tachycardic palpitations as well. She has not discussed the possibility of a pacemaker as far she can recall but believes that she may need one. She feels better lying still and leaning back. Upon arrival to the ED, EKG revealed marked sinus bradycardia with 1st degree AV block, HR in the mid 30s to mid 40s. She remained hemodynamically stable. Labs resulted stable electrolytes and TSH, negative troponin. Patient is on Toprol-XL and flecainide, both taken this AM. Case discussed with Cardiology, who will see patient in consult. Patient was transferred to [facility] and admitted under Hospital Medicine services.       * No surgery found *      Hospital Course:   73 y/o female admitted for symptomatic  bradycardia. BB and flecainide on hold and cardiology consulted. Blood pressure elevated, lisinopril 10mg added. Echo pending. 10/19/22: Patient feeling better today. BP fluctuating, better controlled this AM. SR with HR in 60's-70's this AM. BB and Flecainide dosage adjusted. Ok to discharge from cardiology standpoint. Patient seen and examined on the date of discharge and found stable for discharge. Patient to follow-up with PCP and cardiology outpatient.        Goals of Care Treatment Preferences:  Code Status: Full Code      Consults:   Consults (From admission, onward)        Status Ordering Provider     Inpatient consult to Cardiology  Once        Provider:  Joshua Tim MD    Completed TRISHA SYLVESTER          No new Assessment & Plan notes have been filed under this hospital service since the last note was generated.  Service: Hospital Medicine    Final Active Diagnoses:    Diagnosis Date Noted POA    PRINCIPAL PROBLEM:  Symptomatic sinus bradycardia [R00.1] 10/17/2022 Yes    PAF (paroxysmal atrial fibrillation) [I48.0] 05/25/2021 Yes     Chronic    Primary hypertension [I10]  Yes     Chronic      Problems Resolved During this Admission:       Discharged Condition: stable    Disposition: Home or Self Care    Follow Up:   Follow-up Information     Brad Aguilar MD Follow up in 1 week(s).    Specialties: Cardiology, Cardiovascular Disease  Why: for hospital follow-up  Contact information:  [address] [facility]  [place] [id number] [phone]             Cody Parks MD Follow up in 3 day(s).    Specialty: Family Medicine  Why: for hospital follow-up  Contact information:  [id number] [address] [facility]  [place] [address]  [phone]                       Patient Instructions:      Notify your health care provider if you experience any of the following:  difficulty breathing or increased cough     Notify your health care provider if you experience any of the following:  persistent dizziness,  light-headedness, or visual disturbances     Notify your health care provider if you experience any of the following:  increased confusion or weakness     Activity as tolerated       Significant Diagnostic Studies: Labs:   BMP: No results for input(s): GLU, NA, K, CL, CO2, BUN, CREATININE, CALCIUM, MG in the last 48 hours., CMP No results for input(s): NA, K, CL, CO2, GLU, BUN, CREATININE, CALCIUM, PROT, ALBUMIN, BILITOT, ALKPHOS, AST, ALT, ANIONGAP, ESTGFRAFRICA, EGFRNONAA in the last 48 hours. and CBC   Recent Labs   Lab 10/19/22  0525   WBC 8.90   HGB 12.6   HCT 37.5          Pending Diagnostic Studies:     Procedure Component Value Units Date/Time    Flecainide level [[id number]] Collected: 10/18/22 1542    Order Status: Sent Lab Status: In process Updated: 10/19/22 0239    Specimen: Blood          Medications:  Reconciled Home Medications:      Medication List      START taking these medications    lisinopriL 20 MG tablet  Commonly known as: PRINIVIL,ZESTRIL  Take 1 tablet (20 mg total) by mouth once daily.        CHANGE how you take these medications    flecainide 100 MG Tab  Commonly known as: TAMBOCOR  Take 1 tablet (100 mg total) by mouth every 12 (twelve) hours.  What changed:   · medication strength  · how much to take     metoprolol succinate 100 MG 24 hr tablet  Commonly known as: TOPROL-XL  Take 1 tablet (100 mg total) by mouth once daily.  What changed: when to take this        CONTINUE taking these medications    albuterol 90 mcg/actuation inhaler  Commonly known as: PROVENTIL/VENTOLIN HFA  Inhale 2 puffs into the lungs every 6 (six) hours as needed for Wheezing.     apixaban 5 mg Tab  Commonly known as: ELIQUIS  Take 1 tablet (5 mg total) by mouth 2 (two) times daily.     blood sugar diagnostic Strp  Commonly known as: BLOOD GLUCOSE TEST  Test 2 times daily     blood-glucose meter kit  Test twice daily     DULoxetine 20 MG capsule  Commonly known as: CYMBALTA  Take 1 capsule (20 mg total) by  mouth once daily.     estrogens (conjugated) 0.625 MG tablet  Commonly known as: PREMARIN  Take 1 tablet (0.625 mg total) by mouth once daily.     JANUMET 50-1,000 mg per tablet  Generic drug: SITagliptan-metformin  Take 1 tablet by mouth 2 (two) times daily with meals.     lancets Misc  Test twice daily     levalbuterol 1.25 mg/3 mL nebulizer solution  Commonly known as: XOPENEX  USE 1 VIAL IN NEBULIZER THREE TIMES DAILY AS NEEDED FOR WHEEZING FOR 5 DAYS     rosuvastatin 5 MG tablet  Commonly known as: CRESTOR  Take 1 tablet (5 mg total) by mouth once daily.     zolpidem 10 mg Tab  Commonly known as: AMBIEN  1/2 po qhs prn insomnia        STOP taking these medications    ascorbic acid (vitamin C) 500 MG tablet  Commonly known as: VITAMIN C     biotin 10,000 mcg Cap     chlorhexidine 0.12 % solution  Commonly known as: PERIDEX     cyanocobalamin 2000 MCG tablet     DEEP SEA NASAL 0.65 % nasal spray  Generic drug: sodium chloride     FLUZONE HIGHDOSE QUAD 20-21  mcg/0.7 mL Syrg  Generic drug: flu vacc [id number](65yr up)-PF     HYDROcodone-acetaminophen 5-325 mg per tablet  Commonly known as: NORCO     ibuprofen 800 MG tablet  Commonly known as: ADVIL,MOTRIN     omeprazole 40 MG capsule  Commonly known as: PRILOSEC     promethazine-dextromethorphan 6.25-15 mg/5 mL Syrp  Commonly known as: PROMETHAZINE-DM            Indwelling Lines/Drains at time of discharge:   Lines/Drains/Airways     None                 Time spent on the discharge of patient: 35 minutes         Kelsi Gordon NP  Department of Hospital Medicine  [facility] - Med Surg

## 2022-10-21 ENCOUNTER — TELEPHONE (OUTPATIENT)
Dept: CARDIOLOGY | Facility: CLINIC | Age: 72
End: 2022-10-21
Payer: MEDICARE

## 2022-10-21 PROBLEM — Z79.01 CHRONIC ANTICOAGULATION: Status: ACTIVE | Noted: 2022-10-21

## 2022-10-21 NOTE — TELEPHONE ENCOUNTER
Per MD REINALDO Way Staff  Holter showed 10% afib   Any improvement of palpitation?     Spoke to patient, states there has been no change in her palpitations, she also states that she has been a little dizzy and sometimes has a hard time walking.

## 2022-10-21 NOTE — TELEPHONE ENCOUNTER
Per Dr. Bocanegra     Spoke to patient scheduled next available with EP for follow up

## 2022-11-01 ENCOUNTER — PATIENT MESSAGE (OUTPATIENT)
Dept: ADMINISTRATIVE | Facility: HOSPITAL | Age: 72
End: 2022-11-01
Payer: MEDICARE

## 2022-11-02 ENCOUNTER — PATIENT OUTREACH (OUTPATIENT)
Dept: ADMINISTRATIVE | Facility: HOSPITAL | Age: 72
End: 2022-11-02
Payer: MEDICARE

## 2022-11-02 ENCOUNTER — PATIENT MESSAGE (OUTPATIENT)
Dept: ADMINISTRATIVE | Facility: HOSPITAL | Age: 72
End: 2022-11-02
Payer: MEDICARE

## 2022-11-02 ENCOUNTER — HOSPITAL ENCOUNTER (EMERGENCY)
Facility: HOSPITAL | Age: 72
Discharge: ANOTHER HOSPITAL | End: 2022-11-02
Attending: EMERGENCY MEDICINE
Payer: MEDICARE

## 2022-11-02 VITALS
OXYGEN SATURATION: 98 % | HEART RATE: 42 BPM | BODY MASS INDEX: 29.06 KG/M2 | HEIGHT: 63 IN | SYSTOLIC BLOOD PRESSURE: 120 MMHG | WEIGHT: 164 LBS | DIASTOLIC BLOOD PRESSURE: 83 MMHG | TEMPERATURE: 98 F | RESPIRATION RATE: 20 BRPM

## 2022-11-02 DIAGNOSIS — R00.1 BRADYCARDIA: ICD-10-CM

## 2022-11-02 DIAGNOSIS — S00.83XA CONTUSION OF FOREHEAD, INITIAL ENCOUNTER: ICD-10-CM

## 2022-11-02 DIAGNOSIS — S06.6X9A SUBARACHNOID HEMORRHAGE FOLLOWING INJURY, WITH LOSS OF CONSCIOUSNESS, INITIAL ENCOUNTER: Primary | ICD-10-CM

## 2022-11-02 DIAGNOSIS — R00.1 SYMPTOMATIC BRADYCARDIA: ICD-10-CM

## 2022-11-02 DIAGNOSIS — Z79.01 CHRONIC ANTICOAGULATION: ICD-10-CM
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
## 2022-11-02 DIAGNOSIS — Z86.79 HISTORY OF ATRIAL FIBRILLATION: ICD-10-CM

## 2022-11-02 LAB
ABO + RH BLD: NORMAL
ALBUMIN SERPL BCP-MCNC: 3.4 G/DL (ref 3.5–5.2)
ALP SERPL-CCNC: 81 U/L (ref 55–135)
ALT SERPL W/O P-5'-P-CCNC: 29 U/L (ref 10–44)
ANION GAP SERPL CALC-SCNC: 9 MMOL/L (ref 8–16)
AST SERPL-CCNC: 31 U/L (ref 10–40)
BASOPHILS # BLD AUTO: 0.04 K/UL (ref 0–0.2)
BASOPHILS NFR BLD: 0.3 % (ref 0–1.9)
BILIRUB SERPL-MCNC: 0.4 MG/DL (ref 0.1–1)
BLD GP AB SCN CELLS X3 SERPL QL: NORMAL
BNP SERPL-MCNC: 176 PG/ML (ref 0–99)
BUN SERPL-MCNC: 12 MG/DL (ref 8–23)
CALCIUM SERPL-MCNC: 9.2 MG/DL (ref 8.7–10.5)
CHLORIDE SERPL-SCNC: 105 MMOL/L (ref 95–110)
CO2 SERPL-SCNC: 24 MMOL/L (ref 23–29)
CREAT SERPL-MCNC: 1 MG/DL (ref 0.5–1.4)
DIFFERENTIAL METHOD: ABNORMAL
EOSINOPHIL # BLD AUTO: 0.3 K/UL (ref 0–0.5)
EOSINOPHIL NFR BLD: 2.1 % (ref 0–8)
ERYTHROCYTE [DISTWIDTH] IN BLOOD BY AUTOMATED COUNT: 11.9 % (ref 11.5–14.5)
EST. GFR  (NO RACE VARIABLE): >60 ML/MIN/1.73 M^2
GLUCOSE SERPL-MCNC: 136 MG/DL (ref 70–110)
HCT VFR BLD AUTO: 38.3 % (ref 37–48.5)
HGB BLD-MCNC: 12.1 G/DL (ref 12–16)
IMM GRANULOCYTES # BLD AUTO: 0.07 K/UL (ref 0–0.04)
IMM GRANULOCYTES NFR BLD AUTO: 0.5 % (ref 0–0.5)
LYMPHOCYTES # BLD AUTO: 1.9 K/UL (ref 1–4.8)
LYMPHOCYTES NFR BLD: 14.4 % (ref 18–48)
MAGNESIUM SERPL-MCNC: 1.7 MG/DL (ref 1.6–2.6)
MCH RBC QN AUTO: 29 PG (ref 27–31)
MCHC RBC AUTO-ENTMCNC: 31.6 G/DL (ref 32–36)
MCV RBC AUTO: 92 FL (ref 82–98)
MONOCYTES # BLD AUTO: 0.6 K/UL (ref 0.3–1)
MONOCYTES NFR BLD: 4.3 % (ref 4–15)
NEUTROPHILS # BLD AUTO: 10.3 K/UL (ref 1.8–7.7)
NEUTROPHILS NFR BLD: 78.4 % (ref 38–73)
NRBC BLD-RTO: 0 /100 WBC
PLATELET # BLD AUTO: 299 K/UL (ref 150–450)
PMV BLD AUTO: 9 FL (ref 9.2–12.9)
POTASSIUM SERPL-SCNC: 4.8 MMOL/L (ref 3.5–5.1)
PROT SERPL-MCNC: 6.6 G/DL (ref 6–8.4)
RBC # BLD AUTO: 4.17 M/UL (ref 4–5.4)
SODIUM SERPL-SCNC: 138 MMOL/L (ref 136–145)
TROPONIN I SERPL DL<=0.01 NG/ML-MCNC: <0.006 NG/ML (ref 0–0.03)
TSH SERPL DL<=0.005 MIU/L-ACNC: 2.21 UIU/ML (ref 0.4–4)
WBC # BLD AUTO: 13.11 K/UL (ref 3.9–12.7)

## 2022-11-02 PROCEDURE — 93005 ELECTROCARDIOGRAM TRACING: CPT

## 2022-11-02 PROCEDURE — 80053 COMPREHEN METABOLIC PANEL: CPT | Performed by: EMERGENCY MEDICINE

## 2022-11-02 PROCEDURE — A4216 STERILE WATER/SALINE, 10 ML: HCPCS | Performed by: EMERGENCY MEDICINE

## 2022-11-02 PROCEDURE — 99291 CRITICAL CARE FIRST HOUR: CPT | Mod: 25

## 2022-11-02 PROCEDURE — 84443 ASSAY THYROID STIM HORMONE: CPT | Performed by: EMERGENCY MEDICINE

## 2022-11-02 PROCEDURE — 86901 BLOOD TYPING SEROLOGIC RH(D): CPT | Performed by: EMERGENCY MEDICINE

## 2022-11-02 PROCEDURE — 83735 ASSAY OF MAGNESIUM: CPT | Performed by: EMERGENCY MEDICINE

## 2022-11-02 PROCEDURE — 84484 ASSAY OF TROPONIN QUANT: CPT | Performed by: EMERGENCY MEDICINE

## 2022-11-02 PROCEDURE — 85025 COMPLETE CBC W/AUTO DIFF WBC: CPT | Performed by: EMERGENCY MEDICINE

## 2022-11-02 PROCEDURE — 83880 ASSAY OF NATRIURETIC PEPTIDE: CPT | Performed by: EMERGENCY MEDICINE

## 2022-11-02 PROCEDURE — 93010 EKG 12-LEAD: ICD-10-PCS | Mod: ,,, | Performed by: INTERNAL MEDICINE

## 2022-11-02 PROCEDURE — 93010 ELECTROCARDIOGRAM REPORT: CPT | Mod: ,,, | Performed by: INTERNAL MEDICINE

## 2022-11-02 PROCEDURE — 63600175 PHARM REV CODE 636 W HCPCS: Mod: JG | Performed by: EMERGENCY MEDICINE

## 2022-11-02 PROCEDURE — 25000003 PHARM REV CODE 250: Performed by: EMERGENCY MEDICINE

## 2022-11-02 PROCEDURE — 96365 THER/PROPH/DIAG IV INF INIT: CPT

## 2022-11-02 RX ORDER — HYDRALAZINE HYDROCHLORIDE 20 MG/ML
10 INJECTION INTRAMUSCULAR; INTRAVENOUS
Status: DISCONTINUED | OUTPATIENT
Start: 2022-11-02 | End: 2022-11-02 | Stop reason: HOSPADM

## 2022-11-02 RX ORDER — ACETAMINOPHEN 500 MG
1000 TABLET ORAL
Status: COMPLETED | OUTPATIENT
Start: 2022-11-02 | End: 2022-11-02

## 2022-11-02 RX ADMIN — ACETAMINOPHEN 1000 MG: 500 TABLET ORAL at 12:11

## 2022-11-02 RX ADMIN — Medication 3745 UNITS: at 01:11

## 2022-11-02 NOTE — ED PROVIDER NOTES
SCRIBE #1 NOTE: I, Vera Lee, am scribing for, and in the presence of, Deana Penny MD. I have scribed the entire note.       History     Chief Complaint   Patient presents with    Bradycardia     Pt fell in parking lot of doctor's office at [facility]. Pt reports weakness and dizziness prior to fall. Pt hit head, hematoma noted to right eyebrow, taking Eliquis and metoprolol. 2mg atropine given en route per EMS.     Review of patient's allergies indicates:   Allergen Reactions    Floxin [ofloxacin]     Fluvastatin     Pravastatin Other (See Comments)     myalgias    Trazodone      Racing heart         History of Present Illness     HPI    11/2/2022, 11:54 AM  History obtained from the patient      History of Present Illness: Kelsie Bustamante is a 72 y.o. female patient with a PMHx of a-fib, GERD, DM, HLD, and HTN who presents to the Emergency Department for evaluation of bradycardia. Pt was getting out of her car in the parking lot at [facility] and suddenly got lightheaded and dizzy. She reports that she lost consciousness and fell to the ground hitting her head. Symptoms are constant and moderate in severity. No mitigating or exacerbating factors reported. Associated sxs include weakness and a HA. Patient denies any neck pain, CP, SOB, dysuria, blood in stool, and all other sxs at this time. Prior Tx includes 2mg atropine given en route per EMS. Pt is on Eliquis. No further complaints or concerns at this time.       Arrival mode: Ambulance Service    PCP: Cody Parks MD        Past Medical History:  Past Medical History:   Diagnosis Date    Achalasia     demetrius    Atrial fibrillation with RVR 5/11/2021    Cataract     Colon polyp     Gastroesophageal reflux     Hiatal hernia     Hx of colonic polyps 08/15/2014    per colonoscopy in      Hyperlipidemia 6/24/2022    Hypertension     Peripheral neuropathy     Postmenopausal HRT (hormone replacement therapy)     failed tapering off    Sepsis 5/5/2021     Last Assessment & Plan:  Formatting of this note might be different from the original. History & Physical Patient meets sepsis criteria with a white cell count of 15, and tachypnea.  Source of infection not clear at this time.  No evidence for meningitis.  Cover with broad-spectrum antibiotics especially given invasive dental procedure done recently.  Check blood cultures and monitor for fever.  R    Sinusitis     Thyroid nodule 3/16 subcm; kathleen 2 yrs    Type 2 diabetes mellitus with neurological manifestations     Vasodepressor syncope 2018       Past Surgical History:  Past Surgical History:   Procedure Laterality Date    BREAST BIOPSY Left 2021    cataract Left      SECTION      CHOLECYSTECTOMY      CYST REMOVAL Right 2021    [facility]    ESOPHAGEAL MANOMETRY WITH MEASUREMENT OF IMPEDANCE N/A 10/6/2022    Procedure: MANOMETRY, ESOPHAGUS, WITH IMPEDANCE MEASUREMENT  Cardiac Clearance/Eliquis 2 day hold approval received per Dr. RABIA Aguilar, Cardiology on 22.  Note in encounters.  LB;  Surgeon: Bruna Fletcher MD;  Location: [facility];  Service: Endoscopy;  Laterality: N/A;    ESOPHAGOGASTRODUODENOSCOPY N/A 10/6/2022    Procedure: EGD (ESOPHAGOGASTRODUODENOSCOPY);  Surgeon: Bruna Fletcher MD;  Location: [facility];  Service: Endoscopy;  Laterality: N/A;    HYSTERECTOMY      nonca    OOPHORECTOMY      TILT TABLE TEST N/A 10/25/2018    Procedure: TILT TABLE TEST;  Surgeon: Daryl Walker MD;  Location: [facility] CATH LAB;  Service: Cardiology;  Laterality: N/A;  VAS.DEP.SYN,HUT,FAS,3PREP    TONSILLECTOMY           Family History:  Family History   Problem Relation Age of Onset    Aneurysm Sister     Heart disease Mother     Diabetes Father     Coronary artery disease Father     Coronary artery disease Brother     Parkinsonism Brother        Social History:  Social History     Tobacco Use    Smoking status: Never    Smokeless tobacco: Never   Substance  and Sexual Activity    Alcohol use: No    Drug use: No    Sexual activity: Never        Review of Systems     Review of Systems   Constitutional:  Negative for fever.   HENT:  Negative for sore throat.    Respiratory:  Negative for shortness of breath.    Cardiovascular:  Negative for chest pain.   Gastrointestinal:  Negative for blood in stool and nausea.   Genitourinary:  Negative for dysuria.   Musculoskeletal:  Negative for back pain and neck pain.   Skin:  Negative for rash.   Neurological:  Positive for dizziness, syncope, weakness, light-headedness and headaches.   Hematological:  Does not bruise/bleed easily.   All other systems reviewed and are negative.     Physical Exam     Initial Vitals [11/02/22 1126]   BP Pulse Resp Temp SpO2   (!) 140/59 (!) 42 18 97.8 °F (36.6 °C) (!) 94 %      MAP       --          Physical Exam  Nursing Notes and Vital Signs Reviewed.  Constitutional: Patient is in no acute distress. Appears pale.  Head: Small contusion to R temporal aspect of scalp. Normocephalic.  Eyes: PERRL. EOM intact. Conjunctivae are not pale. No scleral icterus.  ENT: Mucous membranes are moist. Oropharynx is clear and symmetric.  Abrasion to R ear lobe.  Neck: Supple. Full ROM.   Cardiovascular: Bradycardic. Regular rhythm. No murmurs, rubs, or gallops. Distal pulses are 2+ and symmetric.  Pulmonary/Chest: No respiratory distress. Clear to auscultation bilaterally. No wheezing or rales.  Abdominal: Soft and non-distended.  There is no tenderness.  No rebound, guarding, or rigidity.   Musculoskeletal: Moves all extremities. No obvious deformities. No edema.   Skin: Warm and dry.  Neurological:  Alert, awake, and appropriate.  Normal speech.  No acute focal neurological deficits are appreciated.  Psychiatric: Normal affect. Good eye contact. Appropriate in content.     ED Course   Critical Care    Date/Time: 11/2/2022 1:35 PM  Performed by: Deana Penny MD  Authorized by: Deana Penny MD   Direct  "patient critical care time: 10 minutes  Additional history critical care time: 5 minutes  Ordering / reviewing critical care time: 10 minutes  Documentation critical care time: 5 minutes  Consulting other physicians critical care time: 5 minutes  Total critical care time (exclusive of procedural time) : 35 minutes  Critical care time was exclusive of separately billable procedures and treating other patients and teaching time.  Critical care was necessary to treat or prevent imminent or life-threatening deterioration of the following conditions: trauma (Traumatic Subarachnoid Hemorrhage).  Critical care was time spent personally by me on the following activities: blood draw for specimens, development of treatment plan with patient or surrogate, discussions with consultants, evaluation of patient's response to treatment, interpretation of cardiac output measurements, examination of patient, obtaining history from patient or surrogate, ordering and performing treatments and interventions, ordering and review of laboratory studies, ordering and review of radiographic studies, pulse oximetry, re-evaluation of patient's condition and review of old charts.      ED Vital Signs:  Vitals:    11/02/22 1126 11/02/22 1155 11/02/22 1158 11/02/22 1219   BP: (!) 140/59 (!) 154/63     Pulse: (!) 42 (!) 41 (!) 43    Resp: 18 (!) 24     Temp: 97.8 °F (36.6 °C)      TempSrc: Oral      SpO2: (!) 94% 98%     Weight:    74.4 kg (164 lb)   Height: 5' 3" (1.6 m)       11/02/22 1234 11/02/22 1310 11/02/22 1320   BP: (!) 146/66 (!) 154/68 (!) 154/63   Pulse: (!) 45 (!) 48 (!) 48   Resp: 14 (!) 21 20   Temp:      TempSrc:      SpO2: 98% 98% 97%   Weight:      Height:          Abnormal Lab Results:  Labs Reviewed   CBC W/ AUTO DIFFERENTIAL - Abnormal; Notable for the following components:       Result Value    WBC 13.11 (*)     MCHC 31.6 (*)     MPV 9.0 (*)     Gran # (ANC) 10.3 (*)     Immature Grans (Abs) 0.07 (*)     Gran % 78.4 (*)     " Lymph % 14.4 (*)     All other components within normal limits   COMPREHENSIVE METABOLIC PANEL - Abnormal; Notable for the following components:    Glucose 136 (*)     Albumin 3.4 (*)     All other components within normal limits   B-TYPE NATRIURETIC PEPTIDE - Abnormal; Notable for the following components:     (*)     All other components within normal limits   TROPONIN I   MAGNESIUM   TSH   TYPE & SCREEN        All Lab Results:  Results for orders placed or performed during the hospital encounter of 11/02/22   CBC auto differential   Result Value Ref Range    WBC 13.11 (H) 3.90 - 12.70 K/uL    RBC 4.17 4.00 - 5.40 M/uL    Hemoglobin 12.1 12.0 - 16.0 g/dL    Hematocrit 38.3 37.0 - 48.5 %    MCV 92 82 - 98 fL    MCH 29.0 27.0 - 31.0 pg    MCHC 31.6 (L) 32.0 - 36.0 g/dL    RDW 11.9 11.5 - 14.5 %    Platelets 299 150 - 450 K/uL    MPV 9.0 (L) 9.2 - 12.9 fL    Immature Granulocytes 0.5 0.0 - 0.5 %    Gran # (ANC) 10.3 (H) 1.8 - 7.7 K/uL    Immature Grans (Abs) 0.07 (H) 0.00 - 0.04 K/uL    Lymph # 1.9 1.0 - 4.8 K/uL    Mono # 0.6 0.3 - 1.0 K/uL    Eos # 0.3 0.0 - 0.5 K/uL    Baso # 0.04 0.00 - 0.20 K/uL    nRBC 0 0 /100 WBC    Gran % 78.4 (H) 38.0 - 73.0 %    Lymph % 14.4 (L) 18.0 - 48.0 %    Mono % 4.3 4.0 - 15.0 %    Eosinophil % 2.1 0.0 - 8.0 %    Basophil % 0.3 0.0 - 1.9 %    Differential Method Automated    Comprehensive metabolic panel   Result Value Ref Range    Sodium 138 136 - 145 mmol/L    Potassium 4.8 3.5 - 5.1 mmol/L    Chloride 105 95 - 110 mmol/L    CO2 24 23 - 29 mmol/L    Glucose 136 (H) 70 - 110 mg/dL    BUN 12 8 - 23 mg/dL    Creatinine 1.0 0.5 - 1.4 mg/dL    Calcium 9.2 8.7 - 10.5 mg/dL    Total Protein 6.6 6.0 - 8.4 g/dL    Albumin 3.4 (L) 3.5 - 5.2 g/dL    Total Bilirubin 0.4 0.1 - 1.0 mg/dL    Alkaline Phosphatase 81 55 - 135 U/L    AST 31 10 - 40 U/L    ALT 29 10 - 44 U/L    Anion Gap 9 8 - 16 mmol/L    eGFR >60 >60 mL/min/1.73 m^2   Troponin I #1   Result Value Ref Range    Troponin I  <0.006 0.000 - 0.026 ng/mL   BNP   Result Value Ref Range     (H) 0 - 99 pg/mL   Magnesium   Result Value Ref Range    Magnesium 1.7 1.6 - 2.6 mg/dL   TSH   Result Value Ref Range    TSH 2.206 0.400 - 4.000 uIU/mL         Imaging Results:  Imaging Results              X-Ray Cervical Spine AP And Lateral (Final result)  Result time 11/02/22 14:05:11      Final result by Mervat Mauricio MD (11/02/22 14:05:11)                   Impression:      No overt acute osseous finding with multilevel spondylosis and demineralization      Electronically signed by: Mervat Mauricio  Date:    11/02/2022  Time:    14:05               Narrative:    EXAMINATION:  XR CERVICAL SPINE AP LATERAL    CLINICAL HISTORY:  Unspecified fall, initial encounter    TECHNIQUE:  Three-view exam    COMPARISON:  None available    FINDINGS:  No acute fracture or traumatic malalignment identified.  Vertebral body heights grossly maintained.  Multilevel spondylosis moderate.                                       X-Ray Chest AP Portable (Final result)  Result time 11/02/22 14:03:11      Final result by Mervat Mauricio MD (11/02/22 14:03:11)                   Impression:      Minimal perihilar linear/interstitial opacity similar to slightly increased      Electronically signed by: Mervat Mauricio  Date:    11/02/2022  Time:    14:03               Narrative:    EXAMINATION:  XR CHEST AP PORTABLE    CLINICAL HISTORY:  Unspecified fall, initial encounter    TECHNIQUE:  Single view    COMPARISON:  10/17/2022    FINDINGS:  There is minimal perihilar linear opacity bilateral.  No sizable pulmonary consolidation or pleural effusion.  Stable mediastinal contour midline.  No convincing pneumothorax space                                       CT Head Without Contrast (Final result)  Result time 11/02/22 12:23:59      Final result by Cody Mena MD (11/02/22 12:23:59)                   Impression:      1.  Multiple areas of subarachnoid bleeding,  with filling of few of the lateral right parietal sulci with thin linear areas of hemorrhage, as well as an 8 mm focal subarachnoid bleed within a medial left parietal sulcus.    2.  Right temporal region hematoma with overlying laceration.    3.  Cerebral atrophy noted.  Intracranial atherosclerotic disease noted.  Small vessel ischemic changes noted.    4.  Negative for acute process otherwise.  Stable findings as noted above.    5.  A call report was made to Dr. Penny at 12:20 hours on November 2, 2022.    All CT scans at this facility are performed  using dose modulation techniques as appropriate to performed exam including the following:  automated exposure control; adjustment of mA and/or kV according to the patients size (this includes techniques or standardized protocols for targeted exams where dose is matched to indication/reason for exam: i.e. extremities or head);  iterative reconstruction technique.      Electronically signed by: Cody Mena MD  Date:    11/02/2022  Time:    12:23               Narrative:    EXAMINATION:  CT HEAD WITHOUT CONTRAST    CLINICAL HISTORY:  Head trauma, moderate-severe;    TECHNIQUE:  Axial images through the brain and posterior fossa were obtained without the use of IV contrast.  Sagittal and coronal reconstructions are provided for review.    COMPARISON:  October 17, 2022    FINDINGS:  Motion artifact is present on a number of images.  Subtle abnormalities could be overlooked.  The ventricles are midline and the CSF spaces are prominent and unchanged in appearance.  The gray-white matter junction is well preserved. Negative for intracranial vascular abnormalities.    There is been interval development a 9 mm hyperdense focus within the medial left superior parietal sulcus consistent with a small area of subarachnoid hemorrhage (reference image 19 of series 2).  There is a minimal amount of subarachnoid blood in and amongst some right lateral parietal sulci as well  (reference image 14 of series 2).  Negative for mass, mass effect, cerebral edema, other areas of hemorrhage or abnormal fluid collections.  Intracranial atherosclerotic disease noted.  Small vessel ischemic changes noted.    There is a moderate size anterior right temporal region hematoma with a tiny air collection in the subcutaneous fat consistent with an overlying laceration.  The skull and scalp are otherwise intact.    The   paranasal sinuses, mastoid air cells, middle ears and ear canals are clear. The globes are intact.  Postoperative changes to the lens regions.  Right ford bullosa with mild leftward nasal septal deviation.                                       The EKG was ordered, reviewed, and independently interpreted by the ED provider.  Interpretation time: 11:43  Rate: 41 BPM  Rhythm:  Wide QRS rhythm  Interpretation: Rightward axis. Nonspecific intraventricular block. No STEMI.  When compared to EKG performed 10/17/22, there are no significant changes.           The Emergency Provider reviewed the vital signs and test results, which are outlined above.     ED Discussion     12:28 PM: Re-evaluated pt.  is at bedside. Updated patient and  on CT findings and the need for transfer to a facility that has neurocritical care and neurosurgical services. Pt would like to stay in [place] if possible. Answered all questions. Pt expresses understanding at this time.      1:55 PM: Consult with Dr. Miranda at [facility] concerning pt. There are no neurosurgical services, which the patient requires, offered at [facility] at this time. Dr. Miranda expresses understanding and will accept transfer for a brain hemorrhage .  Accepting Facility: [facility] ED  Accepting Physician: Dr. Bejarano    1:55 PM: Re-evaluated pt. GCS remains 15 BP within acceptable range at this time.  Informed pt and family that there are no neurosurgical services available at this time. I have discussed test results, shared  treatment plan, and the need for transfer with patient and family at bedside. All historical, clinical, radiographic, and laboratory findings were reviewed with the patient/family in detail. Patient will be transferred by [facility] services with  care required en route. Patient understands that there could be unforeseen motor vehicle accidents or loss of vital signs that could result in potential death or permanent disability. Pt and family express understanding at this time and agree with all information. All questions answered. Pt and family have no further questions or concerns at this time. Pt is ready for transfer.         Medical Decision Making:   Clinical Tests:   Lab Tests: Ordered and Reviewed  Radiological Study: Ordered and Reviewed  Medical Tests: Ordered and Reviewed         ED Medication(s):  Medications   hydrALAZINE injection 10 mg (0 mg Intravenous Hold 11/2/22 1245)   acetaminophen tablet 1,000 mg (1,000 mg Oral Given 11/2/22 1218)   human prothrombin complex (PCC) (KCENTRA) 530 Units, human prothrombin complex (PCC) (KCENTRA) 3,215 Units in sterile water for injection 140 mL IVPB (3,745 Units Intravenous New Bag 11/2/22 1313)       New Prescriptions    No medications on file               Scribe Attestation:   Scribe #1: I performed the above scribed service and the documentation accurately describes the services I performed. I attest to the accuracy of the note.     Attending:   Physician Attestation Statement for Scribe #1: I, Deana Penny MD, personally performed the services described in this documentation, as scribed by Vera Lee, in my presence, and it is both accurate and complete.           Clinical Impression       ICD-10-CM ICD-9-CM   1. Subarachnoid hemorrhage following injury, with loss of consciousness, initial encounter  S06.6X9A 852.06   2. Bradycardia  R00.1 427.89   3. Chronic anticoagulation  Z79.01 V58.61   4. History of atrial fibrillation  Z86.79 V12.59   5. Symptomatic  bradycardia  R00.1 427.89   6. Contusion of forehead, initial encounter  S00.83XA 920   7. Fall  W19.XXXA E888.9       Disposition:   Disposition: Transferred  Condition: Serious       Deana Penny MD  11/02/22 2364

## 2022-11-02 NOTE — ED NOTES
Notified by MUNIRA Rene, that patient has been accepted at [facility] ED under the care of Dr. Miranda. Report to be called to [phone]. Patient flow center to set up transport with [facility].

## 2022-11-02 NOTE — PROGRESS NOTES
Diabetic Eye Exam: Attempted to contact the patient to schedule annual eye exam, phone unable to connect, will reply to [employer] message.

## 2022-11-02 NOTE — PHARMACY MED REC
"Admission Medication History     The home medication history was taken by Parag Lux.    You may go to "Admission" then "Reconcile Home Medications" tabs to review and/or act upon these items.     The home medication list has been updated by the Pharmacy department.   Please read ALL comments highlighted in yellow.   Please address this information as you see fit.    Feel free to contact us if you have any questions or require assistance.    Attention: patient stated she is taking her flecanide 100 mg tablet once every morning vs. Twice daily and her metoprolol succinate 100 mg tablet twice daily vs. Once daily. Concerned she may be confused and taking them incorrectly or mixed-up.     The medications listed below were removed from the home medication list. Please reorder if appropriate:  Patient reports no longer taking the following medication(s):  MISCELLANEOUS OLD DIABETIC SUPPLIES  LEVALBUTEROL 1.25 MG/ 3 mL NEBULIZER SOLUTION    Medications listed below were obtained from: Patient/family, [employer]- [email or web address], and Patient's pharmacy  (Not in a hospital admission)      Potential issues to be addressed PRIOR TO DISCHARGE: NONE    Parag Lux CPhT-Adv  Pharmacy Technician Specialist-Medication History  [facility] [phone]  Secure chat preferred     Current Outpatient Medications on File Prior to Encounter   Medication Sig Dispense Refill Last Dose    albuterol (PROVENTIL/VENTOLIN HFA) 90 mcg/actuation inhaler Inhale 2 puffs into the lungs every 6 (six) hours as needed for Wheezing. 6.7 g 5 11/1/2022    apixaban (ELIQUIS) 5 mg Tab Take 1 tablet (5 mg total) by mouth 2 (two) times daily. 180 tablet 1 11/2/2022    DULoxetine (CYMBALTA) 20 MG capsule Take 1 capsule (20 mg total) by mouth once daily. 90 capsule 3 11/2/2022    estrogens, conjugated, (PREMARIN) 0.625 MG tablet Take 1 tablet (0.625 mg total) by mouth once daily. 90 tablet 3 11/2/2022    flecainide (TAMBOCOR) 100 MG Tab Take 1 tablet (100 " mg total) by mouth every 12 (twelve) hours. (Patient taking differently: Take 100 mg by mouth every morning.) 60 tablet 0 11/2/2022    lisinopriL (PRINIVIL,ZESTRIL) 20 MG tablet Take 1 tablet (20 mg total) by mouth once daily. 90 tablet 0 11/2/2022    metoprolol succinate (TOPROL-XL) 100 MG 24 hr tablet Take 1 tablet (100 mg total) by mouth once daily. (Patient taking differently: Take 100 mg by mouth 2 (two) times daily.) 180 tablet 3 11/2/2022    rosuvastatin (CRESTOR) 5 MG tablet Take 1 tablet (5 mg total) by mouth once daily. 90 tablet 3 11/1/2022    SITagliptan-metformin (JANUMET) 50-1,000 mg per tablet Take 1 tablet by mouth 2 (two) times daily with meals. 180 tablet 1 11/2/2022    zolpidem (AMBIEN) 10 mg Tab 1/2 po qhs prn insomnia 90 tablet 1 11/1/2022    [DISCONTINUED] blood sugar diagnostic (BLOOD GLUCOSE TEST) Strp Test 2 times daily 200 strip 3 11/2/2022    [DISCONTINUED] blood-glucose meter kit Test twice daily 1 each 0 11/2/2022    [DISCONTINUED] lancets Misc Test twice daily 200 each 3 11/2/2022    [DISCONTINUED] levalbuterol (XOPENEX) 1.25 mg/3 mL nebulizer solution USE 1 VIAL IN NEBULIZER THREE TIMES DAILY AS NEEDED FOR WHEEZING FOR 5 DAYS                                  .

## 2022-11-03 ENCOUNTER — TELEPHONE (OUTPATIENT)
Dept: CARDIOLOGY | Facility: CLINIC | Age: 72
End: 2022-11-03
Payer: MEDICARE

## 2022-11-03 NOTE — TELEPHONE ENCOUNTER
Nurse from ICU @[facility] contacted the office to inform Dr. Quintana that pt had a syncopal episode which resulted in her falling and having a Brain Bleed. ICU physician stated that the pt is cleared from a Neuro standpoint but would like for pt to evaluated with cardio. Spoke with pt, stated that she was feeling much better and would like to be seen in Clinic for a Hosp F/u.     Pt is scheduled for Next week with Dr. Quintana, Pt confirmed appt/date/time/location. Pt verbalized understanding with no questions or concerns.

## 2022-11-10 ENCOUNTER — TELEPHONE (OUTPATIENT)
Dept: CARDIOLOGY | Facility: CLINIC | Age: 72
End: 2022-11-10
Payer: MEDICARE

## 2022-11-10 ENCOUNTER — OFFICE VISIT (OUTPATIENT)
Dept: CARDIOLOGY | Facility: CLINIC | Age: 72
End: 2022-11-10
Payer: MEDICARE

## 2022-11-10 VITALS
DIASTOLIC BLOOD PRESSURE: 74 MMHG | BODY MASS INDEX: 27.34 KG/M2 | WEIGHT: 154.31 LBS | SYSTOLIC BLOOD PRESSURE: 122 MMHG | OXYGEN SATURATION: 97 % | HEART RATE: 66 BPM | HEIGHT: 63 IN

## 2022-11-10 DIAGNOSIS — I48.0 PAF (PAROXYSMAL ATRIAL FIBRILLATION): Primary | Chronic | ICD-10-CM

## 2022-11-10 DIAGNOSIS — I60.9 SUBARACHNOID HEMORRHAGE: ICD-10-CM

## 2022-11-10 DIAGNOSIS — I48.0 PAF (PAROXYSMAL ATRIAL FIBRILLATION): Chronic | ICD-10-CM

## 2022-11-10 DIAGNOSIS — Z78.9 STATIN INTOLERANCE: ICD-10-CM

## 2022-11-10 DIAGNOSIS — E11.9 TYPE 2 DIABETES MELLITUS WITHOUT COMPLICATION, WITHOUT LONG-TERM CURRENT USE OF INSULIN: Chronic | ICD-10-CM

## 2022-11-10 DIAGNOSIS — R00.1 SYMPTOMATIC SINUS BRADYCARDIA: ICD-10-CM

## 2022-11-10 DIAGNOSIS — E78.5 HYPERLIPIDEMIA, UNSPECIFIED HYPERLIPIDEMIA TYPE: ICD-10-CM

## 2022-11-10 DIAGNOSIS — I10 PRIMARY HYPERTENSION: Primary | Chronic | ICD-10-CM

## 2022-11-10 PROCEDURE — 99999 PR PBB SHADOW E&M-EST. PATIENT-LVL III: CPT | Mod: PBBFAC,,, | Performed by: INTERNAL MEDICINE

## 2022-11-10 PROCEDURE — 99213 OFFICE O/P EST LOW 20 MIN: CPT | Mod: PBBFAC | Performed by: INTERNAL MEDICINE

## 2022-11-10 PROCEDURE — 99214 PR OFFICE/OUTPT VISIT, EST, LEVL IV, 30-39 MIN: ICD-10-PCS | Mod: S$GLB,,, | Performed by: INTERNAL MEDICINE

## 2022-11-10 PROCEDURE — 99214 OFFICE O/P EST MOD 30 MIN: CPT | Mod: S$GLB,,, | Performed by: INTERNAL MEDICINE

## 2022-11-10 PROCEDURE — 99999 PR PBB SHADOW E&M-EST. PATIENT-LVL III: ICD-10-PCS | Mod: PBBFAC,,, | Performed by: INTERNAL MEDICINE

## 2022-11-10 NOTE — H&P (VIEW-ONLY)
Subjective:   Patient ID:  Kelsie Bustamante is a 72 y.o. female who presents for evaluation of Palpitations      HPI    Kelsie Bustamante is a 72 year old female who presents to Arrhythmia clinic for EP referral to discuss PPM implant per Dr Quintana.     Her current medical conditions include PAF on Eliquis, DM Type II, HTN, Fibromyalgia.     She was admitted in October with symptomatic bradycardia and her meds were adjusted upon discharge.     She had syncopal event in Nov 2nd which required admission to [facility]     She was recently admitted due to symptomatic bradycardia and had a syncopal event while returning her holter monitor to clinic. Her holter monitor revealed multiple episodes of significant bradycardis with HR in 30s. She then suffered a SAH and was transferred to [facility] and her eliquis was held. She was started back on Toprol  mg daily. Discussion regarding watchman device during admission at [facility]. She was discharged on Flecainide 100 mg BID and TOprol  mg daily.     Repeat EKG today reveals SR with FDAVB, HR 63 Iván 234 ms    Discussed case with Dr Walker in clinic today, will proceed with PPM implant next week and will discuss further treatment strategies for VDS.     Orthostatic VS today in clinic:  Sitting: /62 HR 72  Standing 1 minute /76 HR 76  Standing 3 minutes /68 HR 79  Standing 5 minutes /66 HR 76      TEST DESCRIPTION   DATE OF PROCEDURE:  10/25/2018     1.  Baseline values are 125/56 and 50 beats per minute.   2.  Normal blood pressure response to head-up tilt.   3.  Flat chronotropic response to head-up tilt.   4.  The patient presented with the following medications on board:  Losartan and metoprolol.   5.  3D spectral analysis of heart rate variability reveals prominent vagal tone at rest with minimal sympathetic tone at rest.  With tilting, eventual sympathetic stimulation is noted, but this remains relatively mild.  Vagal tone eventually suppresses    nearly completely.   6.  Quantitatively, the sympathovagal index was 0.05 or 0.02 at rest and increased to approximately 0.8. This was a very steady and gradually increase, which actually increased eventually to about 1.0.   7.  The cardiovagal tone was 90% at rest and eventually dropped to 25% while the vascular vagal tone was 90% at rest and dropped also to 25%.  The sympathetic cardiac tone was 10% and increased to about 75% while the vascular tone was 10% at rest and   increased to about 25%.   8.  Plethysmographic techniques were used to track changes in stroke index, cardiac index and TPRI.  The following observations were made:  Stroke index was 37 throughout.  Cardiac index was approximately 1.8 L/m2 per minute throughout.  TPRI was   approximately 3900 and increased to about 4500 during tilting.   9.  Valsalva maneuver was performed.  This was of adequate quality and moderate effort.  The response was within normal limits.     CONCLUSIONS:   1.  Normal arterial baroreceptor reflex arch function.   2.  No evidence of excessive venous pooling.   3.  Evidence of chronotropic incompetence.  This may reflect adequate beta blockade.            Past Medical History:   Diagnosis Date    Achalasia     demetrius    Atrial fibrillation with RVR 5/11/2021    Cataract     Colon polyp     Gastroesophageal reflux     Hiatal hernia     Hx of colonic polyps 08/15/2014    per colonoscopy in      Hyperlipidemia 6/24/2022    Hypertension     Peripheral neuropathy     Postmenopausal HRT (hormone replacement therapy)     failed tapering off    Sepsis 5/5/2021    Last Assessment & Plan:  Formatting of this note might be different from the original. History & Physical Patient meets sepsis criteria with a white cell count of 15, and tachypnea.  Source of infection not clear at this time.  No evidence for meningitis.  Cover with broad-spectrum antibiotics especially given invasive dental procedure done recently.  Check blood  cultures and monitor for fever.  R    Sinusitis     Thyroid nodule 3/16 subcm; kathleen 2 yrs    Type 2 diabetes mellitus with neurological manifestations     Vasodepressor syncope 2018       Past Surgical History:   Procedure Laterality Date    BREAST BIOPSY Left 2021    cataract Left      SECTION      CHOLECYSTECTOMY      CYST REMOVAL Right 2021    [facility]    ESOPHAGEAL MANOMETRY WITH MEASUREMENT OF IMPEDANCE N/A 10/6/2022    Procedure: MANOMETRY, ESOPHAGUS, WITH IMPEDANCE MEASUREMENT  Cardiac Clearance/Eliquis 2 day hold approval received per Dr. RABIA Aguilar, Cardiology on 22.  Note in encounters.  LB;  Surgeon: Bruna Fletcher MD;  Location: [facility];  Service: Endoscopy;  Laterality: N/A;    ESOPHAGOGASTRODUODENOSCOPY N/A 10/6/2022    Procedure: EGD (ESOPHAGOGASTRODUODENOSCOPY);  Surgeon: Bruna Fletcher MD;  Location: [facility];  Service: Endoscopy;  Laterality: N/A;    HYSTERECTOMY      nonca    OOPHORECTOMY      TILT TABLE TEST N/A 10/25/2018    Procedure: TILT TABLE TEST;  Surgeon: Daryl Walker MD;  Location: [facility] CATH LAB;  Service: Cardiology;  Laterality: N/A;  VAS.DEP.SYN,HUT,FAS,3PREP    TONSILLECTOMY         Social History     Tobacco Use    Smoking status: Never    Smokeless tobacco: Never   Substance Use Topics    Alcohol use: No    Drug use: No       Family History   Problem Relation Age of Onset    Aneurysm Sister     Heart disease Mother     Diabetes Father     Coronary artery disease Father     Coronary artery disease Brother     Parkinsonism Brother      Wt Readings from Last 3 Encounters:   22 70.8 kg (156 lb)   11/10/22 70 kg (154 lb 5.2 oz)   22 74.4 kg (164 lb)     Temp Readings from Last 3 Encounters:   22 97.8 °F (36.6 °C) (Oral)   10/19/22 98.5 °F (36.9 °C) (Oral)   10/06/22 97.7 °F (36.5 °C) (Temporal)     BP Readings from Last 3 Encounters:   22 114/64   11/10/22 122/74   22 120/83      Pulse Readings from Last 3 Encounters:   11/11/22 74   11/10/22 66   11/02/22 (!) 42     Current Outpatient Medications on File Prior to Visit   Medication Sig Dispense Refill    DULoxetine (CYMBALTA) 20 MG capsule Take 1 capsule (20 mg total) by mouth once daily. 90 capsule 3    flecainide (TAMBOCOR) 100 MG Tab Take 1 tablet (100 mg total) by mouth every 12 (twelve) hours. (Patient taking differently: Take 100 mg by mouth every morning.) 60 tablet 0    metoprolol succinate (TOPROL-XL) 100 MG 24 hr tablet Take 1 tablet (100 mg total) by mouth once daily. (Patient taking differently: Take 100 mg by mouth Daily.) 180 tablet 3    rosuvastatin (CRESTOR) 5 MG tablet Take 1 tablet (5 mg total) by mouth once daily. 90 tablet 3    SITagliptan-metformin (JANUMET) 50-1,000 mg per tablet Take 1 tablet by mouth 2 (two) times daily with meals. 180 tablet 1    zolpidem (AMBIEN) 10 mg Tab 1/2 po qhs prn insomnia 90 tablet 1    [DISCONTINUED] lisinopriL (PRINIVIL,ZESTRIL) 20 MG tablet Take 1 tablet (20 mg total) by mouth once daily. 90 tablet 0    albuterol (PROVENTIL/VENTOLIN HFA) 90 mcg/actuation inhaler Inhale 2 puffs into the lungs every 6 (six) hours as needed for Wheezing. 6.7 g 5    apixaban (ELIQUIS) 5 mg Tab Take 1 tablet (5 mg total) by mouth 2 (two) times daily. (Patient not taking: Reported on 11/11/2022) 180 tablet 1    estrogens, conjugated, (PREMARIN) 0.625 MG tablet Take 1 tablet (0.625 mg total) by mouth once daily. 90 tablet 3     Current Facility-Administered Medications on File Prior to Visit   Medication Dose Route Frequency Provider Last Rate Last Admin    [DISCONTINUED] GENERIC EXTERNAL MEDICATION     Generic External Data Provider        [DISCONTINUED] senna tablet  2 tablet Oral  Generic External Data Provider           Review of Systems   Constitutional: Positive for malaise/fatigue.   HENT:  Negative for hearing loss and hoarse voice.    Eyes:  Negative for blurred vision and visual disturbance.  "  Cardiovascular:  Positive for dyspnea on exertion, palpitations and syncope. Negative for chest pain, claudication, irregular heartbeat, leg swelling, near-syncope, orthopnea and paroxysmal nocturnal dyspnea.   Respiratory:  Negative for cough, hemoptysis, shortness of breath, sleep disturbances due to breathing, snoring and wheezing.    Endocrine: Negative for cold intolerance and heat intolerance.   Hematologic/Lymphatic: Does not bruise/bleed easily.   Skin:  Negative for color change, dry skin and nail changes.   Musculoskeletal:  Positive for arthritis and back pain. Negative for joint pain and myalgias.   Gastrointestinal:  Negative for bloating, abdominal pain, constipation, nausea and vomiting.   Genitourinary:  Negative for dysuria, flank pain, hematuria and hesitancy.   Neurological:  Positive for dizziness and light-headedness. Negative for headaches, loss of balance, numbness, paresthesias and weakness.   Psychiatric/Behavioral:  Negative for altered mental status.    Allergic/Immunologic: Negative for environmental allergies.     Objective:/64 (BP Location: Left arm, Patient Position: Sitting)   Pulse 74   Ht 5' 3" (1.6 m)   Wt 70.8 kg (156 lb)   SpO2 97%   BMI 27.63 kg/m²      Physical Exam  Vitals and nursing note reviewed.   Constitutional:       General: She is not in acute distress.     Appearance: Normal appearance. She is well-developed. She is not ill-appearing.   HENT:      Head: Normocephalic and atraumatic. Right periorbital erythema present.      Nose: Nose normal.      Mouth/Throat:      Mouth: Mucous membranes are moist.   Eyes:      Pupils: Pupils are equal, round, and reactive to light.   Neck:      Thyroid: No thyromegaly.      Vascular: No JVD.      Trachea: No tracheal deviation.   Cardiovascular:      Rate and Rhythm: Normal rate and regular rhythm.      Chest Wall: PMI is not displaced.      Pulses: Intact distal pulses.           Radial pulses are 2+ on the right side " and 2+ on the left side.        Dorsalis pedis pulses are 2+ on the right side and 2+ on the left side.      Heart sounds: S1 normal and S2 normal. Heart sounds not distant. No murmur heard.     Comments: SR FDAVB HR 63  Pulmonary:      Effort: Pulmonary effort is normal. No respiratory distress.      Breath sounds: Normal breath sounds. No decreased breath sounds or wheezing.   Abdominal:      General: Bowel sounds are normal. There is no distension.      Palpations: Abdomen is soft.      Tenderness: There is no abdominal tenderness.   Musculoskeletal:         General: No swelling. Normal range of motion.      Cervical back: Full passive range of motion without pain, normal range of motion and neck supple.      Right ankle: No swelling.      Left ankle: No swelling.   Skin:     General: Skin is warm and dry.      Capillary Refill: Capillary refill takes less than 2 seconds.      Findings: Bruising present.      Nails: There is no clubbing.      Comments: Right periorbital ecchymosis   Neurological:      General: No focal deficit present.      Mental Status: She is alert and oriented to person, place, and time.      Motor: Weakness present.   Psychiatric:         Speech: Speech normal.         Behavior: Behavior normal.         Thought Content: Thought content normal.         Judgment: Judgment normal.       Lab Results   Component Value Date    CHOL 122 02/10/2021    CHOL 136 01/30/2020    CHOL 149 01/10/2019     Lab Results   Component Value Date    HDL 58 02/10/2021    HDL 53 01/30/2020    HDL 62 01/10/2019     Lab Results   Component Value Date    LDLCALC 37.0 (L) 02/10/2021    LDLCALC 48.8 (L) 01/30/2020    LDLCALC 57.6 (L) 01/10/2019     Lab Results   Component Value Date    TRIG 135 02/10/2021    TRIG 171 (H) 01/30/2020    TRIG 147 01/10/2019     Lab Results   Component Value Date    CHOLHDL 47.5 02/10/2021    CHOLHDL 39.0 01/30/2020    CHOLHDL 41.6 01/10/2019       Chemistry        Component Value Date/Time      11/02/2022 1201    K 4.8 11/02/2022 1201     11/02/2022 1201    CO2 24 11/02/2022 1201    BUN 12 11/02/2022 1201    CREATININE 1.0 11/02/2022 1201     (H) 11/02/2022 1201        Component Value Date/Time    CALCIUM 9.2 11/02/2022 1201    ALKPHOS 81 11/02/2022 1201    AST 31 11/02/2022 1201    ALT 29 11/02/2022 1201    BILITOT 0.4 11/02/2022 1201    ESTGFRAFRICA >60 06/25/2022 0357    EGFRNONAA 57 (A) 06/25/2022 0357          Lab Results   Component Value Date    TSH 2.206 11/02/2022     Lab Results   Component Value Date    INR 1.0 10/17/2022    INR 0.9 06/24/2022     Lab Results   Component Value Date    WBC 13.11 (H) 11/02/2022    HGB 12.1 11/02/2022    HCT 38.3 11/02/2022    MCV 92 11/02/2022     11/02/2022     Results for orders placed during the hospital encounter of 10/17/22    Echo    Interpretation Summary  · Mild left atrial enlargement.  · The left ventricle is normal in size with normal systolic function.  · The estimated ejection fraction is 60%.  · Normal left ventricular diastolic function.  · Normal right ventricular size with normal right ventricular systolic function.  · Normal central venous pressure (3 mmHg).  · The estimated PA systolic pressure is 7 mmHg.       Assessment:      1. Symptomatic sinus bradycardia    2. Vasodepressor syncope    3. PAF (paroxysmal atrial fibrillation)        Plan:     Proceed with DC PPM implant (eblizzroniNanostellar) per Dr Walker next week on Nov 15th at 8 AM, arrival time of 6 AM.  NPO status verified with patient  Labs in epic from last week  Hibiclens provided to patient today in office    Stop Lisinopril for now  Will further adjust medical therapy for VDS after PPM implant  RTC in 1 week for wound check/device check    Nicole May, FNP-C [facility]

## 2022-11-10 NOTE — PROGRESS NOTES
Subjective:   Patient ID:  Kelsie Bustamante is a 72 y.o. female who presents for evaluation of Palpitations      HPI    Kelsie Bustamante is a 72 year old female who presents to Arrhythmia clinic for EP referral to discuss PPM implant per Dr Quintana.     Her current medical conditions include PAF on Eliquis, DM Type II, HTN, Fibromyalgia.     She was admitted in October with symptomatic bradycardia and her meds were adjusted upon discharge.     She had syncopal event in Nov 2nd which required admission to [facility]     She was recently admitted due to symptomatic bradycardia and had a syncopal event while returning her holter monitor to clinic. Her holter monitor revealed multiple episodes of significant bradycardis with HR in 30s. She then suffered a SAH and was transferred to [facility] and her eliquis was held. She was started back on Toprol  mg daily. Discussion regarding watchman device during admission at [facility]. She was discharged on Flecainide 100 mg BID and TOprol  mg daily.     Repeat EKG today reveals SR with FDAVB, HR 63 Iván 234 ms    Discussed case with Dr Walker in clinic today, will proceed with PPM implant next week and will discuss further treatment strategies for VDS.     Orthostatic VS today in clinic:  Sitting: /62 HR 72  Standing 1 minute /76 HR 76  Standing 3 minutes /68 HR 79  Standing 5 minutes /66 HR 76      TEST DESCRIPTION   DATE OF PROCEDURE:  10/25/2018     1.  Baseline values are 125/56 and 50 beats per minute.   2.  Normal blood pressure response to head-up tilt.   3.  Flat chronotropic response to head-up tilt.   4.  The patient presented with the following medications on board:  Losartan and metoprolol.   5.  3D spectral analysis of heart rate variability reveals prominent vagal tone at rest with minimal sympathetic tone at rest.  With tilting, eventual sympathetic stimulation is noted, but this remains relatively mild.  Vagal tone eventually suppresses    nearly completely.   6.  Quantitatively, the sympathovagal index was 0.05 or 0.02 at rest and increased to approximately 0.8. This was a very steady and gradually increase, which actually increased eventually to about 1.0.   7.  The cardiovagal tone was 90% at rest and eventually dropped to 25% while the vascular vagal tone was 90% at rest and dropped also to 25%.  The sympathetic cardiac tone was 10% and increased to about 75% while the vascular tone was 10% at rest and   increased to about 25%.   8.  Plethysmographic techniques were used to track changes in stroke index, cardiac index and TPRI.  The following observations were made:  Stroke index was 37 throughout.  Cardiac index was approximately 1.8 L/m2 per minute throughout.  TPRI was   approximately 3900 and increased to about 4500 during tilting.   9.  Valsalva maneuver was performed.  This was of adequate quality and moderate effort.  The response was within normal limits.     CONCLUSIONS:   1.  Normal arterial baroreceptor reflex arch function.   2.  No evidence of excessive venous pooling.   3.  Evidence of chronotropic incompetence.  This may reflect adequate beta blockade.            Past Medical History:   Diagnosis Date    Achalasia     demetrius    Atrial fibrillation with RVR 5/11/2021    Cataract     Colon polyp     Gastroesophageal reflux     Hiatal hernia     Hx of colonic polyps 08/15/2014    per colonoscopy in      Hyperlipidemia 6/24/2022    Hypertension     Peripheral neuropathy     Postmenopausal HRT (hormone replacement therapy)     failed tapering off    Sepsis 5/5/2021    Last Assessment & Plan:  Formatting of this note might be different from the original. History & Physical Patient meets sepsis criteria with a white cell count of 15, and tachypnea.  Source of infection not clear at this time.  No evidence for meningitis.  Cover with broad-spectrum antibiotics especially given invasive dental procedure done recently.  Check blood  cultures and monitor for fever.  R    Sinusitis     Thyroid nodule 3/16 subcm; kathleen 2 yrs    Type 2 diabetes mellitus with neurological manifestations     Vasodepressor syncope 2018       Past Surgical History:   Procedure Laterality Date    BREAST BIOPSY Left 2021    cataract Left      SECTION      CHOLECYSTECTOMY      CYST REMOVAL Right 2021    [facility]    ESOPHAGEAL MANOMETRY WITH MEASUREMENT OF IMPEDANCE N/A 10/6/2022    Procedure: MANOMETRY, ESOPHAGUS, WITH IMPEDANCE MEASUREMENT  Cardiac Clearance/Eliquis 2 day hold approval received per Dr. RABIA Aguilar, Cardiology on 22.  Note in encounters.  LB;  Surgeon: Bruna Fletcher MD;  Location: [facility];  Service: Endoscopy;  Laterality: N/A;    ESOPHAGOGASTRODUODENOSCOPY N/A 10/6/2022    Procedure: EGD (ESOPHAGOGASTRODUODENOSCOPY);  Surgeon: Bruna Fletcher MD;  Location: [facility];  Service: Endoscopy;  Laterality: N/A;    HYSTERECTOMY      nonca    OOPHORECTOMY      TILT TABLE TEST N/A 10/25/2018    Procedure: TILT TABLE TEST;  Surgeon: Daryl Walker MD;  Location: [facility] CATH LAB;  Service: Cardiology;  Laterality: N/A;  VAS.DEP.SYN,HUT,FAS,3PREP    TONSILLECTOMY         Social History     Tobacco Use    Smoking status: Never    Smokeless tobacco: Never   Substance Use Topics    Alcohol use: No    Drug use: No       Family History   Problem Relation Age of Onset    Aneurysm Sister     Heart disease Mother     Diabetes Father     Coronary artery disease Father     Coronary artery disease Brother     Parkinsonism Brother      Wt Readings from Last 3 Encounters:   22 70.8 kg (156 lb)   11/10/22 70 kg (154 lb 5.2 oz)   22 74.4 kg (164 lb)     Temp Readings from Last 3 Encounters:   22 97.8 °F (36.6 °C) (Oral)   10/19/22 98.5 °F (36.9 °C) (Oral)   10/06/22 97.7 °F (36.5 °C) (Temporal)     BP Readings from Last 3 Encounters:   22 114/64   11/10/22 122/74   22 120/83      Pulse Readings from Last 3 Encounters:   11/11/22 74   11/10/22 66   11/02/22 (!) 42     Current Outpatient Medications on File Prior to Visit   Medication Sig Dispense Refill    DULoxetine (CYMBALTA) 20 MG capsule Take 1 capsule (20 mg total) by mouth once daily. 90 capsule 3    flecainide (TAMBOCOR) 100 MG Tab Take 1 tablet (100 mg total) by mouth every 12 (twelve) hours. (Patient taking differently: Take 100 mg by mouth every morning.) 60 tablet 0    metoprolol succinate (TOPROL-XL) 100 MG 24 hr tablet Take 1 tablet (100 mg total) by mouth once daily. (Patient taking differently: Take 100 mg by mouth Daily.) 180 tablet 3    rosuvastatin (CRESTOR) 5 MG tablet Take 1 tablet (5 mg total) by mouth once daily. 90 tablet 3    SITagliptan-metformin (JANUMET) 50-1,000 mg per tablet Take 1 tablet by mouth 2 (two) times daily with meals. 180 tablet 1    zolpidem (AMBIEN) 10 mg Tab 1/2 po qhs prn insomnia 90 tablet 1    [DISCONTINUED] lisinopriL (PRINIVIL,ZESTRIL) 20 MG tablet Take 1 tablet (20 mg total) by mouth once daily. 90 tablet 0    albuterol (PROVENTIL/VENTOLIN HFA) 90 mcg/actuation inhaler Inhale 2 puffs into the lungs every 6 (six) hours as needed for Wheezing. 6.7 g 5    apixaban (ELIQUIS) 5 mg Tab Take 1 tablet (5 mg total) by mouth 2 (two) times daily. (Patient not taking: Reported on 11/11/2022) 180 tablet 1    estrogens, conjugated, (PREMARIN) 0.625 MG tablet Take 1 tablet (0.625 mg total) by mouth once daily. 90 tablet 3     Current Facility-Administered Medications on File Prior to Visit   Medication Dose Route Frequency Provider Last Rate Last Admin    [DISCONTINUED] GENERIC EXTERNAL MEDICATION     Generic External Data Provider        [DISCONTINUED] senna tablet  2 tablet Oral  Generic External Data Provider           Review of Systems   Constitutional: Positive for malaise/fatigue.   HENT:  Negative for hearing loss and hoarse voice.    Eyes:  Negative for blurred vision and visual disturbance.  "  Cardiovascular:  Positive for dyspnea on exertion, palpitations and syncope. Negative for chest pain, claudication, irregular heartbeat, leg swelling, near-syncope, orthopnea and paroxysmal nocturnal dyspnea.   Respiratory:  Negative for cough, hemoptysis, shortness of breath, sleep disturbances due to breathing, snoring and wheezing.    Endocrine: Negative for cold intolerance and heat intolerance.   Hematologic/Lymphatic: Does not bruise/bleed easily.   Skin:  Negative for color change, dry skin and nail changes.   Musculoskeletal:  Positive for arthritis and back pain. Negative for joint pain and myalgias.   Gastrointestinal:  Negative for bloating, abdominal pain, constipation, nausea and vomiting.   Genitourinary:  Negative for dysuria, flank pain, hematuria and hesitancy.   Neurological:  Positive for dizziness and light-headedness. Negative for headaches, loss of balance, numbness, paresthesias and weakness.   Psychiatric/Behavioral:  Negative for altered mental status.    Allergic/Immunologic: Negative for environmental allergies.     Objective:/64 (BP Location: Left arm, Patient Position: Sitting)   Pulse 74   Ht 5' 3" (1.6 m)   Wt 70.8 kg (156 lb)   SpO2 97%   BMI 27.63 kg/m²      Physical Exam  Vitals and nursing note reviewed.   Constitutional:       General: She is not in acute distress.     Appearance: Normal appearance. She is well-developed. She is not ill-appearing.   HENT:      Head: Normocephalic and atraumatic. Right periorbital erythema present.      Nose: Nose normal.      Mouth/Throat:      Mouth: Mucous membranes are moist.   Eyes:      Pupils: Pupils are equal, round, and reactive to light.   Neck:      Thyroid: No thyromegaly.      Vascular: No JVD.      Trachea: No tracheal deviation.   Cardiovascular:      Rate and Rhythm: Normal rate and regular rhythm.      Chest Wall: PMI is not displaced.      Pulses: Intact distal pulses.           Radial pulses are 2+ on the right side " and 2+ on the left side.        Dorsalis pedis pulses are 2+ on the right side and 2+ on the left side.      Heart sounds: S1 normal and S2 normal. Heart sounds not distant. No murmur heard.     Comments: SR FDAVB HR 63  Pulmonary:      Effort: Pulmonary effort is normal. No respiratory distress.      Breath sounds: Normal breath sounds. No decreased breath sounds or wheezing.   Abdominal:      General: Bowel sounds are normal. There is no distension.      Palpations: Abdomen is soft.      Tenderness: There is no abdominal tenderness.   Musculoskeletal:         General: No swelling. Normal range of motion.      Cervical back: Full passive range of motion without pain, normal range of motion and neck supple.      Right ankle: No swelling.      Left ankle: No swelling.   Skin:     General: Skin is warm and dry.      Capillary Refill: Capillary refill takes less than 2 seconds.      Findings: Bruising present.      Nails: There is no clubbing.      Comments: Right periorbital ecchymosis   Neurological:      General: No focal deficit present.      Mental Status: She is alert and oriented to person, place, and time.      Motor: Weakness present.   Psychiatric:         Speech: Speech normal.         Behavior: Behavior normal.         Thought Content: Thought content normal.         Judgment: Judgment normal.       Lab Results   Component Value Date    CHOL 122 02/10/2021    CHOL 136 01/30/2020    CHOL 149 01/10/2019     Lab Results   Component Value Date    HDL 58 02/10/2021    HDL 53 01/30/2020    HDL 62 01/10/2019     Lab Results   Component Value Date    LDLCALC 37.0 (L) 02/10/2021    LDLCALC 48.8 (L) 01/30/2020    LDLCALC 57.6 (L) 01/10/2019     Lab Results   Component Value Date    TRIG 135 02/10/2021    TRIG 171 (H) 01/30/2020    TRIG 147 01/10/2019     Lab Results   Component Value Date    CHOLHDL 47.5 02/10/2021    CHOLHDL 39.0 01/30/2020    CHOLHDL 41.6 01/10/2019       Chemistry        Component Value Date/Time      11/02/2022 1201    K 4.8 11/02/2022 1201     11/02/2022 1201    CO2 24 11/02/2022 1201    BUN 12 11/02/2022 1201    CREATININE 1.0 11/02/2022 1201     (H) 11/02/2022 1201        Component Value Date/Time    CALCIUM 9.2 11/02/2022 1201    ALKPHOS 81 11/02/2022 1201    AST 31 11/02/2022 1201    ALT 29 11/02/2022 1201    BILITOT 0.4 11/02/2022 1201    ESTGFRAFRICA >60 06/25/2022 0357    EGFRNONAA 57 (A) 06/25/2022 0357          Lab Results   Component Value Date    TSH 2.206 11/02/2022     Lab Results   Component Value Date    INR 1.0 10/17/2022    INR 0.9 06/24/2022     Lab Results   Component Value Date    WBC 13.11 (H) 11/02/2022    HGB 12.1 11/02/2022    HCT 38.3 11/02/2022    MCV 92 11/02/2022     11/02/2022     Results for orders placed during the hospital encounter of 10/17/22    Echo    Interpretation Summary  · Mild left atrial enlargement.  · The left ventricle is normal in size with normal systolic function.  · The estimated ejection fraction is 60%.  · Normal left ventricular diastolic function.  · Normal right ventricular size with normal right ventricular systolic function.  · Normal central venous pressure (3 mmHg).  · The estimated PA systolic pressure is 7 mmHg.       Assessment:      1. Symptomatic sinus bradycardia    2. Vasodepressor syncope    3. PAF (paroxysmal atrial fibrillation)        Plan:     Proceed with DC PPM implant ([email or web address]) per Dr Walker next week on Nov 15th at 8 AM, arrival time of 6 AM.  NPO status verified with patient  Labs in epic from last week  Hibiclens provided to patient today in office    Stop Lisinopril for now  Will further adjust medical therapy for VDS after PPM implant  RTC in 1 week for wound check/device check    Nicole May, FNP-C [facility]

## 2022-11-11 ENCOUNTER — OFFICE VISIT (OUTPATIENT)
Dept: OPHTHALMOLOGY | Facility: CLINIC | Age: 72
End: 2022-11-11
Payer: MEDICARE

## 2022-11-11 ENCOUNTER — PATIENT OUTREACH (OUTPATIENT)
Dept: ADMINISTRATIVE | Facility: HOSPITAL | Age: 72
End: 2022-11-11
Payer: MEDICARE

## 2022-11-11 ENCOUNTER — OFFICE VISIT (OUTPATIENT)
Dept: CARDIOLOGY | Facility: CLINIC | Age: 72
End: 2022-11-11
Payer: MEDICARE

## 2022-11-11 ENCOUNTER — HOSPITAL ENCOUNTER (OUTPATIENT)
Dept: CARDIOLOGY | Facility: HOSPITAL | Age: 72
Discharge: HOME | End: 2022-11-11
Payer: MEDICARE

## 2022-11-11 VITALS
SYSTOLIC BLOOD PRESSURE: 114 MMHG | DIASTOLIC BLOOD PRESSURE: 64 MMHG | HEIGHT: 63 IN | WEIGHT: 156 LBS | BODY MASS INDEX: 27.64 KG/M2 | HEART RATE: 74 BPM | OXYGEN SATURATION: 97 %

## 2022-11-11 DIAGNOSIS — I48.0 PAF (PAROXYSMAL ATRIAL FIBRILLATION): Chronic | ICD-10-CM

## 2022-11-11 DIAGNOSIS — H05.231 PERIORBITAL HEMATOMA OF RIGHT EYE: Primary | ICD-10-CM

## 2022-11-11 DIAGNOSIS — R55 VASODEPRESSOR SYNCOPE: ICD-10-CM

## 2022-11-11 DIAGNOSIS — H43.391 VITREOUS SYNERESIS OF RIGHT EYE: ICD-10-CM

## 2022-11-11 DIAGNOSIS — E11.9 DIABETES MELLITUS WITHOUT COMPLICATION: ICD-10-CM

## 2022-11-11 DIAGNOSIS — H11.31 SUBCONJUNCTIVAL HEMORRHAGE OF RIGHT EYE: ICD-10-CM

## 2022-11-11 DIAGNOSIS — Z96.1 PSEUDOPHAKIA OF BOTH EYES: ICD-10-CM

## 2022-11-11 DIAGNOSIS — R00.1 SYMPTOMATIC SINUS BRADYCARDIA: Primary | ICD-10-CM

## 2022-11-11 PROCEDURE — 99999 PR PBB SHADOW E&M-EST. PATIENT-LVL III: CPT | Mod: PBBFAC,,, | Performed by: OPTOMETRIST

## 2022-11-11 PROCEDURE — 92004 PR EYE EXAM, NEW PATIENT,COMPREHESV: ICD-10-PCS | Mod: S$GLB,,, | Performed by: OPTOMETRIST

## 2022-11-11 PROCEDURE — 99999 PR PBB SHADOW E&M-EST. PATIENT-LVL III: ICD-10-PCS | Mod: PBBFAC,,, | Performed by: NURSE PRACTITIONER

## 2022-11-11 PROCEDURE — 93010 EKG 12-LEAD: ICD-10-PCS | Mod: ,,, | Performed by: INTERNAL MEDICINE

## 2022-11-11 PROCEDURE — 99213 OFFICE O/P EST LOW 20 MIN: CPT | Mod: PBBFAC,27 | Performed by: OPTOMETRIST

## 2022-11-11 PROCEDURE — 93010 ELECTROCARDIOGRAM REPORT: CPT | Mod: ,,, | Performed by: INTERNAL MEDICINE

## 2022-11-11 PROCEDURE — 99999 PR PBB SHADOW E&M-EST. PATIENT-LVL III: CPT | Mod: PBBFAC,,, | Performed by: NURSE PRACTITIONER

## 2022-11-11 PROCEDURE — 99999 PR PBB SHADOW E&M-EST. PATIENT-LVL III: ICD-10-PCS | Mod: PBBFAC,,, | Performed by: OPTOMETRIST

## 2022-11-11 PROCEDURE — 93005 ELECTROCARDIOGRAM TRACING: CPT

## 2022-11-11 PROCEDURE — 99213 OFFICE O/P EST LOW 20 MIN: CPT | Mod: PBBFAC | Performed by: NURSE PRACTITIONER

## 2022-11-11 PROCEDURE — 99214 OFFICE O/P EST MOD 30 MIN: CPT | Mod: S$GLB,,, | Performed by: NURSE PRACTITIONER

## 2022-11-11 PROCEDURE — 99214 PR OFFICE/OUTPT VISIT, EST, LEVL IV, 30-39 MIN: ICD-10-PCS | Mod: S$GLB,,, | Performed by: NURSE PRACTITIONER

## 2022-11-11 PROCEDURE — 92004 COMPRE OPH EXAM NEW PT 1/>: CPT | Mod: S$GLB,,, | Performed by: OPTOMETRIST

## 2022-11-11 NOTE — PROGRESS NOTES
Received message via [facility] in box that patient would like to schedule colonoscopy. Patient has referral to endo  for colonoscopy already entered in chart

## 2022-11-11 NOTE — PROGRESS NOTES
HPI    NP to DKT  Patient here today for red eye  Patient had a fall last week hitting OD  Eye is red and bruised  No pain or discomfort    Last edited by Bryanna Wing, PCT on 11/11/2022  1:16 PM.            Assessment /Plan     For exam results, see Encounter Report.    Periorbital hematoma of right eye  Patient suffered fall 1 week prior. DFE today normal with no holes or tears. RD precautions reviewed. Observe at this time.     Subconjunctival hemorrhage of right eye  Benign nature of subconjunctival hemorrhage was discussed with patient and/or patient's family. Reassurance provided. Symptoms should improve or resolve in the next 7-10 days. RTC PRN if symptoms worsen or persist x 1-2 weeks.  Discussed above and answered questions.     Diabetes mellitus without complication  Last A1c 5.8 Stressed importance of DM control to preserve vision. No diabetic retinopathy was seen in either eye today. Continue strict blood glucose control.  Reviewed importance of yearly dilated eye exams. Continue close care with PCP regarding diabetes.    Vitreous syneresis of right eye  RD precautions reviewed. Observe.     Pseudophakia of both eyes  S/p CE/IOL, doing well with OTC readers PRN.     RTC 1 yr for dilated eye exam or sooner if any changes to vision.   Discussed above and answered questions.

## 2022-11-11 NOTE — PROGRESS NOTES
Subjective:   Patient ID:  Kelsie Bustamante is a 72 y.o. female who presents for follow up of No chief complaint on file.  11/10/2022.    She was recently admitted to the hospital with symptomatic bradycardia.  Review of her most recent EKG in the system shows junctional bradycardia with retrograde P wave.    She has seen Dr. Aguilar after her last visit.  Her Holter monitor was abnormal with multiple episodes of significant bradycardia with heart rates in the 30s.    Not clear what measure was taken at that point.  She states that possibly she held her metoprolol after her Holter monitor results.  But however she is not certain.    She was then found to have subarachnoid bleed after her recent fall.    Was monitored at [facility] after transfer.  And her Eliquis was held.    Of note she was short back again on Toprol 100 mg.    I discussed with her need to stop her Toprol.  She states that she would feel momentarily her AFib being uncontrolled with that.      7.7.2022  She had COVID 2 weeks ago.  She took steroids.  She states that her palpitations are better but she takes Toprol 100 mg in the morning and 50 mg in the evening.  Still on flecainide 50 mg b.i.d..  She states that yesterday she felt weak when she stood up and felt dizzy and had to sit down.  She denies any syncope however.    3.14.2022   She comes in still having a palpitations.  She states that she is having them all day.  She did not get her Holter monitor she thought that will be an event monitor or a bar the and she does not want to have a sticker on her chest as she states she had bad reaction to it in the past.  Patient homeless after conversation to get her Holter monitor.  On exam she has frequent PACs she has a regular rhythm with PACs.  She is taking Toprol XL 50 mg in the morning and metoprolol tartrate 50 mg in the evening.  She is not taking Cardizem.  She denies any chest pains, dyspnea, lightheadedness, weakness,.  She is  compliant with her medications.  She is taking her Eliquis no bleeding.  She had a Holter monitor in the past which showed rare PACs.  And 2017 12.16.2021   1 cup of coffee, no alcohol , no smoking   Palpitations, 2 mins twice a day , mostly during the day between 10-2 pm   No chest pain, no santos,   Never smoker   States get her palpitaitons before it is time to take the next dose of toprol xl   NO CHEST PAIN   Normal ETT 2 years ago   Seen Dr Aguilar and other providers see notes below     5.2021  71 yo female,discharge f/u.  PMH PAF HTN DM HLD  [facility] with RVR. Converted to SR after IV cardizem,  troponin negative x2, Cr 0.8 and H&H 11/36.  Echo showed normal biv function.  F/u with Dr. Tim in 2018 for positional dizziness and subsequent tilt table test negative    Today states that   Occasional dizziness and faint at home  Some SOB while doing something  No chest pain   Some coughing and fatigue  No active bleeding         Follow-up  Pertinent negatives include no abdominal pain, chest pain, coughing, diaphoresis, fever, headaches, joint swelling, nausea, neck pain, numbness, rash, vomiting or weakness.   Dizziness:    Associated symptoms: palpitations.no fever, no headaches, no nausea, no vomiting, no diaphoresis, no weakness, no light-headedness, no syncope and no chest pain.  8/2019   Resting HR: 77 bpm    Target HR: 129 bpm   Resting BP: 136/70 mmHg     Max HR: 135 bpm % Target: 109%   Max Systolic: 212 mmHg   Max Diastolic: 72 mmHg   Exercise Duration: 04:51 min:sec    METS: 6.2 METS     The test was terminated due to the patient requesting to stop;   experiencing fatigue and shortness of breath; .     The patient reported fatigue; shortness of breath; headache; no chest   pain; .     FINDINGS   Baseline ECG: Normal sinus rhythm     Stress ECG: Sinus tachycardia, no ischemic changes     Arrhythmias: No significant arrhythmias, 2 couplets were noted     CONCLUSION   1. Low  risk treadmill stress electrocardiogram.   2. Normal blood pressure response.   3. Above Average exercise tolerance for age and gender.       [facility]  Specimen Collected: -- Last Resulted: 19 10:55 AM   Received From: [facility]  Result Received: 21  9:29 AM       Past Medical History:   Diagnosis Date    Achalasia     demetrius    Atrial fibrillation with RVR 2021    Cataract     Colon polyp     Gastroesophageal reflux     Hiatal hernia     Hx of colonic polyps 08/15/2014    per colonoscopy in      Hyperlipidemia 2022    Hypertension     Peripheral neuropathy     Postmenopausal HRT (hormone replacement therapy)     failed tapering off    Sepsis 2021    Last Assessment & Plan:  Formatting of this note might be different from the original. History & Physical Patient meets sepsis criteria with a white cell count of 15, and tachypnea.  Source of infection not clear at this time.  No evidence for meningitis.  Cover with broad-spectrum antibiotics especially given invasive dental procedure done recently.  Check blood cultures and monitor for fever.  R    Sinusitis     Thyroid nodule 3/16 subcm; kathleen 2 yrs    Type 2 diabetes mellitus with neurological manifestations     Vasodepressor syncope 2018       Past Surgical History:   Procedure Laterality Date    BREAST BIOPSY Left 2021    cataract Left      SECTION      CHOLECYSTECTOMY      CYST REMOVAL Right 2021    [facility]    ESOPHAGEAL MANOMETRY WITH MEASUREMENT OF IMPEDANCE N/A 10/6/2022    Procedure: MANOMETRY, ESOPHAGUS, WITH IMPEDANCE MEASUREMENT  Cardiac Clearance/Eliquis 2 day hold approval received per Dr. RABIA Aguilar, Cardiology on 22.  Note in encounters.  LB;  Surgeon: Bruna Fletcher MD;  Location: [facility];  Service: Endoscopy;  Laterality: N/A;    ESOPHAGOGASTRODUODENOSCOPY N/A  10/6/2022    Procedure: EGD (ESOPHAGOGASTRODUODENOSCOPY);  Surgeon: Bruna Fletcher MD;  Location: [facility];  Service: Endoscopy;  Laterality: N/A;    HYSTERECTOMY      nonca    OOPHORECTOMY      TILT TABLE TEST N/A 10/25/2018    Procedure: TILT TABLE TEST;  Surgeon: Daryl Walker MD;  Location: [facility] CATH LAB;  Service: Cardiology;  Laterality: N/A;  VAS.DEP.SYN,HUT,FAS,3PREP    TONSILLECTOMY         Social History     Tobacco Use    Smoking status: Never    Smokeless tobacco: Never   Substance Use Topics    Alcohol use: No    Drug use: No       Family History   Problem Relation Age of Onset    Aneurysm Sister     Heart disease Mother     Diabetes Father     Coronary artery disease Father     Coronary artery disease Brother     Parkinsonism Brother            Review of Systems   Constitutional:  Positive for malaise/fatigue. Negative for diaphoresis and fever.   HENT:  Negative for nosebleeds.    Eyes:  Negative for blurred vision and double vision.   Respiratory:  Negative for cough and shortness of breath.    Cardiovascular:  Positive for palpitations. Negative for chest pain, orthopnea, claudication, leg swelling, syncope and PND.   Gastrointestinal:  Negative for abdominal pain, heartburn, nausea and vomiting.   Genitourinary:  Negative for dysuria, frequency and hematuria.   Musculoskeletal:  Negative for back pain, falls, joint pain, joint swelling and neck pain.   Skin:  Negative for rash.   Neurological:  Positive for dizziness. Negative for focal weakness, seizures, weakness, light-headedness, numbness and headaches.   Endo/Heme/Allergies:  Does not bruise/bleed easily.   Psychiatric/Behavioral:  Negative for depression, memory loss and substance abuse. The patient does not have insomnia.      Objective:   Physical Exam  HENT:      Head: Normocephalic.   Eyes:      Pupils: Pupils are equal, round, and reactive to light.   Neck:      Thyroid: No thyromegaly.      Vascular: Normal carotid pulses.  No carotid bruit or JVD.   Cardiovascular:      Rate and Rhythm: Normal rate and regular rhythm. No extrasystoles are present.     Chest Wall: PMI is not displaced.      Pulses: Normal pulses.      Heart sounds: Normal heart sounds. No murmur heard.    No gallop. No S3 sounds.   Pulmonary:      Effort: No respiratory distress.      Breath sounds: Normal breath sounds. No stridor.   Abdominal:      General: Bowel sounds are normal.      Palpations: Abdomen is soft.      Tenderness: There is no abdominal tenderness. There is no rebound.   Musculoskeletal:         General: Normal range of motion.   Skin:     Findings: No rash.   Neurological:      Mental Status: She is alert and oriented to person, place, and time.   Psychiatric:         Behavior: Behavior normal.       Lab Results   Component Value Date    CHOL 122 02/10/2021    CHOL 136 01/30/2020    CHOL 149 01/10/2019     Lab Results   Component Value Date    HDL 58 02/10/2021    HDL 53 01/30/2020    HDL 62 01/10/2019     Lab Results   Component Value Date    LDLCALC 37.0 (L) 02/10/2021    LDLCALC 48.8 (L) 01/30/2020    LDLCALC 57.6 (L) 01/10/2019     Lab Results   Component Value Date    TRIG 135 02/10/2021    TRIG 171 (H) 01/30/2020    TRIG 147 01/10/2019     Lab Results   Component Value Date    CHOLHDL 47.5 02/10/2021    CHOLHDL 39.0 01/30/2020    CHOLHDL 41.6 01/10/2019       Chemistry        Component Value Date/Time     11/02/2022 1201    K 4.8 11/02/2022 1201     11/02/2022 1201    CO2 24 11/02/2022 1201    BUN 12 11/02/2022 1201    CREATININE 1.0 11/02/2022 1201     (H) 11/02/2022 1201        Component Value Date/Time    CALCIUM 9.2 11/02/2022 1201    ALKPHOS 81 11/02/2022 1201    AST 31 11/02/2022 1201    ALT 29 11/02/2022 1201    BILITOT 0.4 11/02/2022 1201    ESTGFRAFRICA >60 06/25/2022 0357    EGFRNONAA 57 (A) 06/25/2022 0357          Lab Results   Component Value Date    HGBA1C 5.8 (H) 08/16/2021     Lab Results   Component Value  Date    TSH 2.206 11/02/2022     Lab Results   Component Value Date    INR 1.0 10/17/2022    INR 0.9 06/24/2022     Lab Results   Component Value Date    WBC 13.11 (H) 11/02/2022    HGB 12.1 11/02/2022    HCT 38.3 11/02/2022    MCV 92 11/02/2022     11/02/2022     BMP  Sodium   Date Value Ref Range Status   11/02/2022 138 136 - 145 mmol/L Final     Potassium   Date Value Ref Range Status   11/02/2022 4.8 3.5 - 5.1 mmol/L Final     Chloride   Date Value Ref Range Status   11/02/2022 105 95 - 110 mmol/L Final     CO2   Date Value Ref Range Status   11/02/2022 24 23 - 29 mmol/L Final     BUN   Date Value Ref Range Status   11/02/2022 12 8 - 23 mg/dL Final     Creatinine   Date Value Ref Range Status   11/02/2022 1.0 0.5 - 1.4 mg/dL Final     Calcium   Date Value Ref Range Status   11/02/2022 9.2 8.7 - 10.5 mg/dL Final     Anion Gap   Date Value Ref Range Status   11/02/2022 9 8 - 16 mmol/L Final     eGFR if    Date Value Ref Range Status   06/25/2022 >60 >60 mL/min/1.73 m^2 Final     eGFR if non    Date Value Ref Range Status   06/25/2022 57 (A) >60 mL/min/1.73 m^2 Final     Comment:     Calculation used to obtain the estimated glomerular filtration  rate (eGFR) is the CKD-EPI equation.        BNP  @LABRCNTIP(BNP,BNPTRIAGEBLO)@  @LABRCNTIP(troponini)@  CrCl cannot be calculated (Patient's most recent lab result is older than the maximum 7 days allowed.).  No results found in the last 24 hours.  No results found in the last 24 hours.  No results found in the last 24 hours.    Assessment:      1. Primary hypertension    2. Symptomatic sinus bradycardia    3. PAF (paroxysmal atrial fibrillation)    4. Type 2 diabetes mellitus without complication, without long-term current use of insulin    5. Subarachnoid hemorrhage    6. Hyperlipidemia, unspecified hyperlipidemia type    7. Statin intolerance          Plan:   on flecainide and Toprol     Patient declined to stop the Toprol given  her episodes of paroxysmal atrial fibrillation.   Explained to the patient given her sinus node dysfunction and symptomatic bradycardia she will need a pacemaker.  Will set her up with EP follow-up tomorrow.    Off Eliquis due to recent fall and subarachnoid hemorrhage  Reviewed all tests and above medical conditions with patient in detail and formulated treatment plan.  Risk factor modification discussed.   Cardiac low salt diet discussed.  Maintaining healthy weight and weight loss goals were discussed in clinic.    rtc in 6 months

## 2022-11-14 ENCOUNTER — TELEPHONE (OUTPATIENT)
Dept: CARDIOLOGY | Facility: HOSPITAL | Age: 72
End: 2022-11-14
Payer: MEDICARE

## 2022-11-14 ENCOUNTER — TELEPHONE (OUTPATIENT)
Dept: CARDIOLOGY | Facility: CLINIC | Age: 72
End: 2022-11-14
Payer: MEDICARE

## 2022-11-14 ENCOUNTER — PATIENT MESSAGE (OUTPATIENT)
Dept: CARDIOLOGY | Facility: CLINIC | Age: 72
End: 2022-11-14
Payer: MEDICARE

## 2022-11-14 DIAGNOSIS — R55 VASODEPRESSOR SYNCOPE: ICD-10-CM

## 2022-11-14 DIAGNOSIS — R00.1 SYMPTOMATIC SINUS BRADYCARDIA: Primary | ICD-10-CM

## 2022-11-14 DIAGNOSIS — I48.0 PAF (PAROXYSMAL ATRIAL FIBRILLATION): ICD-10-CM

## 2022-11-14 RX ORDER — SODIUM CHLORIDE 0.9 % (FLUSH) 0.9 %
10 SYRINGE (ML) INJECTION
Status: CANCELLED | OUTPATIENT
Start: 2022-11-15

## 2022-11-14 NOTE — TELEPHONE ENCOUNTER
Returned call to patient, she wasn't feeling well yesterday and went to ER.  She tested positive for flu, has been running fever but it appears to have broken earlier today.  She is currently scheduled for device implant 11/15/22.  Would like to postpone procedure until recovered from flu.  Notified Dr. Delatorre and procedure .

## 2022-11-14 NOTE — TELEPHONE ENCOUNTER
----- Message from Melita Peters LPN sent at 11/14/2022  3:01 PM CST -----  Contact: kelsie    ----- Message -----  From: Lubna Hussein  Sent: 11/14/2022   2:58 PM CST  To: [facility] Arrhythmia Proc Clinical Support    Kelsie would like a call back at [phone], Regards to getting her pacemaker appointment reschedule she stated that she has the flu.    Thanks  Td

## 2022-11-14 NOTE — TELEPHONE ENCOUNTER
Telephoned patient to reschedule PPM implant to 12/6 or 12/8    Pt called clinic informing us that she tested positive for flu yesterday, has been running fever (it broke earlier today).  She would like to reschedule pacer implant.  Dr. Delatorre notified and will contact patient with new date later

## 2022-11-15 ENCOUNTER — TELEPHONE (OUTPATIENT)
Dept: INTERNAL MEDICINE | Facility: CLINIC | Age: 72
End: 2022-11-15
Payer: MEDICARE

## 2022-11-16 ENCOUNTER — PATIENT MESSAGE (OUTPATIENT)
Dept: INTERNAL MEDICINE | Facility: CLINIC | Age: 72
End: 2022-11-16
Payer: MEDICARE

## 2022-12-02 DIAGNOSIS — Z95.0 CARDIAC PACEMAKER IN SITU: Primary | ICD-10-CM

## 2022-12-02 DIAGNOSIS — R00.1 SYMPTOMATIC SINUS BRADYCARDIA: ICD-10-CM

## 2022-12-05 ENCOUNTER — PATIENT MESSAGE (OUTPATIENT)
Dept: CARDIOLOGY | Facility: CLINIC | Age: 72
End: 2022-12-05
Payer: MEDICARE

## 2022-12-06 ENCOUNTER — HOSPITAL ENCOUNTER (OUTPATIENT)
Facility: HOSPITAL | Age: 72
Discharge: HOME | End: 2022-12-06
Attending: INTERNAL MEDICINE | Admitting: INTERNAL MEDICINE
Payer: MEDICARE

## 2022-12-06 ENCOUNTER — ANESTHESIA (OUTPATIENT)
Dept: CARDIOLOGY | Facility: HOSPITAL | Age: 72
End: 2022-12-06
Payer: MEDICARE

## 2022-12-06 ENCOUNTER — ANESTHESIA EVENT (OUTPATIENT)
Dept: CARDIOLOGY | Facility: HOSPITAL | Age: 72
End: 2022-12-06
Payer: MEDICARE

## 2022-12-06 DIAGNOSIS — R00.1 SYMPTOMATIC SINUS BRADYCARDIA: ICD-10-CM

## 2022-12-06 DIAGNOSIS — I49.9 ARRHYTHMIA: ICD-10-CM

## 2022-12-06 DIAGNOSIS — I48.0 PAF (PAROXYSMAL ATRIAL FIBRILLATION): ICD-10-CM

## 2022-12-06 DIAGNOSIS — R55 VASODEPRESSOR SYNCOPE: ICD-10-CM

## 2022-12-06 LAB
ANION GAP SERPL CALC-SCNC: 14 MMOL/L (ref 8–16)
BASOPHILS # BLD AUTO: 0.04 K/UL (ref 0–0.2)
BASOPHILS NFR BLD: 0.3 % (ref 0–1.9)
BUN SERPL-MCNC: 17 MG/DL (ref 8–23)
CALCIUM SERPL-MCNC: 9.7 MG/DL (ref 8.7–10.5)
CHLORIDE SERPL-SCNC: 104 MMOL/L (ref 95–110)
CO2 SERPL-SCNC: 21 MMOL/L (ref 23–29)
CREAT SERPL-MCNC: 0.9 MG/DL (ref 0.5–1.4)
DIFFERENTIAL METHOD: ABNORMAL
EOSINOPHIL # BLD AUTO: 0.3 K/UL (ref 0–0.5)
EOSINOPHIL NFR BLD: 2.2 % (ref 0–8)
ERYTHROCYTE [DISTWIDTH] IN BLOOD BY AUTOMATED COUNT: 13.2 % (ref 11.5–14.5)
EST. GFR  (NO RACE VARIABLE): >60 ML/MIN/1.73 M^2
GLUCOSE SERPL-MCNC: 162 MG/DL (ref 70–110)
HCT VFR BLD AUTO: 41.3 % (ref 37–48.5)
HGB BLD-MCNC: 13.5 G/DL (ref 12–16)
IMM GRANULOCYTES # BLD AUTO: 0.05 K/UL (ref 0–0.04)
IMM GRANULOCYTES NFR BLD AUTO: 0.4 % (ref 0–0.5)
INR PPP: 0.9 (ref 0.8–1.2)
LYMPHOCYTES # BLD AUTO: 2 K/UL (ref 1–4.8)
LYMPHOCYTES NFR BLD: 15.1 % (ref 18–48)
MCH RBC QN AUTO: 29.5 PG (ref 27–31)
MCHC RBC AUTO-ENTMCNC: 32.7 G/DL (ref 32–36)
MCV RBC AUTO: 90 FL (ref 82–98)
MONOCYTES # BLD AUTO: 0.9 K/UL (ref 0.3–1)
MONOCYTES NFR BLD: 7.3 % (ref 4–15)
NEUTROPHILS # BLD AUTO: 9.7 K/UL (ref 1.8–7.7)
NEUTROPHILS NFR BLD: 74.7 % (ref 38–73)
NRBC BLD-RTO: 0 /100 WBC
PLATELET # BLD AUTO: 311 K/UL (ref 150–450)
PMV BLD AUTO: 9.1 FL (ref 9.2–12.9)
POTASSIUM SERPL-SCNC: 4.4 MMOL/L (ref 3.5–5.1)
PROTHROMBIN TIME: 9.6 SEC (ref 9–12.5)
RBC # BLD AUTO: 4.57 M/UL (ref 4–5.4)
SODIUM SERPL-SCNC: 139 MMOL/L (ref 136–145)
WBC # BLD AUTO: 12.93 K/UL (ref 3.9–12.7)

## 2022-12-06 PROCEDURE — 85025 COMPLETE CBC W/AUTO DIFF WBC: CPT | Performed by: INTERNAL MEDICINE

## 2022-12-06 PROCEDURE — 99499 NO LOS: ICD-10-PCS | Mod: ,,, | Performed by: INTERNAL MEDICINE

## 2022-12-06 PROCEDURE — 27201423 OPTIME MED/SURG SUP & DEVICES STERILE SUPPLY: Performed by: INTERNAL MEDICINE

## 2022-12-06 PROCEDURE — C1769 GUIDE WIRE: HCPCS | Performed by: INTERNAL MEDICINE

## 2022-12-06 PROCEDURE — 63600175 PHARM REV CODE 636 W HCPCS: Performed by: NURSE ANESTHETIST, CERTIFIED REGISTERED

## 2022-12-06 PROCEDURE — 99499 UNLISTED E&M SERVICE: CPT | Mod: ,,, | Performed by: INTERNAL MEDICINE

## 2022-12-06 PROCEDURE — 93010 EKG 12-LEAD: ICD-10-PCS | Mod: 76,,, | Performed by: INTERNAL MEDICINE

## 2022-12-06 PROCEDURE — 63600175 PHARM REV CODE 636 W HCPCS: Performed by: INTERNAL MEDICINE

## 2022-12-06 PROCEDURE — 25000003 PHARM REV CODE 250: Performed by: NURSE ANESTHETIST, CERTIFIED REGISTERED

## 2022-12-06 PROCEDURE — C1785 PMKR, DUAL, RATE-RESP: HCPCS | Performed by: INTERNAL MEDICINE

## 2022-12-06 PROCEDURE — 33208 PR INSER HART PACER XVENOUS ATR/VENTR: ICD-10-PCS | Mod: ,,, | Performed by: INTERNAL MEDICINE

## 2022-12-06 PROCEDURE — C1898 LEAD, PMKR, OTHER THAN TRANS: HCPCS | Performed by: INTERNAL MEDICINE

## 2022-12-06 PROCEDURE — C1894 INTRO/SHEATH, NON-LASER: HCPCS | Performed by: INTERNAL MEDICINE

## 2022-12-06 PROCEDURE — 93005 ELECTROCARDIOGRAM TRACING: CPT | Mod: 59

## 2022-12-06 PROCEDURE — 37000009 HC ANESTHESIA EA ADD 15 MINS: Performed by: INTERNAL MEDICINE

## 2022-12-06 PROCEDURE — 33208 INSRT HEART PM ATRIAL & VENT: CPT | Mod: ,,, | Performed by: INTERNAL MEDICINE

## 2022-12-06 PROCEDURE — 80048 BASIC METABOLIC PNL TOTAL CA: CPT | Performed by: INTERNAL MEDICINE

## 2022-12-06 PROCEDURE — 33208 INSRT HEART PM ATRIAL & VENT: CPT | Performed by: INTERNAL MEDICINE

## 2022-12-06 PROCEDURE — 25000003 PHARM REV CODE 250: Performed by: INTERNAL MEDICINE

## 2022-12-06 PROCEDURE — 85610 PROTHROMBIN TIME: CPT | Performed by: INTERNAL MEDICINE

## 2022-12-06 PROCEDURE — 37000008 HC ANESTHESIA 1ST 15 MINUTES: Performed by: INTERNAL MEDICINE

## 2022-12-06 PROCEDURE — 93010 ELECTROCARDIOGRAM REPORT: CPT | Mod: 76,,, | Performed by: INTERNAL MEDICINE

## 2022-12-06 DEVICE — IMPLANTABLE DEVICE
Type: IMPLANTABLE DEVICE | Site: CHEST | Status: FUNCTIONAL
Brand: EDORA 8 DR-T

## 2022-12-06 DEVICE — IMPLANTABLE DEVICE
Type: IMPLANTABLE DEVICE | Site: HEART | Status: FUNCTIONAL
Brand: SOLIA

## 2022-12-06 RX ORDER — CEFAZOLIN SODIUM 1 G/3ML
INJECTION, POWDER, FOR SOLUTION INTRAMUSCULAR; INTRAVENOUS
Status: DISCONTINUED | OUTPATIENT
Start: 2022-12-06 | End: 2022-12-06

## 2022-12-06 RX ORDER — LIDOCAINE HYDROCHLORIDE 20 MG/ML
INJECTION INTRAVENOUS
Status: DISCONTINUED | OUTPATIENT
Start: 2022-12-06 | End: 2022-12-06

## 2022-12-06 RX ORDER — KETOROLAC TROMETHAMINE 10 MG/1
10 TABLET, FILM COATED ORAL EVERY 6 HOURS
Qty: 20 TABLET | Refills: 0 | Status: SHIPPED | OUTPATIENT
Start: 2022-12-06 | End: 2022-12-11

## 2022-12-06 RX ORDER — HYDROCODONE BITARTRATE AND ACETAMINOPHEN 5; 325 MG/1; MG/1
1 TABLET ORAL EVERY 4 HOURS PRN
Status: DISCONTINUED | OUTPATIENT
Start: 2022-12-06 | End: 2022-12-06 | Stop reason: HOSPADM

## 2022-12-06 RX ORDER — CEFAZOLIN SODIUM 2 G/50ML
2 SOLUTION INTRAVENOUS
Status: DISCONTINUED | OUTPATIENT
Start: 2022-12-06 | End: 2022-12-06 | Stop reason: HOSPADM

## 2022-12-06 RX ORDER — HYDROCODONE BITARTRATE AND ACETAMINOPHEN 10; 325 MG/1; MG/1
1 TABLET ORAL EVERY 4 HOURS PRN
Status: DISCONTINUED | OUTPATIENT
Start: 2022-12-06 | End: 2022-12-06 | Stop reason: HOSPADM

## 2022-12-06 RX ORDER — ONDANSETRON 2 MG/ML
INJECTION INTRAMUSCULAR; INTRAVENOUS
Status: DISCONTINUED | OUTPATIENT
Start: 2022-12-06 | End: 2022-12-06

## 2022-12-06 RX ORDER — VANCOMYCIN HYDROCHLORIDE 1 G/20ML
INJECTION, POWDER, LYOPHILIZED, FOR SOLUTION INTRAVENOUS
Status: DISCONTINUED | OUTPATIENT
Start: 2022-12-06 | End: 2022-12-06 | Stop reason: ALTCHOICE

## 2022-12-06 RX ORDER — ACETAMINOPHEN 325 MG/1
650 TABLET ORAL EVERY 4 HOURS PRN
Status: DISCONTINUED | OUTPATIENT
Start: 2022-12-06 | End: 2022-12-06 | Stop reason: HOSPADM

## 2022-12-06 RX ORDER — PROPOFOL 10 MG/ML
VIAL (ML) INTRAVENOUS CONTINUOUS PRN
Status: DISCONTINUED | OUTPATIENT
Start: 2022-12-06 | End: 2022-12-06

## 2022-12-06 RX ORDER — MIDAZOLAM HYDROCHLORIDE 1 MG/ML
INJECTION, SOLUTION INTRAMUSCULAR; INTRAVENOUS
Status: DISCONTINUED | OUTPATIENT
Start: 2022-12-06 | End: 2022-12-06

## 2022-12-06 RX ORDER — LIDOCAINE HYDROCHLORIDE 20 MG/ML
INJECTION, SOLUTION EPIDURAL; INFILTRATION; INTRACAUDAL; PERINEURAL
Status: DISCONTINUED | OUTPATIENT
Start: 2022-12-06 | End: 2022-12-06 | Stop reason: HOSPADM

## 2022-12-06 RX ORDER — KETOROLAC TROMETHAMINE 30 MG/ML
15 INJECTION, SOLUTION INTRAMUSCULAR; INTRAVENOUS ONCE
Status: COMPLETED | OUTPATIENT
Start: 2022-12-06 | End: 2022-12-06

## 2022-12-06 RX ORDER — SODIUM CHLORIDE 0.9 % (FLUSH) 0.9 %
10 SYRINGE (ML) INJECTION
Status: DISCONTINUED | OUTPATIENT
Start: 2022-12-06 | End: 2022-12-06 | Stop reason: HOSPADM

## 2022-12-06 RX ORDER — FENTANYL CITRATE 50 UG/ML
INJECTION, SOLUTION INTRAMUSCULAR; INTRAVENOUS
Status: DISCONTINUED | OUTPATIENT
Start: 2022-12-06 | End: 2022-12-06

## 2022-12-06 RX ADMIN — HYDROCODONE BITARTRATE AND ACETAMINOPHEN 1 TABLET: 10; 325 TABLET ORAL at 02:12

## 2022-12-06 RX ADMIN — FENTANYL CITRATE 25 MCG: 50 INJECTION, SOLUTION INTRAMUSCULAR; INTRAVENOUS at 01:12

## 2022-12-06 RX ADMIN — SODIUM CHLORIDE, SODIUM LACTATE, POTASSIUM CHLORIDE, AND CALCIUM CHLORIDE: .6; .31; .03; .02 INJECTION, SOLUTION INTRAVENOUS at 12:12

## 2022-12-06 RX ADMIN — FENTANYL CITRATE 25 MCG: 50 INJECTION, SOLUTION INTRAMUSCULAR; INTRAVENOUS at 12:12

## 2022-12-06 RX ADMIN — MIDAZOLAM 1 MG: 1 INJECTION INTRAMUSCULAR; INTRAVENOUS at 12:12

## 2022-12-06 RX ADMIN — CEFAZOLIN 2 G: 1 INJECTION, POWDER, FOR SOLUTION INTRAMUSCULAR; INTRAVENOUS at 12:12

## 2022-12-06 RX ADMIN — ONDANSETRON 4 MG: 2 INJECTION, SOLUTION INTRAMUSCULAR; INTRAVENOUS at 12:12

## 2022-12-06 RX ADMIN — Medication 40 MG: at 12:12

## 2022-12-06 RX ADMIN — KETOROLAC TROMETHAMINE 15 MG: 30 INJECTION, SOLUTION INTRAMUSCULAR; INTRAVENOUS at 03:12

## 2022-12-06 RX ADMIN — PROPOFOL 50 MCG/KG/MIN: 10 INJECTION, EMULSION INTRAVENOUS at 12:12

## 2022-12-06 NOTE — TRANSFER OF CARE
"Anesthesia Transfer of Care Note    Patient: Kelsie Bustamante    Procedure(s) Performed: Procedure(s) (LRB):  INSERTION, CARDIAC PACEMAKER, DUAL CHAMBER (Left)    Patient location: Cath Lab    Anesthesia Type: MAC    Transport from OR: Transported from OR on room air with adequate spontaneous ventilation    Post pain: adequate analgesia    Post assessment: no apparent anesthetic complications    Post vital signs: stable    Level of consciousness: awake    Nausea/Vomiting: no nausea/vomiting    Complications: none    Transfer of care protocol was followed      Last vitals:   Visit Vitals  BP (!) 134/46 (BP Location: Left arm, Patient Position: Lying)   Pulse (!) 52   Temp 37 °C (98.6 °F) (Temporal)   Resp 18   Ht 5' 3" (1.6 m)   Wt 70.8 kg (156 lb 1.4 oz)   SpO2 100%   BMI 27.65 kg/m²     "

## 2022-12-06 NOTE — Clinical Note
The lead was inserted under fluorscopic guidance and thresholds were tested. The lead was inserted in the right ventricle. A new lead was attached to the right ventricle.

## 2022-12-06 NOTE — Clinical Note
The lead was inserted under fluorscopic guidance and thresholds were tested. The lead was inserted in the right atrium. A new lead was attached to the right atrium.

## 2022-12-06 NOTE — Clinical Note
The groin and chest was prepped. The site was prepped with ChloraPrep. The site was clipped. The patient was draped. The patient was positioned supine.

## 2022-12-06 NOTE — PLAN OF CARE
Went over discharge instructions with patient.   Stressed importance of making and keeping all follow ups as well as making prescribed medication changes.   Gave education material for safe mobility after discharge.   Walked patient to ensure patient was safe to go home.   Patient verbalized understanding and has no questions in regards to discharge.  IV removed, catheter intact.  Primary nurse notified of pt's discharge status.   Made sure patient has someone to drive them and explained not to drive for 24 hours.   Aquacell to left upper chest wall C/D/I. Patient states her pain is 2/10.

## 2022-12-06 NOTE — INTERVAL H&P NOTE
The patient has been examined and the H&P has been reviewed:    I concur with the findings and no changes have occurred since H&P was written.    Procedure risks, benefits and alternative options discussed and understood by patient/family.      She is here for PPM implant for CTI issues   I have discussed the PPM implant procedure in detail with the patient. I described its benefits and risks. I reviewed alternative therapies and discussed their potential value. The patient was given ample opportunity to express concerns and ask questions and I provided appropriate responses and  answers to such.The patient understands and agrees to proceed.  Consent form was signed today by patient and myself and appropriately witnessed.

## 2022-12-06 NOTE — ANESTHESIA POSTPROCEDURE EVALUATION
Anesthesia Post Evaluation    Patient: Kelsie Bustamante    Procedure(s) Performed: Procedure(s) (LRB):  INSERTION, CARDIAC PACEMAKER, DUAL CHAMBER (Left)    Final Anesthesia Type: MAC      Patient location during evaluation: Cath Lab  Patient participation: Yes- Able to Participate  Level of consciousness: awake and alert  Post-procedure vital signs: reviewed and stable  Pain management: adequate  Airway patency: patent    PONV status at discharge: No PONV  Anesthetic complications: no      Cardiovascular status: hemodynamically stable  Respiratory status: unassisted, spontaneous ventilation and room air  Hydration status: euvolemic  Follow-up not needed.          Vitals Value Taken Time   /46 12/06/22 0954   Temp 37 °C (98.6 °F) 12/06/22 0954   Pulse 52 12/06/22 0954   Resp 18 12/06/22 0954   SpO2 100 % 12/06/22 0954         No case tracking events are documented in the log.      Pain/Brandyn Score: Brandyn Score: 9 (12/6/2022  1:39 PM)

## 2022-12-06 NOTE — DISCHARGE INSTRUCTIONS
PACEMAKER/ICD INSTRUCTIONS    SAFETY  BECAUSE OF THE AFTEREFFECTS OF THE SEDATION YOU RECEIVED, WE ADVISE YOU TO REFRAIN FROM THE FOLLOWING ACTIVITIES FOR 24 HOURS.   1. DO NOT DRIVE OR OPERATE MACHINERY FOR 24 HOURS   2. DO NOT OPERATE APPLIANCES IF ALONE.     3.DON'T SIGN LEGAL PAPERS FOR 24 HOURS.     4. WE ADVISE THAT SOMEONE STAY WITH YOU AFTER THE PROCEDURE FOR AT LEAST 8 HOURS      DISCOMFORT: FOR GENERAL DISCOMFORT AT THE PUNCTURE, YOU MAY TAKE TYLENOL, 1-2 TABS EVERY 4-6 HOURS AS NEEDED.  DO NOT TAKE MORE THAN 4000 MG  IN 24 HOUR PERIOD.    ACTIVITY/ WOUND INSTRUCTIONS     AFFECTED ARM  DO NOT LIFT ARM ABOVE SHOULDER HEIGHT FOR 7 DAYS.                                  DO NOT  SWING ARM FOR 6 WEEKS                                DO NOT REMOVE DRESSING.  IT WILL BE REMOVED AT FOLLOW UP APPOINTMENT                                YOU MAY SHOWER IN 48 HOURS.  DO NOT REMOVE THE DRESSING FOR SHOWER. USE HIBICLENS                                        SOAP ON CHEST AND NECK AREA  FOR 1 WEEK.  FACE AWAY FROM SPRAY WHEN SHOWERING                                                                                REMOVE SLING FOR SHOWER, THEN REAPPLY AFTER SHOWER.                                USE ICE PACK FOR 48 HOURS .                                WEAR SLING AND SWATHE FOR 48 HOURS AROUND THE CLOCK THEN ONLY WHILE SLEEPING AT NIGHT FOR 6 WEEKS.              6. CALL YOUR HEALTHCARE PROVIDER IF YOU START TO HAVE THE FOLLOWING SYMPTOMS:                       1. High fever (101 degrees or higher)                 2. Redness,drainage or swelling  or intense pain at the incision site       3.  Drowsiness that doesn't get better       4. Weakness or dizziness that doesn't get better            5. Repeated vomiting                                                                                                                  NOZIN INSTRUCTIONS     GOAL: TO REDUCE THE RISK OF POST-PROCEDURAL INFECTIONS BY BACTERIA IN THE NASAL CAVITY.  THINK OF IT AS A HAND  FOR YOUR NOSE.       HOW TO USE:  1. SHAKE NOZIN BOTTLE WELL                            2. TAKE A COTTON SWAB AND APPLY FOUR DROPS TO TIP.                            3. INSERT COTTON SWAB INTO ONE NOSTRIL, BEING SURE NOT TO GO DEEPER INTO NOSE THAN THE TIP OF THE SWAB.                            4.SWAB NOSTRIL 6 TIMES CLOCKWISE AND 6 TIMES COUNERCLOCKWISE.  MAKE SURE TO SWAB THE INSIDE FRONT POCKET OF NOSTRIL.                             5. TAKE SWAB OUT AND APPLY 2 DROPS TO THE SAME COTTON TIP.  REPEAT STEPS 3 AND 4 IN THE OTHER NOSTRIL      DO STEPS 1-5 TWICE A DAY FOR 7 DAYS.

## 2022-12-06 NOTE — ANESTHESIA PREPROCEDURE EVALUATION
12/06/2022  Kelsie Bustamante is a 72 y.o., female.      Pre-op Assessment    I have reviewed the Patient Summary Reports.     I have reviewed the Nursing Notes. I have reviewed the NPO Status.   I have reviewed the Medications.     Review of Systems  Anesthesia Hx:  No problems with previous Anesthesia  Denies Family Hx of Anesthesia complications.   Denies Personal Hx of Anesthesia complications.   Social:  Non-Smoker, No Alcohol Use    Hematology/Oncology:         -- Anemia:   Cardiovascular:   Hypertension Orthopnea ECG has been reviewed. ? Mild left atrial enlargement.  ? The left ventricle is normal in size with normal systolic function.  ? The estimated ejection fraction is 60%.  ? Normal left ventricular diastolic function.  ? Normal right ventricular size with normal right ventricular systolic function.  ? Normal central venous pressure (3 mmHg).  ? The estimated PA systolic pressure is 7 mmHg.   Pulmonary:   Shortness of breath H/o bronchitis, pleurisy   Hepatic/GI:   Hiatal Hernia, GERD, poorly controlled    Musculoskeletal:   Arthritis   Spine Disorders:   SAH.    Neurological:   CVA (SAH post fall - no sx required) Neuromuscular Disease, fibomyalgia  Peripheral Neuropathy    Endocrine:   Diabetes, type 2        Physical Exam  General: Well nourished, Cooperative, Alert and Oriented    Airway:  Mallampati: III   Mouth Opening: Normal  TM Distance: Normal  Tongue: Normal    Dental:  Dentures        Anesthesia Plan  Type of Anesthesia, risks & benefits discussed:    Anesthesia Type: MAC  Intra-op Monitoring Plan: Standard ASA Monitors  Post Op Pain Control Plan: IV/PO Opioids PRN  Induction:  IV  Informed Consent: Informed consent signed with the Patient and all parties understand the risks and agree with anesthesia plan.  All questions answered.   ASA Score: 3  Day of Surgery Review of History &  Physical: H&P Update referred to the surgeon/provider.I have interviewed and examined the patient. I have reviewed the patient's H&P dated:     Ready For Surgery From Anesthesia Perspective.     .

## 2022-12-08 VITALS
DIASTOLIC BLOOD PRESSURE: 54 MMHG | RESPIRATION RATE: 19 BRPM | HEART RATE: 64 BPM | WEIGHT: 156.06 LBS | BODY MASS INDEX: 27.65 KG/M2 | HEIGHT: 63 IN | OXYGEN SATURATION: 90 % | TEMPERATURE: 97 F | SYSTOLIC BLOOD PRESSURE: 122 MMHG

## 2022-12-09 ENCOUNTER — CLINICAL SUPPORT (OUTPATIENT)
Dept: CARDIOLOGY | Facility: HOSPITAL | Age: 72
End: 2022-12-09
Payer: MEDICARE

## 2022-12-09 DIAGNOSIS — R00.1 SYMPTOMATIC SINUS BRADYCARDIA: ICD-10-CM

## 2022-12-09 PROCEDURE — 93280 CARDIAC DEVICE CHECK - IN CLINIC & HOSPITAL: ICD-10-PCS | Mod: 26,,, | Performed by: NURSE PRACTITIONER

## 2022-12-09 PROCEDURE — 99999 PR PBB SHADOW E&M-EST. PATIENT-LVL I: ICD-10-PCS | Mod: PBBFAC,,,

## 2022-12-09 PROCEDURE — 99999 PR PBB SHADOW E&M-EST. PATIENT-LVL I: CPT | Mod: PBBFAC,,,

## 2022-12-09 PROCEDURE — 93280 PM DEVICE PROGR EVAL DUAL: CPT

## 2022-12-09 PROCEDURE — 93280 PM DEVICE PROGR EVAL DUAL: CPT | Mod: 26,,, | Performed by: NURSE PRACTITIONER

## 2022-12-15 ENCOUNTER — IMMUNIZATION (OUTPATIENT)
Dept: INTERNAL MEDICINE | Facility: CLINIC | Age: 72
End: 2022-12-15
Payer: OTHER GOVERNMENT

## 2022-12-15 PROCEDURE — 90694 VACC AIIV4 NO PRSRV 0.5ML IM: CPT | Mod: S$GLB,,, | Performed by: NURSE PRACTITIONER

## 2022-12-15 PROCEDURE — G0008 ADMIN INFLUENZA VIRUS VAC: HCPCS | Mod: S$GLB,,, | Performed by: NURSE PRACTITIONER

## 2022-12-15 PROCEDURE — 90694 FLU VACCINE - QUADRIVALENT - ADJUVANTED: ICD-10-PCS | Mod: S$GLB,,, | Performed by: NURSE PRACTITIONER

## 2022-12-15 PROCEDURE — G0008 FLU VACCINE - QUADRIVALENT - ADJUVANTED: ICD-10-PCS | Mod: S$GLB,,, | Performed by: NURSE PRACTITIONER

## 2022-12-21 DIAGNOSIS — I48.0 PAF (PAROXYSMAL ATRIAL FIBRILLATION): ICD-10-CM

## 2022-12-22 RX ORDER — METOPROLOL SUCCINATE 100 MG/1
100 TABLET, EXTENDED RELEASE ORAL DAILY
Qty: 180 TABLET | Refills: 3
Start: 2022-12-22 | End: 2023-01-05 | Stop reason: SDUPTHER

## 2023-01-05 DIAGNOSIS — I48.0 PAF (PAROXYSMAL ATRIAL FIBRILLATION): ICD-10-CM

## 2023-01-05 RX ORDER — METOPROLOL SUCCINATE 100 MG/1
100 TABLET, EXTENDED RELEASE ORAL DAILY
Qty: 180 TABLET | Refills: 3
Start: 2023-01-05 | End: 2023-01-06 | Stop reason: SDUPTHER

## 2023-01-05 NOTE — TELEPHONE ENCOUNTER
----- Message from Tiny Rivers sent at 1/5/2023  3:09 PM CST -----  Contact: jesus  Patient is calling to speak with the nurse regarding medication. Reports the medication metoprolol succinate (TOPROL-XL) 100 MG 24 hr tablet was never sent to [facility]. Please give the patient a call back at [phone]  Thanks bel

## 2023-01-09 RX ORDER — METOPROLOL SUCCINATE 100 MG/1
100 TABLET, EXTENDED RELEASE ORAL DAILY
Qty: 180 TABLET | Refills: 3
Start: 2023-01-09 | End: 2023-01-23 | Stop reason: SDUPTHER

## 2023-01-20 ENCOUNTER — PATIENT OUTREACH (OUTPATIENT)
Dept: ADMINISTRATIVE | Facility: HOSPITAL | Age: 73
End: 2023-01-20
Payer: MEDICARE

## 2023-01-20 NOTE — PROGRESS NOTES
[facility] Med Rec-Post Hospital Discharge.    Pt was not seen post hosp 7/16/22-8/15/22.  No med rec completed.

## 2023-01-23 DIAGNOSIS — I48.0 PAF (PAROXYSMAL ATRIAL FIBRILLATION): ICD-10-CM

## 2023-01-24 RX ORDER — METOPROLOL SUCCINATE 100 MG/1
100 TABLET, EXTENDED RELEASE ORAL DAILY
Qty: 180 TABLET | Refills: 3
Start: 2023-01-24 | End: 2023-01-31 | Stop reason: SDUPTHER

## 2023-01-25 ENCOUNTER — PATIENT MESSAGE (OUTPATIENT)
Dept: ADMINISTRATIVE | Facility: HOSPITAL | Age: 73
End: 2023-01-25
Payer: MEDICARE

## 2023-01-31 DIAGNOSIS — I48.0 PAF (PAROXYSMAL ATRIAL FIBRILLATION): ICD-10-CM

## 2023-01-31 RX ORDER — METOPROLOL SUCCINATE 100 MG/1
100 TABLET, EXTENDED RELEASE ORAL DAILY
Qty: 180 TABLET | Refills: 3 | Status: SHIPPED | OUTPATIENT
Start: 2023-01-31 | End: 2023-02-03 | Stop reason: SDUPTHER

## 2023-02-01 NOTE — TELEPHONE ENCOUNTER
Pt contacted [facility] to call back.  \      ----- Message from Marti Durham sent at 2/1/2023 10:04 AM CST -----  Contact: Kelsie Kiran is calling in regards to her car breaking down on her way to her appt.Please call back at [phone]          Thanks  REBECCA

## 2023-02-02 DIAGNOSIS — I48.0 PAF (PAROXYSMAL ATRIAL FIBRILLATION): ICD-10-CM

## 2023-02-02 RX ORDER — METOPROLOL SUCCINATE 100 MG/1
100 TABLET, EXTENDED RELEASE ORAL DAILY
Qty: 180 TABLET | Refills: 3 | Status: CANCELLED | OUTPATIENT
Start: 2023-02-02 | End: 2024-02-02

## 2023-02-03 ENCOUNTER — PATIENT OUTREACH (OUTPATIENT)
Dept: ADMINISTRATIVE | Facility: HOSPITAL | Age: 73
End: 2023-02-03
Payer: MEDICARE

## 2023-03-09 ENCOUNTER — CLINICAL SUPPORT (OUTPATIENT)
Dept: CARDIOLOGY | Facility: HOSPITAL | Age: 73
End: 2023-03-09
Payer: OTHER GOVERNMENT

## 2023-03-09 DIAGNOSIS — Z95.0 PRESENCE OF CARDIAC PACEMAKER: ICD-10-CM

## 2023-03-09 PROCEDURE — 93294 REM INTERROG EVL PM/LDLS PM: CPT | Mod: S$GLB,,, | Performed by: INTERNAL MEDICINE

## 2023-03-09 PROCEDURE — 93294 CARDIAC DEVICE CHECK - REMOTE: ICD-10-PCS | Mod: S$GLB,,, | Performed by: INTERNAL MEDICINE

## 2023-03-09 PROCEDURE — 93296 REM INTERROG EVL PM/IDS: CPT | Performed by: INTERNAL MEDICINE

## 2023-03-14 ENCOUNTER — LAB VISIT (OUTPATIENT)
Dept: LAB | Facility: HOSPITAL | Age: 73
End: 2023-03-14
Attending: FAMILY MEDICINE
Payer: MEDICARE

## 2023-03-14 ENCOUNTER — OFFICE VISIT (OUTPATIENT)
Dept: INTERNAL MEDICINE | Facility: CLINIC | Age: 73
End: 2023-03-14
Payer: MEDICARE

## 2023-03-14 VITALS
HEIGHT: 63 IN | DIASTOLIC BLOOD PRESSURE: 60 MMHG | TEMPERATURE: 97 F | HEART RATE: 64 BPM | SYSTOLIC BLOOD PRESSURE: 120 MMHG | OXYGEN SATURATION: 95 % | BODY MASS INDEX: 29.72 KG/M2 | WEIGHT: 167.75 LBS

## 2023-03-14 DIAGNOSIS — G62.9 PERIPHERAL POLYNEUROPATHY: ICD-10-CM

## 2023-03-14 DIAGNOSIS — R00.1 SYMPTOMATIC SINUS BRADYCARDIA: Primary | ICD-10-CM

## 2023-03-14 DIAGNOSIS — K22.0 ACHALASIA: ICD-10-CM

## 2023-03-14 DIAGNOSIS — E11.9 TYPE 2 DIABETES MELLITUS WITHOUT COMPLICATION, WITHOUT LONG-TERM CURRENT USE OF INSULIN: Chronic | ICD-10-CM

## 2023-03-14 DIAGNOSIS — I48.0 PAF (PAROXYSMAL ATRIAL FIBRILLATION): Chronic | ICD-10-CM

## 2023-03-14 DIAGNOSIS — I10 PRIMARY HYPERTENSION: Chronic | ICD-10-CM

## 2023-03-14 DIAGNOSIS — Z00.00 ROUTINE HEALTH MAINTENANCE: Primary | ICD-10-CM

## 2023-03-14 DIAGNOSIS — E04.1 THYROID NODULE: ICD-10-CM

## 2023-03-14 DIAGNOSIS — Z95.0 PACEMAKER: ICD-10-CM

## 2023-03-14 DIAGNOSIS — Z12.11 SCREEN FOR COLON CANCER: ICD-10-CM

## 2023-03-14 LAB
ALBUMIN SERPL BCP-MCNC: 3.8 G/DL (ref 3.5–5.2)
ALP SERPL-CCNC: 82 U/L (ref 55–135)
ALT SERPL W/O P-5'-P-CCNC: 22 U/L (ref 10–44)
ANION GAP SERPL CALC-SCNC: 6 MMOL/L (ref 8–16)
AST SERPL-CCNC: 21 U/L (ref 10–40)
BILIRUB SERPL-MCNC: 0.3 MG/DL (ref 0.1–1)
BUN SERPL-MCNC: 15 MG/DL (ref 8–23)
CALCIUM SERPL-MCNC: 9.7 MG/DL (ref 8.7–10.5)
CHLORIDE SERPL-SCNC: 105 MMOL/L (ref 95–110)
CHOLEST SERPL-MCNC: 205 MG/DL (ref 120–199)
CHOLEST/HDLC SERPL: 3.4 {RATIO} (ref 2–5)
CO2 SERPL-SCNC: 27 MMOL/L (ref 23–29)
CREAT SERPL-MCNC: 1 MG/DL (ref 0.5–1.4)
EST. GFR  (NO RACE VARIABLE): >60 ML/MIN/1.73 M^2
GLUCOSE SERPL-MCNC: 142 MG/DL (ref 70–110)
HDLC SERPL-MCNC: 60 MG/DL (ref 40–75)
HDLC SERPL: 29.3 % (ref 20–50)
LDLC SERPL CALC-MCNC: 108.2 MG/DL (ref 63–159)
NONHDLC SERPL-MCNC: 145 MG/DL
POTASSIUM SERPL-SCNC: 5.2 MMOL/L (ref 3.5–5.1)
PROT SERPL-MCNC: 6.9 G/DL (ref 6–8.4)
SODIUM SERPL-SCNC: 138 MMOL/L (ref 136–145)
TRIGL SERPL-MCNC: 184 MG/DL (ref 30–150)
TSH SERPL DL<=0.005 MIU/L-ACNC: 2.08 UIU/ML (ref 0.4–4)

## 2023-03-14 PROCEDURE — 1101F PR PT FALLS ASSESS DOC 0-1 FALLS W/OUT INJ PAST YR: ICD-10-PCS | Mod: CPTII,S$GLB,, | Performed by: FAMILY MEDICINE

## 2023-03-14 PROCEDURE — 3074F PR MOST RECENT SYSTOLIC BLOOD PRESSURE < 130 MM HG: ICD-10-PCS | Mod: CPTII,S$GLB,, | Performed by: FAMILY MEDICINE

## 2023-03-14 PROCEDURE — 3044F HG A1C LEVEL LT 7.0%: CPT | Mod: CPTII,S$GLB,, | Performed by: FAMILY MEDICINE

## 2023-03-14 PROCEDURE — 3061F PR NEG MICROALBUMINURIA RESULT DOCUMENTED/REVIEW: ICD-10-PCS | Mod: CPTII,S$GLB,, | Performed by: FAMILY MEDICINE

## 2023-03-14 PROCEDURE — 4010F ACE/ARB THERAPY RXD/TAKEN: CPT | Mod: CPTII,S$GLB,, | Performed by: FAMILY MEDICINE

## 2023-03-14 PROCEDURE — 80053 COMPREHEN METABOLIC PANEL: CPT | Performed by: FAMILY MEDICINE

## 2023-03-14 PROCEDURE — 82607 VITAMIN B-12: CPT | Mod: GA | Performed by: FAMILY MEDICINE

## 2023-03-14 PROCEDURE — 1126F PR PAIN SEVERITY QUANTIFIED, NO PAIN PRESENT: ICD-10-PCS | Mod: CPTII,S$GLB,, | Performed by: FAMILY MEDICINE

## 2023-03-14 PROCEDURE — 3078F PR MOST RECENT DIASTOLIC BLOOD PRESSURE < 80 MM HG: ICD-10-PCS | Mod: CPTII,S$GLB,, | Performed by: FAMILY MEDICINE

## 2023-03-14 PROCEDURE — 80061 LIPID PANEL: CPT | Performed by: FAMILY MEDICINE

## 2023-03-14 PROCEDURE — 3066F PR DOCUMENTATION OF TREATMENT FOR NEPHROPATHY: ICD-10-PCS | Mod: CPTII,S$GLB,, | Performed by: FAMILY MEDICINE

## 2023-03-14 PROCEDURE — 3044F PR MOST RECENT HEMOGLOBIN A1C LEVEL <7.0%: ICD-10-PCS | Mod: CPTII,S$GLB,, | Performed by: FAMILY MEDICINE

## 2023-03-14 PROCEDURE — 3074F SYST BP LT 130 MM HG: CPT | Mod: CPTII,S$GLB,, | Performed by: FAMILY MEDICINE

## 2023-03-14 PROCEDURE — 99999 PR PBB SHADOW E&M-EST. PATIENT-LVL V: ICD-10-PCS | Mod: PBBFAC,,, | Performed by: FAMILY MEDICINE

## 2023-03-14 PROCEDURE — 36415 COLL VENOUS BLD VENIPUNCTURE: CPT | Performed by: FAMILY MEDICINE

## 2023-03-14 PROCEDURE — 1126F AMNT PAIN NOTED NONE PRSNT: CPT | Mod: CPTII,S$GLB,, | Performed by: FAMILY MEDICINE

## 2023-03-14 PROCEDURE — 4010F PR ACE/ARB THEARPY RXD/TAKEN: ICD-10-PCS | Mod: CPTII,S$GLB,, | Performed by: FAMILY MEDICINE

## 2023-03-14 PROCEDURE — 99397 PER PM REEVAL EST PAT 65+ YR: CPT | Mod: S$GLB,,, | Performed by: FAMILY MEDICINE

## 2023-03-14 PROCEDURE — 3288F FALL RISK ASSESSMENT DOCD: CPT | Mod: CPTII,S$GLB,, | Performed by: FAMILY MEDICINE

## 2023-03-14 PROCEDURE — 3061F NEG MICROALBUMINURIA REV: CPT | Mod: CPTII,S$GLB,, | Performed by: FAMILY MEDICINE

## 2023-03-14 PROCEDURE — 3008F PR BODY MASS INDEX (BMI) DOCUMENTED: ICD-10-PCS | Mod: CPTII,S$GLB,, | Performed by: FAMILY MEDICINE

## 2023-03-14 PROCEDURE — 99397 PR PREVENTIVE VISIT,EST,65 & OVER: ICD-10-PCS | Mod: S$GLB,,, | Performed by: FAMILY MEDICINE

## 2023-03-14 PROCEDURE — 99999 PR PBB SHADOW E&M-EST. PATIENT-LVL V: CPT | Mod: PBBFAC,,, | Performed by: FAMILY MEDICINE

## 2023-03-14 PROCEDURE — 84443 ASSAY THYROID STIM HORMONE: CPT | Performed by: FAMILY MEDICINE

## 2023-03-14 PROCEDURE — 3288F PR FALLS RISK ASSESSMENT DOCUMENTED: ICD-10-PCS | Mod: CPTII,S$GLB,, | Performed by: FAMILY MEDICINE

## 2023-03-14 PROCEDURE — 1101F PT FALLS ASSESS-DOCD LE1/YR: CPT | Mod: CPTII,S$GLB,, | Performed by: FAMILY MEDICINE

## 2023-03-14 PROCEDURE — 3066F NEPHROPATHY DOC TX: CPT | Mod: CPTII,S$GLB,, | Performed by: FAMILY MEDICINE

## 2023-03-14 PROCEDURE — 3008F BODY MASS INDEX DOCD: CPT | Mod: CPTII,S$GLB,, | Performed by: FAMILY MEDICINE

## 2023-03-14 PROCEDURE — 83036 HEMOGLOBIN GLYCOSYLATED A1C: CPT | Performed by: FAMILY MEDICINE

## 2023-03-14 PROCEDURE — 3078F DIAST BP <80 MM HG: CPT | Mod: CPTII,S$GLB,, | Performed by: FAMILY MEDICINE

## 2023-03-15 LAB
ESTIMATED AVG GLUCOSE: 140 MG/DL (ref 68–131)
HBA1C MFR BLD: 6.5 % (ref 4–5.6)
VIT B12 SERPL-MCNC: 435 PG/ML (ref 210–950)

## 2023-03-16 ENCOUNTER — OFFICE VISIT (OUTPATIENT)
Dept: SURGERY | Facility: CLINIC | Age: 73
End: 2023-03-16
Payer: MEDICARE

## 2023-03-16 ENCOUNTER — HOSPITAL ENCOUNTER (OUTPATIENT)
Dept: CARDIOLOGY | Facility: HOSPITAL | Age: 73
Discharge: HOME | End: 2023-03-16
Attending: SURGERY
Payer: MEDICARE

## 2023-03-16 VITALS
WEIGHT: 167.56 LBS | HEART RATE: 99 BPM | BODY MASS INDEX: 29.69 KG/M2 | TEMPERATURE: 98 F | SYSTOLIC BLOOD PRESSURE: 126 MMHG | HEIGHT: 63 IN | DIASTOLIC BLOOD PRESSURE: 69 MMHG

## 2023-03-16 DIAGNOSIS — K22.0 ACHALASIA: Primary | ICD-10-CM

## 2023-03-16 DIAGNOSIS — K22.0 ACHALASIA: ICD-10-CM

## 2023-03-16 PROCEDURE — 3066F NEPHROPATHY DOC TX: CPT | Mod: CPTII,S$GLB,, | Performed by: SURGERY

## 2023-03-16 PROCEDURE — 1160F PR REVIEW ALL MEDS BY PRESCRIBER/CLIN PHARMACIST DOCUMENTED: ICD-10-PCS | Mod: CPTII,S$GLB,, | Performed by: SURGERY

## 2023-03-16 PROCEDURE — 93005 ELECTROCARDIOGRAM TRACING: CPT

## 2023-03-16 PROCEDURE — 3066F PR DOCUMENTATION OF TREATMENT FOR NEPHROPATHY: ICD-10-PCS | Mod: CPTII,S$GLB,, | Performed by: SURGERY

## 2023-03-16 PROCEDURE — 1126F AMNT PAIN NOTED NONE PRSNT: CPT | Mod: CPTII,S$GLB,, | Performed by: SURGERY

## 2023-03-16 PROCEDURE — 99215 PR OFFICE/OUTPT VISIT, EST, LEVL V, 40-54 MIN: ICD-10-PCS | Mod: S$GLB,,, | Performed by: SURGERY

## 2023-03-16 PROCEDURE — 99215 OFFICE O/P EST HI 40 MIN: CPT | Mod: S$GLB,,, | Performed by: SURGERY

## 2023-03-16 PROCEDURE — 1126F PR PAIN SEVERITY QUANTIFIED, NO PAIN PRESENT: ICD-10-PCS | Mod: CPTII,S$GLB,, | Performed by: SURGERY

## 2023-03-16 PROCEDURE — 3044F PR MOST RECENT HEMOGLOBIN A1C LEVEL <7.0%: ICD-10-PCS | Mod: CPTII,S$GLB,, | Performed by: SURGERY

## 2023-03-16 PROCEDURE — 3008F BODY MASS INDEX DOCD: CPT | Mod: CPTII,S$GLB,, | Performed by: SURGERY

## 2023-03-16 PROCEDURE — 3008F PR BODY MASS INDEX (BMI) DOCUMENTED: ICD-10-PCS | Mod: CPTII,S$GLB,, | Performed by: SURGERY

## 2023-03-16 PROCEDURE — 4010F PR ACE/ARB THEARPY RXD/TAKEN: ICD-10-PCS | Mod: CPTII,S$GLB,, | Performed by: SURGERY

## 2023-03-16 PROCEDURE — 99999 PR PBB SHADOW E&M-EST. PATIENT-LVL IV: CPT | Mod: PBBFAC,,, | Performed by: SURGERY

## 2023-03-16 PROCEDURE — 1159F PR MEDICATION LIST DOCUMENTED IN MEDICAL RECORD: ICD-10-PCS | Mod: CPTII,S$GLB,, | Performed by: SURGERY

## 2023-03-16 PROCEDURE — 3061F NEG MICROALBUMINURIA REV: CPT | Mod: CPTII,S$GLB,, | Performed by: SURGERY

## 2023-03-16 PROCEDURE — 4010F ACE/ARB THERAPY RXD/TAKEN: CPT | Mod: CPTII,S$GLB,, | Performed by: SURGERY

## 2023-03-16 PROCEDURE — 1159F MED LIST DOCD IN RCRD: CPT | Mod: CPTII,S$GLB,, | Performed by: SURGERY

## 2023-03-16 PROCEDURE — 3074F PR MOST RECENT SYSTOLIC BLOOD PRESSURE < 130 MM HG: ICD-10-PCS | Mod: CPTII,S$GLB,, | Performed by: SURGERY

## 2023-03-16 PROCEDURE — 99999 PR PBB SHADOW E&M-EST. PATIENT-LVL IV: ICD-10-PCS | Mod: PBBFAC,,, | Performed by: SURGERY

## 2023-03-16 PROCEDURE — 3074F SYST BP LT 130 MM HG: CPT | Mod: CPTII,S$GLB,, | Performed by: SURGERY

## 2023-03-16 PROCEDURE — 3078F PR MOST RECENT DIASTOLIC BLOOD PRESSURE < 80 MM HG: ICD-10-PCS | Mod: CPTII,S$GLB,, | Performed by: SURGERY

## 2023-03-16 PROCEDURE — 3044F HG A1C LEVEL LT 7.0%: CPT | Mod: CPTII,S$GLB,, | Performed by: SURGERY

## 2023-03-16 PROCEDURE — 93010 ELECTROCARDIOGRAM REPORT: CPT | Mod: ,,, | Performed by: STUDENT IN AN ORGANIZED HEALTH CARE EDUCATION/TRAINING PROGRAM

## 2023-03-16 PROCEDURE — 3061F PR NEG MICROALBUMINURIA RESULT DOCUMENTED/REVIEW: ICD-10-PCS | Mod: CPTII,S$GLB,, | Performed by: SURGERY

## 2023-03-16 PROCEDURE — 3078F DIAST BP <80 MM HG: CPT | Mod: CPTII,S$GLB,, | Performed by: SURGERY

## 2023-03-16 PROCEDURE — 1160F RVW MEDS BY RX/DR IN RCRD: CPT | Mod: CPTII,S$GLB,, | Performed by: SURGERY

## 2023-03-16 PROCEDURE — 93010 EKG 12-LEAD: ICD-10-PCS | Mod: ,,, | Performed by: STUDENT IN AN ORGANIZED HEALTH CARE EDUCATION/TRAINING PROGRAM

## 2023-03-16 RX ORDER — SODIUM CHLORIDE 9 MG/ML
INJECTION, SOLUTION INTRAVENOUS CONTINUOUS
OUTPATIENT
Start: 2023-03-16

## 2023-03-16 RX ORDER — LIDOCAINE HYDROCHLORIDE 10 MG/ML
1 INJECTION, SOLUTION EPIDURAL; INFILTRATION; INTRACAUDAL; PERINEURAL ONCE
OUTPATIENT
Start: 2023-03-16 | End: 2023-03-16

## 2023-03-16 NOTE — PROGRESS NOTES
History & Physical    SUBJECTIVE:     History of Present Illness:  Patient is a 72 y.o. female referred for achalasia.  Patient reports increasing issues with food regurgitation, food sticking in lower chest and chest discomfort with eating.  She reports it is the same for solids and liquids.  She is completed EGD and manometry concerning for achalasia.  She reports maintaining weight without significant weight loss.  She was 1st diagnosed with achalasia in 2014 but reports her symptoms have progressively worsened.  No previous Botox injections or esophageal dilations.    No chief complaint on file.      Review of patient's allergies indicates:   Allergen Reactions    Floxin [ofloxacin] Diarrhea and Nausea And Vomiting    Fluvastatin Other (See Comments)     myalgia    Pravastatin Other (See Comments)     myalgia    Trazodone Palpitations     Racing heart       Current Outpatient Medications   Medication Sig Dispense Refill    albuterol (PROVENTIL/VENTOLIN HFA) 90 mcg/actuation inhaler Inhale 2 puffs into the lungs every 6 (six) hours as needed for Wheezing. 6.7 g 5    apixaban (ELIQUIS) 5 mg Tab Take 1 tablet (5 mg total) by mouth 2 (two) times daily. 180 tablet 1    DULoxetine (CYMBALTA) 20 MG capsule Take 1 capsule (20 mg total) by mouth once daily. 90 capsule 3    estrogens, conjugated, (PREMARIN) 0.625 MG tablet Take 1 tablet (0.625 mg total) by mouth once daily. 90 tablet 3    flecainide (TAMBOCOR) 100 MG Tab Take 1 tablet (100 mg total) by mouth every 12 (twelve) hours. 60 tablet 0    metoprolol succinate (TOPROL-XL) 100 MG 24 hr tablet Take 1 tablet (100 mg total) by mouth once daily. 180 tablet 3    rosuvastatin (CRESTOR) 5 MG tablet Take 1 tablet (5 mg total) by mouth once daily. 90 tablet 3    SITagliptan-metformin (JANUMET) 50-1,000 mg per tablet Take 1 tablet by mouth 2 (two) times daily with meals. 180 tablet 1    zolpidem (AMBIEN) 10 mg Tab 1/2 po qhs prn insomnia 90 tablet 1     No current  facility-administered medications for this visit.       Past Medical History:   Diagnosis Date    Achalasia     demetrius    Atrial fibrillation with RVR 2021    Cataract     Colon polyp     Gastroesophageal reflux     Hiatal hernia     Hx of colonic polyps 08/15/2014    per colonoscopy in      Hyperlipidemia 2022    Hypertension     Pacemaker     Peripheral neuropathy     Postmenopausal HRT (hormone replacement therapy)     failed tapering off    SAH (subarachnoid hemorrhage)     from a fall late 22    Sepsis 2021    Last Assessment & Plan:  Formatting of this note might be different from the original. History & Physical Patient meets sepsis criteria with a white cell count of 15, and tachypnea.  Source of infection not clear at this time.  No evidence for meningitis.  Cover with broad-spectrum antibiotics especially given invasive dental procedure done recently.  Check blood cultures and monitor for fever.  R    Sinusitis     Thyroid nodule 3/16 subcm; kathleen 2 yrs    Type 2 diabetes mellitus with neurological manifestations     Vasodepressor syncope 2018     Past Surgical History:   Procedure Laterality Date    A-V CARDIAC PACEMAKER INSERTION Left 2022    Procedure: INSERTION, CARDIAC PACEMAKER, DUAL CHAMBER;  Surgeon: Daryl Walker MD;  Location: [facility] CATH LAB;  Service: Cardiology;  Laterality: Left;  Patient instructed 11AM start time/needs ptt/pt/inr  All Biotronik with His lead/MDT His as back up//Vinod notified    BREAST BIOPSY Left 2021    cataract Left      SECTION      CHOLECYSTECTOMY      CYST REMOVAL Right 2021    [facility]    ESOPHAGEAL MANOMETRY WITH MEASUREMENT OF IMPEDANCE N/A 10/6/2022    Procedure: MANOMETRY, ESOPHAGUS, WITH IMPEDANCE MEASUREMENT  Cardiac Clearance/Eliquis 2 day hold approval received per Dr. RABIA Aguilar, Cardiology on 22.  Note in encounters.  LB;  Surgeon: Bruna Fletcher MD;  Location: [facility]  "ENDO;  Service: Endoscopy;  Laterality: N/A;    ESOPHAGOGASTRODUODENOSCOPY N/A 10/6/2022    Procedure: EGD (ESOPHAGOGASTRODUODENOSCOPY);  Surgeon: Bruna Fletcher MD;  Location: [facility] ENDO;  Service: Endoscopy;  Laterality: N/A;    HYSTERECTOMY      nonca    OOPHORECTOMY      TILT TABLE TEST N/A 10/25/2018    Procedure: TILT TABLE TEST;  Surgeon: Daryl Walker MD;  Location: [facility] CATH LAB;  Service: Cardiology;  Laterality: N/A;  VAS.DEP.SYN,HUT,FAS,3PREP    TONSILLECTOMY       Family History   Problem Relation Age of Onset    Aneurysm Sister     Heart disease Mother     Diabetes Father     Coronary artery disease Father     Coronary artery disease Brother     Parkinsonism Brother      Social History     Tobacco Use    Smoking status: Never    Smokeless tobacco: Never   Substance Use Topics    Alcohol use: No    Drug use: No        Review of Systems:  Review of Systems   Constitutional:  Negative for chills, fatigue, fever and unexpected weight change.   Respiratory:  Negative for cough, shortness of breath, wheezing and stridor.    Cardiovascular:  Negative for chest pain, palpitations and leg swelling.   Gastrointestinal:  Negative for abdominal distention, abdominal pain, constipation, diarrhea, nausea and vomiting.   Genitourinary:  Negative for difficulty urinating, dysuria, frequency, hematuria and urgency.   Skin:  Negative for color change, pallor, rash and wound.   Hematological:  Does not bruise/bleed easily.     OBJECTIVE:     Vital Signs (Most Recent)  Temp: 97.8 °F (36.6 °C) (03/16/23 1010)  Pulse: 99 (03/16/23 1010)  BP: 126/69 (03/16/23 1010)  5' 3" (1.6 m)  76 kg (167 lb 8.8 oz)     Physical Exam:  Physical Exam  Vitals reviewed.   Constitutional:       Appearance: She is well-developed.   HENT:      Head: Normocephalic and atraumatic.   Cardiovascular:      Rate and Rhythm: Normal rate and regular rhythm.   Pulmonary:      Effort: Pulmonary effort is normal.      Breath sounds: Normal " breath sounds.   Abdominal:      General: Bowel sounds are normal. There is no distension.      Palpations: Abdomen is soft.      Tenderness: There is no abdominal tenderness.      Comments: Open cholecystectomy scar   Lower abdominal scar   Musculoskeletal:      Cervical back: Neck supple.   Skin:     General: Skin is warm and dry.   Neurological:      Mental Status: She is alert and oriented to person, place, and time.       Diagnostic Results:  EGD:  Impression:            - The examination was suspicious for achalasia.                          - Normal stomach.                          - Normal examined duodenum.                          - Biopsies were obtained in the middle third of                          the esophagus and in the lower third of the                          esophagus.     Manometry:  Impression:            - Abnormal esophageal manometry.                          - Manometry results are consistent with Type II                          achalasia.     ASSESSMENT/PLAN:     72-year-old female with achalasia    PLAN:Plan     Patient having worsening symptoms of food regurgitation, food sticking and chest pain associated with eating     Robotic Heller myotomy with Jenaro fundoplication possible hiatal hernia repair 03/28/2023   Preop:  CBC, CMP, EKG, cardiology clearance to hold Eliquis  Risks and benefits discussed with patient at length as well as alternatives to procedure including but not limited to:  Pain, bleeding, infection, scarring, injury to underlying abdominal/mediastinal and thoracic organs, arrhythmias, esophageal or gastric perforation, failure to improve symptoms, stricture stenosis, reflux, need for further procedure    Atrial fibrillation On Eliquis Cardiology clearance     45 minutes of total time spent on the encounter, which includes face to face time and non-face to face time preparing to see the patient (eg, review of tests), Obtaining and/or reviewing separately obtained  history, Documenting clinical information in the electronic or other health record, Independently interpreting results (not separately reported) and communicating results to the patient/family/caregiver, or Care coordination (not separately reported).

## 2023-03-17 ENCOUNTER — TELEPHONE (OUTPATIENT)
Dept: PREADMISSION TESTING | Facility: HOSPITAL | Age: 73
End: 2023-03-17
Payer: MEDICARE

## 2023-03-17 NOTE — TELEPHONE ENCOUNTER
----- Message from Nadja Wilcox MA sent at 3/17/2023 10:58 AM CDT -----  Regarding: RE: surgery 3/28  Good morning and happy Friday! The clearance request has been sent and we are waiting to hear back from cardiology. The consent forms have been scanned in to Media.  ----- Message -----  From: Jacqueline Bermudez RN  Sent: 3/17/2023   9:44 AM CDT  To: [facility] Staff  Subject: surgery 3/28                                     Hello, Pre-Admit Testing Department is inquiring about this surgery patient's consent & cardiac clearance for their procedure on 3/28/23. Please let us know if it will be available in Epic, or will be done day of surgery.       Thank you,  -[facility] Pre Admit Testing Nurse  Office # [phone]  Mon-Fri 7:30 am - 4 pm

## 2023-03-20 ENCOUNTER — TELEPHONE (OUTPATIENT)
Dept: PREADMISSION TESTING | Facility: HOSPITAL | Age: 73
End: 2023-03-20
Payer: MEDICARE

## 2023-03-20 ENCOUNTER — TELEPHONE (OUTPATIENT)
Dept: SURGERY | Facility: CLINIC | Age: 73
End: 2023-03-20
Payer: MEDICARE

## 2023-03-20 NOTE — TELEPHONE ENCOUNTER
Jacqueline Bermudez, RN  P [facility] Staff  Good afternoon, pt was not sure if she still wanted to have sx on 3/28/23. I advised pt to speak with office staff since I am unable to change surgery schedule. Please reach out to pt as to whether she is still having surgery on 3/28/23. Thank you.         -Pre Admit Dept.   [phone]

## 2023-03-20 NOTE — TELEPHONE ENCOUNTER
----- Message from Alesha Bran PA-C sent at 3/20/2023  2:29 PM CDT -----  Regarding: RE: surgery 3/28  She is going to post-pone until after her upcoming trip. I am contacting the OR.    Alesha Bran PA-C  [facility]    ----- Message -----  From: Jacqueline Bermudez RN  Sent: 3/20/2023   1:41 PM CDT  To: [facility] Staff  Subject: surgery 3/28                                     Good afternoon, pt was not sure if she still wanted to have sx on 3/28/23. I advised pt to speak with office staff since I am unable to change surgery schedule. Please reach out to pt as to whether she is still having surgery on 3/28/23. Thank you.        -Pre Admit Dept.  [phone]

## 2023-03-20 NOTE — TELEPHONE ENCOUNTER
Called patient. Wants to postpone surgery until after upcoming trip. Would like repeat appointment with Dr. Schneider pre-operatively to ask more questions. Will have her scheduled for follow-up appointment after the trip.    Alesha Bran PA-C  [facility]    ----- Message from Jacqueline Bermudez RN sent at 3/20/2023  1:39 PM CDT -----  Regarding: surgery 3/28  Good afternoon, pt was not sure if she still wanted to have sx on 3/28/23. I advised pt to speak with office staff since I am unable to change surgery schedule. Please reach out to pt as to whether she is still having surgery on 3/28/23. Thank you.        -Pre Admit Dept.  [phone]     English

## 2023-03-21 ENCOUNTER — TELEPHONE (OUTPATIENT)
Dept: CARDIOLOGY | Facility: CLINIC | Age: 73
End: 2023-03-21
Payer: MEDICARE

## 2023-03-21 NOTE — TELEPHONE ENCOUNTER
Pt putting surgery off for a while- no clearance needed at this time  Offered just regular cardio follow up- pt ststed she was good for now

## 2023-03-21 NOTE — TELEPHONE ENCOUNTER
Pt states that her surgery has been canceled and she will call back when she needs the clearance pb    ----- Message from Brad Aguilar MD sent at 3/21/2023 11:58 AM CDT -----  Needs clinic visit for preop     ----- Message -----  From: Melita Peters LPN  Sent: 3/21/2023   9:02 AM CDT  To: Brad Aguilar MD      ----- Message -----  From: Daryl Walker MD  Sent: 3/20/2023   9:15 PM CDT  To: Melita Peters LPN    I am not her primary cardiologist nor PCP  Never saw her in clinic  Also, chart says that she wants to delay surgery - seems a bit extensive  Robotic Heller myotomy with Jenaro fundoplication possible hiatal hernia repair 03/28/2023   Please refer to primary care takers  Tx   ----- Message -----  From: Melita Peters LPN  Sent: 3/16/2023   2:43 PM CDT  To: Daryl Walker MD      ----- Message -----  From: Connie Leigh MA  Sent: 3/16/2023   2:37 PM CDT  To: Aaron YOUSSEF Staff    Patient will also need cardiac clearance for surgery. Surgery is on 3/28

## 2023-03-21 NOTE — TELEPHONE ENCOUNTER
----- Message from Brad Aguilar MD sent at 3/21/2023 11:58 AM CDT -----  Needs clinic visit for preop     ----- Message -----  From: Melita Peters LPN  Sent: 3/21/2023   9:02 AM CDT  To: Brad Aguilar MD      ----- Message -----  From: Daryl Walker MD  Sent: 3/20/2023   9:15 PM CDT  To: Melita Peters LPN    I am not her primary cardiologist nor PCP  Never saw her in clinic  Also, chart says that she wants to delay surgery - seems a bit extensive  Robotic Heller myotomy with Jenaro fundoplication possible hiatal hernia repair 03/28/2023   Please refer to primary care takers  Tx   ----- Message -----  From: Melita Peters LPN  Sent: 3/16/2023   2:43 PM CDT  To: Daryl Walker MD      ----- Message -----  From: Connie Leigh MA  Sent: 3/16/2023   2:37 PM CDT  To: Aaron YOUSSEF Staff    Patient will also need cardiac clearance for surgery. Surgery is on 3/28

## 2023-03-24 ENCOUNTER — HOSPITAL ENCOUNTER (OUTPATIENT)
Dept: PREADMISSION TESTING | Facility: HOSPITAL | Age: 73
Discharge: HOME | End: 2023-03-24
Attending: INTERNAL MEDICINE
Payer: MEDICARE

## 2023-03-24 ENCOUNTER — TELEPHONE (OUTPATIENT)
Dept: CARDIOLOGY | Facility: CLINIC | Age: 73
End: 2023-03-24
Payer: MEDICARE

## 2023-03-24 DIAGNOSIS — Z12.11 SCREEN FOR COLON CANCER: ICD-10-CM

## 2023-03-27 NOTE — PROGRESS NOTES
Subjective:      Patient ID: Kelsie Bustamante is a 72 y.o. female.    Chief Complaint: Annual Exam    HPI here for physical examination and issues  below   And issues problistbelow  Patient Active Problem List   Diagnosis    Primary hypertension    Type 2 diabetes mellitus, without long-term current use of insulin    Peripheral neuropathy    Carpal tunnel syndrome    Localized primary carpometacarpal osteoarthritis    Synovitis of right ankle    Gastroesophageal reflux disease without esophagitis    Thyroid nodule    Premature menopause (surgery in 30s)    Palpitation    Preop cardiovascular exam    Other insomnia    Statin intolerance    Neck pain on right side    Bronchitis    Orthopnea    Normocytic anemia    PAF (paroxysmal atrial fibrillation)    Maxillary cyst    Atelectasis    Debility    Myalgia    Personal history of pneumonia (recurrent)    Personal history of urinary (tract) infections    Esophagitis    Pleurisy    Fibromyalgia    Acute respiratory failure due to COVID-19 with acute bronchitis    Hyperlipidemia    Acute bronchitis due to COVID-19 virus    Odynophagia    Dysphagia    Achalasia of esophagus    Symptomatic sinus bradycardia    Chronic anticoagulation    Vasodepressor syncope    Pacemaker      Past Medical History:   Diagnosis Date    Achalasia     demetrius    Atrial fibrillation with RVR 05/11/2021    Cataract     Colon polyp     Gastroesophageal reflux     Hiatal hernia     Hx of colonic polyps 08/15/2014    per colonoscopy in      Hyperlipidemia 06/24/2022    Hypertension     Pacemaker     Peripheral neuropathy     Postmenopausal HRT (hormone replacement therapy)     failed tapering off    SAH (subarachnoid hemorrhage)     from a fall late 22    Sepsis 05/05/2021    Last Assessment & Plan:  Formatting of this note might be different from the original. History & Physical Patient meets sepsis criteria with a white cell count of 15, and tachypnea.  Source of infection not clear at this  time.  No evidence for meningitis.  Cover with broad-spectrum antibiotics especially given invasive dental procedure done recently.  Check blood cultures and monitor for fever.  R    Sinusitis     Thyroid nodule 3/16 subcm; kathleen 2 yrs    Type 2 diabetes mellitus with neurological manifestations     Vasodepressor syncope 2018      Past Surgical History:   Procedure Laterality Date    A-V CARDIAC PACEMAKER INSERTION Left 2022    Procedure: INSERTION, CARDIAC PACEMAKER, DUAL CHAMBER;  Surgeon: Daryl Walker MD;  Location: [facility] CATH LAB;  Service: Cardiology;  Laterality: Left;  Patient instructed 11AM start time/needs ptt/pt/inr  All Biotronik with His lead/MDT His as back up//Vinod notified    BREAST BIOPSY Left 2021    cataract Left      SECTION      CHOLECYSTECTOMY      CYST REMOVAL Right 2021    [facility]    ESOPHAGEAL MANOMETRY WITH MEASUREMENT OF IMPEDANCE N/A 10/6/2022    Procedure: MANOMETRY, ESOPHAGUS, WITH IMPEDANCE MEASUREMENT  Cardiac Clearance/Eliquis 2 day hold approval received per Dr. RABIA Aguilar, Cardiology on 22.  Note in encounters.  LB;  Surgeon: Bruna Fletcher MD;  Location: [facility];  Service: Endoscopy;  Laterality: N/A;    ESOPHAGOGASTRODUODENOSCOPY N/A 10/6/2022    Procedure: EGD (ESOPHAGOGASTRODUODENOSCOPY);  Surgeon: Bruna Fletcher MD;  Location: [facility];  Service: Endoscopy;  Laterality: N/A;    HYSTERECTOMY      nonca    OOPHORECTOMY      TILT TABLE TEST N/A 10/25/2018    Procedure: TILT TABLE TEST;  Surgeon: Daryl Walker MD;  Location: [facility] CATH LAB;  Service: Cardiology;  Laterality: N/A;  VAS.DEP.SYN,HUT,FAS,3PREP    TONSILLECTOMY        Social History     Socioeconomic History    Marital status:     Number of children: 2   Tobacco Use    Smoking status: Never    Smokeless tobacco: Never   Substance and Sexual Activity    Alcohol use: No    Drug use: No    Sexual activity: Never     Social  Determinants of Health     Financial Resource Strain: Medium Risk    Difficulty of Paying Living Expenses: Somewhat hard   Food Insecurity: No Food Insecurity    Worried About Running Out of Food in the Last Year: Never true    Ran Out of Food in the Last Year: Never true   Transportation Needs: No Transportation Needs    Lack of Transportation (Medical): No    Lack of Transportation (Non-Medical): No   Physical Activity: Unknown    Days of Exercise per Week: 0 days   Stress: Stress Concern Present    Feeling of Stress : To some extent   Social Connections: Unknown    Frequency of Communication with Friends and Family: More than three times a week    Frequency of Social Gatherings with Friends and Family: Once a week    Active Member of Clubs or Organizations: No    Attends Club or Organization Meetings: Never    Marital Status:    Housing Stability: Low Risk     Unable to Pay for Housing in the Last Year: No    Number of Places Lived in the Last Year: 1    Unstable Housing in the Last Year: No      Family History   Problem Relation Age of Onset    Aneurysm Sister     Heart disease Mother     Diabetes Father     Coronary artery disease Father     Coronary artery disease Brother     Parkinsonism Brother       Review of Systems  Cardiovascular: no chest pain  Chest: no shortness of breath  Abd: no abd pain  Remainder review of systems negative        Objective:     Physical Exam  Vitals and nursing note reviewed.   Constitutional:       General: She is not in acute distress.     Appearance: She is well-developed.   HENT:      Head: Atraumatic.      Right Ear: External ear normal.      Left Ear: External ear normal.      Nose: Nose normal.      Mouth/Throat:      Pharynx: No oropharyngeal exudate.   Eyes:      General: No scleral icterus.     Conjunctiva/sclera: Conjunctivae normal.      Pupils: Pupils are equal, round, and reactive to light.   Neck:      Thyroid: No thyromegaly.   Cardiovascular:      Rate and  Rhythm: Normal rate and regular rhythm.      Heart sounds: Normal heart sounds. No murmur heard.  Pulmonary:      Effort: Pulmonary effort is normal. No respiratory distress.      Breath sounds: Normal breath sounds. No wheezing or rales.   Abdominal:      General: Bowel sounds are normal. There is no distension.      Palpations: Abdomen is soft. There is no mass.      Tenderness: There is no abdominal tenderness. There is no guarding or rebound.   Musculoskeletal:         General: No tenderness. Normal range of motion.      Cervical back: Normal range of motion and neck supple.   Lymphadenopathy:      Cervical: No cervical adenopathy.   Skin:     General: Skin is warm.      Coloration: Skin is not pale.      Findings: No erythema or rash.   Neurological:      Mental Status: She is alert and oriented to person, place, and time.      Cranial Nerves: No cranial nerve deficit.      Motor: No abnormal muscle tone.      Coordination: Coordination normal.   Psychiatric:         Behavior: Behavior normal.         Thought Content: Thought content normal.         Judgment: Judgment normal.   Bilateral feet with absent monofilament testing and no lesions.  Bilat dors pedis pulses 2+     Assessment:     Phys exam    ICD-10-CM ICD-9-CM   1. Type 2 diabetes mellitus without complication, without long-term current use of insulin  E11.9 250.00   2. Primary hypertension  I10 401.9   3. Thyroid nodule  E04.1 241.0   4. PAF (paroxysmal atrial fibrillation)  I48.0 427.31   5. Peripheral polyneuropathy  G62.9 356.9   6. Pacemaker  Z95.0 V45.01   7. Screen for colon cancer  Z12.11 V76.51   8. Achalasia  K22.0 530.0      Plan:    Lab today  Lab and follow up after in 6 months bernard    F/u specialists    1. Type 2 diabetes mellitus without complication, without long-term current use of insulin  -     Hemoglobin A1C; Future; Expected date: 03/14/2023  -     Lipid Panel; Future; Expected date: 03/14/2023  -     Microalbumin/Creatinine Ratio,  Urine; Future; Expected date: 03/14/2023  -     Comprehensive Metabolic Panel; Future; Expected date: 03/14/2023  -     TSH; Future; Expected date: 03/14/2023  -     Vitamin B12; Future; Expected date: 03/14/2023  -     Hemoglobin A1C; Future; Expected date: 09/10/2023    2. Primary hypertension    3. Thyroid nodule    4. PAF (paroxysmal atrial fibrillation)    5. Peripheral polyneuropathy    6. Pacemaker    7. Screen for colon cancer  -     Ambulatory referral/consult to Endo Procedure ; Future; Expected date: 03/15/2023    8. Achalasia  -     Ambulatory referral/consult to General Surgery; Future; Expected date: 03/21/2023

## 2023-05-04 ENCOUNTER — CLINICAL SUPPORT (OUTPATIENT)
Dept: CARDIOLOGY | Facility: HOSPITAL | Age: 73
End: 2023-05-04
Attending: INTERNAL MEDICINE
Payer: MEDICARE

## 2023-05-04 DIAGNOSIS — Z95.0 CARDIAC PACEMAKER IN SITU: ICD-10-CM

## 2023-05-04 DIAGNOSIS — R00.1 SYMPTOMATIC SINUS BRADYCARDIA: ICD-10-CM

## 2023-05-04 PROCEDURE — 93280 PM DEVICE PROGR EVAL DUAL: CPT

## 2023-05-04 PROCEDURE — 93280 PM DEVICE PROGR EVAL DUAL: CPT | Mod: 26,,, | Performed by: INTERNAL MEDICINE

## 2023-05-04 PROCEDURE — 93280 CARDIAC DEVICE CHECK - IN CLINIC & HOSPITAL: ICD-10-PCS | Mod: 26,,, | Performed by: INTERNAL MEDICINE

## 2023-05-04 PROCEDURE — 99999 PR PBB SHADOW E&M-EST. PATIENT-LVL I: ICD-10-PCS | Mod: PBBFAC,,,

## 2023-05-04 PROCEDURE — 99999 PR PBB SHADOW E&M-EST. PATIENT-LVL I: CPT | Mod: PBBFAC,,,

## 2023-05-12 ENCOUNTER — HOSPITAL ENCOUNTER (OUTPATIENT)
Dept: PREADMISSION TESTING | Facility: HOSPITAL | Age: 73
Discharge: HOME | End: 2023-05-12
Attending: INTERNAL MEDICINE
Payer: MEDICARE

## 2023-06-07 ENCOUNTER — CLINICAL SUPPORT (OUTPATIENT)
Dept: CARDIOLOGY | Facility: HOSPITAL | Age: 73
End: 2023-06-07
Payer: MEDICARE

## 2023-06-07 DIAGNOSIS — Z95.0 PRESENCE OF CARDIAC PACEMAKER: ICD-10-CM

## 2023-06-07 PROCEDURE — 93296 REM INTERROG EVL PM/IDS: CPT | Performed by: INTERNAL MEDICINE

## 2023-06-23 ENCOUNTER — HOSPITAL ENCOUNTER (OUTPATIENT)
Dept: RADIOLOGY | Facility: HOSPITAL | Age: 73
Discharge: HOME | End: 2023-06-23
Attending: NURSE PRACTITIONER
Payer: MEDICARE

## 2023-06-23 ENCOUNTER — OFFICE VISIT (OUTPATIENT)
Dept: INTERNAL MEDICINE | Facility: CLINIC | Age: 73
End: 2023-06-23
Payer: MEDICARE

## 2023-06-23 VITALS
WEIGHT: 170 LBS | OXYGEN SATURATION: 96 % | BODY MASS INDEX: 30.12 KG/M2 | TEMPERATURE: 98 F | HEIGHT: 63 IN | DIASTOLIC BLOOD PRESSURE: 70 MMHG | HEART RATE: 64 BPM | SYSTOLIC BLOOD PRESSURE: 110 MMHG

## 2023-06-23 DIAGNOSIS — E11.9 TYPE 2 DIABETES MELLITUS WITHOUT COMPLICATION, WITHOUT LONG-TERM CURRENT USE OF INSULIN: Chronic | ICD-10-CM

## 2023-06-23 DIAGNOSIS — I10 PRIMARY HYPERTENSION: Chronic | ICD-10-CM

## 2023-06-23 DIAGNOSIS — R06.02 SHORTNESS OF BREATH: ICD-10-CM

## 2023-06-23 DIAGNOSIS — I48.0 PAF (PAROXYSMAL ATRIAL FIBRILLATION): Chronic | ICD-10-CM

## 2023-06-23 DIAGNOSIS — J40 BRONCHITIS: Primary | ICD-10-CM

## 2023-06-23 PROCEDURE — 1159F PR MEDICATION LIST DOCUMENTED IN MEDICAL RECORD: ICD-10-PCS | Mod: CPTII,S$GLB,, | Performed by: NURSE PRACTITIONER

## 2023-06-23 PROCEDURE — 1100F PTFALLS ASSESS-DOCD GE2>/YR: CPT | Mod: CPTII,S$GLB,, | Performed by: NURSE PRACTITIONER

## 2023-06-23 PROCEDURE — 99999 PR PBB SHADOW E&M-EST. PATIENT-LVL V: CPT | Mod: PBBFAC,,, | Performed by: NURSE PRACTITIONER

## 2023-06-23 PROCEDURE — 3078F DIAST BP <80 MM HG: CPT | Mod: CPTII,S$GLB,, | Performed by: NURSE PRACTITIONER

## 2023-06-23 PROCEDURE — 1126F PR PAIN SEVERITY QUANTIFIED, NO PAIN PRESENT: ICD-10-PCS | Mod: CPTII,S$GLB,, | Performed by: NURSE PRACTITIONER

## 2023-06-23 PROCEDURE — 3066F NEPHROPATHY DOC TX: CPT | Mod: CPTII,S$GLB,, | Performed by: NURSE PRACTITIONER

## 2023-06-23 PROCEDURE — 71046 XR CHEST PA AND LATERAL: ICD-10-PCS | Mod: 26,,, | Performed by: STUDENT IN AN ORGANIZED HEALTH CARE EDUCATION/TRAINING PROGRAM

## 2023-06-23 PROCEDURE — 1160F PR REVIEW ALL MEDS BY PRESCRIBER/CLIN PHARMACIST DOCUMENTED: ICD-10-PCS | Mod: CPTII,S$GLB,, | Performed by: NURSE PRACTITIONER

## 2023-06-23 PROCEDURE — 3061F PR NEG MICROALBUMINURIA RESULT DOCUMENTED/REVIEW: ICD-10-PCS | Mod: CPTII,S$GLB,, | Performed by: NURSE PRACTITIONER

## 2023-06-23 PROCEDURE — 4010F PR ACE/ARB THEARPY RXD/TAKEN: ICD-10-PCS | Mod: CPTII,S$GLB,, | Performed by: NURSE PRACTITIONER

## 2023-06-23 PROCEDURE — 99214 PR OFFICE/OUTPT VISIT, EST, LEVL IV, 30-39 MIN: ICD-10-PCS | Mod: S$GLB,,, | Performed by: NURSE PRACTITIONER

## 2023-06-23 PROCEDURE — 71046 X-RAY EXAM CHEST 2 VIEWS: CPT | Mod: TC,PO

## 2023-06-23 PROCEDURE — 3074F PR MOST RECENT SYSTOLIC BLOOD PRESSURE < 130 MM HG: ICD-10-PCS | Mod: CPTII,S$GLB,, | Performed by: NURSE PRACTITIONER

## 2023-06-23 PROCEDURE — 1100F PR PT FALLS ASSESS DOC 2+ FALLS/FALL W/INJURY/YR: ICD-10-PCS | Mod: CPTII,S$GLB,, | Performed by: NURSE PRACTITIONER

## 2023-06-23 PROCEDURE — 3044F PR MOST RECENT HEMOGLOBIN A1C LEVEL <7.0%: ICD-10-PCS | Mod: CPTII,S$GLB,, | Performed by: NURSE PRACTITIONER

## 2023-06-23 PROCEDURE — 3078F PR MOST RECENT DIASTOLIC BLOOD PRESSURE < 80 MM HG: ICD-10-PCS | Mod: CPTII,S$GLB,, | Performed by: NURSE PRACTITIONER

## 2023-06-23 PROCEDURE — 99214 OFFICE O/P EST MOD 30 MIN: CPT | Mod: S$GLB,,, | Performed by: NURSE PRACTITIONER

## 2023-06-23 PROCEDURE — 3044F HG A1C LEVEL LT 7.0%: CPT | Mod: CPTII,S$GLB,, | Performed by: NURSE PRACTITIONER

## 2023-06-23 PROCEDURE — 1126F AMNT PAIN NOTED NONE PRSNT: CPT | Mod: CPTII,S$GLB,, | Performed by: NURSE PRACTITIONER

## 2023-06-23 PROCEDURE — 3074F SYST BP LT 130 MM HG: CPT | Mod: CPTII,S$GLB,, | Performed by: NURSE PRACTITIONER

## 2023-06-23 PROCEDURE — 3061F NEG MICROALBUMINURIA REV: CPT | Mod: CPTII,S$GLB,, | Performed by: NURSE PRACTITIONER

## 2023-06-23 PROCEDURE — 1159F MED LIST DOCD IN RCRD: CPT | Mod: CPTII,S$GLB,, | Performed by: NURSE PRACTITIONER

## 2023-06-23 PROCEDURE — 4010F ACE/ARB THERAPY RXD/TAKEN: CPT | Mod: CPTII,S$GLB,, | Performed by: NURSE PRACTITIONER

## 2023-06-23 PROCEDURE — 3066F PR DOCUMENTATION OF TREATMENT FOR NEPHROPATHY: ICD-10-PCS | Mod: CPTII,S$GLB,, | Performed by: NURSE PRACTITIONER

## 2023-06-23 PROCEDURE — 71046 X-RAY EXAM CHEST 2 VIEWS: CPT | Mod: 26,,, | Performed by: STUDENT IN AN ORGANIZED HEALTH CARE EDUCATION/TRAINING PROGRAM

## 2023-06-23 PROCEDURE — 3288F PR FALLS RISK ASSESSMENT DOCUMENTED: ICD-10-PCS | Mod: CPTII,S$GLB,, | Performed by: NURSE PRACTITIONER

## 2023-06-23 PROCEDURE — 3008F BODY MASS INDEX DOCD: CPT | Mod: CPTII,S$GLB,, | Performed by: NURSE PRACTITIONER

## 2023-06-23 PROCEDURE — 99999 PR PBB SHADOW E&M-EST. PATIENT-LVL V: ICD-10-PCS | Mod: PBBFAC,,, | Performed by: NURSE PRACTITIONER

## 2023-06-23 PROCEDURE — 3008F PR BODY MASS INDEX (BMI) DOCUMENTED: ICD-10-PCS | Mod: CPTII,S$GLB,, | Performed by: NURSE PRACTITIONER

## 2023-06-23 PROCEDURE — 3288F FALL RISK ASSESSMENT DOCD: CPT | Mod: CPTII,S$GLB,, | Performed by: NURSE PRACTITIONER

## 2023-06-23 PROCEDURE — 1160F RVW MEDS BY RX/DR IN RCRD: CPT | Mod: CPTII,S$GLB,, | Performed by: NURSE PRACTITIONER

## 2023-06-23 RX ORDER — PROMETHAZINE HYDROCHLORIDE AND DEXTROMETHORPHAN HYDROBROMIDE 6.25; 15 MG/5ML; MG/5ML
5 SYRUP ORAL EVERY 6 HOURS PRN
Qty: 180 ML | Refills: 0 | Status: SHIPPED | OUTPATIENT
Start: 2023-06-23 | End: 2023-06-30

## 2023-06-23 RX ORDER — ALBUTEROL SULFATE 90 UG/1
2 AEROSOL, METERED RESPIRATORY (INHALATION) EVERY 6 HOURS PRN
Qty: 6.7 G | Refills: 5 | Status: SHIPPED | OUTPATIENT
Start: 2023-06-23 | End: 2023-07-17

## 2023-06-23 RX ORDER — PREDNISONE 20 MG/1
40 TABLET ORAL DAILY
Qty: 10 TABLET | Refills: 0 | Status: SHIPPED | OUTPATIENT
Start: 2023-06-23 | End: 2023-06-28

## 2023-06-23 RX ORDER — IBUPROFEN 600 MG/1
TABLET ORAL
COMMUNITY
Start: 2022-11-16

## 2023-06-23 RX ORDER — LISINOPRIL 20 MG/1
20 TABLET ORAL
COMMUNITY
Start: 2023-01-08 | End: 2023-07-17 | Stop reason: ALTCHOICE

## 2023-06-23 NOTE — ASSESSMENT & PLAN NOTE
CXR today to rule out pneumonia due to fever and chills 2 days prior. Prednisone, albuterol, and cough suppressant. OTC Tylenol/ibuprofen as needed. Symptomatic care and RTC precautions reviewed.

## 2023-06-23 NOTE — ASSESSMENT & PLAN NOTE
Lab Results   Component Value Date    HGBA1C 6.5 (H) 03/14/2023   Controlled. Continue current medications as prescribed. Okay for short dose of oral steroids. Advised to monitor BG.

## 2023-06-23 NOTE — PROGRESS NOTES
Subjective:       Patient ID: Kelsie Bustamante is a 73 y.o. female.    Chief Complaint: Cough    Mrs. Bustamante presents to clinic for cough and chest congestion x 1 week. Subjective fever and chills initially, which resolved x past 2 days. Shortness of breath mainly at night.Taking Nyquil without relief.    Cough  This is a new problem. The current episode started 1 to 4 weeks ago. The problem has been gradually worsening. Associated symptoms include chills (resolved), a fever (resolved), shortness of breath (laying down), sweats and wheezing (improving). Pertinent negatives include no chest pain, ear congestion, ear pain, headaches, hemoptysis, myalgias, nasal congestion, postnasal drip, rash, rhinorrhea or sore throat.     Patient Active Problem List   Diagnosis    Primary hypertension    Type 2 diabetes mellitus, without long-term current use of insulin    Peripheral neuropathy    Carpal tunnel syndrome    Localized primary carpometacarpal osteoarthritis    Synovitis of right ankle    Gastroesophageal reflux disease without esophagitis    Thyroid nodule    Premature menopause (surgery in 30s)    Palpitation    Preop cardiovascular exam    Other insomnia    Statin intolerance    Neck pain on right side    Bronchitis    Orthopnea    Normocytic anemia    PAF (paroxysmal atrial fibrillation)    Maxillary cyst    Atelectasis    Debility    Myalgia    Personal history of pneumonia (recurrent)    Personal history of urinary (tract) infections    Esophagitis    Pleurisy    Fibromyalgia    Acute respiratory failure due to COVID-19 with acute bronchitis    Hyperlipidemia    Acute bronchitis due to COVID-19 virus    Odynophagia    Dysphagia    Achalasia of esophagus    Symptomatic sinus bradycardia    Chronic anticoagulation    Vasodepressor syncope    Pacemaker       Family History   Problem Relation Age of Onset    Aneurysm Sister     Heart disease Mother     Diabetes Father     Coronary artery disease Father     Coronary  artery disease Brother     Parkinsonism Brother      Past Surgical History:   Procedure Laterality Date    A-V CARDIAC PACEMAKER INSERTION Left 2022    Procedure: INSERTION, CARDIAC PACEMAKER, DUAL CHAMBER;  Surgeon: Daryl Walker MD;  Location: [facility] CATH LAB;  Service: Cardiology;  Laterality: Left;  Patient instructed 11AM start time/needs ptt/pt/inr  All [employer] with His lead/MDT His as back up//Vinod notified    BREAST BIOPSY Left 2021    cataract Left      SECTION      CHOLECYSTECTOMY      CYST REMOVAL Right 2021    [facility]    ESOPHAGEAL MANOMETRY WITH MEASUREMENT OF IMPEDANCE N/A 10/6/2022    Procedure: MANOMETRY, ESOPHAGUS, WITH IMPEDANCE MEASUREMENT  Cardiac Clearance/Eliquis 2 day hold approval received per Dr. RABIA Aguilar, Cardiology on 22.  Note in encounters.  LB;  Surgeon: Bruna Fletcher MD;  Location: [facility];  Service: Endoscopy;  Laterality: N/A;    ESOPHAGOGASTRODUODENOSCOPY N/A 10/6/2022    Procedure: EGD (ESOPHAGOGASTRODUODENOSCOPY);  Surgeon: Bruna Fletcher MD;  Location: [facility];  Service: Endoscopy;  Laterality: N/A;    HYSTERECTOMY      nonca    OOPHORECTOMY      TILT TABLE TEST N/A 10/25/2018    Procedure: TILT TABLE TEST;  Surgeon: Daryl Walker MD;  Location: [facility] CATH LAB;  Service: Cardiology;  Laterality: N/A;  VAS.DEP.SYN,HUT,FAS,3PREP    TONSILLECTOMY           Current Outpatient Medications:     albuterol (PROVENTIL/VENTOLIN HFA) 90 mcg/actuation inhaler, Inhale 2 puffs into the lungs every 6 (six) hours as needed for Wheezing., Disp: 6.7 g, Rfl: 5    apixaban (ELIQUIS) 5 mg Tab, Take 1 tablet (5 mg total) by mouth 2 (two) times daily., Disp: 180 tablet, Rfl: 4    DULoxetine (CYMBALTA) 20 MG capsule, Take 1 capsule (20 mg total) by mouth once daily., Disp: 90 capsule, Rfl: 3    estrogens, conjugated, (PREMARIN) 0.625 MG tablet, Take 1 tablet (0.625 mg total) by mouth once daily., Disp: 90 tablet,  "Rfl: 3    flecainide (TAMBOCOR) 100 MG Tab, Take 1 tablet (100 mg total) by mouth every 12 (twelve) hours., Disp: 60 tablet, Rfl: 0    ibuprofen (ADVIL,MOTRIN) 600 MG tablet, 1 tablet with food or milk as needed Orally Three times a day for 7 days, Disp: , Rfl:     lisinopriL (PRINIVIL,ZESTRIL) 20 MG tablet, Take 20 mg by mouth., Disp: , Rfl:     metoprolol succinate (TOPROL-XL) 100 MG 24 hr tablet, Take 1 tablet (100 mg total) by mouth once daily., Disp: 180 tablet, Rfl: 3    rosuvastatin (CRESTOR) 5 MG tablet, Take 1 tablet (5 mg total) by mouth once daily., Disp: 90 tablet, Rfl: 3    SITagliptan-metformin (JANUMET) 50-1,000 mg per tablet, Take 1 tablet by mouth 2 (two) times daily with meals., Disp: 180 tablet, Rfl: 1    zolpidem (AMBIEN) 10 mg Tab, 1/2 po qhs prn insomnia, Disp: 90 tablet, Rfl: 1    dexbrompheniramine-phenyleph 2-7.5 mg Tab, one tablet as needed Orally every 6 hours for 7 days, Disp: , Rfl:     predniSONE (DELTASONE) 20 MG tablet, Take 2 tablets (40 mg total) by mouth once daily. for 5 days, Disp: 10 tablet, Rfl: 0    promethazine-dextromethorphan (PROMETHAZINE-DM) 6.25-15 mg/5 mL Syrp, Take 5 mLs by mouth every 6 (six) hours as needed (cough)., Disp: 180 mL, Rfl: 0    Review of Systems   Constitutional:  Positive for chills (resolved), fatigue and fever (resolved).   HENT:  Negative for congestion, ear pain, postnasal drip, rhinorrhea, sore throat and trouble swallowing.    Respiratory:  Positive for cough, shortness of breath (laying down) and wheezing (improving). Negative for hemoptysis and chest tightness.    Cardiovascular:  Negative for chest pain, palpitations and leg swelling.   Musculoskeletal:  Negative for myalgias.   Skin:  Negative for rash.   Neurological:  Negative for headaches.     Objective:   /70 (BP Location: Left arm, Patient Position: Sitting, BP Method: Medium (Manual))   Pulse 64   Temp 98.2 °F (36.8 °C)   Ht 5' 3" (1.6 m)   Wt 77.1 kg (169 lb 15.6 oz)   SpO2 " 96%   BMI 30.11 kg/m²      Physical Exam  Constitutional:       General: She is not in acute distress.     Appearance: Normal appearance. She is ill-appearing (mild).   HENT:      Head: Normocephalic.      Right Ear: Tympanic membrane and ear canal normal.      Left Ear: Tympanic membrane and ear canal normal.      Nose: Nose normal. No congestion or rhinorrhea.      Mouth/Throat:      Mouth: Mucous membranes are moist.      Pharynx: Oropharynx is clear. No posterior oropharyngeal erythema.   Eyes:      Conjunctiva/sclera: Conjunctivae normal.   Cardiovascular:      Rate and Rhythm: Normal rate and regular rhythm.      Heart sounds: Normal heart sounds. No murmur heard.  Pulmonary:      Effort: Pulmonary effort is normal. No respiratory distress.      Breath sounds: Wheezing and rhonchi present. No rales.      Comments: +cough  Neurological:      Mental Status: She is alert and oriented to person, place, and time.      Gait: Gait normal.   Psychiatric:         Mood and Affect: Mood normal.         Behavior: Behavior normal.       Assessment & Plan     1. Bronchitis  Assessment & Plan:  CXR today to rule out pneumonia due to fever and chills 2 days prior. Prednisone, albuterol, and cough suppressant. OTC Tylenol/ibuprofen as needed. Symptomatic care and RTC precautions reviewed.    Orders:  -     albuterol (PROVENTIL/VENTOLIN HFA) 90 mcg/actuation inhaler; Inhale 2 puffs into the lungs every 6 (six) hours as needed for Wheezing.  Dispense: 6.7 g; Refill: 5  -     promethazine-dextromethorphan (PROMETHAZINE-DM) 6.25-15 mg/5 mL Syrp; Take 5 mLs by mouth every 6 (six) hours as needed (cough).  Dispense: 180 mL; Refill: 0  -     predniSONE (DELTASONE) 20 MG tablet; Take 2 tablets (40 mg total) by mouth once daily. for 5 days  Dispense: 10 tablet; Refill: 0    2. Shortness of breath  -     X-Ray Chest PA And Lateral; Future; Expected date: 06/23/2023    3. Primary hypertension  Assessment & Plan:  Stable. BP well  "controlled today in clinic. Continue current medications as prescribed.      4. Type 2 diabetes mellitus without complication, without long-term current use of insulin  Assessment & Plan:  Lab Results   Component Value Date    HGBA1C 6.5 (H) 03/14/2023   Controlled. Continue current medications as prescribed. Okay for short dose of oral steroids. Advised to monitor BG.       5. PAF (paroxysmal atrial fibrillation)  Assessment & Plan:  Rate well controlled. Continue Eliquis, metoprolol, and fecainide.            JESUS MANUEL Christianson      Portions of this note may have been created with voice recognition software. Occasional "wrong-word" or "sound-a-like" substitutions may have occurred due to the inherent limitations of voice recognition software. Please, read the note carefully and recognize, using context, where substitutions have occurred.     "

## 2023-06-23 NOTE — PATIENT INSTRUCTIONS
Over-the-counter Tylenol/ibuprofen as needed for pain and fever if not contraindicated   Hand hygiene   Rest and maintain adequate hydration.   Antihistamine and flonase for nasal congestion and upper respiratory symptoms  Warm salt water gargles, chloraseptic spray, and lozenges as needed for sore throat.  Notify clinic if symptoms do not improve or worsen.

## 2023-07-17 ENCOUNTER — PATIENT MESSAGE (OUTPATIENT)
Dept: INTERNAL MEDICINE | Facility: CLINIC | Age: 73
End: 2023-07-17

## 2023-07-17 ENCOUNTER — OFFICE VISIT (OUTPATIENT)
Dept: INTERNAL MEDICINE | Facility: CLINIC | Age: 73
End: 2023-07-17
Payer: MEDICARE

## 2023-07-17 ENCOUNTER — LAB VISIT (OUTPATIENT)
Dept: LAB | Facility: HOSPITAL | Age: 73
End: 2023-07-17
Attending: PHYSICIAN ASSISTANT
Payer: MEDICARE

## 2023-07-17 VITALS
SYSTOLIC BLOOD PRESSURE: 120 MMHG | TEMPERATURE: 98 F | HEIGHT: 63 IN | DIASTOLIC BLOOD PRESSURE: 70 MMHG | WEIGHT: 173.31 LBS | BODY MASS INDEX: 30.71 KG/M2 | OXYGEN SATURATION: 98 % | HEART RATE: 89 BPM

## 2023-07-17 DIAGNOSIS — Z12.31 ENCOUNTER FOR SCREENING MAMMOGRAM FOR MALIGNANT NEOPLASM OF BREAST: ICD-10-CM

## 2023-07-17 DIAGNOSIS — E11.22 TYPE 2 DIABETES MELLITUS WITH STAGE 3A CHRONIC KIDNEY DISEASE, WITHOUT LONG-TERM CURRENT USE OF INSULIN: ICD-10-CM

## 2023-07-17 DIAGNOSIS — R53.83 FATIGUE, UNSPECIFIED TYPE: ICD-10-CM

## 2023-07-17 DIAGNOSIS — R00.2 PALPITATION: ICD-10-CM

## 2023-07-17 DIAGNOSIS — I48.0 PAF (PAROXYSMAL ATRIAL FIBRILLATION): Chronic | ICD-10-CM

## 2023-07-17 DIAGNOSIS — E11.65 TYPE 2 DIABETES MELLITUS WITH HYPERGLYCEMIA, WITHOUT LONG-TERM CURRENT USE OF INSULIN: Primary | ICD-10-CM

## 2023-07-17 DIAGNOSIS — N18.31 TYPE 2 DIABETES MELLITUS WITH STAGE 3A CHRONIC KIDNEY DISEASE, WITHOUT LONG-TERM CURRENT USE OF INSULIN: ICD-10-CM

## 2023-07-17 DIAGNOSIS — E11.9 TYPE 2 DIABETES MELLITUS WITHOUT COMPLICATION, WITHOUT LONG-TERM CURRENT USE OF INSULIN: Chronic | ICD-10-CM

## 2023-07-17 DIAGNOSIS — Z95.0 PACEMAKER: ICD-10-CM

## 2023-07-17 DIAGNOSIS — I10 PRIMARY HYPERTENSION: Chronic | ICD-10-CM

## 2023-07-17 DIAGNOSIS — E11.65 TYPE 2 DIABETES MELLITUS WITH HYPERGLYCEMIA, WITHOUT LONG-TERM CURRENT USE OF INSULIN: ICD-10-CM

## 2023-07-17 DIAGNOSIS — N30.00 ACUTE CYSTITIS WITHOUT HEMATURIA: ICD-10-CM

## 2023-07-17 DIAGNOSIS — R53.83 FATIGUE, UNSPECIFIED TYPE: Primary | ICD-10-CM

## 2023-07-17 PROBLEM — Z01.810 PREOP CARDIOVASCULAR EXAM: Status: RESOLVED | Noted: 2017-12-21 | Resolved: 2023-07-17

## 2023-07-17 LAB
ALBUMIN SERPL BCP-MCNC: 3.5 G/DL (ref 3.5–5.2)
ALP SERPL-CCNC: 74 U/L (ref 55–135)
ALT SERPL W/O P-5'-P-CCNC: 27 U/L (ref 10–44)
ANION GAP SERPL CALC-SCNC: 12 MMOL/L (ref 8–16)
AST SERPL-CCNC: 32 U/L (ref 10–40)
BASOPHILS # BLD AUTO: 0.03 K/UL (ref 0–0.2)
BASOPHILS NFR BLD: 0.4 % (ref 0–1.9)
BILIRUB SERPL-MCNC: 0.2 MG/DL (ref 0.1–1)
BUN SERPL-MCNC: 12 MG/DL (ref 8–23)
CALCIUM SERPL-MCNC: 9.4 MG/DL (ref 8.7–10.5)
CHLORIDE SERPL-SCNC: 104 MMOL/L (ref 95–110)
CO2 SERPL-SCNC: 24 MMOL/L (ref 23–29)
CREAT SERPL-MCNC: 1.2 MG/DL (ref 0.5–1.4)
DIFFERENTIAL METHOD: ABNORMAL
EOSINOPHIL # BLD AUTO: 0.2 K/UL (ref 0–0.5)
EOSINOPHIL NFR BLD: 2.2 % (ref 0–8)
ERYTHROCYTE [DISTWIDTH] IN BLOOD BY AUTOMATED COUNT: 12.5 % (ref 11.5–14.5)
EST. GFR  (NO RACE VARIABLE): 48 ML/MIN/1.73 M^2
ESTIMATED AVG GLUCOSE: 174 MG/DL (ref 68–131)
GLUCOSE SERPL-MCNC: 306 MG/DL (ref 70–110)
HBA1C MFR BLD: 7.7 % (ref 4–5.6)
HCT VFR BLD AUTO: 37.5 % (ref 37–48.5)
HGB BLD-MCNC: 12.7 G/DL (ref 12–16)
IMM GRANULOCYTES # BLD AUTO: 0.05 K/UL (ref 0–0.04)
IMM GRANULOCYTES NFR BLD AUTO: 0.7 % (ref 0–0.5)
LYMPHOCYTES # BLD AUTO: 1.7 K/UL (ref 1–4.8)
LYMPHOCYTES NFR BLD: 22.9 % (ref 18–48)
MCH RBC QN AUTO: 31.1 PG (ref 27–31)
MCHC RBC AUTO-ENTMCNC: 33.9 G/DL (ref 32–36)
MCV RBC AUTO: 92 FL (ref 82–98)
MONOCYTES # BLD AUTO: 0.4 K/UL (ref 0.3–1)
MONOCYTES NFR BLD: 6 % (ref 4–15)
NEUTROPHILS # BLD AUTO: 5 K/UL (ref 1.8–7.7)
NEUTROPHILS NFR BLD: 67.8 % (ref 38–73)
NRBC BLD-RTO: 0 /100 WBC
PLATELET # BLD AUTO: 254 K/UL (ref 150–450)
PMV BLD AUTO: 9.1 FL (ref 9.2–12.9)
POTASSIUM SERPL-SCNC: 5.1 MMOL/L (ref 3.5–5.1)
PROT SERPL-MCNC: 6.7 G/DL (ref 6–8.4)
RBC # BLD AUTO: 4.09 M/UL (ref 4–5.4)
SODIUM SERPL-SCNC: 140 MMOL/L (ref 136–145)
WBC # BLD AUTO: 7.38 K/UL (ref 3.9–12.7)

## 2023-07-17 PROCEDURE — 4010F PR ACE/ARB THEARPY RXD/TAKEN: ICD-10-PCS | Mod: CPTII,S$GLB,, | Performed by: PHYSICIAN ASSISTANT

## 2023-07-17 PROCEDURE — 3044F PR MOST RECENT HEMOGLOBIN A1C LEVEL <7.0%: ICD-10-PCS | Mod: CPTII,S$GLB,, | Performed by: PHYSICIAN ASSISTANT

## 2023-07-17 PROCEDURE — 80053 COMPREHEN METABOLIC PANEL: CPT | Performed by: PHYSICIAN ASSISTANT

## 2023-07-17 PROCEDURE — 1126F AMNT PAIN NOTED NONE PRSNT: CPT | Mod: CPTII,S$GLB,, | Performed by: PHYSICIAN ASSISTANT

## 2023-07-17 PROCEDURE — 3078F PR MOST RECENT DIASTOLIC BLOOD PRESSURE < 80 MM HG: ICD-10-PCS | Mod: CPTII,S$GLB,, | Performed by: PHYSICIAN ASSISTANT

## 2023-07-17 PROCEDURE — 99999 PR PBB SHADOW E&M-EST. PATIENT-LVL IV: ICD-10-PCS | Mod: PBBFAC,,, | Performed by: PHYSICIAN ASSISTANT

## 2023-07-17 PROCEDURE — 1160F PR REVIEW ALL MEDS BY PRESCRIBER/CLIN PHARMACIST DOCUMENTED: ICD-10-PCS | Mod: CPTII,S$GLB,, | Performed by: PHYSICIAN ASSISTANT

## 2023-07-17 PROCEDURE — 1160F RVW MEDS BY RX/DR IN RCRD: CPT | Mod: CPTII,S$GLB,, | Performed by: PHYSICIAN ASSISTANT

## 2023-07-17 PROCEDURE — 3008F BODY MASS INDEX DOCD: CPT | Mod: CPTII,S$GLB,, | Performed by: PHYSICIAN ASSISTANT

## 2023-07-17 PROCEDURE — 3061F PR NEG MICROALBUMINURIA RESULT DOCUMENTED/REVIEW: ICD-10-PCS | Mod: CPTII,S$GLB,, | Performed by: PHYSICIAN ASSISTANT

## 2023-07-17 PROCEDURE — 3044F HG A1C LEVEL LT 7.0%: CPT | Mod: CPTII,S$GLB,, | Performed by: PHYSICIAN ASSISTANT

## 2023-07-17 PROCEDURE — 3066F PR DOCUMENTATION OF TREATMENT FOR NEPHROPATHY: ICD-10-PCS | Mod: CPTII,S$GLB,, | Performed by: PHYSICIAN ASSISTANT

## 2023-07-17 PROCEDURE — 84443 ASSAY THYROID STIM HORMONE: CPT | Performed by: PHYSICIAN ASSISTANT

## 2023-07-17 PROCEDURE — 99999 PR PBB SHADOW E&M-EST. PATIENT-LVL IV: CPT | Mod: PBBFAC,,, | Performed by: PHYSICIAN ASSISTANT

## 2023-07-17 PROCEDURE — 3061F NEG MICROALBUMINURIA REV: CPT | Mod: CPTII,S$GLB,, | Performed by: PHYSICIAN ASSISTANT

## 2023-07-17 PROCEDURE — 1159F PR MEDICATION LIST DOCUMENTED IN MEDICAL RECORD: ICD-10-PCS | Mod: CPTII,S$GLB,, | Performed by: PHYSICIAN ASSISTANT

## 2023-07-17 PROCEDURE — 36415 COLL VENOUS BLD VENIPUNCTURE: CPT | Performed by: PHYSICIAN ASSISTANT

## 2023-07-17 PROCEDURE — 99214 OFFICE O/P EST MOD 30 MIN: CPT | Mod: S$GLB,,, | Performed by: PHYSICIAN ASSISTANT

## 2023-07-17 PROCEDURE — 3066F NEPHROPATHY DOC TX: CPT | Mod: CPTII,S$GLB,, | Performed by: PHYSICIAN ASSISTANT

## 2023-07-17 PROCEDURE — 83036 HEMOGLOBIN GLYCOSYLATED A1C: CPT | Performed by: PHYSICIAN ASSISTANT

## 2023-07-17 PROCEDURE — 3008F PR BODY MASS INDEX (BMI) DOCUMENTED: ICD-10-PCS | Mod: CPTII,S$GLB,, | Performed by: PHYSICIAN ASSISTANT

## 2023-07-17 PROCEDURE — 3074F SYST BP LT 130 MM HG: CPT | Mod: CPTII,S$GLB,, | Performed by: PHYSICIAN ASSISTANT

## 2023-07-17 PROCEDURE — 3074F PR MOST RECENT SYSTOLIC BLOOD PRESSURE < 130 MM HG: ICD-10-PCS | Mod: CPTII,S$GLB,, | Performed by: PHYSICIAN ASSISTANT

## 2023-07-17 PROCEDURE — 99214 PR OFFICE/OUTPT VISIT, EST, LEVL IV, 30-39 MIN: ICD-10-PCS | Mod: S$GLB,,, | Performed by: PHYSICIAN ASSISTANT

## 2023-07-17 PROCEDURE — 3078F DIAST BP <80 MM HG: CPT | Mod: CPTII,S$GLB,, | Performed by: PHYSICIAN ASSISTANT

## 2023-07-17 PROCEDURE — 85025 COMPLETE CBC W/AUTO DIFF WBC: CPT | Performed by: PHYSICIAN ASSISTANT

## 2023-07-17 PROCEDURE — 1159F MED LIST DOCD IN RCRD: CPT | Mod: CPTII,S$GLB,, | Performed by: PHYSICIAN ASSISTANT

## 2023-07-17 PROCEDURE — 1126F PR PAIN SEVERITY QUANTIFIED, NO PAIN PRESENT: ICD-10-PCS | Mod: CPTII,S$GLB,, | Performed by: PHYSICIAN ASSISTANT

## 2023-07-17 PROCEDURE — 4010F ACE/ARB THERAPY RXD/TAKEN: CPT | Mod: CPTII,S$GLB,, | Performed by: PHYSICIAN ASSISTANT

## 2023-07-17 RX ORDER — DAPAGLIFLOZIN 5 MG/1
5 TABLET, FILM COATED ORAL DAILY
Qty: 30 TABLET | Refills: 1 | Status: SHIPPED | OUTPATIENT
Start: 2023-07-17 | End: 2023-07-17 | Stop reason: SDUPTHER

## 2023-07-17 RX ORDER — DAPAGLIFLOZIN 5 MG/1
5 TABLET, FILM COATED ORAL DAILY
Qty: 30 TABLET | Refills: 1 | Status: SHIPPED | OUTPATIENT
Start: 2023-07-17 | End: 2023-09-15

## 2023-07-17 RX ORDER — METFORMIN HYDROCHLORIDE 500 MG/1
500 TABLET, EXTENDED RELEASE ORAL 2 TIMES DAILY WITH MEALS
Qty: 60 TABLET | Refills: 1 | Status: SHIPPED | OUTPATIENT
Start: 2023-07-17 | End: 2023-07-17 | Stop reason: SDUPTHER

## 2023-07-17 RX ORDER — METFORMIN HYDROCHLORIDE 500 MG/1
500 TABLET, EXTENDED RELEASE ORAL 2 TIMES DAILY WITH MEALS
Qty: 60 TABLET | Refills: 1 | Status: SHIPPED | OUTPATIENT
Start: 2023-07-17 | End: 2023-08-07

## 2023-07-17 RX ORDER — CEPHALEXIN 500 MG/1
500 CAPSULE ORAL 2 TIMES DAILY
Qty: 20 CAPSULE | Refills: 0 | Status: SHIPPED | OUTPATIENT
Start: 2023-07-17 | End: 2023-07-27

## 2023-07-17 NOTE — PROGRESS NOTES
Subjective:      Patient ID: Kelsie Bustamante is a 73 y.o. female.    Chief Complaint: medication concerns    HPI  Here today for eval of fatigue, hoarseness and chronic daily cough. No fever, chest pain, or shortness of breath.   Pt taking lisinopril but was stopped by her cardiologist in November.   Seen by Dr. Chau Schneider for her achalasia and a surgery was supposed to be done but pt had to cancel. Her swallowing is a big problem for her. She will schedule.   Colon ca screening: path was negative in 2014. Will defer to next year. PT does not want to have it done this year.  Mammogram: due. Will order.  DEXA due next month.   Stopped the janumet 6 months ago and is taking an over the counter herbal medication instead for her diabetes. Does not check her blood sugar at home.     Wt Readings from Last 3 Encounters:   07/17/23 1354 78.6 kg (173 lb 4.5 oz)   06/23/23 1044 77.1 kg (169 lb 15.6 oz)   03/16/23 1010 76 kg (167 lb 8.8 oz)      Patient Active Problem List   Diagnosis    Primary hypertension    Type 2 diabetes mellitus, without long-term current use of insulin    Peripheral neuropathy    Carpal tunnel syndrome    Localized primary carpometacarpal osteoarthritis    Synovitis of right ankle    Gastroesophageal reflux disease without esophagitis    Thyroid nodule    Premature menopause (surgery in 30s)    Palpitation    Other insomnia    Statin intolerance    Neck pain on right side    Bronchitis    Orthopnea    Normocytic anemia    PAF (paroxysmal atrial fibrillation)    Maxillary cyst    Atelectasis    Debility    Myalgia    Personal history of pneumonia (recurrent)    Personal history of urinary (tract) infections    Esophagitis    Pleurisy    Fibromyalgia    Acute respiratory failure due to COVID-19 with acute bronchitis    Hyperlipidemia    Acute bronchitis due to COVID-19 virus    Odynophagia    Dysphagia    Achalasia of esophagus    Symptomatic sinus bradycardia    Chronic anticoagulation     Vasodepressor syncope    Pacemaker         Current Outpatient Medications:     apixaban (ELIQUIS) 5 mg Tab, Take 1 tablet (5 mg total) by mouth 2 (two) times daily., Disp: 180 tablet, Rfl: 4    DULoxetine (CYMBALTA) 20 MG capsule, Take 1 capsule (20 mg total) by mouth once daily., Disp: 90 capsule, Rfl: 3    flecainide (TAMBOCOR) 100 MG Tab, Take 1 tablet (100 mg total) by mouth every 12 (twelve) hours., Disp: 60 tablet, Rfl: 0    ibuprofen (ADVIL,MOTRIN) 600 MG tablet, 1 tablet with food or milk as needed Orally Three times a day for 7 days, Disp: , Rfl:     metoprolol succinate (TOPROL-XL) 100 MG 24 hr tablet, Take 1 tablet (100 mg total) by mouth once daily., Disp: 180 tablet, Rfl: 3    zolpidem (AMBIEN) 10 mg Tab, 1/2 po qhs prn insomnia, Disp: 90 tablet, Rfl: 1    estrogens, conjugated, (PREMARIN) 0.625 MG tablet, Take 1 tablet (0.625 mg total) by mouth once daily., Disp: 90 tablet, Rfl: 3    rosuvastatin (CRESTOR) 5 MG tablet, Take 1 tablet (5 mg total) by mouth once daily., Disp: 90 tablet, Rfl: 3    Review of Systems   Constitutional:  Positive for fatigue. Negative for activity change, appetite change, chills, diaphoresis, fever and unexpected weight change.   HENT:  Positive for trouble swallowing and voice change. Negative for congestion, hearing loss, postnasal drip, rhinorrhea and sore throat.    Eyes: Negative.  Negative for visual disturbance.   Respiratory:  Positive for cough. Negative for choking, chest tightness and shortness of breath.    Cardiovascular:  Negative for chest pain, palpitations and leg swelling.   Gastrointestinal:  Negative for abdominal distention, abdominal pain, blood in stool, constipation, diarrhea, nausea and vomiting.   Endocrine: Negative for cold intolerance, heat intolerance, polydipsia and polyuria.   Genitourinary: Negative.  Negative for difficulty urinating and frequency.   Musculoskeletal:  Negative for arthralgias, back pain, gait problem, joint swelling and  "myalgias.   Skin:  Negative for color change, pallor, rash and wound.   Neurological:  Negative for dizziness, tremors, weakness, light-headedness, numbness and headaches.   Hematological:  Negative for adenopathy.   Psychiatric/Behavioral:  Negative for behavioral problems, confusion, self-injury, sleep disturbance and suicidal ideas. The patient is not nervous/anxious.    Objective:   /70 (BP Location: Left arm, Patient Position: Sitting, BP Method: Large (Manual))   Pulse 89   Temp 98.2 °F (36.8 °C) (Tympanic)   Ht 5' 3" (1.6 m)   Wt 78.6 kg (173 lb 4.5 oz)   SpO2 98%   BMI 30.70 kg/m²     Physical Exam  Vitals reviewed.   Constitutional:       General: She is not in acute distress.     Appearance: Normal appearance. She is well-developed. She is not ill-appearing, toxic-appearing or diaphoretic.   HENT:      Head: Normocephalic and atraumatic.      Right Ear: External ear normal.      Left Ear: External ear normal.      Nose: Nose normal.   Eyes:      Conjunctiva/sclera: Conjunctivae normal.      Pupils: Pupils are equal, round, and reactive to light.   Cardiovascular:      Rate and Rhythm: Normal rate and regular rhythm.      Heart sounds: Normal heart sounds. No murmur heard.    No friction rub. No gallop.   Pulmonary:      Effort: Pulmonary effort is normal. No respiratory distress.      Breath sounds: Normal breath sounds. No wheezing or rales.   Chest:      Chest wall: No tenderness.   Abdominal:      General: There is no distension.      Palpations: Abdomen is soft.      Tenderness: There is no abdominal tenderness.   Musculoskeletal:         General: Normal range of motion.      Cervical back: Normal range of motion and neck supple.   Lymphadenopathy:      Cervical: No cervical adenopathy.   Skin:     General: Skin is warm and dry.      Capillary Refill: Capillary refill takes less than 2 seconds.      Findings: No rash.   Neurological:      Mental Status: She is alert and oriented to person, " place, and time.      Motor: No weakness.      Coordination: Coordination normal.      Gait: Gait normal.   Psychiatric:         Mood and Affect: Mood normal.         Behavior: Behavior normal.         Thought Content: Thought content normal.         Judgment: Judgment normal.       Assessment:     1. Fatigue, unspecified type    2. Primary hypertension    3. Type 2 diabetes mellitus without complication, without long-term current use of insulin    4. PAF (paroxysmal atrial fibrillation)    5. Palpitation    6. Pacemaker    7. Encounter for screening mammogram for malignant neoplasm of breast      Plan:   Fatigue, unspecified type  -     CBC Auto Differential; Future; Expected date: 07/17/2023  -     Comprehensive Metabolic Panel; Future  -     TSH; Future  -     Urinalysis; Future; Expected date: 07/17/2023    Primary hypertension    Type 2 diabetes mellitus without complication, without long-term current use of insulin  -     Hemoglobin A1C; Future; Expected date: 07/17/2023  -     Comprehensive Metabolic Panel; Future    PAF (paroxysmal atrial fibrillation)    Palpitation  -     TSH; Future    Pacemaker    Encounter for screening mammogram for malignant neoplasm of breast  -     Mammo Digital Screening Bilat w/ Javier; Future; Expected date: 07/17/2023    -stop lisinopril. Start to monitor blood pressure and blood sugar.     -ok to wean off cymbalta (pt requests)  -schedule follow up with Chau Schneider or schedule the surgery to fix achalasia.     Follow up if symptoms worsen or fail to improve.

## 2023-07-18 ENCOUNTER — OFFICE VISIT (OUTPATIENT)
Dept: CARDIOLOGY | Facility: CLINIC | Age: 73
End: 2023-07-18
Payer: MEDICARE

## 2023-07-18 VITALS
HEIGHT: 63 IN | WEIGHT: 175.25 LBS | HEART RATE: 60 BPM | BODY MASS INDEX: 31.05 KG/M2 | SYSTOLIC BLOOD PRESSURE: 124 MMHG | OXYGEN SATURATION: 98 % | DIASTOLIC BLOOD PRESSURE: 90 MMHG

## 2023-07-18 DIAGNOSIS — K21.9 GASTROESOPHAGEAL REFLUX DISEASE, UNSPECIFIED WHETHER ESOPHAGITIS PRESENT: ICD-10-CM

## 2023-07-18 DIAGNOSIS — Z95.0 PRESENCE OF CARDIAC PACEMAKER: ICD-10-CM

## 2023-07-18 DIAGNOSIS — I10 PRIMARY HYPERTENSION: Primary | Chronic | ICD-10-CM

## 2023-07-18 DIAGNOSIS — I48.0 PAF (PAROXYSMAL ATRIAL FIBRILLATION): Chronic | ICD-10-CM

## 2023-07-18 DIAGNOSIS — E78.5 HYPERLIPIDEMIA, UNSPECIFIED HYPERLIPIDEMIA TYPE: ICD-10-CM

## 2023-07-18 DIAGNOSIS — R00.2 PALPITATION: ICD-10-CM

## 2023-07-18 DIAGNOSIS — G62.9 PERIPHERAL POLYNEUROPATHY: ICD-10-CM

## 2023-07-18 LAB — TSH SERPL DL<=0.005 MIU/L-ACNC: 1.72 UIU/ML (ref 0.4–4)

## 2023-07-18 PROCEDURE — 3080F PR MOST RECENT DIASTOLIC BLOOD PRESSURE >= 90 MM HG: ICD-10-PCS | Mod: CPTII,S$GLB,, | Performed by: INTERNAL MEDICINE

## 2023-07-18 PROCEDURE — 3051F HG A1C>EQUAL 7.0%<8.0%: CPT | Mod: CPTII,S$GLB,, | Performed by: INTERNAL MEDICINE

## 2023-07-18 PROCEDURE — 99999 PR PBB SHADOW E&M-EST. PATIENT-LVL III: CPT | Mod: PBBFAC,,, | Performed by: INTERNAL MEDICINE

## 2023-07-18 PROCEDURE — 3074F SYST BP LT 130 MM HG: CPT | Mod: CPTII,S$GLB,, | Performed by: INTERNAL MEDICINE

## 2023-07-18 PROCEDURE — 3066F PR DOCUMENTATION OF TREATMENT FOR NEPHROPATHY: ICD-10-PCS | Mod: CPTII,S$GLB,, | Performed by: INTERNAL MEDICINE

## 2023-07-18 PROCEDURE — 4010F PR ACE/ARB THEARPY RXD/TAKEN: ICD-10-PCS | Mod: CPTII,S$GLB,, | Performed by: INTERNAL MEDICINE

## 2023-07-18 PROCEDURE — 3051F PR MOST RECENT HEMOGLOBIN A1C LEVEL 7.0 - < 8.0%: ICD-10-PCS | Mod: CPTII,S$GLB,, | Performed by: INTERNAL MEDICINE

## 2023-07-18 PROCEDURE — 3061F NEG MICROALBUMINURIA REV: CPT | Mod: CPTII,S$GLB,, | Performed by: INTERNAL MEDICINE

## 2023-07-18 PROCEDURE — 4010F ACE/ARB THERAPY RXD/TAKEN: CPT | Mod: CPTII,S$GLB,, | Performed by: INTERNAL MEDICINE

## 2023-07-18 PROCEDURE — 3066F NEPHROPATHY DOC TX: CPT | Mod: CPTII,S$GLB,, | Performed by: INTERNAL MEDICINE

## 2023-07-18 PROCEDURE — 3008F PR BODY MASS INDEX (BMI) DOCUMENTED: ICD-10-PCS | Mod: CPTII,S$GLB,, | Performed by: INTERNAL MEDICINE

## 2023-07-18 PROCEDURE — 3074F PR MOST RECENT SYSTOLIC BLOOD PRESSURE < 130 MM HG: ICD-10-PCS | Mod: CPTII,S$GLB,, | Performed by: INTERNAL MEDICINE

## 2023-07-18 PROCEDURE — 3080F DIAST BP >= 90 MM HG: CPT | Mod: CPTII,S$GLB,, | Performed by: INTERNAL MEDICINE

## 2023-07-18 PROCEDURE — 3061F PR NEG MICROALBUMINURIA RESULT DOCUMENTED/REVIEW: ICD-10-PCS | Mod: CPTII,S$GLB,, | Performed by: INTERNAL MEDICINE

## 2023-07-18 PROCEDURE — 3288F PR FALLS RISK ASSESSMENT DOCUMENTED: ICD-10-PCS | Mod: CPTII,S$GLB,, | Performed by: INTERNAL MEDICINE

## 2023-07-18 PROCEDURE — 1126F AMNT PAIN NOTED NONE PRSNT: CPT | Mod: CPTII,S$GLB,, | Performed by: INTERNAL MEDICINE

## 2023-07-18 PROCEDURE — 1126F PR PAIN SEVERITY QUANTIFIED, NO PAIN PRESENT: ICD-10-PCS | Mod: CPTII,S$GLB,, | Performed by: INTERNAL MEDICINE

## 2023-07-18 PROCEDURE — 99214 OFFICE O/P EST MOD 30 MIN: CPT | Mod: S$GLB,,, | Performed by: INTERNAL MEDICINE

## 2023-07-18 PROCEDURE — 99214 PR OFFICE/OUTPT VISIT, EST, LEVL IV, 30-39 MIN: ICD-10-PCS | Mod: S$GLB,,, | Performed by: INTERNAL MEDICINE

## 2023-07-18 PROCEDURE — 3288F FALL RISK ASSESSMENT DOCD: CPT | Mod: CPTII,S$GLB,, | Performed by: INTERNAL MEDICINE

## 2023-07-18 PROCEDURE — 3008F BODY MASS INDEX DOCD: CPT | Mod: CPTII,S$GLB,, | Performed by: INTERNAL MEDICINE

## 2023-07-18 PROCEDURE — 99999 PR PBB SHADOW E&M-EST. PATIENT-LVL III: ICD-10-PCS | Mod: PBBFAC,,, | Performed by: INTERNAL MEDICINE

## 2023-07-18 PROCEDURE — 1159F MED LIST DOCD IN RCRD: CPT | Mod: CPTII,S$GLB,, | Performed by: INTERNAL MEDICINE

## 2023-07-18 PROCEDURE — 1100F PTFALLS ASSESS-DOCD GE2>/YR: CPT | Mod: CPTII,S$GLB,, | Performed by: INTERNAL MEDICINE

## 2023-07-18 PROCEDURE — 1100F PR PT FALLS ASSESS DOC 2+ FALLS/FALL W/INJURY/YR: ICD-10-PCS | Mod: CPTII,S$GLB,, | Performed by: INTERNAL MEDICINE

## 2023-07-18 PROCEDURE — 1159F PR MEDICATION LIST DOCUMENTED IN MEDICAL RECORD: ICD-10-PCS | Mod: CPTII,S$GLB,, | Performed by: INTERNAL MEDICINE

## 2023-07-18 RX ORDER — ESOMEPRAZOLE MAGNESIUM 40 MG/1
40 CAPSULE, DELAYED RELEASE ORAL
Qty: 30 CAPSULE | Refills: 11 | Status: SHIPPED | OUTPATIENT
Start: 2023-07-18 | End: 2023-07-28 | Stop reason: SDUPTHER

## 2023-07-18 RX ORDER — METOPROLOL SUCCINATE 50 MG/1
TABLET, EXTENDED RELEASE ORAL
Qty: 270 TABLET | Refills: 3 | Status: SHIPPED | OUTPATIENT
Start: 2023-07-18 | End: 2023-07-28 | Stop reason: SDUPTHER

## 2023-07-18 NOTE — PROGRESS NOTES
Subjective:   Patient ID:  Kelsie Bustamante is a 73 y.o. female who presents for follow up of Follow-up  7.18.2023  COMES AND FOR 6 MONTHS OVERDUE FOLLOW-UP.    IN THE INTERIM SHE HAD BEEN EVALUATED BY ELECTROPHYSIOLOGY AND SHE RECEIVED A DUAL-CHAMBER PACEMAKER.    Her most recent interrogation shows 60% use of the atrial pacing.  Still has intermittent episodes of AFib.    She states that she has orthostatic dizziness.  Only orthostatic.  Never when sitting.    He does go to the gym and tries to train her lower extremities.    She does not wear compression stockings.    No syncope.    She states that she is not sure why her prescription of metoprolol she has from 100 b.i.d. to 100 a day.    Denies any chest pain.      11/10/2022.    She was recently admitted to the hospital with symptomatic bradycardia.  Review of her most recent EKG in the system shows junctional bradycardia with retrograde P wave.    She has seen Dr. Aguilar after her last visit.  Her Holter monitor was abnormal with multiple episodes of significant bradycardia with heart rates in the 30s.    Not clear what measure was taken at that point.  She states that possibly she held her metoprolol after her Holter monitor results.  But however she is not certain.    She was then found to have subarachnoid bleed after her recent fall.    Was monitored at [facility] after transfer.  And her Eliquis was held.    Of note she was short back again on Toprol 100 mg.    I discussed with her need to stop her Toprol.  She states that she would feel momentarily her AFib being uncontrolled with that.      7.7.2022  She had COVID 2 weeks ago.  She took steroids.  She states that her palpitations are better but she takes Toprol 100 mg in the morning and 50 mg in the evening.  Still on flecainide 50 mg b.i.d..  She states that yesterday she felt weak when she stood up and felt dizzy and had to sit down.  She denies any syncope however.    3.14.2022   She comes  in still having a palpitations.  She states that she is having them all day.  She did not get her Holter monitor she thought that will be an event monitor or a bar the and she does not want to have a sticker on her chest as she states she had bad reaction to it in the past.  Patient homeless after conversation to get her Holter monitor.  On exam she has frequent PACs she has a regular rhythm with PACs.  She is taking Toprol XL 50 mg in the morning and metoprolol tartrate 50 mg in the evening.  She is not taking Cardizem.  She denies any chest pains, dyspnea, lightheadedness, weakness,.  She is compliant with her medications.  She is taking her Eliquis no bleeding.  She had a Holter monitor in the past which showed rare PACs.  And 2017 12.16.2021   1 cup of coffee, no alcohol , no smoking   Palpitations, 2 mins twice a day , mostly during the day between 10-2 pm   No chest pain, no santos,   Never smoker   States get her palpitaitons before it is time to take the next dose of toprol xl   NO CHEST PAIN   Normal ETT 2 years ago   Seen Dr Aguilar and other providers see notes below     5.2021  69 yo female,discharge f/u.  PMH PAF HTN DM HLD  05/2021 admitted to [facility] for afib with RVR. Converted to SR after IV cardizem,  troponin negative x2, Cr 0.8 and H&H 11/36.  Echo showed normal biv function.  F/u with Dr. Tim in 2018 for positional dizziness and subsequent tilt table test negative    Today states that   Occasional dizziness and faint at home  Some SOB while doing something  No chest pain   Some coughing and fatigue  No active bleeding         Follow-up  Pertinent negatives include no chest pain, joint swelling or numbness.   Dizziness: no light-headedness, no syncope, no palpitations and no chest pain.  8/2019   Resting HR: 77 bpm    Target HR: 129 bpm   Resting BP: 136/70 mmHg     Max HR: 135 bpm % Target: 109%   Max Systolic: 212 mmHg   Max Diastolic: 72 mmHg   Exercise Duration: 04:51 min:sec    METS:  6.2 METS     The test was terminated due to the patient requesting to stop;   experiencing fatigue and shortness of breath; .     The patient reported fatigue; shortness of breath; headache; no chest   pain; .     FINDINGS   Baseline ECG: Normal sinus rhythm     Stress ECG: Sinus tachycardia, no ischemic changes     Arrhythmias: No significant arrhythmias, 2 couplets were noted     CONCLUSION   1. Low risk treadmill stress electrocardiogram.   2. Normal blood pressure response.   3. Above Average exercise tolerance for age and gender.       [facility]  Specimen Collected: -- Last Resulted: 08/06/19 10:55 AM   Received From: [facility]  Result Received: 09/09/21  9:29 AM       Past Medical History:   Diagnosis Date    Achalasia     demetrius    Atrial fibrillation with RVR 05/11/2021    Cataract     Colon polyp     Gastroesophageal reflux     Hiatal hernia     Hx of colonic polyps 08/15/2014    per colonoscopy in      Hyperlipidemia 06/24/2022    Hypertension     Pacemaker     Peripheral neuropathy     Postmenopausal HRT (hormone replacement therapy)     failed tapering off    SAH (subarachnoid hemorrhage)     from a fall late 22    Sepsis 05/05/2021    Last Assessment & Plan:  Formatting of this note might be different from the original. History & Physical Patient meets sepsis criteria with a white cell count of 15, and tachypnea.  Source of infection not clear at this time.  No evidence for meningitis.  Cover with broad-spectrum antibiotics especially given invasive dental procedure done recently.  Check blood cultures and monitor for fever.  R    Sinusitis     Thyroid nodule 3/16 subcm; kathleen 2 yrs    Type 2 diabetes mellitus with neurological manifestations     Vasodepressor syncope 08/22/2018       Past Surgical History:   Procedure Laterality Date    A-V CARDIAC PACEMAKER INSERTION Left 12/6/2022    Procedure:  INSERTION, CARDIAC PACEMAKER, DUAL CHAMBER;  Surgeon: Daryl Walker MD;  Location: [facility] CATH LAB;  Service: Cardiology;  Laterality: Left;  Patient instructed 11AM start time/needs ptt/pt/inr  All Biotronik with His lead/MDT His as back up//Vinod notified    BREAST BIOPSY Left 2021    cataract Left      SECTION      CHOLECYSTECTOMY      CYST REMOVAL Right 2021    [facility]    ESOPHAGEAL MANOMETRY WITH MEASUREMENT OF IMPEDANCE N/A 10/6/2022    Procedure: MANOMETRY, ESOPHAGUS, WITH IMPEDANCE MEASUREMENT  Cardiac Clearance/Eliquis 2 day hold approval received per Dr. RABIA Aguilar, Cardiology on 22.  Note in encounters.  LB;  Surgeon: Bruna Fletcher MD;  Location: [facility];  Service: Endoscopy;  Laterality: N/A;    ESOPHAGOGASTRODUODENOSCOPY N/A 10/6/2022    Procedure: EGD (ESOPHAGOGASTRODUODENOSCOPY);  Surgeon: Bruna Fletcher MD;  Location: [facility];  Service: Endoscopy;  Laterality: N/A;    HYSTERECTOMY      nonca    OOPHORECTOMY      TILT TABLE TEST N/A 10/25/2018    Procedure: TILT TABLE TEST;  Surgeon: Daryl Walker MD;  Location: [facility] CATH LAB;  Service: Cardiology;  Laterality: N/A;  VAS.DEP.SYN,HUT,FAS,3PREP    TONSILLECTOMY         Social History     Tobacco Use    Smoking status: Never    Smokeless tobacco: Never   Substance Use Topics    Alcohol use: No    Drug use: No       Family History   Problem Relation Age of Onset    Aneurysm Sister     Heart disease Mother     Diabetes Father     Coronary artery disease Father     Coronary artery disease Brother     Parkinsonism Brother            Review of Systems   Cardiovascular:  Negative for chest pain, palpitations and syncope.   Musculoskeletal:  Negative for joint swelling.   Neurological:  Positive for dizziness. Negative for light-headedness and numbness.     Objective:   Physical Exam  Vitals reviewed.   Constitutional:       Appearance: She is well-developed.   Neck:      Vascular:  No carotid bruit.   Cardiovascular:      Rate and Rhythm: Normal rate and regular rhythm.      Pulses: Intact distal pulses.      Heart sounds: Normal heart sounds. No murmur heard.  Pulmonary:      Breath sounds: Normal breath sounds.   Neurological:      Mental Status: She is oriented to person, place, and time.       Lab Results   Component Value Date    CHOL 205 (H) 03/14/2023    CHOL 122 02/10/2021    CHOL 136 01/30/2020     Lab Results   Component Value Date    HDL 60 03/14/2023    HDL 58 02/10/2021    HDL 53 01/30/2020     Lab Results   Component Value Date    LDLCALC 108.2 03/14/2023    LDLCALC 37.0 (L) 02/10/2021    LDLCALC 48.8 (L) 01/30/2020     Lab Results   Component Value Date    TRIG 184 (H) 03/14/2023    TRIG 135 02/10/2021    TRIG 171 (H) 01/30/2020     Lab Results   Component Value Date    CHOLHDL 29.3 03/14/2023    CHOLHDL 47.5 02/10/2021    CHOLHDL 39.0 01/30/2020       Chemistry        Component Value Date/Time     07/17/2023 1434    K 5.1 07/17/2023 1434     07/17/2023 1434    CO2 24 07/17/2023 1434    BUN 12 07/17/2023 1434    CREATININE 1.2 07/17/2023 1434     (H) 07/17/2023 1434        Component Value Date/Time    CALCIUM 9.4 07/17/2023 1434    ALKPHOS 74 07/17/2023 1434    AST 32 07/17/2023 1434    ALT 27 07/17/2023 1434    BILITOT 0.2 07/17/2023 1434    ESTGFRAFRICA 69 11/03/2022 0357    ESTGFRAFRICA >60 06/25/2022 0357    EGFRNONAA 57 (A) 06/25/2022 0357          Lab Results   Component Value Date    HGBA1C 7.7 (H) 07/17/2023     Lab Results   Component Value Date    TSH 1.719 07/17/2023     Lab Results   Component Value Date    INR 0.9 12/06/2022    INR 1.1 11/02/2022    INR 1.0 10/17/2022     Lab Results   Component Value Date    WBC 7.38 07/17/2023    HGB 12.7 07/17/2023    HCT 37.5 07/17/2023    MCV 92 07/17/2023     07/17/2023     BMP  Sodium   Date Value Ref Range Status   07/17/2023 140 136 - 145 mmol/L Final     Potassium   Date Value Ref Range Status    07/17/2023 5.1 3.5 - 5.1 mmol/L Final     Chloride   Date Value Ref Range Status   07/17/2023 104 95 - 110 mmol/L Final     CO2   Date Value Ref Range Status   07/17/2023 24 23 - 29 mmol/L Final     BUN   Date Value Ref Range Status   07/17/2023 12 8 - 23 mg/dL Final     Creatinine   Date Value Ref Range Status   07/17/2023 1.2 0.5 - 1.4 mg/dL Final     Calcium   Date Value Ref Range Status   07/17/2023 9.4 8.7 - 10.5 mg/dL Final     Anion Gap   Date Value Ref Range Status   07/17/2023 12 8 - 16 mmol/L Final     eGFR if    Date Value Ref Range Status   06/25/2022 >60 >60 mL/min/1.73 m^2 Final     eGFR    Date Value Ref Range Status   11/03/2022 69 mL/min/1.73mSq Final     Comment:     In accordance with NKF-ASN Task Force recommendation, calculation based on the Chronic Kidney Disease Epidemiology Collaboration (CKD-EPI) equation without adjustment for race. eGFR adjusted for gender and age and calculated in ml/min/1.73mSquared. eGFR cannot be calculated if patient is under 18 years of age.     Reference Range:   >= 60 ml/min/1.73mSquared.     eGFR if non    Date Value Ref Range Status   06/25/2022 57 (A) >60 mL/min/1.73 m^2 Final     Comment:     Calculation used to obtain the estimated glomerular filtration  rate (eGFR) is the CKD-EPI equation.        BNP  @LABRCNTIP(BNP,BNPTRIAGEBLO)@  @LABRCNTIP(troponini)@  Estimated Creatinine Clearance: 42.3 mL/min (based on SCr of 1.2 mg/dL).  No results found in the last 24 hours.  No results found in the last 24 hours.  No results found in the last 24 hours.    Assessment:      1. Primary hypertension    2. PAF (paroxysmal atrial fibrillation)    3. Hyperlipidemia, unspecified hyperlipidemia type    4. Peripheral polyneuropathy    5. Palpitation    6. Presence of cardiac pacemaker            Plan:   on flecainide and Toprol , will increase Toprol from 100 q.a.m. to additional 3:00 p.m..    Will increase the flecainide to  150 mg b.i.d. if still episodes of palpitations.    Patient declined to stop the Toprol given her episodes of paroxysmal atrial fibrillation.   Recommended compression stockings.  Lower body exercise.  Good p.o. hydration.  Reviewed all tests and above medical conditions with patient in detail and formulated treatment plan.  Risk factor modification discussed.   Cardiac low salt diet discussed.  Maintaining healthy weight and weight loss goals were discussed in clinic.    rtc in 6 months

## 2023-07-28 DIAGNOSIS — K21.9 GASTROESOPHAGEAL REFLUX DISEASE, UNSPECIFIED WHETHER ESOPHAGITIS PRESENT: ICD-10-CM

## 2023-07-28 DIAGNOSIS — I48.0 PAF (PAROXYSMAL ATRIAL FIBRILLATION): Chronic | ICD-10-CM

## 2023-07-28 NOTE — TELEPHONE ENCOUNTER
No care due was identified.  [facility] Embedded Care Due Messages. Reference number: [id number].   7/28/2023 2:13:17 PM CDT

## 2023-07-31 RX ORDER — ROSUVASTATIN CALCIUM 5 MG/1
5 TABLET, COATED ORAL DAILY
Qty: 90 TABLET | Refills: 3 | Status: SHIPPED | OUTPATIENT
Start: 2023-07-31 | End: 2024-03-25 | Stop reason: SDUPTHER

## 2023-07-31 RX ORDER — ESOMEPRAZOLE MAGNESIUM 40 MG/1
40 CAPSULE, DELAYED RELEASE ORAL
Qty: 30 CAPSULE | Refills: 11 | Status: SHIPPED | OUTPATIENT
Start: 2023-07-31 | End: 2023-11-14

## 2023-07-31 RX ORDER — ZOLPIDEM TARTRATE 10 MG/1
TABLET ORAL
Qty: 90 TABLET | Refills: 1 | Status: SHIPPED | OUTPATIENT
Start: 2023-07-31 | End: 2023-09-28 | Stop reason: SDUPTHER

## 2023-07-31 RX ORDER — METOPROLOL SUCCINATE 50 MG/1
TABLET, EXTENDED RELEASE ORAL
Qty: 270 TABLET | Refills: 3 | Status: SHIPPED | OUTPATIENT
Start: 2023-07-31 | End: 2024-07-30

## 2023-08-07 ENCOUNTER — HOSPITAL ENCOUNTER (OUTPATIENT)
Dept: RADIOLOGY | Facility: HOSPITAL | Age: 73
Discharge: HOME | End: 2023-08-07
Attending: FAMILY MEDICINE
Payer: MEDICARE

## 2023-08-07 ENCOUNTER — TELEPHONE (OUTPATIENT)
Dept: INTERNAL MEDICINE | Facility: CLINIC | Age: 73
End: 2023-08-07

## 2023-08-07 ENCOUNTER — OFFICE VISIT (OUTPATIENT)
Dept: INTERNAL MEDICINE | Facility: CLINIC | Age: 73
End: 2023-08-07
Payer: MEDICARE

## 2023-08-07 VITALS
TEMPERATURE: 97 F | WEIGHT: 170.88 LBS | BODY MASS INDEX: 30.28 KG/M2 | SYSTOLIC BLOOD PRESSURE: 128 MMHG | DIASTOLIC BLOOD PRESSURE: 72 MMHG | HEART RATE: 78 BPM | HEIGHT: 63 IN | RESPIRATION RATE: 12 BRPM | OXYGEN SATURATION: 95 %

## 2023-08-07 DIAGNOSIS — N39.0 URINARY TRACT INFECTION WITHOUT HEMATURIA, SITE UNSPECIFIED: ICD-10-CM

## 2023-08-07 DIAGNOSIS — R06.09 DOE (DYSPNEA ON EXERTION): ICD-10-CM

## 2023-08-07 DIAGNOSIS — R05.9 COUGH, UNSPECIFIED TYPE: ICD-10-CM

## 2023-08-07 DIAGNOSIS — D68.69 OTHER THROMBOPHILIA: ICD-10-CM

## 2023-08-07 DIAGNOSIS — M35.3 POLYMYALGIA: ICD-10-CM

## 2023-08-07 DIAGNOSIS — J18.9 PNEUMONIA DUE TO INFECTIOUS ORGANISM, UNSPECIFIED LATERALITY, UNSPECIFIED PART OF LUNG: Primary | ICD-10-CM

## 2023-08-07 DIAGNOSIS — E11.42 TYPE 2 DIABETES MELLITUS WITH DIABETIC POLYNEUROPATHY, UNSPECIFIED WHETHER LONG TERM INSULIN USE: Primary | ICD-10-CM

## 2023-08-07 PROCEDURE — 3066F PR DOCUMENTATION OF TREATMENT FOR NEPHROPATHY: ICD-10-PCS | Mod: CPTII,S$GLB,, | Performed by: FAMILY MEDICINE

## 2023-08-07 PROCEDURE — 99214 OFFICE O/P EST MOD 30 MIN: CPT | Mod: S$GLB,,, | Performed by: FAMILY MEDICINE

## 2023-08-07 PROCEDURE — 71046 XR CHEST PA AND LATERAL: ICD-10-PCS | Mod: 26,,, | Performed by: RADIOLOGY

## 2023-08-07 PROCEDURE — 3051F HG A1C>EQUAL 7.0%<8.0%: CPT | Mod: CPTII,S$GLB,, | Performed by: FAMILY MEDICINE

## 2023-08-07 PROCEDURE — 1100F PR PT FALLS ASSESS DOC 2+ FALLS/FALL W/INJURY/YR: ICD-10-PCS | Mod: CPTII,S$GLB,, | Performed by: FAMILY MEDICINE

## 2023-08-07 PROCEDURE — 1126F AMNT PAIN NOTED NONE PRSNT: CPT | Mod: CPTII,S$GLB,, | Performed by: FAMILY MEDICINE

## 2023-08-07 PROCEDURE — 3078F PR MOST RECENT DIASTOLIC BLOOD PRESSURE < 80 MM HG: ICD-10-PCS | Mod: CPTII,S$GLB,, | Performed by: FAMILY MEDICINE

## 2023-08-07 PROCEDURE — 3074F SYST BP LT 130 MM HG: CPT | Mod: CPTII,S$GLB,, | Performed by: FAMILY MEDICINE

## 2023-08-07 PROCEDURE — 3061F NEG MICROALBUMINURIA REV: CPT | Mod: CPTII,S$GLB,, | Performed by: FAMILY MEDICINE

## 2023-08-07 PROCEDURE — 86580 TB INTRADERMAL TEST: CPT | Mod: S$GLB,,, | Performed by: FAMILY MEDICINE

## 2023-08-07 PROCEDURE — 99214 PR OFFICE/OUTPT VISIT, EST, LEVL IV, 30-39 MIN: ICD-10-PCS | Mod: S$GLB,,, | Performed by: FAMILY MEDICINE

## 2023-08-07 PROCEDURE — 4010F PR ACE/ARB THEARPY RXD/TAKEN: ICD-10-PCS | Mod: CPTII,S$GLB,, | Performed by: FAMILY MEDICINE

## 2023-08-07 PROCEDURE — 3051F PR MOST RECENT HEMOGLOBIN A1C LEVEL 7.0 - < 8.0%: ICD-10-PCS | Mod: CPTII,S$GLB,, | Performed by: FAMILY MEDICINE

## 2023-08-07 PROCEDURE — 1159F PR MEDICATION LIST DOCUMENTED IN MEDICAL RECORD: ICD-10-PCS | Mod: CPTII,S$GLB,, | Performed by: FAMILY MEDICINE

## 2023-08-07 PROCEDURE — 3074F PR MOST RECENT SYSTOLIC BLOOD PRESSURE < 130 MM HG: ICD-10-PCS | Mod: CPTII,S$GLB,, | Performed by: FAMILY MEDICINE

## 2023-08-07 PROCEDURE — 4010F ACE/ARB THERAPY RXD/TAKEN: CPT | Mod: CPTII,S$GLB,, | Performed by: FAMILY MEDICINE

## 2023-08-07 PROCEDURE — 99999 PR PBB SHADOW E&M-EST. PATIENT-LVL IV: ICD-10-PCS | Mod: PBBFAC,,, | Performed by: FAMILY MEDICINE

## 2023-08-07 PROCEDURE — 71046 X-RAY EXAM CHEST 2 VIEWS: CPT | Mod: 26,,, | Performed by: RADIOLOGY

## 2023-08-07 PROCEDURE — 3078F DIAST BP <80 MM HG: CPT | Mod: CPTII,S$GLB,, | Performed by: FAMILY MEDICINE

## 2023-08-07 PROCEDURE — 1126F PR PAIN SEVERITY QUANTIFIED, NO PAIN PRESENT: ICD-10-PCS | Mod: CPTII,S$GLB,, | Performed by: FAMILY MEDICINE

## 2023-08-07 PROCEDURE — 3066F NEPHROPATHY DOC TX: CPT | Mod: CPTII,S$GLB,, | Performed by: FAMILY MEDICINE

## 2023-08-07 PROCEDURE — 3008F PR BODY MASS INDEX (BMI) DOCUMENTED: ICD-10-PCS | Mod: CPTII,S$GLB,, | Performed by: FAMILY MEDICINE

## 2023-08-07 PROCEDURE — 3288F PR FALLS RISK ASSESSMENT DOCUMENTED: ICD-10-PCS | Mod: CPTII,S$GLB,, | Performed by: FAMILY MEDICINE

## 2023-08-07 PROCEDURE — 1100F PTFALLS ASSESS-DOCD GE2>/YR: CPT | Mod: CPTII,S$GLB,, | Performed by: FAMILY MEDICINE

## 2023-08-07 PROCEDURE — 1159F MED LIST DOCD IN RCRD: CPT | Mod: CPTII,S$GLB,, | Performed by: FAMILY MEDICINE

## 2023-08-07 PROCEDURE — 3288F FALL RISK ASSESSMENT DOCD: CPT | Mod: CPTII,S$GLB,, | Performed by: FAMILY MEDICINE

## 2023-08-07 PROCEDURE — 3061F PR NEG MICROALBUMINURIA RESULT DOCUMENTED/REVIEW: ICD-10-PCS | Mod: CPTII,S$GLB,, | Performed by: FAMILY MEDICINE

## 2023-08-07 PROCEDURE — 86580 POCT TB SKIN TEST: ICD-10-PCS | Mod: S$GLB,,, | Performed by: FAMILY MEDICINE

## 2023-08-07 PROCEDURE — 99999 PR PBB SHADOW E&M-EST. PATIENT-LVL IV: CPT | Mod: PBBFAC,,, | Performed by: FAMILY MEDICINE

## 2023-08-07 PROCEDURE — 3008F BODY MASS INDEX DOCD: CPT | Mod: CPTII,S$GLB,, | Performed by: FAMILY MEDICINE

## 2023-08-07 PROCEDURE — 71046 X-RAY EXAM CHEST 2 VIEWS: CPT | Mod: TC

## 2023-08-07 RX ORDER — DOXYCYCLINE 100 MG/1
100 CAPSULE ORAL 2 TIMES DAILY
Qty: 20 CAPSULE | Refills: 0 | Status: SHIPPED | OUTPATIENT
Start: 2023-08-07 | End: 2023-08-17

## 2023-08-07 RX ORDER — AMOXICILLIN AND CLAVULANATE POTASSIUM 875; 125 MG/1; MG/1
1 TABLET, FILM COATED ORAL 2 TIMES DAILY
Qty: 20 TABLET | Refills: 0 | Status: SHIPPED | OUTPATIENT
Start: 2023-08-07 | End: 2023-08-17

## 2023-08-07 NOTE — PROGRESS NOTES
Subjective:      Patient ID: Kelsie Bustamante is a 73 y.o. female.    Chief Complaint: Fatigue and Dizziness    HPISeveral months or longer several chronic symptoms specifically couple times a month several hours of achiness fatigue and temperature up to 105 that goes with Tylenol and time and rest of day and in between weeks almost back to normal she states this started sometime after COVID last year she was seen and treated for UTI last few weeks she is also had chronic dyspnea on exertion gradually worsening without chest pain along with intermittent cough sometimes dry sometimes productive and postnasal drip no sore throat no abdominal pain but has had a day or 2 recently of some vomiting no complaints of bowel issues chest x-ray in June was okay had some rhonchi then.  Saw Nalini recently and Farxiga started and patient states she is off metformin off Cymbalta still on lisinopril  Past Medical History:   Diagnosis Date    Achalasia     demetrius    Atrial fibrillation with RVR 05/11/2021    Cataract     Colon polyp     Gastroesophageal reflux     Hiatal hernia     Hx of colonic polyps 08/15/2014    per colonoscopy in      Hyperlipidemia 06/24/2022    Hypertension     Pacemaker     Peripheral neuropathy     Postmenopausal HRT (hormone replacement therapy)     failed tapering off    SAH (subarachnoid hemorrhage)     from a fall late 22    Sepsis 05/05/2021    Last Assessment & Plan:  Formatting of this note might be different from the original. History & Physical Patient meets sepsis criteria with a white cell count of 15, and tachypnea.  Source of infection not clear at this time.  No evidence for meningitis.  Cover with broad-spectrum antibiotics especially given invasive dental procedure done recently.  Check blood cultures and monitor for fever.  R    Sinusitis     Thyroid nodule 3/16 subcm; kathleen 2 yrs    Type 2 diabetes mellitus with neurological manifestations     Vasodepressor syncope 08/22/2018       Past Surgical History:   Procedure Laterality Date    A-V CARDIAC PACEMAKER INSERTION Left 2022    Procedure: INSERTION, CARDIAC PACEMAKER, DUAL CHAMBER;  Surgeon: Daryl Walker MD;  Location: [facility] CATH LAB;  Service: Cardiology;  Laterality: Left;  Patient instructed 11AM start time/needs ptt/pt/inr  All Biotronik with His lead/MDT His as back up//Vinod notified    BREAST BIOPSY Left 2021    cataract Left      SECTION      CHOLECYSTECTOMY      CYST REMOVAL Right 2021    [facility]    ESOPHAGEAL MANOMETRY WITH MEASUREMENT OF IMPEDANCE N/A 10/6/2022    Procedure: MANOMETRY, ESOPHAGUS, WITH IMPEDANCE MEASUREMENT  Cardiac Clearance/Eliquis 2 day hold approval received per Dr. RABIA Aguilar, Cardiology on 22.  Note in encounters.  LB;  Surgeon: Bruna Fletcher MD;  Location: [facility];  Service: Endoscopy;  Laterality: N/A;    ESOPHAGOGASTRODUODENOSCOPY N/A 10/6/2022    Procedure: EGD (ESOPHAGOGASTRODUODENOSCOPY);  Surgeon: Bruna Fletcher MD;  Location: [facility];  Service: Endoscopy;  Laterality: N/A;    HYSTERECTOMY      nonca    OOPHORECTOMY      TILT TABLE TEST N/A 10/25/2018    Procedure: TILT TABLE TEST;  Surgeon: Daryl Walker MD;  Location: [facility] CATH LAB;  Service: Cardiology;  Laterality: N/A;  VAS.DEP.SYN,HUT,FAS,3PREP    TONSILLECTOMY        Social History     Socioeconomic History    Marital status:     Number of children: 2   Tobacco Use    Smoking status: Never    Smokeless tobacco: Never   Substance and Sexual Activity    Alcohol use: No    Drug use: No    Sexual activity: Never     Social Determinants of Health     Financial Resource Strain: Medium Risk (2022)    Overall Financial Resource Strain (CARDIA)     Difficulty of Paying Living Expenses: Somewhat hard   Food Insecurity: No Food Insecurity (2022)    Hunger Vital Sign     Worried About Running Out of Food in the Last Year: Never true     Ran Out of Food in  the Last Year: Never true   Transportation Needs: No Transportation Needs (9/23/2022)    PRAPARE - Transportation     Lack of Transportation (Medical): No     Lack of Transportation (Non-Medical): No   Physical Activity: Unknown (9/23/2022)    Exercise Vital Sign     Days of Exercise per Week: 0 days   Stress: Stress Concern Present (10/19/2022)    [employer] [place] [employer] - Occupational Stress Questionnaire     Feeling of Stress : To some extent   Social Connections: Unknown (9/23/2022)    Social Connection and Isolation Panel [NHANES]     Frequency of Communication with Friends and Family: More than three times a week     Frequency of Social Gatherings with Friends and Family: Once a week     Active Member of Clubs or Organizations: No     Attends Club or Organization Meetings: Never     Marital Status:    Housing Stability: Low Risk  (9/23/2022)    Housing Stability Vital Sign     Unable to Pay for Housing in the Last Year: No     Number of Places Lived in the Last Year: 1     Unstable Housing in the Last Year: No      Family History   Problem Relation Age of Onset    Aneurysm Sister     Heart disease Mother     Diabetes Father     Coronary artery disease Father     Coronary artery disease Brother     Parkinsonism Brother       Review of Systems        Objective:     Physical Exam  Vitals and nursing note reviewed.   Constitutional:       General: She is not in acute distress.     Appearance: She is well-developed.   HENT:      Head: Atraumatic.      Right Ear: External ear normal.      Left Ear: External ear normal.      Nose: Nose normal.      Mouth/Throat:      Pharynx: No oropharyngeal exudate.   Eyes:      General: No scleral icterus.     Conjunctiva/sclera: Conjunctivae normal.      Pupils: Pupils are equal, round, and reactive to light.   Neck:      Thyroid: No thyromegaly.   Cardiovascular:      Rate and Rhythm: Normal rate and regular rhythm.      Heart sounds: Normal heart sounds.  No murmur heard.  Pulmonary:      Effort: Pulmonary effort is normal. No respiratory distress.      Breath sounds: Rales (bilat basilar) present. No wheezing.   Abdominal:      General: Bowel sounds are normal. There is no distension.      Palpations: Abdomen is soft. There is no mass.      Tenderness: There is no abdominal tenderness. There is no guarding or rebound.   Musculoskeletal:         General: No tenderness. Normal range of motion.      Cervical back: Normal range of motion and neck supple.   Lymphadenopathy:      Cervical: No cervical adenopathy.   Skin:     General: Skin is warm.      Coloration: Skin is not pale.      Findings: No erythema or rash.   Neurological:      Mental Status: She is alert and oriented to person, place, and time.      Cranial Nerves: No cranial nerve deficit.      Motor: No abnormal muscle tone.      Coordination: Coordination normal.   Psychiatric:         Behavior: Behavior normal.         Thought Content: Thought content normal.         Judgment: Judgment normal.       Assessment:         ICD-10-CM ICD-9-CM   1. Other thrombophilia  D68.69 289.82   2. Type 2 diabetes mellitus with diabetic polyneuropathy, unspecified whether long term insulin use  E11.42 250.60     357.2   3. Polymyalgia  M35.3 725   4. Cough, unspecified type  R05.9 786.2   5. Urinary tract infection without hematuria, site unspecified  N39.0 599.0   6. TREVIZO (dyspnea on exertion)  R06.09 786.09      Plan:        1. Other thrombophilia    2. Type 2 diabetes mellitus with diabetic polyneuropathy, unspecified whether long term insulin use    3. Polymyalgia    4. Cough, unspecified type  -     POCT TB Skin Test Read    5. Urinary tract infection without hematuria, site unspecified  -     Urinalysis; Future  -     Urine culture; Future; Expected date: 08/07/2023    6. TREVIZO (dyspnea on exertion)  -     X-Ray Chest PA And Lateral; Future; Expected date: 08/07/2023; rales new finding on exam           Clarify at f/u if  actually off metformin;     Follow up with few days in [place]  follow-up rivas Archuleta for PPD and above evaluation      Stop Crestor for now has significant improvement off this will DC the fatigue etc. Then trial alternative otherwise can resume this would not explain the intermittent fevers cough dyspnea on exertion will make sure urinary tract infections resolved and if it is then consider blood cultures further evaluation      Add:ua neg  Cxr poss pneumonia  Alan cxr two weeks  Augm/doxy now  F/u Kathe later in week ; if sympt persist after completion of abx then refer pulm for poss noninfectious cause resp sympt and cxr

## 2023-08-08 NOTE — TELEPHONE ENCOUNTER
I spoke with the patient informing her of the following information from Dr Parks     Please notify cxr shows possible pneumonia   Two antibiotics erxd to [facility] [place]   Keep f/u Kathe later in week   Also cxr two weeks(can be in [place] if wants      The patient stated understanding  and she has the appointment with Kathe on tomorrow. Also, she will have ask Kathe to order another chest xray . The patient was informed to call the office if she need anything more.

## 2023-08-09 LAB
TB INDURATION - 48 HR READ: 0 MM
TB INDURATION - 72 HR READ: 0 MM
TB SKIN TEST - 48 HR READ: NEGATIVE
TB SKIN TEST - 72 HR READ: NEGATIVE

## 2023-08-10 ENCOUNTER — TELEPHONE (OUTPATIENT)
Dept: INTERNAL MEDICINE | Facility: CLINIC | Age: 73
End: 2023-08-10
Payer: MEDICARE

## 2023-08-21 ENCOUNTER — HOSPITAL ENCOUNTER (OUTPATIENT)
Dept: RADIOLOGY | Facility: HOSPITAL | Age: 73
Discharge: HOME | End: 2023-08-21
Attending: FAMILY MEDICINE
Payer: MEDICARE

## 2023-08-21 ENCOUNTER — OFFICE VISIT (OUTPATIENT)
Dept: INTERNAL MEDICINE | Facility: CLINIC | Age: 73
End: 2023-08-21
Payer: MEDICARE

## 2023-08-21 VITALS
SYSTOLIC BLOOD PRESSURE: 132 MMHG | DIASTOLIC BLOOD PRESSURE: 70 MMHG | HEART RATE: 86 BPM | OXYGEN SATURATION: 98 % | WEIGHT: 173.5 LBS | TEMPERATURE: 98 F | HEIGHT: 63 IN | BODY MASS INDEX: 30.74 KG/M2

## 2023-08-21 DIAGNOSIS — D72.829 LEUKOCYTOSIS, UNSPECIFIED TYPE: ICD-10-CM

## 2023-08-21 DIAGNOSIS — J18.9 PNEUMONIA DUE TO INFECTIOUS ORGANISM, UNSPECIFIED LATERALITY, UNSPECIFIED PART OF LUNG: Primary | ICD-10-CM

## 2023-08-21 DIAGNOSIS — I48.0 PAF (PAROXYSMAL ATRIAL FIBRILLATION): Chronic | ICD-10-CM

## 2023-08-21 DIAGNOSIS — J18.9 PNEUMONIA DUE TO INFECTIOUS ORGANISM, UNSPECIFIED LATERALITY, UNSPECIFIED PART OF LUNG: ICD-10-CM

## 2023-08-21 DIAGNOSIS — R05.9 COUGH, UNSPECIFIED TYPE: ICD-10-CM

## 2023-08-21 DIAGNOSIS — R06.02 SHORTNESS OF BREATH: ICD-10-CM

## 2023-08-21 DIAGNOSIS — I10 PRIMARY HYPERTENSION: Chronic | ICD-10-CM

## 2023-08-21 DIAGNOSIS — R53.83 FATIGUE, UNSPECIFIED TYPE: ICD-10-CM

## 2023-08-21 PROBLEM — U07.1 ACUTE RESPIRATORY FAILURE DUE TO COVID-19: Status: RESOLVED | Noted: 2022-06-24 | Resolved: 2023-08-21

## 2023-08-21 PROBLEM — J20.8 ACUTE BRONCHITIS DUE TO COVID-19 VIRUS: Status: RESOLVED | Noted: 2022-06-25 | Resolved: 2023-08-21

## 2023-08-21 PROBLEM — J96.00 ACUTE RESPIRATORY FAILURE DUE TO COVID-19: Status: RESOLVED | Noted: 2022-06-24 | Resolved: 2023-08-21

## 2023-08-21 PROBLEM — U07.1 ACUTE BRONCHITIS DUE TO COVID-19 VIRUS: Status: RESOLVED | Noted: 2022-06-25 | Resolved: 2023-08-21

## 2023-08-21 PROCEDURE — 1101F PT FALLS ASSESS-DOCD LE1/YR: CPT | Mod: CPTII,S$GLB,, | Performed by: NURSE PRACTITIONER

## 2023-08-21 PROCEDURE — 99999 PR PBB SHADOW E&M-EST. PATIENT-LVL IV: ICD-10-PCS | Mod: PBBFAC,,, | Performed by: NURSE PRACTITIONER

## 2023-08-21 PROCEDURE — 3066F NEPHROPATHY DOC TX: CPT | Mod: CPTII,S$GLB,, | Performed by: NURSE PRACTITIONER

## 2023-08-21 PROCEDURE — 3051F HG A1C>EQUAL 7.0%<8.0%: CPT | Mod: CPTII,S$GLB,, | Performed by: NURSE PRACTITIONER

## 2023-08-21 PROCEDURE — 1126F PR PAIN SEVERITY QUANTIFIED, NO PAIN PRESENT: ICD-10-PCS | Mod: CPTII,S$GLB,, | Performed by: NURSE PRACTITIONER

## 2023-08-21 PROCEDURE — 3051F PR MOST RECENT HEMOGLOBIN A1C LEVEL 7.0 - < 8.0%: ICD-10-PCS | Mod: CPTII,S$GLB,, | Performed by: NURSE PRACTITIONER

## 2023-08-21 PROCEDURE — 4010F ACE/ARB THERAPY RXD/TAKEN: CPT | Mod: CPTII,S$GLB,, | Performed by: NURSE PRACTITIONER

## 2023-08-21 PROCEDURE — 71046 X-RAY EXAM CHEST 2 VIEWS: CPT | Mod: 26,,, | Performed by: RADIOLOGY

## 2023-08-21 PROCEDURE — 1126F AMNT PAIN NOTED NONE PRSNT: CPT | Mod: CPTII,S$GLB,, | Performed by: NURSE PRACTITIONER

## 2023-08-21 PROCEDURE — 1160F PR REVIEW ALL MEDS BY PRESCRIBER/CLIN PHARMACIST DOCUMENTED: ICD-10-PCS | Mod: CPTII,S$GLB,, | Performed by: NURSE PRACTITIONER

## 2023-08-21 PROCEDURE — 71046 X-RAY EXAM CHEST 2 VIEWS: CPT | Mod: TC,PO

## 2023-08-21 PROCEDURE — 3075F SYST BP GE 130 - 139MM HG: CPT | Mod: CPTII,S$GLB,, | Performed by: NURSE PRACTITIONER

## 2023-08-21 PROCEDURE — 1101F PR PT FALLS ASSESS DOC 0-1 FALLS W/OUT INJ PAST YR: ICD-10-PCS | Mod: CPTII,S$GLB,, | Performed by: NURSE PRACTITIONER

## 2023-08-21 PROCEDURE — 1159F MED LIST DOCD IN RCRD: CPT | Mod: CPTII,S$GLB,, | Performed by: NURSE PRACTITIONER

## 2023-08-21 PROCEDURE — 3008F BODY MASS INDEX DOCD: CPT | Mod: CPTII,S$GLB,, | Performed by: NURSE PRACTITIONER

## 2023-08-21 PROCEDURE — 3075F PR MOST RECENT SYSTOLIC BLOOD PRESS GE 130-139MM HG: ICD-10-PCS | Mod: CPTII,S$GLB,, | Performed by: NURSE PRACTITIONER

## 2023-08-21 PROCEDURE — 3061F PR NEG MICROALBUMINURIA RESULT DOCUMENTED/REVIEW: ICD-10-PCS | Mod: CPTII,S$GLB,, | Performed by: NURSE PRACTITIONER

## 2023-08-21 PROCEDURE — 3288F FALL RISK ASSESSMENT DOCD: CPT | Mod: CPTII,S$GLB,, | Performed by: NURSE PRACTITIONER

## 2023-08-21 PROCEDURE — 3061F NEG MICROALBUMINURIA REV: CPT | Mod: CPTII,S$GLB,, | Performed by: NURSE PRACTITIONER

## 2023-08-21 PROCEDURE — 3288F PR FALLS RISK ASSESSMENT DOCUMENTED: ICD-10-PCS | Mod: CPTII,S$GLB,, | Performed by: NURSE PRACTITIONER

## 2023-08-21 PROCEDURE — 1160F RVW MEDS BY RX/DR IN RCRD: CPT | Mod: CPTII,S$GLB,, | Performed by: NURSE PRACTITIONER

## 2023-08-21 PROCEDURE — 4010F PR ACE/ARB THEARPY RXD/TAKEN: ICD-10-PCS | Mod: CPTII,S$GLB,, | Performed by: NURSE PRACTITIONER

## 2023-08-21 PROCEDURE — 3008F PR BODY MASS INDEX (BMI) DOCUMENTED: ICD-10-PCS | Mod: CPTII,S$GLB,, | Performed by: NURSE PRACTITIONER

## 2023-08-21 PROCEDURE — 99214 PR OFFICE/OUTPT VISIT, EST, LEVL IV, 30-39 MIN: ICD-10-PCS | Mod: S$GLB,,, | Performed by: NURSE PRACTITIONER

## 2023-08-21 PROCEDURE — 99999 PR PBB SHADOW E&M-EST. PATIENT-LVL IV: CPT | Mod: PBBFAC,,, | Performed by: NURSE PRACTITIONER

## 2023-08-21 PROCEDURE — 99214 OFFICE O/P EST MOD 30 MIN: CPT | Mod: S$GLB,,, | Performed by: NURSE PRACTITIONER

## 2023-08-21 PROCEDURE — 3078F DIAST BP <80 MM HG: CPT | Mod: CPTII,S$GLB,, | Performed by: NURSE PRACTITIONER

## 2023-08-21 PROCEDURE — 3078F PR MOST RECENT DIASTOLIC BLOOD PRESSURE < 80 MM HG: ICD-10-PCS | Mod: CPTII,S$GLB,, | Performed by: NURSE PRACTITIONER

## 2023-08-21 PROCEDURE — 71046 XR CHEST PA AND LATERAL: ICD-10-PCS | Mod: 26,,, | Performed by: RADIOLOGY

## 2023-08-21 PROCEDURE — 1159F PR MEDICATION LIST DOCUMENTED IN MEDICAL RECORD: ICD-10-PCS | Mod: CPTII,S$GLB,, | Performed by: NURSE PRACTITIONER

## 2023-08-21 PROCEDURE — 3066F PR DOCUMENTATION OF TREATMENT FOR NEPHROPATHY: ICD-10-PCS | Mod: CPTII,S$GLB,, | Performed by: NURSE PRACTITIONER

## 2023-08-21 RX ORDER — ALBUTEROL SULFATE 90 UG/1
AEROSOL, METERED RESPIRATORY (INHALATION)
COMMUNITY
Start: 2023-08-15 | End: 2024-03-04 | Stop reason: SDUPTHER

## 2023-08-21 RX ORDER — BENZONATATE 100 MG/1
100 CAPSULE ORAL 3 TIMES DAILY
COMMUNITY
Start: 2023-08-15 | End: 2023-10-17

## 2023-08-21 NOTE — ASSESSMENT & PLAN NOTE
Completed doxycyline and augmentin. Repeat chest xray clear. Labs today since she continues with fatigue and SOB.

## 2023-08-21 NOTE — PROGRESS NOTES
Subjective:       Patient ID: Kelsie Bustamante is a 73 y.o. female.    Chief Complaint: Cough    Mrs. Bustamante presents to visit for complaint of cough and difficulty with breathing, onset approx 3 weeks. Cough has been productive. Started on abx (augmentin/doxy) for pneumonia on 8/7, completed them. Had chest xray repeated today and was clear without any acute findings. Continues to have fatigue, persistent cough that causes her to get short of breath. Cough worse at night and with lying down. Has been using 4 pillows to sleep at night. Evaluated in the Emergency Dept while she was in [place] last week also, had labs and xray. Discharged with albuterol and tessalon perles. Has also had to intermittently use home oxygen that was from when her sister was sick at her house. Did take some nyquil last night that helped with cough. No relief with tessalon perles.     Cough  This is a new problem. The current episode started 1 to 4 weeks ago. The problem has been waxing and waning. The cough is Productive of sputum. Associated symptoms include a fever (resolved), headaches, shortness of breath and wheezing. Pertinent negatives include no chest pain, chills, ear congestion, ear pain, heartburn, myalgias, nasal congestion, postnasal drip, rash, rhinorrhea or sore throat. The symptoms are aggravated by lying down. She has tried a beta-agonist inhaler and prescription cough suppressant (augmentin, doxy, oxygen) for the symptoms. The treatment provided mild relief. Her past medical history is significant for bronchitis and pneumonia.       Patient Active Problem List   Diagnosis    Primary hypertension    Type 2 diabetes mellitus, without long-term current use of insulin    Peripheral neuropathy    Carpal tunnel syndrome    Localized primary carpometacarpal osteoarthritis    Synovitis of right ankle    Gastroesophageal reflux disease without esophagitis    Thyroid nodule    Premature menopause (surgery in 30s)    Palpitation     Other insomnia    Statin intolerance    Neck pain on right side    Bronchitis    Orthopnea    Normocytic anemia    PAF (paroxysmal atrial fibrillation)    Maxillary cyst    Atelectasis    Debility    Myalgia    Personal history of pneumonia (recurrent)    Personal history of urinary (tract) infections    Esophagitis    Pleurisy    Fibromyalgia    Hyperlipidemia    Odynophagia    Dysphagia    Achalasia of esophagus    Symptomatic sinus bradycardia    Chronic anticoagulation    Vasodepressor syncope    Pacemaker    Other thrombophilia    Pneumonia due to infectious organism       Family History   Problem Relation Age of Onset    Aneurysm Sister     Heart disease Mother     Diabetes Father     Coronary artery disease Father     Coronary artery disease Brother     Parkinsonism Brother      Past Surgical History:   Procedure Laterality Date    A-V CARDIAC PACEMAKER INSERTION Left 2022    Procedure: INSERTION, CARDIAC PACEMAKER, DUAL CHAMBER;  Surgeon: Daryl Walker MD;  Location: [facility] CATH LAB;  Service: Cardiology;  Laterality: Left;  Patient instructed 11AM start time/needs ptt/pt/inr  All [employer] with His lead/MDT His as back up//Vinod notified    BREAST BIOPSY Left 2021    cataract Left      SECTION      CHOLECYSTECTOMY      CYST REMOVAL Right 2021    [facility]    ESOPHAGEAL MANOMETRY WITH MEASUREMENT OF IMPEDANCE N/A 10/6/2022    Procedure: MANOMETRY, ESOPHAGUS, WITH IMPEDANCE MEASUREMENT  Cardiac Clearance/Eliquis 2 day hold approval received per Dr. RABIA Aguilar, Cardiology on 22.  Note in encounters.  LB;  Surgeon: Bruna Fletcher MD;  Location: [facility];  Service: Endoscopy;  Laterality: N/A;    ESOPHAGOGASTRODUODENOSCOPY N/A 10/6/2022    Procedure: EGD (ESOPHAGOGASTRODUODENOSCOPY);  Surgeon: Bruna Fletcher MD;  Location: [facility];  Service: Endoscopy;  Laterality: N/A;    HYSTERECTOMY      nonca    OOPHORECTOMY      TILT TABLE TEST N/A  10/25/2018    Procedure: TILT TABLE TEST;  Surgeon: Daryl Walker MD;  Location: [facility] CATH LAB;  Service: Cardiology;  Laterality: N/A;  VAS.DEP.SYN,HUT,FAS,3PREP    TONSILLECTOMY           Current Outpatient Medications:     apixaban (ELIQUIS) 5 mg Tab, Take 1 tablet (5 mg total) by mouth 2 (two) times daily., Disp: 180 tablet, Rfl: 4    dapagliflozin propanediol (FARXIGA) 5 mg Tab tablet, Take 1 tablet (5 mg total) by mouth once daily., Disp: 30 tablet, Rfl: 1    esomeprazole (NEXIUM) 40 MG capsule, Take 1 capsule (40 mg total) by mouth before breakfast., Disp: 30 capsule, Rfl: 11    estrogens, conjugated, (PREMARIN) 0.625 MG tablet, Take 1 tablet (0.625 mg total) by mouth once daily., Disp: 90 tablet, Rfl: 3    flecainide (TAMBOCOR) 100 MG Tab, Take 1 tablet (100 mg total) by mouth every 12 (twelve) hours., Disp: 60 tablet, Rfl: 0    ibuprofen (ADVIL,MOTRIN) 600 MG tablet, 1 tablet with food or milk as needed Orally Three times a day for 7 days, Disp: , Rfl:     metoprolol succinate (TOPROL-XL) 50 MG 24 hr tablet, Take 2 tablets (100 mg total) by mouth every morning AND 1 tablet (50 mg total) every evening., Disp: 270 tablet, Rfl: 3    rosuvastatin (CRESTOR) 5 MG tablet, Take 1 tablet (5 mg total) by mouth once daily., Disp: 90 tablet, Rfl: 3    zolpidem (AMBIEN) 10 mg Tab, 1/2 po qhs prn insomnia, Disp: 90 tablet, Rfl: 1    albuterol (PROVENTIL/VENTOLIN HFA) 90 mcg/actuation inhaler, SMARTSI Puff(s) By Mouth 4 Times Daily, Disp: , Rfl:     benzonatate (TESSALON) 100 MG capsule, Take 100 mg by mouth 3 (three) times daily., Disp: , Rfl:     Review of Systems   Constitutional:  Positive for fatigue and fever (resolved). Negative for appetite change, chills and diaphoresis.   HENT:  Negative for ear pain, postnasal drip, rhinorrhea and sore throat.    Respiratory:  Positive for cough, shortness of breath and wheezing.    Cardiovascular:  Positive for palpitations. Negative for chest pain.  "  Gastrointestinal:  Positive for vomiting. Negative for abdominal pain, blood in stool, constipation, diarrhea, heartburn and nausea.   Genitourinary:  Negative for dysuria and frequency.   Musculoskeletal:  Negative for myalgias.   Skin:  Negative for rash.   Neurological:  Positive for dizziness, light-headedness and headaches.       Objective:   /70   Pulse 86   Temp 97.7 °F (36.5 °C)   Ht 5' 3" (1.6 m)   Wt 78.7 kg (173 lb 8 oz)   SpO2 98%   BMI 30.73 kg/m²      Physical Exam  Constitutional:       General: She is not in acute distress.     Appearance: Normal appearance. She is not ill-appearing.   HENT:      Head: Normocephalic.   Cardiovascular:      Rate and Rhythm: Normal rate and regular rhythm.      Pulses: Normal pulses.      Heart sounds: Normal heart sounds. No murmur heard.     No friction rub. No gallop.   Pulmonary:      Effort: Pulmonary effort is normal. No respiratory distress.      Breath sounds: Normal breath sounds. No wheezing.   Skin:     General: Skin is warm and dry.      Coloration: Skin is not pale.      Findings: No erythema.   Neurological:      Mental Status: She is alert and oriented to person, place, and time.   Psychiatric:         Mood and Affect: Mood normal.         Behavior: Behavior normal.         Assessment & Plan     Problem List Items Addressed This Visit          Pulmonary    Pneumonia due to infectious organism - Primary    Current Assessment & Plan     Completed doxycyline and augmentin. Repeat chest xray clear. Labs today since she continues with fatigue and SOB.             Cardiac/Vascular    Primary hypertension (Chronic)    Current Assessment & Plan     Blood pressure stable. Continue current medications.          PAF (paroxysmal atrial fibrillation) (Chronic)    Current Assessment & Plan     Rate controlled. Followed by cardiology. On flecainide, metoprolol, and eliquis.           Other Visit Diagnoses       Fatigue, unspecified type        Relevant " "Orders    CBC Auto Differential (Completed)    COMPREHENSIVE METABOLIC PANEL (Completed)    Cough, unspecified type        Shortness of breath        Relevant Orders    CBC Auto Differential (Completed)    COMPREHENSIVE METABOLIC PANEL (Completed)    B-TYPE NATRIURETIC PEPTIDE (Completed)        Labs today.   Xray clear.   BBSCTA.   If no improvement of symptoms, may need referral to pulmonology. Also recommended following up with cardiology since she was having palpitations last night, and improved with additional dose of metoprolol.   Continue to monitor pulse ox at home.       Follow up if symptoms worsen or fail to improve.        Portions of this note may have been created with voice recognition software. Occasional "wrong-word" or "sound-a-like" substitutions may have occurred due to the inherent limitations of voice recognition software. Please, read the note carefully and recognize, using context, where substitutions have occurred.       "

## 2023-09-05 ENCOUNTER — CLINICAL SUPPORT (OUTPATIENT)
Dept: CARDIOLOGY | Facility: HOSPITAL | Age: 73
End: 2023-09-05
Payer: MEDICARE

## 2023-09-05 DIAGNOSIS — Z95.0 PRESENCE OF CARDIAC PACEMAKER: ICD-10-CM

## 2023-09-05 PROCEDURE — 93294 CARDIAC DEVICE CHECK - REMOTE: ICD-10-PCS | Mod: S$GLB,,, | Performed by: INTERNAL MEDICINE

## 2023-09-05 PROCEDURE — 93296 REM INTERROG EVL PM/IDS: CPT | Performed by: INTERNAL MEDICINE

## 2023-09-05 PROCEDURE — 93294 REM INTERROG EVL PM/LDLS PM: CPT | Mod: S$GLB,,, | Performed by: INTERNAL MEDICINE

## 2023-09-11 ENCOUNTER — OFFICE VISIT (OUTPATIENT)
Dept: PULMONOLOGY | Facility: CLINIC | Age: 73
End: 2023-09-11
Payer: MEDICARE

## 2023-09-11 VITALS
HEIGHT: 63 IN | DIASTOLIC BLOOD PRESSURE: 68 MMHG | BODY MASS INDEX: 30.74 KG/M2 | OXYGEN SATURATION: 95 % | WEIGHT: 173.5 LBS | SYSTOLIC BLOOD PRESSURE: 142 MMHG | HEART RATE: 67 BPM | RESPIRATION RATE: 18 BRPM

## 2023-09-11 DIAGNOSIS — J98.11 ATELECTASIS: ICD-10-CM

## 2023-09-11 DIAGNOSIS — K21.9 GASTROESOPHAGEAL REFLUX DISEASE WITHOUT ESOPHAGITIS: ICD-10-CM

## 2023-09-11 DIAGNOSIS — K22.0 ACHALASIA OF ESOPHAGUS: ICD-10-CM

## 2023-09-11 DIAGNOSIS — R13.10 DYSPHAGIA, UNSPECIFIED TYPE: Primary | ICD-10-CM

## 2023-09-11 DIAGNOSIS — R06.09 DYSPNEA ON EXERTION: ICD-10-CM

## 2023-09-11 DIAGNOSIS — Z87.01 PERSONAL HISTORY OF PNEUMONIA (RECURRENT): ICD-10-CM

## 2023-09-11 DIAGNOSIS — J40 BRONCHITIS: ICD-10-CM

## 2023-09-11 DIAGNOSIS — T17.908D ASPIRATION INTO AIRWAY, SUBSEQUENT ENCOUNTER: ICD-10-CM

## 2023-09-11 DIAGNOSIS — J44.1 COPD EXACERBATION: ICD-10-CM

## 2023-09-11 PROCEDURE — 1126F AMNT PAIN NOTED NONE PRSNT: CPT | Mod: CPTII,S$GLB,, | Performed by: INTERNAL MEDICINE

## 2023-09-11 PROCEDURE — 1159F PR MEDICATION LIST DOCUMENTED IN MEDICAL RECORD: ICD-10-PCS | Mod: CPTII,S$GLB,, | Performed by: INTERNAL MEDICINE

## 2023-09-11 PROCEDURE — 3061F NEG MICROALBUMINURIA REV: CPT | Mod: CPTII,S$GLB,, | Performed by: INTERNAL MEDICINE

## 2023-09-11 PROCEDURE — 3077F SYST BP >= 140 MM HG: CPT | Mod: CPTII,S$GLB,, | Performed by: INTERNAL MEDICINE

## 2023-09-11 PROCEDURE — 3008F PR BODY MASS INDEX (BMI) DOCUMENTED: ICD-10-PCS | Mod: CPTII,S$GLB,, | Performed by: INTERNAL MEDICINE

## 2023-09-11 PROCEDURE — 99214 PR OFFICE/OUTPT VISIT, EST, LEVL IV, 30-39 MIN: ICD-10-PCS | Mod: S$GLB,,, | Performed by: INTERNAL MEDICINE

## 2023-09-11 PROCEDURE — 99214 OFFICE O/P EST MOD 30 MIN: CPT | Mod: S$GLB,,, | Performed by: INTERNAL MEDICINE

## 2023-09-11 PROCEDURE — 3066F PR DOCUMENTATION OF TREATMENT FOR NEPHROPATHY: ICD-10-PCS | Mod: CPTII,S$GLB,, | Performed by: INTERNAL MEDICINE

## 2023-09-11 PROCEDURE — 3288F FALL RISK ASSESSMENT DOCD: CPT | Mod: CPTII,S$GLB,, | Performed by: INTERNAL MEDICINE

## 2023-09-11 PROCEDURE — 3051F HG A1C>EQUAL 7.0%<8.0%: CPT | Mod: CPTII,S$GLB,, | Performed by: INTERNAL MEDICINE

## 2023-09-11 PROCEDURE — 3077F PR MOST RECENT SYSTOLIC BLOOD PRESSURE >= 140 MM HG: ICD-10-PCS | Mod: CPTII,S$GLB,, | Performed by: INTERNAL MEDICINE

## 2023-09-11 PROCEDURE — 3078F DIAST BP <80 MM HG: CPT | Mod: CPTII,S$GLB,, | Performed by: INTERNAL MEDICINE

## 2023-09-11 PROCEDURE — 1159F MED LIST DOCD IN RCRD: CPT | Mod: CPTII,S$GLB,, | Performed by: INTERNAL MEDICINE

## 2023-09-11 PROCEDURE — 99999 PR PBB SHADOW E&M-EST. PATIENT-LVL IV: CPT | Mod: PBBFAC,,, | Performed by: INTERNAL MEDICINE

## 2023-09-11 PROCEDURE — 1101F PT FALLS ASSESS-DOCD LE1/YR: CPT | Mod: CPTII,S$GLB,, | Performed by: INTERNAL MEDICINE

## 2023-09-11 PROCEDURE — 1160F PR REVIEW ALL MEDS BY PRESCRIBER/CLIN PHARMACIST DOCUMENTED: ICD-10-PCS | Mod: CPTII,S$GLB,, | Performed by: INTERNAL MEDICINE

## 2023-09-11 PROCEDURE — 3288F PR FALLS RISK ASSESSMENT DOCUMENTED: ICD-10-PCS | Mod: CPTII,S$GLB,, | Performed by: INTERNAL MEDICINE

## 2023-09-11 PROCEDURE — 3008F BODY MASS INDEX DOCD: CPT | Mod: CPTII,S$GLB,, | Performed by: INTERNAL MEDICINE

## 2023-09-11 PROCEDURE — 99999 PR PBB SHADOW E&M-EST. PATIENT-LVL IV: ICD-10-PCS | Mod: PBBFAC,,, | Performed by: INTERNAL MEDICINE

## 2023-09-11 PROCEDURE — 3051F PR MOST RECENT HEMOGLOBIN A1C LEVEL 7.0 - < 8.0%: ICD-10-PCS | Mod: CPTII,S$GLB,, | Performed by: INTERNAL MEDICINE

## 2023-09-11 PROCEDURE — 1126F PR PAIN SEVERITY QUANTIFIED, NO PAIN PRESENT: ICD-10-PCS | Mod: CPTII,S$GLB,, | Performed by: INTERNAL MEDICINE

## 2023-09-11 PROCEDURE — 4010F PR ACE/ARB THEARPY RXD/TAKEN: ICD-10-PCS | Mod: CPTII,S$GLB,, | Performed by: INTERNAL MEDICINE

## 2023-09-11 PROCEDURE — 3078F PR MOST RECENT DIASTOLIC BLOOD PRESSURE < 80 MM HG: ICD-10-PCS | Mod: CPTII,S$GLB,, | Performed by: INTERNAL MEDICINE

## 2023-09-11 PROCEDURE — 3061F PR NEG MICROALBUMINURIA RESULT DOCUMENTED/REVIEW: ICD-10-PCS | Mod: CPTII,S$GLB,, | Performed by: INTERNAL MEDICINE

## 2023-09-11 PROCEDURE — 3066F NEPHROPATHY DOC TX: CPT | Mod: CPTII,S$GLB,, | Performed by: INTERNAL MEDICINE

## 2023-09-11 PROCEDURE — 1160F RVW MEDS BY RX/DR IN RCRD: CPT | Mod: CPTII,S$GLB,, | Performed by: INTERNAL MEDICINE

## 2023-09-11 PROCEDURE — 4010F ACE/ARB THERAPY RXD/TAKEN: CPT | Mod: CPTII,S$GLB,, | Performed by: INTERNAL MEDICINE

## 2023-09-11 PROCEDURE — 1101F PR PT FALLS ASSESS DOC 0-1 FALLS W/OUT INJ PAST YR: ICD-10-PCS | Mod: CPTII,S$GLB,, | Performed by: INTERNAL MEDICINE

## 2023-09-11 RX ORDER — DOXYCYCLINE 100 MG/1
100 CAPSULE ORAL 2 TIMES DAILY
Qty: 20 CAPSULE | Refills: 0 | Status: SHIPPED | OUTPATIENT
Start: 2023-09-11 | End: 2023-10-17

## 2023-09-11 RX ORDER — ALBUTEROL SULFATE 0.83 MG/ML
2.5 SOLUTION RESPIRATORY (INHALATION)
Qty: 360 ML | Refills: 11 | Status: SHIPPED | OUTPATIENT
Start: 2023-09-11 | End: 2024-03-04 | Stop reason: SDUPTHER

## 2023-09-11 RX ORDER — PREDNISONE 20 MG/1
TABLET ORAL
Qty: 20 TABLET | Refills: 0 | Status: SHIPPED | OUTPATIENT
Start: 2023-09-11 | End: 2023-10-17

## 2023-09-11 NOTE — PROGRESS NOTES
Subjective:     Patient ID: Kelsie Bustamante is a 73 y.o. female.    Chief Complaint: follow up for pneumonia     AIDA Bustamante presents to visit for complaint of cough and difficulty with breathing, onset approx 3 months ago Cough has been productive. Started on abx (augmentin/doxy) for pneumonia on 8/7, completed them. Had chest xray repeated today and was clear without any acute findings. Continues to have fatigue, persistent cough that causes her to get short of breath. Cough worse at night and with lying down. Has been using 4 pillows to sleep at night. Evaluated in the Emergency Dept while she was in [place] last week also, had labs and xray. Discharged with albuterol and tessalon perles. Has also had to intermittently use home oxygen that was from when her sister was sick at her house. Did take some nyquil last night that helped with cough. No relief with tessalon perles.     Cough  This is a new problem. The current episode started 1 to 4 weeks ago. The problem has been waxing and waning. The cough is Productive of sputum. Associated symptoms include a fever (resolved), headaches, shortness of breath and wheezing. Pertinent negatives include no chest pain, chills, ear congestion, ear pain, heartburn, myalgias, nasal congestion, postnasal drip, rash, rhinorrhea or sore throat. The symptoms are aggravated by lying down. She has tried a beta-agonist inhaler and prescription cough suppressant (augmentin, doxy, oxygen) for the symptoms. The treatment provided mild relief. Her past medical history is significant for bronchitis and pneumonia.       Past Medical History:   Diagnosis Date    Achalasia     demetrius    Atrial fibrillation with RVR 05/11/2021    Cataract     Colon polyp     Gastroesophageal reflux     Hiatal hernia     Hx of colonic polyps 08/15/2014    per colonoscopy in      Hyperlipidemia 06/24/2022    Hypertension     Pacemaker     Peripheral neuropathy     Postmenopausal HRT (hormone replacement  therapy)     failed tapering off    SAH (subarachnoid hemorrhage)     from a fall late 22    Sepsis 2021    Last Assessment & Plan:  Formatting of this note might be different from the original. History & Physical Patient meets sepsis criteria with a white cell count of 15, and tachypnea.  Source of infection not clear at this time.  No evidence for meningitis.  Cover with broad-spectrum antibiotics especially given invasive dental procedure done recently.  Check blood cultures and monitor for fever.  R    Sinusitis     Thyroid nodule 3/16 subcm; kathleen 2 yrs    Type 2 diabetes mellitus with neurological manifestations     Vasodepressor syncope 2018     Past Surgical History:   Procedure Laterality Date    A-V CARDIAC PACEMAKER INSERTION Left 2022    Procedure: INSERTION, CARDIAC PACEMAKER, DUAL CHAMBER;  Surgeon: Daryl Walker MD;  Location: [facility] CATH LAB;  Service: Cardiology;  Laterality: Left;  Patient instructed 11AM start time/needs ptt/pt/inr  All [employer] with His lead/MDT His as back up//Vinod notified    BREAST BIOPSY Left 2021    cataract Left      SECTION      CHOLECYSTECTOMY      CYST REMOVAL Right 2021    [facility]    ESOPHAGEAL MANOMETRY WITH MEASUREMENT OF IMPEDANCE N/A 10/6/2022    Procedure: MANOMETRY, ESOPHAGUS, WITH IMPEDANCE MEASUREMENT  Cardiac Clearance/Eliquis 2 day hold approval received per Dr. RABIA Aguilar, Cardiology on 22.  Note in encounters.  LB;  Surgeon: Bruna Fletcher MD;  Location: [facility];  Service: Endoscopy;  Laterality: N/A;    ESOPHAGOGASTRODUODENOSCOPY N/A 10/6/2022    Procedure: EGD (ESOPHAGOGASTRODUODENOSCOPY);  Surgeon: Bruna Fletcher MD;  Location: [facility];  Service: Endoscopy;  Laterality: N/A;    HYSTERECTOMY      nonca    OOPHORECTOMY      TILT TABLE TEST N/A 10/25/2018    Procedure: TILT TABLE TEST;  Surgeon: Daryl Walker MD;  Location: [facility] CATH LAB;  Service: Cardiology;   Laterality: N/A;  VAS.DEP.SYN,HUT,FAS,3PREP    TONSILLECTOMY       Review of patient's allergies indicates:   Allergen Reactions    Floxin [ofloxacin] Diarrhea and Nausea And Vomiting    Fluvastatin Other (See Comments)     myalgia    Pravastatin Other (See Comments)     myalgia    Trazodone Palpitations     Racing heart     Current Outpatient Medications on File Prior to Visit   Medication Sig Dispense Refill    albuterol (PROVENTIL/VENTOLIN HFA) 90 mcg/actuation inhaler SMARTSI Puff(s) By Mouth 4 Times Daily      apixaban (ELIQUIS) 5 mg Tab Take 1 tablet (5 mg total) by mouth 2 (two) times daily. 180 tablet 4    benzonatate (TESSALON) 100 MG capsule Take 100 mg by mouth 3 (three) times daily.      esomeprazole (NEXIUM) 40 MG capsule Take 1 capsule (40 mg total) by mouth before breakfast. 30 capsule 11    estrogens, conjugated, (PREMARIN) 0.625 MG tablet Take 1 tablet (0.625 mg total) by mouth once daily. 90 tablet 3    flecainide (TAMBOCOR) 100 MG Tab Take 1 tablet (100 mg total) by mouth every 12 (twelve) hours. 60 tablet 0    ibuprofen (ADVIL,MOTRIN) 600 MG tablet 1 tablet with food or milk as needed Orally Three times a day for 7 days      metoprolol succinate (TOPROL-XL) 50 MG 24 hr tablet Take 2 tablets (100 mg total) by mouth every morning AND 1 tablet (50 mg total) every evening. 270 tablet 3    rosuvastatin (CRESTOR) 5 MG tablet Take 1 tablet (5 mg total) by mouth once daily. 90 tablet 3    zolpidem (AMBIEN) 10 mg Tab 1/2 po qhs prn insomnia 90 tablet 1     No current facility-administered medications on file prior to visit.     Social History     Socioeconomic History    Marital status:     Number of children: 2   Tobacco Use    Smoking status: Never    Smokeless tobacco: Never   Substance and Sexual Activity    Alcohol use: No    Drug use: No    Sexual activity: Never     Social Determinants of Health     Financial Resource Strain: Medium Risk (2022)    Overall Financial Resource Strain  (CARDIA)     Difficulty of Paying Living Expenses: Somewhat hard   Food Insecurity: No Food Insecurity (9/23/2022)    Hunger Vital Sign     Worried About Running Out of Food in the Last Year: Never true     Ran Out of Food in the Last Year: Never true   Transportation Needs: No Transportation Needs (9/23/2022)    PRAPARE - Transportation     Lack of Transportation (Medical): No     Lack of Transportation (Non-Medical): No   Physical Activity: Unknown (9/23/2022)    Exercise Vital Sign     Days of Exercise per Week: 0 days   Stress: Stress Concern Present (10/19/2022)    [employer] - Occupational Stress Questionnaire     Feeling of Stress : To some extent   Social Connections: Unknown (9/23/2022)    Social Connection and Isolation Panel [NHANES]     Frequency of Communication with Friends and Family: More than three times a week     Frequency of Social Gatherings with Friends and Family: Once a week     Active Member of Clubs or Organizations: No     Attends Club or Organization Meetings: Never     Marital Status:    Housing Stability: Low Risk  (9/23/2022)    Housing Stability Vital Sign     Unable to Pay for Housing in the Last Year: No     Number of Places Lived in the Last Year: 1     Unstable Housing in the Last Year: No     Family History   Problem Relation Age of Onset    Aneurysm Sister     Heart disease Mother     Diabetes Father     Coronary artery disease Father     Coronary artery disease Brother     Parkinsonism Brother        Review of Systems   Constitutional:  Positive for fatigue. Negative for fever.   HENT:  Positive for postnasal drip, rhinorrhea and congestion.    Eyes:  Negative for redness and itching.   Respiratory:  Positive for cough, sputum production, shortness of breath, dyspnea on extertion, use of rescue inhaler and Paroxysmal Nocturnal Dyspnea.    Cardiovascular:  Negative for chest pain, palpitations and leg swelling.   Genitourinary:  Negative for  "difficulty urinating and hematuria.   Endocrine:  Negative for cold intolerance and heat intolerance.    Skin:  Negative for rash.   Gastrointestinal:  Negative for nausea and abdominal pain.   Neurological:  Negative for dizziness, syncope, weakness and light-headedness.   Hematological:  Negative for adenopathy. Does not bruise/bleed easily.   Psychiatric/Behavioral:  Negative for sleep disturbance. The patient is not nervous/anxious.        Objective:      BP (!) 142/68   Pulse 67   Resp 18   Ht 5' 3" (1.6 m)   Wt 78.7 kg (173 lb 8 oz)   SpO2 95%   BMI 30.73 kg/m²   Physical Exam  Vitals and nursing note reviewed.   Constitutional:       Appearance: She is well-developed.   HENT:      Head: Normocephalic and atraumatic.      Nose: Nose normal.      Mouth/Throat:      Pharynx: No oropharyngeal exudate.   Eyes:      Conjunctiva/sclera: Conjunctivae normal.      Pupils: Pupils are equal, round, and reactive to light.   Neck:      Thyroid: No thyromegaly.      Vascular: No JVD.      Trachea: No tracheal deviation.   Cardiovascular:      Rate and Rhythm: Normal rate and regular rhythm.      Heart sounds: Murmur heard.      Systolic murmur is present.   Pulmonary:      Effort: Pulmonary effort is normal. No respiratory distress.      Breath sounds: Examination of the right-lower field reveals wheezing. Examination of the left-lower field reveals wheezing. Decreased breath sounds and wheezing present. No rhonchi or rales.   Chest:      Chest wall: No tenderness.   Abdominal:      General: Bowel sounds are normal.      Palpations: Abdomen is soft.   Musculoskeletal:         General: Normal range of motion.      Cervical back: Neck supple.   Lymphadenopathy:      Cervical: No cervical adenopathy.   Skin:     General: Skin is warm and dry.   Neurological:      Mental Status: She is alert and oriented to person, place, and time.       Personal Diagnostic Review  Chest x-ray: clearing of Chest X Ray         9/14/2023 " "    1:21 PM   Pulmonary Studies Review   Height 5' 3" (1.6 m)   Weight 78.7 kg (173 lb 8 oz)   BMI (Calculated) 30.7   Predicted Distance 266.67   Predicted Distance Meters (Calculated) 408.22 meters       Mammo Digital Screening Bilat w/ Javier  Narrative: Result:  Mammo Digital Screening Bilat w/ Javier    History:  Patient is 73 y.o. and is seen for a screening mammogram.    Films Compared:  Compared to: 09/09/2021 Mammo Digital Diagnostic Bilat with Javier and   09/09/2020 Mammo Digital Diagnostic Bilat w/ Javier     Findings:   This procedure was performed using tomosynthesis.   Computer-aided detection was utilized in the interpretation of this   examination.    The breasts have scattered areas of fibroglandular density. There is no   evidence of suspicious masses, microcalcifications or architectural   distortion.  Impression:    No mammographic evidence of malignancy.    BI-RADS Category 1: Negative    Recommendation:  Routine screening mammogram in 1 year is recommended.    Your estimated lifetime risk of breast cancer (to age 85) based on   Tyrer-Cuzick risk assessment model is 1.49 %.  According to the American   Cancer Society, patients with a lifetime breast cancer risk of 20% or   higher might benefit from supplemental screening tests.      Office Spirometry Results:         9/14/2023     1:21 PM 9/11/2023     3:36 PM 8/21/2023    10:41 AM 8/7/2023     9:40 AM 7/18/2023     1:15 PM 7/17/2023     1:54 PM 6/23/2023    10:44 AM   Pulmonary Function Tests   SpO2  95 % 98 % 95 % 98 % 98 % 96 %   Height 5' 3" (1.6 m) 5' 3" (1.6 m) 5' 3" (1.6 m) 5' 3" (1.6 m) 5' 3" (1.6 m) 5' 3" (1.6 m) 5' 3" (1.6 m)   Weight 78.7 kg (173 lb 8 oz) 78.7 kg (173 lb 8 oz) 78.7 kg (173 lb 8 oz) 77.5 kg (170 lb 13.7 oz) 79.5 kg (175 lb 4.3 oz) 78.6 kg (173 lb 4.5 oz) 77.1 kg (169 lb 15.6 oz)   BMI (Calculated) 30.7 30.7 30.7 30.3 31.1 30.7 30.1         9/14/2023     1:21 PM   Pulmonary Studies Review   Height 5' 3" (1.6 m)   Weight 78.7 " kg (173 lb 8 oz)   BMI (Calculated) 30.7   Predicted Distance 266.67   Predicted Distance Meters (Calculated) 408.22 meters         Assessment:            Dysphagia, unspecified type    Gastroesophageal reflux disease without esophagitis    Aspiration into airway, subsequent encounter  -     doxycycline (MONODOX) 100 MG capsule; Take 1 capsule (100 mg total) by mouth 2 (two) times daily.  Dispense: 20 capsule; Refill: 0    COPD exacerbation  -     predniSONE (DELTASONE) 20 MG tablet; Prednisone 60 mg/ day for 3 days, 40 mg/day for 3 days,20 mg/ day for 3 days, (1/2 tablet )10 mg a day for 3 days.  Dispense: 20 tablet; Refill: 0  -     albuterol (PROVENTIL) 2.5 mg /3 mL (0.083 %) nebulizer solution; Take 3 mLs (2.5 mg total) by nebulization every 4 to 6 hours as needed for Wheezing or Shortness of Breath.  Dispense: 360 mL; Refill: 11  -     NEBULIZER KIT (SUPPLIES) FOR HOME USE  -     NEBULIZER FOR HOME USE    Dyspnea on exertion  -     Stress test, pulmonary; Future; Expected date: 2023  -     Complete PFT with bronchodilator; Future; Expected date: 2023    Atelectasis    Achalasia of esophagus    Bronchitis    Personal history of pneumonia (recurrent)          Outpatient Encounter Medications as of 2023   Medication Sig Dispense Refill    albuterol (PROVENTIL/VENTOLIN HFA) 90 mcg/actuation inhaler SMARTSI Puff(s) By Mouth 4 Times Daily      apixaban (ELIQUIS) 5 mg Tab Take 1 tablet (5 mg total) by mouth 2 (two) times daily. 180 tablet 4    benzonatate (TESSALON) 100 MG capsule Take 100 mg by mouth 3 (three) times daily.      [] dapagliflozin propanediol (FARXIGA) 5 mg Tab tablet Take 1 tablet (5 mg total) by mouth once daily. 30 tablet 1    esomeprazole (NEXIUM) 40 MG capsule Take 1 capsule (40 mg total) by mouth before breakfast. 30 capsule 11    estrogens, conjugated, (PREMARIN) 0.625 MG tablet Take 1 tablet (0.625 mg total) by mouth once daily. 90 tablet 3    flecainide (TAMBOCOR) 100  MG Tab Take 1 tablet (100 mg total) by mouth every 12 (twelve) hours. 60 tablet 0    ibuprofen (ADVIL,MOTRIN) 600 MG tablet 1 tablet with food or milk as needed Orally Three times a day for 7 days      metoprolol succinate (TOPROL-XL) 50 MG 24 hr tablet Take 2 tablets (100 mg total) by mouth every morning AND 1 tablet (50 mg total) every evening. 270 tablet 3    rosuvastatin (CRESTOR) 5 MG tablet Take 1 tablet (5 mg total) by mouth once daily. 90 tablet 3    zolpidem (AMBIEN) 10 mg Tab 1/2 po qhs prn insomnia 90 tablet 1    albuterol (PROVENTIL) 2.5 mg /3 mL (0.083 %) nebulizer solution Take 3 mLs (2.5 mg total) by nebulization every 4 to 6 hours as needed for Wheezing or Shortness of Breath. 360 mL 11    doxycycline (MONODOX) 100 MG capsule Take 1 capsule (100 mg total) by mouth 2 (two) times daily. 20 capsule 0    predniSONE (DELTASONE) 20 MG tablet Prednisone 60 mg/ day for 3 days, 40 mg/day for 3 days,20 mg/ day for 3 days, (1/2 tablet )10 mg a day for 3 days. 20 tablet 0     No facility-administered encounter medications on file as of 9/11/2023.     Plan:       Requested Prescriptions     Signed Prescriptions Disp Refills    predniSONE (DELTASONE) 20 MG tablet 20 tablet 0     Sig: Prednisone 60 mg/ day for 3 days, 40 mg/day for 3 days,20 mg/ day for 3 days, (1/2 tablet )10 mg a day for 3 days.    albuterol (PROVENTIL) 2.5 mg /3 mL (0.083 %) nebulizer solution 360 mL 11     Sig: Take 3 mLs (2.5 mg total) by nebulization every 4 to 6 hours as needed for Wheezing or Shortness of Breath.    doxycycline (MONODOX) 100 MG capsule 20 capsule 0     Sig: Take 1 capsule (100 mg total) by mouth 2 (two) times daily.     Problem List Items Addressed This Visit       Achalasia of esophagus    Atelectasis    Overview     Last Assessment & Plan:   Formatting of this note might be different from the original.  History & Physical Patient with scattered atelectasis on x-ray chest confirmed with CT angiogram of chest.  For  incentive spirometry.  Mucinex for cough and loosening of sputum    Discharge Summary X-ray chest significant for atelectasis.  Patient educated on incentive spirometry.  To continue this at home.  Use Mucinex for cough.  Patient reporting some pleuritic-like chest pain.  CT angiogram negative for PE.    Follow-up Patient with history of atelectasis. Demonstrated incentive spirometry. Continue Mucinex for cough         Bronchitis    Dysphagia - Primary    Gastroesophageal reflux disease without esophagitis    Personal history of pneumonia (recurrent)     Other Visit Diagnoses       Aspiration into airway, subsequent encounter        Relevant Medications    doxycycline (MONODOX) 100 MG capsule    COPD exacerbation        Relevant Medications    predniSONE (DELTASONE) 20 MG tablet    albuterol (PROVENTIL) 2.5 mg /3 mL (0.083 %) nebulizer solution    Other Relevant Orders    NEBULIZER KIT (SUPPLIES) FOR HOME USE    NEBULIZER FOR HOME USE    Dyspnea on exertion        Relevant Orders    Stress test, pulmonary    Complete PFT with bronchodilator               Follow up in about 4 weeks (around 10/9/2023) for PFT on return, 6 min walk - on return.    MEDICAL DECISION MAKING: Moderate to high complexity.  Overall, the multiple problems listed are of moderate to high severity that may impact quality of life and activities of daily living. Side effects of medications, treatment plan as well as options and alternatives reviewed and discussed with patient. There was counseling of patient concerning these issues.    Total time spent in counseling and coordination of care - 34  minutes of total time spent on the encounter, which includes face to face time and non-face to face time preparing to see the patient (eg, review of tests), Obtaining and/or reviewing separately obtained history, Documenting clinical information in the electronic or other health record, Independently interpreting results (not separately reported) and  communicating results to the patient/family/caregiver, or Care coordination (not separately reported).    Time was used in discussion of prognosis, risks, benefits of treatment, instructions and compliance with regimen . Discussion with other physicians and/or health care providers - home health or for use of durable medical equipment (oxygen, nebulizers, CPAP, BiPAP) occurred.

## 2023-09-14 ENCOUNTER — HOSPITAL ENCOUNTER (OUTPATIENT)
Dept: RADIOLOGY | Facility: HOSPITAL | Age: 73
Discharge: HOME | End: 2023-09-14
Attending: PHYSICIAN ASSISTANT
Payer: MEDICARE

## 2023-09-14 VITALS — WEIGHT: 173.5 LBS | BODY MASS INDEX: 30.74 KG/M2 | HEIGHT: 63 IN

## 2023-09-14 DIAGNOSIS — Z12.31 ENCOUNTER FOR SCREENING MAMMOGRAM FOR MALIGNANT NEOPLASM OF BREAST: ICD-10-CM

## 2023-09-14 PROCEDURE — 77067 SCR MAMMO BI INCL CAD: CPT | Mod: TC

## 2023-09-14 PROCEDURE — 77063 BREAST TOMOSYNTHESIS BI: CPT | Mod: 26,,, | Performed by: RADIOLOGY

## 2023-09-14 PROCEDURE — 77067 SCR MAMMO BI INCL CAD: CPT | Mod: 26,,, | Performed by: RADIOLOGY

## 2023-09-14 PROCEDURE — 77067 MAMMO DIGITAL SCREENING BILAT WITH TOMO: ICD-10-PCS | Mod: 26,,, | Performed by: RADIOLOGY

## 2023-09-14 PROCEDURE — 77063 MAMMO DIGITAL SCREENING BILAT WITH TOMO: ICD-10-PCS | Mod: 26,,, | Performed by: RADIOLOGY

## 2023-09-29 NOTE — TELEPHONE ENCOUNTER
No care due was identified.  [facility] Embedded Care Due Messages. Reference number: [id number].   9/28/2023 8:50:19 PM CDT

## 2023-10-02 RX ORDER — ZOLPIDEM TARTRATE 10 MG/1
TABLET ORAL
Qty: 90 TABLET | Refills: 1 | Status: SHIPPED | OUTPATIENT
Start: 2023-10-02 | End: 2023-12-20 | Stop reason: SDUPTHER

## 2023-10-11 ENCOUNTER — TELEPHONE (OUTPATIENT)
Dept: PULMONOLOGY | Facility: CLINIC | Age: 73
End: 2023-10-11
Payer: OTHER GOVERNMENT

## 2023-10-12 ENCOUNTER — TELEPHONE (OUTPATIENT)
Dept: PULMONOLOGY | Facility: CLINIC | Age: 73
End: 2023-10-12
Payer: OTHER GOVERNMENT

## 2023-10-12 NOTE — TELEPHONE ENCOUNTER
----- Message from Tam Nuñez sent at 10/11/2023 10:22 AM CDT -----  Contact: self  Pt is asking for an return call in reference to rescheduling apt she has on tomorrow , please call back at [phone] Thx CJ

## 2023-10-17 ENCOUNTER — OFFICE VISIT (OUTPATIENT)
Dept: PULMONOLOGY | Facility: CLINIC | Age: 73
End: 2023-10-17
Payer: MEDICARE

## 2023-10-17 ENCOUNTER — CLINICAL SUPPORT (OUTPATIENT)
Dept: PULMONOLOGY | Facility: CLINIC | Age: 73
End: 2023-10-17
Payer: MEDICARE

## 2023-10-17 VITALS
DIASTOLIC BLOOD PRESSURE: 61 MMHG | OXYGEN SATURATION: 95 % | WEIGHT: 169.75 LBS | WEIGHT: 169.75 LBS | HEIGHT: 63 IN | HEIGHT: 63 IN | BODY MASS INDEX: 30.08 KG/M2 | RESPIRATION RATE: 17 BRPM | SYSTOLIC BLOOD PRESSURE: 133 MMHG | HEART RATE: 79 BPM | BODY MASS INDEX: 30.08 KG/M2

## 2023-10-17 DIAGNOSIS — R05.3 CHRONIC COUGH: ICD-10-CM

## 2023-10-17 DIAGNOSIS — R06.09 DYSPNEA ON EXERTION: ICD-10-CM

## 2023-10-17 PROCEDURE — 3066F NEPHROPATHY DOC TX: CPT | Mod: CPTII,S$GLB,, | Performed by: HOSPITALIST

## 2023-10-17 PROCEDURE — 3066F PR DOCUMENTATION OF TREATMENT FOR NEPHROPATHY: ICD-10-PCS | Mod: CPTII,S$GLB,, | Performed by: HOSPITALIST

## 2023-10-17 PROCEDURE — 4010F PR ACE/ARB THEARPY RXD/TAKEN: ICD-10-PCS | Mod: CPTII,S$GLB,, | Performed by: HOSPITALIST

## 2023-10-17 PROCEDURE — 94729 PR C02/MEMBANE DIFFUSE CAPACITY: ICD-10-PCS | Mod: S$GLB,,, | Performed by: INTERNAL MEDICINE

## 2023-10-17 PROCEDURE — 1101F PT FALLS ASSESS-DOCD LE1/YR: CPT | Mod: CPTII,S$GLB,, | Performed by: HOSPITALIST

## 2023-10-17 PROCEDURE — 1126F PR PAIN SEVERITY QUANTIFIED, NO PAIN PRESENT: ICD-10-PCS | Mod: CPTII,S$GLB,, | Performed by: HOSPITALIST

## 2023-10-17 PROCEDURE — 1159F PR MEDICATION LIST DOCUMENTED IN MEDICAL RECORD: ICD-10-PCS | Mod: CPTII,S$GLB,, | Performed by: HOSPITALIST

## 2023-10-17 PROCEDURE — 3061F NEG MICROALBUMINURIA REV: CPT | Mod: CPTII,S$GLB,, | Performed by: HOSPITALIST

## 2023-10-17 PROCEDURE — 3288F PR FALLS RISK ASSESSMENT DOCUMENTED: ICD-10-PCS | Mod: CPTII,S$GLB,, | Performed by: HOSPITALIST

## 2023-10-17 PROCEDURE — 1101F PR PT FALLS ASSESS DOC 0-1 FALLS W/OUT INJ PAST YR: ICD-10-PCS | Mod: CPTII,S$GLB,, | Performed by: HOSPITALIST

## 2023-10-17 PROCEDURE — 3051F HG A1C>EQUAL 7.0%<8.0%: CPT | Mod: CPTII,S$GLB,, | Performed by: HOSPITALIST

## 2023-10-17 PROCEDURE — 3008F BODY MASS INDEX DOCD: CPT | Mod: CPTII,S$GLB,, | Performed by: HOSPITALIST

## 2023-10-17 PROCEDURE — 94726 PULM FUNCT TST PLETHYSMOGRAP: ICD-10-PCS | Mod: S$GLB,,, | Performed by: INTERNAL MEDICINE

## 2023-10-17 PROCEDURE — 1159F MED LIST DOCD IN RCRD: CPT | Mod: CPTII,S$GLB,, | Performed by: HOSPITALIST

## 2023-10-17 PROCEDURE — 94618 PULMONARY STRESS TESTING: ICD-10-PCS | Mod: S$GLB,,, | Performed by: INTERNAL MEDICINE

## 2023-10-17 PROCEDURE — 1126F AMNT PAIN NOTED NONE PRSNT: CPT | Mod: CPTII,S$GLB,, | Performed by: HOSPITALIST

## 2023-10-17 PROCEDURE — 3288F FALL RISK ASSESSMENT DOCD: CPT | Mod: CPTII,S$GLB,, | Performed by: HOSPITALIST

## 2023-10-17 PROCEDURE — 99213 PR OFFICE/OUTPT VISIT, EST, LEVL III, 20-29 MIN: ICD-10-PCS | Mod: S$GLB,,, | Performed by: HOSPITALIST

## 2023-10-17 PROCEDURE — 94726 PLETHYSMOGRAPHY LUNG VOLUMES: CPT | Mod: S$GLB,,, | Performed by: INTERNAL MEDICINE

## 2023-10-17 PROCEDURE — 94729 DIFFUSING CAPACITY: CPT | Mod: S$GLB,,, | Performed by: INTERNAL MEDICINE

## 2023-10-17 PROCEDURE — 94060 PR EVAL OF BRONCHOSPASM: ICD-10-PCS | Mod: 59,S$GLB,, | Performed by: INTERNAL MEDICINE

## 2023-10-17 PROCEDURE — 94618 PULMONARY STRESS TESTING: CPT | Mod: S$GLB,,, | Performed by: INTERNAL MEDICINE

## 2023-10-17 PROCEDURE — 1160F PR REVIEW ALL MEDS BY PRESCRIBER/CLIN PHARMACIST DOCUMENTED: ICD-10-PCS | Mod: CPTII,S$GLB,, | Performed by: HOSPITALIST

## 2023-10-17 PROCEDURE — 3008F PR BODY MASS INDEX (BMI) DOCUMENTED: ICD-10-PCS | Mod: CPTII,S$GLB,, | Performed by: HOSPITALIST

## 2023-10-17 PROCEDURE — 3075F SYST BP GE 130 - 139MM HG: CPT | Mod: CPTII,S$GLB,, | Performed by: HOSPITALIST

## 2023-10-17 PROCEDURE — 3078F DIAST BP <80 MM HG: CPT | Mod: CPTII,S$GLB,, | Performed by: HOSPITALIST

## 2023-10-17 PROCEDURE — 99999 PR PBB SHADOW E&M-EST. PATIENT-LVL IV: ICD-10-PCS | Mod: PBBFAC,,, | Performed by: HOSPITALIST

## 2023-10-17 PROCEDURE — 3078F PR MOST RECENT DIASTOLIC BLOOD PRESSURE < 80 MM HG: ICD-10-PCS | Mod: CPTII,S$GLB,, | Performed by: HOSPITALIST

## 2023-10-17 PROCEDURE — 99999 PR PBB SHADOW E&M-EST. PATIENT-LVL I: CPT | Mod: PBBFAC,,,

## 2023-10-17 PROCEDURE — 3061F PR NEG MICROALBUMINURIA RESULT DOCUMENTED/REVIEW: ICD-10-PCS | Mod: CPTII,S$GLB,, | Performed by: HOSPITALIST

## 2023-10-17 PROCEDURE — 99999 PR PBB SHADOW E&M-EST. PATIENT-LVL I: ICD-10-PCS | Mod: PBBFAC,,,

## 2023-10-17 PROCEDURE — 99999 PR PBB SHADOW E&M-EST. PATIENT-LVL IV: CPT | Mod: PBBFAC,,, | Performed by: HOSPITALIST

## 2023-10-17 PROCEDURE — 1160F RVW MEDS BY RX/DR IN RCRD: CPT | Mod: CPTII,S$GLB,, | Performed by: HOSPITALIST

## 2023-10-17 PROCEDURE — 94060 EVALUATION OF WHEEZING: CPT | Mod: 59,S$GLB,, | Performed by: INTERNAL MEDICINE

## 2023-10-17 PROCEDURE — 4010F ACE/ARB THERAPY RXD/TAKEN: CPT | Mod: CPTII,S$GLB,, | Performed by: HOSPITALIST

## 2023-10-17 PROCEDURE — 3051F PR MOST RECENT HEMOGLOBIN A1C LEVEL 7.0 - < 8.0%: ICD-10-PCS | Mod: CPTII,S$GLB,, | Performed by: HOSPITALIST

## 2023-10-17 PROCEDURE — 3075F PR MOST RECENT SYSTOLIC BLOOD PRESS GE 130-139MM HG: ICD-10-PCS | Mod: CPTII,S$GLB,, | Performed by: HOSPITALIST

## 2023-10-17 PROCEDURE — 99213 OFFICE O/P EST LOW 20 MIN: CPT | Mod: S$GLB,,, | Performed by: HOSPITALIST

## 2023-10-17 NOTE — PROGRESS NOTES
Subjective:      Patient ID: Kelsie Bustamante is a 73 y.o. female.    Chief Complaint: Cough    73 year old female with history of Afib, GERD, HLD, HTN, pacemaker, who presents to Pulmonary clinic for follow up chronic cough.  She was last seen 9/2023 by Dr. Silver- at that visit she reported 3 months of cough and dyspnea. She had been treated with Augmentin 8/7/23. At that visit she was prescribed Doxycycline, Prednisone, and a nebulizer. Further work up ordered in the way of PFT and a walk.     Cough during the day has improved, but still with cough at night, productive. Deep cough. Sputum a little reddish looking- rust colored. Thick secretions. No weight loss or weight gain.    Pertinent work up:  PFT 10/17/23- Borderline restriction/obstruction without significant bronchodilator response, decreased DLCO 54.4%  6MW 10/17/23- SpO2 range 94-99%, walked 274m, 66% predicted, no pause  CXR 8/21/23- Clear lungs  TTE 11/2022- Normal LV size and function, normal RV size    Pulmonary Interventions:   Mucinex  Albuterol neb- not using  Albuterol HFA  Tessalon- not using anymore    Review of Systems   Respiratory:  Positive for sputum production and choking. Negative for wheezing and dyspnea on extertion.      Objective:     Physical Exam   Constitutional: She is oriented to person, place, and time. She appears well-developed and well-nourished. She is not obese.   Cardiovascular: Normal rate and regular rhythm.   Pulmonary/Chest: Normal expansion, effort normal and breath sounds normal.   Musculoskeletal:         General: No edema.   Neurological: She is alert and oriented to person, place, and time.   Skin: Skin is warm and dry.   Psychiatric: She has a normal mood and affect.     Personal Diagnostic Review  As Above      9/14/2023     1:21 PM 9/11/2023     3:36 PM 8/21/2023    10:41 AM 8/7/2023     9:40 AM 7/18/2023     1:15 PM 7/17/2023     1:54 PM 6/23/2023    10:44 AM   Pulmonary Function Tests   SpO2  95 % 98 % 95  "% 98 % 98 % 96 %   Height 5' 3" (1.6 m) 5' 3" (1.6 m) 5' 3" (1.6 m) 5' 3" (1.6 m) 5' 3" (1.6 m) 5' 3" (1.6 m) 5' 3" (1.6 m)   Weight 78.7 kg (173 lb 8 oz) 78.7 kg (173 lb 8 oz) 78.7 kg (173 lb 8 oz) 77.5 kg (170 lb 13.7 oz) 79.5 kg (175 lb 4.3 oz) 78.6 kg (173 lb 4.5 oz) 77.1 kg (169 lb 15.6 oz)   BMI (Calculated) 30.7 30.7 30.7 30.3 31.1 30.7 30.1        Assessment:     No diagnosis found.     Outpatient Encounter Medications as of 10/17/2023   Medication Sig Dispense Refill    albuterol (PROVENTIL) 2.5 mg /3 mL (0.083 %) nebulizer solution Take 3 mLs (2.5 mg total) by nebulization every 4 to 6 hours as needed for Wheezing or Shortness of Breath. 360 mL 11    albuterol (PROVENTIL/VENTOLIN HFA) 90 mcg/actuation inhaler SMARTSI Puff(s) By Mouth 4 Times Daily      apixaban (ELIQUIS) 5 mg Tab Take 1 tablet (5 mg total) by mouth 2 (two) times daily. 180 tablet 4    benzonatate (TESSALON) 100 MG capsule Take 100 mg by mouth 3 (three) times daily.      doxycycline (MONODOX) 100 MG capsule Take 1 capsule (100 mg total) by mouth 2 (two) times daily. 20 capsule 0    esomeprazole (NEXIUM) 40 MG capsule Take 1 capsule (40 mg total) by mouth before breakfast. 30 capsule 11    estrogens, conjugated, (PREMARIN) 0.625 MG tablet Take 1 tablet (0.625 mg total) by mouth once daily. 90 tablet 3    flecainide (TAMBOCOR) 100 MG Tab Take 1 tablet (100 mg total) by mouth every 12 (twelve) hours. 60 tablet 0    ibuprofen (ADVIL,MOTRIN) 600 MG tablet 1 tablet with food or milk as needed Orally Three times a day for 7 days      metoprolol succinate (TOPROL-XL) 50 MG 24 hr tablet Take 2 tablets (100 mg total) by mouth every morning AND 1 tablet (50 mg total) every evening. 270 tablet 3    predniSONE (DELTASONE) 20 MG tablet Prednisone 60 mg/ day for 3 days, 40 mg/day for 3 days,20 mg/ day for 3 days, (1/2 tablet )10 mg a day for 3 days. 20 tablet 0    rosuvastatin (CRESTOR) 5 MG tablet Take 1 tablet (5 mg total) by mouth once daily. 90 " tablet 3    zolpidem (AMBIEN) 10 mg Tab 1/2 po qhs prn insomnia 90 tablet 1     No facility-administered encounter medications on file as of 10/17/2023.     No orders of the defined types were placed in this encounter.        Plan:     Problem List Items Addressed This Visit          Pulmonary    Chronic cough     - improvement in daytime cough, still with nightime  - recommended pt do nebulizer treatment at the end of the day  - no wheezing today  - offered CT chest, pt would like to have a little more time, she feels that symptoms are improving  - will do close virtual follow up as pt comes from [place]          Plan discussed with pt and she expressed understanding, all questions answered. RTC virtual visit 4 weeks.

## 2023-10-17 NOTE — PROCEDURES
"[facility] 3rdFl  Six Minute Walk   SUMMARY   Ordering Provider: Heber Silver MD   Interpreting Provider: Heber Silver MD  Performing nurse/tech/RT: VT, RT  Diagnosis:  (TREVIZO)  Height: 5' 3" (160 cm)  Weight: 77 kg (169 lb 12.1 oz)  BMI (Calculated): 30.1   Patient Race:    Phase Oxygen Assessment Supplemental O2 Heart   Rate Blood Pressure Rajwinder Dyspnea Scale Rating   Resting 95 % Room Air 79 bpm 133/61 0   Exercise        Minute        1 94 % Room Air 102 bpm     2 97 % Room Air 101 bpm     3 97 % Room Air 109 bpm     4 97 % Room Air 103 bpm     5 97 % Room Air 107 bpm     6  97 % Room Air 105 bpm 126/58 4   Recovery        Minute        1 98 % Room Air 102 bpm     2 99 % Room Air 98 bpm     3 98 % Room Air 89 bpm     4 98 % Room Air 84 bpm 130/60 1     Six Minute Walk Summary  6MWT Status: completed without stopping  Patient Reported: Dyspnea, Lightheadedness     Interpretation:  Did the patient stop or pause?: No         Total Time Walked (Calculated): 360 seconds  Final Partial Lap Distance (feet): 100 feet  Total Distance Meters (Calculated): 274.32 meters  Predicted Distance Meters (Calculated): 412.11 meters  Percentage of Predicted (Calculated): 66.56  Peak VO2 (Calculated): 12.21  Mets: 3.49  Has The Patient Had a Previous Six Minute Walk Test?: No       Previous 6MWT Results  Has The Patient Had a Previous Six Minute Walk Test?: No      Interpretation:  Total distance walked in six minutes is mildly reduced indicating a reduction in overall  functional capacity. The patient did not meet criteria for supplemental oxygen prescription.  Clinical correlation suggested.  [] Mild exercise-induced hypoxemia described as an arterial oxygen saturation of 93-95% (with a fall of 3-4% with exercise),   [] Moderate exercise-induced hypoxemia as a fall in oxygen saturation to  89-93% (with a fall of 3-4 % with exercise)  [] Severe exercise induced hypoxemia as < 89% O2 saturation (88% " and below).  Medicare Criteria for Oxygen prescription comments: When arterial oxygen saturation is at or below 88% during exercise (severe exercise induced hypoxemia) then the patient falls under   Details about Medicare Group Criteria coverage can be found at http://[email or web address]/manuals/[email or web address]     Heber Silver MD

## 2023-10-17 NOTE — ASSESSMENT & PLAN NOTE
- improvement in daytime cough, still with nightime  - recommended pt do nebulizer treatment at the end of the day  - no wheezing today  - offered CT chest, pt would like to have a little more time, she feels that symptoms are improving  - will do close virtual follow up as pt comes from [place]

## 2023-10-18 LAB
BRPFT: ABNORMAL
DLCO ADJ PRE: 11.08 ML/(MIN*MMHG) (ref 14.63–26.1)
DLCO SINGLE BREATH LLN: 14.63
DLCO SINGLE BREATH PRE REF: 54.4 %
DLCO SINGLE BREATH REF: 20.36
DLCOC SBVA LLN: 2.78
DLCOC SBVA PRE REF: 111.7 %
DLCOC SBVA REF: 4.27
DLCOC SINGLE BREATH LLN: 14.63
DLCOC SINGLE BREATH PRE REF: 54.4 %
DLCOC SINGLE BREATH REF: 20.36
DLCOVA LLN: 2.78
DLCOVA PRE REF: 111.7 %
DLCOVA PRE: 4.77 ML/(MIN*MMHG*L) (ref 2.78–5.76)
DLCOVA REF: 4.27
DLVAADJ PRE: 4.77 ML/(MIN*MMHG*L) (ref 2.78–5.76)
ERV LLN: -16449.39
ERV PRE REF: 57.1 %
ERV REF: 0.61
FEF 25 75 CHG: 10.9 %
FEF 25 75 LLN: 0.79
FEF 25 75 POST REF: 96.5 %
FEF 25 75 PRE REF: 87 %
FEF 25 75 REF: 1.75
FET100 CHG: 5.8 %
FEV1 CHG: 8.9 %
FEV1 FVC CHG: -0.3 %
FEV1 FVC LLN: 64
FEV1 FVC POST REF: 101.9 %
FEV1 FVC PRE REF: 102.2 %
FEV1 FVC REF: 78
FEV1 LLN: 1.49
FEV1 POST REF: 79.5 %
FEV1 PRE REF: 73.1 %
FEV1 REF: 2.07
FRCPLETH LLN: 1.83
FRCPLETH PREREF: 89.4 %
FRCPLETH REF: 2.66
FVC CHG: 9.2 %
FVC LLN: 1.94
FVC POST REF: 77.4 %
FVC PRE REF: 70.9 %
FVC REF: 2.68
IVC PRE: 1.38 L (ref 1.94–3.42)
IVC SINGLE BREATH LLN: 1.94
IVC SINGLE BREATH PRE REF: 51.4 %
IVC SINGLE BREATH REF: 2.68
MVV LLN: 65
MVV PRE REF: 59.9 %
MVV REF: 77
PEF CHG: 2.4 %
PEF LLN: 3.68
PEF POST REF: 74.5 %
PEF PRE REF: 72.7 %
PEF REF: 5.34
POST FEF 25 75: 1.69 L/S (ref 0.79–2.71)
POST FET 100: 6.7 SEC
POST FEV1 FVC: 79.44 % (ref 64.36–91.6)
POST FEV1: 1.65 L (ref 1.49–2.65)
POST FVC: 2.07 L (ref 1.94–3.42)
POST PEF: 3.98 L/S (ref 3.68–7)
PRE DLCO: 11.08 ML/(MIN*MMHG) (ref 14.63–26.1)
PRE ERV: 0.35 L (ref -16449.39–16450.61)
PRE FEF 25 75: 1.52 L/S (ref 0.79–2.71)
PRE FET 100: 6.34 SEC
PRE FEV1 FVC: 79.66 % (ref 64.36–91.6)
PRE FEV1: 1.51 L (ref 1.49–2.65)
PRE FRC PL: 2.37 L
PRE FVC: 1.9 L (ref 1.94–3.42)
PRE MVV: 46 L/MIN (ref 65.28–88.32)
PRE PEF: 3.88 L/S (ref 3.68–7)
PRE RV: 1.96 L (ref 1.47–2.62)
PRE TLC: 3.85 L (ref 3.78–5.76)
RAW LLN: 3.06
RAW PRE REF: 128.2 %
RAW PRE: 3.92 CMH2O*S/L (ref 3.06–3.06)
RAW REF: 3.06
RV LLN: 1.47
RV PRE REF: 95.5 %
RV REF: 2.05
RVTLC LLN: 34
RVTLC PRE REF: 116.8 %
RVTLC PRE: 50.75 % (ref 33.85–53.03)
RVTLC REF: 43
TLC LLN: 3.78
TLC PRE REF: 80.8 %
TLC REF: 4.77
VA PRE: 2.35 L (ref 4.62–4.62)
VA SINGLE BREATH LLN: 4.62
VA SINGLE BREATH PRE REF: 50.8 %
VA SINGLE BREATH REF: 4.62
VC LLN: 1.94
VC PRE REF: 70.9 %
VC PRE: 1.9 L (ref 1.94–3.42)
VC REF: 2.68
VTGRAWPRE: 2.29 L

## 2023-10-30 ENCOUNTER — IMMUNIZATION (OUTPATIENT)
Dept: INTERNAL MEDICINE | Facility: CLINIC | Age: 73
End: 2023-10-30
Payer: MEDICARE

## 2023-10-30 PROCEDURE — 90694 FLU VACCINE - QUADRIVALENT - ADJUVANTED: ICD-10-PCS | Mod: S$GLB,,, | Performed by: STUDENT IN AN ORGANIZED HEALTH CARE EDUCATION/TRAINING PROGRAM

## 2023-10-30 PROCEDURE — G0008 FLU VACCINE - QUADRIVALENT - ADJUVANTED: ICD-10-PCS | Mod: S$GLB,,, | Performed by: STUDENT IN AN ORGANIZED HEALTH CARE EDUCATION/TRAINING PROGRAM

## 2023-10-30 PROCEDURE — 90694 VACC AIIV4 NO PRSRV 0.5ML IM: CPT | Mod: S$GLB,,, | Performed by: STUDENT IN AN ORGANIZED HEALTH CARE EDUCATION/TRAINING PROGRAM

## 2023-10-30 PROCEDURE — G0008 ADMIN INFLUENZA VIRUS VAC: HCPCS | Mod: S$GLB,,, | Performed by: STUDENT IN AN ORGANIZED HEALTH CARE EDUCATION/TRAINING PROGRAM

## 2023-11-14 ENCOUNTER — OFFICE VISIT (OUTPATIENT)
Dept: PULMONOLOGY | Facility: CLINIC | Age: 73
End: 2023-11-14
Payer: MEDICARE

## 2023-11-14 VITALS
WEIGHT: 176.13 LBS | RESPIRATION RATE: 20 BRPM | DIASTOLIC BLOOD PRESSURE: 72 MMHG | HEIGHT: 63 IN | HEART RATE: 80 BPM | SYSTOLIC BLOOD PRESSURE: 126 MMHG | OXYGEN SATURATION: 95 % | BODY MASS INDEX: 31.21 KG/M2

## 2023-11-14 DIAGNOSIS — K21.9 GASTROESOPHAGEAL REFLUX DISEASE, UNSPECIFIED WHETHER ESOPHAGITIS PRESENT: Primary | ICD-10-CM

## 2023-11-14 DIAGNOSIS — K21.9 GASTROESOPHAGEAL REFLUX DISEASE WITHOUT ESOPHAGITIS: ICD-10-CM

## 2023-11-14 DIAGNOSIS — R05.3 CHRONIC COUGH: ICD-10-CM

## 2023-11-14 PROCEDURE — 1159F MED LIST DOCD IN RCRD: CPT | Mod: CPTII,S$GLB,, | Performed by: HOSPITALIST

## 2023-11-14 PROCEDURE — 3061F PR NEG MICROALBUMINURIA RESULT DOCUMENTED/REVIEW: ICD-10-PCS | Mod: CPTII,S$GLB,, | Performed by: HOSPITALIST

## 2023-11-14 PROCEDURE — 3074F SYST BP LT 130 MM HG: CPT | Mod: CPTII,S$GLB,, | Performed by: HOSPITALIST

## 2023-11-14 PROCEDURE — 1101F PT FALLS ASSESS-DOCD LE1/YR: CPT | Mod: CPTII,S$GLB,, | Performed by: HOSPITALIST

## 2023-11-14 PROCEDURE — 3074F PR MOST RECENT SYSTOLIC BLOOD PRESSURE < 130 MM HG: ICD-10-PCS | Mod: CPTII,S$GLB,, | Performed by: HOSPITALIST

## 2023-11-14 PROCEDURE — 3078F DIAST BP <80 MM HG: CPT | Mod: CPTII,S$GLB,, | Performed by: HOSPITALIST

## 2023-11-14 PROCEDURE — 4010F PR ACE/ARB THEARPY RXD/TAKEN: ICD-10-PCS | Mod: CPTII,S$GLB,, | Performed by: HOSPITALIST

## 2023-11-14 PROCEDURE — 99999 PR PBB SHADOW E&M-EST. PATIENT-LVL V: CPT | Mod: PBBFAC,,, | Performed by: HOSPITALIST

## 2023-11-14 PROCEDURE — 1101F PR PT FALLS ASSESS DOC 0-1 FALLS W/OUT INJ PAST YR: ICD-10-PCS | Mod: CPTII,S$GLB,, | Performed by: HOSPITALIST

## 2023-11-14 PROCEDURE — 3066F NEPHROPATHY DOC TX: CPT | Mod: CPTII,S$GLB,, | Performed by: HOSPITALIST

## 2023-11-14 PROCEDURE — 1126F AMNT PAIN NOTED NONE PRSNT: CPT | Mod: CPTII,S$GLB,, | Performed by: HOSPITALIST

## 2023-11-14 PROCEDURE — 1159F PR MEDICATION LIST DOCUMENTED IN MEDICAL RECORD: ICD-10-PCS | Mod: CPTII,S$GLB,, | Performed by: HOSPITALIST

## 2023-11-14 PROCEDURE — 3288F FALL RISK ASSESSMENT DOCD: CPT | Mod: CPTII,S$GLB,, | Performed by: HOSPITALIST

## 2023-11-14 PROCEDURE — 3008F PR BODY MASS INDEX (BMI) DOCUMENTED: ICD-10-PCS | Mod: CPTII,S$GLB,, | Performed by: HOSPITALIST

## 2023-11-14 PROCEDURE — 1126F PR PAIN SEVERITY QUANTIFIED, NO PAIN PRESENT: ICD-10-PCS | Mod: CPTII,S$GLB,, | Performed by: HOSPITALIST

## 2023-11-14 PROCEDURE — 4010F ACE/ARB THERAPY RXD/TAKEN: CPT | Mod: CPTII,S$GLB,, | Performed by: HOSPITALIST

## 2023-11-14 PROCEDURE — 3008F BODY MASS INDEX DOCD: CPT | Mod: CPTII,S$GLB,, | Performed by: HOSPITALIST

## 2023-11-14 PROCEDURE — 1160F RVW MEDS BY RX/DR IN RCRD: CPT | Mod: CPTII,S$GLB,, | Performed by: HOSPITALIST

## 2023-11-14 PROCEDURE — 3288F PR FALLS RISK ASSESSMENT DOCUMENTED: ICD-10-PCS | Mod: CPTII,S$GLB,, | Performed by: HOSPITALIST

## 2023-11-14 PROCEDURE — 3078F PR MOST RECENT DIASTOLIC BLOOD PRESSURE < 80 MM HG: ICD-10-PCS | Mod: CPTII,S$GLB,, | Performed by: HOSPITALIST

## 2023-11-14 PROCEDURE — 3051F PR MOST RECENT HEMOGLOBIN A1C LEVEL 7.0 - < 8.0%: ICD-10-PCS | Mod: CPTII,S$GLB,, | Performed by: HOSPITALIST

## 2023-11-14 PROCEDURE — 99999 PR PBB SHADOW E&M-EST. PATIENT-LVL V: ICD-10-PCS | Mod: PBBFAC,,, | Performed by: HOSPITALIST

## 2023-11-14 PROCEDURE — 3051F HG A1C>EQUAL 7.0%<8.0%: CPT | Mod: CPTII,S$GLB,, | Performed by: HOSPITALIST

## 2023-11-14 PROCEDURE — 3066F PR DOCUMENTATION OF TREATMENT FOR NEPHROPATHY: ICD-10-PCS | Mod: CPTII,S$GLB,, | Performed by: HOSPITALIST

## 2023-11-14 PROCEDURE — 99213 PR OFFICE/OUTPT VISIT, EST, LEVL III, 20-29 MIN: ICD-10-PCS | Mod: S$GLB,,, | Performed by: HOSPITALIST

## 2023-11-14 PROCEDURE — 3061F NEG MICROALBUMINURIA REV: CPT | Mod: CPTII,S$GLB,, | Performed by: HOSPITALIST

## 2023-11-14 PROCEDURE — 99213 OFFICE O/P EST LOW 20 MIN: CPT | Mod: S$GLB,,, | Performed by: HOSPITALIST

## 2023-11-14 PROCEDURE — 1160F PR REVIEW ALL MEDS BY PRESCRIBER/CLIN PHARMACIST DOCUMENTED: ICD-10-PCS | Mod: CPTII,S$GLB,, | Performed by: HOSPITALIST

## 2023-11-14 RX ORDER — PANTOPRAZOLE SODIUM 40 MG/1
40 TABLET, DELAYED RELEASE ORAL NIGHTLY
Qty: 30 TABLET | Refills: 11 | Status: SHIPPED | OUTPATIENT
Start: 2023-11-14 | End: 2024-11-13

## 2023-11-14 RX ORDER — METFORMIN HYDROCHLORIDE 500 MG/1
500 TABLET, EXTENDED RELEASE ORAL 2 TIMES DAILY
COMMUNITY
Start: 2023-10-26 | End: 2023-12-12

## 2023-11-14 RX ORDER — FAMOTIDINE 20 MG/1
20 TABLET, FILM COATED ORAL DAILY
Qty: 30 TABLET | Refills: 11 | Status: SHIPPED | OUTPATIENT
Start: 2023-11-14 | End: 2024-03-04 | Stop reason: ALTCHOICE

## 2023-11-14 NOTE — PROGRESS NOTES
Subjective:      Patient ID: Kelsie Bustamante is a 73 y.o. female.    Chief Complaint: Cough    Interval Hx 11/14/23:    Mrs. Bustamante presents to Pulmonary clinic for follow up chronic cough. She was last seen 10/2023- at that visit she reported improvement in daytime cough, but persistent nocturnal cough. She was encouraged to use nebulizer treatment in the evenings and plan made for close follow up.     Today she reports trouble sleeping because of productive cough. + Post-tussive emesis. Has bad reflux. Was previously supposed to have hiatal hernia repair last December, but passed out before surgery and had pacemaker placed and was not reevaluated.     Pulmonary Interventions:   Albuterol neb- didn't help doing at night  Albuterol HFA  Tessalon- out of, didn't seem to help  Mucinex- out of    HPI 10/17/23:    73 year old female with history of Afib, hiatal hernia, GERD, HLD, HTN, pacemaker, who presents to Pulmonary clinic for follow up chronic cough.  She was last seen 9/2023 by Dr. Silver- at that visit she reported 3 months of cough and dyspnea. She had been treated with Augmentin 8/7/23. At that visit she was prescribed Doxycycline, Prednisone, and a nebulizer. Further work up ordered in the way of PFT and a walk.      Cough during the day has improved, but still with cough at night, productive. Deep cough. Sputum a little reddish looking- rust colored. Thick secretions. No weight loss or weight gain.     Pertinent work up:  PFT 10/17/23- Borderline restriction/obstruction without significant bronchodilator response, decreased DLCO 54.4%  6MW 10/17/23- SpO2 range 94-99%, walked 274m, 66% predicted, no pause  CXR 8/21/23- Clear lungs  TTE 11/2022- Normal LV size and function, normal RV size     Pulmonary Interventions:   Mucinex  Albuterol neb- not using  Albuterol HFA  Tessalon- not using anymore    Review of Systems   Respiratory:  Positive for cough, sputum production and dyspnea on extertion. Negative  "for wheezing.      Objective:     Physical Exam   Constitutional: She is oriented to person, place, and time. She appears well-developed and well-nourished. She is obese.   Cardiovascular: Normal rate and regular rhythm.   Pulmonary/Chest:   Clear breath sounds bilaterally, normal effort at rest, no breathlessness with conversation   Musculoskeletal:         General: No edema.   Neurological: She is alert and oriented to person, place, and time.   Skin: Skin is warm and dry.     Personal Diagnostic Review  As Above      10/17/2023     3:43 PM 10/17/2023     3:31 PM 9/14/2023     1:21 PM 9/11/2023     3:36 PM 8/21/2023    10:41 AM 8/7/2023     9:40 AM 7/18/2023     1:15 PM   Pulmonary Function Tests   SpO2 95 %   95 % 98 % 95 % 98 %   Ordering Provider  Heber Silver MD        Performing nurse/tech/RT  VT, RT        Height 5' 3" (1.6 m) 5' 3" (1.6 m) 5' 3" (1.6 m) 5' 3" (1.6 m) 5' 3" (1.6 m) 5' 3" (1.6 m) 5' 3" (1.6 m)   Weight 77 kg (169 lb 12.1 oz) 77 kg (169 lb 12.1 oz) 78.7 kg (173 lb 8 oz) 78.7 kg (173 lb 8 oz) 78.7 kg (173 lb 8 oz) 77.5 kg (170 lb 13.7 oz) 79.5 kg (175 lb 4.3 oz)   BMI (Calculated) 30.1 30.1 30.7 30.7 30.7 30.3 31.1   Patient Race          6MWT Status  completed without stopping        Patient Reported  Dyspnea;Lightheadedness        Was O2 used?  No        6MW Distance walked (feet)  900 feet        Distance walked (meters)  274.32 meters        Did patient stop?  No        Type of assistive device(s) used?  no assistive devices        Oxygen Saturation  95 %        Supplemental Oxygen  Room Air        Heart Rate  79 bpm        Blood Pressure  133/61        Rajwinder Dyspnea Rating   nothing at all        Oxygen Saturation  97 %        Supplemental Oxygen  Room Air        Heart Rate  105 bpm        Blood Pressure  126/58        Rajwinder Dyspnea Rating   somewhat heavy        Recovery Time (seconds)  240 seconds        Oxygen Saturation  98 %        Supplemental Oxygen  Room Air        Heart " Rate  84 bpm        Blood Pressure  130/60        Rajwinder Dyspnea Rating   very light        Is procedure ready for interpretation?  Yes        Oxygen Qualification?  No             Assessment:     No diagnosis found.     Outpatient Encounter Medications as of 2023   Medication Sig Dispense Refill    albuterol (PROVENTIL) 2.5 mg /3 mL (0.083 %) nebulizer solution Take 3 mLs (2.5 mg total) by nebulization every 4 to 6 hours as needed for Wheezing or Shortness of Breath. 360 mL 11    albuterol (PROVENTIL/VENTOLIN HFA) 90 mcg/actuation inhaler SMARTSI Puff(s) By Mouth 4 Times Daily      apixaban (ELIQUIS) 5 mg Tab Take 1 tablet (5 mg total) by mouth 2 (two) times daily. 180 tablet 4    esomeprazole (NEXIUM) 40 MG capsule Take 1 capsule (40 mg total) by mouth before breakfast. 30 capsule 11    estrogens, conjugated, (PREMARIN) 0.625 MG tablet Take 1 tablet (0.625 mg total) by mouth once daily. 90 tablet 3    flecainide (TAMBOCOR) 100 MG Tab Take 1 tablet (100 mg total) by mouth every 12 (twelve) hours. (Patient not taking: Reported on 10/17/2023) 60 tablet 0    ibuprofen (ADVIL,MOTRIN) 600 MG tablet 1 tablet with food or milk as needed Orally Three times a day for 7 days      metoprolol succinate (TOPROL-XL) 50 MG 24 hr tablet Take 2 tablets (100 mg total) by mouth every morning AND 1 tablet (50 mg total) every evening. 270 tablet 3    rosuvastatin (CRESTOR) 5 MG tablet Take 1 tablet (5 mg total) by mouth once daily. 90 tablet 3    zolpidem (AMBIEN) 10 mg Tab 1/2 po qhs prn insomnia 90 tablet 1     No facility-administered encounter medications on file as of 2023.     No orders of the defined types were placed in this encounter.        Plan:     Problem List Items Addressed This Visit          Pulmonary    Chronic cough     - no significant improvement with prior inhalers, steroids, or abx  - given  hx severe GERD and hiatal hernia, think more likely related to LPR  - not currently on treatment- prescribed  protonix and pepcid  - provided LPR education  - referral to GI (was previously seeing)              GI    Gastroesophageal reflux disease without esophagitis     Per cough          Other Visit Diagnoses       Gastroesophageal reflux disease, unspecified whether esophagitis present    -  Primary    Relevant Medications    pantoprazole (PROTONIX) 40 MG tablet    famotidine (PEPCID) 20 MG tablet    Other Relevant Orders    Ambulatory referral/consult to Gastroenterology          Plan discussed with pt and she expressed understanding, all questions answered. RTC 10 weeks or sooner as needed.

## 2023-11-14 NOTE — ASSESSMENT & PLAN NOTE
- no significant improvement with prior inhalers, steroids, or abx  - given  hx severe GERD and hiatal hernia, think more likely related to LPR  - not currently on treatment- prescribed protonix and pepcid  - provided LPR education  - referral to GI (was previously seeing)

## 2023-11-14 NOTE — PATIENT INSTRUCTIONS
Laryngopharyngeal Reflux (LPR) Patient Information and Medication Instructions     LPR (laryngopharyngeal reflux) can be a challenging condition to control.  Some patients require once daily medication like a proton pump inhibitor to control their symptoms (such as Omeprazole, Pantoprazole, Esomeprazole).  HOWEVER, other patients will require taking this medication twice daily and even may be started on another medication called an H2 blocker (such as Pepcid, Zantac) at bedtime.    It is important that medication is not the only technique that you use to help control your LPR.  Therapeutic lifestyle changes are very important as well:      Weight Loss:  If you are overweight, losing weight can significantly reduce your reflux.  Diet Control:  It is important to determine what your Trigger foods for reflux are.  Limiting your intake of these foods will significantly improve your reflux.  Examples of foods known to increase reflux are listed below  Carbonated beverages  Caffeine (this includes Coffee, soda, iced tea, energy drinks etc.)  Alcohol  Fried/ fatty/ greasy foods  Acidic foods (citrus, tomato etc.)  Chocolate  Spearmint/Peppermint  Elevating the head of your bed:  This doesn't mean more pillows.  You need to actually elevate the head of your bed.  Some patients have found something to put under the legs of the head of their bed.  Other patients have employed a wedge or an air bladder of some sort to elevate the head of their bed.  This makes a significant difference with reflux and can also help with nasal congestion.  Eating before bedtime:  Try not to eat anything for at least 2 hours before bedtime.  This allows for less material to remain in your stomach when you lay down at night which equals less severe reflux.  More information:  If you would like to read more about diet changes and lifestyle changes that you can employ that will help with your reflux, the books below may be helpful.  Dropping Acid:   The Reflux diet Cookbook & Cure by Abdi Brambila M.D. and Vikas Cedeño M.D.  The Acid Watcher Diet:  A 28 Day Reflux Prevention and Healing Program by Abundio Pearson M.D.        Weaning Instructions After Symptom Improvement     It can sometimes take up to 12 weeks to see symptom improvement in LPR.  The medications may reduce the amount of acid you are producing very quickly, but then the tissues of your voice box and upper throat have to heal themselves.  Your physician may have increased year proton pump inhibitor to twice daily dosing and even may have added an H2 blocker at bedtime.  Eventually, the goal is a complete resolution of your symptoms.  Hopefully you have been working on the lifestyle modifications listed above over this time period as well!     Once your symptoms have improved, our goal is to wean you back off of your reflux medication.  The instructions below will help guide you through this process.     Take all anti-reflux medications for at least 3 weeks beyond when your symptoms have improved/resolved  If you are on Twice daily dosing of a proton pump inhibitor (omeprazole, pantoprazole, esomeprazole etc.) and an H2 blocker at bedtime (pepcid, zantac) then you should first discontinue your night time dose of your proton pump inhibitor.  If your symptoms are still controlled after 3 weeks of taking your PPI in the morning and your H2 blocker at bedtime,  discontinue your bedtime H2 blocker  If your symptoms are still controlled after 3 more weeks of only taking your PPI in the morning, discontinue your morning PPI     If at any point your symptoms return during this weaning process, you can return to the previous step and let your physician know at the next appointment.

## 2023-11-20 PROBLEM — J18.9 PNEUMONIA DUE TO INFECTIOUS ORGANISM: Status: RESOLVED | Noted: 2023-08-21 | Resolved: 2023-11-20

## 2023-11-20 RX ORDER — METFORMIN HYDROCHLORIDE 500 MG/1
500 TABLET, EXTENDED RELEASE ORAL 2 TIMES DAILY WITH MEALS
Qty: 60 TABLET | Refills: 0 | OUTPATIENT
Start: 2023-11-20

## 2023-12-04 ENCOUNTER — CLINICAL SUPPORT (OUTPATIENT)
Dept: CARDIOLOGY | Facility: HOSPITAL | Age: 73
End: 2023-12-04
Payer: OTHER GOVERNMENT

## 2023-12-04 DIAGNOSIS — Z95.0 PRESENCE OF CARDIAC PACEMAKER: ICD-10-CM

## 2023-12-04 PROCEDURE — 93296 REM INTERROG EVL PM/IDS: CPT | Performed by: INTERNAL MEDICINE

## 2023-12-05 ENCOUNTER — CLINICAL SUPPORT (OUTPATIENT)
Dept: CARDIOLOGY | Facility: HOSPITAL | Age: 73
End: 2023-12-05
Attending: INTERNAL MEDICINE
Payer: MEDICARE

## 2023-12-05 DIAGNOSIS — Z95.0 PRESENCE OF CARDIAC PACEMAKER: ICD-10-CM

## 2023-12-05 PROCEDURE — 93296 REM INTERROG EVL PM/IDS: CPT | Performed by: INTERNAL MEDICINE

## 2023-12-05 PROCEDURE — 93294 CARDIAC DEVICE CHECK - REMOTE: ICD-10-PCS | Mod: S$GLB,,, | Performed by: INTERNAL MEDICINE

## 2023-12-05 PROCEDURE — 93294 REM INTERROG EVL PM/LDLS PM: CPT | Mod: S$GLB,,, | Performed by: INTERNAL MEDICINE

## 2023-12-12 RX ORDER — METFORMIN HYDROCHLORIDE 500 MG/1
500 TABLET, EXTENDED RELEASE ORAL 2 TIMES DAILY WITH MEALS
Qty: 60 TABLET | Refills: 0 | Status: SHIPPED | OUTPATIENT
Start: 2023-12-12 | End: 2024-01-09

## 2023-12-20 NOTE — TELEPHONE ENCOUNTER
Care Due:                  Date            Visit Type   Department     Provider  --------------------------------------------------------------------------------                                EP -                              PRIMARY      [facility]  Last Visit: 08-      CARE ([facility])   MEDICINE       Cody Parks  Next Visit: None Scheduled  None         None Found                                                            Last  Test          Frequency    Reason                     Performed    Due Date  --------------------------------------------------------------------------------    Lipid Panel.  12 months..  rosuvastatin.............  03- 03-    [facility] Embedded Care Due Messages. Reference number: [id number].   12/20/2023 10:04:58 AM CST

## 2023-12-20 NOTE — TELEPHONE ENCOUNTER
----- Message from Jennifer Machado sent at 12/20/2023  9:55 AM CST -----  Contact: patient  [phone]  Patient called requesting a call back from Dr. Parks's nurse, regarding a refill she is out of the medication

## 2023-12-22 RX ORDER — ZOLPIDEM TARTRATE 10 MG/1
TABLET ORAL
Qty: 90 TABLET | Refills: 1 | Status: SHIPPED | OUTPATIENT
Start: 2023-12-22 | End: 2024-02-26

## 2023-12-27 LAB
OHS CV AF BURDEN PERCENT: 5
OHS CV DC REMOTE DEVICE TYPE: NORMAL
OHS CV RV PACING PERCENT: 3 %

## 2024-01-09 RX ORDER — METFORMIN HYDROCHLORIDE 500 MG/1
500 TABLET, EXTENDED RELEASE ORAL 2 TIMES DAILY WITH MEALS
Qty: 60 TABLET | Refills: 11 | Status: SHIPPED | OUTPATIENT
Start: 2024-01-09

## 2024-01-15 ENCOUNTER — NURSE TRIAGE (OUTPATIENT)
Dept: ADMINISTRATIVE | Facility: CLINIC | Age: 74
End: 2024-01-15
Payer: OTHER GOVERNMENT

## 2024-01-15 NOTE — TELEPHONE ENCOUNTER
Pt has RSV for about 2 weeks and its not getting any better. Albuterol inhaler is useless per pt. She is short winded all the time. Pt stated she really can;t do anything. Cough. She struggles to breathe during the night. Pulse ox 92%. Wheezing. Care advice recommend pt go to Er. Pt verbalized understanding.   Reason for Disposition   [1] MODERATE difficulty breathing (e.g., speaks in phrases, SOB even at rest, pulse 100-120) AND [2] NEW-onset or WORSE than normal    Additional Information   Negative: SEVERE difficulty breathing (e.g., struggling for each breath, speaks in single words)   Negative: [1] Breathing stopped AND [2] hasn't returned   Negative: Choking on something   Negative: Bluish (or gray) lips or face now   Negative: Difficult to awaken or acting confused (e.g., disoriented, slurred speech)   Negative: Passed out (i.e., lost consciousness, collapsed and was not responding)   Negative: Wheezing started suddenly after medicine, an allergic food or bee sting   Negative: Stridor (harsh sound while breathing in)   Negative: Slow, shallow and weak breathing   Negative: Sounds like a life-threatening emergency to the triager    Protocols used: Breathing Difficulty-A-AH

## 2024-01-23 ENCOUNTER — HOSPITAL ENCOUNTER (OUTPATIENT)
Dept: RADIOLOGY | Facility: HOSPITAL | Age: 74
Discharge: HOME | End: 2024-01-23
Attending: HOSPITALIST
Payer: MEDICARE

## 2024-01-23 ENCOUNTER — OFFICE VISIT (OUTPATIENT)
Dept: PULMONOLOGY | Facility: CLINIC | Age: 74
End: 2024-01-23
Payer: MEDICARE

## 2024-01-23 VITALS
RESPIRATION RATE: 15 BRPM | OXYGEN SATURATION: 95 % | SYSTOLIC BLOOD PRESSURE: 114 MMHG | DIASTOLIC BLOOD PRESSURE: 66 MMHG | HEIGHT: 63 IN | HEART RATE: 90 BPM | WEIGHT: 172.38 LBS | BODY MASS INDEX: 30.54 KG/M2

## 2024-01-23 DIAGNOSIS — R05.3 CHRONIC COUGH: ICD-10-CM

## 2024-01-23 DIAGNOSIS — R05.3 CHRONIC COUGH: Primary | ICD-10-CM

## 2024-01-23 PROCEDURE — 1101F PT FALLS ASSESS-DOCD LE1/YR: CPT | Mod: CPTII,S$GLB,, | Performed by: HOSPITALIST

## 2024-01-23 PROCEDURE — 3078F DIAST BP <80 MM HG: CPT | Mod: CPTII,S$GLB,, | Performed by: HOSPITALIST

## 2024-01-23 PROCEDURE — 1160F RVW MEDS BY RX/DR IN RCRD: CPT | Mod: CPTII,S$GLB,, | Performed by: HOSPITALIST

## 2024-01-23 PROCEDURE — 3008F BODY MASS INDEX DOCD: CPT | Mod: CPTII,S$GLB,, | Performed by: HOSPITALIST

## 2024-01-23 PROCEDURE — 3288F FALL RISK ASSESSMENT DOCD: CPT | Mod: CPTII,S$GLB,, | Performed by: HOSPITALIST

## 2024-01-23 PROCEDURE — 1159F MED LIST DOCD IN RCRD: CPT | Mod: CPTII,S$GLB,, | Performed by: HOSPITALIST

## 2024-01-23 PROCEDURE — 71046 X-RAY EXAM CHEST 2 VIEWS: CPT | Mod: TC

## 2024-01-23 PROCEDURE — 99213 OFFICE O/P EST LOW 20 MIN: CPT | Mod: S$GLB,,, | Performed by: HOSPITALIST

## 2024-01-23 PROCEDURE — 99999 PR PBB SHADOW E&M-EST. PATIENT-LVL IV: CPT | Mod: PBBFAC,,, | Performed by: HOSPITALIST

## 2024-01-23 PROCEDURE — 3074F SYST BP LT 130 MM HG: CPT | Mod: CPTII,S$GLB,, | Performed by: HOSPITALIST

## 2024-01-23 PROCEDURE — 71046 X-RAY EXAM CHEST 2 VIEWS: CPT | Mod: 26,,, | Performed by: RADIOLOGY

## 2024-01-23 NOTE — PROGRESS NOTES
Subjective:      Patient ID: Kelsie Bustamante is a 73 y.o. female.    Chief Complaint: Cough    Interval Hx 1/23/24:    Mrs. Bustamante presents to Pulmonary clinic for follow up cough. She was last seen 11/2023- at that visit she reported persistent productive cough at night and severe acid reflux. She was started on a regimen of Protonix and Pepcid and referred to GI.     Has been battling RSV for the past month- persistent productive cough, was bad for the first two weeks and now is a little better but the same over the last 2 weeks. Hasn't seen GI yet. Before she was sick, cough at night was still about the same after starting protonix/pepcid.     Interval Hx 11/14/23:     Mrs. Bustamante presents to Pulmonary clinic for follow up chronic cough. She was last seen 10/2023- at that visit she reported improvement in daytime cough, but persistent nocturnal cough. She was encouraged to use nebulizer treatment in the evenings and plan made for close follow up.      Today she reports trouble sleeping because of productive cough. + Post-tussive emesis. Has bad reflux. Was previously supposed to have hiatal hernia repair last December, but passed out before surgery and had pacemaker placed and was not reevaluated.      Pulmonary Interventions:   Albuterol neb- didn't help doing at night  Albuterol HFA  Tessalon- out of, didn't seem to help  Mucinex- out of     HPI 10/17/23:     73 year old female with history of Afib, hiatal hernia, GERD, HLD, HTN, pacemaker, who presents to Pulmonary clinic for follow up chronic cough.  She was last seen 9/2023 by Dr. Silver- at that visit she reported 3 months of cough and dyspnea. She had been treated with Augmentin 8/7/23. At that visit she was prescribed Doxycycline, Prednisone, and a nebulizer. Further work up ordered in the way of PFT and a walk.      Cough during the day has improved, but still with cough at night, productive. Deep cough. Sputum a little reddish looking- rust  "colored. Thick secretions. No weight loss or weight gain.     Pertinent work up:  PFT 10/17/23- Borderline restriction/obstruction without significant bronchodilator response, decreased DLCO 54.4%  6MW 10/17/23- SpO2 range 94-99%, walked 274m, 66% predicted, no pause  CXR 8/21/23- Clear lungs  TTE 11/2022- Normal LV size and function, normal RV size     Pulmonary Interventions:   Mucinex  Albuterol neb- not using  Albuterol HFA  Tessalon- not using anymore    Review of Systems   Constitutional:  Positive for fatigue.   Respiratory:  Positive for cough and sputum production. Negative for wheezing.      Objective:     Physical Exam   Constitutional: She is oriented to person, place, and time. She appears well-developed and well-nourished. She is obese.   Cardiovascular: Normal rate and regular rhythm.   Pulmonary/Chest:   Course breath sounds bilaterally at bases, normal effort at rest on room air   Musculoskeletal:         General: No edema.   Neurological: She is alert and oriented to person, place, and time.   Skin: Skin is warm and dry.   Psychiatric: She has a normal mood and affect.     Personal Diagnostic Review  As Above      11/14/2023    11:06 AM 10/17/2023     3:43 PM 10/17/2023     3:31 PM 9/14/2023     1:21 PM 9/11/2023     3:36 PM 8/21/2023    10:41 AM 8/7/2023     9:40 AM   Pulmonary Function Tests   SpO2 95 % 95 %   95 % 98 % 95 %   Ordering Provider   Heber Silver MD       Performing nurse/tech/RT   VT, RT       Height 5' 3" (1.6 m) 5' 3" (1.6 m) 5' 3" (1.6 m) 5' 3" (1.6 m) 5' 3" (1.6 m) 5' 3" (1.6 m) 5' 3" (1.6 m)   Weight 79.9 kg (176 lb 2.4 oz) 77 kg (169 lb 12.1 oz) 77 kg (169 lb 12.1 oz) 78.7 kg (173 lb 8 oz) 78.7 kg (173 lb 8 oz) 78.7 kg (173 lb 8 oz) 77.5 kg (170 lb 13.7 oz)   BMI (Calculated) 31.2 30.1 30.1 30.7 30.7 30.7 30.3   Patient Race          6MWT Status   completed without stopping       Patient Reported   Dyspnea;Lightheadedness       Was O2 used?   No       6MW Distance " walked (feet)   900 feet       Distance walked (meters)   274.32 meters       Did patient stop?   No       Type of assistive device(s) used?   no assistive devices       Oxygen Saturation   95 %       Supplemental Oxygen   Room Air       Heart Rate   79 bpm       Blood Pressure   133/61       Rajwinder Dyspnea Rating    nothing at all       Oxygen Saturation   97 %       Supplemental Oxygen   Room Air       Heart Rate   105 bpm       Blood Pressure   126/58       Rajwinder Dyspnea Rating    somewhat heavy       Recovery Time (seconds)   240 seconds       Oxygen Saturation   98 %       Supplemental Oxygen   Room Air       Heart Rate   84 bpm       Blood Pressure   130/60       Rajwinder Dyspnea Rating    very light       Is procedure ready for interpretation?   Yes       Oxygen Qualification?   No            Assessment:     No diagnosis found.     Outpatient Encounter Medications as of 2024   Medication Sig Dispense Refill    albuterol (PROVENTIL) 2.5 mg /3 mL (0.083 %) nebulizer solution Take 3 mLs (2.5 mg total) by nebulization every 4 to 6 hours as needed for Wheezing or Shortness of Breath. 360 mL 11    albuterol (PROVENTIL/VENTOLIN HFA) 90 mcg/actuation inhaler SMARTSI Puff(s) By Mouth 4 Times Daily      apixaban (ELIQUIS) 5 mg Tab Take 1 tablet (5 mg total) by mouth 2 (two) times daily. 180 tablet 4    estrogens, conjugated, (PREMARIN) 0.625 MG tablet Take 1 tablet (0.625 mg total) by mouth once daily. (Patient not taking: Reported on 2023) 90 tablet 3    famotidine (PEPCID) 20 MG tablet Take 1 tablet (20 mg total) by mouth once daily. 30 tablet 11    flecainide (TAMBOCOR) 100 MG Tab Take 1 tablet (100 mg total) by mouth every 12 (twelve) hours. (Patient not taking: Reported on 10/17/2023) 60 tablet 0    ibuprofen (ADVIL,MOTRIN) 600 MG tablet 1 tablet with food or milk as needed Orally Three times a day for 7 days      metFORMIN (GLUCOPHAGE-XR) 500 MG ER 24hr tablet TAKE 1 TABLET BY MOUTH TWICE DAILY WITH MEALS  60 tablet 11    metoprolol succinate (TOPROL-XL) 50 MG 24 hr tablet Take 2 tablets (100 mg total) by mouth every morning AND 1 tablet (50 mg total) every evening. 270 tablet 3    pantoprazole (PROTONIX) 40 MG tablet Take 1 tablet (40 mg total) by mouth nightly. 30 tablet 11    rosuvastatin (CRESTOR) 5 MG tablet Take 1 tablet (5 mg total) by mouth once daily. (Patient not taking: Reported on 11/14/2023) 90 tablet 3    zolpidem (AMBIEN) 10 mg Tab 1/2 po qhs prn insomnia 90 tablet 1    [DISCONTINUED] metFORMIN (GLUCOPHAGE-XR) 500 MG ER 24hr tablet TAKE 1 TABLET BY MOUTH TWICE DAILY WITH MEALS 60 tablet 0     No facility-administered encounter medications on file as of 1/23/2024.     No orders of the defined types were placed in this encounter.        Plan:     Problem List Items Addressed This Visit          Pulmonary    Chronic cough - Primary     - currently with post viral cough as well (RSV)  - encouraged to try albuterol nebulized in the mornings  - in regards to productive nocturnal cough- she has been taking pepcid and protonix, she did not notice much difference after the first 4 weeks and then has been sick since then  - referral sent to GI in November, will try to get her scheduled soon         Relevant Orders    X-Ray Chest PA And Lateral     Plan discussed with pt and she expressed understanding. CXR today, continue pepcid/protonix, will try to get in with GI, follow up in 2 months.

## 2024-01-23 NOTE — ASSESSMENT & PLAN NOTE
- currently with post viral cough as well (RSV)  - encouraged to try albuterol nebulized in the mornings  - in regards to productive nocturnal cough- she has been taking pepcid and protonix, she did not notice much difference after the first 4 weeks and then has been sick since then  - referral sent to GI in November, will try to get her scheduled soon

## 2024-01-24 DIAGNOSIS — I48.0 PAF (PAROXYSMAL ATRIAL FIBRILLATION): Primary | Chronic | ICD-10-CM

## 2024-01-24 DIAGNOSIS — I10 PRIMARY HYPERTENSION: Chronic | ICD-10-CM

## 2024-01-31 DIAGNOSIS — Z78.0 MENOPAUSE: ICD-10-CM

## 2024-02-03 ENCOUNTER — NURSE TRIAGE (OUTPATIENT)
Dept: ADMINISTRATIVE | Facility: CLINIC | Age: 74
End: 2024-02-03
Payer: OTHER GOVERNMENT

## 2024-02-03 ENCOUNTER — ON-DEMAND VIRTUAL (OUTPATIENT)
Dept: URGENT CARE | Facility: CLINIC | Age: 74
End: 2024-02-03
Payer: MEDICARE

## 2024-02-03 DIAGNOSIS — R06.2 WHEEZING: Primary | ICD-10-CM

## 2024-02-03 PROCEDURE — 99212 OFFICE O/P EST SF 10 MIN: CPT | Mod: 95,,, | Performed by: FAMILY MEDICINE

## 2024-02-03 RX ORDER — METHYLPREDNISOLONE 4 MG/1
TABLET ORAL
Qty: 21 EACH | Refills: 0 | Status: SHIPPED | OUTPATIENT
Start: 2024-02-03 | End: 2024-02-24

## 2024-02-03 RX ORDER — BENZONATATE 200 MG/1
200 CAPSULE ORAL 3 TIMES DAILY PRN
Qty: 30 CAPSULE | Refills: 0 | Status: SHIPPED | OUTPATIENT
Start: 2024-02-03 | End: 2024-02-13

## 2024-02-03 NOTE — TELEPHONE ENCOUNTER
Pt reporting chest congestion, coughing, wheezing, sinus congestion. Pt reports it started yesterday and has worsened today. Feels like she has a low fever, but does not have thermometer. Reports sob at rest that she feels is from wheezing. No hx of asthma.    Dispo- go to ed now. Pt advused but refuses dispo. States she does not need to go to ED and was hoping to have something called in stronger than OTC meds. Advised pt if she is having wheezing and sob, she needs to be evaluated to rule out anything emergent. Pt still refuses dispo, states she will treat herself at home.   Reason for Disposition   [1] MODERATE difficulty breathing (e.g., speaks in phrases, SOB even at rest, pulse 100-120) AND [2] still present when not coughing    Additional Information   Negative: SEVERE difficulty breathing (e.g., struggling for each breath, speaks in single words)   Negative: Bluish (or gray) lips or face now   Negative: [1] Rapid onset of cough AND [2] has hives   Negative: Coughing started suddenly after medicine, an allergic food or bee sting   Negative: [1] Difficulty breathing AND [2] exposure to flames, smoke, or fumes   Negative: [1] Stridor AND [2] difficulty breathing   Negative: Sounds like a life-threatening emergency to the triager    Protocols used: Cough - Acute Non-Productive-A-AH

## 2024-02-03 NOTE — PROGRESS NOTES
Subjective:      Patient ID: Kelsie Bustamante is a 73 y.o. female.    Vitals:  vitals were not taken for this visit.     Chief Complaint: Sinus Problem      Visit Type: TELE AUDIOVISUAL    Present with the patient at the time of consultation: TELEMED PRESENT WITH PATIENT: None    Past Medical History:   Diagnosis Date    Achalasia     demetrius    Atrial fibrillation with RVR 2021    Cataract     Colon polyp     Gastroesophageal reflux     Hiatal hernia     Hx of colonic polyps 08/15/2014    per colonoscopy in      Hyperlipidemia 2022    Hypertension     Pacemaker     Peripheral neuropathy     Postmenopausal HRT (hormone replacement therapy)     failed tapering off    SAH (subarachnoid hemorrhage)     from a fall late     Sepsis 2021    Last Assessment & Plan:  Formatting of this note might be different from the original. History & Physical Patient meets sepsis criteria with a white cell count of 15, and tachypnea.  Source of infection not clear at this time.  No evidence for meningitis.  Cover with broad-spectrum antibiotics especially given invasive dental procedure done recently.  Check blood cultures and monitor for fever.  R    Sinusitis     Thyroid nodule 3/16 subcm; kathleen 2 yrs    Type 2 diabetes mellitus with neurological manifestations     Vasodepressor syncope 2018     Past Surgical History:   Procedure Laterality Date    A-V CARDIAC PACEMAKER INSERTION Left 2022    Procedure: INSERTION, CARDIAC PACEMAKER, DUAL CHAMBER;  Surgeon: Daryl Walker MD;  Location: [facility] CATH LAB;  Service: Cardiology;  Laterality: Left;  Patient instructed 11AM start time/needs ptt/pt/inr  All Bi[employer] with His lead/MDT His as back up//Vinod notified    BREAST BIOPSY Left 2021    cataract Left      SECTION      CHOLECYSTECTOMY      CYST REMOVAL Right 2021    [facility]    ESOPHAGEAL MANOMETRY WITH MEASUREMENT OF IMPEDANCE N/A 10/6/2022     Procedure: MANOMETRY, ESOPHAGUS, WITH IMPEDANCE MEASUREMENT  Cardiac Clearance/Eliquis 2 day hold approval received per Dr. RABIA Aguilar, Cardiology on 22.  Note in encounters.  LB;  Surgeon: Bruna Fletcher MD;  Location: [facility];  Service: Endoscopy;  Laterality: N/A;    ESOPHAGOGASTRODUODENOSCOPY N/A 10/6/2022    Procedure: EGD (ESOPHAGOGASTRODUODENOSCOPY);  Surgeon: Bruna Fletcher MD;  Location: [facility] ENDO;  Service: Endoscopy;  Laterality: N/A;    HYSTERECTOMY      nonca    OOPHORECTOMY      TILT TABLE TEST N/A 10/25/2018    Procedure: TILT TABLE TEST;  Surgeon: Daryl Walker MD;  Location: [facility] CATH LAB;  Service: Cardiology;  Laterality: N/A;  VAS.DEP.SYN,HUT,FAS,3PREP    TONSILLECTOMY       Review of patient's allergies indicates:   Allergen Reactions    Floxin [ofloxacin] Diarrhea and Nausea And Vomiting    Fluvastatin Other (See Comments)     myalgia    Pravastatin Other (See Comments)     myalgia    Trazodone Palpitations     Racing heart     Current Outpatient Medications on File Prior to Visit   Medication Sig Dispense Refill    albuterol (PROVENTIL) 2.5 mg /3 mL (0.083 %) nebulizer solution Take 3 mLs (2.5 mg total) by nebulization every 4 to 6 hours as needed for Wheezing or Shortness of Breath. 360 mL 11    albuterol (PROVENTIL/VENTOLIN HFA) 90 mcg/actuation inhaler SMARTSI Puff(s) By Mouth 4 Times Daily      apixaban (ELIQUIS) 5 mg Tab Take 1 tablet (5 mg total) by mouth 2 (two) times daily. 180 tablet 4    estrogens, conjugated, (PREMARIN) 0.625 MG tablet Take 1 tablet (0.625 mg total) by mouth once daily. 90 tablet 3    famotidine (PEPCID) 20 MG tablet Take 1 tablet (20 mg total) by mouth once daily. 30 tablet 11    flecainide (TAMBOCOR) 100 MG Tab Take 1 tablet (100 mg total) by mouth every 12 (twelve) hours. (Patient not taking: Reported on 10/17/2023) 60 tablet 0    ibuprofen (ADVIL,MOTRIN) 600 MG tablet 1 tablet with food or milk as needed Orally Three times a day for 7  days      metFORMIN (GLUCOPHAGE-XR) 500 MG ER 24hr tablet TAKE 1 TABLET BY MOUTH TWICE DAILY WITH MEALS 60 tablet 11    metoprolol succinate (TOPROL-XL) 50 MG 24 hr tablet Take 2 tablets (100 mg total) by mouth every morning AND 1 tablet (50 mg total) every evening. 270 tablet 3    pantoprazole (PROTONIX) 40 MG tablet Take 1 tablet (40 mg total) by mouth nightly. 30 tablet 11    rosuvastatin (CRESTOR) 5 MG tablet Take 1 tablet (5 mg total) by mouth once daily. 90 tablet 3    zolpidem (AMBIEN) 10 mg Tab 1/2 po qhs prn insomnia 90 tablet 1     No current facility-administered medications on file prior to visit.     Family History   Problem Relation Age of Onset    Aneurysm Sister     Heart disease Mother     Diabetes Father     Coronary artery disease Father     Coronary artery disease Brother     Parkinsonism Brother        Medications Ordered                [id number] - [place], [place] - [address]   [address] [place] [address]    Telephone: [phone]   Fax: [phone]   Hours: Not open 24 hours                         E-Prescribed (2 of 2)              benzonatate (TESSALON) 200 MG capsule    Sig: Take 1 capsule (200 mg total) by mouth 3 (three) times daily as needed for Cough.       Start: 2/3/24     Quantity: 30 capsule Refills: 0                         methylPREDNISolone (MEDROL DOSEPACK) 4 mg tablet    Sig: use as directed       Start: 2/3/24     Quantity: 21 each Refills: 0                           Ohs Peq Odvv Intake    2/3/2024  1:18 PM CST - Filed by Patient   What is your current physical address in the event of a medical emergency? [address] [place] [address] [place] [address]   Are you able to take your vital signs? No   Please attach any relevant images or files          Sinus Problem  This is a new (Had RSV in January.  Symptoms  improved.  Now having congestion and wheezing.) problem. The current episode started yesterday. The problem has been gradually worsening since  onset. There has been no fever. Associated symptoms include congestion, coughing, headaches, a hoarse voice, shortness of breath and sinus pressure. Pertinent negatives include no chills, diaphoresis, ear pain, neck pain, sneezing, sore throat or swollen glands. Treatments tried: albuterol nebulizer. The treatment provided mild relief.       Constitution: Negative for chills and sweating.   HENT:  Positive for congestion and sinus pressure. Negative for ear pain and sore throat.    Neck: Negative for neck pain.   Respiratory:  Positive for cough and shortness of breath.    Allergic/Immunologic: Negative for sneezing.   Neurological:  Positive for headaches.        Objective:   The physical exam was conducted virtually.  Physical Exam   Constitutional: She is oriented to person, place, and time.   HENT:   Ears:   Right Ear: External ear normal.   Left Ear: External ear normal.   Eyes: Conjunctivae are normal.   Pulmonary/Chest: Effort normal. No respiratory distress. She has wheezes.   Abdominal: Normal appearance.   Neurological: She is alert and oriented to person, place, and time.   Skin: Skin is no rash.   Psychiatric: Her behavior is normal.       Assessment:     1. Wheezing        Plan:       Wheezing  -     methylPREDNISolone (MEDROL DOSEPACK) 4 mg tablet; use as directed  Dispense: 21 each; Refill: 0  -     benzonatate (TESSALON) 200 MG capsule; Take 1 capsule (200 mg total) by mouth 3 (three) times daily as needed for Cough.  Dispense: 30 capsule; Refill: 0

## 2024-02-20 ENCOUNTER — HOSPITAL ENCOUNTER (EMERGENCY)
Facility: HOSPITAL | Age: 74
Discharge: HOME | End: 2024-02-21
Attending: EMERGENCY MEDICINE
Payer: MEDICARE

## 2024-02-20 VITALS
RESPIRATION RATE: 19 BRPM | SYSTOLIC BLOOD PRESSURE: 155 MMHG | HEART RATE: 90 BPM | DIASTOLIC BLOOD PRESSURE: 88 MMHG | OXYGEN SATURATION: 97 % | TEMPERATURE: 98 F

## 2024-02-20 DIAGNOSIS — F03.90 DEMENTIA, UNSPECIFIED DEMENTIA SEVERITY, UNSPECIFIED DEMENTIA TYPE, UNSPECIFIED WHETHER BEHAVIORAL, PSYCHOTIC, OR MOOD DISTURBANCE OR ANXIETY: Primary | ICD-10-CM

## 2024-02-20 LAB
ALBUMIN SERPL BCP-MCNC: 3.8 G/DL (ref 3.5–5.2)
ALP SERPL-CCNC: 93 U/L (ref 55–135)
ALT SERPL W/O P-5'-P-CCNC: 28 U/L (ref 10–44)
AMPHET+METHAMPHET UR QL: NEGATIVE
ANION GAP SERPL CALC-SCNC: 13 MMOL/L (ref 8–16)
APAP SERPL-MCNC: <3 UG/ML (ref 10–20)
AST SERPL-CCNC: 34 U/L (ref 10–40)
BARBITURATES UR QL SCN>200 NG/ML: NEGATIVE
BASOPHILS # BLD AUTO: 0.04 K/UL (ref 0–0.2)
BASOPHILS NFR BLD: 0.5 % (ref 0–1.9)
BENZODIAZ UR QL SCN>200 NG/ML: NEGATIVE
BILIRUB SERPL-MCNC: 0.4 MG/DL (ref 0.1–1)
BILIRUB UR QL STRIP: NEGATIVE
BUN SERPL-MCNC: 11 MG/DL (ref 8–23)
BZE UR QL SCN: NEGATIVE
CALCIUM SERPL-MCNC: 9.7 MG/DL (ref 8.7–10.5)
CANNABINOIDS UR QL SCN: NEGATIVE
CHLORIDE SERPL-SCNC: 106 MMOL/L (ref 95–110)
CLARITY UR: CLEAR
CO2 SERPL-SCNC: 21 MMOL/L (ref 23–29)
COLOR UR: YELLOW
CREAT SERPL-MCNC: 1 MG/DL (ref 0.5–1.4)
CREAT UR-MCNC: 149.4 MG/DL (ref 15–325)
DIFFERENTIAL METHOD BLD: NORMAL
EOSINOPHIL # BLD AUTO: 0.3 K/UL (ref 0–0.5)
EOSINOPHIL NFR BLD: 3.2 % (ref 0–8)
ERYTHROCYTE [DISTWIDTH] IN BLOOD BY AUTOMATED COUNT: 12.4 % (ref 11.5–14.5)
EST. GFR  (NO RACE VARIABLE): 60 ML/MIN/1.73 M^2
ETHANOL SERPL-MCNC: <10 MG/DL
GLUCOSE SERPL-MCNC: 164 MG/DL (ref 70–110)
GLUCOSE UR QL STRIP: ABNORMAL
HCT VFR BLD AUTO: 41.1 % (ref 37–48.5)
HGB BLD-MCNC: 13.9 G/DL (ref 12–16)
HGB UR QL STRIP: NEGATIVE
IMM GRANULOCYTES # BLD AUTO: 0.04 K/UL (ref 0–0.04)
IMM GRANULOCYTES NFR BLD AUTO: 0.5 % (ref 0–0.5)
KETONES UR QL STRIP: ABNORMAL
LEUKOCYTE ESTERASE UR QL STRIP: ABNORMAL
LYMPHOCYTES # BLD AUTO: 2.2 K/UL (ref 1–4.8)
LYMPHOCYTES NFR BLD: 26.3 % (ref 18–48)
MCH RBC QN AUTO: 29.6 PG (ref 27–31)
MCHC RBC AUTO-ENTMCNC: 33.8 G/DL (ref 32–36)
MCV RBC AUTO: 88 FL (ref 82–98)
METHADONE UR QL SCN>300 NG/ML: NEGATIVE
MICROSCOPIC COMMENT: ABNORMAL
MONOCYTES # BLD AUTO: 0.6 K/UL (ref 0.3–1)
MONOCYTES NFR BLD: 6.5 % (ref 4–15)
NEUTROPHILS # BLD AUTO: 5.3 K/UL (ref 1.8–7.7)
NEUTROPHILS NFR BLD: 63 % (ref 38–73)
NITRITE UR QL STRIP: NEGATIVE
NRBC BLD-RTO: 0 /100 WBC
OPIATES UR QL SCN: NEGATIVE
PCP UR QL SCN>25 NG/ML: NEGATIVE
PH UR STRIP: 6 [PH] (ref 5–8)
PLATELET # BLD AUTO: 267 K/UL (ref 150–450)
PMV BLD AUTO: 9.2 FL (ref 9.2–12.9)
POTASSIUM SERPL-SCNC: 4.1 MMOL/L (ref 3.5–5.1)
PROT SERPL-MCNC: 6.9 G/DL (ref 6–8.4)
PROT UR QL STRIP: ABNORMAL
RBC # BLD AUTO: 4.69 M/UL (ref 4–5.4)
SODIUM SERPL-SCNC: 140 MMOL/L (ref 136–145)
SP GR UR STRIP: 1.02 (ref 1–1.03)
SQUAMOUS #/AREA URNS HPF: 3 /HPF
TOXICOLOGY INFORMATION: NORMAL
TSH SERPL DL<=0.005 MIU/L-ACNC: 1.61 UIU/ML (ref 0.4–4)
UNIDENT CRYS URNS QL MICRO: ABNORMAL
URN SPEC COLLECT METH UR: ABNORMAL
UROBILINOGEN UR STRIP-ACNC: NEGATIVE EU/DL
WBC # BLD AUTO: 8.43 K/UL (ref 3.9–12.7)
WBC #/AREA URNS HPF: 8 /HPF (ref 0–5)
WBC CLUMPS URNS QL MICRO: ABNORMAL

## 2024-02-20 PROCEDURE — 80143 DRUG ASSAY ACETAMINOPHEN: CPT | Performed by: EMERGENCY MEDICINE

## 2024-02-20 PROCEDURE — 25000003 PHARM REV CODE 250: Performed by: EMERGENCY MEDICINE

## 2024-02-20 PROCEDURE — 81000 URINALYSIS NONAUTO W/SCOPE: CPT | Mod: 59 | Performed by: EMERGENCY MEDICINE

## 2024-02-20 PROCEDURE — 82077 ASSAY SPEC XCP UR&BREATH IA: CPT | Performed by: EMERGENCY MEDICINE

## 2024-02-20 PROCEDURE — 85025 COMPLETE CBC W/AUTO DIFF WBC: CPT | Performed by: EMERGENCY MEDICINE

## 2024-02-20 PROCEDURE — 84443 ASSAY THYROID STIM HORMONE: CPT | Performed by: EMERGENCY MEDICINE

## 2024-02-20 PROCEDURE — 80053 COMPREHEN METABOLIC PANEL: CPT | Performed by: EMERGENCY MEDICINE

## 2024-02-20 PROCEDURE — 80307 DRUG TEST PRSMV CHEM ANLYZR: CPT | Performed by: EMERGENCY MEDICINE

## 2024-02-20 PROCEDURE — G0426 INPT/ED TELECONSULT50: HCPCS | Mod: 95,,, | Performed by: PSYCHIATRY & NEUROLOGY

## 2024-02-20 PROCEDURE — 99285 EMERGENCY DEPT VISIT HI MDM: CPT | Mod: 25

## 2024-02-20 RX ORDER — SERTRALINE HYDROCHLORIDE 50 MG/1
TABLET, FILM COATED ORAL
Qty: 32 TABLET | Refills: 0 | Status: SHIPPED | OUTPATIENT
Start: 2024-02-20 | End: 2024-02-26

## 2024-02-20 RX ORDER — METOPROLOL SUCCINATE 50 MG/1
50 TABLET, EXTENDED RELEASE ORAL
Status: COMPLETED | OUTPATIENT
Start: 2024-02-20 | End: 2024-02-20

## 2024-02-20 RX ORDER — QUETIAPINE FUMARATE 25 MG/1
25 TABLET, FILM COATED ORAL NIGHTLY
Qty: 30 TABLET | Refills: 0 | Status: SHIPPED | OUTPATIENT
Start: 2024-02-20 | End: 2024-02-26

## 2024-02-20 RX ADMIN — METOPROLOL SUCCINATE 50 MG: 50 TABLET, EXTENDED RELEASE ORAL at 11:02
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
## 2024-02-21 NOTE — DISCHARGE INSTRUCTIONS
Take Seroquel and Zoloft as prescribed.  Follow up with Psychiatry at next available.  I have placed a referral on your behalf.  I have also placed outpatient resources for you.  Return as needed

## 2024-02-26 ENCOUNTER — OFFICE VISIT (OUTPATIENT)
Dept: GASTROENTEROLOGY | Facility: CLINIC | Age: 74
End: 2024-02-26
Payer: MEDICARE

## 2024-02-26 ENCOUNTER — OFFICE VISIT (OUTPATIENT)
Dept: INTERNAL MEDICINE | Facility: CLINIC | Age: 74
End: 2024-02-26
Payer: MEDICARE

## 2024-02-26 VITALS
HEIGHT: 63 IN | BODY MASS INDEX: 30.79 KG/M2 | SYSTOLIC BLOOD PRESSURE: 120 MMHG | HEART RATE: 67 BPM | DIASTOLIC BLOOD PRESSURE: 70 MMHG | WEIGHT: 173.75 LBS

## 2024-02-26 VITALS
BODY MASS INDEX: 30.94 KG/M2 | HEIGHT: 63 IN | HEART RATE: 89 BPM | WEIGHT: 174.63 LBS | RESPIRATION RATE: 16 BRPM | OXYGEN SATURATION: 96 % | DIASTOLIC BLOOD PRESSURE: 76 MMHG | SYSTOLIC BLOOD PRESSURE: 126 MMHG | TEMPERATURE: 97 F

## 2024-02-26 DIAGNOSIS — R41.3 MEMORY LOSS: ICD-10-CM

## 2024-02-26 DIAGNOSIS — E11.42 TYPE 2 DIABETES MELLITUS WITH DIABETIC POLYNEUROPATHY, UNSPECIFIED WHETHER LONG TERM INSULIN USE: ICD-10-CM

## 2024-02-26 DIAGNOSIS — Z78.0 ASYMPTOMATIC POSTMENOPAUSAL STATUS: ICD-10-CM

## 2024-02-26 DIAGNOSIS — K21.9 GASTROESOPHAGEAL REFLUX DISEASE, UNSPECIFIED WHETHER ESOPHAGITIS PRESENT: ICD-10-CM

## 2024-02-26 DIAGNOSIS — I48.0 PAF (PAROXYSMAL ATRIAL FIBRILLATION): ICD-10-CM

## 2024-02-26 DIAGNOSIS — I10 PRIMARY HYPERTENSION: Primary | ICD-10-CM

## 2024-02-26 DIAGNOSIS — D68.69 OTHER THROMBOPHILIA: ICD-10-CM

## 2024-02-26 DIAGNOSIS — K22.0 ACHALASIA: Primary | ICD-10-CM

## 2024-02-26 PROBLEM — R06.01 ORTHOPNEA: Status: RESOLVED | Noted: 2021-04-15 | Resolved: 2024-02-26

## 2024-02-26 PROCEDURE — 1160F RVW MEDS BY RX/DR IN RCRD: CPT | Mod: CPTII,S$GLB,, | Performed by: NURSE PRACTITIONER

## 2024-02-26 PROCEDURE — 1101F PT FALLS ASSESS-DOCD LE1/YR: CPT | Mod: CPTII,S$GLB,, | Performed by: FAMILY MEDICINE

## 2024-02-26 PROCEDURE — 3008F BODY MASS INDEX DOCD: CPT | Mod: CPTII,S$GLB,, | Performed by: FAMILY MEDICINE

## 2024-02-26 PROCEDURE — 99214 OFFICE O/P EST MOD 30 MIN: CPT | Mod: S$GLB,,, | Performed by: NURSE PRACTITIONER

## 2024-02-26 PROCEDURE — 3078F DIAST BP <80 MM HG: CPT | Mod: CPTII,S$GLB,, | Performed by: FAMILY MEDICINE

## 2024-02-26 PROCEDURE — 1126F AMNT PAIN NOTED NONE PRSNT: CPT | Mod: CPTII,S$GLB,, | Performed by: FAMILY MEDICINE

## 2024-02-26 PROCEDURE — 3074F SYST BP LT 130 MM HG: CPT | Mod: CPTII,S$GLB,, | Performed by: NURSE PRACTITIONER

## 2024-02-26 PROCEDURE — 3078F DIAST BP <80 MM HG: CPT | Mod: CPTII,S$GLB,, | Performed by: NURSE PRACTITIONER

## 2024-02-26 PROCEDURE — 3288F FALL RISK ASSESSMENT DOCD: CPT | Mod: CPTII,S$GLB,, | Performed by: NURSE PRACTITIONER

## 2024-02-26 PROCEDURE — 3008F BODY MASS INDEX DOCD: CPT | Mod: CPTII,S$GLB,, | Performed by: NURSE PRACTITIONER

## 2024-02-26 PROCEDURE — 1101F PT FALLS ASSESS-DOCD LE1/YR: CPT | Mod: CPTII,S$GLB,, | Performed by: NURSE PRACTITIONER

## 2024-02-26 PROCEDURE — 1159F MED LIST DOCD IN RCRD: CPT | Mod: CPTII,S$GLB,, | Performed by: NURSE PRACTITIONER

## 2024-02-26 PROCEDURE — 3288F FALL RISK ASSESSMENT DOCD: CPT | Mod: CPTII,S$GLB,, | Performed by: FAMILY MEDICINE

## 2024-02-26 PROCEDURE — 99999 PR PBB SHADOW E&M-EST. PATIENT-LVL V: CPT | Mod: PBBFAC,,, | Performed by: NURSE PRACTITIONER

## 2024-02-26 PROCEDURE — 1126F AMNT PAIN NOTED NONE PRSNT: CPT | Mod: CPTII,S$GLB,, | Performed by: NURSE PRACTITIONER

## 2024-02-26 PROCEDURE — 99999 PR PBB SHADOW E&M-EST. PATIENT-LVL IV: CPT | Mod: PBBFAC,,, | Performed by: FAMILY MEDICINE

## 2024-02-26 PROCEDURE — 99214 OFFICE O/P EST MOD 30 MIN: CPT | Mod: S$GLB,,, | Performed by: FAMILY MEDICINE

## 2024-02-26 PROCEDURE — 1159F MED LIST DOCD IN RCRD: CPT | Mod: CPTII,S$GLB,, | Performed by: FAMILY MEDICINE

## 2024-02-26 PROCEDURE — 3074F SYST BP LT 130 MM HG: CPT | Mod: CPTII,S$GLB,, | Performed by: FAMILY MEDICINE

## 2024-02-26 RX ORDER — BENZONATATE 100 MG/1
100 CAPSULE ORAL 3 TIMES DAILY PRN
Qty: 30 CAPSULE | Refills: 2 | Status: SHIPPED | OUTPATIENT
Start: 2024-02-26 | End: 2024-03-04

## 2024-02-26 RX ORDER — TRAZODONE HYDROCHLORIDE 50 MG/1
50 TABLET ORAL NIGHTLY PRN
Qty: 30 TABLET | Refills: 3 | Status: SHIPPED | OUTPATIENT
Start: 2024-02-26 | End: 2024-04-01 | Stop reason: SDUPTHER

## 2024-02-26 NOTE — PROGRESS NOTES
Clinic Follow Up:  [facility] Follow Up Note    Reason for Follow Up:  The primary encounter diagnosis was Achalasia. A diagnosis of Gastroesophageal reflux disease, unspecified whether esophagitis present was also pertinent to this visit.    PCP: Cody Parsk   [address] / [facility] [address]    HPI:  This is a 73 y.o. female here for follow up.   She was last seen in 2022 for GERD and dysphagia.   Workup included EGD and esophageal manometry.   - EGD (10/6/22)- exam suspicious for achalasia (hypertonic LES with resistance of endoscope advancement into the stomach.   - esophageal manometry (10/6/22)- consistent with type II achalasia.     She was referred to Dr. Schneider. She never made it to her appt as she had a fall in the parking lot with subarachnoid hemorrhage. She was lost to follow up after that.     She was referred back to me for continued GERD and dysphagia. She has been seeing pulmonology for cough who thinks it is 2/2 GERD.     Review of Systems   Constitutional:  Negative for activity change and appetite change.        As per interval history above   Respiratory:  Positive for cough. Negative for shortness of breath.    Cardiovascular:  Negative for chest pain.   Gastrointestinal:  Negative for abdominal pain.        Heartburn, dysphagia.    Skin:  Negative for color change and rash.       Medical History:  Past Medical History:   Diagnosis Date    Achalasia     demetrius    Atrial fibrillation with RVR 05/11/2021    Cataract     Colon polyp     Gastroesophageal reflux     Hiatal hernia     Hx of colonic polyps 08/15/2014    per colonoscopy in      Hyperlipidemia 06/24/2022    Hypertension     Pacemaker     Peripheral neuropathy     Postmenopausal HRT (hormone replacement therapy)     failed tapering off    SAH (subarachnoid hemorrhage)     from a fall late 22    Sepsis 05/05/2021    Last Assessment & Plan:  Formatting of this note might be different from the  original. History & Physical Patient meets sepsis criteria with a white cell count of 15, and tachypnea.  Source of infection not clear at this time.  No evidence for meningitis.  Cover with broad-spectrum antibiotics especially given invasive dental procedure done recently.  Check blood cultures and monitor for fever.  R    Sinusitis     Thyroid nodule 3/16 subcm; kathleen 2 yrs    Type 2 diabetes mellitus with neurological manifestations     Vasodepressor syncope 2018       Surgical History:   Past Surgical History:   Procedure Laterality Date    A-V CARDIAC PACEMAKER INSERTION Left 2022    Procedure: INSERTION, CARDIAC PACEMAKER, DUAL CHAMBER;  Surgeon: Daryl Walker MD;  Location: [facility] CATH LAB;  Service: Cardiology;  Laterality: Left;  Patient instructed 11AM start time/needs ptt/pt/inr  All [employer] with His lead/MDT His as back up//Vinod notified    BREAST BIOPSY Left 2021    cataract Left      SECTION      CHOLECYSTECTOMY      CYST REMOVAL Right 2021    [facility]    ESOPHAGEAL MANOMETRY WITH MEASUREMENT OF IMPEDANCE N/A 10/6/2022    Procedure: MANOMETRY, ESOPHAGUS, WITH IMPEDANCE MEASUREMENT  Cardiac Clearance/Eliquis 2 day hold approval received per Dr. RABIA Aguilar, Cardiology on 22.  Note in encounters.  LB;  Surgeon: Bruna Fletcher MD;  Location: [facility];  Service: Endoscopy;  Laterality: N/A;    ESOPHAGOGASTRODUODENOSCOPY N/A 10/6/2022    Procedure: EGD (ESOPHAGOGASTRODUODENOSCOPY);  Surgeon: Bruna Fletcher MD;  Location: [facility];  Service: Endoscopy;  Laterality: N/A;    HYSTERECTOMY      nonca    OOPHORECTOMY      TILT TABLE TEST N/A 10/25/2018    Procedure: TILT TABLE TEST;  Surgeon: Daryl Walker MD;  Location: [facility] CATH LAB;  Service: Cardiology;  Laterality: N/A;  VAS.DEP.SYN,HUT,FAS,3PREP    TONSILLECTOMY         Family History:   Family History   Problem Relation Age of Onset    Aneurysm Sister     Heart disease  Mother     Diabetes Father     Coronary artery disease Father     Coronary artery disease Brother     Parkinsonism Brother        Social History:   Social History     Tobacco Use    Smoking status: Never    Smokeless tobacco: Never   Substance Use Topics    Alcohol use: No    Drug use: No       Allergies:   Review of patient's allergies indicates:   Allergen Reactions    Floxin [ofloxacin] Diarrhea and Nausea And Vomiting    Fluvastatin Other (See Comments)     myalgia    Pravastatin Other (See Comments)     myalgia       Home Medications:  Current Outpatient Medications on File Prior to Visit   Medication Sig Dispense Refill    albuterol (PROVENTIL) 2.5 mg /3 mL (0.083 %) nebulizer solution Take 3 mLs (2.5 mg total) by nebulization every 4 to 6 hours as needed for Wheezing or Shortness of Breath. 360 mL 11    albuterol (PROVENTIL/VENTOLIN HFA) 90 mcg/actuation inhaler SMARTSI Puff(s) By Mouth 4 Times Daily      apixaban (ELIQUIS) 5 mg Tab Take 1 tablet (5 mg total) by mouth 2 (two) times daily. 180 tablet 4    estrogens, conjugated, (PREMARIN) 0.625 MG tablet Take 1 tablet (0.625 mg total) by mouth once daily. 90 tablet 3    famotidine (PEPCID) 20 MG tablet Take 1 tablet (20 mg total) by mouth once daily. 30 tablet 11    ibuprofen (ADVIL,MOTRIN) 600 MG tablet 1 tablet with food or milk as needed Orally Three times a day for 7 days      metFORMIN (GLUCOPHAGE-XR) 500 MG ER 24hr tablet TAKE 1 TABLET BY MOUTH TWICE DAILY WITH MEALS 60 tablet 11    metoprolol succinate (TOPROL-XL) 50 MG 24 hr tablet Take 2 tablets (100 mg total) by mouth every morning AND 1 tablet (50 mg total) every evening. 270 tablet 3    pantoprazole (PROTONIX) 40 MG tablet Take 1 tablet (40 mg total) by mouth nightly. 30 tablet 11    rosuvastatin (CRESTOR) 5 MG tablet Take 1 tablet (5 mg total) by mouth once daily. 90 tablet 3    [DISCONTINUED] flecainide (TAMBOCOR) 100 MG Tab Take 1 tablet (100 mg total) by mouth every 12 (twelve) hours.  "(Patient not taking: Reported on 10/17/2023) 60 tablet 0    [DISCONTINUED] QUEtiapine (SEROQUEL) 25 MG Tab Take 1 tablet (25 mg total) by mouth every evening. 30 tablet 0    [DISCONTINUED] sertraline (ZOLOFT) 50 MG tablet Take 0.5 tablets (25 mg total) by mouth once daily for 7 days, THEN 1 tablet (50 mg total) once daily. (Patient not taking: Reported on 2/26/2024) 32 tablet 0    [DISCONTINUED] zolpidem (AMBIEN) 10 mg Tab 1/2 po qhs prn insomnia (Patient not taking: Reported on 2/26/2024) 90 tablet 1     No current facility-administered medications on file prior to visit.       /70 (BP Location: Left arm, Patient Position: Sitting, BP Method: Medium (Manual))   Pulse 67   Ht 5' 3" (1.6 m)   Wt 78.8 kg (173 lb 11.6 oz)   BMI 30.77 kg/m²   Body mass index is 30.77 kg/m².  Physical Exam  Constitutional:       General: She is not in acute distress.  HENT:      Head: Normocephalic.   Neurological:      General: No focal deficit present.      Mental Status: She is alert.   Psychiatric:         Mood and Affect: Mood normal.         Judgment: Judgment normal.         Labs: Pertinent labs reviewed.  CRC Screening:     Assessment:   1. Achalasia    2. Gastroesophageal reflux disease, unspecified whether esophagitis present        Recommendations:   - needs to be on pantoprazole (she is not sure she is taking it)  - needs to see Dr. Schneider-- for surgical consultation.   - we discussed possible need for repeat manometry. She would like to wait until after her appt with Dr. Schneider.     Achalasia  -     Ambulatory referral/consult to General Surgery; Future; Expected date: 03/04/2024    Gastroesophageal reflux disease, unspecified whether esophagitis present  -     Ambulatory referral/consult to Gastroenterology    Return to Clinic:  Follow up if symptoms worsen or fail to improve.    Thank you for the opportunity to participate in the care of this patient.  JESUS MANUEL Vilchis        "
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
## 2024-03-04 ENCOUNTER — OFFICE VISIT (OUTPATIENT)
Dept: SURGERY | Facility: CLINIC | Age: 74
End: 2024-03-04
Payer: MEDICARE

## 2024-03-04 ENCOUNTER — OFFICE VISIT (OUTPATIENT)
Dept: PULMONOLOGY | Facility: CLINIC | Age: 74
End: 2024-03-04
Payer: MEDICARE

## 2024-03-04 ENCOUNTER — CLINICAL SUPPORT (OUTPATIENT)
Dept: CARDIOLOGY | Facility: HOSPITAL | Age: 74
End: 2024-03-04

## 2024-03-04 VITALS
HEIGHT: 63 IN | RESPIRATION RATE: 18 BRPM | DIASTOLIC BLOOD PRESSURE: 75 MMHG | OXYGEN SATURATION: 93 % | WEIGHT: 174.81 LBS | HEART RATE: 89 BPM | BODY MASS INDEX: 31.02 KG/M2 | DIASTOLIC BLOOD PRESSURE: 78 MMHG | SYSTOLIC BLOOD PRESSURE: 141 MMHG | BODY MASS INDEX: 30.97 KG/M2 | WEIGHT: 175.06 LBS | SYSTOLIC BLOOD PRESSURE: 140 MMHG | HEART RATE: 79 BPM

## 2024-03-04 DIAGNOSIS — J45.21 MILD INTERMITTENT ASTHMATIC BRONCHITIS WITH ACUTE EXACERBATION: ICD-10-CM

## 2024-03-04 DIAGNOSIS — Z95.0 PRESENCE OF CARDIAC PACEMAKER: ICD-10-CM

## 2024-03-04 DIAGNOSIS — K22.0 ACHALASIA: Primary | ICD-10-CM

## 2024-03-04 DIAGNOSIS — J40 BRONCHITIS: Primary | ICD-10-CM

## 2024-03-04 DIAGNOSIS — R05.3 CHRONIC COUGH: ICD-10-CM

## 2024-03-04 DIAGNOSIS — J44.1 COPD EXACERBATION: ICD-10-CM

## 2024-03-04 PROCEDURE — 99999 PR PBB SHADOW E&M-EST. PATIENT-LVL III: CPT | Mod: PBBFAC,,, | Performed by: INTERNAL MEDICINE

## 2024-03-04 PROCEDURE — 99215 OFFICE O/P EST HI 40 MIN: CPT | Mod: S$GLB,,, | Performed by: SURGERY

## 2024-03-04 PROCEDURE — 99213 OFFICE O/P EST LOW 20 MIN: CPT | Mod: PBBFAC | Performed by: INTERNAL MEDICINE

## 2024-03-04 PROCEDURE — 99499 UNLISTED E&M SERVICE: CPT | Mod: ,,, | Performed by: SURGERY

## 2024-03-04 PROCEDURE — 99214 OFFICE O/P EST MOD 30 MIN: CPT | Mod: PBBFAC | Performed by: SURGERY

## 2024-03-04 PROCEDURE — 99999 PR PBB SHADOW E&M-EST. PATIENT-LVL IV: CPT | Mod: PBBFAC,,, | Performed by: SURGERY

## 2024-03-04 PROCEDURE — 99214 OFFICE O/P EST MOD 30 MIN: CPT | Mod: S$GLB,,, | Performed by: INTERNAL MEDICINE

## 2024-03-04 RX ORDER — ALBUTEROL SULFATE 90 UG/1
AEROSOL, METERED RESPIRATORY (INHALATION)
Qty: 18 G | Refills: 11 | Status: SHIPPED | OUTPATIENT
Start: 2024-03-04 | End: 2024-03-29 | Stop reason: CLARIF

## 2024-03-04 RX ORDER — PREDNISONE 5 MG/1
TABLET ORAL
Qty: 20 TABLET | Refills: 0 | Status: SHIPPED | OUTPATIENT
Start: 2024-03-04 | End: 2024-03-14 | Stop reason: SDUPTHER

## 2024-03-04 RX ORDER — DOXYCYCLINE 100 MG/1
100 CAPSULE ORAL 2 TIMES DAILY
Qty: 20 CAPSULE | Refills: 0 | Status: SHIPPED | OUTPATIENT
Start: 2024-03-04 | End: 2024-03-15 | Stop reason: SDUPTHER

## 2024-03-04 RX ORDER — ALBUTEROL SULFATE 0.83 MG/ML
2.5 SOLUTION RESPIRATORY (INHALATION)
Qty: 360 ML | Refills: 11 | Status: SHIPPED | OUTPATIENT
Start: 2024-03-04 | End: 2024-03-29 | Stop reason: CLARIF

## 2024-03-04 RX ORDER — BENZONATATE 200 MG/1
200 CAPSULE ORAL 3 TIMES DAILY PRN
Qty: 90 CAPSULE | Refills: 2 | Status: SHIPPED | OUTPATIENT
Start: 2024-03-04 | End: 2024-03-15 | Stop reason: SDUPTHER

## 2024-03-04 NOTE — PROGRESS NOTES
History & Physical    SUBJECTIVE:     History of Present Illness:  Patient is a 73 y.o. female presents for follow-up of her achalasia.  She did not undergo previously scheduled Heller myotomy/Jenaro fundoplication but  subsequently lost to follow-up.  In the interim she reports continued symptoms of food sticking in the mid chest with solids and liquids.  Despite this she is gaining weight.  She is on Eliquis.    Initially referred for achalasia.  Patient reports increasing issues with food regurgitation, food sticking in lower chest and chest discomfort with eating.  She reports it is the same for solids and liquids.  She is completed EGD and manometry concerning for achalasia.  She reports maintaining weight without significant weight loss.  She was 1st diagnosed with achalasia in  but reports her symptoms have progressively worsened.  No previous Botox injections or esophageal dilations.    Chief Complaint   Patient presents with    Follow-up         Review of patient's allergies indicates:   Allergen Reactions    Floxin [ofloxacin] Diarrhea and Nausea And Vomiting    Fluvastatin Other (See Comments)     myalgia    Pravastatin Other (See Comments)     myalgia       Current Outpatient Medications   Medication Sig Dispense Refill    albuterol (PROVENTIL) 2.5 mg /3 mL (0.083 %) nebulizer solution Take 3 mLs (2.5 mg total) by nebulization every 4 to 6 hours as needed for Wheezing or Shortness of Breath. 360 mL 11    albuterol (PROVENTIL/VENTOLIN HFA) 90 mcg/actuation inhaler SMARTSI Puff(s) By Mouth 4 Times Daily 18 g 11    apixaban (ELIQUIS) 5 mg Tab Take 1 tablet (5 mg total) by mouth 2 (two) times daily. 180 tablet 4    benzonatate (TESSALON) 200 MG capsule Take 1 capsule (200 mg total) by mouth 3 (three) times daily as needed for Cough. 90 capsule 2    doxycycline (MONODOX) 100 MG capsule Take 1 capsule (100 mg total) by mouth 2 (two) times daily. 20 capsule 0    estrogens, conjugated, (PREMARIN) 0.625 MG  tablet Take 1 tablet (0.625 mg total) by mouth once daily. 90 tablet 3    ibuprofen (ADVIL,MOTRIN) 600 MG tablet 1 tablet with food or milk as needed Orally Three times a day for 7 days      metFORMIN (GLUCOPHAGE-XR) 500 MG ER 24hr tablet TAKE 1 TABLET BY MOUTH TWICE DAILY WITH MEALS 60 tablet 11    metoprolol succinate (TOPROL-XL) 50 MG 24 hr tablet Take 2 tablets (100 mg total) by mouth every morning AND 1 tablet (50 mg total) every evening. 270 tablet 3    pantoprazole (PROTONIX) 40 MG tablet Take 1 tablet (40 mg total) by mouth nightly. 30 tablet 11    predniSONE (DELTASONE) 5 MG tablet Take 4 tablets (20 mg total) by mouth once daily for 3 days, THEN 2 tablets (10 mg total) once daily for 3 days, THEN 1 tablet (5 mg total) once daily for 3 days, THEN 0.5 tablets (2.5 mg total) once daily for 3 days. 20 tablet 0    rosuvastatin (CRESTOR) 5 MG tablet Take 1 tablet (5 mg total) by mouth once daily. 90 tablet 3    traZODone (DESYREL) 50 MG tablet Take 1 tablet (50 mg total) by mouth nightly as needed for Insomnia. 30 tablet 3     No current facility-administered medications for this visit.       Past Medical History:   Diagnosis Date    Achalasia     demetrius    Atrial fibrillation with RVR 05/11/2021    Cataract     Colon polyp     Gastroesophageal reflux     Hiatal hernia     Hx of colonic polyps 08/15/2014    per colonoscopy in      Hyperlipidemia 06/24/2022    Hypertension     Pacemaker     Peripheral neuropathy     Postmenopausal HRT (hormone replacement therapy)     failed tapering off    SAH (subarachnoid hemorrhage)     from a fall late 22    Sepsis 05/05/2021    Last Assessment & Plan:  Formatting of this note might be different from the original. History & Physical Patient meets sepsis criteria with a white cell count of 15, and tachypnea.  Source of infection not clear at this time.  No evidence for meningitis.  Cover with broad-spectrum antibiotics especially given invasive dental procedure done  recently.  Check blood cultures and monitor for fever.  R    Sinusitis     Thyroid nodule 3/16 subcm; kathleen 2 yrs    Type 2 diabetes mellitus with neurological manifestations     Vasodepressor syncope 2018     Past Surgical History:   Procedure Laterality Date    A-V CARDIAC PACEMAKER INSERTION Left 2022    Procedure: INSERTION, CARDIAC PACEMAKER, DUAL CHAMBER;  Surgeon: Daryl Walker MD;  Location: [facility] CATH LAB;  Service: Cardiology;  Laterality: Left;  Patient instructed 11AM start time/needs ptt/pt/inr  All [employer] with His lead/MDT His as back up//Vinod notified    BREAST BIOPSY Left 2021    cataract Left      SECTION      CHOLECYSTECTOMY      CYST REMOVAL Right 2021    [facility]    ESOPHAGEAL MANOMETRY WITH MEASUREMENT OF IMPEDANCE N/A 10/6/2022    Procedure: MANOMETRY, ESOPHAGUS, WITH IMPEDANCE MEASUREMENT  Cardiac Clearance/Eliquis 2 day hold approval received per Dr. RABIA Aguilar, Cardiology on 22.  Note in encounters.  LB;  Surgeon: Bruna Fletcher MD;  Location: [facility];  Service: Endoscopy;  Laterality: N/A;    ESOPHAGOGASTRODUODENOSCOPY N/A 10/6/2022    Procedure: EGD (ESOPHAGOGASTRODUODENOSCOPY);  Surgeon: Bruna Fletcher MD;  Location: [facility];  Service: Endoscopy;  Laterality: N/A;    HYSTERECTOMY      nonca    OOPHORECTOMY      TILT TABLE TEST N/A 10/25/2018    Procedure: TILT TABLE TEST;  Surgeon: Daryl Walker MD;  Location: [facility] CATH LAB;  Service: Cardiology;  Laterality: N/A;  VAS.DEP.SYN,HUT,FAS,3PREP    TONSILLECTOMY       Family History   Problem Relation Age of Onset    Aneurysm Sister     Heart disease Mother     Diabetes Father     Coronary artery disease Father     Coronary artery disease Brother     Parkinsonism Brother      Social History     Tobacco Use    Smoking status: Never    Smokeless tobacco: Never   Substance Use Topics    Alcohol use: No    Drug use: No        Review of Systems:  Review of  Systems   Constitutional:  Negative for chills, fatigue, fever and unexpected weight change.   Respiratory:  Negative for cough, shortness of breath, wheezing and stridor.    Cardiovascular:  Negative for chest pain, palpitations and leg swelling.   Gastrointestinal:  Negative for abdominal distention, abdominal pain, constipation, diarrhea, nausea and vomiting.   Genitourinary:  Negative for difficulty urinating, dysuria, frequency, hematuria and urgency.   Skin:  Negative for color change, pallor, rash and wound.   Hematological:  Does not bruise/bleed easily.       OBJECTIVE:     Vital Signs (Most Recent)  Pulse: 79 (03/04/24 1150)  BP: (!) 141/75 (03/04/24 1150)     79.3 kg (174 lb 13.2 oz)     Physical Exam:  Physical Exam  Vitals reviewed.   Constitutional:       Appearance: She is well-developed.   HENT:      Head: Normocephalic and atraumatic.   Cardiovascular:      Rate and Rhythm: Normal rate and regular rhythm.   Pulmonary:      Effort: Pulmonary effort is normal.      Breath sounds: Normal breath sounds.   Abdominal:      General: Bowel sounds are normal. There is no distension.      Palpations: Abdomen is soft.      Tenderness: There is no abdominal tenderness.      Comments: Open cholecystectomy scar   Lower abdominal scar   Musculoskeletal:      Cervical back: Neck supple.   Skin:     General: Skin is warm and dry.   Neurological:      Mental Status: She is alert and oriented to person, place, and time.         Diagnostic Results:  EGD:  Impression:            - The examination was suspicious for achalasia.                          - Normal stomach.                          - Normal examined duodenum.                          - Biopsies were obtained in the middle third of                          the esophagus and in the lower third of the                          esophagus.     Manometry:  Impression:            - Abnormal esophageal manometry.                          - Manometry results are  consistent with Type II                          achalasia.     ASSESSMENT/PLAN:     72-year-old female with achalasia    PLAN:Plan     Patient presents with continued symptoms of food regurgitation, food sticking and chest pain associated with eating.    She now reports she underwent some type of weight loss surgery in 2009 in [place].  She thinks it was a gastric sleeve but isn't completely sure.    We discussed how prior gastric sleeve or other gastric surgery could affect the recommended procedure as there was no ability to perform a Jenaro fundoplication.  Will review previous records and imaging as this is new information brought to light.    We will get upper GI for further evaluation of her esophagus and stomach.  Review records further as there had been no mention of prior gastric sleeve on prior EGD     Atrial fibrillation On [employer] Cardiology clearance prior to any procedural intervention    40 minutes of total time spent on the encounter, which includes face to face time and non-face to face time preparing to see the patient (eg, review of tests), Obtaining and/or reviewing separately obtained history, Documenting clinical information in the electronic or other health record, Independently interpreting results (not separately reported) and communicating results to the patient/family/caregiver, or Care coordination (not separately reported).

## 2024-03-04 NOTE — PROGRESS NOTES
Subjective:     Patient ID: Kelsie Bustamante is a 73 y.o. female.    Chief Complaint:      HPI    74 y/o history of Afib, hiatal hernia, GERD, HLD, HTN, pacemaker, who presents to Pulmonary clinic for follow up chronic cough   History of RSV about 2 months ago in 1/2024  Seen in Emergency Room at [facility] - does not remember details  Tests were negative for COVID 19 and Flu, positive for RSV ([facility])    Under stress from home situation       Cough  This is a recurrent problem. The current episode started  7- 8 weeks ago. The problem has been slowly improving The cough is Productive of sputum. Associated symptoms include a fever (resolved), headaches, shortness of breath and wheezing. Pertinent negatives include no chest pain, chills, ear congestion, ear pain, heartburn, myalgias, nasal congestion, postnasal drip, rash, rhinorrhea or sore throat. The symptoms are aggravated by lying down. She has tried a beta-agonist inhaler and prescription cough suppressant and antibiotic for the symptoms. The treatment provided mild relief. Her past medical history is significant for bronchitis and pneumonia.       Past Medical History:   Diagnosis Date    Achalasia     demetrius    Atrial fibrillation with RVR 05/11/2021    Cataract     Colon polyp     Gastroesophageal reflux     Hiatal hernia     Hx of colonic polyps 08/15/2014    per colonoscopy in      Hyperlipidemia 06/24/2022    Hypertension     Pacemaker     Peripheral neuropathy     Postmenopausal HRT (hormone replacement therapy)     failed tapering off    SAH (subarachnoid hemorrhage)     from a fall late 22    Sepsis 05/05/2021    Last Assessment & Plan:  Formatting of this note might be different from the original. History & Physical Patient meets sepsis criteria with a white cell count of 15, and tachypnea.  Source of infection not clear at this time.  No evidence for meningitis.  Cover with broad-spectrum antibiotics especially given  invasive dental procedure done recently.  Check blood cultures and monitor for fever.  R    Sinusitis     Thyroid nodule 3/16 subcm; kathleen 2 yrs    Type 2 diabetes mellitus with neurological manifestations     Vasodepressor syncope 2018     Past Surgical History:   Procedure Laterality Date    A-V CARDIAC PACEMAKER INSERTION Left 2022    Procedure: INSERTION, CARDIAC PACEMAKER, DUAL CHAMBER;  Surgeon: Daryl Walker MD;  Location: [facility] CATH LAB;  Service: Cardiology;  Laterality: Left;  Patient instructed 11AM start time/needs ptt/pt/inr  All [employer] with His lead/MDT His as back up//Vinod notified    BREAST BIOPSY Left 2021    cataract Left      SECTION      CHOLECYSTECTOMY      CYST REMOVAL Right 2021    [facility]    ESOPHAGEAL MANOMETRY WITH MEASUREMENT OF IMPEDANCE N/A 10/6/2022    Procedure: MANOMETRY, ESOPHAGUS, WITH IMPEDANCE MEASUREMENT  Cardiac Clearance/Eliquis 2 day hold approval received per Dr. RABIA Aguilar, Cardiology on 22.  Note in encounters.  LB;  Surgeon: Bruna Fletcher MD;  Location: [facility];  Service: Endoscopy;  Laterality: N/A;    ESOPHAGOGASTRODUODENOSCOPY N/A 10/6/2022    Procedure: EGD (ESOPHAGOGASTRODUODENOSCOPY);  Surgeon: Bruna Fletcher MD;  Location: [facility];  Service: Endoscopy;  Laterality: N/A;    HYSTERECTOMY      nonca    OOPHORECTOMY      TILT TABLE TEST N/A 10/25/2018    Procedure: TILT TABLE TEST;  Surgeon: Daryl Walker MD;  Location: [facility] CATH LAB;  Service: Cardiology;  Laterality: N/A;  VAS.DEP.SYN,HUT,FAS,3PREP    TONSILLECTOMY       Review of patient's allergies indicates:   Allergen Reactions    Floxin [ofloxacin] Diarrhea and Nausea And Vomiting    Fluvastatin Other (See Comments)     myalgia    Pravastatin Other (See Comments)     myalgia     Current Outpatient Medications on File Prior to Visit   Medication Sig Dispense Refill    apixaban (ELIQUIS) 5 mg Tab Take 1 tablet (5 mg total)  by mouth 2 (two) times daily. 180 tablet 4    estrogens, conjugated, (PREMARIN) 0.625 MG tablet Take 1 tablet (0.625 mg total) by mouth once daily. 90 tablet 3    ibuprofen (ADVIL,MOTRIN) 600 MG tablet 1 tablet with food or milk as needed Orally Three times a day for 7 days      metFORMIN (GLUCOPHAGE-XR) 500 MG ER 24hr tablet TAKE 1 TABLET BY MOUTH TWICE DAILY WITH MEALS 60 tablet 11    metoprolol succinate (TOPROL-XL) 50 MG 24 hr tablet Take 2 tablets (100 mg total) by mouth every morning AND 1 tablet (50 mg total) every evening. 270 tablet 3    pantoprazole (PROTONIX) 40 MG tablet Take 1 tablet (40 mg total) by mouth nightly. 30 tablet 11    rosuvastatin (CRESTOR) 5 MG tablet Take 1 tablet (5 mg total) by mouth once daily. 90 tablet 3    traZODone (DESYREL) 50 MG tablet Take 1 tablet (50 mg total) by mouth nightly as needed for Insomnia. 30 tablet 3    [DISCONTINUED] albuterol (PROVENTIL) 2.5 mg /3 mL (0.083 %) nebulizer solution Take 3 mLs (2.5 mg total) by nebulization every 4 to 6 hours as needed for Wheezing or Shortness of Breath. 360 mL 11    [DISCONTINUED] albuterol (PROVENTIL/VENTOLIN HFA) 90 mcg/actuation inhaler SMARTSI Puff(s) By Mouth 4 Times Daily      [DISCONTINUED] benzonatate (TESSALON) 100 MG capsule Take 1 capsule (100 mg total) by mouth 3 (three) times daily as needed for Cough. 30 capsule 2    [DISCONTINUED] famotidine (PEPCID) 20 MG tablet Take 1 tablet (20 mg total) by mouth once daily. 30 tablet 11     No current facility-administered medications on file prior to visit.     Social History     Socioeconomic History    Marital status:     Number of children: 2   Tobacco Use    Smoking status: Never    Smokeless tobacco: Never   Substance and Sexual Activity    Alcohol use: No    Drug use: No    Sexual activity: Never     Social Determinants of Health     Financial Resource Strain: Medium Risk (2022)    Overall Financial Resource Strain (CARDIA)     Difficulty of Paying Living  Expenses: Somewhat hard   Food Insecurity: No Food Insecurity (9/23/2022)    Hunger Vital Sign     Worried About Running Out of Food in the Last Year: Never true     Ran Out of Food in the Last Year: Never true   Transportation Needs: No Transportation Needs (9/23/2022)    PRAPARE - Transportation     Lack of Transportation (Medical): No     Lack of Transportation (Non-Medical): No   Physical Activity: Unknown (9/23/2022)    Exercise Vital Sign     Days of Exercise per Week: 0 days   Stress: Stress Concern Present (10/19/2022)    [employer] [place] [employer] - Occupational Stress Questionnaire     Feeling of Stress : To some extent   Social Connections: Unknown (9/23/2022)    Social Connection and Isolation Panel [NHANES]     Frequency of Communication with Friends and Family: More than three times a week     Frequency of Social Gatherings with Friends and Family: Once a week     Active Member of Clubs or Organizations: No     Attends Club or Organization Meetings: Never     Marital Status:    Housing Stability: Low Risk  (9/23/2022)    Housing Stability Vital Sign     Unable to Pay for Housing in the Last Year: No     Number of Places Lived in the Last Year: 1     Unstable Housing in the Last Year: No     Family History   Problem Relation Age of Onset    Aneurysm Sister     Heart disease Mother     Diabetes Father     Coronary artery disease Father     Coronary artery disease Brother     Parkinsonism Brother        Review of Systems   Constitutional:  Positive for fatigue. Negative for fever.   HENT:  Positive for postnasal drip, rhinorrhea and congestion.    Eyes:  Negative for redness and itching.   Respiratory:  Positive for cough, sputum production, shortness of breath, dyspnea on extertion, use of rescue inhaler and Paroxysmal Nocturnal Dyspnea.    Cardiovascular:  Negative for chest pain, palpitations and leg swelling.   Genitourinary:  Negative for difficulty urinating and hematuria.  "  Endocrine:  Negative for cold intolerance and heat intolerance.    Skin:  Negative for rash.   Gastrointestinal:  Negative for nausea and abdominal pain.   Neurological:  Negative for dizziness, syncope, weakness and light-headedness.   Hematological:  Negative for adenopathy. Does not bruise/bleed easily.   Psychiatric/Behavioral:  Negative for sleep disturbance. The patient is not nervous/anxious.        Objective:      BP (!) 140/78 (BP Location: Left arm, Patient Position: Sitting, BP Method: Medium (Manual))   Pulse 89   Resp 18   Ht 5' 3" (1.6 m)   Wt 79.4 kg (175 lb 0.7 oz)   SpO2 (!) 93%   BMI 31.01 kg/m²   Physical Exam  Vitals and nursing note reviewed.   Constitutional:       Appearance: She is well-developed. She is obese.   HENT:      Head: Normocephalic and atraumatic.      Nose: Nose normal.      Mouth/Throat:      Pharynx: No oropharyngeal exudate.   Eyes:      Conjunctiva/sclera: Conjunctivae normal.      Pupils: Pupils are equal, round, and reactive to light.   Neck:      Thyroid: No thyromegaly.      Vascular: No JVD.      Trachea: No tracheal deviation.   Cardiovascular:      Rate and Rhythm: Normal rate and regular rhythm.      Heart sounds: Normal heart sounds.   Pulmonary:      Effort: Pulmonary effort is normal. No respiratory distress.      Breath sounds: Examination of the right-lower field reveals wheezing. Examination of the left-lower field reveals wheezing. Decreased breath sounds, wheezing and rales present. No rhonchi.   Chest:      Chest wall: No tenderness.   Abdominal:      General: Bowel sounds are normal.      Palpations: Abdomen is soft.   Musculoskeletal:         General: Normal range of motion.      Cervical back: Neck supple.   Lymphadenopathy:      Cervical: No cervical adenopathy.   Skin:     General: Skin is warm and dry.   Neurological:      Mental Status: She is alert and oriented to person, place, and time.       Personal Diagnostic Review  none pertinent        " "3/4/2024    11:12 AM   Pulmonary Studies Review   SpO2 93 %   Height 5' 3" (1.6 m)   Weight 79.4 kg (175 lb 0.7 oz)   BMI (Calculated) 31   Predicted Distance 264.8   Predicted Distance Meters (Calculated) 406.61 meters       CT Head Without Contrast  Narrative: EXAMINATION:  CT HEAD WITHOUT CONTRAST    CLINICAL HISTORY:  Mental status change, unknown cause;    TECHNIQUE:  Low dose axial CT images obtained throughout the head without intravenous contrast. Sagittal and coronal reconstructions were performed.    COMPARISON:  Multiple priors.    FINDINGS:  Intracranial compartment:    Ventricles and sulci are normal in size for age without evidence of hydrocephalus. No extra-axial blood or fluid collections.    The brain parenchyma appears normal. No parenchymal mass, hemorrhage, edema or major vascular distribution infarct.    Skull/extracranial contents (limited evaluation): No fracture. Mastoid air cells and paranasal sinuses are essentially clear.  Impression: No acute abnormality.    All CT scans at this facility are performed  using dose modulation techniques as appropriate to performed exam including the following:  automated exposure control; adjustment of mA and/or kV according to the patients size (this includes techniques or standardized protocols for targeted exams where dose is matched to indication/reason for exam: i.e. extremities or head);  iterative reconstruction technique.    Electronically signed by: Valentin Guerra  Date:    02/20/2024  Time:    20:37      Office Spirometry Results:         3/4/2024    11:12 AM 2/26/2024    11:11 AM 2/26/2024     9:40 AM 2/20/2024     6:17 PM 1/23/2024    10:39 AM 11/14/2023    11:06 AM 10/17/2023     3:43 PM   Pulmonary Function Tests   SpO2 93 %  96 % 97 % 95 % 95 % 95 %   Height 5' 3" (1.6 m) 5' 3" (1.6 m) 5' 3" (1.6 m)  5' 3" (1.6 m) 5' 3" (1.6 m) 5' 3" (1.6 m)   Weight 79.4 kg (175 lb 0.7 oz) 78.8 kg (173 lb 11.6 oz) 79.2 kg (174 lb 9.7 oz)  78.2 kg (172 lb 6.4 " "oz) 79.9 kg (176 lb 2.4 oz) 77 kg (169 lb 12.1 oz)   BMI (Calculated) 31 30.8 30.9  30.5 31.2 30.1         3/4/2024    11:12 AM   Pulmonary Studies Review   SpO2 93 %   Height 5' 3" (1.6 m)   Weight 79.4 kg (175 lb 0.7 oz)   BMI (Calculated) 31   Predicted Distance 264.8   Predicted Distance Meters (Calculated) 406.61 meters           Recent Results (from the past 336 hour(s))   CBC auto differential    Collection Time: 24  8:10 PM   Result Value Ref Range    WBC 8.43 3.90 - 12.70 K/uL    Hemoglobin 13.9 12.0 - 16.0 g/dL    Hematocrit 41.1 37.0 - 48.5 %    Platelets 267 150 - 450 K/uL       Assessment:            Bronchitis  -     albuterol (PROVENTIL/VENTOLIN HFA) 90 mcg/actuation inhaler; SMARTSI Puff(s) By Mouth 4 Times Daily  Dispense: 18 g; Refill: 11  -     benzonatate (TESSALON) 200 MG capsule; Take 1 capsule (200 mg total) by mouth 3 (three) times daily as needed for Cough.  Dispense: 90 capsule; Refill: 2  -     Spirometry with/without bronchodilator; Future; Expected date: 2024    COPD exacerbation  -     albuterol (PROVENTIL) 2.5 mg /3 mL (0.083 %) nebulizer solution; Take 3 mLs (2.5 mg total) by nebulization every 4 to 6 hours as needed for Wheezing or Shortness of Breath.  Dispense: 360 mL; Refill: 11  -     predniSONE (DELTASONE) 5 MG tablet; Take 4 tablets (20 mg total) by mouth once daily for 3 days, THEN 2 tablets (10 mg total) once daily for 3 days, THEN 1 tablet (5 mg total) once daily for 3 days, THEN 0.5 tablets (2.5 mg total) once daily for 3 days.  Dispense: 20 tablet; Refill: 0  -     doxycycline (MONODOX) 100 MG capsule; Take 1 capsule (100 mg total) by mouth 2 (two) times daily.  Dispense: 20 capsule; Refill: 0    Mild intermittent asthmatic bronchitis with acute exacerbation  -     predniSONE (DELTASONE) 5 MG tablet; Take 4 tablets (20 mg total) by mouth once daily for 3 days, THEN 2 tablets (10 mg total) once daily for 3 days, THEN 1 tablet (5 mg total) once daily for 3 " days, THEN 0.5 tablets (2.5 mg total) once daily for 3 days.  Dispense: 20 tablet; Refill: 0  -     doxycycline (MONODOX) 100 MG capsule; Take 1 capsule (100 mg total) by mouth 2 (two) times daily.  Dispense: 20 capsule; Refill: 0    Chronic cough  -     benzonatate (TESSALON) 200 MG capsule; Take 1 capsule (200 mg total) by mouth 3 (three) times daily as needed for Cough.  Dispense: 90 capsule; Refill: 2          Outpatient Encounter Medications as of 3/4/2024   Medication Sig Dispense Refill    apixaban (ELIQUIS) 5 mg Tab Take 1 tablet (5 mg total) by mouth 2 (two) times daily. 180 tablet 4    estrogens, conjugated, (PREMARIN) 0.625 MG tablet Take 1 tablet (0.625 mg total) by mouth once daily. 90 tablet 3    ibuprofen (ADVIL,MOTRIN) 600 MG tablet 1 tablet with food or milk as needed Orally Three times a day for 7 days      metFORMIN (GLUCOPHAGE-XR) 500 MG ER 24hr tablet TAKE 1 TABLET BY MOUTH TWICE DAILY WITH MEALS 60 tablet 11    metoprolol succinate (TOPROL-XL) 50 MG 24 hr tablet Take 2 tablets (100 mg total) by mouth every morning AND 1 tablet (50 mg total) every evening. 270 tablet 3    pantoprazole (PROTONIX) 40 MG tablet Take 1 tablet (40 mg total) by mouth nightly. 30 tablet 11    rosuvastatin (CRESTOR) 5 MG tablet Take 1 tablet (5 mg total) by mouth once daily. 90 tablet 3    traZODone (DESYREL) 50 MG tablet Take 1 tablet (50 mg total) by mouth nightly as needed for Insomnia. 30 tablet 3    [DISCONTINUED] albuterol (PROVENTIL) 2.5 mg /3 mL (0.083 %) nebulizer solution Take 3 mLs (2.5 mg total) by nebulization every 4 to 6 hours as needed for Wheezing or Shortness of Breath. 360 mL 11    [DISCONTINUED] albuterol (PROVENTIL/VENTOLIN HFA) 90 mcg/actuation inhaler SMARTSI Puff(s) By Mouth 4 Times Daily      [DISCONTINUED] benzonatate (TESSALON) 100 MG capsule Take 1 capsule (100 mg total) by mouth 3 (three) times daily as needed for Cough. 30 capsule 2    [DISCONTINUED] famotidine (PEPCID) 20 MG tablet Take  1 tablet (20 mg total) by mouth once daily. 30 tablet 11    albuterol (PROVENTIL) 2.5 mg /3 mL (0.083 %) nebulizer solution Take 3 mLs (2.5 mg total) by nebulization every 4 to 6 hours as needed for Wheezing or Shortness of Breath. 360 mL 11    albuterol (PROVENTIL/VENTOLIN HFA) 90 mcg/actuation inhaler SMARTSI Puff(s) By Mouth 4 Times Daily 18 g 11    benzonatate (TESSALON) 200 MG capsule Take 1 capsule (200 mg total) by mouth 3 (three) times daily as needed for Cough. 90 capsule 2    doxycycline (MONODOX) 100 MG capsule Take 1 capsule (100 mg total) by mouth 2 (two) times daily. 20 capsule 0    [] methylPREDNISolone (MEDROL DOSEPACK) 4 mg tablet use as directed 21 each 0    predniSONE (DELTASONE) 5 MG tablet Take 4 tablets (20 mg total) by mouth once daily for 3 days, THEN 2 tablets (10 mg total) once daily for 3 days, THEN 1 tablet (5 mg total) once daily for 3 days, THEN 0.5 tablets (2.5 mg total) once daily for 3 days. 20 tablet 0    [DISCONTINUED] flecainide (TAMBOCOR) 100 MG Tab Take 1 tablet (100 mg total) by mouth every 12 (twelve) hours. (Patient not taking: Reported on 10/17/2023) 60 tablet 0    [DISCONTINUED] QUEtiapine (SEROQUEL) 25 MG Tab Take 1 tablet (25 mg total) by mouth every evening. 30 tablet 0    [DISCONTINUED] sertraline (ZOLOFT) 50 MG tablet Take 0.5 tablets (25 mg total) by mouth once daily for 7 days, THEN 1 tablet (50 mg total) once daily. (Patient not taking: Reported on 2024) 32 tablet 0    [DISCONTINUED] zolpidem (AMBIEN) 10 mg Tab 1/2 po qhs prn insomnia (Patient not taking: Reported on 2024) 90 tablet 1     No facility-administered encounter medications on file as of 3/4/2024.     Plan:       Requested Prescriptions     Signed Prescriptions Disp Refills    albuterol (PROVENTIL) 2.5 mg /3 mL (0.083 %) nebulizer solution 360 mL 11     Sig: Take 3 mLs (2.5 mg total) by nebulization every 4 to 6 hours as needed for Wheezing or Shortness of Breath.    albuterol  (PROVENTIL/VENTOLIN HFA) 90 mcg/actuation inhaler 18 g 11     Sig: SMARTSI Puff(s) By Mouth 4 Times Daily    predniSONE (DELTASONE) 5 MG tablet 20 tablet 0     Sig: Take 4 tablets (20 mg total) by mouth once daily for 3 days, THEN 2 tablets (10 mg total) once daily for 3 days, THEN 1 tablet (5 mg total) once daily for 3 days, THEN 0.5 tablets (2.5 mg total) once daily for 3 days.    doxycycline (MONODOX) 100 MG capsule 20 capsule 0     Sig: Take 1 capsule (100 mg total) by mouth 2 (two) times daily.    benzonatate (TESSALON) 200 MG capsule 90 capsule 2     Sig: Take 1 capsule (200 mg total) by mouth 3 (three) times daily as needed for Cough.     Problem List Items Addressed This Visit       Bronchitis - Primary    Relevant Medications    albuterol (PROVENTIL/VENTOLIN HFA) 90 mcg/actuation inhaler    benzonatate (TESSALON) 200 MG capsule    Other Relevant Orders    Spirometry with/without bronchodilator    Chronic cough    Relevant Medications    benzonatate (TESSALON) 200 MG capsule     Other Visit Diagnoses       COPD exacerbation        Relevant Medications    albuterol (PROVENTIL) 2.5 mg /3 mL (0.083 %) nebulizer solution    predniSONE (DELTASONE) 5 MG tablet    doxycycline (MONODOX) 100 MG capsule    Mild intermittent asthmatic bronchitis with acute exacerbation        Relevant Medications    predniSONE (DELTASONE) 5 MG tablet    doxycycline (MONODOX) 100 MG capsule               Follow up in about 6 months (around 2024) for Follow up with NP, Review progress, bonnie - on return.    MEDICAL DECISION MAKING: Moderate to high complexity.  Overall, the multiple problems listed are of moderate to high severity that may impact quality of life and activities of daily living. Side effects of medications, treatment plan as well as options and alternatives reviewed and discussed with patient. There was counseling of patient concerning these issues.    Total time spent in counseling and coordination of care - 40   minutes of total time spent on the encounter, which includes face to face time and non-face to face time preparing to see the patient (eg, review of tests), Obtaining and/or reviewing separately obtained history, Documenting clinical information in the electronic or other health record, Independently interpreting results (not separately reported) and communicating results to the patient/family/caregiver, or Care coordination (not separately reported).    Time was used in discussion of prognosis, risks, benefits of treatment, instructions and compliance with regimen . Discussion with other physicians and/or health care providers - home health or for use of durable medical equipment (oxygen, nebulizers, CPAP, BiPAP) occurred.

## 2024-03-05 ENCOUNTER — CLINICAL SUPPORT (OUTPATIENT)
Dept: CARDIOLOGY | Facility: HOSPITAL | Age: 74
End: 2024-03-05
Attending: INTERNAL MEDICINE
Payer: MEDICARE

## 2024-03-05 DIAGNOSIS — Z95.0 PRESENCE OF CARDIAC PACEMAKER: ICD-10-CM

## 2024-03-05 PROCEDURE — 93294 REM INTERROG EVL PM/LDLS PM: CPT | Mod: S$GLB,,, | Performed by: INTERNAL MEDICINE

## 2024-03-05 PROCEDURE — 93296 REM INTERROG EVL PM/IDS: CPT | Performed by: INTERNAL MEDICINE

## 2024-03-07 ENCOUNTER — TELEPHONE (OUTPATIENT)
Dept: INTERNAL MEDICINE | Facility: CLINIC | Age: 74
End: 2024-03-07
Payer: OTHER GOVERNMENT

## 2024-03-07 NOTE — TELEPHONE ENCOUNTER
Tried calling the patient no answer. A message was left for a return call to Dr Parks office concerning her lab results.

## 2024-03-14 DIAGNOSIS — J45.21 MILD INTERMITTENT ASTHMATIC BRONCHITIS WITH ACUTE EXACERBATION: ICD-10-CM

## 2024-03-14 DIAGNOSIS — J44.1 COPD EXACERBATION: ICD-10-CM

## 2024-03-14 RX ORDER — DOXYCYCLINE 100 MG/1
100 CAPSULE ORAL 2 TIMES DAILY
Qty: 20 CAPSULE | Refills: 0 | Status: CANCELLED | OUTPATIENT
Start: 2024-03-14

## 2024-03-15 ENCOUNTER — TELEPHONE (OUTPATIENT)
Dept: INTERNAL MEDICINE | Facility: CLINIC | Age: 74
End: 2024-03-15
Payer: OTHER GOVERNMENT

## 2024-03-15 DIAGNOSIS — R05.3 CHRONIC COUGH: ICD-10-CM

## 2024-03-15 DIAGNOSIS — J44.1 COPD EXACERBATION: ICD-10-CM

## 2024-03-15 DIAGNOSIS — J45.21 MILD INTERMITTENT ASTHMATIC BRONCHITIS WITH ACUTE EXACERBATION: ICD-10-CM

## 2024-03-15 DIAGNOSIS — J40 BRONCHITIS: ICD-10-CM

## 2024-03-15 RX ORDER — BENZONATATE 200 MG/1
200 CAPSULE ORAL 3 TIMES DAILY PRN
Qty: 90 CAPSULE | Refills: 2 | Status: SHIPPED | OUTPATIENT
Start: 2024-03-15 | End: 2024-03-29

## 2024-03-15 RX ORDER — TRAZODONE HYDROCHLORIDE 50 MG/1
50 TABLET ORAL NIGHTLY PRN
Qty: 30 TABLET | Refills: 3 | OUTPATIENT
Start: 2024-03-15

## 2024-03-15 RX ORDER — DOXYCYCLINE 100 MG/1
100 CAPSULE ORAL 2 TIMES DAILY
Qty: 20 CAPSULE | Refills: 0 | Status: SHIPPED | OUTPATIENT
Start: 2024-03-15 | End: 2024-03-29 | Stop reason: CLARIF

## 2024-03-15 NOTE — TELEPHONE ENCOUNTER
Disp Refills Start End SHWETHA   traZODone (DESYREL) 50 MG tablet 30 tablet 3 2/26/2024 -- --   Sig - Route: Take 1 tablet (50 mg total) by mouth nightly as needed for Insomnia. - Oral   Sent to pharmacy as: traZODone (DESYREL) 50 MG tablet   Class: Normal   Order: [id number]   Date/Time Signed: 2/26/2024 10:35       E-Prescribing Status: Transmission to pharmacy failed (2/26/2024 11:36 AM CST)   Message completed by Hollis Guerra MA (2/28/2024 10:58 AM).     Pharmacy    [facility]  [address] [facility]

## 2024-03-15 NOTE — TELEPHONE ENCOUNTER
Spoke with pt. Advised pt to contact Dr. Nadeem MD office to have medication switched to local pharmacy due to medication being short term. Verbalized understanding.//ddw

## 2024-03-15 NOTE — TELEPHONE ENCOUNTER
----- Message from Lucy Melchor sent at 3/15/2024  9:58 AM CDT -----  Contact: [phone]  Patient is calling in regards to her medication that was sent over to [facility]. She stated that they do not fill those short term medication. Pt would like it to be sent over to   [id number] [address] [address]  [address]  [place] [id number]  Phone: [phone] Fax: [phone]          Please call pt back at [phone]. Thanks KB

## 2024-03-15 NOTE — TELEPHONE ENCOUNTER
No care due was identified.  [facility] Embedded Care Due Messages. Reference number: [id number].   3/15/2024 8:38:19 AM CDT

## 2024-03-18 ENCOUNTER — TELEPHONE (OUTPATIENT)
Dept: INTERNAL MEDICINE | Facility: CLINIC | Age: 74
End: 2024-03-18

## 2024-03-18 ENCOUNTER — OFFICE VISIT (OUTPATIENT)
Dept: INTERNAL MEDICINE | Facility: CLINIC | Age: 74
End: 2024-03-18
Payer: MEDICARE

## 2024-03-18 VITALS
WEIGHT: 175.06 LBS | DIASTOLIC BLOOD PRESSURE: 72 MMHG | SYSTOLIC BLOOD PRESSURE: 112 MMHG | TEMPERATURE: 99 F | BODY MASS INDEX: 31.02 KG/M2 | OXYGEN SATURATION: 94 % | HEIGHT: 63 IN | HEART RATE: 100 BPM

## 2024-03-18 DIAGNOSIS — I48.0 PAF (PAROXYSMAL ATRIAL FIBRILLATION): Chronic | ICD-10-CM

## 2024-03-18 DIAGNOSIS — Z78.0 ASYMPTOMATIC POSTMENOPAUSAL STATE: ICD-10-CM

## 2024-03-18 DIAGNOSIS — K22.0 ACHALASIA OF ESOPHAGUS: ICD-10-CM

## 2024-03-18 DIAGNOSIS — D64.9 NORMOCYTIC ANEMIA: ICD-10-CM

## 2024-03-18 DIAGNOSIS — E11.42 TYPE 2 DIABETES MELLITUS WITH DIABETIC POLYNEUROPATHY, UNSPECIFIED WHETHER LONG TERM INSULIN USE: ICD-10-CM

## 2024-03-18 DIAGNOSIS — I10 PRIMARY HYPERTENSION: Chronic | ICD-10-CM

## 2024-03-18 DIAGNOSIS — E78.2 MIXED HYPERLIPIDEMIA: ICD-10-CM

## 2024-03-18 DIAGNOSIS — D68.69 OTHER THROMBOPHILIA: ICD-10-CM

## 2024-03-18 DIAGNOSIS — K21.9 GASTROESOPHAGEAL REFLUX DISEASE WITHOUT ESOPHAGITIS: ICD-10-CM

## 2024-03-18 DIAGNOSIS — E11.42 TYPE 2 DIABETES MELLITUS WITH DIABETIC POLYNEUROPATHY, UNSPECIFIED WHETHER LONG TERM INSULIN USE: Primary | ICD-10-CM

## 2024-03-18 DIAGNOSIS — Z79.01 CHRONIC ANTICOAGULATION: ICD-10-CM

## 2024-03-18 PROCEDURE — 99214 OFFICE O/P EST MOD 30 MIN: CPT | Mod: S$PBB,,, | Performed by: PHYSICIAN ASSISTANT

## 2024-03-18 PROCEDURE — 99215 OFFICE O/P EST HI 40 MIN: CPT | Mod: PBBFAC | Performed by: PHYSICIAN ASSISTANT

## 2024-03-18 PROCEDURE — 99999 PR PBB SHADOW E&M-EST. PATIENT-LVL V: CPT | Mod: PBBFAC,,, | Performed by: PHYSICIAN ASSISTANT

## 2024-03-18 RX ORDER — ONDANSETRON 4 MG/1
4 TABLET, ORALLY DISINTEGRATING ORAL EVERY 6 HOURS PRN
Qty: 20 TABLET | Refills: 0 | Status: SHIPPED | OUTPATIENT
Start: 2024-03-18 | End: 2024-03-18 | Stop reason: SDUPTHER

## 2024-03-18 RX ORDER — PREDNISONE 5 MG/1
TABLET ORAL
Qty: 20 TABLET | Refills: 0 | Status: SHIPPED | OUTPATIENT
Start: 2024-03-18 | End: 2024-03-29

## 2024-03-18 RX ORDER — TIRZEPATIDE 2.5 MG/.5ML
2.5 INJECTION, SOLUTION SUBCUTANEOUS
Qty: 4 PEN | Refills: 1 | Status: SHIPPED | OUTPATIENT
Start: 2024-03-18 | End: 2024-03-18 | Stop reason: SDUPTHER

## 2024-03-18 NOTE — TELEPHONE ENCOUNTER
No care due was identified.  [facility] Embedded Care Due Messages. Reference number: [id number].   3/18/2024 4:57:59 PM CDT

## 2024-03-18 NOTE — PROGRESS NOTES
Subjective:      Patient ID: Kelsie Bustamante is a 73 y.o. female.    Chief Complaint: Weight Loss    HPI  Here today for a routine follow up.   Eye exam and foot exam due. Will complete foot exam today.   A1C up. Pt would like to start on GLP1a. No fam hx med thyroid ca or MEN. No personal history of bowel obstruction. No personal history of acute pancreatitis.   DEXA due.   A. Fib stable on eliquis, metoprolol, crestor.   DM: currently on metformin. Not controlled. Doesn't check blood sugar at home.   Patient Active Problem List   Diagnosis    Primary hypertension    Type 2 diabetes mellitus with diabetic polyneuropathy, unspecified whether long term insulin use    Peripheral neuropathy    Carpal tunnel syndrome    Localized primary carpometacarpal osteoarthritis    Synovitis of right ankle    Gastroesophageal reflux disease without esophagitis    Thyroid nodule    Premature menopause (surgery in 30s)    Palpitation    Other insomnia    Statin intolerance    Neck pain on right side    Bronchitis    Normocytic anemia    PAF (paroxysmal atrial fibrillation)    Maxillary cyst    Atelectasis    Debility    Myalgia    Personal history of pneumonia (recurrent)    Personal history of urinary (tract) infections    Esophagitis    Pleurisy    Fibromyalgia    Hyperlipidemia    Odynophagia    Dysphagia    Achalasia of esophagus    Symptomatic sinus bradycardia    Chronic anticoagulation    Vasodepressor syncope    Pacemaker    Other thrombophilia    Chronic cough         Current Outpatient Medications:     albuterol (PROVENTIL) 2.5 mg /3 mL (0.083 %) nebulizer solution, Take 3 mLs (2.5 mg total) by nebulization every 4 to 6 hours as needed for Wheezing or Shortness of Breath., Disp: 360 mL, Rfl: 11    albuterol (PROVENTIL/VENTOLIN HFA) 90 mcg/actuation inhaler, SMARTSI Puff(s) By Mouth 4 Times Daily, Disp: 18 g, Rfl: 11    apixaban (ELIQUIS) 5 mg Tab, Take 1 tablet (5 mg total) by mouth 2 (two) times daily., Disp: 180  tablet, Rfl: 4    benzonatate (TESSALON) 200 MG capsule, Take 1 capsule (200 mg total) by mouth 3 (three) times daily as needed for Cough., Disp: 90 capsule, Rfl: 2    doxycycline (MONODOX) 100 MG capsule, Take 1 capsule (100 mg total) by mouth 2 (two) times daily., Disp: 20 capsule, Rfl: 0    estrogens, conjugated, (PREMARIN) 0.625 MG tablet, Take 1 tablet (0.625 mg total) by mouth once daily., Disp: 90 tablet, Rfl: 3    ibuprofen (ADVIL,MOTRIN) 600 MG tablet, 1 tablet with food or milk as needed Orally Three times a day for 7 days, Disp: , Rfl:     metFORMIN (GLUCOPHAGE-XR) 500 MG ER 24hr tablet, TAKE 1 TABLET BY MOUTH TWICE DAILY WITH MEALS, Disp: 60 tablet, Rfl: 11    metoprolol succinate (TOPROL-XL) 50 MG 24 hr tablet, Take 2 tablets (100 mg total) by mouth every morning AND 1 tablet (50 mg total) every evening., Disp: 270 tablet, Rfl: 3    pantoprazole (PROTONIX) 40 MG tablet, Take 1 tablet (40 mg total) by mouth nightly., Disp: 30 tablet, Rfl: 11    rosuvastatin (CRESTOR) 5 MG tablet, Take 1 tablet (5 mg total) by mouth once daily., Disp: 90 tablet, Rfl: 3    traZODone (DESYREL) 50 MG tablet, Take 1 tablet (50 mg total) by mouth nightly as needed for Insomnia., Disp: 30 tablet, Rfl: 3    ondansetron (ZOFRAN-ODT) 4 MG TbDL, Take 1 tablet (4 mg total) by mouth every 6 (six) hours as needed (nausea)., Disp: 20 tablet, Rfl: 0    tirzepatide (MOUNJARO) 2.5 mg/0.5 mL PnIj, Inject 2.5 mg into the skin every 7 days., Disp: 4 Pen, Rfl: 1    Review of Systems   Constitutional:  Negative for activity change, appetite change, chills, diaphoresis, fatigue, fever and unexpected weight change.   HENT: Negative.  Negative for congestion, hearing loss, postnasal drip, rhinorrhea, sore throat, trouble swallowing and voice change.    Eyes: Negative.  Negative for visual disturbance.   Respiratory: Negative.  Negative for cough, choking, chest tightness and shortness of breath.    Cardiovascular:  Negative for chest pain,  "palpitations and leg swelling.   Gastrointestinal:  Negative for abdominal distention, abdominal pain, blood in stool, constipation, diarrhea, nausea and vomiting.   Endocrine: Negative for cold intolerance, heat intolerance, polydipsia and polyuria.   Genitourinary: Negative.  Negative for difficulty urinating and frequency.   Musculoskeletal:  Negative for arthralgias, back pain, gait problem, joint swelling and myalgias.   Skin:  Negative for color change, pallor, rash and wound.   Neurological:  Negative for dizziness, tremors, weakness, light-headedness, numbness and headaches.   Hematological:  Negative for adenopathy.   Psychiatric/Behavioral:  Negative for behavioral problems, confusion, self-injury, sleep disturbance and suicidal ideas. The patient is not nervous/anxious.      Objective:   /72 (BP Location: Right arm, Patient Position: Sitting, BP Method: Large (Manual))   Pulse 100   Temp 99 °F (37.2 °C) (Tympanic)   Ht 5' 3" (1.6 m)   Wt 79.4 kg (175 lb 0.7 oz)   SpO2 (!) 94%   BMI 31.01 kg/m²     Physical Exam  Vitals reviewed.   Constitutional:       General: She is not in acute distress.     Appearance: Normal appearance. She is well-developed. She is not ill-appearing, toxic-appearing or diaphoretic.   HENT:      Head: Normocephalic and atraumatic.      Right Ear: External ear normal.      Left Ear: External ear normal.      Nose: Nose normal.   Eyes:      Conjunctiva/sclera: Conjunctivae normal.      Pupils: Pupils are equal, round, and reactive to light.   Cardiovascular:      Rate and Rhythm: Normal rate and regular rhythm.      Pulses:           Dorsalis pedis pulses are 2+ on the right side and 2+ on the left side.        Posterior tibial pulses are 2+ on the right side and 2+ on the left side.      Heart sounds: Normal heart sounds. No murmur heard.     No friction rub. No gallop.   Pulmonary:      Effort: Pulmonary effort is normal. No respiratory distress.      Breath sounds: " Normal breath sounds. No wheezing or rales.   Chest:      Chest wall: No tenderness.   Abdominal:      General: There is no distension.      Palpations: Abdomen is soft.      Tenderness: There is no abdominal tenderness.   Musculoskeletal:         General: Normal range of motion.      Cervical back: Normal range of motion and neck supple.   Feet:      Right foot:      Protective Sensation: 10 sites tested.  10 sites sensed.      Skin integrity: Skin integrity normal.      Toenail Condition: Right toenails are normal.      Left foot:      Protective Sensation: 10 sites tested.  10 sites sensed.      Skin integrity: Skin integrity normal.      Toenail Condition: Left toenails are normal.   Lymphadenopathy:      Cervical: No cervical adenopathy.   Skin:     General: Skin is warm and dry.      Capillary Refill: Capillary refill takes less than 2 seconds.      Findings: No rash.   Neurological:      Mental Status: She is alert and oriented to person, place, and time.      Motor: No weakness.      Coordination: Coordination normal.      Gait: Gait normal.   Psychiatric:         Mood and Affect: Mood normal.         Behavior: Behavior normal.         Thought Content: Thought content normal.         Judgment: Judgment normal.       Lab Visit on 03/01/2024   Component Date Value Ref Range Status    Microalbumin, Urine 03/01/2024 22.0  ug/mL Final    Creatinine, Urine 03/01/2024 222.0  15.0 - 325.0 mg/dL Final    Microalb/Creat Ratio 03/01/2024 9.9  0.0 - 30.0 ug/mg Final   Lab Visit on 03/01/2024   Component Date Value Ref Range Status    Hemoglobin A1C 03/01/2024 8.6 (H)  4.0 - 5.6 % Final    Comment: ADA Screening Guidelines:  5.7-6.4%  Consistent with prediabetes  >or=6.5%  Consistent with diabetes    High levels of fetal hemoglobin interfere with the HbA1C  assay. Heterozygous hemoglobin variants (HbS, HgC, etc)do  not significantly interfere with this assay.   However, presence of multiple variants may affect  accuracy.      Estimated Avg Glucose 03/01/2024 200 (H)  68 - 131 mg/dL Final    Cholesterol 03/01/2024 133  120 - 199 mg/dL Final    Comment: The National Cholesterol Education Program (NCEP) has set the  following guidelines (reference ranges) for Cholesterol:  Optimal.....................<200 mg/dL  Borderline High.............200-239 mg/dL  High........................> or = 240 mg/dL      Triglycerides 03/01/2024 180 (H)  30 - 150 mg/dL Final    Comment: The National Cholesterol Education Program (NCEP) has set the  following guidelines (reference values) for triglycerides:  Normal......................<150 mg/dL  Borderline High.............150-199 mg/dL  High........................200-499 mg/dL      HDL 03/01/2024 48  40 - 75 mg/dL Final    Comment: The National Cholesterol Education Program (NCEP) has set the  following guidelines (reference values) for HDL Cholesterol:  Low...............<40 mg/dL  Optimal...........>60 mg/dL      LDL Cholesterol 03/01/2024 49.0 (L)  63.0 - 159.0 mg/dL Final    Comment: The National Cholesterol Education Program (NCEP) has set the  following guidelines (reference values) for LDL Cholesterol:  Optimal.......................<130 mg/dL  Borderline High...............130-159 mg/dL  High..........................160-189 mg/dL  Very High.....................>190 mg/dL      HDL/Cholesterol Ratio 03/01/2024 36.1  20.0 - 50.0 % Final    Total Cholesterol/HDL Ratio 03/01/2024 2.8  2.0 - 5.0 Final    Non-HDL Cholesterol 03/01/2024 85  mg/dL Final    Comment: Risk category and Non-HDL cholesterol goals:  Coronary heart disease (CHD)or equivalent (10-year risk of CHD >20%):  Non-HDL cholesterol goal     <130 mg/dL  Two or more CHD risk factors and 10-year risk of CHD <= 20%:  Non-HDL cholesterol goal     <160 mg/dL  0 to 1 CHD risk factor:  Non-HDL cholesterol goal     <190 mg/dL      Vitamin B-12 03/01/2024 585  210 - 950 pg/mL Final   Admission on 02/20/2024, Discharged on 02/21/2024    Component Date Value Ref Range Status    WBC 02/20/2024 8.43  3.90 - 12.70 K/uL Final    RBC 02/20/2024 4.69  4.00 - 5.40 M/uL Final    Hemoglobin 02/20/2024 13.9  12.0 - 16.0 g/dL Final    Hematocrit 02/20/2024 41.1  37.0 - 48.5 % Final    MCV 02/20/2024 88  82 - 98 fL Final    MCH 02/20/2024 29.6  27.0 - 31.0 pg Final    MCHC 02/20/2024 33.8  32.0 - 36.0 g/dL Final    RDW 02/20/2024 12.4  11.5 - 14.5 % Final    Platelets 02/20/2024 267  150 - 450 K/uL Final    MPV 02/20/2024 9.2  9.2 - 12.9 fL Final    Immature Granulocytes 02/20/2024 0.5  0.0 - 0.5 % Final    Gran # (ANC) 02/20/2024 5.3  1.8 - 7.7 K/uL Final    Immature Grans (Abs) 02/20/2024 0.04  0.00 - 0.04 K/uL Final    Comment: Mild elevation in immature granulocytes is non specific and   can be seen in a variety of conditions including stress response,   acute inflammation, trauma and pregnancy. Correlation with other   laboratory and clinical findings is essential.      Lymph # 02/20/2024 2.2  1.0 - 4.8 K/uL Final    Mono # 02/20/2024 0.6  0.3 - 1.0 K/uL Final    Eos # 02/20/2024 0.3  0.0 - 0.5 K/uL Final    Baso # 02/20/2024 0.04  0.00 - 0.20 K/uL Final    nRBC 02/20/2024 0  0 /100 WBC Final    Gran % 02/20/2024 63.0  38.0 - 73.0 % Final    Lymph % 02/20/2024 26.3  18.0 - 48.0 % Final    Mono % 02/20/2024 6.5  4.0 - 15.0 % Final    Eosinophil % 02/20/2024 3.2  0.0 - 8.0 % Final    Basophil % 02/20/2024 0.5  0.0 - 1.9 % Final    Differential Method 02/20/2024 Automated   Final    Sodium 02/20/2024 140  136 - 145 mmol/L Final    Potassium 02/20/2024 4.1  3.5 - 5.1 mmol/L Final    Chloride 02/20/2024 106  95 - 110 mmol/L Final    CO2 02/20/2024 21 (L)  23 - 29 mmol/L Final    Glucose 02/20/2024 164 (H)  70 - 110 mg/dL Final    BUN 02/20/2024 11  8 - 23 mg/dL Final    Creatinine 02/20/2024 1.0  0.5 - 1.4 mg/dL Final    Calcium 02/20/2024 9.7  8.7 - 10.5 mg/dL Final    Total Protein 02/20/2024 6.9  6.0 - 8.4 g/dL Final    Albumin 02/20/2024 3.8  3.5 - 5.2  g/dL Final    Total Bilirubin 02/20/2024 0.4  0.1 - 1.0 mg/dL Final    Comment: For infants and newborns, interpretation of results should be based  on gestational age, weight and in agreement with clinical  observations.    Premature Infant recommended reference ranges:  Up to 24 hours.............<8.0 mg/dL  Up to 48 hours............<12.0 mg/dL  3-5 days..................<15.0 mg/dL  6-29 days.................<15.0 mg/dL      Alkaline Phosphatase 02/20/2024 93  55 - 135 U/L Final    AST 02/20/2024 34  10 - 40 U/L Final    ALT 02/20/2024 28  10 - 44 U/L Final    eGFR 02/20/2024 60  >60 mL/min/1.73 m^2 Final    Anion Gap 02/20/2024 13  8 - 16 mmol/L Final    TSH 02/20/2024 1.609  0.400 - 4.000 uIU/mL Final    Specimen UA 02/20/2024 Urine, Clean Catch   Final    Color, UA 02/20/2024 Yellow  Yellow, Straw, Shelia Final    Appearance, UA 02/20/2024 Clear  Clear Final    pH, UA 02/20/2024 6.0  5.0 - 8.0 Final    Specific Gravity, UA 02/20/2024 1.020  1.005 - 1.030 Final    Protein, UA 02/20/2024 Trace (A)  Negative Final    Comment: Recommend a 24 hour urine protein or a urine   protein/creatinine ratio if globulin induced proteinuria is  clinically suspected.      Glucose, UA 02/20/2024 2+ (A)  Negative Final    Ketones, UA 02/20/2024 Trace (A)  Negative Final    Bilirubin (UA) 02/20/2024 Negative  Negative Final    Occult Blood UA 02/20/2024 Negative  Negative Final    Nitrite, UA 02/20/2024 Negative  Negative Final    Urobilinogen, UA 02/20/2024 Negative  <2.0 EU/dL Final    Leukocytes, UA 02/20/2024 Trace (A)  Negative Final    Benzodiazepines 02/20/2024 Negative  Negative Final    Methadone metabolites 02/20/2024 Negative  Negative Final    Cocaine (Metab.) 02/20/2024 Negative  Negative Final    Opiate Scrn, Ur 02/20/2024 Negative  Negative Final    Barbiturate Screen, Ur 02/20/2024 Negative  Negative Final    Amphetamine Screen, Ur 02/20/2024 Negative  Negative Final    THC 02/20/2024 Negative  Negative Final     Phencyclidine 02/20/2024 Negative  Negative Final    Creatinine, Urine 02/20/2024 149.4  15.0 - 325.0 mg/dL Final    Toxicology Information 02/20/2024 SEE COMMENT   Final    Comment: This screen includes the following classes of drugs at the listed   cut-off:    Benzodiazepines 200 ng/ml  Methadone 300 ng/ml  Cocaine metabolite 300 ng/ml  Opiates 300 ng/ml  Barbiturates 200 ng/ml  Amphetamines 1000 ng/ml  Marijuana metabs (THC) 50 ng/ml  Phencyclidine (PCP) 25 ng/ml    This is a screening test. If results do not correlate with clinical   presentation, then a confirmatory send out test is advised.     This report is intended for use in clinical monitoring and management   of   patients. It is not intended for use in employment related drug   testing.      Alcohol, Serum 02/20/2024 <10  <10 mg/dL Final    Acetaminophen (Tylenol), Serum 02/20/2024 <3.0 (L)  10.0 - 20.0 ug/mL Final    Comment: Toxic Levels:  Adults (4 hr post-ingestion).........>150 ug/mL  Adults (12 hr post-ingestion)........>40 ug/mL  Peds (2 hr post-ingestion, liquid)...>225 ug/mL      WBC, UA 02/20/2024 8 (H)  0 - 5 /hpf Final    WBC Clumps, UA 02/20/2024 Occasional (A)  None-Rare Final    Squam Epithel, UA 02/20/2024 3  /hpf Final    Unclass Miri UA 02/20/2024 Moderate  None-Moderate Final    Microscopic Comment 02/20/2024 SEE COMMENT   Final    Comment: Other formed elements not mentioned in the report are not   present in the microscopic examination.          Assessment:     1. Type 2 diabetes mellitus with diabetic polyneuropathy, unspecified whether long term insulin use    2. Asymptomatic postmenopausal state    3. Primary hypertension    4. Chronic anticoagulation    5. PAF (paroxysmal atrial fibrillation)    6. Gastroesophageal reflux disease without esophagitis    7. Achalasia of esophagus    8. Normocytic anemia    9. Other thrombophilia    10. Mixed hyperlipidemia      Plan:   Type 2 diabetes mellitus with diabetic polyneuropathy,  unspecified whether long term insulin use  -     tirzepatide (MOUNJARO) 2.5 mg/0.5 mL PnIj; Inject 2.5 mg into the skin every 7 days.  Dispense: 4 Pen; Refill: 1  -     ondansetron (ZOFRAN-ODT) 4 MG TbDL; Take 1 tablet (4 mg total) by mouth every 6 (six) hours as needed (nausea).  Dispense: 20 tablet; Refill: 0  -     Ambulatory referral/consult to Optometry; Future; Expected date: 03/25/2024  -SE discussed in detail. Recheck in 1 month for dose adjustment.   -normal foot exam today    Asymptomatic postmenopausal state  -     DXA Bone Density Axial Skeleton 1 or more sites; Future; Expected date: 03/18/2024    Primary hypertension  -stable and controlled on metoprolol    Chronic anticoagulation  PAF (paroxysmal atrial fibrillation)  -stable on metoprolol and eliquis    Gastroesophageal reflux disease without esophagitis  -stable on protonix    Achalasia of esophagus  -stable. No choking    Normocytic anemia  Other thrombophilia  -stable on recent labs    Mixed hyperlipidemia  -stabe on crestor      Follow up in about 4 weeks (around 4/15/2024), or if symptoms worsen or fail to improve.

## 2024-03-19 ENCOUNTER — TELEPHONE (OUTPATIENT)
Dept: SURGERY | Facility: CLINIC | Age: 74
End: 2024-03-19
Payer: OTHER GOVERNMENT

## 2024-03-19 RX ORDER — TIRZEPATIDE 2.5 MG/.5ML
2.5 INJECTION, SOLUTION SUBCUTANEOUS
Qty: 4 PEN | Refills: 1 | Status: SHIPPED | OUTPATIENT
Start: 2024-03-19 | End: 2024-03-29 | Stop reason: CLARIF

## 2024-03-19 RX ORDER — ONDANSETRON 4 MG/1
4 TABLET, ORALLY DISINTEGRATING ORAL EVERY 6 HOURS PRN
Qty: 20 TABLET | Refills: 0 | Status: SHIPPED | OUTPATIENT
Start: 2024-03-19 | End: 2024-04-01 | Stop reason: SDUPTHER

## 2024-03-19 NOTE — TELEPHONE ENCOUNTER
----- Message from Herminio Willis Jr., MD sent at 3/15/2024  3:07 PM CDT -----  Regarding: RE: POEM/achalasia  Absolutely!  Happy to see her.  I'll add the team to the note to get her an appointment.  Thanks for sending her over.  Hope you are doing well!    Albert    ----- Message -----  From: Chau Schneider MD  Sent: 3/13/2024   2:00 PM CDT  To: Herminio Willis Jr., MD  Subject: POEM/achalasia                                   I heard you may be doing endoscopic myotomy for achalasia. This is a patient I have seen on and off for a couple years who was previously diagnosed with achalasia in 2014. She represented to our GI group in 2022 with concerns of food getting stuck in her esophagus and repeated manometry testing not surprisingly showing achalasia. She had not been on any treatment during this time that I could find. We were working towards a heller myotomy and adriana fundoplication but her procedure was cancelled. She came and saw me last week with similar complaints so we began discussing her achalasia. For the first time she mentioned she had prior bariatric surgery in [place]. In the past her EGDs reported normal stomach but I was able to look at previous CT we had in the system showing a sleeve gastrectomy. With this new information I was hesitant about doing heller myotomy as I will typically do it in conjunction with a adriana fundoplication to try and decrease reflux after. Figured I would check and see if POEMs are being done locally as this maybe an alternative. Ironically she despite her complaints about dysphagia she seems to have maintained her weight well.    Let me know if this is someone you would be interested in seeing.    Thanks,  Chau

## 2024-03-19 NOTE — TELEPHONE ENCOUNTER
Spoke with pt to discuss scheduling an appointment with Dr. Willis for achalasia.  Pt declined appointment at this time.  She states she has some other issues that she'd like to take care of first. She took down our clinic/contact information and will call when ready to schedule appointment.

## 2024-03-25 NOTE — TELEPHONE ENCOUNTER
No care due was identified.  [facility] Embedded Care Due Messages. Reference number: [id number].   3/25/2024 1:07:18 PM CDT

## 2024-03-25 NOTE — TELEPHONE ENCOUNTER
Specialty Pharmacy Refill Coordination Note     Cesia is a 42 y.o. female contacted today regarding refills of  [employer] specialty medication(s). Patient is due for injection on 1/13.      Reviewed and verified with patient:       Specialty medication(s) and dose(s) confirmed: yes    Refill Questions    Flowsheet Row Most Recent Value   Changes to allergies? No   Changes to medications? No   New conditions since last clinic visit No   Unplanned office visit, urgent care, ED, or hospital admission in the last 4 weeks  No   How does patient/caregiver feel medication is working? Excellent   Financial problems or insurance changes  No   If yes, describe changes in insurance or financial issues. N/A   Since the previous refill, were any specialty medication doses or scheduled injections missed or delayed?  No   If yes, please provide the amount N/A   Why were doses missed? N/A   Does this patient require a clinical escalation to a pharmacist? No          Delivery Questions    Flowsheet Row Most Recent Value   Delivery method [employer]Ex   Delivery address correct? Yes   Preferred delivery time? Anytime   Number of medications in delivery 1   Medication being filled and delivered [employer]   Doses left of specialty medications 0   Is there any medication that is due not being filled? No   Supplies needed? No supplies needed   Cooler needed? Yes   Copay form of payment Credit card on file   Questions or concerns for the pharmacist? No   Are any medications first time fills? No            Medication Adherence    Adherence tools used: calendar  Support network for adherence: healthcare provider   Other support network: Pharmacy           Follow-up: 28 day(s)     Vickie Contreras, Pharmacy Technician  Specialty Pharmacy Technician       Tried returning the patient call with no answer.

## 2024-03-25 NOTE — TELEPHONE ENCOUNTER
----- Message from Diamone Speed sent at 3/25/2024  1:17 PM CDT -----  Regarding: self  Type:  Patient Returning Call         Who Called: self       Who Left Message for Patient:maggie      Does the patient know what this is regarding?: yes        Would the patient rather a call back or a response via [facility]? Call back     Best Call Back Number:[phone]

## 2024-03-26 RX ORDER — ROSUVASTATIN CALCIUM 5 MG/1
5 TABLET, COATED ORAL DAILY
Qty: 90 TABLET | Refills: 3 | Status: SHIPPED | OUTPATIENT
Start: 2024-03-26

## 2024-03-26 NOTE — TELEPHONE ENCOUNTER
Refill Routing Note   Medication(s) are not appropriate for processing by [facility] for the following reason(s):        Drug-disease interaction    [facility] action(s):  Defer  Approve        Medication Therapy Plan: Crestor DDI Overridden by Cody Parks MD on Jul 31, 2023 1:30 PM; Drug-Disease: estrogens (conjugated) and Other thrombophilia    Alert overridden per protocol: Yes   Pharmacist review requested: Yes     Appointments  past 12m or future 3m with PCP    Date Provider   Last Visit   2/26/2024 Cody Parks MD   Next Visit   Visit date not found Cody Parks MD   ED visits in past 90 days: 1        Note composed:6:43 PM 03/26/2024

## 2024-03-26 NOTE — TELEPHONE ENCOUNTER
Refill Decision Note   Kelsie Bustamante  is requesting a refill authorization.  Brief Assessment and Rationale for Refill:  Defer  Approve     Medication Therapy Plan:  Crestor DDI Overridden by Cody Parks MD on Jul 31, 2023 1:30 PM      Alert overridden per protocol: Yes   Pharmacist review requested: Yes   Comments:     Note composed:6:45 PM 03/26/2024

## 2024-03-29 ENCOUNTER — HOSPITAL ENCOUNTER (EMERGENCY)
Facility: HOSPITAL | Age: 74
Discharge: HOME | End: 2024-03-29
Attending: EMERGENCY MEDICINE
Payer: MEDICARE

## 2024-03-29 VITALS
SYSTOLIC BLOOD PRESSURE: 165 MMHG | HEART RATE: 93 BPM | RESPIRATION RATE: 20 BRPM | TEMPERATURE: 99 F | BODY MASS INDEX: 31.01 KG/M2 | OXYGEN SATURATION: 96 % | WEIGHT: 175.06 LBS | DIASTOLIC BLOOD PRESSURE: 68 MMHG

## 2024-03-29 DIAGNOSIS — J02.9 SORE THROAT: ICD-10-CM

## 2024-03-29 DIAGNOSIS — D72.829 LEUKOCYTOSIS, UNSPECIFIED TYPE: ICD-10-CM

## 2024-03-29 DIAGNOSIS — R05.3 CHRONIC COUGH: ICD-10-CM

## 2024-03-29 DIAGNOSIS — J44.1 COPD EXACERBATION: ICD-10-CM

## 2024-03-29 DIAGNOSIS — J44.1 COPD WITH ACUTE EXACERBATION: ICD-10-CM

## 2024-03-29 DIAGNOSIS — R06.02 SOB (SHORTNESS OF BREATH): ICD-10-CM

## 2024-03-29 DIAGNOSIS — R05.9 COUGH, UNSPECIFIED TYPE: Primary | ICD-10-CM

## 2024-03-29 DIAGNOSIS — R79.89 ELEVATED LACTIC ACID LEVEL: ICD-10-CM

## 2024-03-29 DIAGNOSIS — J45.21 MILD INTERMITTENT ASTHMATIC BRONCHITIS WITH ACUTE EXACERBATION: ICD-10-CM

## 2024-03-29 DIAGNOSIS — J40 BRONCHITIS: ICD-10-CM

## 2024-03-29 LAB
ALBUMIN SERPL BCP-MCNC: 3.6 G/DL (ref 3.5–5.2)
ALP SERPL-CCNC: 97 U/L (ref 55–135)
ALT SERPL W/O P-5'-P-CCNC: 20 U/L (ref 10–44)
ANION GAP SERPL CALC-SCNC: 13 MMOL/L (ref 8–16)
AST SERPL-CCNC: 30 U/L (ref 10–40)
BASOPHILS # BLD AUTO: 0.05 K/UL (ref 0–0.2)
BASOPHILS NFR BLD: 0.3 % (ref 0–1.9)
BILIRUB SERPL-MCNC: 0.5 MG/DL (ref 0.1–1)
BILIRUB UR QL STRIP: NEGATIVE
BNP SERPL-MCNC: 183 PG/ML (ref 0–99)
BUN SERPL-MCNC: 16 MG/DL (ref 8–23)
CALCIUM SERPL-MCNC: 9.3 MG/DL (ref 8.7–10.5)
CHLORIDE SERPL-SCNC: 102 MMOL/L (ref 95–110)
CLARITY UR REFRACT.AUTO: CLEAR
CO2 SERPL-SCNC: 23 MMOL/L (ref 23–29)
COLOR UR AUTO: YELLOW
CREAT SERPL-MCNC: 1.2 MG/DL (ref 0.5–1.4)
CTP QC/QA: YES
CTP QC/QA: YES
DIFFERENTIAL METHOD BLD: ABNORMAL
EOSINOPHIL # BLD AUTO: 0.4 K/UL (ref 0–0.5)
EOSINOPHIL NFR BLD: 2.5 % (ref 0–8)
ERYTHROCYTE [DISTWIDTH] IN BLOOD BY AUTOMATED COUNT: 12.1 % (ref 11.5–14.5)
EST. GFR  (NO RACE VARIABLE): 48.1 ML/MIN/1.73 M^2
GLUCOSE SERPL-MCNC: 304 MG/DL (ref 70–110)
GLUCOSE UR QL STRIP: ABNORMAL
GROUP A STREP, MOLECULAR: NEGATIVE
HCT VFR BLD AUTO: 41.9 % (ref 37–48.5)
HGB BLD-MCNC: 13.8 G/DL (ref 12–16)
HGB UR QL STRIP: NEGATIVE
IMM GRANULOCYTES # BLD AUTO: 0.08 K/UL (ref 0–0.04)
IMM GRANULOCYTES NFR BLD AUTO: 0.5 % (ref 0–0.5)
KETONES UR QL STRIP: NEGATIVE
LACTATE SERPL-SCNC: 2.3 MMOL/L (ref 0.5–2.2)
LACTATE SERPL-SCNC: 2.8 MMOL/L (ref 0.5–2.2)
LEUKOCYTE ESTERASE UR QL STRIP: NEGATIVE
LYMPHOCYTES # BLD AUTO: 1.1 K/UL (ref 1–4.8)
LYMPHOCYTES NFR BLD: 7 % (ref 18–48)
MCH RBC QN AUTO: 28.9 PG (ref 27–31)
MCHC RBC AUTO-ENTMCNC: 32.9 G/DL (ref 32–36)
MCV RBC AUTO: 88 FL (ref 82–98)
MONOCYTES # BLD AUTO: 0.8 K/UL (ref 0.3–1)
MONOCYTES NFR BLD: 5 % (ref 4–15)
NEUTROPHILS # BLD AUTO: 12.7 K/UL (ref 1.8–7.7)
NEUTROPHILS NFR BLD: 84.7 % (ref 38–73)
NITRITE UR QL STRIP: NEGATIVE
NRBC BLD-RTO: 0 /100 WBC
PH UR STRIP: 7 [PH] (ref 5–8)
PLATELET # BLD AUTO: 308 K/UL (ref 150–450)
PMV BLD AUTO: 9.3 FL (ref 9.2–12.9)
POC MOLECULAR INFLUENZA A AGN: NEGATIVE
POC MOLECULAR INFLUENZA B AGN: NEGATIVE
POTASSIUM SERPL-SCNC: 4.2 MMOL/L (ref 3.5–5.1)
PROCALCITONIN SERPL IA-MCNC: 0.11 NG/ML
PROT SERPL-MCNC: 6.8 G/DL (ref 6–8.4)
PROT UR QL STRIP: NEGATIVE
RBC # BLD AUTO: 4.78 M/UL (ref 4–5.4)
SARS-COV-2 RDRP RESP QL NAA+PROBE: NEGATIVE
SODIUM SERPL-SCNC: 138 MMOL/L (ref 136–145)
SP GR UR STRIP: 1.02 (ref 1–1.03)
TROPONIN I SERPL DL<=0.01 NG/ML-MCNC: 0.01 NG/ML (ref 0–0.03)
TROPONIN I SERPL DL<=0.01 NG/ML-MCNC: <0.006 NG/ML (ref 0–0.03)
URN SPEC COLLECT METH UR: ABNORMAL
UROBILINOGEN UR STRIP-ACNC: NEGATIVE EU/DL
WBC # BLD AUTO: 15.05 K/UL (ref 3.9–12.7)

## 2024-03-29 PROCEDURE — 25000242 PHARM REV CODE 250 ALT 637 W/ HCPCS: Mod: ER | Performed by: EMERGENCY MEDICINE

## 2024-03-29 PROCEDURE — 94640 AIRWAY INHALATION TREATMENT: CPT | Mod: ER

## 2024-03-29 PROCEDURE — 99285 EMERGENCY DEPT VISIT HI MDM: CPT | Mod: 25,ER

## 2024-03-29 PROCEDURE — 81003 URINALYSIS AUTO W/O SCOPE: CPT | Mod: ER | Performed by: EMERGENCY MEDICINE

## 2024-03-29 PROCEDURE — 93005 ELECTROCARDIOGRAM TRACING: CPT | Mod: ER

## 2024-03-29 PROCEDURE — 25000003 PHARM REV CODE 250: Mod: ER | Performed by: EMERGENCY MEDICINE

## 2024-03-29 PROCEDURE — 96361 HYDRATE IV INFUSION ADD-ON: CPT | Mod: ER

## 2024-03-29 PROCEDURE — 87651 STREP A DNA AMP PROBE: CPT | Mod: ER | Performed by: EMERGENCY MEDICINE

## 2024-03-29 PROCEDURE — 94761 N-INVAS EAR/PLS OXIMETRY MLT: CPT | Mod: ER

## 2024-03-29 PROCEDURE — 87040 BLOOD CULTURE FOR BACTERIA: CPT | Mod: 59 | Performed by: EMERGENCY MEDICINE

## 2024-03-29 PROCEDURE — 96375 TX/PRO/DX INJ NEW DRUG ADDON: CPT | Mod: ER

## 2024-03-29 PROCEDURE — 84484 ASSAY OF TROPONIN QUANT: CPT | Mod: 91,ER | Performed by: EMERGENCY MEDICINE

## 2024-03-29 PROCEDURE — 93010 ELECTROCARDIOGRAM REPORT: CPT | Mod: ,,, | Performed by: STUDENT IN AN ORGANIZED HEALTH CARE EDUCATION/TRAINING PROGRAM

## 2024-03-29 PROCEDURE — 87389 HIV-1 AG W/HIV-1&-2 AB AG IA: CPT | Performed by: EMERGENCY MEDICINE

## 2024-03-29 PROCEDURE — 87502 INFLUENZA DNA AMP PROBE: CPT | Mod: ER

## 2024-03-29 PROCEDURE — 83605 ASSAY OF LACTIC ACID: CPT | Mod: ER | Performed by: EMERGENCY MEDICINE

## 2024-03-29 PROCEDURE — 86803 HEPATITIS C AB TEST: CPT | Performed by: EMERGENCY MEDICINE

## 2024-03-29 PROCEDURE — 80053 COMPREHEN METABOLIC PANEL: CPT | Mod: ER | Performed by: EMERGENCY MEDICINE

## 2024-03-29 PROCEDURE — 96365 THER/PROPH/DIAG IV INF INIT: CPT | Mod: ER

## 2024-03-29 PROCEDURE — 87635 SARS-COV-2 COVID-19 AMP PRB: CPT | Mod: ER | Performed by: EMERGENCY MEDICINE

## 2024-03-29 PROCEDURE — 63600175 PHARM REV CODE 636 W HCPCS: Mod: ER | Performed by: EMERGENCY MEDICINE

## 2024-03-29 PROCEDURE — 85025 COMPLETE CBC W/AUTO DIFF WBC: CPT | Mod: ER | Performed by: EMERGENCY MEDICINE

## 2024-03-29 PROCEDURE — 83880 ASSAY OF NATRIURETIC PEPTIDE: CPT | Mod: ER | Performed by: EMERGENCY MEDICINE

## 2024-03-29 PROCEDURE — 84145 PROCALCITONIN (PCT): CPT | Mod: ER | Performed by: EMERGENCY MEDICINE

## 2024-03-29 RX ORDER — ALBUTEROL SULFATE 0.83 MG/ML
2.5 SOLUTION RESPIRATORY (INHALATION) EVERY 6 HOURS PRN
Qty: 120 EACH | Refills: 0 | Status: SHIPPED | OUTPATIENT
Start: 2024-03-29

## 2024-03-29 RX ORDER — METHYLPREDNISOLONE SOD SUCC 125 MG
125 VIAL (EA) INJECTION
Status: COMPLETED | OUTPATIENT
Start: 2024-03-29 | End: 2024-03-29

## 2024-03-29 RX ORDER — PREDNISONE 5 MG/1
TABLET ORAL
Qty: 20 TABLET | Refills: 0 | Status: SHIPPED | OUTPATIENT
Start: 2024-03-29 | End: 2024-04-10

## 2024-03-29 RX ORDER — BENZONATATE 200 MG/1
200 CAPSULE ORAL 3 TIMES DAILY PRN
Qty: 90 CAPSULE | Refills: 2 | Status: SHIPPED | OUTPATIENT
Start: 2024-03-29 | End: 2024-04-25

## 2024-03-29 RX ORDER — IPRATROPIUM BROMIDE AND ALBUTEROL SULFATE 2.5; .5 MG/3ML; MG/3ML
3 SOLUTION RESPIRATORY (INHALATION)
Status: COMPLETED | OUTPATIENT
Start: 2024-03-29 | End: 2024-03-29

## 2024-03-29 RX ORDER — AMOXICILLIN 500 MG/1
500 TABLET, FILM COATED ORAL EVERY 12 HOURS
Qty: 20 TABLET | Refills: 0 | Status: SHIPPED | OUTPATIENT
Start: 2024-03-29 | End: 2024-04-08

## 2024-03-29 RX ORDER — ACETAMINOPHEN 325 MG/1
650 TABLET ORAL
Status: COMPLETED | OUTPATIENT
Start: 2024-03-29 | End: 2024-03-29

## 2024-03-29 RX ADMIN — ACETAMINOPHEN 650 MG: 325 TABLET ORAL at 04:03

## 2024-03-29 RX ADMIN — METHYLPREDNISOLONE SODIUM SUCCINATE 125 MG: 125 INJECTION, POWDER, FOR SOLUTION INTRAMUSCULAR; INTRAVENOUS at 05:03

## 2024-03-29 RX ADMIN — IPRATROPIUM BROMIDE AND ALBUTEROL SULFATE 3 ML: 2.5; .5 SOLUTION RESPIRATORY (INHALATION) at 12:03

## 2024-03-29 RX ADMIN — CEFTRIAXONE 1 G: 1 INJECTION, POWDER, FOR SOLUTION INTRAMUSCULAR; INTRAVENOUS at 02:03

## 2024-03-29 RX ADMIN — SODIUM CHLORIDE 1400 ML: 9 INJECTION, SOLUTION INTRAVENOUS at 02:03

## 2024-03-29 RX ADMIN — SODIUM CHLORIDE 1000 ML: 9 INJECTION, SOLUTION INTRAVENOUS at 01:03

## 2024-03-29 NOTE — ED PROVIDER NOTES
Emergency Medicine Provider Note - 3/29/2024       History     Chief Complaint   Patient presents with    Weakness     Generalized, weakness, cough, headach began yesterday. Cough and stating she cannot breathe. Vomited this morning.        Allergies:  Review of patient's allergies indicates:   Allergen Reactions    Floxin [ofloxacin] Diarrhea and Nausea And Vomiting    Fluvastatin Other (See Comments)     myalgia    Pravastatin Other (See Comments)     myalgia        History of Present Illness   HPI    3/29/2024, 12:01 PM  The history is provided by the patient    Kelsie Bustamante is a 73 y.o. female presenting to the ED for fatigue, cough, headache.  Patient reports that she normally sleeps on 5 pillows at night.  She notes slight runny nose, sore throat, cough.  Onset yesterday.  Patient did have indigestion to her chest that lasted 15 minutes this morning.  Did not radiate to the arm neck or jaw.  Patient denies any dysuria, urgency, frequency, abdominal pain, calf pain, calf tenderness.      Patient had seen pulmonary on March 4, 2024:  COPD exacerbation  -     albuterol (PROVENTIL) 2.5 mg /3 mL (0.083 %) nebulizer solution; Take 3 mLs (2.5 mg total) by nebulization every 4 to 6 hours as needed for Wheezing or Shortness of Breath.  Dispense: 360 mL; Refill: 11  -     predniSONE (DELTASONE) 5 MG tablet; Take 4 tablets (20 mg total) by mouth once daily for 3 days, THEN 2 tablets (10 mg total) once daily for 3 days, THEN 1 tablet (5 mg total) once daily for 3 days, THEN 0.5 tablets (2.5 mg total) once daily for 3 days.  Dispense: 20 tablet; Refill: 0  -     doxycycline (MONODOX) 100 MG capsule; Take 1 capsule (100 mg total) by mouth 2 (two) times daily.  Dispense: 20 capsule; Refill: 0     Mild intermittent asthmatic bronchitis with acute exacerbation  -     predniSONE (DELTASONE) 5 MG tablet; Take 4 tablets (20 mg total) by mouth once daily for 3 days, THEN 2 tablets (10 mg total) once daily for 3 days, THEN 1  tablet (5 mg total) once daily for 3 days, THEN 0.5 tablets (2.5 mg total) once daily for 3 days.  Dispense: 20 tablet; Refill: 0  -     doxycycline (MONODOX) 100 MG capsule; Take 1 capsule (100 mg total) by mouth 2 (two) times daily.  Dispense: 20 capsule; Refill: 0     Chronic cough  -     benzonatate (TESSALON) 200 MG capsule; Take 1 capsule (200 mg total) by mouth 3 (three) times daily as needed for Cough.  Dispense: 90 capsule; Refill: 2    Arrival mode: Private Vehicle     PCP: Cody Parks MD     Past Medical History:  Past Medical History:   Diagnosis Date    Achalasia     demetrius    Atrial fibrillation with RVR 05/11/2021    Cataract     Colon polyp     Gastroesophageal reflux     Hiatal hernia     Hx of colonic polyps 08/15/2014    per colonoscopy in      Hyperlipidemia 06/24/2022    Hypertension     Pacemaker     Peripheral neuropathy     Postmenopausal HRT (hormone replacement therapy)     failed tapering off    SAH (subarachnoid hemorrhage)     from a fall late 22    Sepsis 05/05/2021    Last Assessment & Plan:  Formatting of this note might be different from the original. History & Physical Patient meets sepsis criteria with a white cell count of 15, and tachypnea.  Source of infection not clear at this time.  No evidence for meningitis.  Cover with broad-spectrum antibiotics especially given invasive dental procedure done recently.  Check blood cultures and monitor for fever.  R    Sinusitis     Thyroid nodule 3/16 subcm; kathleen 2 yrs    Type 2 diabetes mellitus with neurological manifestations     Vasodepressor syncope 08/22/2018       Past Surgical History:  Past Surgical History:   Procedure Laterality Date    A-V CARDIAC PACEMAKER INSERTION Left 12/6/2022    Procedure: INSERTION, CARDIAC PACEMAKER, DUAL CHAMBER;  Surgeon: Daryl Walker MD;  Location: [facility] CATH LAB;  Service: Cardiology;  Laterality: Left;  Patient instructed 11AM start time/needs ptt/pt/inr  All [employer] with His  lead/MDT His as back up//Vinod notified    BREAST BIOPSY Left 2021    cataract Left      SECTION      CHOLECYSTECTOMY      CYST REMOVAL Right 2021    [facility]    ESOPHAGEAL MANOMETRY WITH MEASUREMENT OF IMPEDANCE N/A 10/6/2022    Procedure: MANOMETRY, ESOPHAGUS, WITH IMPEDANCE MEASUREMENT  Cardiac Clearance/Eliquis 2 day hold approval received per Dr. RABIA Aguilar, Cardiology on 22.  Note in encounters.  LB;  Surgeon: Bruna Fletcher MD;  Location: [facility];  Service: Endoscopy;  Laterality: N/A;    ESOPHAGOGASTRODUODENOSCOPY N/A 10/6/2022    Procedure: EGD (ESOPHAGOGASTRODUODENOSCOPY);  Surgeon: Bruna Fletcher MD;  Location: [facility];  Service: Endoscopy;  Laterality: N/A;    HYSTERECTOMY      nonca    OOPHORECTOMY      TILT TABLE TEST N/A 10/25/2018    Procedure: TILT TABLE TEST;  Surgeon: Daryl Walker MD;  Location: [facility] CATH LAB;  Service: Cardiology;  Laterality: N/A;  VAS.DEP.SYN,HUT,FAS,3PREP    TONSILLECTOMY           Family History:  Family History   Problem Relation Age of Onset    Aneurysm Sister     Heart disease Mother     Diabetes Father     Coronary artery disease Father     Coronary artery disease Brother     Parkinsonism Brother        Social History:  Social History     Tobacco Use    Smoking status: Never    Smokeless tobacco: Never   Substance and Sexual Activity    Alcohol use: No    Drug use: No    Sexual activity: Never        Review of Systems   Review of Systems   Constitutional:  Positive for fatigue. Negative for fever.   HENT:  Positive for postnasal drip, rhinorrhea and sore throat. Negative for congestion.    Respiratory:  Positive for cough. Negative for shortness of breath.    Cardiovascular:  Positive for chest pain.   Gastrointestinal:  Negative for abdominal pain, nausea and vomiting.   Genitourinary:  Negative for dysuria.   Musculoskeletal:  Negative for back pain.   Skin:  Negative for rash.   Neurological:   Negative for weakness.   Hematological:  Does not bruise/bleed easily.        Physical Exam     Initial Vitals   BP Pulse Resp Temp SpO2   03/29/24 1130 03/29/24 1124 03/29/24 1124 03/29/24 1124 03/29/24 1124   (!) 154/68 95 18 97.9 °F (36.6 °C) 96 %      MAP       --                 Physical Exam    Nursing Notes and Vital Signs Reviewed.  Constitutional: Patient is in no apparent distress. Well-developed and well-nourished.  Head: Atraumatic. Normocephalic.  Eyes: PERRL. EOM intact. Conjunctivae are not pale. No scleral icterus.  ENT: Mucous membranes are moist. Oropharynx is clear and symmetric.  There is erythema in the posterior throat.  There is hyperemia of the nasal mucosa.  Neck: Supple. Full ROM. No lymphadenopathy.  Cardiovascular: Regular rate. Regular rhythm. No murmurs, rubs, or gallops. Distal pulses are 2+ and symmetric.  Pulmonary/Chest: No respiratory distress. Clear to auscultation bilaterally.(+) Wheezing noted  Abdominal: Soft and non-distended.  There is no tenderness.  No rebound, guarding, or rigidity. Good bowel sounds.  Genitourinary: No CVA tenderness  Musculoskeletal: Moves all extremities. No obvious deformities. No edema. No calf tenderness.  Skin: Warm and dry.  Neurological:  Alert, awake, and appropriate.  Normal speech.  No acute focal neurological deficits are appreciated.  Psychiatric: Normal affect. Good eye contact. Appropriate in content.     ED Course   ED Procedures:  Critical Care    Date/Time: 3/29/2024 12:04 PM    Performed by: Annita Dudley DO  Authorized by: Annita Dudley DO  Direct patient critical care time: 20 minutes  Ordering / reviewing critical care time: 2 minutes  Documentation critical care time: 5 minutes  Total critical care time (exclusive of procedural time) : 27 minutes  Critical care time was exclusive of separately billable procedures and treating other patients.  Critical care was necessary to treat or prevent imminent or life-threatening  deterioration of the following conditions: dehydration and sepsis.  Critical care was time spent personally by me on the following activities: blood draw for specimens, development of treatment plan with patient or surrogate, discussions with primary provider, evaluation of patient's response to treatment, examination of patient, obtaining history from patient or surrogate, ordering and performing treatments and interventions, ordering and review of radiographic studies, ordering and review of laboratory studies, pulse oximetry, re-evaluation of patient's condition, review of old charts and vascular access procedures.          ED Vital Signs:  Vitals:    03/29/24 1131 03/29/24 1133 03/29/24 1205 03/29/24 1214   BP:  (!) 163/79 (!) 173/76    Pulse: 77 77 87 83   Resp: 14   18   Temp:       TempSrc:       SpO2: 95% 95%  95%   Weight:        03/29/24 1223 03/29/24 1233 03/29/24 1318 03/29/24 1322   BP:  (!) 190/74 (!) 87/50 (!) 142/63   Pulse: 80 75 74 81   Resp: (!) 23      Temp:       TempSrc:       SpO2: 95%      Weight:        03/29/24 1333 03/29/24 1402 03/29/24 1526 03/29/24 1531   BP: (!) 178/76 (!) 208/79  (!) 159/67   Pulse: 82 87 91 93   Resp:   20    Temp:       TempSrc:       SpO2: 95%  96% 96%   Weight:        03/29/24 1603 03/29/24 1640 03/29/24 1730   BP: (!) 199/81  (!) 165/68   Pulse: 93     Resp: 20     Temp:  99 °F (37.2 °C)    TempSrc:      SpO2:      Weight:          Abnormal Lab Results:  Labs Reviewed   COMPREHENSIVE METABOLIC PANEL - Abnormal; Notable for the following components:       Result Value    Glucose 304 (*)     eGFR 48.1 (*)     All other components within normal limits   CBC W/ AUTO DIFFERENTIAL - Abnormal; Notable for the following components:    WBC 15.05 (*)     Gran # (ANC) 12.7 (*)     Immature Grans (Abs) 0.08 (*)     Gran % 84.7 (*)     Lymph % 7.0 (*)     All other components within normal limits   B-TYPE NATRIURETIC PEPTIDE - Abnormal; Notable for the following components:      (*)     All other components within normal limits   URINALYSIS, REFLEX TO URINE CULTURE - Abnormal; Notable for the following components:    Glucose, UA 2+ (*)     All other components within normal limits    Narrative:     Specimen Source->Urine   LACTIC ACID, PLASMA - Abnormal; Notable for the following components:    Lactate (Lactic Acid) 2.8 (*)     All other components within normal limits   LACTIC ACID, PLASMA - Abnormal; Notable for the following components:    Lactate (Lactic Acid) 2.3 (*)     All other components within normal limits   GROUP A STREP, MOLECULAR    Narrative:     Order this one   CULTURE, BLOOD   CULTURE, BLOOD   THROAT SCREEN, RAPID STREP   TROPONIN I   HIV 1 / 2 ANTIBODY    Narrative:     Release to patient->Immediate   HEPATITIS C ANTIBODY    Narrative:     Release to patient->Immediate   HEP C VIRUS HOLD SPECIMEN    Narrative:     Release to patient->Immediate   PROCALCITONIN   TROPONIN I   POCT INFLUENZA A/B MOLECULAR   SARS-COV-2 RDRP GENE    Narrative:     This test utilizes isothermal nucleic acid amplification technology to detect the SARS-CoV-2 RdRp nucleic acid segment. The analytical sensitivity (limit of detection) is 500 copies/swab.     A POSITIVE result is indicative of the presence of SARS-CoV-2 RNA; clinical correlation with patient history and other diagnostic information is necessary to determine patient infection status.    A NEGATIVE result means that SARS-CoV-2 nucleic acids are not present above the limit of detection. A NEGATIVE result should be treated as presumptive. It does not rule out the possibility of COVID-19 and should not be the sole basis for treatment decisions. If COVID-19 is strongly suspected based on clinical and exposure history, re-testing using an alternate molecular assay should be considered.     Commercial kits are provided by [employer].   _________________________________________________________________   The authorized Fact  Sheet for Healthcare Providers and the authorized Fact Sheet for Patients of the ID NOW COVID-19 are available on the FDA website:    https://www.fda.gov/media/341781/download      https://www.fda.gov/media/928723/download           All Lab Results:  Results for orders placed or performed during the hospital encounter of 03/29/24   Group A Strep, Molecular    Specimen: Throat   Result Value Ref Range    Group A Strep, Molecular Negative Negative   Comprehensive metabolic panel   Result Value Ref Range    Sodium 138 136 - 145 mmol/L    Potassium 4.2 3.5 - 5.1 mmol/L    Chloride 102 95 - 110 mmol/L    CO2 23 23 - 29 mmol/L    Glucose 304 (H) 70 - 110 mg/dL    BUN 16 8 - 23 mg/dL    Creatinine 1.2 0.5 - 1.4 mg/dL    Calcium 9.3 8.7 - 10.5 mg/dL    Total Protein 6.8 6.0 - 8.4 g/dL    Albumin 3.6 3.5 - 5.2 g/dL    Total Bilirubin 0.5 0.1 - 1.0 mg/dL    Alkaline Phosphatase 97 55 - 135 U/L    AST 30 10 - 40 U/L    ALT 20 10 - 44 U/L    eGFR 48.1 (A) >60 mL/min/1.73 m^2    Anion Gap 13 8 - 16 mmol/L   CBC auto differential   Result Value Ref Range    WBC 15.05 (H) 3.90 - 12.70 K/uL    RBC 4.78 4.00 - 5.40 M/uL    Hemoglobin 13.8 12.0 - 16.0 g/dL    Hematocrit 41.9 37.0 - 48.5 %    MCV 88 82 - 98 fL    MCH 28.9 27.0 - 31.0 pg    MCHC 32.9 32.0 - 36.0 g/dL    RDW 12.1 11.5 - 14.5 %    Platelets 308 150 - 450 K/uL    MPV 9.3 9.2 - 12.9 fL    Immature Granulocytes 0.5 0.0 - 0.5 %    Gran # (ANC) 12.7 (H) 1.8 - 7.7 K/uL    Immature Grans (Abs) 0.08 (H) 0.00 - 0.04 K/uL    Lymph # 1.1 1.0 - 4.8 K/uL    Mono # 0.8 0.3 - 1.0 K/uL    Eos # 0.4 0.0 - 0.5 K/uL    Baso # 0.05 0.00 - 0.20 K/uL    nRBC 0 0 /100 WBC    Gran % 84.7 (H) 38.0 - 73.0 %    Lymph % 7.0 (L) 18.0 - 48.0 %    Mono % 5.0 4.0 - 15.0 %    Eosinophil % 2.5 0.0 - 8.0 %    Basophil % 0.3 0.0 - 1.9 %    Differential Method Automated    Troponin I   Result Value Ref Range    Troponin I 0.014 0.000 - 0.026 ng/mL   Brain natriuretic peptide   Result Value Ref Range    BNP  183 (H) 0 - 99 pg/mL   Urinalysis, Reflex to Urine Culture Urine, Clean Catch    Specimen: Urine   Result Value Ref Range    Specimen UA Urine, Clean Catch     Color, UA Yellow Yellow, Straw, Shelia    Appearance, UA Clear Clear    pH, UA 7.0 5.0 - 8.0    Specific Gravity, UA 1.020 1.005 - 1.030    Protein, UA Negative Negative    Glucose, UA 2+ (A) Negative    Ketones, UA Negative Negative    Bilirubin (UA) Negative Negative    Occult Blood UA Negative Negative    Nitrite, UA Negative Negative    Urobilinogen, UA Negative <2.0 EU/dL    Leukocytes, UA Negative Negative   HIV 1/2 Ag/Ab (4th Gen)   Result Value Ref Range    HIV 1/2 Ag/Ab Negative Negative   Hepatitis C Antibody   Result Value Ref Range    Hepatitis C Ab Negative Negative   HCV Virus Hold Specimen   Result Value Ref Range    HEP C Virus Hold Specimen Hold for HCV sendout    Lactic acid, plasma   Result Value Ref Range    Lactate (Lactic Acid) 2.8 (H) 0.5 - 2.2 mmol/L   Procalcitonin   Result Value Ref Range    Procalcitonin 0.11 <0.25 ng/mL   Troponin I   Result Value Ref Range    Troponin I <0.006 0.000 - 0.026 ng/mL   Lactic acid, plasma   Result Value Ref Range    Lactate (Lactic Acid) 2.3 (H) 0.5 - 2.2 mmol/L   POCT Influenza A/B Molecular   Result Value Ref Range    POC Molecular Influenza A Ag Negative Negative, Not Reported    POC Molecular Influenza B Ag Negative Negative, Not Reported     Acceptable Yes    POCT COVID-19 Rapid Screening   Result Value Ref Range    POC Rapid COVID Negative Negative     Acceptable Yes            The EKG was ordered, reviewed, and independently interpreted by the ED provider:      ECG Results              EKG 12-lead (Preliminary result)  Result time 03/29/24 12:02:09      Wet Read by Annita Dudley DO (03/29/24 12:02:09, [facility] Emergency Dept, Emergency Medicine)    Sinus rhythm with sinus arrhythmia.  PVCs.  Normal axis.  No ST segment elevation.  No STEMI.                                     Imaging Results:  Imaging Results              X-ray Chest PA And Lateral (Final result)  Result time 03/29/24 12:05:44      Final result by Endy Covarrubias MD (03/29/24 12:05:44)                   Impression:      No acute process seen.      Electronically signed by: Endy Covarrubias MD  Date:    03/29/2024  Time:    12:05               Narrative:    EXAMINATION:  XR CHEST PA AND LATERAL    CLINICAL HISTORY:  Shortness of breath;    COMPARISON:  01/23/2024    FINDINGS:  Cardiac silhouette is normal.  The lungs demonstrate no evidence of active disease.  No evidence of pleural effusion or pneumothorax.  Bones appear intact.  Moderate degenerative changes and moderate atherosclerotic disease.  Left chest wall generator with dual lead pacing wires similar in configuration to prior.                                       Type of Interpretation: ED Physician (Independently Interpreted).  Radiology Procedure Done: Portable CXR.  Interpretation: No acute abnormality.  No inflitrate            The Emergency Provider reviewed the vital signs and test results, which are outlined above.     ED Discussion   ED Medication(s):  Medications   albuterol-ipratropium 2.5 mg-0.5 mg/3 mL nebulizer solution 3 mL (3 mLs Nebulization Given 3/29/24 1214)   sodium chloride 0.9% bolus 1,000 mL 1,000 mL (0 mLs Intravenous Stopped 3/29/24 1421)   sodium chloride 0.9% bolus 1,400 mL 1,400 mL (0 mLs Intravenous Stopped 3/29/24 1629)   cefTRIAXone (Rocephin) 1 g in dextrose 5 % in water (D5W) 100 mL IVPB (MB+) (0 g Intravenous Stopped 3/29/24 1525)   acetaminophen tablet 650 mg (650 mg Oral Given 3/29/24 1642)   methylPREDNISolone sodium succinate injection 125 mg (125 mg Intravenous Given 3/29/24 1730)       ED Course as of 03/30/24 0800   Fri Mar 29, 2024   1429 Patient updated on results of test and POC.  [LB]   1715 Patient is able to ambulate to the bathroom without difficulty.  Patient does not appear to have any  respiratory distress.  Patient is nontoxic.  Patient's lactic acid is almost back normal at 2.3.  Patient is requesting to go home.  Spouse feels comfortable watching her at home.  Likely this represents a bronchitis with wheezing. [LB]   1717 Patient had taken the last pill of prednisone yesterday. [LB]      ED Course User Index  [LB] Annita Dudley, DO            5:25 PM Reassessment: Dr. Dudley reassessed the pt.  The pt is resting comfortably and is NAD.  Pt states their sx have improved. Discussed test results, shared treatment plan, specific conditions for return, and the need for f/u.  Answered their questions at this time.  Pt understands and agrees to the plan.  The pt has remained hemodynamically stable through ED course and is stable for discharge.    I discussed with patient and/or family/caretaker that evaluation in the ED does not suggest any emergent or life threatening medical conditions requiring immediate intervention beyond what was provided in the ED, and I believe patient is safe for discharge.  Regardless, an unremarkable evaluation in the ED does not preclude the development or presence of a serious of life threatening condition. As such, patient was instructed to return immediately for any worsening or change in current symptoms.     MIPS Measures     Smoker? No     Hypertension: History of Hypertension: The patient has elevated blood pressure (higher than 120/80) while being treated in the ED but has a history of hypertension.    MIPS Measure # 116 Avoidance of Antibiotic Treatment in Adults age 18 - 64 years of age with Acute Bronchitis      Poor performance:  Dispensing or prescribing antibiotics for patients with acute bronchitis without clearly documented recent    Desired performance:  Antibiotics are not dispensed or prescribed for patients with acute bronchitis and lesser to clearly documented reason.    Medical Exclusions:    The visit resulted in an inpatient admission:  No  Antibiotic use within the last 30 days: No    Chronic disease:  COPD/chronic bronchitis, emphysema, bronchiectasis, obstructive asthma, TB: Yes  Any acute ENT infections:  Acute sinusitis, pharyngitis, infection of the pharynx/larynx/tonsils: No  Other infectious diseases:  Intestinal infections, Lyme disease, per test this, cellulitis, mastoiditis, bone infections, impetigo, skin staph infections, pneumonia, STD: No      I dispensed or prescribed antibiotic to this patient during this visit: Yes    The patient has specific medical reason for the dispensing/prescription of an antibiotic.  That reason is: hx of COPD.         Medical Decision Making                 Medical Decision Making  Differential diagnosis includes:  Urinary tract infection, pneumonia, COVID, influenza, viral infection, atypical ACS, congestive heart failure    Labs:  Procalcitonin 0.11, COVID negative, influenza negative, blood sugar 304.  Anion gap 13.  WBC 15.05.  Positive left shift.  H&H normal.  .  Troponin 0.04.  Group a strep negative.  Urinalysis +2 sugar.  Blood cultures obtained.  Chest x-ray:  No acute abnormality noted on chest x-ray.    ED course:  Patient was given fluid bolus, Rocephin, albuterol.  Patient's lactic acid went from 2.82.3.  Patient likely has exacerbation COPD.          Amount and/or Complexity of Data Reviewed  External Data Reviewed: radiology.     Details: Reviewed echo 11/3/2022  Transthoracic Echocardiographic Report     Patient Name: ARACELIS SMILEY H Patient ID: [id number] Account #:   : 1950 (72y 4m) Gender: F Study Date: 2022 09:26:36 AM   Ht(Cm): 163 Wt(Kg): 74.2 BSA: 1.83 Accession #: [id number]   Sonographer: [email or web address] Location: [facility] Provider: HOLLIS REGALADO     Heart Rate: 36 Quality: The study images were of technically adequate quality.   Ref.Provider: HOLLIS REGALADO     -----------------------    CONCLUSIONS:   1. Normal left ventricular cavity size. Normal left  ventricular systolic function. LVEF   55 - 65%.   2. Normal right ventricular size.     -----------------------    PROCEDURES:   Echocardiographic Report: Transthoracic complete echo, 2D, spectral and tissue Doppler,   color flow Doppler, M-mode.   Sonographer: Leander Mccormick RDCS, RVS     -----------------------    INDICATIONS:   Syncope.     -----------------------    FINDINGS:   Left Ventricle: Normal left ventricular cavity size. Normal left ventricular systolic   function. LVEF 55 - 65%. Normal wall thickness.   Right Ventricle: The right ventricle is not well visualized. Normal right ventricular   size.   Left Atrium: The left atrium is normal in size.   Right Atrium: The right atrium is not well visualized. The right atrium is normal in size.   Mitral Valve: No or trivial mitral valve regurgitation. No mitral valve stenosis.   Aortic Valve: The aortic valve is not well visualized. No or trivial aortic valve   regurgitation. No aortic valve stenosis. AV peak PG 11 mmHg   Tricuspid Valve: No or trivial tricuspid valve regurgitation.   Pulmonic Valve: No or trivial pulmonic valve regurgitation.   Pericardium: Normal pericardium without evidence of pericardial effusion.   Aorta: Normal aortic root size.   IVC: Normal IVC size with >50% collapse with inspiration. Normal estimated RAP around 0-5   mmHg.     -----------------------     Labs: ordered. Decision-making details documented in ED Course.  Radiology: ordered and independent interpretation performed. Decision-making details documented in ED Course.  ECG/medicine tests: ordered and independent interpretation performed. Decision-making details documented in ED Course.    Risk  OTC drugs.  Prescription drug management.        Coding    Prescription Management: I performed a review of the patient's current Rx medication list as input by nursing staff.    Discharge Medication List as of 3/29/2024  5:25 PM        START taking these medications    Details    albuterol (PROVENTIL) 2.5 mg /3 mL (0.083 %) nebulizer solution Take 3 mLs (2.5 mg total) by nebulization every 6 (six) hours as needed for Wheezing. Rescue, Starting Fri 3/29/2024, Normal      amoxicillin (AMOXIL) 500 MG Tab Take 1 tablet (500 mg total) by mouth every 12 (twelve) hours. for 10 days, Starting Fri 3/29/2024, Until Mon 4/8/2024, Normal           CONTINUE these medications which have CHANGED    Details   benzonatate (TESSALON) 200 MG capsule Take 1 capsule (200 mg total) by mouth 3 (three) times daily as needed for Cough., Starting Fri 3/29/2024, Normal      predniSONE (DELTASONE) 5 MG tablet Multiple Dosages:Starting Fri 3/29/2024, Until Sun 3/31/2024 at 2359, THEN Starting Mon 4/1/2024, Until Wed 4/3/2024 at 2359, THEN Starting Thu 4/4/2024, Until Sat 4/6/2024 at 2359, THEN Starting Sun 4/7/2024, Until Tue 4/9/2024 at 2359Take 4 tablet s (20 mg total) by mouth once daily for 3 days, THEN 2 tablets (10 mg total) once daily for 3 days, THEN 1 tablet (5 mg total) once daily for 3 days, THEN 0.5 tablets (2.5 mg total) once daily for 3 days., Normal           CONTINUE these medications which have NOT CHANGED    Details   apixaban (ELIQUIS) 5 mg Tab Take 1 tablet (5 mg total) by mouth 2 (two) times daily., Starting Thu 5/25/2023, Normal      estrogens, conjugated, (PREMARIN) 0.625 MG tablet Take 1 tablet (0.625 mg total) by mouth once daily., Starting Tue 3/26/2024, Normal      ibuprofen (ADVIL,MOTRIN) 600 MG tablet 1 tablet with food or milk as needed Orally Three times a day for 7 days, Historical Med      metFORMIN (GLUCOPHAGE-XR) 500 MG ER 24hr tablet TAKE 1 TABLET BY MOUTH TWICE DAILY WITH MEALS, Starting Tue 1/9/2024, Normal      metoprolol succinate (TOPROL-XL) 50 MG 24 hr tablet Multiple Dosages:Starting Mon 7/31/2023, Until Tue 7/30/2024 at 2359Take 2 tablets (100 mg total) by mouth every morning AND 1 tablet (50 mg total) every evening., Normal      ondansetron (ZOFRAN-ODT) 4 MG TbDL Take 1  "tablet (4 mg total) by mouth every 6 (six) hours as needed (nausea)., Starting Tue 3/19/2024, Until Thu 4/18/2024 at 2359, Normal      pantoprazole (PROTONIX) 40 MG tablet Take 1 tablet (40 mg total) by mouth nightly., Starting Tue 11/14/2023, Until Wed 11/13/2024, Normal      rosuvastatin (CRESTOR) 5 MG tablet Take 1 tablet (5 mg total) by mouth once daily., Starting Tue 3/26/2024, Normal      traZODone (DESYREL) 50 MG tablet Take 1 tablet (50 mg total) by mouth nightly as needed for Insomnia., Starting Mon 2/26/2024, Normal              Discussed case with:N/A      Portions of this note may have been created with voice recognition software. Occasional "wrong-word" or "sound-a-like" substitutions may have occurred due to the inherent limitations of voice recognition software. Please, read the note carefully and recognize, using context, where substitutions have occurred.          Clinical Impression       ICD-10-CM ICD-9-CM   1. Cough, unspecified type  R05.9 786.2   2. SOB (shortness of breath)  R06.02 786.05   3. Leukocytosis, unspecified type  D72.829 288.60   4. Sore throat  J02.9 462   5. Elevated lactic acid level  R79.89 276.2   6. COPD exacerbation  J44.1 491.21   7. Mild intermittent asthmatic bronchitis with acute exacerbation  J45.21 493.92   8. Bronchitis  J40 490   9. Chronic cough  R05.3 786.2   10. COPD with acute exacerbation  J44.1 491.21        Disposition        Disposition: Discharge to home  Patient condition: Stable      ED Follow-up      Follow-up Information       The [address] In 2 days.    Specialty: Pulmonology  Why: Return to emergency department for chest pain, chest pressure, shortness of breath, calf swelling, calf pain, confusion, lethargy, high fever, or other concerns  Contact information:  [id number] [facility] [id number] [phone]  Additional information:  Please park on the [address] side and use the Clinic entrance. Check in on on the " 3rd floor, to the right.                                    Annita Dudley,   03/30/24 0800

## 2024-03-30 LAB
HCV AB SERPL QL IA: NEGATIVE
HEP C VIRUS HOLD SPECIMEN: NORMAL
HIV 1+2 AB+HIV1 P24 AG SERPL QL IA: NEGATIVE

## 2024-03-31 LAB
OHS QRS DURATION: 76 MS
OHS QTC CALCULATION: 421 MS

## 2024-04-01 ENCOUNTER — TELEPHONE (OUTPATIENT)
Dept: PULMONOLOGY | Facility: CLINIC | Age: 74
End: 2024-04-01
Payer: MEDICARE

## 2024-04-01 DIAGNOSIS — E11.42 TYPE 2 DIABETES MELLITUS WITH DIABETIC POLYNEUROPATHY, UNSPECIFIED WHETHER LONG TERM INSULIN USE: ICD-10-CM

## 2024-04-01 RX ORDER — ONDANSETRON 4 MG/1
4 TABLET, ORALLY DISINTEGRATING ORAL EVERY 6 HOURS PRN
Qty: 20 TABLET | Refills: 0 | Status: SHIPPED | OUTPATIENT
Start: 2024-04-01 | End: 2024-05-01

## 2024-04-01 NOTE — TELEPHONE ENCOUNTER
----- Message from Marion Tanner sent at 3/30/2024 12:34 PM CDT -----  Regarding: Post ED visit follow up appt within 7 days of d/c date 3/29/24  Good afternoon: Pt was seen in the ED on 3/29/24 for SOB (shortness of breath); Cough, unspecified type; Leukocytosis, unspecified type; Sore throat; Elevated lactic acid level; COPD with acute exacerbation and has orders to follow up with pulmonology in 2 days. I am requesting scheduling assistance. Please contact pt to schedule a follow up appt by 4/5/24 or earlier, if possible.     Thank you,  Marion Tanner

## 2024-04-01 NOTE — TELEPHONE ENCOUNTER
No care due was identified.  [facility] Embedded Care Due Messages. Reference number: [id number].   4/01/2024 3:01:43 PM CDT

## 2024-04-01 NOTE — TELEPHONE ENCOUNTER
----- Message from Ray Albert sent at 4/1/2024 10:07 AM CDT -----  Type:  Patient Returning Call    Who Called:pt  Who Left Message for Patient:  Does the patient know what this is regarding?: Rx  Would the patient rather a call back or a response via [facility]? Call  Best Call Back Number: [phone]  Additional Information: pt states she would like to speak with office before end of day if possible about a prescription. Thank you

## 2024-04-01 NOTE — TELEPHONE ENCOUNTER
Pt states that Alsion called in Monjauro [facility] to [facility] but they have not filled it. She would like for both meds to go to the [facility] in [place]. I was able to send the Zofran to the [facility] in [place] but did not see the Lucita to request it.

## 2024-04-02 ENCOUNTER — OFFICE VISIT (OUTPATIENT)
Dept: PULMONOLOGY | Facility: CLINIC | Age: 74
End: 2024-04-02
Payer: MEDICARE

## 2024-04-02 VITALS
BODY MASS INDEX: 30.43 KG/M2 | HEART RATE: 91 BPM | DIASTOLIC BLOOD PRESSURE: 82 MMHG | WEIGHT: 171.75 LBS | RESPIRATION RATE: 17 BRPM | SYSTOLIC BLOOD PRESSURE: 130 MMHG | HEIGHT: 63 IN

## 2024-04-02 DIAGNOSIS — R05.3 CHRONIC COUGH: Primary | ICD-10-CM

## 2024-04-02 DIAGNOSIS — J40 BRONCHITIS: ICD-10-CM

## 2024-04-02 DIAGNOSIS — J45.901 EXACERBATION OF ASTHMA, UNSPECIFIED ASTHMA SEVERITY, UNSPECIFIED WHETHER PERSISTENT: ICD-10-CM

## 2024-04-02 PROCEDURE — 1159F MED LIST DOCD IN RCRD: CPT | Mod: HCNC,CPTII,S$GLB, | Performed by: HOSPITALIST

## 2024-04-02 PROCEDURE — 3008F BODY MASS INDEX DOCD: CPT | Mod: HCNC,CPTII,S$GLB, | Performed by: HOSPITALIST

## 2024-04-02 PROCEDURE — 99999 PR PBB SHADOW E&M-EST. PATIENT-LVL IV: CPT | Mod: PBBFAC,HCNC,, | Performed by: HOSPITALIST

## 2024-04-02 PROCEDURE — 3066F NEPHROPATHY DOC TX: CPT | Mod: HCNC,CPTII,S$GLB, | Performed by: HOSPITALIST

## 2024-04-02 PROCEDURE — 1101F PT FALLS ASSESS-DOCD LE1/YR: CPT | Mod: HCNC,CPTII,S$GLB, | Performed by: HOSPITALIST

## 2024-04-02 PROCEDURE — 3288F FALL RISK ASSESSMENT DOCD: CPT | Mod: HCNC,CPTII,S$GLB, | Performed by: HOSPITALIST

## 2024-04-02 PROCEDURE — 3061F NEG MICROALBUMINURIA REV: CPT | Mod: HCNC,CPTII,S$GLB, | Performed by: HOSPITALIST

## 2024-04-02 PROCEDURE — 99213 OFFICE O/P EST LOW 20 MIN: CPT | Mod: HCNC,S$GLB,, | Performed by: HOSPITALIST

## 2024-04-02 PROCEDURE — 3052F HG A1C>EQUAL 8.0%<EQUAL 9.0%: CPT | Mod: HCNC,CPTII,S$GLB, | Performed by: HOSPITALIST

## 2024-04-02 PROCEDURE — 3079F DIAST BP 80-89 MM HG: CPT | Mod: HCNC,CPTII,S$GLB, | Performed by: HOSPITALIST

## 2024-04-02 PROCEDURE — 3075F SYST BP GE 130 - 139MM HG: CPT | Mod: HCNC,CPTII,S$GLB, | Performed by: HOSPITALIST

## 2024-04-02 PROCEDURE — 1160F RVW MEDS BY RX/DR IN RCRD: CPT | Mod: HCNC,CPTII,S$GLB, | Performed by: HOSPITALIST

## 2024-04-02 RX ORDER — AZITHROMYCIN 250 MG/1
TABLET, FILM COATED ORAL
Qty: 6 TABLET | Refills: 0 | Status: SHIPPED | OUTPATIENT
Start: 2024-04-02 | End: 2024-04-07

## 2024-04-02 RX ORDER — IPRATROPIUM BROMIDE AND ALBUTEROL SULFATE 2.5; .5 MG/3ML; MG/3ML
3 SOLUTION RESPIRATORY (INHALATION) EVERY 6 HOURS PRN
Qty: 180 ML | Refills: 3 | Status: SHIPPED | OUTPATIENT
Start: 2024-04-02 | End: 2025-04-02

## 2024-04-02 RX ORDER — BUDESONIDE 0.25 MG/2ML
0.25 INHALANT ORAL DAILY
Qty: 180 ML | Refills: 1 | Status: SHIPPED | OUTPATIENT
Start: 2024-04-02 | End: 2025-04-02

## 2024-04-02 NOTE — ASSESSMENT & PLAN NOTE
- wheezing on exam  - has been on prednisone and amoxicillin since Thursday without improvement  - has difficulty with inhalers  - rx sent for budesonide and duo-nebs, mask ordered with nebulizer  - changing abx, continue prednisone  - ED precautions discussed

## 2024-04-02 NOTE — PATIENT INSTRUCTIONS
Stop taking Amoxicillin. Start taking Azithromycin.     Start using duo-neb nebulizer treatment as needed during the day.     Do the budesonide nebulizer treatment once in the morning.

## 2024-04-02 NOTE — PROGRESS NOTES
Subjective:      Patient ID: Kelsie Bustamante is a 73 y.o. female.    Chief Complaint: cough    Interval Hx 4/2/2024:    Mrs. Bustamante presents to Pulmonary clinic for follow up ER visit for SOB. Chart reviewed- She was seen by GI 2/26/24- recomended to continue Protonix and to see general surgery for previously identified achalasia. She was seen by Dr. Silver 3/4/24- treated with tessalon perles, prednisone taper, doxycycline for cough. She was seen in the ED 3/29/24 with weakness, cough and headache, CXR no acute process. She was diagnosed with COPD exacerbation and discharged on Amoxicillin.    Mrs. Bustamante confirms dry cough and dyspnea, has not noted improvement since starting Amoxicillin Thursday. She has been using nebulizer with albuterol multiple times during the day.  She otherwise appears well.     Pulmonary Interventions:  Albuterol neb- did last night  Albuterol HFA    Interval Hx 1/23/24:     Mrs. Bustamante presents to Pulmonary clinic for follow up cough. She was last seen 11/2023- at that visit she reported persistent productive cough at night and severe acid reflux. She was started on a regimen of Protonix and Pepcid and referred to GI.      Has been battling RSV for the past month- persistent productive cough, was bad for the first two weeks and now is a little better but the same over the last 2 weeks. Hasn't seen GI yet. Before she was sick, cough at night was still about the same after starting protonix/pepcid.      Interval Hx 11/14/23:     Mrs. Bustamante presents to Pulmonary clinic for follow up chronic cough. She was last seen 10/2023- at that visit she reported improvement in daytime cough, but persistent nocturnal cough. She was encouraged to use nebulizer treatment in the evenings and plan made for close follow up.      Today she reports trouble sleeping because of productive cough. + Post-tussive emesis. Has bad reflux. Was previously supposed to have hiatal hernia repair last December, but  passed out before surgery and had pacemaker placed and was not reevaluated.      Pulmonary Interventions:   Albuterol neb- didn't help doing at night  Albuterol HFA  Tessalon- out of, didn't seem to help  Mucinex- out of     HPI 10/17/23:     73 year old female with history of Afib, hiatal hernia, GERD, HLD, HTN, pacemaker, who presents to Pulmonary clinic for follow up chronic cough.  She was last seen 9/2023 by Dr. Silver- at that visit she reported 3 months of cough and dyspnea. She had been treated with Augmentin 8/7/23. At that visit she was prescribed Doxycycline, Prednisone, and a nebulizer. Further work up ordered in the way of PFT and a walk.      Cough during the day has improved, but still with cough at night, productive. Deep cough. Sputum a little reddish looking- rust colored. Thick secretions. No weight loss or weight gain.     Pertinent work up:  PFT 10/17/23- Borderline restriction/obstruction without significant bronchodilator response, decreased DLCO 54.4%  6MW 10/17/23- SpO2 range 94-99%, walked 274m, 66% predicted, no pause  CXR 8/21/23- Clear lungs  TTE 11/2022- Normal LV size and function, normal RV size     Pulmonary Interventions:   Mucinex  Albuterol neb- not using  Albuterol HFA  Tessalon- not using anymore    Review of Systems   Respiratory:  Positive for cough, wheezing and dyspnea on extertion. Negative for sputum production.      Objective:     Physical Exam   Constitutional: She is oriented to person, place, and time. She appears well-developed and well-nourished. She is obese.   Cardiovascular: Normal rate and regular rhythm.   Pulmonary/Chest:   Expiratory wheeze and coarse breath sounds bilaterally, normal effort at rest on room air   Musculoskeletal:         General: No edema.   Neurological: She is alert and oriented to person, place, and time.   Skin: Skin is warm and dry.     Personal Diagnostic Review  As Above      3/29/2024     3:31 PM 3/29/2024     3:26 PM 3/29/2024     1:33  PM 3/29/2024    12:23 PM 3/29/2024    12:14 PM 3/29/2024    11:33 AM 3/29/2024    11:31 AM   Pulmonary Function Tests   SpO2 96 % 96 % 95 % 95 % 95 % 95 % 95 %        Assessment:     No diagnosis found.     Outpatient Encounter Medications as of 4/2/2024   Medication Sig Dispense Refill    albuterol (PROVENTIL) 2.5 mg /3 mL (0.083 %) nebulizer solution Take 3 mLs (2.5 mg total) by nebulization every 6 (six) hours as needed for Wheezing. Rescue 120 each 0    amoxicillin (AMOXIL) 500 MG Tab Take 1 tablet (500 mg total) by mouth every 12 (twelve) hours. for 10 days 20 tablet 0    apixaban (ELIQUIS) 5 mg Tab Take 1 tablet (5 mg total) by mouth 2 (two) times daily. 180 tablet 4    benzonatate (TESSALON) 200 MG capsule Take 1 capsule (200 mg total) by mouth 3 (three) times daily as needed for Cough. 90 capsule 2    estrogens, conjugated, (PREMARIN) 0.625 MG tablet Take 1 tablet (0.625 mg total) by mouth once daily. 90 tablet 3    ibuprofen (ADVIL,MOTRIN) 600 MG tablet 1 tablet with food or milk as needed Orally Three times a day for 7 days      metFORMIN (GLUCOPHAGE-XR) 500 MG ER 24hr tablet TAKE 1 TABLET BY MOUTH TWICE DAILY WITH MEALS 60 tablet 11    metoprolol succinate (TOPROL-XL) 50 MG 24 hr tablet Take 2 tablets (100 mg total) by mouth every morning AND 1 tablet (50 mg total) every evening. 270 tablet 3    ondansetron (ZOFRAN-ODT) 4 MG TbDL Take 1 tablet (4 mg total) by mouth every 6 (six) hours as needed (nausea). 20 tablet 0    pantoprazole (PROTONIX) 40 MG tablet Take 1 tablet (40 mg total) by mouth nightly. 30 tablet 11    predniSONE (DELTASONE) 5 MG tablet Take 4 tablets (20 mg total) by mouth once daily for 3 days, THEN 2 tablets (10 mg total) once daily for 3 days, THEN 1 tablet (5 mg total) once daily for 3 days, THEN 0.5 tablets (2.5 mg total) once daily for 3 days. 20 tablet 0    rosuvastatin (CRESTOR) 5 MG tablet Take 1 tablet (5 mg total) by mouth once daily. 90 tablet 3    traZODone (DESYREL) 50 MG  tablet Take 1 tablet (50 mg total) by mouth nightly as needed for Insomnia. 30 tablet 3    [DISCONTINUED] ondansetron (ZOFRAN-ODT) 4 MG TbDL Take 1 tablet (4 mg total) by mouth every 6 (six) hours as needed (nausea). 20 tablet 0     No facility-administered encounter medications on file as of 4/2/2024.     No orders of the defined types were placed in this encounter.        Plan:     Problem List Items Addressed This Visit          Pulmonary    Bronchitis     - wheezing on exam  - has been on prednisone and amoxicillin since Thursday without improvement  - has difficulty with inhalers  - rx sent for budesonide and duo-nebs, mask ordered with nebulizer  - changing abx, continue prednisone  - ED precautions discussed             Chronic cough - Primary    Relevant Orders    NEBULIZER KIT (SUPPLIES) FOR HOME USE     Other Visit Diagnoses       Exacerbation of asthma, unspecified asthma severity, unspecified whether persistent        Relevant Medications    albuterol-ipratropium (DUO-NEB) 2.5 mg-0.5 mg/3 mL nebulizer solution    budesonide (PULMICORT) 0.25 mg/2 mL nebulizer solution    azithromycin (Z-CELSA) 250 MG tablet          Plan discussed with pt and she expressed understanding, all questions answered. RTC 3 months or sooner as needed.

## 2024-04-03 ENCOUNTER — PATIENT MESSAGE (OUTPATIENT)
Dept: PULMONOLOGY | Facility: CLINIC | Age: 74
End: 2024-04-03
Payer: MEDICARE

## 2024-04-03 DIAGNOSIS — E11.42 TYPE 2 DIABETES MELLITUS WITH DIABETIC POLYNEUROPATHY, WITHOUT LONG-TERM CURRENT USE OF INSULIN: Primary | ICD-10-CM

## 2024-04-04 LAB
BACTERIA BLD CULT: NORMAL
BACTERIA BLD CULT: NORMAL

## 2024-04-05 RX ORDER — TRAZODONE HYDROCHLORIDE 50 MG/1
50 TABLET ORAL NIGHTLY PRN
Qty: 30 TABLET | Refills: 11 | Status: SHIPPED | OUTPATIENT
Start: 2024-04-05

## 2024-04-16 ENCOUNTER — HOSPITAL ENCOUNTER (INPATIENT)
Facility: HOSPITAL | Age: 74
LOS: 1 days | Discharge: HOME | DRG: 190 | End: 2024-04-17
Attending: EMERGENCY MEDICINE | Admitting: HOSPITALIST
Payer: MEDICARE

## 2024-04-16 DIAGNOSIS — J96.01 ACUTE RESPIRATORY FAILURE WITH HYPOXIA: ICD-10-CM

## 2024-04-16 DIAGNOSIS — R00.1 SYMPTOMATIC SINUS BRADYCARDIA: ICD-10-CM

## 2024-04-16 DIAGNOSIS — R00.0 TACHYCARDIA: ICD-10-CM

## 2024-04-16 DIAGNOSIS — J18.9 PNEUMONIA OF BOTH LOWER LOBES DUE TO INFECTIOUS ORGANISM: Primary | ICD-10-CM

## 2024-04-16 DIAGNOSIS — Z95.0 CARDIAC PACEMAKER IN SITU: Primary | ICD-10-CM

## 2024-04-16 DIAGNOSIS — R53.1 WEAKNESS: ICD-10-CM

## 2024-04-16 PROBLEM — J69.0 ASPIRATION PNEUMONIA: Status: ACTIVE | Noted: 2024-04-16

## 2024-04-16 PROBLEM — J44.1 COPD EXACERBATION: Status: ACTIVE | Noted: 2024-04-16

## 2024-04-16 LAB
ALBUMIN SERPL BCP-MCNC: 3 G/DL (ref 3.5–5.2)
ALLENS TEST: ABNORMAL
ALP SERPL-CCNC: 91 U/L (ref 55–135)
ALT SERPL W/O P-5'-P-CCNC: 17 U/L (ref 10–44)
ANION GAP SERPL CALC-SCNC: 10 MMOL/L (ref 8–16)
AST SERPL-CCNC: 18 U/L (ref 10–40)
BACTERIA #/AREA URNS HPF: ABNORMAL /HPF
BASOPHILS # BLD AUTO: 0.03 K/UL (ref 0–0.2)
BASOPHILS NFR BLD: 0.4 % (ref 0–1.9)
BILIRUB SERPL-MCNC: 0.4 MG/DL (ref 0.1–1)
BILIRUB UR QL STRIP: NEGATIVE
BNP SERPL-MCNC: 51 PG/ML (ref 0–99)
BUN SERPL-MCNC: 8 MG/DL (ref 8–23)
CALCIUM SERPL-MCNC: 9.5 MG/DL (ref 8.7–10.5)
CHLORIDE SERPL-SCNC: 103 MMOL/L (ref 95–110)
CLARITY UR: CLEAR
CO2 SERPL-SCNC: 26 MMOL/L (ref 23–29)
COLOR UR: YELLOW
CREAT SERPL-MCNC: 1.1 MG/DL (ref 0.5–1.4)
D DIMER PPP IA.FEU-MCNC: 0.65 MG/L FEU
DELSYS: ABNORMAL
DIFFERENTIAL METHOD BLD: NORMAL
EOSINOPHIL # BLD AUTO: 0.4 K/UL (ref 0–0.5)
EOSINOPHIL NFR BLD: 6.3 % (ref 0–8)
ERYTHROCYTE [DISTWIDTH] IN BLOOD BY AUTOMATED COUNT: 12.8 % (ref 11.5–14.5)
EST. GFR  (NO RACE VARIABLE): 53 ML/MIN/1.73 M^2
GLUCOSE SERPL-MCNC: 235 MG/DL (ref 70–110)
GLUCOSE UR QL STRIP: ABNORMAL
HCO3 UR-SCNC: 26.4 MMOL/L (ref 24–28)
HCT VFR BLD AUTO: 39 % (ref 37–48.5)
HGB BLD-MCNC: 12.7 G/DL (ref 12–16)
HGB UR QL STRIP: NEGATIVE
IMM GRANULOCYTES # BLD AUTO: 0.03 K/UL (ref 0–0.04)
IMM GRANULOCYTES NFR BLD AUTO: 0.4 % (ref 0–0.5)
INFLUENZA A, MOLECULAR: NEGATIVE
INFLUENZA B, MOLECULAR: NEGATIVE
KETONES UR QL STRIP: NEGATIVE
LACTATE SERPL-SCNC: 1.6 MMOL/L (ref 0.5–2.2)
LACTATE SERPL-SCNC: 3.9 MMOL/L (ref 0.5–2.2)
LEUKOCYTE ESTERASE UR QL STRIP: ABNORMAL
LYMPHOCYTES # BLD AUTO: 2.1 K/UL (ref 1–4.8)
LYMPHOCYTES NFR BLD: 30.5 % (ref 18–48)
MAGNESIUM SERPL-MCNC: 1.9 MG/DL (ref 1.6–2.6)
MCH RBC QN AUTO: 29 PG (ref 27–31)
MCHC RBC AUTO-ENTMCNC: 32.6 G/DL (ref 32–36)
MCV RBC AUTO: 89 FL (ref 82–98)
MICROSCOPIC COMMENT: ABNORMAL
MODE: ABNORMAL
MONOCYTES # BLD AUTO: 0.5 K/UL (ref 0.3–1)
MONOCYTES NFR BLD: 7.9 % (ref 4–15)
NEUTROPHILS # BLD AUTO: 3.7 K/UL (ref 1.8–7.7)
NEUTROPHILS NFR BLD: 54.5 % (ref 38–73)
NITRITE UR QL STRIP: NEGATIVE
NRBC BLD-RTO: 0 /100 WBC
PCO2 BLDA: 45.8 MMHG (ref 35–45)
PH SMN: 7.37 [PH] (ref 7.35–7.45)
PH UR STRIP: 6 [PH] (ref 5–8)
PLATELET # BLD AUTO: 284 K/UL (ref 150–450)
PMV BLD AUTO: 9.3 FL (ref 9.2–12.9)
PO2 BLDA: 56 MMHG (ref 80–100)
POC BE: 1 MMOL/L
POC SATURATED O2: 87 % (ref 95–100)
POTASSIUM SERPL-SCNC: 3.9 MMOL/L (ref 3.5–5.1)
PROCALCITONIN SERPL IA-MCNC: 0.1 NG/ML
PROT SERPL-MCNC: 6.9 G/DL (ref 6–8.4)
PROT UR QL STRIP: NEGATIVE
RBC # BLD AUTO: 4.38 M/UL (ref 4–5.4)
SAMPLE: ABNORMAL
SARS-COV-2 RDRP RESP QL NAA+PROBE: NEGATIVE
SITE: ABNORMAL
SODIUM SERPL-SCNC: 139 MMOL/L (ref 136–145)
SP GR UR STRIP: >1.03 (ref 1–1.03)
SP02: 90
SPECIMEN SOURCE: NORMAL
SQUAMOUS #/AREA URNS HPF: 5 /HPF
TROPONIN I SERPL DL<=0.01 NG/ML-MCNC: <0.006 NG/ML (ref 0–0.03)
URN SPEC COLLECT METH UR: ABNORMAL
UROBILINOGEN UR STRIP-ACNC: NEGATIVE EU/DL
WBC # BLD AUTO: 6.85 K/UL (ref 3.9–12.7)
WBC #/AREA URNS HPF: 22 /HPF (ref 0–5)
WBC CLUMPS URNS QL MICRO: ABNORMAL

## 2024-04-16 PROCEDURE — 85025 COMPLETE CBC W/AUTO DIFF WBC: CPT | Mod: HCNC | Performed by: EMERGENCY MEDICINE

## 2024-04-16 PROCEDURE — 84145 PROCALCITONIN (PCT): CPT | Mod: HCNC | Performed by: EMERGENCY MEDICINE

## 2024-04-16 PROCEDURE — 96361 HYDRATE IV INFUSION ADD-ON: CPT | Mod: HCNC

## 2024-04-16 PROCEDURE — 25500020 PHARM REV CODE 255: Mod: HCNC | Performed by: EMERGENCY MEDICINE

## 2024-04-16 PROCEDURE — 25000003 PHARM REV CODE 250: Mod: HCNC | Performed by: EMERGENCY MEDICINE

## 2024-04-16 PROCEDURE — 87040 BLOOD CULTURE FOR BACTERIA: CPT | Mod: HCNC | Performed by: EMERGENCY MEDICINE

## 2024-04-16 PROCEDURE — 94761 N-INVAS EAR/PLS OXIMETRY MLT: CPT | Mod: HCNC

## 2024-04-16 PROCEDURE — 80053 COMPREHEN METABOLIC PANEL: CPT | Mod: HCNC | Performed by: EMERGENCY MEDICINE

## 2024-04-16 PROCEDURE — 99900035 HC TECH TIME PER 15 MIN (STAT): Mod: HCNC

## 2024-04-16 PROCEDURE — 81000 URINALYSIS NONAUTO W/SCOPE: CPT | Mod: HCNC | Performed by: EMERGENCY MEDICINE

## 2024-04-16 PROCEDURE — 36415 COLL VENOUS BLD VENIPUNCTURE: CPT | Mod: HCNC | Performed by: EMERGENCY MEDICINE

## 2024-04-16 PROCEDURE — 84484 ASSAY OF TROPONIN QUANT: CPT | Mod: HCNC | Performed by: EMERGENCY MEDICINE

## 2024-04-16 PROCEDURE — 25000242 PHARM REV CODE 250 ALT 637 W/ HCPCS: Mod: HCNC | Performed by: EMERGENCY MEDICINE

## 2024-04-16 PROCEDURE — 96375 TX/PRO/DX INJ NEW DRUG ADDON: CPT | Mod: HCNC

## 2024-04-16 PROCEDURE — 93005 ELECTROCARDIOGRAM TRACING: CPT | Mod: HCNC

## 2024-04-16 PROCEDURE — 96374 THER/PROPH/DIAG INJ IV PUSH: CPT | Mod: HCNC

## 2024-04-16 PROCEDURE — 25000003 PHARM REV CODE 250: Mod: HCNC | Performed by: HOSPITALIST

## 2024-04-16 PROCEDURE — 36600 WITHDRAWAL OF ARTERIAL BLOOD: CPT | Mod: HCNC

## 2024-04-16 PROCEDURE — 99291 CRITICAL CARE FIRST HOUR: CPT | Mod: HCNC

## 2024-04-16 PROCEDURE — 94640 AIRWAY INHALATION TREATMENT: CPT | Mod: HCNC

## 2024-04-16 PROCEDURE — 25000242 PHARM REV CODE 250 ALT 637 W/ HCPCS: Mod: HCNC | Performed by: HOSPITALIST

## 2024-04-16 PROCEDURE — 87502 INFLUENZA DNA AMP PROBE: CPT | Mod: HCNC | Performed by: EMERGENCY MEDICINE

## 2024-04-16 PROCEDURE — U0002 COVID-19 LAB TEST NON-CDC: HCPCS | Mod: HCNC | Performed by: EMERGENCY MEDICINE

## 2024-04-16 PROCEDURE — 27100171 HC OXYGEN HIGH FLOW UP TO 24 HOURS: Mod: HCNC

## 2024-04-16 PROCEDURE — 83735 ASSAY OF MAGNESIUM: CPT | Mod: HCNC | Performed by: EMERGENCY MEDICINE

## 2024-04-16 PROCEDURE — 85379 FIBRIN DEGRADATION QUANT: CPT | Mod: HCNC | Performed by: EMERGENCY MEDICINE

## 2024-04-16 PROCEDURE — 83605 ASSAY OF LACTIC ACID: CPT | Mod: 91,HCNC | Performed by: EMERGENCY MEDICINE

## 2024-04-16 PROCEDURE — 87086 URINE CULTURE/COLONY COUNT: CPT | Mod: HCNC | Performed by: EMERGENCY MEDICINE

## 2024-04-16 PROCEDURE — 21400001 HC TELEMETRY ROOM: Mod: HCNC

## 2024-04-16 PROCEDURE — 63600175 PHARM REV CODE 636 W HCPCS: Mod: HCNC | Performed by: EMERGENCY MEDICINE

## 2024-04-16 PROCEDURE — 83880 ASSAY OF NATRIURETIC PEPTIDE: CPT | Mod: HCNC | Performed by: EMERGENCY MEDICINE

## 2024-04-16 PROCEDURE — 63600175 PHARM REV CODE 636 W HCPCS: Mod: HCNC | Performed by: HOSPITALIST

## 2024-04-16 PROCEDURE — 93010 ELECTROCARDIOGRAM REPORT: CPT | Mod: HCNC,,, | Performed by: INTERNAL MEDICINE

## 2024-04-16 PROCEDURE — 82803 BLOOD GASES ANY COMBINATION: CPT | Mod: HCNC

## 2024-04-16 RX ORDER — IPRATROPIUM BROMIDE AND ALBUTEROL SULFATE 2.5; .5 MG/3ML; MG/3ML
3 SOLUTION RESPIRATORY (INHALATION)
Status: COMPLETED | OUTPATIENT
Start: 2024-04-16 | End: 2024-04-16

## 2024-04-16 RX ORDER — AZITHROMYCIN 250 MG/1
250 TABLET, FILM COATED ORAL DAILY
Status: DISCONTINUED | OUTPATIENT
Start: 2024-04-17 | End: 2024-04-17 | Stop reason: HOSPADM

## 2024-04-16 RX ORDER — IBUPROFEN 200 MG
24 TABLET ORAL
Status: DISCONTINUED | OUTPATIENT
Start: 2024-04-16 | End: 2024-04-17 | Stop reason: HOSPADM

## 2024-04-16 RX ORDER — SODIUM CHLORIDE 9 MG/ML
INJECTION, SOLUTION INTRAVENOUS
Status: DISCONTINUED | OUTPATIENT
Start: 2024-04-16 | End: 2024-04-17 | Stop reason: HOSPADM

## 2024-04-16 RX ORDER — IPRATROPIUM BROMIDE AND ALBUTEROL SULFATE 2.5; .5 MG/3ML; MG/3ML
3 SOLUTION RESPIRATORY (INHALATION)
Status: DISCONTINUED | OUTPATIENT
Start: 2024-04-16 | End: 2024-04-16 | Stop reason: SDUPTHER

## 2024-04-16 RX ORDER — METOPROLOL SUCCINATE 50 MG/1
50 TABLET, EXTENDED RELEASE ORAL
Status: CANCELLED | OUTPATIENT
Start: 2024-04-16

## 2024-04-16 RX ORDER — METHYLPREDNISOLONE SOD SUCC 125 MG
125 VIAL (EA) INJECTION
Status: COMPLETED | OUTPATIENT
Start: 2024-04-16 | End: 2024-04-16

## 2024-04-16 RX ORDER — GLUCAGON 1 MG
1 KIT INJECTION
Status: DISCONTINUED | OUTPATIENT
Start: 2024-04-16 | End: 2024-04-17 | Stop reason: HOSPADM

## 2024-04-16 RX ORDER — METOPROLOL SUCCINATE 50 MG/1
100 TABLET, EXTENDED RELEASE ORAL 2 TIMES DAILY
Status: DISCONTINUED | OUTPATIENT
Start: 2024-04-16 | End: 2024-04-17 | Stop reason: HOSPADM

## 2024-04-16 RX ORDER — INSULIN ASPART 100 [IU]/ML
0-10 INJECTION, SOLUTION INTRAVENOUS; SUBCUTANEOUS
Status: DISCONTINUED | OUTPATIENT
Start: 2024-04-16 | End: 2024-04-17 | Stop reason: HOSPADM

## 2024-04-16 RX ORDER — IPRATROPIUM BROMIDE AND ALBUTEROL SULFATE 2.5; .5 MG/3ML; MG/3ML
3 SOLUTION RESPIRATORY (INHALATION)
Status: DISCONTINUED | OUTPATIENT
Start: 2024-04-16 | End: 2024-04-17 | Stop reason: HOSPADM

## 2024-04-16 RX ORDER — BENZONATATE 100 MG/1
200 CAPSULE ORAL 3 TIMES DAILY PRN
Status: DISCONTINUED | OUTPATIENT
Start: 2024-04-16 | End: 2024-04-17 | Stop reason: HOSPADM

## 2024-04-16 RX ORDER — IPRATROPIUM BROMIDE AND ALBUTEROL SULFATE 2.5; .5 MG/3ML; MG/3ML
3 SOLUTION RESPIRATORY (INHALATION) ONCE
Status: COMPLETED | OUTPATIENT
Start: 2024-04-16 | End: 2024-04-16

## 2024-04-16 RX ORDER — SODIUM CHLORIDE 0.9 % (FLUSH) 0.9 %
3 SYRINGE (ML) INJECTION
Status: DISCONTINUED | OUTPATIENT
Start: 2024-04-16 | End: 2024-04-17 | Stop reason: HOSPADM

## 2024-04-16 RX ORDER — ATORVASTATIN CALCIUM 10 MG/1
20 TABLET, FILM COATED ORAL DAILY
Status: CANCELLED | OUTPATIENT
Start: 2024-04-17

## 2024-04-16 RX ORDER — PANTOPRAZOLE SODIUM 40 MG/1
40 TABLET, DELAYED RELEASE ORAL NIGHTLY
Status: DISCONTINUED | OUTPATIENT
Start: 2024-04-16 | End: 2024-04-17 | Stop reason: HOSPADM

## 2024-04-16 RX ORDER — TRAZODONE HYDROCHLORIDE 50 MG/1
50 TABLET ORAL NIGHTLY PRN
Status: DISCONTINUED | OUTPATIENT
Start: 2024-04-16 | End: 2024-04-17 | Stop reason: HOSPADM

## 2024-04-16 RX ORDER — PREDNISONE 20 MG/1
40 TABLET ORAL DAILY
Status: DISCONTINUED | OUTPATIENT
Start: 2024-04-16 | End: 2024-04-17 | Stop reason: HOSPADM

## 2024-04-16 RX ORDER — IBUPROFEN 200 MG
16 TABLET ORAL
Status: DISCONTINUED | OUTPATIENT
Start: 2024-04-16 | End: 2024-04-17 | Stop reason: HOSPADM

## 2024-04-16 RX ADMIN — PANTOPRAZOLE SODIUM 40 MG: 40 TABLET, DELAYED RELEASE ORAL at 08:04

## 2024-04-16 RX ADMIN — METOPROLOL SUCCINATE 100 MG: 50 TABLET, EXTENDED RELEASE ORAL at 11:04

## 2024-04-16 RX ADMIN — SODIUM CHLORIDE 1000 ML: 9 INJECTION, SOLUTION INTRAVENOUS at 12:04

## 2024-04-16 RX ADMIN — IOHEXOL 100 ML: 350 INJECTION, SOLUTION INTRAVENOUS at 11:04

## 2024-04-16 RX ADMIN — TRAZODONE HYDROCHLORIDE 50 MG: 50 TABLET ORAL at 11:04

## 2024-04-16 RX ADMIN — IPRATROPIUM BROMIDE AND ALBUTEROL SULFATE 3 ML: 2.5; .5 SOLUTION RESPIRATORY (INHALATION) at 10:04

## 2024-04-16 RX ADMIN — IPRATROPIUM BROMIDE AND ALBUTEROL SULFATE 3 ML: 2.5; .5 SOLUTION RESPIRATORY (INHALATION) at 07:04

## 2024-04-16 RX ADMIN — AZITHROMYCIN MONOHYDRATE 500 MG: 500 INJECTION, POWDER, LYOPHILIZED, FOR SOLUTION INTRAVENOUS at 04:04

## 2024-04-16 RX ADMIN — IPRATROPIUM BROMIDE AND ALBUTEROL SULFATE 3 ML: 2.5; .5 SOLUTION RESPIRATORY (INHALATION) at 09:04

## 2024-04-16 RX ADMIN — CEFTRIAXONE 1 G: 1 INJECTION, POWDER, FOR SOLUTION INTRAMUSCULAR; INTRAVENOUS at 01:04

## 2024-04-16 RX ADMIN — APIXABAN 5 MG: 2.5 TABLET, FILM COATED ORAL at 08:04

## 2024-04-16 RX ADMIN — BENZONATATE 200 MG: 100 CAPSULE ORAL at 11:04

## 2024-04-16 RX ADMIN — PREDNISONE 40 MG: 20 TABLET ORAL at 04:04

## 2024-04-16 RX ADMIN — METHYLPREDNISOLONE SODIUM SUCCINATE 125 MG: 125 INJECTION, POWDER, FOR SOLUTION INTRAMUSCULAR; INTRAVENOUS at 12:04

## 2024-04-16 RX ADMIN — INSULIN ASPART 5 UNITS: 100 INJECTION, SOLUTION INTRAVENOUS; SUBCUTANEOUS at 11:04

## 2024-04-16 NOTE — H&P
[facility] - Emergency Dept.  [facility] Medicine  History & Physical    Patient Name: Kelsie Bustamante  MRN: [id number]  Patient Class: IP- Inpatient  Admission Date: 4/16/2024  Attending Physician: Jalil Rodriguez MD   Primary Care Provider: Cody Parks MD         Patient information was obtained from patient, past medical records, and ER records.     Subjective:     Principal Problem:COPD exacerbation    Chief Complaint:   Chief Complaint   Patient presents with    Cough     Pt c/o cough, weakness, n/v x2 weeks. Seen and admitted recently for same complaint.         HPI: The patient is a 74 yo female with HTN, thyroid nodule, DM type II, Achalasia, peripheral neuropathy, AFIB, and postmenopausal HRT who presented to ED with SOB which onset gradually within the past couple of days. Pt's oxygen saturation was 84% in triage. Pt was recently admitted for similar sxs. Pt's  notes that she has been more confused as of this morning. Associated sxs include weakness, N/V, abd pain, wheezing, cough, congestion, fever, fatigue, diaphoresis, chills, weakness, and lightheadedness. Pt's fever was 102 degrees fahrenheit.  Pt denies usage of oxygen at home.     Past Medical History:   Diagnosis Date    Achalasia     demetrius    Atrial fibrillation with RVR 05/11/2021    Cataract     Colon polyp     Gastroesophageal reflux     Hiatal hernia     Hx of colonic polyps 08/15/2014    per colonoscopy in      Hyperlipidemia 06/24/2022    Hypertension     Pacemaker     Peripheral neuropathy     Postmenopausal HRT (hormone replacement therapy)     failed tapering off    SAH (subarachnoid hemorrhage)     from a fall late 22    Sepsis 05/05/2021    Last Assessment & Plan:  Formatting of this note might be different from the original. History & Physical Patient meets sepsis criteria with a white cell count of 15, and tachypnea.  Source of infection not clear at this time.  No evidence for meningitis.  Cover with broad-spectrum  antibiotics especially given invasive dental procedure done recently.  Check blood cultures and monitor for fever.  R    Sinusitis     Thyroid nodule 3/16 subcm; kathleen 2 yrs    Type 2 diabetes mellitus with neurological manifestations     Vasodepressor syncope 2018       Past Surgical History:   Procedure Laterality Date    A-V CARDIAC PACEMAKER INSERTION Left 2022    Procedure: INSERTION, CARDIAC PACEMAKER, DUAL CHAMBER;  Surgeon: Daryl Walker MD;  Location: [facility] CATH LAB;  Service: Cardiology;  Laterality: Left;  Patient instructed 11AM start time/needs ptt/pt/inr  All [employer] with His lead/MDT His as back up//Vinod notified    BREAST BIOPSY Left 2021    cataract Left      SECTION      CHOLECYSTECTOMY      CYST REMOVAL Right 2021    [facility]    ESOPHAGEAL MANOMETRY WITH MEASUREMENT OF IMPEDANCE N/A 10/6/2022    Procedure: MANOMETRY, ESOPHAGUS, WITH IMPEDANCE MEASUREMENT  Cardiac Clearance/Eliquis 2 day hold approval received per Dr. RABIA Aguilar, Cardiology on 22.  Note in encounters.  LB;  Surgeon: Bruna Fletcher MD;  Location: [facility];  Service: Endoscopy;  Laterality: N/A;    ESOPHAGOGASTRODUODENOSCOPY N/A 10/6/2022    Procedure: EGD (ESOPHAGOGASTRODUODENOSCOPY);  Surgeon: Bruna Fletcher MD;  Location: [facility];  Service: Endoscopy;  Laterality: N/A;    HYSTERECTOMY      nonca    OOPHORECTOMY      TILT TABLE TEST N/A 10/25/2018    Procedure: TILT TABLE TEST;  Surgeon: Daryl Walker MD;  Location: [facility] CATH LAB;  Service: Cardiology;  Laterality: N/A;  VAS.DEP.SYN,HUT,FAS,3PREP    TONSILLECTOMY         Review of patient's allergies indicates:   Allergen Reactions    Floxin [ofloxacin] Diarrhea and Nausea And Vomiting    Fluvastatin Other (See Comments)     myalgia    Pravastatin Other (See Comments)     myalgia       Current Facility-Administered Medications   Medication Dose Route Frequency Provider Last Rate Last Admin     cefTRIAXone (Rocephin) 1 g in dextrose 5 % in water (D5W) 100 mL IVPB (MB+)  1 g Intravenous ED 1 Time Reta Connors  mL/hr at 04/16/24 1359 1 g at 04/16/24 1359    sodium chloride 0.9% bolus 1,000 mL 1,000 mL  1,000 mL Intravenous ED 1 Time Reta Connors  mL/hr at 04/16/24 1216 1,000 mL at 04/16/24 1216     Current Outpatient Medications   Medication Sig Dispense Refill    apixaban (ELIQUIS) 5 mg Tab Take 1 tablet (5 mg total) by mouth 2 (two) times daily. 180 tablet 4    benzonatate (TESSALON) 200 MG capsule Take 1 capsule (200 mg total) by mouth 3 (three) times daily as needed for Cough. 90 capsule 2    budesonide (PULMICORT) 0.25 mg/2 mL nebulizer solution Take 2 mLs (0.25 mg total) by nebulization once daily. Controller 180 mL 1    estrogens, conjugated, (PREMARIN) 0.625 MG tablet Take 1 tablet (0.625 mg total) by mouth once daily. 90 tablet 3    metFORMIN (GLUCOPHAGE-XR) 500 MG ER 24hr tablet TAKE 1 TABLET BY MOUTH TWICE DAILY WITH MEALS 60 tablet 11    metoprolol succinate (TOPROL-XL) 50 MG 24 hr tablet Take 2 tablets (100 mg total) by mouth every morning AND 1 tablet (50 mg total) every evening. (Patient taking differently: 100mg twice daily) 270 tablet 3    pantoprazole (PROTONIX) 40 MG tablet Take 1 tablet (40 mg total) by mouth nightly. 30 tablet 11    rosuvastatin (CRESTOR) 5 MG tablet Take 1 tablet (5 mg total) by mouth once daily. 90 tablet 3    traZODone (DESYREL) 50 MG tablet Take 1 tablet (50 mg total) by mouth nightly as needed for Insomnia. 30 tablet 11    albuterol (PROVENTIL) 2.5 mg /3 mL (0.083 %) nebulizer solution Take 3 mLs (2.5 mg total) by nebulization every 6 (six) hours as needed for Wheezing. Rescue 120 each 0    albuterol-ipratropium (DUO-NEB) 2.5 mg-0.5 mg/3 mL nebulizer solution Take 3 mLs by nebulization every 6 (six) hours as needed for Wheezing. Rescue 180 mL 3    ondansetron (ZOFRAN-ODT) 4 MG TbDL Take 1 tablet (4 mg total) by mouth every 6 (six) hours as  needed (nausea). 20 tablet 0    tirzepatide, weight loss, 2.5 mg/0.5 mL PnIj Inject 2.5 mg into the skin every 7 days. 4 Pen 3     Family History       Problem Relation (Age of Onset)    Aneurysm Sister    Coronary artery disease Father, Brother    Diabetes Father    Heart disease Mother    Parkinsonism Brother          Tobacco Use    Smoking status: Never    Smokeless tobacco: Never   Substance and Sexual Activity    Alcohol use: No    Drug use: No    Sexual activity: Never     Review of Systems   All other systems reviewed and are negative.    Objective:     Vital Signs (Most Recent):  Temp: 97.8 °F (36.6 °C) (04/16/24 0929)  Pulse: 94 (04/16/24 1300)  Resp: (!) 24 (04/16/24 1300)  BP: (!) 142/89 (04/16/24 1300)  SpO2: 96 % (04/16/24 1300) Vital Signs (24h Range):  Temp:  [97.8 °F (36.6 °C)] 97.8 °F (36.6 °C)  Pulse:  [] 94  Resp:  [18-32] 24  SpO2:  [84 %-99 %] 96 %  BP: (133-168)/(62-89) 142/89     Weight: 76.5 kg (168 lb 10.4 oz)  Body mass index is 29.88 kg/m².     Physical Exam  Vitals and nursing note reviewed.   Constitutional:       General: She is not in acute distress.     Appearance: Normal appearance. She is normal weight.   HENT:      Head: Normocephalic and atraumatic.      Nose: Nose normal.      Mouth/Throat:      Mouth: Mucous membranes are dry.      Pharynx: Oropharynx is clear.   Eyes:      Extraocular Movements: Extraocular movements intact.      Pupils: Pupils are equal, round, and reactive to light.   Cardiovascular:      Rate and Rhythm: Tachycardia present.      Pulses: Normal pulses.      Heart sounds: Normal heart sounds.   Pulmonary:      Effort: Respiratory distress present.      Breath sounds: Wheezing and rhonchi present. No rales.      Comments: Wearing 4 L NC  Abdominal:      General: Abdomen is flat. Bowel sounds are normal. There is no distension.      Palpations: Abdomen is soft.      Tenderness: There is no abdominal tenderness. There is no guarding.   Skin:      Coloration: Skin is pale.   Neurological:      General: No focal deficit present.      Mental Status: She is alert and oriented to person, place, and time.   Psychiatric:         Mood and Affect: Mood normal.         Behavior: Behavior normal.              CRANIAL NERVES     CN III, IV, VI   Pupils are equal, round, and reactive to light.       Significant Labs: All pertinent labs within the past 24 hours have been reviewed.  Recent Lab Results         04/16/24  1049   04/16/24  1001   04/16/24  0946   04/16/24  0945        Influenza A, Molecular     Negative         Influenza B, Molecular     Negative         Procalcitonin   0.10  Comment: A concentration < 0.25 ng/mL represents a low risk of bacterial   infection.  Procalcitonin may not be accurate among patients with localized   infection, recent trauma or major surgery, immunosuppressed state,   invasive fungal infection, renal dysfunction. Decisions regarding   initiation or continuation of antibiotic therapy should not be based   solely on procalcitonin levels.             Albumin       3.0       ALP       91       Allens Test Pass             ALT       17       Anion Gap       10       AST       18       Baso #       0.03       Basophil %       0.4       BILIRUBIN TOTAL       0.4  Comment: For infants and newborns, interpretation of results should be based  on gestational age, weight and in agreement with clinical  observations.    Premature Infant recommended reference ranges:  Up to 24 hours.............<8.0 mg/dL  Up to 48 hours............<12.0 mg/dL  3-5 days..................<15.0 mg/dL  6-29 days.................<15.0 mg/dL         BNP       51  Comment: Values of less than 100 pg/ml are consistent with non-CHF populations.       Site RR             BUN       8       Calcium       9.5       Chloride       103       CO2       26       Creatinine       1.1       D-Dimer       0.65  Comment: The quantitative D-dimer assay should be used as an aid in   the  diagnosis of deep vein thrombosis and pulmonary embolism  in patients with the appropriate presentation and clinical  history. The upper limit of the reference interval and the clinical   cut off   point are identical. Causes of a positive (>0.50 mg/L FEU) D-Dimer   test  include, but are not limited to: DVT, PE, DIC, thrombolytic   therapy, anticoagulant therapy, recent surgery, trauma, or   pregnancy, disseminated malignancy, aortic aneurysm, cirrhosis,  and severe infection. False negative results may occur in   patients with distal DVT.         [employer] Room Air             Differential Method       Automated       eGFR       53       Eos #       0.4       Eos %       6.3       Flu A & B Source     Nasal swab         Glucose       235       Gran # (ANC)       3.7       Gran %       54.5       Hematocrit       39.0       Hemoglobin       12.7       Immature Grans (Abs)       0.03  Comment: Mild elevation in immature granulocytes is non specific and   can be seen in a variety of conditions including stress response,   acute inflammation, trauma and pregnancy. Correlation with other   laboratory and clinical findings is essential.         Immature Granulocytes       0.4       Lactic Acid Level       1.6  Comment: Falsely low lactic acid results can be found in samples   containing >=13.0 mg/dL total bilirubin and/or >=3.5 mg/dL   direct bilirubin.         Lymph #       2.1       Lymph %       30.5       Magnesium        1.9       MCH       29.0       MCHC       32.6       MCV       89       Mode SPONT             Mono #       0.5       Mono %       7.9       MPV       9.3       nRBC       0       Platelet Count       284       POC BE 1             POC HCO3 26.4             POC PCO2 45.8             POC PH 7.369             POC PO2 56             POC SATURATED O2 87             Potassium       3.9       PROTEIN TOTAL       6.9       RBC       4.38       RDW       12.8       Sample ARTERIAL             SARS-CoV-2 RNA,  Amplification, Qual     Negative  Comment: This test utilizes isothermal nucleic acid amplification technology   to   detect the SARS-CoV-2 RdRp nucleic acid segment. The analytical   sensitivity   (limit of detection) is 500 copies/swab.    A POSITIVE result is indicative of the presence of SARS-CoV-2 RNA;   clinical   correlation with patient history and other diagnostic information is   necessary to determine patient infection status.    A NEGATIVE result means that SARS-CoV-2 nucleic acids are not present   above   the limit of detection. A NEGATIVE result should be treated as   presumptive.   It does not rule out the possibility of COVID-19 and should not be   the sole   basis for treatment decisions.    If COVID-19 is strongly suspected based on clinical and exposure   history,   re-testing using an alternate molecular assay should be considered.    This test is Food and Drug Administration (FDA) approved. Performance   characteristics of this has been independently verified by [facility] Department of Pathology and Laboratory Medicine.           Sodium       139       Sp02 90             Troponin I       <0.006  Comment: The reference interval for Troponin I represents the 99th percentile   cutoff   for our facility and is consistent with 3rd generation assay   performance.         WBC       6.85               Significant Imaging: I have reviewed all pertinent imaging results/findings within the past 24 hours.    CT Abdomen Pelvis With IV Contrast NO Oral Contrast   Final Result      No acute intra-abdominal process.  Please see CTA chest report of the same day.      All CT scans at this facility use dose modulation, iterative reconstructions, and/or weight base dosing when appropriate to reduce radiation dose to as low as reasonably achievable         Electronically signed by: Albert Mathias MD   Date:    04/16/2024   Time:    12:08      CTA Chest Non-Coronary (PE Studies)   Final Result     "  No evidence of pulmonary embolism.  Diffuse patchy bilateral lung infiltrate grades diffuse lung parenchymal abnormalities concerning for atypical pneumonia or aspiration.  There is diffuse esophageal wall thickening with distension and debris or food material within the esophagus concerning for gastroesophageal reflux.      All CT scans at this facility use dose modulation, iterative reconstruction and/or weight based dosing when appropriate to reduce radiation dose to as low as reasonably achievable.         Electronically signed by: Albert Mathias MD   Date:    04/16/2024   Time:    12:04      X-Ray Chest AP Portable   Final Result      No acute process seen.         Electronically signed by: Endy Covarrubias MD   Date:    04/16/2024   Time:    10:25          Assessment/Plan:     * COPD exacerbation  Patient's COPD is with exacerbation noted by continued dyspnea and worsening of baseline hypoxia currently.  Patient is currently on COPD Pathway. Continue scheduled inhalers Steroids, Antibiotics, and Supplemental oxygen and monitor respiratory status closely.     Aspiration pneumonia  Antibiotics as above  Aspiration precautions  Dysphagia/soft diet      Type 2 diabetes mellitus with diabetic polyneuropathy, unspecified whether long term insulin use  Patient's FSGs are uncontrolled due to hyperglycemia on current medication regimen.  Last A1c reviewed-   Lab Results   Component Value Date    HGBA1C 8.6 (H) 03/01/2024     Most recent fingerstick glucose reviewed- No results for input(s): "POCTGLUCOSE" in the last 24 hours.  Current correctional scale  Medium  Maintain anti-hyperglycemic dose as follows-   Antihyperglycemics (From admission, onward)      Start     Stop Route Frequency Ordered    04/16/24 2336  insulin aspart U-100 pen 0-10 Units         -- SubQ Before meals & nightly PRN 04/16/24 2236          Hold Oral hypoglycemics while patient is in the hospital.      Primary hypertension  Chronic, controlled. " Latest blood pressure and vitals reviewed-     Temp:  [97.8 °F (36.6 °C)-97.9 °F (36.6 °C)]   Pulse:  []   Resp:  [18-32]   BP: (133-168)/(62-89)   SpO2:  [84 %-99 %] .   Home meds for hypertension were reviewed and noted below.   Hypertension Medications               metoprolol succinate (TOPROL-XL) 50 MG 24 hr tablet Take 2 tablets (100 mg total) by mouth every morning AND 1 tablet (50 mg total) every evening.            While in the hospital, will manage blood pressure as follows; Continue home antihypertensive regimen    Will utilize p.r.n. blood pressure medication only if patient's blood pressure greater than 180/110 and she develops symptoms such as worsening chest pain or shortness of breath.    Peripheral neuropathy  Resume home meds        VTE Risk Mitigation (From admission, onward)      None                       AdmissionCare    Guideline: COPD - INPT, Inpatient    Based on the indications selected for the patient, the bed status of Inpatient was determined to be MET    The following indications were selected as present at the time of evaluation of the patient:      - New or worsened (eg, from baseline) signs or symptoms of COPD (eg, dyspnea, Tachypnea) that persist despite observation care    - Tachypnea, as indicated by respiratory rate of 1 or more of the following:     - Greater than 18 breaths per minute in adult or child 13 years of age or older    AdmissionCare documentation entered by: Jalil Rodriguez    [facility] [employer], 27th edition, Copyright © 2023 [facility] [employer], [employer] All Rights Reserved.  [id number]:01:35-05:00    Jlail Rodriguez MD  Department of Hospital Medicine  [facility] - Emergency Dept.

## 2024-04-16 NOTE — ADMISSIONCARE
AdmissionCare    Guideline: COPD - INPT, Inpatient    Based on the indications selected for the patient, the bed status of Inpatient was determined to be MET    The following indications were selected as present at the time of evaluation of the patient:      - New or worsened (eg, from baseline) signs or symptoms of COPD (eg, dyspnea, Tachypnea) that persist despite observation care    - Tachypnea, as indicated by respiratory rate of 1 or more of the following:     - Greater than 18 breaths per minute in adult or child 13 years of age or older    AdmissionCare documentation entered by: Jalil Rodriguez    [facility], 27th edition, Copyright © 2023 [facility] [employer], [facility] All Rights Reserved.  [id number]:01:35-05:00

## 2024-04-16 NOTE — NURSING
Dr. Rodriguez notified per secure chat that patient appeared hesitant in psychosocial screen.  Ne return information yet.  Reported to oncoming shift.

## 2024-04-16 NOTE — SUBJECTIVE & OBJECTIVE
Past Medical History:   Diagnosis Date    Achalasia     demetrius    Atrial fibrillation with RVR 2021    Cataract     Colon polyp     Gastroesophageal reflux     Hiatal hernia     Hx of colonic polyps 08/15/2014    per colonoscopy in      Hyperlipidemia 2022    Hypertension     Pacemaker     Peripheral neuropathy     Postmenopausal HRT (hormone replacement therapy)     failed tapering off    SAH (subarachnoid hemorrhage)     from a fall late 22    Sepsis 2021    Last Assessment & Plan:  Formatting of this note might be different from the original. History & Physical Patient meets sepsis criteria with a white cell count of 15, and tachypnea.  Source of infection not clear at this time.  No evidence for meningitis.  Cover with broad-spectrum antibiotics especially given invasive dental procedure done recently.  Check blood cultures and monitor for fever.  R    Sinusitis     Thyroid nodule 3/16 subcm; kathleen 2 yrs    Type 2 diabetes mellitus with neurological manifestations     Vasodepressor syncope 2018       Past Surgical History:   Procedure Laterality Date    A-V CARDIAC PACEMAKER INSERTION Left 2022    Procedure: INSERTION, CARDIAC PACEMAKER, DUAL CHAMBER;  Surgeon: Daryl Walker MD;  Location: [facility] CATH LAB;  Service: Cardiology;  Laterality: Left;  Patient instructed 11AM start time/needs ptt/pt/inr  All [employer] with His lead/MDT His as back up//Vinod notified    BREAST BIOPSY Left 2021    cataract Left      SECTION      CHOLECYSTECTOMY      CYST REMOVAL Right 2021    [facility]    ESOPHAGEAL MANOMETRY WITH MEASUREMENT OF IMPEDANCE N/A 10/6/2022    Procedure: MANOMETRY, ESOPHAGUS, WITH IMPEDANCE MEASUREMENT  Cardiac Clearance/Eliquis 2 day hold approval received per Dr. RABIA Aguilar, Cardiology on 22.  Note in encounters.  LB;  Surgeon: Bruna Fletcher MD;  Location: [facility];  Service: Endoscopy;  Laterality: N/A;     ESOPHAGOGASTRODUODENOSCOPY N/A 10/6/2022    Procedure: EGD (ESOPHAGOGASTRODUODENOSCOPY);  Surgeon: Bruna Fletcher MD;  Location: [facility];  Service: Endoscopy;  Laterality: N/A;    HYSTERECTOMY      nonca    OOPHORECTOMY      TILT TABLE TEST N/A 10/25/2018    Procedure: TILT TABLE TEST;  Surgeon: Daryl Walker MD;  Location: [facility] CATH LAB;  Service: Cardiology;  Laterality: N/A;  VAS.DEP.SYN,HUT,FAS,3PREP    TONSILLECTOMY         Review of patient's allergies indicates:   Allergen Reactions    Floxin [ofloxacin] Diarrhea and Nausea And Vomiting    Fluvastatin Other (See Comments)     myalgia    Pravastatin Other (See Comments)     myalgia       Current Facility-Administered Medications   Medication Dose Route Frequency Provider Last Rate Last Admin    cefTRIAXone (Rocephin) 1 g in dextrose 5 % in water (D5W) 100 mL IVPB (MB+)  1 g Intravenous ED 1 Time Reta Connors  mL/hr at 04/16/24 1359 1 g at 04/16/24 1359    sodium chloride 0.9% bolus 1,000 mL 1,000 mL  1,000 mL Intravenous ED 1 Time Reta Connors  mL/hr at 04/16/24 1216 1,000 mL at 04/16/24 1216     Current Outpatient Medications   Medication Sig Dispense Refill    apixaban (ELIQUIS) 5 mg Tab Take 1 tablet (5 mg total) by mouth 2 (two) times daily. 180 tablet 4    benzonatate (TESSALON) 200 MG capsule Take 1 capsule (200 mg total) by mouth 3 (three) times daily as needed for Cough. 90 capsule 2    budesonide (PULMICORT) 0.25 mg/2 mL nebulizer solution Take 2 mLs (0.25 mg total) by nebulization once daily. Controller 180 mL 1    estrogens, conjugated, (PREMARIN) 0.625 MG tablet Take 1 tablet (0.625 mg total) by mouth once daily. 90 tablet 3    metFORMIN (GLUCOPHAGE-XR) 500 MG ER 24hr tablet TAKE 1 TABLET BY MOUTH TWICE DAILY WITH MEALS 60 tablet 11    metoprolol succinate (TOPROL-XL) 50 MG 24 hr tablet Take 2 tablets (100 mg total) by mouth every morning AND 1 tablet (50 mg total) every evening. (Patient taking differently:  100mg twice daily) 270 tablet 3    pantoprazole (PROTONIX) 40 MG tablet Take 1 tablet (40 mg total) by mouth nightly. 30 tablet 11    rosuvastatin (CRESTOR) 5 MG tablet Take 1 tablet (5 mg total) by mouth once daily. 90 tablet 3    traZODone (DESYREL) 50 MG tablet Take 1 tablet (50 mg total) by mouth nightly as needed for Insomnia. 30 tablet 11    albuterol (PROVENTIL) 2.5 mg /3 mL (0.083 %) nebulizer solution Take 3 mLs (2.5 mg total) by nebulization every 6 (six) hours as needed for Wheezing. Rescue 120 each 0    albuterol-ipratropium (DUO-NEB) 2.5 mg-0.5 mg/3 mL nebulizer solution Take 3 mLs by nebulization every 6 (six) hours as needed for Wheezing. Rescue 180 mL 3    ondansetron (ZOFRAN-ODT) 4 MG TbDL Take 1 tablet (4 mg total) by mouth every 6 (six) hours as needed (nausea). 20 tablet 0    tirzepatide, weight loss, 2.5 mg/0.5 mL PnIj Inject 2.5 mg into the skin every 7 days. 4 Pen 3     Family History       Problem Relation (Age of Onset)    Aneurysm Sister    Coronary artery disease Father, Brother    Diabetes Father    Heart disease Mother    Parkinsonism Brother          Tobacco Use    Smoking status: Never    Smokeless tobacco: Never   Substance and Sexual Activity    Alcohol use: No    Drug use: No    Sexual activity: Never     Review of Systems   All other systems reviewed and are negative.    Objective:     Vital Signs (Most Recent):  Temp: 97.8 °F (36.6 °C) (04/16/24 0929)  Pulse: 94 (04/16/24 1300)  Resp: (!) 24 (04/16/24 1300)  BP: (!) 142/89 (04/16/24 1300)  SpO2: 96 % (04/16/24 1300) Vital Signs (24h Range):  Temp:  [97.8 °F (36.6 °C)] 97.8 °F (36.6 °C)  Pulse:  [] 94  Resp:  [18-32] 24  SpO2:  [84 %-99 %] 96 %  BP: (133-168)/(62-89) 142/89     Weight: 76.5 kg (168 lb 10.4 oz)  Body mass index is 29.88 kg/m².     Physical Exam  Vitals and nursing note reviewed.   Constitutional:       General: She is not in acute distress.     Appearance: Normal appearance. She is normal weight.   HENT:       Head: Normocephalic and atraumatic.      Nose: Nose normal.      Mouth/Throat:      Mouth: Mucous membranes are dry.      Pharynx: Oropharynx is clear.   Eyes:      Extraocular Movements: Extraocular movements intact.      Pupils: Pupils are equal, round, and reactive to light.   Cardiovascular:      Rate and Rhythm: Tachycardia present.      Pulses: Normal pulses.      Heart sounds: Normal heart sounds.   Pulmonary:      Effort: Respiratory distress present.      Breath sounds: Wheezing and rhonchi present. No rales.      Comments: Wearing 4 L NC  Abdominal:      General: Abdomen is flat. Bowel sounds are normal. There is no distension.      Palpations: Abdomen is soft.      Tenderness: There is no abdominal tenderness. There is no guarding.   Skin:     Coloration: Skin is pale.   Neurological:      General: No focal deficit present.      Mental Status: She is alert and oriented to person, place, and time.   Psychiatric:         Mood and Affect: Mood normal.         Behavior: Behavior normal.              CRANIAL NERVES     CN III, IV, VI   Pupils are equal, round, and reactive to light.       Significant Labs: All pertinent labs within the past 24 hours have been reviewed.  Recent Lab Results         04/16/24  1049   04/16/24  1001   04/16/24  0946   04/16/24  0945        Influenza A, Molecular     Negative         Influenza B, Molecular     Negative         Procalcitonin   0.10  Comment: A concentration < 0.25 ng/mL represents a low risk of bacterial   infection.  Procalcitonin may not be accurate among patients with localized   infection, recent trauma or major surgery, immunosuppressed state,   invasive fungal infection, renal dysfunction. Decisions regarding   initiation or continuation of antibiotic therapy should not be based   solely on procalcitonin levels.             Albumin       3.0       ALP       91       Allens Test Pass             ALT       17       Anion Gap       10       AST       18       Baso  #       0.03       Basophil %       0.4       BILIRUBIN TOTAL       0.4  Comment: For infants and newborns, interpretation of results should be based  on gestational age, weight and in agreement with clinical  observations.    Premature Infant recommended reference ranges:  Up to 24 hours.............<8.0 mg/dL  Up to 48 hours............<12.0 mg/dL  3-5 days..................<15.0 mg/dL  6-29 days.................<15.0 mg/dL         BNP       51  Comment: Values of less than 100 pg/ml are consistent with non-CHF populations.       Site RR             BUN       8       Calcium       9.5       Chloride       103       CO2       26       Creatinine       1.1       D-Dimer       0.65  Comment: The quantitative D-dimer assay should be used as an aid in   the diagnosis of deep vein thrombosis and pulmonary embolism  in patients with the appropriate presentation and clinical  history. The upper limit of the reference interval and the clinical   cut off   point are identical. Causes of a positive (>0.50 mg/L FEU) D-Dimer   test  include, but are not limited to: DVT, PE, DIC, thrombolytic   therapy, anticoagulant therapy, recent surgery, trauma, or   pregnancy, disseminated malignancy, aortic aneurysm, cirrhosis,  and severe infection. False negative results may occur in   patients with distal DVT.         [facility] Room Air             Differential Method       Automated       eGFR       53       Eos #       0.4       Eos %       6.3       Flu A & B Source     Nasal swab         Glucose       235       Gran # (ANC)       3.7       Gran %       54.5       Hematocrit       39.0       Hemoglobin       12.7       Immature Grans (Abs)       0.03  Comment: Mild elevation in immature granulocytes is non specific and   can be seen in a variety of conditions including stress response,   acute inflammation, trauma and pregnancy. Correlation with other   laboratory and clinical findings is essential.         Immature Granulocytes        0.4       Lactic Acid Level       1.6  Comment: Falsely low lactic acid results can be found in samples   containing >=13.0 mg/dL total bilirubin and/or >=3.5 mg/dL   direct bilirubin.         Lymph #       2.1       Lymph %       30.5       Magnesium        1.9       MCH       29.0       MCHC       32.6       MCV       89       Mode SPONT             Mono #       0.5       Mono %       7.9       MPV       9.3       nRBC       0       Platelet Count       284       POC BE 1             POC HCO3 26.4             POC PCO2 45.8             POC PH 7.369             POC PO2 56             POC SATURATED O2 87             Potassium       3.9       PROTEIN TOTAL       6.9       RBC       4.38       RDW       12.8       Sample ARTERIAL             SARS-CoV-2 RNA, Amplification, Qual     Negative  Comment: This test utilizes isothermal nucleic acid amplification technology   to   detect the SARS-CoV-2 RdRp nucleic acid segment. The analytical   sensitivity   (limit of detection) is 500 copies/swab.    A POSITIVE result is indicative of the presence of SARS-CoV-2 RNA;   clinical   correlation with patient history and other diagnostic information is   necessary to determine patient infection status.    A NEGATIVE result means that SARS-CoV-2 nucleic acids are not present   above   the limit of detection. A NEGATIVE result should be treated as   presumptive.   It does not rule out the possibility of COVID-19 and should not be   the sole   basis for treatment decisions.    If COVID-19 is strongly suspected based on clinical and exposure   history,   re-testing using an alternate molecular assay should be considered.    This test is Food and Drug Administration (FDA) approved. Performance   characteristics of this has been independently verified by [facility] Department of Pathology and Laboratory Medicine.           Sodium       139       Sp02 90             Troponin I       <0.006  Comment: The reference interval  for Troponin I represents the 99th percentile   cutoff   for our facility and is consistent with 3rd generation assay   performance.         WBC       6.85               Significant Imaging: I have reviewed all pertinent imaging results/findings within the past 24 hours.    CT Abdomen Pelvis With IV Contrast NO Oral Contrast   Final Result      No acute intra-abdominal process.  Please see CTA chest report of the same day.      All CT scans at this facility use dose modulation, iterative reconstructions, and/or weight base dosing when appropriate to reduce radiation dose to as low as reasonably achievable         Electronically signed by: Albert Mathias MD   Date:    04/16/2024   Time:    12:08      CTA Chest Non-Coronary (PE Studies)   Final Result      No evidence of pulmonary embolism.  Diffuse patchy bilateral lung infiltrate grades diffuse lung parenchymal abnormalities concerning for atypical pneumonia or aspiration.  There is diffuse esophageal wall thickening with distension and debris or food material within the esophagus concerning for gastroesophageal reflux.      All CT scans at this facility use dose modulation, iterative reconstruction and/or weight based dosing when appropriate to reduce radiation dose to as low as reasonably achievable.         Electronically signed by: Albert Mathias MD   Date:    04/16/2024   Time:    12:04      X-Ray Chest AP Portable   Final Result      No acute process seen.         Electronically signed by: Endy Covarrubias MD   Date:    04/16/2024   Time:    10:25

## 2024-04-16 NOTE — HPI
The patient is a 74 yo female with HTN, thyroid nodule, DM type II, Achalasia, peripheral neuropathy, AFIB, and postmenopausal HRT who presented to ED with SOB which onset gradually within the past couple of days. Pt's oxygen saturation was 84% in triage. Pt was recently admitted for similar sxs. Pt's  notes that she has been more confused as of this morning. Associated sxs include weakness, N/V, abd pain, wheezing, cough, congestion, fever, fatigue, diaphoresis, chills, weakness, and lightheadedness. Pt's fever was 102 degrees fahrenheit.  Pt denies usage of oxygen at home.

## 2024-04-16 NOTE — PHARMACY MED REC
"Admission Medication History     The home medication history was taken by Sage Gracia.    You may go to "Admission" then "Reconcile Home Medications" tabs to review and/or act upon these items.     The home medication list has been updated by the Pharmacy department.   Please read ALL comments highlighted in yellow.   Please address this information as you see fit.    Feel free to contact us if you have any questions or require assistance.        Medications listed below were obtained from: Patient/family and Analytic software- [employer]  Current Facility-Administered Medications   Medication Dose Route Frequency Provider Last Rate Last Admin    albuterol-ipratropium 2.5 mg-0.5 mg/3 mL nebulizer solution 3 mL  3 mL Nebulization ED 1 Time DoReta MD        methylPREDNISolone sodium succinate injection 125 mg  125 mg Intravenous ED 1 Time DoReta MD        sodium chloride 0.9% bolus 1,000 mL 1,000 mL  1,000 mL Intravenous ED 1 Time Reta Connors MD         Current Outpatient Medications   Medication Sig Dispense Refill    apixaban (ELIQUIS) 5 mg Tab Take 1 tablet (5 mg total) by mouth 2 (two) times daily. 180 tablet 4    benzonatate (TESSALON) 200 MG capsule Take 1 capsule (200 mg total) by mouth 3 (three) times daily as needed for Cough. 90 capsule 2    budesonide (PULMICORT) 0.25 mg/2 mL nebulizer solution Take 2 mLs (0.25 mg total) by nebulization once daily. Controller 180 mL 1    estrogens, conjugated, (PREMARIN) 0.625 MG tablet Take 1 tablet (0.625 mg total) by mouth once daily. 90 tablet 3    metFORMIN (GLUCOPHAGE-XR) 500 MG ER 24hr tablet TAKE 1 TABLET BY MOUTH TWICE DAILY WITH MEALS 60 tablet 11    metoprolol succinate (TOPROL-XL) 50 MG 24 hr tablet Take 2 tablets (100 mg total) by mouth every morning AND 1 tablet (50 mg total) every evening. (Patient taking differently: 100mg twice daily) 270 tablet 3    pantoprazole (PROTONIX) 40 MG tablet Take 1 tablet (40 mg total) by " mouth nightly. 30 tablet 11    rosuvastatin (CRESTOR) 5 MG tablet Take 1 tablet (5 mg total) by mouth once daily. 90 tablet 3    traZODone (DESYREL) 50 MG tablet Take 1 tablet (50 mg total) by mouth nightly as needed for Insomnia. 30 tablet 11    albuterol (PROVENTIL) 2.5 mg /3 mL (0.083 %) nebulizer solution Take 3 mLs (2.5 mg total) by nebulization every 6 (six) hours as needed for Wheezing. Rescue 120 each 0    albuterol-ipratropium (DUO-NEB) 2.5 mg-0.5 mg/3 mL nebulizer solution Take 3 mLs by nebulization every 6 (six) hours as needed for Wheezing. Rescue 180 mL 3    ondansetron (ZOFRAN-ODT) 4 MG TbDL Take 1 tablet (4 mg total) by mouth every 6 (six) hours as needed (nausea). 20 tablet 0    tirzepatide, weight loss, 2.5 mg/0.5 mL PnIj Inject 2.5 mg into the skin every 7 days. 4 Pen 3         Sage Gracia  [id number]                .

## 2024-04-16 NOTE — ED PROVIDER NOTES
SCRIBE #1 NOTE: I, Sameer Lora, am scribing for, and in the presence of, Reta Connors MD. I have scribed the entire note.       History     Chief Complaint   Patient presents with    Cough     Pt c/o cough, weakness, n/v x2 weeks. Seen and admitted recently for same complaint.      Review of patient's allergies indicates:   Allergen Reactions    Floxin [ofloxacin] Diarrhea and Nausea And Vomiting    Fluvastatin Other (See Comments)     myalgia    Pravastatin Other (See Comments)     myalgia         History of Present Illness     HPI    4/16/2024, 9:50 AM  History obtained from the patient and pt's       History of Present Illness: Kelsie Bustamante is a 73 y.o. female patient with a PMHx of HTN, thyroid nodule, colon polpy, DM type II, achalasia, peripheral neuropathy, AFIB, and postmenopausal HRT who presents to the Emergency Department for evaluation of SOB which onset gradually within the past couple of days. Pt's oxygen saturation was 84% in triage. Pt was recently admitted for similar sxs. Pt recently dx with an upper respiratory infection. Pt's  notes that she has been more confused as of this morning. Symptoms are constant and moderate in severity. No mitigating or exacerbating factors reported. Associated sxs include weakness, N/V, abd pain, wheezing, cough, congestion, fever, fatigue, diaphoresis, chills, weakness, and lightheadedness. Pt's fever was 102 degrees fahrenheit. Patient denies any back pain, diarrhea, chest tightness, dysuria, and all other sxs at this time. No prior Tx. Pt denies usage of oxygen at home. No further complaints or concerns at this time.       Arrival mode: Personal vehicle    PCP: Cody Parks MD        Past Medical History:  Past Medical History:   Diagnosis Date    Achalasia     demetrius    Atrial fibrillation with RVR 05/11/2021    Cataract     Colon polyp     Gastroesophageal reflux     Hiatal hernia     Hx of colonic polyps 08/15/2014    per  colonoscopy in      Hyperlipidemia 2022    Hypertension     Pacemaker     Peripheral neuropathy     Postmenopausal HRT (hormone replacement therapy)     failed tapering off    SAH (subarachnoid hemorrhage)     from a fall late 22    Sepsis 2021    Last Assessment & Plan:  Formatting of this note might be different from the original. History & Physical Patient meets sepsis criteria with a white cell count of 15, and tachypnea.  Source of infection not clear at this time.  No evidence for meningitis.  Cover with broad-spectrum antibiotics especially given invasive dental procedure done recently.  Check blood cultures and monitor for fever.  R    Sinusitis     Thyroid nodule 3/16 subcm; kathleen 2 yrs    Type 2 diabetes mellitus with neurological manifestations     Vasodepressor syncope 2018       Past Surgical History:  Past Surgical History:   Procedure Laterality Date    A-V CARDIAC PACEMAKER INSERTION Left 2022    Procedure: INSERTION, CARDIAC PACEMAKER, DUAL CHAMBER;  Surgeon: Daryl Walker MD;  Location: [facility] CATH LAB;  Service: Cardiology;  Laterality: Left;  Patient instructed 11AM start time/needs ptt/pt/inr  All [employer] with His lead/MDT His as back up//Vinod notified    BREAST BIOPSY Left 2021    cataract Left      SECTION      CHOLECYSTECTOMY      CYST REMOVAL Right 2021    [facility]    ESOPHAGEAL MANOMETRY WITH MEASUREMENT OF IMPEDANCE N/A 10/6/2022    Procedure: MANOMETRY, ESOPHAGUS, WITH IMPEDANCE MEASUREMENT  Cardiac Clearance/Eliquis 2 day hold approval received per Dr. RABIA Aguilar, Cardiology on 22.  Note in encounters.  LB;  Surgeon: Bruna Fletcher MD;  Location: [facility];  Service: Endoscopy;  Laterality: N/A;    ESOPHAGOGASTRODUODENOSCOPY N/A 10/6/2022    Procedure: EGD (ESOPHAGOGASTRODUODENOSCOPY);  Surgeon: Bruna Fletcher MD;  Location: [facility];  Service: Endoscopy;  Laterality: N/A;    HYSTERECTOMY       nonca    OOPHORECTOMY      TILT TABLE TEST N/A 10/25/2018    Procedure: TILT TABLE TEST;  Surgeon: Daryl Walker MD;  Location: [facility] CATH LAB;  Service: Cardiology;  Laterality: N/A;  VAS.DEP.SYN,HUT,FAS,3PREP    TONSILLECTOMY           Family History:  Family History   Problem Relation Name Age of Onset    Aneurysm Sister      Heart disease Mother      Diabetes Father      Coronary artery disease Father      Coronary artery disease Brother      Parkinsonism Brother         Social History:  Social History     Tobacco Use    Smoking status: Never    Smokeless tobacco: Never   Substance and Sexual Activity    Alcohol use: No    Drug use: No    Sexual activity: Never        Review of Systems     Review of Systems   Constitutional:  Positive for chills, diaphoresis, fatigue and fever (102 degrees fahrenheit).   HENT:  Positive for congestion. Negative for sore throat and trouble swallowing.    Respiratory:  Positive for cough, shortness of breath and wheezing. Negative for chest tightness and stridor.    Cardiovascular:  Negative for chest pain, palpitations and leg swelling.   Gastrointestinal:  Positive for abdominal pain, nausea and vomiting. Negative for abdominal distention and diarrhea.   Genitourinary:  Negative for dysuria.   Musculoskeletal:  Negative for back pain.   Skin:  Negative for rash.   Neurological:  Positive for weakness and light-headedness.   Hematological:  Does not bruise/bleed easily.   Psychiatric/Behavioral:  Positive for confusion. Negative for agitation and hallucinations.    All other systems reviewed and are negative.       Physical Exam     Initial Vitals [04/16/24 0929]   BP Pulse Resp Temp SpO2   139/64 85 18 97.8 °F (36.6 °C) (!) 84 %      MAP       --          Physical Exam  Vitals and nursing note reviewed.   Constitutional:       General: She is not in acute distress.     Appearance: She is well-developed. She is ill-appearing.      Comments: However patient is very weak    HENT:      Head: Normocephalic and atraumatic.      Nose: No congestion or rhinorrhea.      Mouth/Throat:      Mouth: Mucous membranes are dry.   Eyes:      Extraocular Movements: Extraocular movements intact.      Conjunctiva/sclera: Conjunctivae normal.   Cardiovascular:      Rate and Rhythm: Normal rate and regular rhythm.      Heart sounds: Normal heart sounds. No murmur heard.     No friction rub. No gallop.   Pulmonary:      Effort: Respiratory distress present.      Breath sounds: No stridor. Wheezing present. No rhonchi or rales.   Abdominal:      General: Bowel sounds are normal. There is no distension.      Palpations: Abdomen is soft.      Tenderness: There is no abdominal tenderness. There is no guarding or rebound.   Musculoskeletal:         General: No tenderness, deformity or signs of injury.      Cervical back: Normal range of motion and neck supple. No tenderness.      Comments: Able to move arms or legs but if she is very weak   Skin:     General: Skin is warm and dry.      Coloration: Skin is not jaundiced.      Findings: No rash.   Neurological:      General: No focal deficit present.      Mental Status: She is alert.      Cranial Nerves: No cranial nerve deficit.      Sensory: No sensory deficit.      Motor: No weakness.      Comments: She is alert but very weak, able to follow commands          ED Course   Critical Care    Date/Time: 4/16/2024 12:54 PM    Performed by: Reta Connors MD  Authorized by: Reta Connors MD  Direct patient critical care time: 20 minutes  Additional history critical care time: 15 minutes  Ordering / reviewing critical care time: 8 minutes  Documentation critical care time: 9 minutes  Consulting other physicians critical care time: 8 minutes  Total critical care time (exclusive of procedural time) : 60 minutes  Critical care time was exclusive of teaching time and separately billable procedures and treating other patients.  Critical care was necessary to  treat or prevent imminent or life-threatening deterioration of the following conditions: Hypoxia.  Critical care was time spent personally by me on the following activities: blood draw for specimens, development of treatment plan with patient or surrogate, discussions with consultants, interpretation of cardiac output measurements, evaluation of patient's response to treatment, examination of patient, obtaining history from patient or surrogate, ordering and performing treatments and interventions, ordering and review of laboratory studies, ordering and review of radiographic studies, pulse oximetry, re-evaluation of patient's condition and review of old charts.        ED Vital Signs:  Vitals:    04/16/24 0928 04/16/24 0929 04/16/24 0942 04/16/24 0944   BP:  139/64  (!) 144/64   Pulse:  85 86 85   Resp:  18  20   Temp:  97.8 °F (36.6 °C)     TempSrc:  Oral     SpO2:  (!) 84%  95%   Weight: 76.5 kg (168 lb 10.4 oz)       04/16/24 0949 04/16/24 1000 04/16/24 1049 04/16/24 1050   BP:  (!) 145/62     Pulse: 79 86 84 83   Resp: 20 (!) 32 (!) 32 20   Temp:       TempSrc:       SpO2: 96% 96% (!) 94% 99%   Weight:        04/16/24 1100 04/16/24 1230 04/16/24 1300 04/16/24 1400   BP: (!) 168/72 133/68 (!) 142/89 (!) 148/63   Pulse: 81 (!) 114 94 109   Resp: (!) 26 (!) 26 (!) 24 (!) 26   Temp:       TempSrc:       SpO2: 99% (!) 94% 96% 98%   Weight:        04/16/24 1430   BP: (!) 166/71   Pulse: 105   Resp: (!) 32   Temp:    TempSrc:    SpO2: 95%   Weight:        Abnormal Lab Results:  Labs Reviewed   COMPREHENSIVE METABOLIC PANEL - Abnormal; Notable for the following components:       Result Value    Glucose 235 (*)     Albumin 3.0 (*)     eGFR 53 (*)     All other components within normal limits   LACTIC ACID, PLASMA - Abnormal; Notable for the following components:    Lactate (Lactic Acid) 3.9 (*)     All other components within normal limits    Narrative:     LA critical result(s) called and verbal readback obtained from  GORGE CRENSHAW RN by [facility] 04/16/2024 14:23   D DIMER, QUANTITATIVE - Abnormal; Notable for the following components:    D-Dimer 0.65 (*)     All other components within normal limits   ISTAT PROCEDURE - Abnormal; Notable for the following components:    POC PCO2 45.8 (*)     POC PO2 56 (*)     All other components within normal limits   INFLUENZA A & B BY MOLECULAR   CULTURE, BLOOD   CULTURE, BLOOD   CBC W/ AUTO DIFFERENTIAL   LACTIC ACID, PLASMA   B-TYPE NATRIURETIC PEPTIDE   PROCALCITONIN   TROPONIN I   MAGNESIUM   SARS-COV-2 RNA AMPLIFICATION, QUAL   D DIMER, QUANTITATIVE   URINALYSIS, REFLEX TO URINE CULTURE        All Lab Results:  Results for orders placed or performed during the hospital encounter of 04/16/24   Influenza A & B by Molecular    Specimen: Nasopharyngeal Swab   Result Value Ref Range    Influenza A, Molecular Negative Negative    Influenza B, Molecular Negative Negative    Flu A & B Source Nasal swab    CBC auto differential   Result Value Ref Range    WBC 6.85 3.90 - 12.70 K/uL    RBC 4.38 4.00 - 5.40 M/uL    Hemoglobin 12.7 12.0 - 16.0 g/dL    Hematocrit 39.0 37.0 - 48.5 %    MCV 89 82 - 98 fL    MCH 29.0 27.0 - 31.0 pg    MCHC 32.6 32.0 - 36.0 g/dL    RDW 12.8 11.5 - 14.5 %    Platelets 284 150 - 450 K/uL    MPV 9.3 9.2 - 12.9 fL    Immature Granulocytes 0.4 0.0 - 0.5 %    Gran # (ANC) 3.7 1.8 - 7.7 K/uL    Immature Grans (Abs) 0.03 0.00 - 0.04 K/uL    Lymph # 2.1 1.0 - 4.8 K/uL    Mono # 0.5 0.3 - 1.0 K/uL    Eos # 0.4 0.0 - 0.5 K/uL    Baso # 0.03 0.00 - 0.20 K/uL    nRBC 0 0 /100 WBC    Gran % 54.5 38.0 - 73.0 %    Lymph % 30.5 18.0 - 48.0 %    Mono % 7.9 4.0 - 15.0 %    Eosinophil % 6.3 0.0 - 8.0 %    Basophil % 0.4 0.0 - 1.9 %    Differential Method Automated    Comprehensive metabolic panel   Result Value Ref Range    Sodium 139 136 - 145 mmol/L    Potassium 3.9 3.5 - 5.1 mmol/L    Chloride 103 95 - 110 mmol/L    CO2 26 23 - 29 mmol/L    Glucose 235 (H) 70 - 110 mg/dL    BUN 8 8 - 23 mg/dL     Creatinine 1.1 0.5 - 1.4 mg/dL    Calcium 9.5 8.7 - 10.5 mg/dL    Total Protein 6.9 6.0 - 8.4 g/dL    Albumin 3.0 (L) 3.5 - 5.2 g/dL    Total Bilirubin 0.4 0.1 - 1.0 mg/dL    Alkaline Phosphatase 91 55 - 135 U/L    AST 18 10 - 40 U/L    ALT 17 10 - 44 U/L    eGFR 53 (A) >60 mL/min/1.73 m^2    Anion Gap 10 8 - 16 mmol/L   Lactic acid, plasma #1   Result Value Ref Range    Lactate (Lactic Acid) 1.6 0.5 - 2.2 mmol/L   Brain natriuretic peptide   Result Value Ref Range    BNP 51 0 - 99 pg/mL   Procalcitonin   Result Value Ref Range    Procalcitonin 0.10 <0.25 ng/mL   Troponin I   Result Value Ref Range    Troponin I <0.006 0.000 - 0.026 ng/mL   Magnesium   Result Value Ref Range    Magnesium 1.9 1.6 - 2.6 mg/dL   COVID-19 Rapid Screening   Result Value Ref Range    SARS-CoV-2 RNA, Amplification, Qual Negative Negative   Lactic acid, plasma #2   Result Value Ref Range    Lactate (Lactic Acid) 3.9 (HH) 0.5 - 2.2 mmol/L   D-Dimer, Quantitative   Result Value Ref Range    D-Dimer 0.65 (H) <0.50 mg/L FEU   EKG 12-lead   Result Value Ref Range    QRS Duration 74 ms    [facility] QTC Calculation 444 ms   ISTAT PROCEDURE   Result Value Ref Range    POC PH 7.369 7.35 - 7.45    POC PCO2 45.8 (H) 35 - 45 mmHg    POC PO2 56 (LL) 80 - 100 mmHg    POC HCO3 26.4 24 - 28 mmol/L    POC BE 1 -2 to 2 mmol/L    POC SATURATED O2 87 95 - 100 %    Sample ARTERIAL     Site RR     Allens Test Pass     DelSys Room Air     Mode SPONT     Sp02 90          Imaging Results:  Imaging Results              CT Abdomen Pelvis With IV Contrast NO Oral Contrast (Final result)  Result time 04/16/24 12:08:03      Final result by Albert Mathias MD (Timothy) (04/16/24 12:08:03)                   Impression:      No acute intra-abdominal process.  Please see CTA chest report of the same day.    All CT scans at this facility use dose modulation, iterative reconstructions, and/or weight base dosing when appropriate to reduce radiation dose to as low as reasonably  achievable      Electronically signed by: Albert Mathias MD  Date:    04/16/2024  Time:    12:08               Narrative:    EXAMINATION:  CT ABDOMEN PELVIS WITH IV CONTRAST    CLINICAL HISTORY:  Abdominal abscess/infection suspected;Nausea/vomiting;    TECHNIQUE:  Postcontrast images were obtained    COMPARISON:  None    FINDINGS:  Fatty infiltration of the liver.  No liver masses.  No focal splenic abnormalities.    The gallbladder is unremarkable.  There is no bile duct dilatation.    The kidneys are normal.    The aorta and inferior vena cava are unremarkable.    There are no acute bowel abnormalities.  Normal appendix.  No evidence of appendicitis.  No evidence of diverticulitis.    Bladder is normal. No abnormal masses or fluid collections in the pelvis.    Skeletal structures are intact.  No acute skeletal findings.                                       CTA Chest Non-Coronary (PE Studies) (Final result)  Result time 04/16/24 12:04:25      Final result by Albert Mathias (Girish), MD (04/16/24 12:04:25)                   Impression:      No evidence of pulmonary embolism.  Diffuse patchy bilateral lung infiltrate grades diffuse lung parenchymal abnormalities concerning for atypical pneumonia or aspiration.  There is diffuse esophageal wall thickening with distension and debris or food material within the esophagus concerning for gastroesophageal reflux.    All CT scans at this facility use dose modulation, iterative reconstruction and/or weight based dosing when appropriate to reduce radiation dose to as low as reasonably achievable.      Electronically signed by: Albert Mathias MD  Date:    04/16/2024  Time:    12:04               Narrative:    EXAMINATION:  CTA CHEST NON CORONARY (PE STUDIES)    CLINICAL HISTORY:  Pulmonary embolism (PE) suspected, unknown D-dimer;    TECHNIQUE:  Standard CTA of the chest performed with 100 cc Omnipaque 350 utilizing 3-D MIP  reformations.    COMPARISON:  None    FINDINGS:  Cardiac size is normal.  Pacemaker leads are present.  No coronary artery calcifications.  Major pulmonary arteries are normal.  No evidence of aortic dissection.    There are patchy infiltrates noted involving the right lung with areas of central lobar ground-glass ill-defined nodularity.  There are milder changes involving the left lung.  Findings are suspicious for pneumonia or aspiration.  There is diffuse esophageal wall thickening with distention of the esophagus which appears to be filled with debris or food material.  Patient has had a previous gastroplasty.                                       X-Ray Chest AP Portable (Final result)  Result time 04/16/24 10:25:58      Final result by Endy Covarrubias MD (04/16/24 10:25:58)                   Impression:      No acute process seen.      Electronically signed by: Endy Covarrubias MD  Date:    04/16/2024  Time:    10:25               Narrative:    EXAMINATION:  XR CHEST AP PORTABLE    CLINICAL HISTORY:  Sepsis;    FINDINGS:  Single view of the chest.  Comparison 03/29/2024    Cardiac silhouette is normal.  The lungs demonstrate no evidence of active disease.  No evidence of pleural effusion or pneumothorax.  Bones appear intact.  Moderate degenerative changes and moderate atherosclerotic disease.  Pacing wires demonstrates similar configuration.                                       The EKG was ordered, reviewed, and independently interpreted by the ED provider.  Interpretation time: 10:06  Rate: 84 BPM  Rhythm: normal sinus rhythm; Suspect unspecified pacemaker failure  Interpretation: Nonspecific ST abnormality. No STEMI.             The Emergency Provider reviewed the vital signs and test results, which are outlined above.     ED Discussion       1:10 PM: Discussed case with Zaira Vieyra NP (Hospital Medicine). Dr. Rodriguez agrees with current care and management of pt and accepts admission.   Admitting Service:    Admitting Physician: Dr. Rodriguez  Admit to: Inpatient Med Tele       1:10 PM: Re-evaluated pt. I have discussed test results, shared treatment plan, and the need for admission with patient and family at bedside. Pt and family express understanding at this time and agree with all information. All questions answered. Pt and family have no further questions or concerns at this time. Pt is ready for admit.        Medical Decision Making  Patient with bilateral pneumonia not seen on x-ray but CT scan. Room air oxygen is around 88% doing very well on 2 L.  given antibiotics and fluids.  Patient did require DuoNebs and steroids for wheezing.    Amount and/or Complexity of Data Reviewed  Independent Historian: spouse  External Data Reviewed: labs and notes.  Labs: ordered. Decision-making details documented in ED Course.  Radiology: ordered. Decision-making details documented in ED Course.  ECG/medicine tests: ordered and independent interpretation performed. Decision-making details documented in ED Course.    Risk  Prescription drug management.  Decision regarding hospitalization.  Risk Details: \.Differential diagnoses:  Pneumonia, Congestive heart failure, Acute Myocardial infarction, Pleural effusion, Pulmonary edema, Pulmonary embolism, Electrolyte imbalance, Infectious etiology, COPD exacerbation, Asthma exacerbation, Pneumothorax,  Cardiac tamponade, Anemia, Deconditioning, bronchospasm, upper airway obstruction such: Aspiration, Foreign body                 ED Medication(s):  Medications   albuterol-ipratropium 2.5 mg-0.5 mg/3 mL nebulizer solution 3 mL (has no administration in time range)   albuterol-ipratropium 2.5 mg-0.5 mg/3 mL nebulizer solution 3 mL (3 mLs Nebulization Given 4/16/24 1227)   albuterol-ipratropium 2.5 mg-0.5 mg/3 mL nebulizer solution 3 mL (3 mLs Nebulization Given 4/16/24 1050)   methylPREDNISolone sodium succinate injection 125 mg (125 mg Intravenous Given 4/16/24 1216)   sodium chloride  0.9% bolus 1,000 mL 1,000 mL (0 mLs Intravenous Stopped 4/16/24 1416)   iohexoL (OMNIPAQUE 350) injection 100 mL (100 mLs Intravenous Given 4/16/24 1152)   cefTRIAXone (Rocephin) 1 g in dextrose 5 % in water (D5W) 100 mL IVPB (MB+) (0 g Intravenous Stopped 4/16/24 1429)       New Prescriptions    No medications on file               Scribe Attestation:   Scribe #1: I performed the above scribed service and the documentation accurately describes the services I performed. I attest to the accuracy of the note.     Attending:   Physician Attestation Statement for Scribe #1: I, Reta Connors MD, personally performed the services described in this documentation, as scribed by Sameer Lora, in my presence, and it is both accurate and complete.           Clinical Impression       ICD-10-CM ICD-9-CM   1. Pneumonia of both lower lobes due to infectious organism  J18.9 486   2. Weakness  R53.1 780.79   3. Acute respiratory failure with hypoxia  J96.01 518.81       Disposition:   Disposition: Admitted  Condition: Fair        Reta Connors MD  04/16/24 1506

## 2024-04-17 VITALS
SYSTOLIC BLOOD PRESSURE: 149 MMHG | HEIGHT: 63 IN | WEIGHT: 168.63 LBS | TEMPERATURE: 98 F | BODY MASS INDEX: 29.88 KG/M2 | DIASTOLIC BLOOD PRESSURE: 67 MMHG | OXYGEN SATURATION: 95 % | RESPIRATION RATE: 18 BRPM | HEART RATE: 74 BPM

## 2024-04-17 LAB
ANION GAP SERPL CALC-SCNC: 12 MMOL/L (ref 8–16)
BASOPHILS # BLD AUTO: 0.01 K/UL (ref 0–0.2)
BASOPHILS NFR BLD: 0.1 % (ref 0–1.9)
BUN SERPL-MCNC: 11 MG/DL (ref 8–23)
CALCIUM SERPL-MCNC: 9.2 MG/DL (ref 8.7–10.5)
CHLORIDE SERPL-SCNC: 102 MMOL/L (ref 95–110)
CO2 SERPL-SCNC: 25 MMOL/L (ref 23–29)
CREAT SERPL-MCNC: 1.1 MG/DL (ref 0.5–1.4)
DIFFERENTIAL METHOD BLD: ABNORMAL
EOSINOPHIL # BLD AUTO: 0 K/UL (ref 0–0.5)
EOSINOPHIL NFR BLD: 0 % (ref 0–8)
ERYTHROCYTE [DISTWIDTH] IN BLOOD BY AUTOMATED COUNT: 12.9 % (ref 11.5–14.5)
EST. GFR  (NO RACE VARIABLE): 53 ML/MIN/1.73 M^2
GLUCOSE SERPL-MCNC: 293 MG/DL (ref 70–110)
HCT VFR BLD AUTO: 35.3 % (ref 37–48.5)
HGB BLD-MCNC: 11.6 G/DL (ref 12–16)
IMM GRANULOCYTES # BLD AUTO: 0.09 K/UL (ref 0–0.04)
IMM GRANULOCYTES NFR BLD AUTO: 0.6 % (ref 0–0.5)
LYMPHOCYTES # BLD AUTO: 0.9 K/UL (ref 1–4.8)
LYMPHOCYTES NFR BLD: 6.1 % (ref 18–48)
MAGNESIUM SERPL-MCNC: 1.8 MG/DL (ref 1.6–2.6)
MCH RBC QN AUTO: 29.2 PG (ref 27–31)
MCHC RBC AUTO-ENTMCNC: 32.9 G/DL (ref 32–36)
MCV RBC AUTO: 89 FL (ref 82–98)
MONOCYTES # BLD AUTO: 0.4 K/UL (ref 0.3–1)
MONOCYTES NFR BLD: 2.8 % (ref 4–15)
NEUTROPHILS # BLD AUTO: 13 K/UL (ref 1.8–7.7)
NEUTROPHILS NFR BLD: 90.4 % (ref 38–73)
NRBC BLD-RTO: 0 /100 WBC
OHS QRS DURATION: 74 MS
OHS QRS DURATION: 74 MS
OHS QTC CALCULATION: 438 MS
OHS QTC CALCULATION: 444 MS
PHOSPHATE SERPL-MCNC: 2.3 MG/DL (ref 2.7–4.5)
PLATELET # BLD AUTO: 298 K/UL (ref 150–450)
PMV BLD AUTO: 9.6 FL (ref 9.2–12.9)
POCT GLUCOSE: 346 MG/DL (ref 70–110)
POCT GLUCOSE: 475 MG/DL (ref 70–110)
POTASSIUM SERPL-SCNC: 4.4 MMOL/L (ref 3.5–5.1)
RBC # BLD AUTO: 3.97 M/UL (ref 4–5.4)
SODIUM SERPL-SCNC: 139 MMOL/L (ref 136–145)
WBC # BLD AUTO: 14.33 K/UL (ref 3.9–12.7)

## 2024-04-17 PROCEDURE — 25000003 PHARM REV CODE 250: Mod: HCNC | Performed by: HOSPITALIST

## 2024-04-17 PROCEDURE — 93005 ELECTROCARDIOGRAM TRACING: CPT | Mod: HCNC

## 2024-04-17 PROCEDURE — 80048 BASIC METABOLIC PNL TOTAL CA: CPT | Mod: HCNC | Performed by: HOSPITALIST

## 2024-04-17 PROCEDURE — 63700000 PHARM REV CODE 250 ALT 637 W/O HCPCS: Mod: HCNC | Performed by: HOSPITALIST

## 2024-04-17 PROCEDURE — 85025 COMPLETE CBC W/AUTO DIFF WBC: CPT | Mod: HCNC | Performed by: HOSPITALIST

## 2024-04-17 PROCEDURE — 25000242 PHARM REV CODE 250 ALT 637 W/ HCPCS: Mod: HCNC | Performed by: HOSPITALIST

## 2024-04-17 PROCEDURE — 36415 COLL VENOUS BLD VENIPUNCTURE: CPT | Mod: HCNC | Performed by: HOSPITALIST

## 2024-04-17 PROCEDURE — 84100 ASSAY OF PHOSPHORUS: CPT | Mod: HCNC | Performed by: HOSPITALIST

## 2024-04-17 PROCEDURE — 83735 ASSAY OF MAGNESIUM: CPT | Mod: HCNC | Performed by: HOSPITALIST

## 2024-04-17 PROCEDURE — 94640 AIRWAY INHALATION TREATMENT: CPT | Mod: HCNC

## 2024-04-17 PROCEDURE — 63600175 PHARM REV CODE 636 W HCPCS: Mod: HCNC | Performed by: HOSPITALIST

## 2024-04-17 PROCEDURE — 94761 N-INVAS EAR/PLS OXIMETRY MLT: CPT | Mod: HCNC

## 2024-04-17 PROCEDURE — 93010 ELECTROCARDIOGRAM REPORT: CPT | Mod: HCNC,,, | Performed by: INTERNAL MEDICINE

## 2024-04-17 RX ORDER — ACETAMINOPHEN 325 MG/1
650 TABLET ORAL EVERY 6 HOURS PRN
Status: DISCONTINUED | OUTPATIENT
Start: 2024-04-17 | End: 2024-04-17 | Stop reason: HOSPADM

## 2024-04-17 RX ORDER — PREDNISONE 10 MG/1
TABLET ORAL
Qty: 14 TABLET | Refills: 0 | Status: SHIPPED | OUTPATIENT
Start: 2024-04-17 | End: 2024-04-17

## 2024-04-17 RX ORDER — AMOXICILLIN AND CLAVULANATE POTASSIUM 875; 125 MG/1; MG/1
1 TABLET, FILM COATED ORAL 2 TIMES DAILY
Qty: 14 TABLET | Refills: 0 | Status: SHIPPED | OUTPATIENT
Start: 2024-04-17 | End: 2024-04-25

## 2024-04-17 RX ORDER — PREDNISONE 10 MG/1
TABLET ORAL
Qty: 14 TABLET | Refills: 0 | Status: SHIPPED | OUTPATIENT
Start: 2024-04-17 | End: 2024-04-25 | Stop reason: ALTCHOICE

## 2024-04-17 RX ORDER — AMOXICILLIN AND CLAVULANATE POTASSIUM 875; 125 MG/1; MG/1
1 TABLET, FILM COATED ORAL 2 TIMES DAILY
Qty: 14 TABLET | Refills: 0 | Status: SHIPPED | OUTPATIENT
Start: 2024-04-17 | End: 2024-04-17

## 2024-04-17 RX ADMIN — APIXABAN 5 MG: 2.5 TABLET, FILM COATED ORAL at 08:04

## 2024-04-17 RX ADMIN — METOPROLOL SUCCINATE 100 MG: 50 TABLET, EXTENDED RELEASE ORAL at 08:04

## 2024-04-17 RX ADMIN — INSULIN ASPART 8 UNITS: 100 INJECTION, SOLUTION INTRAVENOUS; SUBCUTANEOUS at 06:04

## 2024-04-17 RX ADMIN — IPRATROPIUM BROMIDE AND ALBUTEROL SULFATE 3 ML: 2.5; .5 SOLUTION RESPIRATORY (INHALATION) at 07:04

## 2024-04-17 RX ADMIN — PREDNISONE 40 MG: 20 TABLET ORAL at 08:04

## 2024-04-17 RX ADMIN — ACETAMINOPHEN 650 MG: 325 TABLET ORAL at 01:04

## 2024-04-17 RX ADMIN — CEFTRIAXONE 1 G: 1 INJECTION, POWDER, FOR SOLUTION INTRAMUSCULAR; INTRAVENOUS at 08:04

## 2024-04-17 RX ADMIN — AZITHROMYCIN DIHYDRATE 250 MG: 250 TABLET ORAL at 09:04

## 2024-04-17 NOTE — ASSESSMENT & PLAN NOTE
"Patient's FSGs are uncontrolled due to hyperglycemia on current medication regimen.  Last A1c reviewed-   Lab Results   Component Value Date    HGBA1C 8.6 (H) 03/01/2024     Most recent fingerstick glucose reviewed- No results for input(s): "POCTGLUCOSE" in the last 24 hours.  Current correctional scale  Medium  Maintain anti-hyperglycemic dose as follows-   Antihyperglycemics (From admission, onward)      Start     Stop Route Frequency Ordered    04/16/24 2336  insulin aspart U-100 pen 0-10 Units         -- SubQ Before meals & nightly PRN 04/16/24 2236          Hold Oral hypoglycemics while patient is in the hospital.    "

## 2024-04-17 NOTE — ASSESSMENT & PLAN NOTE
Patient's COPD is with exacerbation noted by continued dyspnea and worsening of baseline hypoxia currently.  Patient is currently on COPD Pathway. Continue scheduled inhalers Steroids, Antibiotics, and Supplemental oxygen and monitor respiratory status closely.

## 2024-04-17 NOTE — PLAN OF CARE
Discussed poc with pt, pt verbalized understanding    Purposeful rounding every 2hours    VS wnl  Cardiac monitoring in use, pt is ST with PVC, tele monitor # [id number]  O2 per nc at 2lpm.  Fall precautions in place, remains injury free  Pt denies c/o pain.  Pain and nausea under control with PRN meds    Accurate I&Os  Abx given as prescribed  Bed locked at lowest position  Call light within reach    Chart check complete  Will cont with POC

## 2024-04-17 NOTE — PLAN OF CARE
[facility] - Med Surg  Discharge Final Note    Primary Care Provider: Cody Parks MD    Expected Discharge Date: 4/17/2024    Final Discharge Note (most recent)       Final Note - 04/17/24 1019          Final Note    Assessment Type Final Discharge Note     Anticipated Discharge Disposition Home or Self Care     Hospital Resources/Appts/Education Provided Appointments scheduled and added to AVS        Post-Acute Status    Discharge Delays None known at this time                   Contact Info       Cody Parks MD   Specialty: Family Medicine   Relationship: PCP - General    [id number]  [place] [address]   Phone: [phone]       Next Steps: Schedule an appointment as soon as possible for a visit in 1 week(s)    Instructions: Hospital discharge follow up          No d/c needs/orders at this time.

## 2024-04-17 NOTE — NURSING
This patient refused to wear her heart monitor last night because she says that it interfere with her sleep.  MD at night informed. No new orders at this time.

## 2024-04-17 NOTE — PLAN OF CARE
Problem: Adjustment to Illness COPD (Chronic Obstructive Pulmonary Disease)  Goal: Optimal Chronic Illness Coping  Outcome: Ongoing, Progressing     Problem: Functional Ability Impaired COPD (Chronic Obstructive Pulmonary Disease)  Goal: Optimal Level of Functional [place]  Outcome: Ongoing, Progressing     Problem: Infection COPD (Chronic Obstructive Pulmonary Disease)  Goal: Absence of Infection Signs and Symptoms  Outcome: Ongoing, Progressing     Problem: Oral Intake Inadequate COPD (Chronic Obstructive Pulmonary Disease)  Goal: Improved Nutrition Intake  Outcome: Ongoing, Progressing     Problem: Respiratory Compromise COPD (Chronic Obstructive Pulmonary Disease)  Goal: Effective Oxygenation and Ventilation  Outcome: Ongoing, Progressing     Problem: Adult Inpatient Plan of Care  Goal: Plan of Care Review  Outcome: Ongoing, Progressing  Goal: Patient-Specific Goal (Individualized)  Outcome: Ongoing, Progressing  Goal: Absence of Hospital-Acquired Illness or Injury  Outcome: Ongoing, Progressing  Goal: Optimal Comfort and Wellbeing  Outcome: Ongoing, Progressing  Goal: Readiness for Transition of Care  Outcome: Ongoing, Progressing     Problem: Diabetes Comorbidity  Goal: Blood Glucose Level Within Targeted Range  Outcome: Ongoing, Progressing

## 2024-04-17 NOTE — ASSESSMENT & PLAN NOTE
Chronic, controlled. Latest blood pressure and vitals reviewed-     Temp:  [97.8 °F (36.6 °C)-97.9 °F (36.6 °C)]   Pulse:  []   Resp:  [18-32]   BP: (133-168)/(62-89)   SpO2:  [84 %-99 %] .   Home meds for hypertension were reviewed and noted below.   Hypertension Medications               metoprolol succinate (TOPROL-XL) 50 MG 24 hr tablet Take 2 tablets (100 mg total) by mouth every morning AND 1 tablet (50 mg total) every evening.            While in the hospital, will manage blood pressure as follows; Continue home antihypertensive regimen    Will utilize p.r.n. blood pressure medication only if patient's blood pressure greater than 180/110 and she develops symptoms such as worsening chest pain or shortness of breath.

## 2024-04-18 LAB
BACTERIA UR CULT: NORMAL
BACTERIA UR CULT: NORMAL

## 2024-04-21 LAB
BACTERIA BLD CULT: NORMAL
BACTERIA BLD CULT: NORMAL
OHS CV AF BURDEN PERCENT: 4
OHS CV DC REMOTE DEVICE TYPE: NORMAL
OHS CV RV PACING PERCENT: 2 %

## 2024-04-25 ENCOUNTER — HOSPITAL ENCOUNTER (OUTPATIENT)
Dept: RADIOLOGY | Facility: HOSPITAL | Age: 74
Discharge: HOME | End: 2024-04-25
Attending: PHYSICIAN ASSISTANT
Payer: OTHER GOVERNMENT

## 2024-04-25 ENCOUNTER — OFFICE VISIT (OUTPATIENT)
Dept: INTERNAL MEDICINE | Facility: CLINIC | Age: 74
End: 2024-04-25
Payer: MEDICARE

## 2024-04-25 VITALS
HEART RATE: 81 BPM | DIASTOLIC BLOOD PRESSURE: 84 MMHG | TEMPERATURE: 97 F | BODY MASS INDEX: 30.39 KG/M2 | WEIGHT: 171.5 LBS | SYSTOLIC BLOOD PRESSURE: 128 MMHG | OXYGEN SATURATION: 95 % | HEIGHT: 63 IN

## 2024-04-25 DIAGNOSIS — Z95.0 PACEMAKER: ICD-10-CM

## 2024-04-25 DIAGNOSIS — K21.9 GASTROESOPHAGEAL REFLUX DISEASE, UNSPECIFIED WHETHER ESOPHAGITIS PRESENT: ICD-10-CM

## 2024-04-25 DIAGNOSIS — E11.65 UNCONTROLLED TYPE 2 DIABETES MELLITUS WITH HYPERGLYCEMIA: ICD-10-CM

## 2024-04-25 DIAGNOSIS — E11.42 TYPE 2 DIABETES MELLITUS WITH DIABETIC POLYNEUROPATHY, WITHOUT LONG-TERM CURRENT USE OF INSULIN: ICD-10-CM

## 2024-04-25 DIAGNOSIS — Z79.4 TYPE 2 DIABETES MELLITUS WITH HYPERGLYCEMIA, WITH LONG-TERM CURRENT USE OF INSULIN: ICD-10-CM

## 2024-04-25 DIAGNOSIS — I48.0 PAF (PAROXYSMAL ATRIAL FIBRILLATION): Chronic | ICD-10-CM

## 2024-04-25 DIAGNOSIS — Z09 HOSPITAL DISCHARGE FOLLOW-UP: ICD-10-CM

## 2024-04-25 DIAGNOSIS — K22.0 ACHALASIA OF ESOPHAGUS: ICD-10-CM

## 2024-04-25 DIAGNOSIS — I48.0 PAROXYSMAL ATRIAL FIBRILLATION: ICD-10-CM

## 2024-04-25 DIAGNOSIS — E11.65 TYPE 2 DIABETES MELLITUS WITH HYPERGLYCEMIA, WITH LONG-TERM CURRENT USE OF INSULIN: ICD-10-CM

## 2024-04-25 DIAGNOSIS — J69.0 ASPIRATION PNEUMONIA OF BOTH LUNGS DUE TO GASTRIC SECRETIONS, UNSPECIFIED PART OF LUNG: Primary | ICD-10-CM

## 2024-04-25 DIAGNOSIS — J69.0 ASPIRATION PNEUMONIA OF BOTH LUNGS DUE TO GASTRIC SECRETIONS, UNSPECIFIED PART OF LUNG: ICD-10-CM

## 2024-04-25 PROCEDURE — 71046 X-RAY EXAM CHEST 2 VIEWS: CPT | Mod: TC

## 2024-04-25 PROCEDURE — 3074F SYST BP LT 130 MM HG: CPT | Mod: HCNC,CPTII,S$GLB, | Performed by: PHYSICIAN ASSISTANT

## 2024-04-25 PROCEDURE — 3066F NEPHROPATHY DOC TX: CPT | Mod: HCNC,CPTII,S$GLB, | Performed by: PHYSICIAN ASSISTANT

## 2024-04-25 PROCEDURE — 1111F DSCHRG MED/CURRENT MED MERGE: CPT | Mod: HCNC,CPTII,S$GLB, | Performed by: PHYSICIAN ASSISTANT

## 2024-04-25 PROCEDURE — 99999 PR PBB SHADOW E&M-EST. PATIENT-LVL V: CPT | Mod: PBBFAC,HCNC,, | Performed by: PHYSICIAN ASSISTANT

## 2024-04-25 PROCEDURE — 3061F NEG MICROALBUMINURIA REV: CPT | Mod: HCNC,CPTII,S$GLB, | Performed by: PHYSICIAN ASSISTANT

## 2024-04-25 PROCEDURE — 1159F MED LIST DOCD IN RCRD: CPT | Mod: HCNC,CPTII,S$GLB, | Performed by: PHYSICIAN ASSISTANT

## 2024-04-25 PROCEDURE — 1160F RVW MEDS BY RX/DR IN RCRD: CPT | Mod: HCNC,CPTII,S$GLB, | Performed by: PHYSICIAN ASSISTANT

## 2024-04-25 PROCEDURE — 3079F DIAST BP 80-89 MM HG: CPT | Mod: HCNC,CPTII,S$GLB, | Performed by: PHYSICIAN ASSISTANT

## 2024-04-25 PROCEDURE — 1126F AMNT PAIN NOTED NONE PRSNT: CPT | Mod: HCNC,CPTII,S$GLB, | Performed by: PHYSICIAN ASSISTANT

## 2024-04-25 PROCEDURE — 3052F HG A1C>EQUAL 8.0%<EQUAL 9.0%: CPT | Mod: HCNC,CPTII,S$GLB, | Performed by: PHYSICIAN ASSISTANT

## 2024-04-25 PROCEDURE — 99214 OFFICE O/P EST MOD 30 MIN: CPT | Mod: HCNC,S$GLB,, | Performed by: PHYSICIAN ASSISTANT

## 2024-04-25 PROCEDURE — 71046 X-RAY EXAM CHEST 2 VIEWS: CPT | Mod: 26,,, | Performed by: RADIOLOGY

## 2024-04-25 PROCEDURE — 1101F PT FALLS ASSESS-DOCD LE1/YR: CPT | Mod: HCNC,CPTII,S$GLB, | Performed by: PHYSICIAN ASSISTANT

## 2024-04-25 PROCEDURE — 3288F FALL RISK ASSESSMENT DOCD: CPT | Mod: HCNC,CPTII,S$GLB, | Performed by: PHYSICIAN ASSISTANT

## 2024-04-25 PROCEDURE — G2211 COMPLEX E/M VISIT ADD ON: HCPCS | Mod: HCNC,S$GLB,, | Performed by: PHYSICIAN ASSISTANT

## 2024-04-25 RX ORDER — PANTOPRAZOLE SODIUM 40 MG/1
40 TABLET, DELAYED RELEASE ORAL 2 TIMES DAILY
Qty: 180 TABLET | Refills: 3 | Status: SHIPPED | OUTPATIENT
Start: 2024-04-25 | End: 2025-04-25

## 2024-04-25 RX ORDER — BLOOD-GLUCOSE SENSOR
EACH MISCELLANEOUS
Qty: 3 EACH | Refills: 6 | Status: SHIPPED | OUTPATIENT
Start: 2024-04-25

## 2024-04-25 RX ORDER — PEN NEEDLE, DIABETIC 30 GX3/16"
NEEDLE, DISPOSABLE MISCELLANEOUS
Qty: 100 EACH | Refills: 0 | Status: SHIPPED | OUTPATIENT
Start: 2024-04-25

## 2024-04-25 RX ORDER — INSULIN GLARGINE 100 [IU]/ML
10 INJECTION, SOLUTION SUBCUTANEOUS NIGHTLY
Qty: 10 ML | Refills: 0 | Status: SHIPPED | OUTPATIENT
Start: 2024-04-25 | End: 2024-08-03

## 2024-04-25 NOTE — PROGRESS NOTES
Subjective:      Patient ID: Kelsie Bustamante is a 73 y.o. female.    Chief Complaint: Hospital Follow Up (PNEUMONIA) and Cough      Here today for a hospital follow up. Diagnosed with aspiration pneumonia.   Pneumonia  She complains of chest tightness, cough, difficulty breathing, frequent throat clearing, shortness of breath and wheezing. There is no hemoptysis, hoarse voice or sputum production. This is a new problem. The current episode started 1 to 4 weeks ago. The problem occurs constantly. The problem has been unchanged. The cough is non-productive. Associated symptoms include appetite change, chest pain, dyspnea on exertion, heartburn, malaise/fatigue, a sore throat and trouble swallowing. Pertinent negatives include no ear congestion, ear pain, fever, headaches, myalgias, nasal congestion, orthopnea, PND, postnasal drip, rhinorrhea, sneezing, sweats or weight loss. Her symptoms are aggravated by minimal activity. Her symptoms are alleviated by nothing. She reports minimal improvement on treatment. Her past medical history is significant for COPD.   Emesis   This is a chronic problem. The problem has been unchanged. There has been no fever. Associated symptoms include chest pain and coughing. Pertinent negatives include no abdominal pain, arthralgias, chills, diarrhea, dizziness, fever, headaches, myalgias, sweats or weight loss. Risk factors: achalasia/GERD. Treatments tried: protonix. The treatment provided no relief.     Cough: No improvement with tessalon pearls.     Has had 2 rocephin injections, 2 zpacks, 1 course of amoxicillin and 1 course of augmentin in the past month.   Still heavy in the chest, short of breath, and cough. Feel like she needs to get something up but unable to. No improvement with mucinex.     Achalasia not controlled. Chronic emesis lainey at night. Seeing GI here at [facility].     Patient Active Problem List   Diagnosis    Primary hypertension    Type 2 diabetes mellitus with  diabetic polyneuropathy, with long-term current use of insulin    Peripheral neuropathy    Carpal tunnel syndrome    Localized primary carpometacarpal osteoarthritis    Synovitis of right ankle    Gastroesophageal reflux disease without esophagitis    Thyroid nodule    Premature menopause (surgery in 30s)    Palpitation    Other insomnia    Statin intolerance    Neck pain on right side    Bronchitis    Normocytic anemia    PAF (paroxysmal atrial fibrillation)    Maxillary cyst    Atelectasis    Debility    Myalgia    Personal history of pneumonia (recurrent)    Personal history of urinary (tract) infections    Esophagitis    Pleurisy    Fibromyalgia    Hyperlipidemia    Odynophagia    Dysphagia    Achalasia of esophagus    Symptomatic sinus bradycardia    Chronic anticoagulation    Vasodepressor syncope    Pacemaker    Other thrombophilia    Chronic cough    Aspiration pneumonia    COPD exacerbation         Current Outpatient Medications:     albuterol (PROVENTIL) 2.5 mg /3 mL (0.083 %) nebulizer solution, Take 3 mLs (2.5 mg total) by nebulization every 6 (six) hours as needed for Wheezing. Rescue, Disp: 120 each, Rfl: 0    albuterol-ipratropium (DUO-NEB) 2.5 mg-0.5 mg/3 mL nebulizer solution, Take 3 mLs by nebulization every 6 (six) hours as needed for Wheezing. Rescue, Disp: 180 mL, Rfl: 3    apixaban (ELIQUIS) 5 mg Tab, Take 1 tablet (5 mg total) by mouth 2 (two) times daily., Disp: 180 tablet, Rfl: 4    blood-glucose sensor (DEXCOM G7 SENSOR) La, Use as directed to check blood sugar daily, Disp: 3 each, Rfl: 6    budesonide (PULMICORT) 0.25 mg/2 mL nebulizer solution, Take 2 mLs (0.25 mg total) by nebulization once daily. Controller, Disp: 180 mL, Rfl: 1    estrogens, conjugated, (PREMARIN) 0.625 MG tablet, Take 1 tablet (0.625 mg total) by mouth once daily., Disp: 90 tablet, Rfl: 3    insulin glargine U-100, Lantus, (LANTUS) 100 unit/mL injection, Inject 10 Units into the skin every evening., Disp: 10 mL,  "Rfl: 0    metFORMIN (GLUCOPHAGE-XR) 500 MG ER 24hr tablet, TAKE 1 TABLET BY MOUTH TWICE DAILY WITH MEALS, Disp: 60 tablet, Rfl: 11    metoprolol succinate (TOPROL-XL) 50 MG 24 hr tablet, Take 2 tablets (100 mg total) by mouth every morning AND 1 tablet (50 mg total) every evening. (Patient taking differently: 100mg twice daily), Disp: 270 tablet, Rfl: 3    ondansetron (ZOFRAN-ODT) 4 MG TbDL, Take 1 tablet (4 mg total) by mouth every 6 (six) hours as needed (nausea)., Disp: 20 tablet, Rfl: 0    pantoprazole (PROTONIX) 40 MG tablet, Take 1 tablet (40 mg total) by mouth 2 (two) times daily., Disp: 180 tablet, Rfl: 3    pen needle, diabetic 32 gauge x 5/32" Ndle, Use to dispense insulin once daily, Disp: 100 each, Rfl: 0    rosuvastatin (CRESTOR) 5 MG tablet, Take 1 tablet (5 mg total) by mouth once daily., Disp: 90 tablet, Rfl: 3    tirzepatide, weight loss, 2.5 mg/0.5 mL PnIj, Inject 2.5 mg into the skin every 7 days., Disp: 4 Pen, Rfl: 3    traZODone (DESYREL) 50 MG tablet, Take 1 tablet (50 mg total) by mouth nightly as needed for Insomnia., Disp: 30 tablet, Rfl: 11    Review of Systems   Constitutional:  Positive for appetite change, fatigue and malaise/fatigue. Negative for activity change, chills, diaphoresis, fever, unexpected weight change and weight loss.   HENT:  Positive for sore throat and trouble swallowing. Negative for congestion, ear pain, hearing loss, hoarse voice, postnasal drip, rhinorrhea, sneezing and voice change.    Eyes: Negative.  Negative for visual disturbance.   Respiratory:  Positive for cough, chest tightness, shortness of breath and wheezing. Negative for hemoptysis, sputum production and choking.    Cardiovascular:  Positive for chest pain and dyspnea on exertion. Negative for palpitations, leg swelling and PND.   Gastrointestinal:  Positive for heartburn, nausea and vomiting. Negative for abdominal distention, abdominal pain, anal bleeding, blood in stool, constipation, diarrhea and " "rectal pain.   Endocrine: Negative for cold intolerance, heat intolerance, polydipsia and polyuria.   Genitourinary: Negative.  Negative for difficulty urinating and frequency.   Musculoskeletal:  Negative for arthralgias, back pain, gait problem, joint swelling and myalgias.   Skin:  Negative for color change, pallor, rash and wound.   Neurological:  Negative for dizziness, tremors, weakness, light-headedness, numbness and headaches.   Hematological:  Negative for adenopathy.   Psychiatric/Behavioral:  Negative for behavioral problems, confusion, self-injury, sleep disturbance and suicidal ideas. The patient is not nervous/anxious.      Objective:   /84 (BP Location: Left arm, Patient Position: Sitting, BP Method: Large (Manual))   Pulse 81   Temp 97.2 °F (36.2 °C) (Tympanic)   Ht 5' 3" (1.6 m)   Wt 77.8 kg (171 lb 8.3 oz)   SpO2 95%   BMI 30.38 kg/m²     Physical Exam  Vitals and nursing note reviewed.   Constitutional:       General: She is not in acute distress.     Appearance: Normal appearance. She is well-developed. She is not ill-appearing, toxic-appearing or diaphoretic.   HENT:      Head: Normocephalic and atraumatic.   Cardiovascular:      Rate and Rhythm: Normal rate. Rhythm irregular.      Heart sounds: Normal heart sounds. No murmur heard.     No friction rub. No gallop.   Pulmonary:      Effort: Pulmonary effort is normal. No respiratory distress.      Breath sounds: Wheezing and rhonchi present. No rales.   Musculoskeletal:         General: Normal range of motion.   Skin:     General: Skin is warm.      Capillary Refill: Capillary refill takes less than 2 seconds.      Findings: No rash.   Neurological:      Mental Status: She is alert and oriented to person, place, and time.      Motor: No weakness.      Gait: Gait normal.   Psychiatric:         Mood and Affect: Mood normal.         Behavior: Behavior normal.         Thought Content: Thought content normal.         Judgment: Judgment " normal.         Assessment:     1. Aspiration pneumonia of both lungs due to gastric secretions, unspecified part of lung    2. Achalasia of esophagus    3. Gastroesophageal reflux disease, unspecified whether esophagitis present    4. Type 2 diabetes mellitus with hyperglycemia, with long-term current use of insulin    5. Uncontrolled type 2 diabetes mellitus with hyperglycemia    6. Type 2 diabetes mellitus with diabetic polyneuropathy, without long-term current use of insulin    7. Paroxysmal atrial fibrillation    8. Hospital discharge follow-up    9. PAF (paroxysmal atrial fibrillation)    10. Pacemaker      Plan:   Aspiration pneumonia of both lungs due to gastric secretions, unspecified part of lung  -     CBC Auto Differential; Future; Expected date: 04/25/2024  -     Comprehensive Metabolic Panel; Future  -     pantoprazole (PROTONIX) 40 MG tablet; Take 1 tablet (40 mg total) by mouth 2 (two) times daily.  Dispense: 180 tablet; Refill: 3  -     X-Ray Chest PA And Lateral; Future; Expected date: 04/25/2024  -     Ambulatory referral/consult to Pulmonology; Future; Expected date: 05/02/2024    Achalasia of esophagus    Gastroesophageal reflux disease, unspecified whether esophagitis present  -     CBC Auto Differential; Future; Expected date: 04/25/2024  -     Comprehensive Metabolic Panel; Future  -     pantoprazole (PROTONIX) 40 MG tablet; Take 1 tablet (40 mg total) by mouth 2 (two) times daily.  Dispense: 180 tablet; Refill: 3  -     X-Ray Chest PA And Lateral; Future; Expected date: 04/25/2024    Type 2 diabetes mellitus with hyperglycemia, with long-term current use of insulin  -     blood-glucose sensor (DEXCOM G7 SENSOR) La; Use as directed to check blood sugar daily  Dispense: 3 each; Refill: 6    Uncontrolled type 2 diabetes mellitus with hyperglycemia  -     Ambulatory referral/consult to Diabetic Advanced Practice Providers (Medical Management); Future; Expected date: 05/02/2024  -     insulin  "glargine U-100, Lantus, (LANTUS) 100 unit/mL injection; Inject 10 Units into the skin every evening.  Dispense: 10 mL; Refill: 0  -     pen needle, diabetic 32 gauge x 5/32" Ndle; Use to dispense insulin once daily  Dispense: 100 each; Refill: 0  -     Ambulatory referral/consult to Diabetes Education; Future; Expected date: 05/02/2024  -     blood-glucose sensor (DEXCOM G7 SENSOR) La; Use as directed to check blood sugar daily  Dispense: 3 each; Refill: 6    Type 2 diabetes mellitus with diabetic polyneuropathy, without long-term current use of insulin  -     tirzepatide, weight loss, 2.5 mg/0.5 mL PnIj; Inject 2.5 mg into the skin every 7 days.  Dispense: 4 Pen; Refill: 3    Paroxysmal atrial fibrillation  -     EKG 12-lead; Future    Hospital discharge follow-up    PAF (paroxysmal atrial fibrillation)  -     Ambulatory referral/consult to Cardiology    Pacemaker  -     Ambulatory referral/consult to Cardiology    -rhythm irregular on exam today. Listened again and went back into normal rhythm. May need to get pacemaker checked.       -increase protonix twice daily.   -sent message to her gastroenterologist to ask for further recommendations and a soon follow up  -was able to get her into see pulmonologist today      -hold off on starting mounjaro. Worried about worsening gerd/nausea. STart on long acting insulin once daily for now and refer to diabetes for management.     Follow up in about 2 weeks (around 5/9/2024), or if symptoms worsen or fail to improve.      "

## 2024-04-28 NOTE — HOSPITAL COURSE
4/17  CURT, patient weaned to RA  Stable for discharge  F/U with PCP in 1-2 weeks for further management

## 2024-04-28 NOTE — DISCHARGE SUMMARY
[facility] Medicine  Discharge Summary      Patient Name: Kelsie Bustamante  MRN: [id number]  JAMIE: [id number]  Patient Class: IP- Inpatient  Admission Date: 4/16/2024  Hospital Length of Stay: 1 days  Discharge Date and Time: 4/17/2024 11:56 AM  Attending Physician: No att. providers found   Discharging Provider: Jalil Rodriguez MD  Primary Care Provider: Cody Parks MD    Primary Care Team: [facility] MEDICINE A    HPI:   The patient is a 74 yo female with HTN, thyroid nodule, DM type II, Achalasia, peripheral neuropathy, AFIB, and postmenopausal HRT who presented to ED with SOB which onset gradually within the past couple of days. Pt's oxygen saturation was 84% in triage. Pt was recently admitted for similar sxs. Pt's  notes that she has been more confused as of this morning. Associated sxs include weakness, N/V, abd pain, wheezing, cough, congestion, fever, fatigue, diaphoresis, chills, weakness, and lightheadedness. Pt's fever was 102 degrees fahrenheit.  Pt denies usage of oxygen at home.     * No surgery found *      Hospital Course:   4/17  NAEON, patient weaned to RA  Stable for discharge  F/U with PCP in 1-2 weeks for further management     Goals of Care Treatment Preferences:  Code Status: Full Code      Consults:     Pulmonary  Aspiration pneumonia  Antibiotics as above  Aspiration precautions  Dysphagia/soft diet        Final Active Diagnoses:    Diagnosis Date Noted POA    PRINCIPAL PROBLEM:  COPD exacerbation [J44.1] 04/16/2024 Yes    Aspiration pneumonia [J69.0] 04/16/2024 Yes    Type 2 diabetes mellitus with diabetic polyneuropathy, with long-term current use of insulin [E11.42, Z79.4]  Yes    Primary hypertension [I10]  Yes     Chronic    Peripheral neuropathy [G62.9]  Yes      Problems Resolved During this Admission:       Discharged Condition: stable    Disposition: Home or Self Care    Follow Up:   Follow-up Information       Cody Parks MD. Schedule an appointment  as soon as possible for a visit in 1 week(s).    Specialty: Family Medicine  Why: Hospital discharge follow up  Contact information:  [address]  [place] [address] [phone]                           Patient Instructions:   No discharge procedures on file.    Significant Diagnostic Studies: Labs: All labs within the past 24 hours have been reviewed    Pending Diagnostic Studies:       None           Medications:  Reconciled Home Medications:      Medication List        CHANGE how you take these medications      metoprolol succinate 50 MG 24 hr tablet  Commonly known as: TOPROL-XL  Take 2 tablets (100 mg total) by mouth every morning AND 1 tablet (50 mg total) every evening.  What changed: See the new instructions.            CONTINUE taking these medications      albuterol 2.5 mg /3 mL (0.083 %) nebulizer solution  Commonly known as: PROVENTIL  Take 3 mLs (2.5 mg total) by nebulization every 6 (six) hours as needed for Wheezing. Rescue     albuterol-ipratropium 2.5 mg-0.5 mg/3 mL nebulizer solution  Commonly known as: DUO-NEB  Take 3 mLs by nebulization every 6 (six) hours as needed for Wheezing. Rescue     apixaban 5 mg Tab  Commonly known as: ELIQUIS  Take 1 tablet (5 mg total) by mouth 2 (two) times daily.     budesonide 0.25 mg/2 mL nebulizer solution  Commonly known as: PULMICORT  Take 2 mLs (0.25 mg total) by nebulization once daily. Controller     estrogens (conjugated) 0.625 MG tablet  Commonly known as: PREMARIN  Take 1 tablet (0.625 mg total) by mouth once daily.     metFORMIN 500 MG ER 24hr tablet  Commonly known as: GLUCOPHAGE-XR  TAKE 1 TABLET BY MOUTH TWICE DAILY WITH MEALS     ondansetron 4 MG Tbdl  Commonly known as: ZOFRAN-ODT  Take 1 tablet (4 mg total) by mouth every 6 (six) hours as needed (nausea).     rosuvastatin 5 MG tablet  Commonly known as: CRESTOR  Take 1 tablet (5 mg total) by mouth once daily.     traZODone 50 MG tablet  Commonly known as: DESYREL  Take 1 tablet (50 mg  total) by mouth nightly as needed for Insomnia.              Indwelling Lines/Drains at time of discharge:   Lines/Drains/Airways       None                   Time spent on the discharge of patient: 35 minutes         Jalil Rodriguez MD  Department of Hospital Medicine  '[facility]

## 2024-04-30 DIAGNOSIS — Z00.00 ENCOUNTER FOR MEDICARE ANNUAL WELLNESS EXAM: ICD-10-CM

## 2024-05-07 ENCOUNTER — CLINICAL SUPPORT (OUTPATIENT)
Dept: CARDIOLOGY | Facility: HOSPITAL | Age: 74
End: 2024-05-07
Payer: MEDICARE

## 2024-05-07 DIAGNOSIS — Z95.0 PRESENCE OF CARDIAC PACEMAKER: ICD-10-CM

## 2024-05-15 PROBLEM — R55 VASODEPRESSOR SYNCOPE: Status: RESOLVED | Noted: 2022-11-11 | Resolved: 2024-05-15

## 2024-05-15 PROBLEM — E11.65 UNCONTROLLED TYPE 2 DIABETES MELLITUS WITH HYPERGLYCEMIA: Status: ACTIVE | Noted: 2024-05-15

## 2024-05-15 PROBLEM — R00.1 SYMPTOMATIC SINUS BRADYCARDIA: Status: RESOLVED | Noted: 2022-10-17 | Resolved: 2024-05-15

## 2024-05-15 PROBLEM — E11.69 TYPE 2 DIABETES MELLITUS WITH HYPERTRIGLYCERIDEMIA: Status: ACTIVE | Noted: 2024-05-15

## 2024-05-15 PROBLEM — K20.90 ESOPHAGITIS: Status: RESOLVED | Noted: 2021-05-05 | Resolved: 2024-05-15

## 2024-05-15 PROBLEM — R09.1 PLEURISY: Status: RESOLVED | Noted: 2021-05-05 | Resolved: 2024-05-15

## 2024-05-15 PROBLEM — E78.1 TYPE 2 DIABETES MELLITUS WITH HYPERTRIGLYCERIDEMIA: Status: ACTIVE | Noted: 2024-05-15

## 2024-05-15 PROBLEM — E11.69 HYPERLIPIDEMIA ASSOCIATED WITH TYPE 2 DIABETES MELLITUS: Status: ACTIVE | Noted: 2022-06-24

## 2024-06-02 ENCOUNTER — CLINICAL SUPPORT (OUTPATIENT)
Dept: CARDIOLOGY | Facility: HOSPITAL | Age: 74
End: 2024-06-02
Payer: MEDICARE

## 2024-06-02 DIAGNOSIS — Z95.0 PRESENCE OF CARDIAC PACEMAKER: ICD-10-CM

## 2024-06-05 ENCOUNTER — TELEPHONE (OUTPATIENT)
Dept: DIABETES | Facility: CLINIC | Age: 74
End: 2024-06-05
Payer: MEDICARE

## 2024-06-05 ENCOUNTER — CLINICAL SUPPORT (OUTPATIENT)
Dept: CARDIOLOGY | Facility: HOSPITAL | Age: 74
End: 2024-06-05
Attending: INTERNAL MEDICINE
Payer: MEDICARE

## 2024-06-05 DIAGNOSIS — Z95.0 PRESENCE OF CARDIAC PACEMAKER: ICD-10-CM

## 2024-06-05 PROCEDURE — 93296 REM INTERROG EVL PM/IDS: CPT | Mod: HCNC | Performed by: INTERNAL MEDICINE

## 2024-06-05 PROCEDURE — 93294 REM INTERROG EVL PM/LDLS PM: CPT | Mod: HCNC,S$GLB,, | Performed by: INTERNAL MEDICINE

## 2024-06-05 NOTE — TELEPHONE ENCOUNTER
Patient called to schedule sooner appointment per provider. Patient voiced understanding of  time an date.  ----- Message from Jaja Lazo PA-C sent at 6/5/2024 11:37 AM CDT -----  Offer sooner est care visit with Meghan

## 2024-06-09 LAB
OHS CV AF BURDEN PERCENT: 3
OHS CV DC REMOTE DEVICE TYPE: NORMAL
OHS CV ICD SHOCK: NO
OHS CV RV PACING PERCENT: 2 %

## 2024-06-19 DIAGNOSIS — E11.9 TYPE 2 DIABETES MELLITUS WITHOUT COMPLICATION: ICD-10-CM

## 2024-06-20 ENCOUNTER — TELEPHONE (OUTPATIENT)
Dept: GASTROENTEROLOGY | Facility: CLINIC | Age: 74
End: 2024-06-20
Payer: MEDICARE

## 2024-06-20 NOTE — TELEPHONE ENCOUNTER
----- Message from Tam Nuñez sent at 6/20/2024  8:55 AM CDT -----  Contact: self  ..Type:  Sooner Apoointment Request    Caller is requesting a sooner appointment.  Caller declined first available appointment listed below.  Caller will not accept being placed on the waitlist and is requesting a message be sent to doctor.  Name of Caller:.Kelsie QUINTERO Bustamante  When is the first available appointment?August   Symptoms:vomiting, weakness   Would the patient rather a call back or a response via [facility]? Call back   Best Call Back Number:.[phone]   Additional Information: Symptoms: Vomiting, Weakness  Outcome: Schedule an urgent appointment (within 4 hours) or talk to a nurse or provider within 30 minutes.  Reason: Vomited at least once in the past 8 hours    The caller accepted this outcome

## 2024-06-24 ENCOUNTER — OFFICE VISIT (OUTPATIENT)
Dept: INTERNAL MEDICINE | Facility: CLINIC | Age: 74
End: 2024-06-24
Payer: MEDICARE

## 2024-06-24 ENCOUNTER — HOSPITAL ENCOUNTER (OUTPATIENT)
Dept: RADIOLOGY | Facility: HOSPITAL | Age: 74
Discharge: HOME | End: 2024-06-24
Attending: NURSE PRACTITIONER
Payer: MEDICARE

## 2024-06-24 ENCOUNTER — TELEPHONE (OUTPATIENT)
Dept: INTERNAL MEDICINE | Facility: CLINIC | Age: 74
End: 2024-06-24
Payer: MEDICARE

## 2024-06-24 VITALS
HEART RATE: 76 BPM | SYSTOLIC BLOOD PRESSURE: 130 MMHG | TEMPERATURE: 99 F | DIASTOLIC BLOOD PRESSURE: 68 MMHG | BODY MASS INDEX: 30.38 KG/M2 | OXYGEN SATURATION: 94 % | RESPIRATION RATE: 15 BRPM | HEIGHT: 63 IN

## 2024-06-24 DIAGNOSIS — J44.1 COPD EXACERBATION: ICD-10-CM

## 2024-06-24 DIAGNOSIS — E11.65 UNCONTROLLED TYPE 2 DIABETES MELLITUS WITH HYPERGLYCEMIA: ICD-10-CM

## 2024-06-24 DIAGNOSIS — E11.59 HYPERTENSION ASSOCIATED WITH DIABETES: ICD-10-CM

## 2024-06-24 DIAGNOSIS — J18.9 PNEUMONIA OF RIGHT LUNG DUE TO INFECTIOUS ORGANISM, UNSPECIFIED PART OF LUNG: Primary | ICD-10-CM

## 2024-06-24 DIAGNOSIS — I15.2 HYPERTENSION ASSOCIATED WITH DIABETES: ICD-10-CM

## 2024-06-24 DIAGNOSIS — R06.02 SHORTNESS OF BREATH: ICD-10-CM

## 2024-06-24 DIAGNOSIS — I48.0 PAF (PAROXYSMAL ATRIAL FIBRILLATION): Chronic | ICD-10-CM

## 2024-06-24 LAB
CTP QC/QA: YES
CTP QC/QA: YES
POC MOLECULAR INFLUENZA A AGN: NEGATIVE
POC MOLECULAR INFLUENZA B AGN: NEGATIVE
SARS-COV-2 RDRP RESP QL NAA+PROBE: NEGATIVE

## 2024-06-24 PROCEDURE — 71046 X-RAY EXAM CHEST 2 VIEWS: CPT | Mod: 26,,, | Performed by: RADIOLOGY

## 2024-06-24 PROCEDURE — 1160F RVW MEDS BY RX/DR IN RCRD: CPT | Mod: CPTII,S$GLB,, | Performed by: NURSE PRACTITIONER

## 2024-06-24 PROCEDURE — 3078F DIAST BP <80 MM HG: CPT | Mod: CPTII,S$GLB,, | Performed by: NURSE PRACTITIONER

## 2024-06-24 PROCEDURE — 3008F BODY MASS INDEX DOCD: CPT | Mod: CPTII,S$GLB,, | Performed by: NURSE PRACTITIONER

## 2024-06-24 PROCEDURE — 3061F NEG MICROALBUMINURIA REV: CPT | Mod: CPTII,S$GLB,, | Performed by: NURSE PRACTITIONER

## 2024-06-24 PROCEDURE — 71046 X-RAY EXAM CHEST 2 VIEWS: CPT | Mod: TC,PO

## 2024-06-24 PROCEDURE — 3288F FALL RISK ASSESSMENT DOCD: CPT | Mod: CPTII,S$GLB,, | Performed by: NURSE PRACTITIONER

## 2024-06-24 PROCEDURE — 99214 OFFICE O/P EST MOD 30 MIN: CPT | Mod: S$GLB,,, | Performed by: NURSE PRACTITIONER

## 2024-06-24 PROCEDURE — 1125F AMNT PAIN NOTED PAIN PRSNT: CPT | Mod: CPTII,S$GLB,, | Performed by: NURSE PRACTITIONER

## 2024-06-24 PROCEDURE — 87635 SARS-COV-2 COVID-19 AMP PRB: CPT | Mod: QW,S$GLB,, | Performed by: NURSE PRACTITIONER

## 2024-06-24 PROCEDURE — 1159F MED LIST DOCD IN RCRD: CPT | Mod: CPTII,S$GLB,, | Performed by: NURSE PRACTITIONER

## 2024-06-24 PROCEDURE — 1101F PT FALLS ASSESS-DOCD LE1/YR: CPT | Mod: CPTII,S$GLB,, | Performed by: NURSE PRACTITIONER

## 2024-06-24 PROCEDURE — 99999 PR PBB SHADOW E&M-EST. PATIENT-LVL IV: CPT | Mod: PBBFAC,,, | Performed by: NURSE PRACTITIONER

## 2024-06-24 PROCEDURE — 3066F NEPHROPATHY DOC TX: CPT | Mod: CPTII,S$GLB,, | Performed by: NURSE PRACTITIONER

## 2024-06-24 PROCEDURE — 87502 INFLUENZA DNA AMP PROBE: CPT | Mod: QW,S$GLB,, | Performed by: NURSE PRACTITIONER

## 2024-06-24 PROCEDURE — 3075F SYST BP GE 130 - 139MM HG: CPT | Mod: CPTII,S$GLB,, | Performed by: NURSE PRACTITIONER

## 2024-06-24 PROCEDURE — 3052F HG A1C>EQUAL 8.0%<EQUAL 9.0%: CPT | Mod: CPTII,S$GLB,, | Performed by: NURSE PRACTITIONER

## 2024-06-24 RX ORDER — BENZONATATE 100 MG/1
100 CAPSULE ORAL 3 TIMES DAILY PRN
Qty: 30 CAPSULE | Refills: 0 | Status: SHIPPED | OUTPATIENT
Start: 2024-06-24 | End: 2024-07-04

## 2024-06-24 RX ORDER — AMOXICILLIN AND CLAVULANATE POTASSIUM 875; 125 MG/1; MG/1
1 TABLET, FILM COATED ORAL EVERY 12 HOURS
Qty: 14 TABLET | Refills: 0 | Status: SHIPPED | OUTPATIENT
Start: 2024-06-24 | End: 2024-07-01

## 2024-06-24 RX ORDER — DOXYCYCLINE 100 MG/1
100 CAPSULE ORAL 2 TIMES DAILY
Qty: 14 CAPSULE | Refills: 0 | Status: SHIPPED | OUTPATIENT
Start: 2024-06-24 | End: 2024-07-01

## 2024-06-24 RX ORDER — IPRATROPIUM BROMIDE AND ALBUTEROL SULFATE 2.5; .5 MG/3ML; MG/3ML
3 SOLUTION RESPIRATORY (INHALATION) EVERY 6 HOURS PRN
Qty: 180 ML | Refills: 3 | Status: SHIPPED | OUTPATIENT
Start: 2024-06-24 | End: 2025-06-24

## 2024-06-24 NOTE — ASSESSMENT & PLAN NOTE
Lab Results   Component Value Date    HGBA1C 8.6 (H) 03/01/2024     BP stable, controlled with current medications. DM is uncontrolled. Continue current medications. Followed by DM management. Avoid steroid use

## 2024-06-24 NOTE — ASSESSMENT & PLAN NOTE
CXR today. Antibiotic rx. Duoneb PRN. Continue supportive care. Avoid steroids due to uncontrolled DM. Strict ER precautions reviewed.

## 2024-06-24 NOTE — ASSESSMENT & PLAN NOTE
Lab Results   Component Value Date    HGBA1C 8.6 (H) 03/01/2024     Uncontrolled. Avoid steroid use. Continue Lantus, metformin, and tirzepatide. Followed by DM management.

## 2024-06-24 NOTE — TELEPHONE ENCOUNTER
----- Message from Diamone Speed sent at 6/24/2024  9:40 AM CDT -----  Regarding: self  Type: Patient Call Back       Who called: self        What is the request in detail: pt is calling stating she have the flu and needs the call back        Can the clinic reply by [facility]? Yes       Would the patient rather a call back or a response via [facility]? Call back       Best call back number:[phone]

## 2024-06-24 NOTE — PROGRESS NOTES
Subjective:       Patient ID: Kelsie Bustamante is a 74 y.o. female.    Chief Complaint: Shortness of Breath, Fatigue, and Nasal Congestion    Mrs. Bustamante presents to clinic with her  for cough, shortness of breath, congestion, and subjective fever. Symptoms started 3-4 days prior and worsened yesterday. Cough is productive at times, causing emesis. Taking Tylenol, ibuprofen, Nyquil, and coricidin without relief. Using albuterol nebulizer which briefly provides some relief. Hx bronchitis and pneumonia. Diagnosed with aspiration pneumonia in 4/2024.    Shortness of Breath  This is a new problem. The current episode started in the past 7 days. The problem has been unchanged. Associated symptoms include a fever, headaches, a sore throat and vomiting (with coughing). Pertinent negatives include no abdominal pain or chest pain. Her past medical history is significant for COPD and pneumonia. There is no history of PE.   Fever   This is a new problem. The current episode started 1 day ago. The problem has been unchanged. Her temperature was unmeasured prior to arrival. Associated symptoms include congestion, coughing, headaches, muscle aches, a sore throat and vomiting (with coughing). Pertinent negatives include no abdominal pain, chest pain, diarrhea, nausea or urinary pain. She has tried acetaminophen and NSAIDs for the symptoms. The treatment provided no relief.     Patient Active Problem List   Diagnosis    Hypertension associated with diabetes    Type 2 diabetes mellitus with diabetic polyneuropathy, with long-term current use of insulin    Peripheral neuropathy    Carpal tunnel syndrome    Localized primary carpometacarpal osteoarthritis    Synovitis of right ankle    Gastroesophageal reflux disease without esophagitis    Thyroid nodule    Premature menopause (surgery in 30s)    Palpitation    Other insomnia    Statin intolerance    Neck pain on right side    Bronchitis    Normocytic anemia    PAF  (paroxysmal atrial fibrillation)    Maxillary cyst    Atelectasis    Debility    Myalgia    Personal history of pneumonia (recurrent)    Personal history of urinary (tract) infections    Fibromyalgia    Hyperlipidemia associated with type 2 diabetes mellitus    Odynophagia    Dysphagia    Achalasia of esophagus    Chronic anticoagulation- Eliquis    Pacemaker    Other thrombophilia    Chronic cough    Aspiration pneumonia    COPD exacerbation    Uncontrolled type 2 diabetes mellitus with hyperglycemia    Type 2 diabetes mellitus with hypertriglyceridemia       Family History   Problem Relation Name Age of Onset    Aneurysm Sister      Heart disease Mother      Diabetes Father      Coronary artery disease Father      Coronary artery disease Brother      Parkinsonism Brother       Past Surgical History:   Procedure Laterality Date    A-V CARDIAC PACEMAKER INSERTION Left 2022    Procedure: INSERTION, CARDIAC PACEMAKER, DUAL CHAMBER;  Surgeon: Daryl Walker MD;  Location: [facility] CATH LAB;  Service: Cardiology;  Laterality: Left;  Patient instructed 11AM start time/needs ptt/pt/inr  All [employer] with His lead/MDT His as back up//Vinod notified    BREAST BIOPSY Left 2021    cataract Left      SECTION      CHOLECYSTECTOMY      CYST REMOVAL Right 2021    [facility]    ESOPHAGEAL MANOMETRY WITH MEASUREMENT OF IMPEDANCE N/A 10/6/2022    Procedure: MANOMETRY, ESOPHAGUS, WITH IMPEDANCE MEASUREMENT  Cardiac Clearance/Eliquis 2 day hold approval received per Dr. RABIA Aguilar, Cardiology on 22.  Note in encounters.  LB;  Surgeon: Bruna Fletcher MD;  Location: [facility];  Service: Endoscopy;  Laterality: N/A;    ESOPHAGOGASTRODUODENOSCOPY N/A 10/6/2022    Procedure: EGD (ESOPHAGOGASTRODUODENOSCOPY);  Surgeon: Bruna Fletcher MD;  Location: [facility];  Service: Endoscopy;  Laterality: N/A;    HYSTERECTOMY      nonca    OOPHORECTOMY      TILT TABLE TEST N/A 10/25/2018  "   Procedure: TILT TABLE TEST;  Surgeon: Daryl Walker MD;  Location: [facility] CATH LAB;  Service: Cardiology;  Laterality: N/A;  VAS.DEP.SYN,HUT,FAS,3PREP    TONSILLECTOMY           Current Outpatient Medications:     albuterol (PROVENTIL) 2.5 mg /3 mL (0.083 %) nebulizer solution, Take 3 mLs (2.5 mg total) by nebulization every 6 (six) hours as needed for Wheezing. Rescue, Disp: 120 each, Rfl: 0    apixaban (ELIQUIS) 5 mg Tab, Take 1 tablet (5 mg total) by mouth 2 (two) times daily., Disp: 180 tablet, Rfl: 4    blood-glucose sensor (DEXCOM G7 SENSOR) La, Use as directed to check blood sugar daily, Disp: 3 each, Rfl: 6    budesonide (PULMICORT) 0.25 mg/2 mL nebulizer solution, Take 2 mLs (0.25 mg total) by nebulization once daily. Controller, Disp: 180 mL, Rfl: 1    estrogens, conjugated, (PREMARIN) 0.625 MG tablet, Take 1 tablet (0.625 mg total) by mouth once daily., Disp: 90 tablet, Rfl: 3    insulin glargine U-100, Lantus, (LANTUS) 100 unit/mL injection, Inject 10 Units into the skin every evening., Disp: 10 mL, Rfl: 0    metFORMIN (GLUCOPHAGE-XR) 500 MG ER 24hr tablet, TAKE 1 TABLET BY MOUTH TWICE DAILY WITH MEALS, Disp: 60 tablet, Rfl: 11    metoprolol succinate (TOPROL-XL) 50 MG 24 hr tablet, Take 2 tablets (100 mg total) by mouth every morning AND 1 tablet (50 mg total) every evening. (Patient taking differently: 100mg twice daily), Disp: 270 tablet, Rfl: 3    pantoprazole (PROTONIX) 40 MG tablet, Take 1 tablet (40 mg total) by mouth 2 (two) times daily., Disp: 180 tablet, Rfl: 3    pen needle, diabetic 32 gauge x 5/32" Ndle, Use to dispense insulin once daily, Disp: 100 each, Rfl: 0    rosuvastatin (CRESTOR) 5 MG tablet, Take 1 tablet (5 mg total) by mouth once daily., Disp: 90 tablet, Rfl: 3    tirzepatide, weight loss, 2.5 mg/0.5 mL PnIj, Inject 2.5 mg into the skin every 7 days., Disp: 4 Pen, Rfl: 3    traZODone (DESYREL) 50 MG tablet, Take 1 tablet (50 mg total) by mouth nightly as needed for " "Insomnia., Disp: 30 tablet, Rfl: 11    albuterol-ipratropium (DUO-NEB) 2.5 mg-0.5 mg/3 mL nebulizer solution, Take 3 mLs by nebulization every 6 (six) hours as needed for Wheezing. Rescue, Disp: 180 mL, Rfl: 3    amoxicillin-clavulanate 875-125mg (AUGMENTIN) 875-125 mg per tablet, Take 1 tablet by mouth every 12 (twelve) hours. for 7 days, Disp: 14 tablet, Rfl: 0    benzonatate (TESSALON) 100 MG capsule, Take 1 capsule (100 mg total) by mouth 3 (three) times daily as needed., Disp: 30 capsule, Rfl: 0    doxycycline (MONODOX) 100 MG capsule, Take 1 capsule (100 mg total) by mouth 2 (two) times daily. for 7 days, Disp: 14 capsule, Rfl: 0    Review of Systems   Constitutional:  Positive for fever.   HENT:  Positive for congestion and sore throat.    Respiratory:  Positive for cough and shortness of breath.    Cardiovascular:  Negative for chest pain.   Gastrointestinal:  Positive for vomiting (with coughing). Negative for abdominal pain, diarrhea and nausea.   Neurological:  Positive for headaches.       Objective:   /68 (BP Location: Left arm, Patient Position: Sitting, BP Method: Large (Manual))   Pulse 76   Temp 98.6 °F (37 °C)   Resp 15   Ht 5' 3" (1.6 m)   SpO2 (!) 94%   BMI 30.38 kg/m²      Physical Exam  Constitutional:       General: She is not in acute distress.     Appearance: She is ill-appearing. She is not toxic-appearing or diaphoretic.   HENT:      Head: Normocephalic.      Right Ear: Tympanic membrane and ear canal normal.      Left Ear: Tympanic membrane and ear canal normal.      Nose: Congestion present.      Mouth/Throat:      Mouth: Mucous membranes are moist.      Pharynx: Oropharynx is clear. Posterior oropharyngeal erythema (mild) present.   Eyes:      Conjunctiva/sclera: Conjunctivae normal.   Cardiovascular:      Rate and Rhythm: Normal rate and regular rhythm.      Heart sounds: Normal heart sounds. No murmur heard.  Pulmonary:      Effort: Pulmonary effort is normal. No " respiratory distress.      Breath sounds: Rhonchi present. No wheezing or rales.   Neurological:      Mental Status: She is alert.         Assessment & Plan     Results for orders placed or performed in visit on 06/24/24   POCT Influenza A/B Molecular   Result Value Ref Range    POC Molecular Influenza A Ag Negative Negative    POC Molecular Influenza B Ag Negative Negative     Acceptable Yes    POCT COVID-19 Rapid Screening   Result Value Ref Range    POC Rapid COVID Negative Negative     Acceptable Yes      X-Ray Chest PA And Lateral  Narrative: EXAMINATION:  XR CHEST PA AND LATERAL    CLINICAL HISTORY:  Shortness of breath    TECHNIQUE:  PA and lateral views of the chest were performed.    COMPARISON:  Prior radiographs    FINDINGS:  Cardiac silhouette and mediastinal contours are stable.  Cardiac device remains.  Left lung clear.  Small linear opacity within right perihilar location: Possible scarring.  No pleural effusion.  Osseous structures are intact.  Impression: Small right perihilar opacity possible scarring or atelectasis with small focus of pneumonitis possible.  Correlate with follow-up.    Electronically signed by: Mateo Sanchez MD  Date:    06/24/2024  Time:    12:42      1. Pneumonia of right lung due to infectious organism, unspecified part of lung  Comments:  Doxycycline and Augmentin x 7 days. Continue inhalers. Follow up in one week to reevaluate.  Orders:  -     doxycycline (MONODOX) 100 MG capsule; Take 1 capsule (100 mg total) by mouth 2 (two) times daily. for 7 days  Dispense: 14 capsule; Refill: 0  -     amoxicillin-clavulanate 875-125mg (AUGMENTIN) 875-125 mg per tablet; Take 1 tablet by mouth every 12 (twelve) hours. for 7 days  Dispense: 14 tablet; Refill: 0    2. COPD exacerbation  Assessment & Plan:  CXR today. Antibiotic rx. Duoneb PRN. Continue supportive care. Avoid steroids due to uncontrolled DM. Strict ER precautions reviewed.    Orders:  -      "X-Ray Chest PA And Lateral; Future; Expected date: 06/24/2024  -     albuterol-ipratropium (DUO-NEB) 2.5 mg-0.5 mg/3 mL nebulizer solution; Take 3 mLs by nebulization every 6 (six) hours as needed for Wheezing. Rescue  Dispense: 180 mL; Refill: 3  -     benzonatate (TESSALON) 100 MG capsule; Take 1 capsule (100 mg total) by mouth 3 (three) times daily as needed.  Dispense: 30 capsule; Refill: 0  -     doxycycline (MONODOX) 100 MG capsule; Take 1 capsule (100 mg total) by mouth 2 (two) times daily. for 7 days  Dispense: 14 capsule; Refill: 0  -     amoxicillin-clavulanate 875-125mg (AUGMENTIN) 875-125 mg per tablet; Take 1 tablet by mouth every 12 (twelve) hours. for 7 days  Dispense: 14 tablet; Refill: 0    3. Shortness of breath  -     POCT Influenza A/B Molecular  -     POCT COVID-19 Rapid Screening  -     X-Ray Chest PA And Lateral; Future; Expected date: 06/24/2024    4. Uncontrolled type 2 diabetes mellitus with hyperglycemia  Assessment & Plan:  Lab Results   Component Value Date    HGBA1C 8.6 (H) 03/01/2024     Uncontrolled. Avoid steroid use. Continue Lantus, metformin, and tirzepatide. Followed by DM management.      5. PAF (paroxysmal atrial fibrillation)  Assessment & Plan:  Rate controlled today. Continue Eliquis and metoprolol      6. Hypertension associated with diabetes  Assessment & Plan:  Lab Results   Component Value Date    HGBA1C 8.6 (H) 03/01/2024     BP stable, controlled with current medications. DM is uncontrolled. Continue current medications. Followed by DM management. Avoid steroid use          JESUS MANUEL Christianson      Portions of this note may have been created with voice recognition software. Occasional "wrong-word" or "sound-a-like" substitutions may have occurred due to the inherent limitations of voice recognition software. Please, read the note carefully and recognize, using context, where substitutions have occurred.     " How Severe Is Your Skin Lesion?: mild Has Your Skin Lesion Been Treated?: not been treated Is This A New Presentation, Or A Follow-Up?: Skin Lesions

## 2024-07-02 ENCOUNTER — OFFICE VISIT (OUTPATIENT)
Dept: PULMONOLOGY | Facility: CLINIC | Age: 74
End: 2024-07-02
Payer: MEDICARE

## 2024-07-02 VITALS
SYSTOLIC BLOOD PRESSURE: 98 MMHG | RESPIRATION RATE: 21 BRPM | HEART RATE: 50 BPM | OXYGEN SATURATION: 96 % | HEIGHT: 63 IN | BODY MASS INDEX: 29.54 KG/M2 | WEIGHT: 166.69 LBS | DIASTOLIC BLOOD PRESSURE: 60 MMHG

## 2024-07-02 DIAGNOSIS — J69.0 ASPIRATION PNEUMONIA OF BOTH LUNGS DUE TO GASTRIC SECRETIONS, UNSPECIFIED PART OF LUNG: ICD-10-CM

## 2024-07-02 DIAGNOSIS — J42 CHRONIC BRONCHITIS, UNSPECIFIED CHRONIC BRONCHITIS TYPE: Primary | ICD-10-CM

## 2024-07-02 DIAGNOSIS — J18.9 PNEUMONIA OF RIGHT LUNG DUE TO INFECTIOUS ORGANISM, UNSPECIFIED PART OF LUNG: ICD-10-CM

## 2024-07-02 DIAGNOSIS — J44.1 COPD EXACERBATION: ICD-10-CM

## 2024-07-02 PROCEDURE — 99999 PR PBB SHADOW E&M-EST. PATIENT-LVL V: CPT | Mod: PBBFAC,,, | Performed by: HOSPITALIST

## 2024-07-02 RX ORDER — ALBUTEROL SULFATE 0.83 MG/ML
2.5 SOLUTION RESPIRATORY (INHALATION) ONCE
Status: COMPLETED | OUTPATIENT
Start: 2024-07-02 | End: 2024-07-02

## 2024-07-02 RX ORDER — TIOTROPIUM BROMIDE AND OLODATEROL 3.124; 2.736 UG/1; UG/1
2 SPRAY, METERED RESPIRATORY (INHALATION) DAILY
Qty: 1 G | Refills: 5 | Status: SHIPPED | OUTPATIENT
Start: 2024-07-02

## 2024-07-02 RX ORDER — PREDNISONE 20 MG/1
10 TABLET ORAL DAILY
Qty: 3 TABLET | Refills: 0 | Status: SHIPPED | OUTPATIENT
Start: 2024-07-02 | End: 2024-07-08

## 2024-07-02 RX ORDER — AMOXICILLIN AND CLAVULANATE POTASSIUM 875; 125 MG/1; MG/1
1 TABLET, FILM COATED ORAL EVERY 12 HOURS
Qty: 14 TABLET | Refills: 0 | Status: SHIPPED | OUTPATIENT
Start: 2024-07-02 | End: 2024-07-09

## 2024-07-02 RX ADMIN — ALBUTEROL SULFATE 2.5 MG: 0.83 SOLUTION RESPIRATORY (INHALATION) at 11:07

## 2024-07-02 NOTE — ASSESSMENT & PLAN NOTE
- wheezing on exam  - not on controller inhaler, has had issues with use in the past  - trying stiolto for ease in dose compliance as well as hopefully improved delivery of medication with mist  - hold budesonide neb with pneumonia  - breathing treatment before she leaves today  - low dose steroid coarse given hx uncontrolled DM  - close follow up with CXR

## 2024-07-02 NOTE — ASSESSMENT & PLAN NOTE
- discussed that this will likely be a persistent issue until esophageal issues are addressed  - will continue Augmentin for another week  - repeat blood pressure and heart rate before leaving clinic  - discussed hydrating

## 2024-07-02 NOTE — PROGRESS NOTES
Subjective:      Patient ID: Kelsie Bustamante is a 74 y.o. female.    Chief Complaint: Cough    Interval Hx 7/2/2024:    Mrs. Bustamante presents today for follow up. She was last seen 4/2/24 with continued wheezing after steroids and Amoxicillin- she was sent rx for budesonide nebs, abx changed to azithromycin. Chart reviewed- she was admitted to the hospital for one day 4/16 with shortness of breath, confusion and fever. Found to have O2 sats 84% in the ED. CTA with bilateral patchy infiltrates concerning for aspiration or atypical pneumonia, diffuse esophageal wall thickening with distension and food material concerning for GERD. She was seen by PCP 6/24/24 with similar concerns of cough and shortness of breath. Prescribed one week of Doxycycline and Augmentin.    Not feeling well- short of breath, wheezing, weak. Since finishing abx- feels that chest congestion has improved, wheezing improved, overall doing better, but with persistent symptoms. After seen last week was in the bed for a few days because of weakness and breathing, moving around more this week. Feeling dizzy and light-headed at times. Feels like she is keeping up with hydration. Appetite decreased. Taking Mucinex. Having left neck pain, started last week, intermittent, improves with heating pad- denies difficulty swallowing, no change in speech, localized to left side, no headache.    Interim Work Up:  CXR 6/24/24- Possible right hilar pneumonia vs atelectasis  Budesonide- not using  Duoneb- using in the morning then afternoon    Interval Hx 4/2/2024:     Mrs. Bustamante presents to Pulmonary clinic for follow up ER visit for SOB. Chart reviewed- She was seen by GI 2/26/24- recomended to continue Protonix and to see general surgery for previously identified achalasia. She was seen by Dr. Silver 3/4/24- treated with tessalon perles, prednisone taper, doxycycline for cough. She was seen in the ED 3/29/24 with weakness, cough and headache, CXR no acute  process. She was diagnosed with COPD exacerbation and discharged on Amoxicillin.     Mrs. Bustamante confirms dry cough and dyspnea, has not noted improvement since starting Amoxicillin Thursday. She has been using nebulizer with albuterol multiple times during the day.  She otherwise appears well.      Pulmonary Interventions:  Albuterol neb- did last night  Albuterol HFA     Interval Hx 1/23/24:     Mrs. Bustamante presents to Pulmonary clinic for follow up cough. She was last seen 11/2023- at that visit she reported persistent productive cough at night and severe acid reflux. She was started on a regimen of Protonix and Pepcid and referred to GI.      Has been battling RSV for the past month- persistent productive cough, was bad for the first two weeks and now is a little better but the same over the last 2 weeks. Hasn't seen GI yet. Before she was sick, cough at night was still about the same after starting protonix/pepcid.      Interval Hx 11/14/23:     Mrs. Bustamante presents to Pulmonary clinic for follow up chronic cough. She was last seen 10/2023- at that visit she reported improvement in daytime cough, but persistent nocturnal cough. She was encouraged to use nebulizer treatment in the evenings and plan made for close follow up.      Today she reports trouble sleeping because of productive cough. + Post-tussive emesis. Has bad reflux. Was previously supposed to have hiatal hernia repair last December, but passed out before surgery and had pacemaker placed and was not reevaluated.      Pulmonary Interventions:   Albuterol neb- didn't help doing at night  Albuterol HFA  Tessalon- out of, didn't seem to help  Mucinex- out of     HPI 10/17/23:     73 year old female with history of Afib, hiatal hernia, GERD, HLD, HTN, pacemaker, who presents to Pulmonary clinic for follow up chronic cough.  She was last seen 9/2023 by Dr. Silver- at that visit she reported 3 months of cough and dyspnea. She had been treated with  "Augmentin 8/7/23. At that visit she was prescribed Doxycycline, Prednisone, and a nebulizer. Further work up ordered in the way of PFT and a walk.      Cough during the day has improved, but still with cough at night, productive. Deep cough. Sputum a little reddish looking- rust colored. Thick secretions. No weight loss or weight gain.     Pertinent work up:  PFT 10/17/23- Borderline restriction/obstruction without significant bronchodilator response, decreased DLCO 54.4%  6MW 10/17/23- SpO2 range 94-99%, walked 274m, 66% predicted, no pause  CXR 8/21/23- Clear lungs  TTE 11/2022- Normal LV size and function, normal RV size     Pulmonary Interventions:   Mucinex  Albuterol neb- not using  Albuterol HFA  Tessalon- not using anymore    Review of Systems   Respiratory:  Positive for cough, sputum production, chest tightness, shortness of breath, wheezing and dyspnea on extertion.      Objective:     Physical Exam   Constitutional: She is oriented to person, place, and time. She appears well-developed and well-nourished. She is not obese.   Neck:   No palpable fluctuance or fluid collection, no overlying erythema or swelling noted   Neurological: She is alert and oriented to person, place, and time.   Skin: Skin is warm and dry.     Personal Diagnostic Review  As Above      6/24/2024    11:28 AM 4/25/2024     8:38 AM 4/17/2024     8:05 AM 4/17/2024     7:57 AM 4/17/2024     4:49 AM 4/17/2024    12:19 AM 4/16/2024    10:47 PM   Pulmonary Function Tests   SpO2 94 % 95 % 95 % 97 % 90 % 93 % 90 %   Height 5' 3" (1.6 m) 5' 3" (1.6 m)        Weight  77.8 kg (171 lb 8.3 oz)        BMI (Calculated)  30.4             Assessment:     No diagnosis found.     Outpatient Encounter Medications as of 7/2/2024   Medication Sig Dispense Refill    albuterol (PROVENTIL) 2.5 mg /3 mL (0.083 %) nebulizer solution Take 3 mLs (2.5 mg total) by nebulization every 6 (six) hours as needed for Wheezing. Rescue 120 each 0    albuterol-ipratropium " "(DUO-NEB) 2.5 mg-0.5 mg/3 mL nebulizer solution Take 3 mLs by nebulization every 6 (six) hours as needed for Wheezing. Rescue 180 mL 3    [] amoxicillin-clavulanate 875-125mg (AUGMENTIN) 875-125 mg per tablet Take 1 tablet by mouth every 12 (twelve) hours. for 7 days 14 tablet 0    apixaban (ELIQUIS) 5 mg Tab Take 1 tablet (5 mg total) by mouth 2 (two) times daily. 180 tablet 4    benzonatate (TESSALON) 100 MG capsule Take 1 capsule (100 mg total) by mouth 3 (three) times daily as needed. 30 capsule 0    blood-glucose sensor (DEXCOM G7 SENSOR) La Use as directed to check blood sugar daily 3 each 6    budesonide (PULMICORT) 0.25 mg/2 mL nebulizer solution Take 2 mLs (0.25 mg total) by nebulization once daily. Controller 180 mL 1    [] doxycycline (MONODOX) 100 MG capsule Take 1 capsule (100 mg total) by mouth 2 (two) times daily. for 7 days 14 capsule 0    estrogens, conjugated, (PREMARIN) 0.625 MG tablet Take 1 tablet (0.625 mg total) by mouth once daily. 90 tablet 3    insulin glargine U-100, Lantus, (LANTUS) 100 unit/mL injection Inject 10 Units into the skin every evening. 10 mL 0    metFORMIN (GLUCOPHAGE-XR) 500 MG ER 24hr tablet TAKE 1 TABLET BY MOUTH TWICE DAILY WITH MEALS 60 tablet 11    metoprolol succinate (TOPROL-XL) 50 MG 24 hr tablet Take 2 tablets (100 mg total) by mouth every morning AND 1 tablet (50 mg total) every evening. (Patient taking differently: 100mg twice daily) 270 tablet 3    pantoprazole (PROTONIX) 40 MG tablet Take 1 tablet (40 mg total) by mouth 2 (two) times daily. 180 tablet 3    pen needle, diabetic 32 gauge x 5/32" Ndle Use to dispense insulin once daily 100 each 0    rosuvastatin (CRESTOR) 5 MG tablet Take 1 tablet (5 mg total) by mouth once daily. 90 tablet 3    tirzepatide, weight loss, 2.5 mg/0.5 mL PnIj Inject 2.5 mg into the skin every 7 days. 4 Pen 3    traZODone (DESYREL) 50 MG tablet Take 1 tablet (50 mg total) by mouth nightly as needed for Insomnia. 30 " tablet 11    [DISCONTINUED] albuterol-ipratropium (DUO-NEB) 2.5 mg-0.5 mg/3 mL nebulizer solution Take 3 mLs by nebulization every 6 (six) hours as needed for Wheezing. Rescue 180 mL 3     No facility-administered encounter medications on file as of 7/2/2024.     No orders of the defined types were placed in this encounter.        Plan:     Problem List Items Addressed This Visit          Pulmonary    Aspiration pneumonia     - discussed that this will likely be a persistent issue until esophageal issues are addressed  - will continue Augmentin for another week  - repeat blood pressure and heart rate before leaving clinic  - discussed hydrating         COPD exacerbation     - wheezing on exam  - not on controller inhaler, has had issues with use in the past  - trying stiolto for ease in dose compliance as well as hopefully improved delivery of medication with mist  - hold budesonide neb with pneumonia  - breathing treatment before she leaves today  - low dose steroid coarse given hx uncontrolled DM  - close follow up with CXR           Relevant Medications    amoxicillin-clavulanate 875-125mg (AUGMENTIN) 875-125 mg per tablet    predniSONE (DELTASONE) 20 MG tablet    albuterol nebulizer solution 2.5 mg (Completed) (Start on 7/2/2024 12:30 PM)    Other Relevant Orders    X-Ray Chest PA And Lateral     Other Visit Diagnoses       Chronic bronchitis, unspecified chronic bronchitis type    -  Primary    Relevant Medications    tiotropium-olodateroL (STIOLTO RESPIMAT) 2.5-2.5 mcg/actuation Mist    Other Relevant Orders    X-Ray Chest PA And Lateral    Pneumonia of right lung due to infectious organism, unspecified part of lung        Doxycycline and Augmentin x 7 days. Continue inhalers. Follow up in one week to reevaluate.    Relevant Medications    amoxicillin-clavulanate 875-125mg (AUGMENTIN) 875-125 mg per tablet    predniSONE (DELTASONE) 20 MG tablet    Other Relevant Orders    X-Ray Chest PA And Lateral           Plan discussed with pt and she expressed understanding, all questions answered. RTC 4 weeks or sooner as needed. ED precautions discussed.     Addendum:  Vitals at end of appointment:  - /70, HR 71bpm, pulse ox 95%

## 2024-07-02 NOTE — PATIENT INSTRUCTIONS
Start Stiolto:  Do a breathing treatment with your nebulizer and then 2 puffs in the morning with the Stiolto (green top)

## 2024-07-05 ENCOUNTER — PATIENT OUTREACH (OUTPATIENT)
Dept: ADMINISTRATIVE | Facility: HOSPITAL | Age: 74
End: 2024-07-05
Payer: OTHER GOVERNMENT

## 2024-07-05 DIAGNOSIS — Z78.0 POSTMENOPAUSAL STATE: Primary | ICD-10-CM

## 2024-07-05 NOTE — PROGRESS NOTES
VBHM Score: 3     Osteoporosis Screening  Eye Exam  Hemoglobin A1c    Shingles/Zoster Vaccine  RSV Vaccine                  Health Maintenance Topic(s) Outreach Outcomes & Actions Taken:    Osteoporosis Screening - Outreach Outcomes & Actions Taken  : Dexa Appointment Scheduled    Eye Exam - Outreach Outcomes & Actions Taken  : Will schedule at a later time, pt has multiple upcoming appts.    Lab(s) - Outreach Outcomes & Actions Taken  : Overdue Lab(s) Scheduled         Additional Notes:  Spoke to pt she agreed to Dexa Scan scheduling, declined DM eye exam for now, states she has multiple upcoming appts, also requested a change in Cards, upcoming appt at [facility] would like to be scheduled at the [facility], will send information to A Verlander who initiated the referral.  Ha1c has been scheduled.               Care Management, Digital Medicine, and/or Education Referrals    OPCM Risk Score: 69.2         Next Steps - Referral Actions: Declined services

## 2024-07-05 NOTE — Clinical Note
Hello,  This pt is scheduled for Cards and Pacemaker check at [facility] on 7.10.24, she is requesting appt be changed to a provider at the [facility], pt states she does not want to drive to [facility], would like something closer or the [facility] she is driving from [place], [place].  I think Mrs Tyson placed the referral for this appt.  Please contact pt if she can be rescheduled to [facility] location instead.  Thanks  Octavia PANDEY, [facility] Care Coordination Department [facility]

## 2024-07-09 DIAGNOSIS — R00.1 SYMPTOMATIC SINUS BRADYCARDIA: Primary | ICD-10-CM

## 2024-07-12 ENCOUNTER — TELEPHONE (OUTPATIENT)
Dept: INTERNAL MEDICINE | Facility: CLINIC | Age: 74
End: 2024-07-12
Payer: MEDICARE

## 2024-07-15 ENCOUNTER — PATIENT OUTREACH (OUTPATIENT)
Dept: ADMINISTRATIVE | Facility: HOSPITAL | Age: 74
End: 2024-07-15
Payer: MEDICARE

## 2024-07-17 ENCOUNTER — TELEPHONE (OUTPATIENT)
Dept: DIABETES | Facility: CLINIC | Age: 74
End: 2024-07-17
Payer: MEDICARE

## 2024-07-17 NOTE — TELEPHONE ENCOUNTER
Called patient to assist with scheduling a new pt appointment with diabetes management. Left a message.

## 2024-07-18 ENCOUNTER — TELEPHONE (OUTPATIENT)
Dept: DIABETES | Facility: CLINIC | Age: 74
End: 2024-07-18
Payer: MEDICARE

## 2024-07-18 NOTE — TELEPHONE ENCOUNTER
Called patient to assist with scheduling a new patient appointment with diabetes management Left a message   
WDL

## 2024-07-19 ENCOUNTER — TELEPHONE (OUTPATIENT)
Dept: DIABETES | Facility: CLINIC | Age: 74
End: 2024-07-19
Payer: MEDICARE

## 2024-07-22 PROBLEM — J69.0 ASPIRATION PNEUMONIA: Status: RESOLVED | Noted: 2024-04-16 | Resolved: 2024-07-22

## 2024-08-01 ENCOUNTER — PATIENT MESSAGE (OUTPATIENT)
Dept: CARDIOLOGY | Facility: CLINIC | Age: 74
End: 2024-08-01
Payer: MEDICARE

## 2024-08-13 ENCOUNTER — TELEPHONE (OUTPATIENT)
Dept: INTERNAL MEDICINE | Facility: CLINIC | Age: 74
End: 2024-08-13
Payer: MEDICARE

## 2024-08-30 ENCOUNTER — PATIENT MESSAGE (OUTPATIENT)
Dept: ADMINISTRATIVE | Facility: HOSPITAL | Age: 74
End: 2024-08-30
Payer: MEDICARE

## 2024-09-04 ENCOUNTER — CLINICAL SUPPORT (OUTPATIENT)
Dept: CARDIOLOGY | Facility: HOSPITAL | Age: 74
End: 2024-09-04
Payer: MEDICARE

## 2024-09-04 ENCOUNTER — CLINICAL SUPPORT (OUTPATIENT)
Dept: CARDIOLOGY | Facility: HOSPITAL | Age: 74
End: 2024-09-04
Attending: INTERNAL MEDICINE
Payer: MEDICARE

## 2024-09-04 DIAGNOSIS — Z95.0 PRESENCE OF CARDIAC PACEMAKER: ICD-10-CM

## 2024-09-04 PROCEDURE — 93294 REM INTERROG EVL PM/LDLS PM: CPT | Mod: HCNC,S$GLB,, | Performed by: INTERNAL MEDICINE

## 2024-09-04 PROCEDURE — 93296 REM INTERROG EVL PM/IDS: CPT | Mod: HCNC | Performed by: INTERNAL MEDICINE

## 2024-09-05 ENCOUNTER — HOSPITAL ENCOUNTER (OUTPATIENT)
Dept: RADIOLOGY | Facility: HOSPITAL | Age: 74
Discharge: HOME | End: 2024-09-05
Attending: HOSPITALIST
Payer: MEDICARE

## 2024-09-05 ENCOUNTER — HOSPITAL ENCOUNTER (OUTPATIENT)
Dept: CARDIOLOGY | Facility: HOSPITAL | Age: 74
Discharge: HOME | End: 2024-09-05
Attending: PHYSICIAN ASSISTANT
Payer: OTHER GOVERNMENT

## 2024-09-05 ENCOUNTER — OFFICE VISIT (OUTPATIENT)
Dept: CARDIOLOGY | Facility: CLINIC | Age: 74
End: 2024-09-05
Payer: OTHER GOVERNMENT

## 2024-09-05 ENCOUNTER — OFFICE VISIT (OUTPATIENT)
Dept: INTERNAL MEDICINE | Facility: CLINIC | Age: 74
End: 2024-09-05
Payer: OTHER GOVERNMENT

## 2024-09-05 ENCOUNTER — TELEPHONE (OUTPATIENT)
Dept: INTERNAL MEDICINE | Facility: CLINIC | Age: 74
End: 2024-09-05

## 2024-09-05 VITALS
WEIGHT: 164.88 LBS | BODY MASS INDEX: 29.21 KG/M2 | TEMPERATURE: 97 F | OXYGEN SATURATION: 98 % | HEART RATE: 82 BPM | DIASTOLIC BLOOD PRESSURE: 80 MMHG | SYSTOLIC BLOOD PRESSURE: 138 MMHG | HEIGHT: 63 IN

## 2024-09-05 VITALS
OXYGEN SATURATION: 97 % | HEART RATE: 82 BPM | DIASTOLIC BLOOD PRESSURE: 72 MMHG | WEIGHT: 164.88 LBS | SYSTOLIC BLOOD PRESSURE: 141 MMHG | BODY MASS INDEX: 29.21 KG/M2

## 2024-09-05 DIAGNOSIS — R05.9 COUGH, UNSPECIFIED TYPE: ICD-10-CM

## 2024-09-05 DIAGNOSIS — E04.1 THYROID NODULE: ICD-10-CM

## 2024-09-05 DIAGNOSIS — K22.0 ACHALASIA OF ESOPHAGUS: Primary | ICD-10-CM

## 2024-09-05 DIAGNOSIS — E11.65 TYPE 2 DIABETES MELLITUS WITH HYPERGLYCEMIA, WITHOUT LONG-TERM CURRENT USE OF INSULIN: ICD-10-CM

## 2024-09-05 DIAGNOSIS — Z95.0 PACEMAKER: ICD-10-CM

## 2024-09-05 DIAGNOSIS — R06.02 SOB (SHORTNESS OF BREATH): Primary | ICD-10-CM

## 2024-09-05 DIAGNOSIS — E11.69 HYPERLIPIDEMIA ASSOCIATED WITH TYPE 2 DIABETES MELLITUS: ICD-10-CM

## 2024-09-05 DIAGNOSIS — E11.42 TYPE 2 DIABETES MELLITUS WITH DIABETIC POLYNEUROPATHY, WITH LONG-TERM CURRENT USE OF INSULIN: ICD-10-CM

## 2024-09-05 DIAGNOSIS — E11.59 HYPERTENSION ASSOCIATED WITH DIABETES: ICD-10-CM

## 2024-09-05 DIAGNOSIS — R00.2 PALPITATION: ICD-10-CM

## 2024-09-05 DIAGNOSIS — Z87.01 PERSONAL HISTORY OF PNEUMONIA (RECURRENT): ICD-10-CM

## 2024-09-05 DIAGNOSIS — Z12.31 ENCOUNTER FOR SCREENING MAMMOGRAM FOR MALIGNANT NEOPLASM OF BREAST: ICD-10-CM

## 2024-09-05 DIAGNOSIS — J42 CHRONIC BRONCHITIS, UNSPECIFIED CHRONIC BRONCHITIS TYPE: ICD-10-CM

## 2024-09-05 DIAGNOSIS — E78.1 TYPE 2 DIABETES MELLITUS WITH HYPERTRIGLYCERIDEMIA: ICD-10-CM

## 2024-09-05 DIAGNOSIS — Z79.4 TYPE 2 DIABETES MELLITUS WITH DIABETIC POLYNEUROPATHY, WITH LONG-TERM CURRENT USE OF INSULIN: ICD-10-CM

## 2024-09-05 DIAGNOSIS — Z79.01 CHRONIC ANTICOAGULATION: ICD-10-CM

## 2024-09-05 DIAGNOSIS — E11.69 TYPE 2 DIABETES MELLITUS WITH HYPERTRIGLYCERIDEMIA: ICD-10-CM

## 2024-09-05 DIAGNOSIS — E78.5 HYPERLIPIDEMIA ASSOCIATED WITH TYPE 2 DIABETES MELLITUS: ICD-10-CM

## 2024-09-05 DIAGNOSIS — K22.0 ACHALASIA OF ESOPHAGUS: ICD-10-CM

## 2024-09-05 DIAGNOSIS — J18.9 PNEUMONIA OF RIGHT LUNG DUE TO INFECTIOUS ORGANISM, UNSPECIFIED PART OF LUNG: ICD-10-CM

## 2024-09-05 DIAGNOSIS — J44.1 COPD EXACERBATION: ICD-10-CM

## 2024-09-05 DIAGNOSIS — I15.2 HYPERTENSION ASSOCIATED WITH DIABETES: ICD-10-CM

## 2024-09-05 DIAGNOSIS — I48.0 PAF (PAROXYSMAL ATRIAL FIBRILLATION): Primary | Chronic | ICD-10-CM

## 2024-09-05 DIAGNOSIS — I48.0 PAF (PAROXYSMAL ATRIAL FIBRILLATION): Chronic | ICD-10-CM

## 2024-09-05 LAB
OHS QRS DURATION: 76 MS
OHS QTC CALCULATION: 464 MS

## 2024-09-05 PROCEDURE — 99215 OFFICE O/P EST HI 40 MIN: CPT | Mod: PBBFAC,25 | Performed by: PHYSICIAN ASSISTANT

## 2024-09-05 PROCEDURE — 99214 OFFICE O/P EST MOD 30 MIN: CPT | Mod: PBBFAC,25,27 | Performed by: INTERNAL MEDICINE

## 2024-09-05 PROCEDURE — G2211 COMPLEX E/M VISIT ADD ON: HCPCS | Mod: S$PBB,,, | Performed by: PHYSICIAN ASSISTANT

## 2024-09-05 PROCEDURE — 99215 OFFICE O/P EST HI 40 MIN: CPT | Mod: S$GLB,,, | Performed by: INTERNAL MEDICINE

## 2024-09-05 PROCEDURE — 71046 X-RAY EXAM CHEST 2 VIEWS: CPT | Mod: TC

## 2024-09-05 PROCEDURE — 99999 PR PBB SHADOW E&M-EST. PATIENT-LVL IV: CPT | Mod: PBBFAC,,, | Performed by: INTERNAL MEDICINE

## 2024-09-05 PROCEDURE — 93010 ELECTROCARDIOGRAM REPORT: CPT | Mod: ,,, | Performed by: INTERNAL MEDICINE

## 2024-09-05 PROCEDURE — 99999 PR PBB SHADOW E&M-EST. PATIENT-LVL V: CPT | Mod: PBBFAC,,, | Performed by: PHYSICIAN ASSISTANT

## 2024-09-05 PROCEDURE — 93005 ELECTROCARDIOGRAM TRACING: CPT

## 2024-09-05 PROCEDURE — 99214 OFFICE O/P EST MOD 30 MIN: CPT | Mod: S$PBB,,, | Performed by: PHYSICIAN ASSISTANT

## 2024-09-05 RX ORDER — METOPROLOL SUCCINATE 50 MG/1
100 TABLET, EXTENDED RELEASE ORAL 2 TIMES DAILY
Start: 2024-09-05

## 2024-09-05 RX ORDER — LOSARTAN POTASSIUM 25 MG/1
25 TABLET ORAL DAILY
Qty: 30 TABLET | Refills: 5 | Status: SHIPPED | OUTPATIENT
Start: 2024-09-05

## 2024-09-05 RX ORDER — METFORMIN HYDROCHLORIDE 500 MG/1
1000 TABLET, EXTENDED RELEASE ORAL 2 TIMES DAILY WITH MEALS
Qty: 360 TABLET | Refills: 3 | Status: SHIPPED | OUTPATIENT
Start: 2024-09-05 | End: 2025-08-31

## 2024-09-05 NOTE — PROGRESS NOTES
Subjective:   Patient ID:  Kelsie Bustamante is a 74 y.o. female who presents for follow up of Follow-up      75 yo female, 1 yr fu  PMH PAF SSS,. s/p PPM in , HTn HLD and DM. No h/o MI CVA and ca   HOLTER NSR   echo normal EF.  10/22 echo at [facility] EF nml  F/u at cardiology service of [facility].   No chest pain   SOBOE with fast walking, carrying grocery bags. No PND leg swelling  EKG reviewed by myself today reveals AP and PVCs                Past Medical History:   Diagnosis Date    Achalasia     demetrius    Atrial fibrillation with RVR 2021    Cataract     Colon polyp     Gastroesophageal reflux     Hiatal hernia     Hx of colonic polyps 08/15/2014    per colonoscopy in      Hyperlipidemia 2022    Hypertension     Pacemaker     Peripheral neuropathy     Postmenopausal HRT (hormone replacement therapy)     failed tapering off    SAH (subarachnoid hemorrhage)     from a fall late     Sepsis 2021    Last Assessment & Plan:  Formatting of this note might be different from the original. History & Physical Patient meets sepsis criteria with a white cell count of 15, and tachypnea.  Source of infection not clear at this time.  No evidence for meningitis.  Cover with broad-spectrum antibiotics especially given invasive dental procedure done recently.  Check blood cultures and monitor for fever.  R    Sinusitis     Thyroid nodule 3/16 subcm; kathleen 2 yrs    Type 2 diabetes mellitus with neurological manifestations     Vasodepressor syncope 2018       Past Surgical History:   Procedure Laterality Date    A-V CARDIAC PACEMAKER INSERTION Left 2022    Procedure: INSERTION, CARDIAC PACEMAKER, DUAL CHAMBER;  Surgeon: Daryl Walker MD;  Location: [facility] CATH LAB;  Service: Cardiology;  Laterality: Left;  Patient instructed 11AM start time/needs ptt/pt/inr  All Biot[employer] with His lead/MDT His as back up//Vinod notified    BREAST BIOPSY Left 2021    cataract Left       SECTION      CHOLECYSTECTOMY      CYST REMOVAL Right 04/30/2021    [facility] in [place]    ESOPHAGEAL MANOMETRY WITH MEASUREMENT OF IMPEDANCE N/A 10/6/2022    Procedure: MANOMETRY, ESOPHAGUS, WITH IMPEDANCE MEASUREMENT  Cardiac Clearance/Eliquis 2 day hold approval received per Dr. RABIA Aguilar, Cardiology on 09/20/22.  Note in encounters.  LB;  Surgeon: Bruna Fletcher MD;  Location: [facility] ENDO;  Service: Endoscopy;  Laterality: N/A;    ESOPHAGOGASTRODUODENOSCOPY N/A 10/6/2022    Procedure: EGD (ESOPHAGOGASTRODUODENOSCOPY);  Surgeon: Bruna Fletcher MD;  Location: [facility] ENDO;  Service: Endoscopy;  Laterality: N/A;    HYSTERECTOMY      nonca    OOPHORECTOMY      TILT TABLE TEST N/A 10/25/2018    Procedure: TILT TABLE TEST;  Surgeon: Daryl Walker MD;  Location: [facility] CATH LAB;  Service: Cardiology;  Laterality: N/A;  VAS.DEP.SYN,HUT,FAS,3PREP    TONSILLECTOMY         Social History     Tobacco Use    Smoking status: Never    Smokeless tobacco: Never   Substance Use Topics    Alcohol use: No    Drug use: No       Family History   Problem Relation Name Age of Onset    Aneurysm Sister      Heart disease Mother      Diabetes Father      Coronary artery disease Father      Coronary artery disease Brother      Parkinsonism Brother           ROS    Objective:   Physical Exam  HENT:      Head: Normocephalic.   Eyes:      Pupils: Pupils are equal, round, and reactive to light.   Neck:      Thyroid: No thyromegaly.      Vascular: Normal carotid pulses. No carotid bruit or JVD.   Cardiovascular:      Rate and Rhythm: Normal rate and regular rhythm. No extrasystoles are present.     Chest Wall: PMI is not displaced.      Pulses: Normal pulses.      Heart sounds: Normal heart sounds. No murmur heard.     No gallop. No S3 sounds.   Pulmonary:      Effort: No respiratory distress.      Breath sounds: Normal breath sounds. No stridor.   Abdominal:      General: Bowel sounds are normal.      Palpations:  Abdomen is soft.      Tenderness: There is no abdominal tenderness. There is no rebound.   Skin:     Findings: No rash.   Neurological:      Mental Status: She is alert and oriented to person, place, and time.   Psychiatric:         Behavior: Behavior normal.         Lab Results   Component Value Date    CHOL 133 03/01/2024    CHOL 205 (H) 03/14/2023    CHOL 122 02/10/2021     Lab Results   Component Value Date    HDL 48 03/01/2024    HDL 60 03/14/2023    HDL 58 02/10/2021     Lab Results   Component Value Date    LDLCALC 49.0 (L) 03/01/2024    LDLCALC 108.2 03/14/2023    LDLCALC 37.0 (L) 02/10/2021     Lab Results   Component Value Date    TRIG 180 (H) 03/01/2024    TRIG 184 (H) 03/14/2023    TRIG 135 02/10/2021     Lab Results   Component Value Date    CHOLHDL 36.1 03/01/2024    CHOLHDL 29.3 03/14/2023    CHOLHDL 47.5 02/10/2021       Chemistry        Component Value Date/Time     04/25/2024 1024    K 4.0 04/25/2024 1024     04/25/2024 1024    CO2 27 04/25/2024 1024    BUN 10 04/25/2024 1024    CREATININE 1.0 04/25/2024 1024     (H) 04/25/2024 1024        Component Value Date/Time    CALCIUM 9.3 04/25/2024 1024    ALKPHOS 74 04/25/2024 1024    AST 18 04/25/2024 1024    ALT 21 04/25/2024 1024    BILITOT 0.3 04/25/2024 1024    ESTGFRAFRICA 69 11/03/2022 0357    ESTGFRAFRICA >60 06/25/2022 0357    EGFRNONAA 57 (A) 06/25/2022 0357          Lab Results   Component Value Date    HGBA1C 8.6 (H) 03/01/2024     Lab Results   Component Value Date    TSH 1.609 02/20/2024     Lab Results   Component Value Date    INR 0.9 12/06/2022    INR 1.1 11/02/2022    INR 1.0 10/17/2022     Lab Results   Component Value Date    WBC 7.95 04/25/2024    HGB 12.4 04/25/2024    HCT 38.7 04/25/2024    MCV 89 04/25/2024     04/25/2024     BMP  Sodium   Date Value Ref Range Status   04/25/2024 139 136 - 145 mmol/L Final     Potassium   Date Value Ref Range Status   04/25/2024 4.0 3.5 - 5.1 mmol/L Final     Chloride    Date Value Ref Range Status   04/25/2024 106 95 - 110 mmol/L Final     CO2   Date Value Ref Range Status   04/25/2024 27 23 - 29 mmol/L Final     BUN   Date Value Ref Range Status   04/25/2024 10 8 - 23 mg/dL Final     Creatinine   Date Value Ref Range Status   04/25/2024 1.0 0.5 - 1.4 mg/dL Final     Calcium   Date Value Ref Range Status   04/25/2024 9.3 8.7 - 10.5 mg/dL Final     Anion Gap   Date Value Ref Range Status   04/25/2024 6 (L) 8 - 16 mmol/L Final     eGFR if    Date Value Ref Range Status   06/25/2022 >60 >60 mL/min/1.73 m^2 Final     eGFR    Date Value Ref Range Status   11/03/2022 69 mL/min/1.73mSq Final     Comment:     In accordance with NKF-ASN Task Force recommendation, calculation based on the Chronic Kidney Disease Epidemiology Collaboration (CKD-EPI) equation without adjustment for race. eGFR adjusted for gender and age and calculated in ml/min/1.73mSquared. eGFR cannot be calculated if patient is under 18 years of age.     Reference Range:   >= 60 ml/min/1.73mSquared.     eGFR if non    Date Value Ref Range Status   06/25/2022 57 (A) >60 mL/min/1.73 m^2 Final     Comment:     Calculation used to obtain the estimated glomerular filtration  rate (eGFR) is the CKD-EPI equation.        BNP  @LABRCNTIP(BNP,BNPTRIAGEBLO)@  @LABRCNTIP(troponini)@  CrCl cannot be calculated (Patient's most recent lab result is older than the maximum 7 days allowed.).  No results found in the last 24 hours.  X-Ray Chest PA And Lateral    Result Date: 9/5/2024  1.  Improvement of the previously seen right perihilar linear opacity, otherwise no acute cardiopulmonary process. Electronically signed by: Osman Bowser Date:    09/05/2024 Time:    13:13    No results found in the last 24 hours.    Assessment:      1. SOB (shortness of breath)    2. Hyperlipidemia associated with type 2 diabetes mellitus    3. Hypertension associated with diabetes    4. Pacemaker    5. PAF  (paroxysmal atrial fibrillation)    6. Palpitation    7. Type 2 diabetes mellitus with diabetic polyneuropathy, with long-term current use of insulin    8. Personal history of pneumonia (recurrent)    9. COPD exacerbation      LDL C  BP high  A1c 8.6    Plan:   Phar MPI for SOB and DM   Continue eliquis 5 mg bid torpolXl 100 mg bid and statin  Add losartan 25 mg daily for HTN  DM Rx per PCP  Counseled DASH  Check Lipid profile with PCP in 6 months  Recommend heart-healthy diet, weight control and regular exercise.  Giovanna. Risk modification.   I have reviewed all pertinent labs and cardiac studies independently. Plans and recommendations have been formulated under my direct supervision. All questions answered and patient voiced understanding.   If symptoms persist go to the ED  RTC in 6 months

## 2024-09-05 NOTE — PROGRESS NOTES
Subjective:      Patient ID: Kelsie Bustamante is a 74 y.o. female.    Chief Complaint: Cough (Over a month (non-productive)) and Medication Refill    Cough  This is a recurrent problem. The current episode started 1 to 4 weeks ago. The problem has been unchanged. The problem occurs constantly. The cough is Non-productive. Associated symptoms include shortness of breath and wheezing. Pertinent negatives include no chest pain, chills, ear congestion, ear pain, fever, headaches, heartburn, hemoptysis, myalgias, nasal congestion, postnasal drip, rash, rhinorrhea, sore throat, sweats or weight loss. Treatments tried: nyquil. The treatment provided no relief.     Needs to see cardiology and GI.     Eye exam due  Colonoscopy due.   Labs due  Dexa scheduled.   Seeing pulm for chronic cough. Scheduled for spirometry and follow up next week.     Gastroenterology referred to gen surg. Seen by gen surg in march. Recommends repeat EGD due eval previous sleeve (done in [place]). This has not been done yet. Pt states that she is afraid to do the EGD because in the past she was awake for the entire procedure and now has ptsd. Same with colonoscopy which is also due.     Last visit I recommended that she see cardiology bc in persistent Afib. She is on eliquis daily and 100mg metoprolol BID. She will need to see them to make sure she is ok for the EGD as well.   Denies any chest pain but does note irregular rhythm in her chest.     DM: currently on metformin 500mg BID. She did not start the prescribed insulin.       Patient Active Problem List   Diagnosis    Hypertension associated with diabetes    Type 2 diabetes mellitus with diabetic polyneuropathy, with long-term current use of insulin    Peripheral neuropathy    Carpal tunnel syndrome    Localized primary carpometacarpal osteoarthritis    Synovitis of right ankle    Gastroesophageal reflux disease without esophagitis    Thyroid nodule    Premature menopause (surgery in 30s)     "Palpitation    Other insomnia    Statin intolerance    Neck pain on right side    Bronchitis    Normocytic anemia    PAF (paroxysmal atrial fibrillation)    Maxillary cyst    Atelectasis    Debility    Myalgia    Personal history of pneumonia (recurrent)    Personal history of urinary (tract) infections    Fibromyalgia    Hyperlipidemia associated with type 2 diabetes mellitus    Odynophagia    Dysphagia    Achalasia of esophagus    Chronic anticoagulation- Eliquis    Pacemaker    Other thrombophilia    Chronic cough    COPD exacerbation    Uncontrolled type 2 diabetes mellitus with hyperglycemia    Type 2 diabetes mellitus with hypertriglyceridemia         Current Outpatient Medications:     albuterol (PROVENTIL) 2.5 mg /3 mL (0.083 %) nebulizer solution, Take 3 mLs (2.5 mg total) by nebulization every 6 (six) hours as needed for Wheezing. Rescue, Disp: 120 each, Rfl: 0    albuterol-ipratropium (DUO-NEB) 2.5 mg-0.5 mg/3 mL nebulizer solution, Take 3 mLs by nebulization every 6 (six) hours as needed for Wheezing. Rescue, Disp: 180 mL, Rfl: 3    apixaban (ELIQUIS) 5 mg Tab, Take 1 tablet (5 mg total) by mouth 2 (two) times daily., Disp: 180 tablet, Rfl: 4    blood-glucose sensor (DEXCOM G7 SENSOR) [place], Use as directed to check blood sugar daily, Disp: 3 each, Rfl: 6    budesonide (PULMICORT) 0.25 mg/2 mL nebulizer solution, Take 2 mLs (0.25 mg total) by nebulization once daily. Controller, Disp: 180 mL, Rfl: 1    estrogens, conjugated, (PREMARIN) 0.625 MG tablet, Take 1 tablet (0.625 mg total) by mouth once daily., Disp: 90 tablet, Rfl: 3    pantoprazole (PROTONIX) 40 MG tablet, Take 1 tablet (40 mg total) by mouth 2 (two) times daily., Disp: 180 tablet, Rfl: 3    pen needle, diabetic 32 gauge x 5/32" Ndle, Use to dispense insulin once daily, Disp: 100 each, Rfl: 0    rosuvastatin (CRESTOR) 5 MG tablet, Take 1 tablet (5 mg total) by mouth once daily., Disp: 90 tablet, Rfl: 3    tiotropium-olodateroL (STIOLTO " "RESPIMAT) 2.5-2.5 mcg/actuation Mist, Inhale 2 puffs into the lungs once daily. Controller, Disp: 1 g, Rfl: 5    traZODone (DESYREL) 50 MG tablet, Take 1 tablet (50 mg total) by mouth nightly as needed for Insomnia., Disp: 30 tablet, Rfl: 11    metFORMIN (GLUCOPHAGE-XR) 500 MG ER 24hr tablet, Take 2 tablets (1,000 mg total) by mouth 2 (two) times daily with meals., Disp: 360 tablet, Rfl: 3    metoprolol succinate (TOPROL-XL) 50 MG 24 hr tablet, Take 2 tablets (100 mg total) by mouth 2 (two) times daily. 100mg twice daily, Disp: , Rfl:     Review of Systems   Constitutional:  Negative for chills, fever and weight loss.   HENT:  Negative for ear pain, postnasal drip, rhinorrhea and sore throat.    Respiratory:  Positive for cough, shortness of breath and wheezing. Negative for hemoptysis.    Cardiovascular:  Negative for chest pain.   Gastrointestinal:  Negative for heartburn.   Musculoskeletal:  Negative for myalgias.   Skin:  Negative for rash.   Neurological:  Negative for headaches.     Objective:   /80 (BP Location: Right arm, Patient Position: Sitting, BP Method: Medium (Manual))   Pulse 82   Temp 96.8 °F (36 °C) (Tympanic)   Ht 5' 3" (1.6 m)   Wt 74.8 kg (164 lb 14.5 oz)   SpO2 98%   BMI 29.21 kg/m²     Physical Exam  Vitals reviewed.   Constitutional:       General: She is not in acute distress.     Appearance: Normal appearance. She is well-developed. She is not ill-appearing, toxic-appearing or diaphoretic.   HENT:      Head: Normocephalic and atraumatic.      Right Ear: Tympanic membrane, ear canal and external ear normal.      Left Ear: Tympanic membrane, ear canal and external ear normal.      Nose: Nose normal.   Eyes:      Conjunctiva/sclera: Conjunctivae normal.      Pupils: Pupils are equal, round, and reactive to light.   Cardiovascular:      Rate and Rhythm: Normal rate. Rhythm irregular.      Heart sounds: Normal heart sounds. No murmur heard.     No friction rub. No gallop. "   Pulmonary:      Effort: Pulmonary effort is normal. No respiratory distress.      Breath sounds: No stridor. Wheezing present. No rhonchi or rales.   Chest:      Chest wall: No tenderness.   Abdominal:      General: There is no distension.      Palpations: Abdomen is soft.      Tenderness: There is no abdominal tenderness.   Musculoskeletal:         General: Normal range of motion.      Cervical back: Normal range of motion and neck supple.   Lymphadenopathy:      Cervical: No cervical adenopathy.   Skin:     General: Skin is warm and dry.      Capillary Refill: Capillary refill takes less than 2 seconds.      Findings: No rash.   Neurological:      Mental Status: She is alert and oriented to person, place, and time.      Motor: No weakness.      Coordination: Coordination normal.      Gait: Gait normal.   Psychiatric:         Mood and Affect: Mood normal.         Behavior: Behavior normal.         Thought Content: Thought content normal.         Judgment: Judgment normal.         Assessment:     1. PAF (paroxysmal atrial fibrillation)    2. Pacemaker    3. Chronic anticoagulation- Eliquis    4. Cough, unspecified type    5. Encounter for screening mammogram for malignant neoplasm of breast    6. Type 2 diabetes mellitus with diabetic polyneuropathy, with long-term current use of insulin    7. Hypertension associated with diabetes    8. Thyroid nodule    9. Achalasia of esophagus    10. Hyperlipidemia associated with type 2 diabetes mellitus    11. Type 2 diabetes mellitus with hypertriglyceridemia    12. Type 2 diabetes mellitus with hyperglycemia, without long-term current use of insulin      Plan:   PAF (paroxysmal atrial fibrillation)  -     Ambulatory referral/consult to Cardiology  -     metoprolol succinate (TOPROL-XL) 50 MG 24 hr tablet; Take 2 tablets (100 mg total) by mouth 2 (two) times daily. 100mg twice daily  -a fib on exam today    Pacemaker  -     Ambulatory referral/consult to Cardiology  -      EKG 12-lead; Future  A fib on exam today    Chronic anticoagulation- Eliquis  -     Ambulatory referral/consult to Cardiology    Cough, unspecified type  -     Cancel: X-Ray Chest PA And Lateral; Future; Expected date: 09/05/2024  -check cxr today    Encounter for screening mammogram for malignant neoplasm of breast  -     Mammo Digital Screening Bilat w/ Javier; Future; Expected date: 09/05/2024    Type 2 diabetes mellitus with diabetic polyneuropathy, with long-term current use of insulin  -     Hemoglobin A1C; Future; Expected date: 09/05/2024  -     Ambulatory referral/consult to Diabetic Advanced Practice Providers (Medical Management); Future; Expected date: 09/12/2024  -     Ambulatory referral/consult to Optometry; Future; Expected date: 09/12/2024    Hypertension associated with diabetes  -stable and controlled on current meds    Thyroid nodule  -repeat thyroid us  -check tsh  \  Achalasia of esophagus  -sent a message to gen surg and GI for recommendations on the next step    Hyperlipidemia associated with type 2 diabetes mellitus  -stable on crestor    Type 2 diabetes mellitus with hypertriglyceridemia  -not controlled    Type 2 diabetes mellitus with hyperglycemia, without long-term current use of insulin  -     metFORMIN (GLUCOPHAGE-XR) 500 MG ER 24hr tablet; Take 2 tablets (1,000 mg total) by mouth 2 (two) times daily with meals.  Dispense: 360 tablet; Refill: 3  -     Ambulatory referral/consult to Diabetes Education; Future; Expected date: 09/12/2024    -increase metformin to 1000 BID  -I do not feel comfortable prescribing GLP1a due to her achalasia. May need to start insulin. Will check her home sugar readings at follow up in 2 weeks.       Follow up in about 2 weeks (around 9/19/2024), or if symptoms worsen or fail to improve.

## 2024-09-05 NOTE — TELEPHONE ENCOUNTER
----- Message from Chau Schneider MD sent at 9/5/2024  2:49 PM CDT -----  Regarding: RE: Achalasia  The main reasons I wanted her to see Dr. Willis is because he can do a myotomy endoscopically (POEM) but can also look at it from a surgical perspective were if he thought it would be better served doing minimally invasive he could do that option as well.  Doing a traditional Heller myotomy may result in significant reflux as we would not be able to do a Jenaro fundoplication after due to her prior gastric surgery.  Botox would be an option for her for the short term but this would likely be temporary for her symptoms.  ----- Message -----  From: Emily Sanford NP  Sent: 9/5/2024  12:55 PM CDT  To: Chau Schneider MD; Nichelle Tyson PA-C  Subject: RE: Achalasia                                    Hey Nalini!!   She has achalasia (which is a esophageal motility disorder that causes her food to not leave the esophagus). She saw Dr. Schneider but because of prior gastric surgery complicating her case, she was referred to Dr. Willis in [place] for surgical management of achalasia. They reached out to her but she declined to schedule. My recommendation would be for her to follow up with Dr. Willis to discuss treatment options.     Dr. Schneider, do you have any additional recommendations?? Can consider Botox if too high risk for other surgical management.  ----- Message -----  From: Nichelle Tyson PA-C  Sent: 9/5/2024  11:24 AM CDT  To: Chau Schneider MD; Emily Sanford NP  Subject: Achalasia                                        Heroberto Schneider and Emily. I wanted to discuss this patient with yall.   Pt has been having recurrent aspiration pneumonia.   She saw Dr. Schneider in march and he wanted to repeat the EGD. She has not yet done that. She has a pacemaker which needs adjustment bc she is in a fib today so I am going to get her an apt with the cardiologist hopefully today to get that fixed.  She has reservations on the EGD bc she states that she had her last one while fully awake?? I discussed with her and she said that she will be ok with having it done as long as she is sedated. What is the next step. Do audi want me to order her another EGD and get clearance from her cardiologist? Does she need to see one of yall again?   Thank you for your time!    Nichelle Tyson PA-C

## 2024-09-05 NOTE — PATIENT INSTRUCTIONS
See cardiology for Afib/pacemaker follow up  See diabetes management and education - they will call you to schedule

## 2024-09-06 ENCOUNTER — TELEPHONE (OUTPATIENT)
Dept: SURGICAL ONCOLOGY | Facility: CLINIC | Age: 74
End: 2024-09-06
Payer: MEDICARE

## 2024-09-06 ENCOUNTER — TELEPHONE (OUTPATIENT)
Dept: INTERNAL MEDICINE | Facility: CLINIC | Age: 74
End: 2024-09-06
Payer: MEDICARE

## 2024-09-06 NOTE — TELEPHONE ENCOUNTER
----- Message from mEily Sanford NP sent at 9/5/2024 12:33 PM CDT -----  Regarding: RE: Achalasiderrek Gayle!!   She has achalasia (which is a esophageal motility disorder that causes her food to not leave the esophagus). She saw Dr. Schneider but because of prior gastric surgery complicating her case, she was referred to Dr. Willis in [place] for surgical management of achalasia. They reached out to her but she declined to schedule. My recommendation would be for her to follow up with Dr. Willis to discuss treatment options.     Dr. Schneider, do you have any additional recommendations?? Can consider Botox if too high risk for other surgical management.  ----- Message -----  From: Nichelle Tyson PA-C  Sent: 9/5/2024  11:24 AM CDT  To: Chau Schneider MD; Emily Sanford NP  Subject: Achalasia                                        Fermíny Dr. Schneider and Emily. I wanted to discuss this patient with yall.   Pt has been having recurrent aspiration pneumonia.   She saw Dr. Schneider in march and he wanted to repeat the EGD. She has not yet done that. She has a pacemaker which needs adjustment bc she is in a fib today so I am going to get her an apt with the cardiologist hopefully today to get that fixed. She has reservations on the EGD bc she states that she had her last one while fully awake?? I discussed with her and she said that she will be ok with having it done as long as she is sedated. What is the next step. Do audi want me to order her another EGD and get clearance from her cardiologist? Does she need to see one of yall again?   Thank you for your time!    Nichelle Tyson PA-C

## 2024-09-06 NOTE — TELEPHONE ENCOUNTER
----- Message from Nichelle Tyson PA-C sent at 9/6/2024 10:51 AM CDT -----  Regarding: RE: Achalasia  Spoke to the gastroenterologist and surgeon. Patient needs an appointment with  Dr. Willis in [place]. They have tried to call and get her scheduled. He is a general surgeon in [place] that would best be able to manage her current problem. She needs to call to schedule [phone].  ----- Message -----  From: Emily Sanford NP  Sent: 9/5/2024  12:55 PM CDT  To: Chau Schneider MD; Nichelle Tyson PA-C  Subject: RE: Achalasia                                    Hey Nalini!!   She has achalasia (which is a esophageal motility disorder that causes her food to not leave the esophagus). She saw Dr. Schneider but because of prior gastric surgery complicating her case, she was referred to Dr. Willis in [place] for surgical management of achalasia. They reached out to her but she declined to schedule. My recommendation would be for her to follow up with Dr. Willis to discuss treatment options.     Dr. Schneider, do you have any additional recommendations?? Can consider Botox if too high risk for other surgical management.  ----- Message -----  From: Nichelle Tyson PA-C  Sent: 9/5/2024  11:24 AM CDT  To: Chau Schneider MD; Emily Sanford NP  Subject: Achalasia                                        Hey Dr. Schneider and Emily. I wanted to discuss this patient with yall.   Pt has been having recurrent aspiration pneumonia.   She saw Dr. Schneider in march and he wanted to repeat the EGD. She has not yet done that. She has a pacemaker which needs adjustment bc she is in a fib today so I am going to get her an apt with the cardiologist hopefully today to get that fixed. She has reservations on the EGD bc she states that she had her last one while fully awake?? I discussed with her and she said that she will be ok with having it done as long as she is sedated. What is the next step.  Do audi want me to order her another EGD and get clearance from her cardiologist? Does she need to see one of yall again?   Thank you for your time!    Nichelle Tyson PA-C

## 2024-09-07 ENCOUNTER — TELEPHONE (OUTPATIENT)
Dept: DIABETES | Facility: CLINIC | Age: 74
End: 2024-09-07
Payer: MEDICARE

## 2024-09-10 ENCOUNTER — APPOINTMENT (OUTPATIENT)
Dept: RADIOLOGY | Facility: HOSPITAL | Age: 74
End: 2024-09-10
Attending: FAMILY MEDICINE
Payer: OTHER GOVERNMENT

## 2024-09-10 ENCOUNTER — CLINICAL SUPPORT (OUTPATIENT)
Dept: PULMONOLOGY | Facility: CLINIC | Age: 74
End: 2024-09-10
Payer: MEDICARE

## 2024-09-10 ENCOUNTER — OFFICE VISIT (OUTPATIENT)
Dept: PULMONOLOGY | Facility: CLINIC | Age: 74
End: 2024-09-10
Attending: HOSPITALIST
Payer: OTHER GOVERNMENT

## 2024-09-10 VITALS
HEART RATE: 82 BPM | HEIGHT: 63 IN | WEIGHT: 164.5 LBS | OXYGEN SATURATION: 99 % | SYSTOLIC BLOOD PRESSURE: 124 MMHG | BODY MASS INDEX: 29.15 KG/M2 | RESPIRATION RATE: 16 BRPM | DIASTOLIC BLOOD PRESSURE: 72 MMHG

## 2024-09-10 DIAGNOSIS — J40 BRONCHITIS: ICD-10-CM

## 2024-09-10 DIAGNOSIS — K22.0 ACHALASIA OF ESOPHAGUS: ICD-10-CM

## 2024-09-10 DIAGNOSIS — Z78.0 POSTMENOPAUSAL STATE: ICD-10-CM

## 2024-09-10 DIAGNOSIS — R05.3 CHRONIC COUGH: Primary | ICD-10-CM

## 2024-09-10 LAB
BRPFT: NORMAL
FEF 25 75 CHG: 21.6 %
FEF 25 75 LLN: 1.04
FEF 25 75 POST REF: 48.5 %
FEF 25 75 PRE REF: 39.9 %
FEF 25 75 REF: 2.44
FET100 CHG: -6.9 %
FEV1 CHG: 1.3 %
FEV1 FVC CHG: 4.5 %
FEV1 FVC LLN: 64
FEV1 FVC POST REF: 98.5 %
FEV1 FVC PRE REF: 94.2 %
FEV1 FVC REF: 78
FEV1 LLN: 1.47
FEV1 POST REF: 68.4 %
FEV1 PRE REF: 67.5 %
FEV1 REF: 2.04
FVC CHG: -3.1 %
FVC LLN: 1.91
FVC POST REF: 68.8 %
FVC PRE REF: 71 %
FVC REF: 2.65
PEF CHG: -11.7 %
PEF LLN: 3.6
PEF POST REF: 67.9 %
PEF PRE REF: 76.9 %
PEF REF: 5.26
POST FEF 25 75: 1.18 L/S
POST FET 100: 7.26 SEC
POST FEV1 FVC: 76.69 %
POST FEV1: 1.4 L
POST FVC: 1.82 L
POST PEF: 3.57 L/S
PRE FEF 25 75: 0.97 L/S
PRE FET 100: 7.79 SEC
PRE FEV1 FVC: 73.37 %
PRE FEV1: 1.38 L
PRE FVC: 1.88 L
PRE PEF: 4.04 L/S

## 2024-09-10 PROCEDURE — 77080 DXA BONE DENSITY AXIAL: CPT | Mod: TC

## 2024-09-10 PROCEDURE — 99999 PR PBB SHADOW E&M-EST. PATIENT-LVL V: CPT | Mod: PBBFAC,,, | Performed by: HOSPITALIST

## 2024-09-10 PROCEDURE — 77080 DXA BONE DENSITY AXIAL: CPT | Mod: 26,,, | Performed by: RADIOLOGY

## 2024-09-10 PROCEDURE — 99215 OFFICE O/P EST HI 40 MIN: CPT | Mod: PBBFAC,25 | Performed by: HOSPITALIST

## 2024-09-10 NOTE — PATIENT INSTRUCTIONS
Do a nebulizer treatment every morning. After it is complete- use your Stiolto.   You can also use the nebulizer in the afternoon or prior to going to bed if you are having more wheezing or shortness of breath.         Call to schedule appointment with the surgeon in [place] for your esophogeal dysmotility.    Dr. Willis     [phone]

## 2024-09-10 NOTE — ASSESSMENT & PLAN NOTE
- reviewed chart and discussed with pt the recommendation from local gi and surgery for her to see a specialist in [place]  - pt expressed understanding  - provided typed and highlighted instructions including the [facility] number for her to schedule an appt

## 2024-09-10 NOTE — ASSESSMENT & PLAN NOTE
- discussed with pt that achalasia likely contributing to her pulmonary symptoms/cough/pneumonias  - continue Stiolto, discussed and provided highlighted typed instructions to use the duo-neb nebulizer treatment every morning and then as needed during the day

## 2024-09-10 NOTE — PROGRESS NOTES
Subjective:      Patient ID: Kelsie Bustamante is a 74 y.o. female.    Chief Complaint: Cough    Interval Hx 9/10/24:    Mrs. Bustamante presents today for follow up cough. She was last seen 7/2/24- at that visit reported gradually improving symptoms related to pneumonia, likely related to aspiration (hx achalasia). She had a cxr 9/5 which showed improvement of right hilar pneumonia vs atelectasis. She was started on Stiolto, budesonide nebs held with pneumonia, treated with low dose steroids.    Mrs. Bustamante reports persistent cough, but not as bad as before. Has been using Stiolto and feels like it helps, but doesn't last throughout the day. Has not been using her nebulizer, forgets that she has it at times.   I reviewed her chart since last seen- she was recommended to see a specialty surgeon in [place], but when called for the appointment she didn't know why they were calling and did not schedule an appt.     Pulmonary Interventions:  Stiolto- feels like maybe helps in the morning  Duo-neb- not using regularly    Interval Hx 7/2/2024:     Mrs. Bustamante presents today for follow up. She was last seen 4/2/24 with continued wheezing after steroids and Amoxicillin- she was sent rx for budesonide nebs, abx changed to azithromycin. Chart reviewed- she was admitted to the hospital for one day 4/16 with shortness of breath, confusion and fever. Found to have O2 sats 84% in the ED. CTA with bilateral patchy infiltrates concerning for aspiration or atypical pneumonia, diffuse esophageal wall thickening with distension and food material concerning for GERD. She was seen by PCP 6/24/24 with similar concerns of cough and shortness of breath. Prescribed one week of Doxycycline and Augmentin.     Not feeling well- short of breath, wheezing, weak. Since finishing abx- feels that chest congestion has improved, wheezing improved, overall doing better, but with persistent symptoms. After seen last week was in the bed for a few  days because of weakness and breathing, moving around more this week. Feeling dizzy and light-headed at times. Feels like she is keeping up with hydration. Appetite decreased. Taking Mucinex. Having left neck pain, started last week, intermittent, improves with heating pad- denies difficulty swallowing, no change in speech, localized to left side, no headache.     Interim Work Up:  CXR 6/24/24- Possible right hilar pneumonia vs atelectasis  Budesonide- not using  Duoneb- using in the morning then afternoon     Interval Hx 4/2/2024:     Mrs. Bustamante presents to Pulmonary clinic for follow up ER visit for SOB. Chart reviewed- She was seen by GI 2/26/24- recomended to continue Protonix and to see general surgery for previously identified achalasia. She was seen by Dr. Silver 3/4/24- treated with tessalon perles, prednisone taper, doxycycline for cough. She was seen in the ED 3/29/24 with weakness, cough and headache, CXR no acute process. She was diagnosed with COPD exacerbation and discharged on Amoxicillin.     Mrs. Bustamante confirms dry cough and dyspnea, has not noted improvement since starting Amoxicillin Thursday. She has been using nebulizer with albuterol multiple times during the day.  She otherwise appears well.      Pulmonary Interventions:  Albuterol neb- did last night  Albuterol HFA     Interval Hx 1/23/24:     Mrs. Bustamante presents to Pulmonary clinic for follow up cough. She was last seen 11/2023- at that visit she reported persistent productive cough at night and severe acid reflux. She was started on a regimen of Protonix and Pepcid and referred to GI.      Has been battling RSV for the past month- persistent productive cough, was bad for the first two weeks and now is a little better but the same over the last 2 weeks. Hasn't seen GI yet. Before she was sick, cough at night was still about the same after starting protonix/pepcid.      Interval Hx 11/14/23:     Mrs. Bustamante presents to Pulmonary clinic  for follow up chronic cough. She was last seen 10/2023- at that visit she reported improvement in daytime cough, but persistent nocturnal cough. She was encouraged to use nebulizer treatment in the evenings and plan made for close follow up.      Today she reports trouble sleeping because of productive cough. + Post-tussive emesis. Has bad reflux. Was previously supposed to have hiatal hernia repair last December, but passed out before surgery and had pacemaker placed and was not reevaluated.      Pulmonary Interventions:   Albuterol neb- didn't help doing at night  Albuterol HFA  Tessalon- out of, didn't seem to help  Mucinex- out of     HPI 10/17/23:     73 year old female with history of Afib, hiatal hernia, GERD, HLD, HTN, pacemaker, who presents to Pulmonary clinic for follow up chronic cough.  She was last seen 9/2023 by Dr. Silver- at that visit she reported 3 months of cough and dyspnea. She had been treated with Augmentin 8/7/23. At that visit she was prescribed Doxycycline, Prednisone, and a nebulizer. Further work up ordered in the way of PFT and a walk.      Cough during the day has improved, but still with cough at night, productive. Deep cough. Sputum a little reddish looking- rust colored. Thick secretions. No weight loss or weight gain.     Pertinent work up:  PFT 10/17/23- Borderline restriction/obstruction without significant bronchodilator response, decreased DLCO 54.4%  6MW 10/17/23- SpO2 range 94-99%, walked 274m, 66% predicted, no pause  CXR 8/21/23- Clear lungs  TTE 11/2022- Normal LV size and function, normal RV size     Pulmonary Interventions:   Mucinex  Albuterol neb- not using  Albuterol HFA  Tessalon- not using anymore    Review of Systems   Respiratory:  Positive for cough and dyspnea on extertion.      Objective:     Physical Exam   Constitutional: She is oriented to person, place, and time. She appears well-developed and well-nourished. She is obese.   Cardiovascular: Normal rate and  "regular rhythm.   Pulmonary/Chest:   Mild exp wheeze at right base, normal effort on room air at rest and with conversation without breathlessness   Neurological: She is alert and oriented to person, place, and time.     Personal Diagnostic Review  As Above      9/5/2024     1:12 PM 9/5/2024    10:59 AM 7/2/2024    10:59 AM 6/24/2024    11:28 AM 4/25/2024     8:38 AM 4/17/2024     8:05 AM 4/17/2024     7:57 AM   Pulmonary Function Tests   SpO2 97 % 98 % 96 % 94 % 95 % 95 % 97 %   Height  5' 3" (1.6 m) 5' 3" (1.6 m) 5' 3" (1.6 m) 5' 3" (1.6 m)     Weight 74.8 kg (164 lb 14.5 oz) 74.8 kg (164 lb 14.5 oz) 75.6 kg (166 lb 10.7 oz)  77.8 kg (171 lb 8.3 oz)     BMI (Calculated) 29.2 29.2 29.5  30.4          Assessment:     No diagnosis found.     Outpatient Encounter Medications as of 9/10/2024   Medication Sig Dispense Refill    albuterol (PROVENTIL) 2.5 mg /3 mL (0.083 %) nebulizer solution Take 3 mLs (2.5 mg total) by nebulization every 6 (six) hours as needed for Wheezing. Rescue 120 each 0    albuterol-ipratropium (DUO-NEB) 2.5 mg-0.5 mg/3 mL nebulizer solution Take 3 mLs by nebulization every 6 (six) hours as needed for Wheezing. Rescue 180 mL 3    apixaban (ELIQUIS) 5 mg Tab Take 1 tablet (5 mg total) by mouth 2 (two) times daily. 180 tablet 4    blood-glucose sensor (DEXCOM G7 SENSOR) La Use as directed to check blood sugar daily (Patient not taking: Reported on 9/5/2024) 3 each 6    budesonide (PULMICORT) 0.25 mg/2 mL nebulizer solution Take 2 mLs (0.25 mg total) by nebulization once daily. Controller 180 mL 1    estrogens, conjugated, (PREMARIN) 0.625 MG tablet Take 1 tablet (0.625 mg total) by mouth once daily. 90 tablet 3    losartan (COZAAR) 25 MG tablet Take 1 tablet (25 mg total) by mouth once daily. 30 tablet 5    metFORMIN (GLUCOPHAGE-XR) 500 MG ER 24hr tablet Take 2 tablets (1,000 mg total) by mouth 2 (two) times daily with meals. 360 tablet 3    metoprolol succinate (TOPROL-XL) 50 MG 24 hr tablet Take " "2 tablets (100 mg total) by mouth 2 (two) times daily. 100mg twice daily      pantoprazole (PROTONIX) 40 MG tablet Take 1 tablet (40 mg total) by mouth 2 (two) times daily. 180 tablet 3    pen needle, diabetic 32 gauge x 5/32" Ndle Use to dispense insulin once daily 100 each 0    rosuvastatin (CRESTOR) 5 MG tablet Take 1 tablet (5 mg total) by mouth once daily. 90 tablet 3    tiotropium-olodateroL (STIOLTO RESPIMAT) 2.5-2.5 mcg/actuation Mist Inhale 2 puffs into the lungs once daily. Controller 1 g 5    [] tirzepatide, weight loss, 2.5 mg/0.5 mL PnIj Inject 2.5 mg into the skin every 7 days. 4 Pen 3    traZODone (DESYREL) 50 MG tablet Take 1 tablet (50 mg total) by mouth nightly as needed for Insomnia. 30 tablet 11    [DISCONTINUED] insulin glargine U-100, Lantus, (LANTUS) 100 unit/mL injection Inject 10 Units into the skin every evening. 10 mL 0    [DISCONTINUED] metFORMIN (GLUCOPHAGE-XR) 500 MG ER 24hr tablet TAKE 1 TABLET BY MOUTH TWICE DAILY WITH MEALS 60 tablet 11    [DISCONTINUED] metoprolol succinate (TOPROL-XL) 50 MG 24 hr tablet Take 2 tablets (100 mg total) by mouth every morning AND 1 tablet (50 mg total) every evening. (Patient taking differently: 100mg twice daily) 270 tablet 3     No facility-administered encounter medications on file as of 9/10/2024.     No orders of the defined types were placed in this encounter.        Plan:     Problem List Items Addressed This Visit          Pulmonary    Chronic cough - Primary     - discussed with pt that achalasia likely contributing to her pulmonary symptoms/cough/pneumonias  - continue Stiolto, discussed and provided highlighted typed instructions to use the duo-neb nebulizer treatment every morning and then as needed during the day         Relevant Orders    X-Ray Chest PA And Lateral       GI    Achalasia of esophagus     - reviewed chart and discussed with pt the recommendation from local gi and surgery for her to see a specialist in [place]  - " pt expressed understanding  - provided typed and highlighted instructions including the [facility] clinic number for her to schedule an appt          Follow up in 6 months with a chest x-ray or sooner as needed.

## 2024-09-13 ENCOUNTER — TELEPHONE (OUTPATIENT)
Dept: SURGERY | Facility: CLINIC | Age: 74
End: 2024-09-13
Payer: MEDICARE

## 2024-09-13 NOTE — TELEPHONE ENCOUNTER
Called and spoke with . Relayed time date and location of appt scheduled. Spouse verbalized understanding and stated he would deliver this message to the pt

## 2024-09-16 DIAGNOSIS — J42 CHRONIC BRONCHITIS, UNSPECIFIED CHRONIC BRONCHITIS TYPE: ICD-10-CM

## 2024-09-16 DIAGNOSIS — I48.0 PAF (PAROXYSMAL ATRIAL FIBRILLATION): Chronic | ICD-10-CM

## 2024-09-16 DIAGNOSIS — J45.901 EXACERBATION OF ASTHMA, UNSPECIFIED ASTHMA SEVERITY, UNSPECIFIED WHETHER PERSISTENT: ICD-10-CM

## 2024-09-16 DIAGNOSIS — E11.65 TYPE 2 DIABETES MELLITUS WITH HYPERGLYCEMIA, WITHOUT LONG-TERM CURRENT USE OF INSULIN: ICD-10-CM

## 2024-09-16 DIAGNOSIS — J44.1 COPD EXACERBATION: ICD-10-CM

## 2024-09-16 RX ORDER — METFORMIN HYDROCHLORIDE 500 MG/1
1000 TABLET, EXTENDED RELEASE ORAL 2 TIMES DAILY WITH MEALS
Qty: 360 TABLET | Refills: 3 | Status: SHIPPED | OUTPATIENT
Start: 2024-09-16 | End: 2025-09-11

## 2024-09-16 RX ORDER — IPRATROPIUM BROMIDE AND ALBUTEROL SULFATE 2.5; .5 MG/3ML; MG/3ML
3 SOLUTION RESPIRATORY (INHALATION) EVERY 6 HOURS PRN
Qty: 180 ML | Refills: 3 | Status: SHIPPED | OUTPATIENT
Start: 2024-09-16 | End: 2025-09-16

## 2024-09-16 RX ORDER — BUDESONIDE 0.25 MG/2ML
0.25 INHALANT ORAL DAILY
Qty: 180 ML | Refills: 1 | OUTPATIENT
Start: 2024-09-16 | End: 2025-09-16

## 2024-09-16 RX ORDER — METOPROLOL SUCCINATE 50 MG/1
100 TABLET, EXTENDED RELEASE ORAL 2 TIMES DAILY
Start: 2024-09-16 | End: 2024-09-17 | Stop reason: SDUPTHER

## 2024-09-16 RX ORDER — LOSARTAN POTASSIUM 25 MG/1
25 TABLET ORAL DAILY
Qty: 30 TABLET | Refills: 5 | Status: SHIPPED | OUTPATIENT
Start: 2024-09-16

## 2024-09-16 RX ORDER — TIOTROPIUM BROMIDE AND OLODATEROL 3.124; 2.736 UG/1; UG/1
2 SPRAY, METERED RESPIRATORY (INHALATION) DAILY
Qty: 1 G | Refills: 5 | Status: SHIPPED | OUTPATIENT
Start: 2024-09-16

## 2024-09-17 DIAGNOSIS — J69.0 ASPIRATION PNEUMONIA OF BOTH LUNGS DUE TO GASTRIC SECRETIONS, UNSPECIFIED PART OF LUNG: ICD-10-CM

## 2024-09-17 DIAGNOSIS — K21.9 GASTROESOPHAGEAL REFLUX DISEASE, UNSPECIFIED WHETHER ESOPHAGITIS PRESENT: ICD-10-CM

## 2024-09-17 DIAGNOSIS — I48.0 PAF (PAROXYSMAL ATRIAL FIBRILLATION): Chronic | ICD-10-CM

## 2024-09-17 RX ORDER — METOPROLOL SUCCINATE 50 MG/1
100 TABLET, EXTENDED RELEASE ORAL 2 TIMES DAILY
Qty: 360 TABLET | Refills: 1 | Status: SHIPPED | OUTPATIENT
Start: 2024-09-17 | End: 2025-03-16

## 2024-09-17 RX ORDER — NEBULIZER AND COMPRESSOR
EACH MISCELLANEOUS
Qty: 1 EACH | Refills: 0 | Status: SHIPPED | OUTPATIENT
Start: 2024-09-17

## 2024-09-17 RX ORDER — PANTOPRAZOLE SODIUM 40 MG/1
40 TABLET, DELAYED RELEASE ORAL 2 TIMES DAILY
Qty: 180 TABLET | Refills: 3 | Status: SHIPPED | OUTPATIENT
Start: 2024-09-17 | End: 2025-09-17

## 2024-09-17 NOTE — TELEPHONE ENCOUNTER
----- Message from Marti Durham sent at 9/17/2024 11:12 AM CDT -----  Contact: Kelsie Kiran is calling in regards to getting a refill on metoprolol succinate (TOPROL-XL) 50 MG 24 hr tablet and the albuterol machine so that she can put the solution in.please call back at .[phone]         [facility] [address] - [facility]  [facility]  [place] [address]  Phone: [phone] Fax: [phone]    Thanks  REBECCA

## 2024-09-17 NOTE — TELEPHONE ENCOUNTER
----- Message from Marti Durham sent at 9/17/2024 11:17 AM CDT -----  Contact: Kelsie Kiran is calling in regards to getting a refill on Esomeprazole a 90 day supply.please call back at  [phone]        [facility] [address]  [address]  [place] [address]  Phone: [phone] Fax: [phone]        Thanks  REBECCA

## 2024-09-18 LAB
OHS CV AF BURDEN PERCENT: 3
OHS CV DC REMOTE DEVICE TYPE: NORMAL
OHS CV RV PACING PERCENT: 1 %

## 2024-10-02 ENCOUNTER — HOSPITAL ENCOUNTER (OUTPATIENT)
Dept: RADIOLOGY | Facility: HOSPITAL | Age: 74
Discharge: HOME | End: 2024-10-02
Attending: PHYSICIAN ASSISTANT
Payer: MEDICARE

## 2024-10-02 VITALS — HEIGHT: 63 IN | WEIGHT: 164 LBS | BODY MASS INDEX: 29.06 KG/M2

## 2024-10-02 DIAGNOSIS — Z12.31 ENCOUNTER FOR SCREENING MAMMOGRAM FOR MALIGNANT NEOPLASM OF BREAST: ICD-10-CM

## 2024-10-02 PROCEDURE — 77067 SCR MAMMO BI INCL CAD: CPT | Mod: TC,HCNC,PO

## 2024-10-02 PROCEDURE — 77067 SCR MAMMO BI INCL CAD: CPT | Mod: 26,HCNC,, | Performed by: RADIOLOGY

## 2024-10-02 PROCEDURE — 77063 BREAST TOMOSYNTHESIS BI: CPT | Mod: 26,HCNC,, | Performed by: RADIOLOGY

## 2024-10-03 ENCOUNTER — HOSPITAL ENCOUNTER (OUTPATIENT)
Dept: RADIOLOGY | Facility: HOSPITAL | Age: 74
Discharge: HOME | End: 2024-10-03
Attending: INTERNAL MEDICINE
Payer: MEDICARE

## 2024-10-03 DIAGNOSIS — R06.02 SOB (SHORTNESS OF BREATH): ICD-10-CM

## 2024-10-09 ENCOUNTER — OFFICE VISIT (OUTPATIENT)
Dept: SURGERY | Facility: CLINIC | Age: 74
End: 2024-10-09
Payer: MEDICARE

## 2024-10-09 VITALS
OXYGEN SATURATION: 95 % | DIASTOLIC BLOOD PRESSURE: 74 MMHG | BODY MASS INDEX: 28.95 KG/M2 | SYSTOLIC BLOOD PRESSURE: 142 MMHG | HEIGHT: 63 IN | WEIGHT: 163.38 LBS | HEART RATE: 63 BPM

## 2024-10-09 DIAGNOSIS — Z87.01 PERSONAL HISTORY OF PNEUMONIA (RECURRENT): ICD-10-CM

## 2024-10-09 DIAGNOSIS — K22.0 ACHALASIA OF ESOPHAGUS: ICD-10-CM

## 2024-10-09 PROCEDURE — 3077F SYST BP >= 140 MM HG: CPT | Mod: HCNC,CPTII,S$GLB, | Performed by: SURGERY

## 2024-10-09 PROCEDURE — 3066F NEPHROPATHY DOC TX: CPT | Mod: HCNC,CPTII,S$GLB, | Performed by: SURGERY

## 2024-10-09 PROCEDURE — 4010F ACE/ARB THERAPY RXD/TAKEN: CPT | Mod: HCNC,CPTII,S$GLB, | Performed by: SURGERY

## 2024-10-09 PROCEDURE — 3061F NEG MICROALBUMINURIA REV: CPT | Mod: HCNC,CPTII,S$GLB, | Performed by: SURGERY

## 2024-10-09 PROCEDURE — 3288F FALL RISK ASSESSMENT DOCD: CPT | Mod: HCNC,CPTII,S$GLB, | Performed by: SURGERY

## 2024-10-09 PROCEDURE — 99204 OFFICE O/P NEW MOD 45 MIN: CPT | Mod: HCNC,S$GLB,, | Performed by: SURGERY

## 2024-10-09 PROCEDURE — 1159F MED LIST DOCD IN RCRD: CPT | Mod: HCNC,CPTII,S$GLB, | Performed by: SURGERY

## 2024-10-09 PROCEDURE — 1160F RVW MEDS BY RX/DR IN RCRD: CPT | Mod: HCNC,CPTII,S$GLB, | Performed by: SURGERY

## 2024-10-09 PROCEDURE — 3078F DIAST BP <80 MM HG: CPT | Mod: HCNC,CPTII,S$GLB, | Performed by: SURGERY

## 2024-10-09 PROCEDURE — 99999 PR PBB SHADOW E&M-EST. PATIENT-LVL IV: CPT | Mod: PBBFAC,HCNC,, | Performed by: SURGERY

## 2024-10-09 PROCEDURE — 1101F PT FALLS ASSESS-DOCD LE1/YR: CPT | Mod: HCNC,CPTII,S$GLB, | Performed by: SURGERY

## 2024-10-09 PROCEDURE — 3046F HEMOGLOBIN A1C LEVEL >9.0%: CPT | Mod: HCNC,CPTII,S$GLB, | Performed by: SURGERY

## 2024-10-09 PROCEDURE — 1126F AMNT PAIN NOTED NONE PRSNT: CPT | Mod: HCNC,CPTII,S$GLB, | Performed by: SURGERY

## 2024-10-09 PROCEDURE — 3008F BODY MASS INDEX DOCD: CPT | Mod: HCNC,CPTII,S$GLB, | Performed by: SURGERY

## 2024-10-09 NOTE — PROGRESS NOTES
[facility] Clinic H&P    Subjective:     Kelsie Bustamante is a 74 y.o. female with PMH significant for Afib with RVR, GERD, Hiatal hernia, HLD, pacemaker, DMII, and achalasia who presents to clinic for evaluation of achalasia and discuss treatment options. Patient states she has been experiencing symptoms for over 50 years and they have not significantly progressed over time. She is now interested in escalating management for it as she is tired of having to deal with these symptoms. She was recently started on protonix 40mg BID without much noticeable relief of her symptoms. She describes a sensation of globus with both solids and liquids that eventually passes but is very painful. She also endorses regurgitation with associated nausea and vomiting. She has not had a recent appointment with her cardiologist but does have an upcoming nuclear stress test on 10/23. In addition to listed past surgical history listed below, patient has also had sleeve gastrectomy in [place].     2 years ago had upper gi endoscopy and manometry   Endoscopy- hypertonic LES and evidence of eosinophilic esophagitis in middle and lower third of esophagus   Manometry- consistent with type II achalasia  Manometry from 2014 also consistent with type II achalasia     GERD Questionnaire  Meds: Protonix 2x daily   yes Typical heartburn  yes Regurgitation  yes Dysphagia solids  yes Dysphagia liquids  no Hoarseness  yes Sore throat  yes Cough  no Asthma  yes Chest pain  no Water brash  yes Globus  yes Nausea  yes Vomiting     only if has dysphagia  Eckardt Score (none =0, occ=1, daily=2, every meal=3, weight: 0=0, <5=1, 5-10=2, >10=3)  Dysphagia = 3  Regurgitation = 3  Chest pain = 2  Weight loss (kg) = 0 lb   Total = 8     PMH:   Past Medical History:   Diagnosis Date    Achalasia     demetrius    Atrial fibrillation with RVR 05/11/2021    Cataract     Colon polyp     Gastroesophageal reflux     Hiatal hernia     Hx of colonic polyps  08/15/2014    per colonoscopy in      Hyperlipidemia 2022    Hypertension     Pacemaker     Peripheral neuropathy     Postmenopausal HRT (hormone replacement therapy)     failed tapering off    SAH (subarachnoid hemorrhage)     from a fall late     Sepsis 2021    Last Assessment & Plan:  Formatting of this note might be different from the original. History & Physical Patient meets sepsis criteria with a white cell count of 15, and tachypnea.  Source of infection not clear at this time.  No evidence for meningitis.  Cover with broad-spectrum antibiotics especially given invasive dental procedure done recently.  Check blood cultures and monitor for fever.  R    Sinusitis     Thyroid nodule 3/16 subcm; kathleen 2 yrs    Type 2 diabetes mellitus with neurological manifestations     Vasodepressor syncope 2018       Past Surgical History:   Past Surgical History:   Procedure Laterality Date    A-V CARDIAC PACEMAKER INSERTION Left 2022    Procedure: INSERTION, CARDIAC PACEMAKER, DUAL CHAMBER;  Surgeon: Daryl Walker MD;  Location: [facility] CATH LAB;  Service: Cardiology;  Laterality: Left;  Patient instructed 11AM start time/needs ptt/pt/inr  All Biotron[place] with His lead/MDT His as back up//Vinod notified    BREAST BIOPSY Left 2021    cataract Left      SECTION      CHOLECYSTECTOMY      CYST REMOVAL Right 2021    [facility]    ESOPHAGEAL MANOMETRY WITH MEASUREMENT OF IMPEDANCE N/A 10/6/2022    Procedure: MANOMETRY, ESOPHAGUS, WITH IMPEDANCE MEASUREMENT  Cardiac Clearance/Eliquis 2 day hold approval received per Dr. RABIA Aguilar, Cardiology on 22.  Note in encounters.  LB;  Surgeon: Bruna Fletcher MD;  Location: [facility];  Service: Endoscopy;  Laterality: N/A;    ESOPHAGOGASTRODUODENOSCOPY N/A 10/6/2022    Procedure: EGD (ESOPHAGOGASTRODUODENOSCOPY);  Surgeon: Bruna Fletcher MD;  Location: [facility];  Service: Endoscopy;  Laterality: N/A;     HYSTERECTOMY      nonca    OOPHORECTOMY      TILT TABLE TEST N/A 10/25/2018    Procedure: TILT TABLE TEST;  Surgeon: Daryl Walker MD;  Location: [facility] CATH LAB;  Service: Cardiology;  Laterality: N/A;  VAS.DEP.SYN,HUT,FAS,3PREP    TONSILLECTOMY         Social History:  Social History     Socioeconomic History    Marital status:     Number of children: 2   Tobacco Use    Smoking status: Never    Smokeless tobacco: Never   Substance and Sexual Activity    Alcohol use: No    Drug use: No    Sexual activity: Never     Social Drivers of Health     Financial Resource Strain: Low Risk  (9/19/2024)    Overall Financial Resource Strain (CARDIA)     Difficulty of Paying Living Expenses: Not very hard   Food Insecurity: No Food Insecurity (9/23/2022)    Hunger Vital Sign     Worried About Running Out of Food in the Last Year: Never true     Ran Out of Food in the Last Year: Never true   Transportation Needs: No Transportation Needs (9/23/2022)    PRAPARE - Transportation     Lack of Transportation (Medical): No     Lack of Transportation (Non-Medical): No   Physical Activity: Unknown (9/19/2024)    Exercise Vital Sign     Days of Exercise per Week: 0 days   Stress: Stress Concern Present (9/19/2024)    [employer] [place] [employer] - Occupational Stress Questionnaire     Feeling of Stress : To some extent   Housing Stability: Low Risk  (9/23/2022)    Housing Stability Vital Sign     Unable to Pay for Housing in the Last Year: No     Number of Places Lived in the Last Year: 1     Unstable Housing in the Last Year: No       Allergies:   Review of patient's allergies indicates:   Allergen Reactions    Floxin [ofloxacin] Diarrhea and Nausea And Vomiting    Fluvastatin Other (See Comments)     myalgia    Pravastatin Other (See Comments)     myalgia       Medications:  Current Outpatient Medications on File Prior to Visit   Medication Sig Dispense Refill    albuterol (PROVENTIL) 2.5 mg /3 mL (0.083 %)  "nebulizer solution Take 3 mLs (2.5 mg total) by nebulization every 6 (six) hours as needed for Wheezing. Rescue 120 each 0    albuterol-ipratropium (DUO-NEB) 2.5 mg-0.5 mg/3 mL nebulizer solution Take 3 mLs by nebulization every 6 (six) hours as needed for Wheezing. Rescue 180 mL 3    apixaban (ELIQUIS) 5 mg Tab Take 1 tablet (5 mg total) by mouth 2 (two) times daily. 180 tablet 4    blood-glucose sensor (DEXCOM G7 SENSOR) La Use as directed to check blood sugar daily 3 each 6    budesonide (PULMICORT) 0.25 mg/2 mL nebulizer solution Take 2 mLs (0.25 mg total) by nebulization once daily. Controller 180 mL 1    estrogens, conjugated, (PREMARIN) 0.625 MG tablet Take 1 tablet (0.625 mg total) by mouth once daily. 90 tablet 3    losartan (COZAAR) 25 MG tablet Take 1 tablet (25 mg total) by mouth once daily. 30 tablet 5    metFORMIN (GLUCOPHAGE-XR) 500 MG ER 24hr tablet Take 2 tablets (1,000 mg total) by mouth 2 (two) times daily with meals. 360 tablet 3    metoprolol succinate (TOPROL-XL) 50 MG 24 hr tablet Take 2 tablets (100 mg total) by mouth 2 (two) times daily. 100mg twice daily 360 tablet 1    nebulizer and compressor La Use every 6 hours as needed for wheezing 1 each 0    pantoprazole (PROTONIX) 40 MG tablet Take 1 tablet (40 mg total) by mouth 2 (two) times daily. 180 tablet 3    pen needle, diabetic 32 gauge x 5/32" Ndle Use to dispense insulin once daily 100 each 0    rosuvastatin (CRESTOR) 5 MG tablet Take 1 tablet (5 mg total) by mouth once daily. 90 tablet 3    tiotropium-olodateroL (STIOLTO RESPIMAT) 2.5-2.5 mcg/actuation Mist Inhale 2 puffs into the lungs once daily. Controller 1 g 5    traZODone (DESYREL) 50 MG tablet Take 1 tablet (50 mg total) by mouth nightly as needed for Insomnia. 30 tablet 11     No current facility-administered medications on file prior to visit.         Objective:     Vital Signs (Most Recent)  Pulse: 63 (10/09/24 1025)  BP: (!) 142/74 (10/09/24 1025)  SpO2: 95 % (10/09/24 " 1025)    Review of Systems   Constitutional:  Negative for chills and fever.   Respiratory:  Positive for cough.    Cardiovascular:  Positive for chest pain (with achalasia symptoms).   Gastrointestinal:  Positive for heartburn, nausea and vomiting. Negative for constipation and diarrhea.   All other systems reviewed and are negative.   A 10+ review of systems was performed with pertinent positives and negatives noted above in the history of present illness.  Other systems were negative unless otherwise specified.    Physical Exam:  Gen: awake, alert, in no acute distress  HEENT: normocephalic, atraumatic, EOMI, no scleral icterus  CV: regular rate and rhythm  Pulm: equal chest rise bilaterally, normal work of breathing  Abd:  soft, non-tender, no guarding  Ext: WWP, skin warm and dry    Imaging / procedures   The following imaging was reviewed: Upper GI endoscopy and manometry. Findings as in HPI      Assessment:     Kelsie Bustamante is a 74 y.o. female with pmhx significant for Afib with RVR, GERD, Hiatal hernia, HLD, pacemaker, DMII, and achalasia who is presenting for evaluation of achalasia symptoms and discuss treatment strategies.     Plan:     - Repeat EGD  - Timed barium swallow   - RTC after results to further discuss surgery.    Tono Casanova MD  General Surgery, PGY-1  [facility]- Surgery     I have personally taken the history and examined this patient and agree with the resident's note as stated above.         Herminio Willis MD

## 2024-10-10 ENCOUNTER — PATIENT OUTREACH (OUTPATIENT)
Dept: ADMINISTRATIVE | Facility: HOSPITAL | Age: 74
End: 2024-10-10
Payer: MEDICARE

## 2024-10-10 NOTE — PROGRESS NOTES
C OUTREACH: per chart review pt is overdue for Colon Cancer screen, pt reported 9.5.24 visit with Primary care clinic pt is apprehensive about having Colonoscopy due to a previous experience, she has been referred to GI for other medical illness, attempted to call pt to follow up, no ans, lvm.

## 2024-10-14 ENCOUNTER — TELEPHONE (OUTPATIENT)
Dept: ENDOSCOPY | Facility: HOSPITAL | Age: 74
End: 2024-10-14
Payer: MEDICARE

## 2024-10-14 NOTE — TELEPHONE ENCOUNTER
Called pt to schedule procedures. Pt answered phone. She could not speak with me to schedule she was at a restaurant. She has contact information and will call back to schedule tomorrow.

## 2024-10-14 NOTE — TELEPHONE ENCOUNTER
"----- Message from VONNIE Monreal sent at 10/10/2024  2:26 PM CDT -----    ----- Message -----  From: Silvia Rivera RN  Sent: 10/9/2024  11:35 AM CDT  To: [facility] Endoscopy Schedulers    Procedure: (EGD) Endoscopy    Diagnosis: Achalasia    Procedure Timing: Within 4 weeks (Urgent)    #If within 4 weeks selected, please ankur as high priority#    #If greater than 12 weeks, please select "5-12 weeks" and delay sending until 3 months prior to requested date#       Additional Scheduling Information:     Is the patient taking a GLP-1 Agonist and/or blood thinner :yes Eliquis    Have you attached a patient to this message: yes  "

## 2024-10-28 ENCOUNTER — PATIENT MESSAGE (OUTPATIENT)
Dept: CARDIOLOGY | Facility: HOSPITAL | Age: 74
End: 2024-10-28
Payer: MEDICARE

## 2024-11-07 ENCOUNTER — PATIENT OUTREACH (OUTPATIENT)
Dept: ADMINISTRATIVE | Facility: HOSPITAL | Age: 74
End: 2024-11-07
Payer: MEDICARE

## 2024-11-11 RX ORDER — LOSARTAN POTASSIUM 25 MG/1
25 TABLET ORAL DAILY
Qty: 30 TABLET | Refills: 5 | Status: SHIPPED | OUTPATIENT
Start: 2024-11-11

## 2024-11-18 PROBLEM — M54.2 NECK PAIN ON RIGHT SIDE: Status: RESOLVED | Noted: 2020-10-20 | Resolved: 2024-11-18

## 2024-11-18 PROBLEM — J40 BRONCHITIS: Status: RESOLVED | Noted: 2021-04-15 | Resolved: 2024-11-18

## 2024-11-18 PROBLEM — M79.10 MYALGIA: Status: RESOLVED | Noted: 2021-05-05 | Resolved: 2024-11-18

## 2024-11-18 PROBLEM — F03.90 DEMENTIA WITHOUT BEHAVIORAL DISTURBANCE, PSYCHOTIC DISTURBANCE, MOOD DISTURBANCE, OR ANXIETY: Status: ACTIVE | Noted: 2024-11-18

## 2024-11-18 PROBLEM — J41.8 MIXED SIMPLE AND MUCOPURULENT CHRONIC BRONCHITIS: Status: ACTIVE | Noted: 2024-11-18

## 2024-11-18 PROBLEM — J44.1 COPD EXACERBATION: Status: RESOLVED | Noted: 2024-04-16 | Resolved: 2024-11-18

## 2024-11-18 PROBLEM — I70.0 AORTIC ATHEROSCLEROSIS: Status: ACTIVE | Noted: 2024-11-18

## 2024-11-18 PROBLEM — J98.11 ATELECTASIS: Status: RESOLVED | Noted: 2021-05-05 | Resolved: 2024-11-18

## 2024-11-19 ENCOUNTER — OFFICE VISIT (OUTPATIENT)
Dept: INTERNAL MEDICINE | Facility: CLINIC | Age: 74
End: 2024-11-19
Payer: MEDICARE

## 2024-11-19 VITALS
HEART RATE: 78 BPM | HEIGHT: 63 IN | SYSTOLIC BLOOD PRESSURE: 130 MMHG | WEIGHT: 164.44 LBS | DIASTOLIC BLOOD PRESSURE: 80 MMHG | RESPIRATION RATE: 20 BRPM | BODY MASS INDEX: 29.14 KG/M2

## 2024-11-19 DIAGNOSIS — R41.3 MEMORY CHANGES: ICD-10-CM

## 2024-11-19 DIAGNOSIS — K21.9 GASTROESOPHAGEAL REFLUX DISEASE WITHOUT ESOPHAGITIS: ICD-10-CM

## 2024-11-19 DIAGNOSIS — Z86.0100 HISTORY OF COLON POLYPS: ICD-10-CM

## 2024-11-19 DIAGNOSIS — E78.5 HYPERLIPIDEMIA ASSOCIATED WITH TYPE 2 DIABETES MELLITUS: ICD-10-CM

## 2024-11-19 DIAGNOSIS — Z00.00 ENCOUNTER FOR PREVENTIVE HEALTH EXAMINATION: ICD-10-CM

## 2024-11-19 DIAGNOSIS — I70.0 AORTIC ATHEROSCLEROSIS: ICD-10-CM

## 2024-11-19 DIAGNOSIS — R05.3 CHRONIC COUGH: ICD-10-CM

## 2024-11-19 DIAGNOSIS — E11.59 HYPERTENSION ASSOCIATED WITH DIABETES: ICD-10-CM

## 2024-11-19 DIAGNOSIS — G62.9 PERIPHERAL POLYNEUROPATHY: ICD-10-CM

## 2024-11-19 DIAGNOSIS — D64.9 NORMOCYTIC ANEMIA: ICD-10-CM

## 2024-11-19 DIAGNOSIS — J41.8 MIXED SIMPLE AND MUCOPURULENT CHRONIC BRONCHITIS: ICD-10-CM

## 2024-11-19 DIAGNOSIS — E78.1 TYPE 2 DIABETES MELLITUS WITH HYPERTRIGLYCERIDEMIA: ICD-10-CM

## 2024-11-19 DIAGNOSIS — G56.00 CARPAL TUNNEL SYNDROME, UNSPECIFIED LATERALITY: ICD-10-CM

## 2024-11-19 DIAGNOSIS — I48.0 PAF (PAROXYSMAL ATRIAL FIBRILLATION): Chronic | ICD-10-CM

## 2024-11-19 DIAGNOSIS — D68.69 OTHER THROMBOPHILIA: ICD-10-CM

## 2024-11-19 DIAGNOSIS — Z79.01 CHRONIC ANTICOAGULATION: ICD-10-CM

## 2024-11-19 DIAGNOSIS — K22.0 ACHALASIA OF ESOPHAGUS: ICD-10-CM

## 2024-11-19 DIAGNOSIS — R00.2 PALPITATION: ICD-10-CM

## 2024-11-19 DIAGNOSIS — Z00.00 ENCOUNTER FOR MEDICARE ANNUAL WELLNESS EXAM: Primary | ICD-10-CM

## 2024-11-19 DIAGNOSIS — E11.69 TYPE 2 DIABETES MELLITUS WITH HYPERTRIGLYCERIDEMIA: ICD-10-CM

## 2024-11-19 DIAGNOSIS — M19.049 LOCALIZED PRIMARY OSTEOARTHRITIS OF CARPOMETACARPAL (CMC) JOINT, UNSPECIFIED LATERALITY: ICD-10-CM

## 2024-11-19 DIAGNOSIS — G47.09 OTHER INSOMNIA: ICD-10-CM

## 2024-11-19 DIAGNOSIS — M27.40 MAXILLARY CYST: ICD-10-CM

## 2024-11-19 DIAGNOSIS — E04.1 THYROID NODULE: ICD-10-CM

## 2024-11-19 DIAGNOSIS — I15.2 HYPERTENSION ASSOCIATED WITH DIABETES: ICD-10-CM

## 2024-11-19 DIAGNOSIS — R13.10 ODYNOPHAGIA: ICD-10-CM

## 2024-11-19 DIAGNOSIS — Z95.0 PACEMAKER: ICD-10-CM

## 2024-11-19 DIAGNOSIS — R13.10 DYSPHAGIA, UNSPECIFIED TYPE: ICD-10-CM

## 2024-11-19 DIAGNOSIS — M79.7 FIBROMYALGIA: ICD-10-CM

## 2024-11-19 DIAGNOSIS — E11.65 UNCONTROLLED TYPE 2 DIABETES MELLITUS WITH HYPERGLYCEMIA: ICD-10-CM

## 2024-11-19 DIAGNOSIS — E11.69 HYPERLIPIDEMIA ASSOCIATED WITH TYPE 2 DIABETES MELLITUS: ICD-10-CM

## 2024-11-19 DIAGNOSIS — Z79.4 TYPE 2 DIABETES MELLITUS WITH DIABETIC POLYNEUROPATHY, WITH LONG-TERM CURRENT USE OF INSULIN: ICD-10-CM

## 2024-11-19 DIAGNOSIS — E11.42 TYPE 2 DIABETES MELLITUS WITH DIABETIC POLYNEUROPATHY, WITH LONG-TERM CURRENT USE OF INSULIN: ICD-10-CM

## 2024-11-19 DIAGNOSIS — E11.36 DM CATARACT: ICD-10-CM

## 2024-11-19 PROBLEM — R53.81 DEBILITY: Status: RESOLVED | Noted: 2021-05-07 | Resolved: 2024-11-19

## 2024-11-19 PROBLEM — F03.90 DEMENTIA WITHOUT BEHAVIORAL DISTURBANCE, PSYCHOTIC DISTURBANCE, MOOD DISTURBANCE, OR ANXIETY: Status: RESOLVED | Noted: 2024-11-18 | Resolved: 2024-11-19

## 2024-11-19 PROBLEM — R06.02 SOB (SHORTNESS OF BREATH): Status: RESOLVED | Noted: 2024-09-05 | Resolved: 2024-11-19

## 2024-11-19 PROCEDURE — 1158F ADVNC CARE PLAN TLK DOCD: CPT | Mod: HCNC,CPTII,S$GLB, | Performed by: NURSE PRACTITIONER

## 2024-11-19 PROCEDURE — 3075F SYST BP GE 130 - 139MM HG: CPT | Mod: HCNC,CPTII,S$GLB, | Performed by: NURSE PRACTITIONER

## 2024-11-19 PROCEDURE — 1160F RVW MEDS BY RX/DR IN RCRD: CPT | Mod: HCNC,CPTII,S$GLB, | Performed by: NURSE PRACTITIONER

## 2024-11-19 PROCEDURE — G0439 PPPS, SUBSEQ VISIT: HCPCS | Mod: HCNC,S$GLB,, | Performed by: NURSE PRACTITIONER

## 2024-11-19 PROCEDURE — 4010F ACE/ARB THERAPY RXD/TAKEN: CPT | Mod: HCNC,CPTII,S$GLB, | Performed by: NURSE PRACTITIONER

## 2024-11-19 PROCEDURE — 3066F NEPHROPATHY DOC TX: CPT | Mod: HCNC,CPTII,S$GLB, | Performed by: NURSE PRACTITIONER

## 2024-11-19 PROCEDURE — 3061F NEG MICROALBUMINURIA REV: CPT | Mod: HCNC,CPTII,S$GLB, | Performed by: NURSE PRACTITIONER

## 2024-11-19 PROCEDURE — 1101F PT FALLS ASSESS-DOCD LE1/YR: CPT | Mod: HCNC,CPTII,S$GLB, | Performed by: NURSE PRACTITIONER

## 2024-11-19 PROCEDURE — 3288F FALL RISK ASSESSMENT DOCD: CPT | Mod: HCNC,CPTII,S$GLB, | Performed by: NURSE PRACTITIONER

## 2024-11-19 PROCEDURE — 3079F DIAST BP 80-89 MM HG: CPT | Mod: HCNC,CPTII,S$GLB, | Performed by: NURSE PRACTITIONER

## 2024-11-19 PROCEDURE — 1170F FXNL STATUS ASSESSED: CPT | Mod: HCNC,CPTII,S$GLB, | Performed by: NURSE PRACTITIONER

## 2024-11-19 PROCEDURE — 1126F AMNT PAIN NOTED NONE PRSNT: CPT | Mod: HCNC,CPTII,S$GLB, | Performed by: NURSE PRACTITIONER

## 2024-11-19 PROCEDURE — 99999 PR PBB SHADOW E&M-EST. PATIENT-LVL V: CPT | Mod: PBBFAC,HCNC,, | Performed by: NURSE PRACTITIONER

## 2024-11-19 PROCEDURE — 3046F HEMOGLOBIN A1C LEVEL >9.0%: CPT | Mod: HCNC,CPTII,S$GLB, | Performed by: NURSE PRACTITIONER

## 2024-11-19 PROCEDURE — 1159F MED LIST DOCD IN RCRD: CPT | Mod: HCNC,CPTII,S$GLB, | Performed by: NURSE PRACTITIONER

## 2024-11-19 NOTE — PATIENT INSTRUCTIONS
Counseling and Referral of Other Preventative  (Italic type indicates deductible and co-insurance are waived)    Patient Name: Kelsie Bustamante  Today's Date: 11/19/2024    Health Maintenance       Date Due Completion Date    Shingles Vaccine (1 of 2) Never done ---    RSV Vaccine (Age 60+ and Pregnant patients) (1 - Risk 60-74 years 1-dose series) Never done ---    Colorectal Cancer Screening 08/15/2019 8/15/2014    Eye Exam 11/11/2023 11/11/2022    Influenza Vaccine (1) 09/01/2024 10/30/2023    COVID-19 Vaccine (3 - 2024-25 season) 09/01/2024 3/5/2021    Hemoglobin A1c 12/05/2024 9/5/2024    Diabetes Urine Screening 03/01/2025 3/1/2024    Lipid Panel 03/01/2025 3/1/2024    Foot Exam 03/18/2025 3/18/2024    Override on 3/18/2024: Done    Override on 3/14/2023: Done    Override on 8/10/2020: Done    Override on 8/6/2019: Done    Override on 5/29/2018: Done    Override on 7/13/2017: Done    Override on 7/18/2016: Done    Override on 11/11/2015: Done    Override on 5/4/2015: Done    Mammogram 10/02/2025 10/2/2024    TETANUS VACCINE 07/18/2026 7/18/2016    DEXA Scan 09/10/2027 9/10/2024        Orders Placed This Encounter   Procedures    HEMOGLOBIN A1C    Ambulatory referral/consult to Neurology    Diabetes Digital Medicine (DDMP) Enrollment Order    Hypertension Digital Medicine (HDMP) Enrollment Order     The following information is provided to all patients.  This information is to help you find resources for any of the problems found today that may be affecting your health:                  Living healthy guide: www.[facility].[place].gov      Understanding Diabetes: www.diabetes.org      Eating healthy: www.cdc.gov/healthyweight      CDC home safety checklist: www.cdc.gov/steadi/patient.html      Agency on Aging: www.goea.[place].gov      Alcoholics anonymous (AA): www.aa.org      Physical Activity: www.dallin.nih.gov/tf2ymgr      Tobacco use: www.quitwithusla.org

## 2024-11-19 NOTE — PROGRESS NOTES
"  Kelsie Bustamante presented for a  Medicare AWV and comprehensive Health Risk Assessment today. The following components were reviewed and updated:    Medical history  Family History  Social history  Allergies and Current Medications  Health Risk Assessment  Health Maintenance  Care Team         ** See Completed Assessments for Annual Wellness Visit within the encounter summary.**         The following assessments were completed:  Living Situation  CAGE  Depression Screening  Timed Get Up and Go  Whisper Test  Cognitive Function Screening  Nutrition Screening  ADL Screening  PAQ Screening      Opioid documentation:      Patient does not have a current opioid prescription.        Vitals:    11/19/24 0917   BP: 130/80   BP Location: Right arm   Patient Position: Sitting   Pulse: 78   Resp: 20   Weight: 74.6 kg (164 lb 7.4 oz)   Height:    Pain scale 5' 3" (1.6 m)    0     Body mass index is 29.13 kg/m².  Physical Exam  Constitutional:       General: She is not in acute distress.     Appearance: She is not ill-appearing or diaphoretic.   HENT:      Mouth/Throat:      Mouth: Mucous membranes are moist.   Eyes:      General:         Right eye: No discharge.         Left eye: No discharge.      Conjunctiva/sclera: Conjunctivae normal.   Cardiovascular:      Rate and Rhythm: Normal rate and regular rhythm.   Pulmonary:      Effort: Pulmonary effort is normal. No respiratory distress.      Comments: BBS diminished  Skin:     General: Skin is warm and dry.   Neurological:      Mental Status: She is alert and oriented to person, place, and time. Mental status is at baseline.   Psychiatric:         Mood and Affect: Mood normal.         Behavior: Behavior normal.         Thought Content: Thought content normal.         Judgment: Judgment normal.     Diagnoses and health risks identified today and associated recommendations/orders:    1. Encounter for Medicare annual wellness exam  Review for opioid screening: Patient does not " have Rx for Opioids  Review for substance use disorder: Patient does not use substance per chart    Patient states she does not drink alcohol    I offered to discuss advanced care planning, including how to pick a person who would make decisions for you if you were unable to make them for yourself, called a health care power of , and what kind of decisions you might make such as use of life sustaining treatments such as ventilators and tube feeding when faced with a life limiting illness recorded on a living will that they will need to know. (How you want to be cared for as you near the end of your natural life)     X Patient is interested in learning more about how to make advanced directives.  I provided them paperwork and offered to discuss this with them.  - Ambulatory referral/consult to Neurology; Future  - Case Request Endoscopy: COLONOSCOPY  - Diabetes Digital Medicine (DDMP) Enrollment Order  - Hypertension Digital Medicine (HDMP) Enrollment Order  - HEMOGLOBIN A1C; Future    2. Hypertension associated with diabetes  Monitor  Stable  Continue home losartan, toprol xl  - Hypertension Digital Medicine (HDMP) Enrollment Order    3. Other thrombophilia  Monitor  Follow up as directed    4. PAF (paroxysmal atrial fibrillation), Palpitation,  Pacemaker, Chronic anticoagulation- Eliquis  Monitor  Follow up with Cardiology as directed   Continue home eliquis, toprol xl    5. Mixed simple and mucopurulent chronic bronchitis, Chronic cough  Monitor  Stable  Continue home duoneb, pulmicort, stiolto respimat    6. Normocytic anemia  Monitor  Stable  Follow up with PCP    7. Type 2 diabetes mellitus with diabetic polyneuropathy, with long-term current use of insulin  Monitor  Follow up with PCP  Continue home metformin     8. Other insomnia  Monitor  Follow up with PCP  Continue home trazadone     9. Uncontrolled type 2 diabetes mellitus with hyperglycemia, Type 2 diabetes mellitus with hypertriglyceridemia,  "Hyperlipidemia associated with type 2 diabetes mellitus, Peripheral polyneuropathy  Monitor  Follow up with PCP  Continue home  crestor  - Diabetes Digital Medicine (DDMP) Enrollment Order  - HEMOGLOBIN A1C; Future  Lab Results   Component Value Date    HGBA1C 9.4 (H) 09/05/2024   Discussed uncontrolled DM with patient who admits she has been eating a lot of sweets lately, more than usual. Explained to patient the importance of getting he DM under control and she verbalized understanding.   Patient is agreeable to getting back on her DM diet.     10. Aortic atherosclerosis  4/16/2024 Cxr- moderate atherosclerotic disease    Monitor  Follow up with PCP  Continue home  crestor  Blood pressure control    11. DM cataract bilateral with bilateral extractions  Monitor  Follow up with Ophtho as directed     12. Localized primary osteoarthritis of carpometacarpal (CMC) joint, unspecified laterality, Carpal tunnel syndrome, unspecified laterality, Peripheral polyneuropathy  Monitor  Follow up as needed, directed     13. Fibromyalgia  Monitor  Follow up as needed, directed       14. Maxillary cyst  Monitor  Follow up as needed, directed      15. Gastroesophageal reflux disease without esophagitis, History of colon polyps, Achalasia of esophagus, Dysphagia, unspecified type, Odynophagia  Monitor  Follow up as needed, directed      - Case Request Endoscopy: COLONOSCOPY  Continue home protonix     16. Thyroid nodule  Monitor  Follow up as directed       17. Possible Memory changes  Monitor  Patient reports her  keeps telling her she "has dementia". She states "I know he is just telling me this to bully me".   Patient reports some anxiety and depression at times but declined a Psych referral at this time. Patient encouraged to get out more, exercise, do things, go places. Discussed [place] aging Senior citizen program with her. She states she is interested and may try it out.  Patient does state she feels like at times " "she may have some "mild" memory issues and is agreeable to a Neurology evaluation. She states no one in her family has Dementia.   Patient drove herself here and passed the clock and mini memory exam today  - Ambulatory referral/consult to Neurology; Future                                             Provided Kelsie with a 5-10 year written screening schedule and personal prevention plan. Recommendations were developed using the USPSTF age appropriate recommendations. Education, counseling, and referrals were provided as needed. After Visit Summary printed and given to patient which includes a list of additional screenings\tests needed.    Follow up in about 1 year (around 11/19/2025) for Annual wellness visit.    YI Weiner        "

## 2024-11-25 ENCOUNTER — TELEPHONE (OUTPATIENT)
Dept: INTERNAL MEDICINE | Facility: CLINIC | Age: 74
End: 2024-11-25
Payer: MEDICARE

## 2024-11-25 DIAGNOSIS — K21.9 GASTROESOPHAGEAL REFLUX DISEASE, UNSPECIFIED WHETHER ESOPHAGITIS PRESENT: ICD-10-CM

## 2024-11-25 DIAGNOSIS — J69.0 ASPIRATION PNEUMONIA OF BOTH LUNGS DUE TO GASTRIC SECRETIONS, UNSPECIFIED PART OF LUNG: ICD-10-CM

## 2024-11-25 NOTE — TELEPHONE ENCOUNTER
----- Message from Christiano sent at 11/25/2024  9:10 AM CST -----  Type:  RX Refill Request    Who Called: Kelsie  Refill or New Rx:refill  RX Name and Strength:metoprolol succinate (TOPROL-XL) 50 MG 24 hr tablet and estrogens, conjugated, (PREMARIN) 0.625 MG tablet   How is the patient currently taking it? (ex. 1XDay):  Is this a 30 day or 90 day RX:  Preferred Pharmacy with phone number:  [id number] - [address] - [facility] [address] [facility] [address] [address]  [facility] [address] [facility] [address] [address]  [place] [id number]  Phone: [phone] Fax: [phone]      Local or Mail Order:local  Ordering Provider:malini  Would the patient rather a call back or a response via [facility]? call  Best Call Back Number:[phone]  Additional Information:

## 2024-11-26 ENCOUNTER — TELEPHONE (OUTPATIENT)
Dept: CARDIOLOGY | Facility: HOSPITAL | Age: 74
End: 2024-11-26
Payer: MEDICARE

## 2024-11-26 RX ORDER — PANTOPRAZOLE SODIUM 40 MG/1
40 TABLET, DELAYED RELEASE ORAL 2 TIMES DAILY
Qty: 180 TABLET | Refills: 3 | Status: SHIPPED | OUTPATIENT
Start: 2024-11-26 | End: 2025-11-26

## 2024-12-04 ENCOUNTER — CLINICAL SUPPORT (OUTPATIENT)
Dept: CARDIOLOGY | Facility: HOSPITAL | Age: 74
End: 2024-12-04
Payer: MEDICARE

## 2024-12-04 ENCOUNTER — CLINICAL SUPPORT (OUTPATIENT)
Dept: CARDIOLOGY | Facility: HOSPITAL | Age: 74
End: 2024-12-04
Attending: INTERNAL MEDICINE
Payer: MEDICARE

## 2024-12-04 DIAGNOSIS — Z95.0 PRESENCE OF CARDIAC PACEMAKER: ICD-10-CM

## 2024-12-04 PROCEDURE — 93294 REM INTERROG EVL PM/LDLS PM: CPT | Mod: HCNC,S$GLB,, | Performed by: INTERNAL MEDICINE

## 2024-12-04 PROCEDURE — 93296 REM INTERROG EVL PM/IDS: CPT | Mod: HCNC | Performed by: INTERNAL MEDICINE

## 2024-12-09 LAB
OHS CV AF BURDEN PERCENT: 2
OHS CV DC REMOTE DEVICE TYPE: NORMAL
OHS CV RV PACING PERCENT: 1 %

## 2024-12-26 ENCOUNTER — PATIENT OUTREACH (OUTPATIENT)
Dept: ADMINISTRATIVE | Facility: HOSPITAL | Age: 74
End: 2024-12-26
Payer: MEDICARE

## 2025-01-30 ENCOUNTER — LAB VISIT (OUTPATIENT)
Dept: LAB | Facility: HOSPITAL | Age: 75
End: 2025-01-30
Attending: PHYSICIAN ASSISTANT
Payer: MEDICARE

## 2025-01-30 ENCOUNTER — OFFICE VISIT (OUTPATIENT)
Dept: INTERNAL MEDICINE | Facility: CLINIC | Age: 75
End: 2025-01-30
Payer: OTHER GOVERNMENT

## 2025-01-30 VITALS
HEIGHT: 63 IN | DIASTOLIC BLOOD PRESSURE: 64 MMHG | SYSTOLIC BLOOD PRESSURE: 116 MMHG | WEIGHT: 163.81 LBS | TEMPERATURE: 97 F | BODY MASS INDEX: 29.02 KG/M2 | HEART RATE: 76 BPM | OXYGEN SATURATION: 97 %

## 2025-01-30 DIAGNOSIS — F51.01 PRIMARY INSOMNIA: Primary | ICD-10-CM

## 2025-01-30 DIAGNOSIS — I15.2 HYPERTENSION ASSOCIATED WITH DIABETES: ICD-10-CM

## 2025-01-30 DIAGNOSIS — E78.1 TYPE 2 DIABETES MELLITUS WITH HYPERTRIGLYCERIDEMIA: ICD-10-CM

## 2025-01-30 DIAGNOSIS — E11.42 TYPE 2 DIABETES MELLITUS WITH DIABETIC POLYNEUROPATHY, WITHOUT LONG-TERM CURRENT USE OF INSULIN: ICD-10-CM

## 2025-01-30 DIAGNOSIS — E11.36 DM CATARACT: ICD-10-CM

## 2025-01-30 DIAGNOSIS — K22.0 ACHALASIA OF ESOPHAGUS: ICD-10-CM

## 2025-01-30 DIAGNOSIS — E11.65 TYPE 2 DIABETES MELLITUS WITH HYPERGLYCEMIA, WITHOUT LONG-TERM CURRENT USE OF INSULIN: ICD-10-CM

## 2025-01-30 DIAGNOSIS — K21.9 GASTROESOPHAGEAL REFLUX DISEASE, UNSPECIFIED WHETHER ESOPHAGITIS PRESENT: ICD-10-CM

## 2025-01-30 DIAGNOSIS — I48.0 PAF (PAROXYSMAL ATRIAL FIBRILLATION): Chronic | ICD-10-CM

## 2025-01-30 DIAGNOSIS — E11.69 TYPE 2 DIABETES MELLITUS WITH HYPERTRIGLYCERIDEMIA: ICD-10-CM

## 2025-01-30 DIAGNOSIS — Z12.11 SCREEN FOR COLON CANCER: ICD-10-CM

## 2025-01-30 DIAGNOSIS — Z95.0 PACEMAKER: ICD-10-CM

## 2025-01-30 DIAGNOSIS — Z79.01 CHRONIC ANTICOAGULATION: ICD-10-CM

## 2025-01-30 DIAGNOSIS — E78.5 HYPERLIPIDEMIA ASSOCIATED WITH TYPE 2 DIABETES MELLITUS: ICD-10-CM

## 2025-01-30 DIAGNOSIS — E11.59 HYPERTENSION ASSOCIATED WITH DIABETES: ICD-10-CM

## 2025-01-30 DIAGNOSIS — E11.69 HYPERLIPIDEMIA ASSOCIATED WITH TYPE 2 DIABETES MELLITUS: ICD-10-CM

## 2025-01-30 DIAGNOSIS — E11.65 UNCONTROLLED TYPE 2 DIABETES MELLITUS WITH HYPERGLYCEMIA: ICD-10-CM

## 2025-01-30 DIAGNOSIS — R13.10 DYSPHAGIA, UNSPECIFIED TYPE: ICD-10-CM

## 2025-01-30 DIAGNOSIS — J41.8 MIXED SIMPLE AND MUCOPURULENT CHRONIC BRONCHITIS: ICD-10-CM

## 2025-01-30 DIAGNOSIS — I70.0 AORTIC ATHEROSCLEROSIS: ICD-10-CM

## 2025-01-30 PROBLEM — Z78.9 STATIN INTOLERANCE: Status: RESOLVED | Noted: 2018-05-29 | Resolved: 2025-01-30

## 2025-01-30 LAB
ALBUMIN SERPL BCP-MCNC: 3.6 G/DL (ref 3.5–5.2)
ALP SERPL-CCNC: 86 U/L (ref 40–150)
ALT SERPL W/O P-5'-P-CCNC: 24 U/L (ref 10–44)
ANION GAP SERPL CALC-SCNC: 11 MMOL/L (ref 8–16)
AST SERPL-CCNC: 24 U/L (ref 10–40)
BASOPHILS # BLD AUTO: 0.06 K/UL (ref 0–0.2)
BASOPHILS NFR BLD: 0.7 % (ref 0–1.9)
BILIRUB SERPL-MCNC: 0.2 MG/DL (ref 0.1–1)
BUN SERPL-MCNC: 12 MG/DL (ref 8–23)
CALCIUM SERPL-MCNC: 9.7 MG/DL (ref 8.7–10.5)
CHLORIDE SERPL-SCNC: 107 MMOL/L (ref 95–110)
CO2 SERPL-SCNC: 23 MMOL/L (ref 23–29)
CREAT SERPL-MCNC: 1.2 MG/DL (ref 0.5–1.4)
DIFFERENTIAL METHOD BLD: ABNORMAL
EOSINOPHIL # BLD AUTO: 0.5 K/UL (ref 0–0.5)
EOSINOPHIL NFR BLD: 5.6 % (ref 0–8)
ERYTHROCYTE [DISTWIDTH] IN BLOOD BY AUTOMATED COUNT: 12.9 % (ref 11.5–14.5)
EST. GFR  (NO RACE VARIABLE): 47.8 ML/MIN/1.73 M^2
ESTIMATED AVG GLUCOSE: 200 MG/DL (ref 68–131)
GLUCOSE SERPL-MCNC: 235 MG/DL (ref 70–110)
HBA1C MFR BLD: 8.6 % (ref 4–5.6)
HCT VFR BLD AUTO: 41.8 % (ref 37–48.5)
HGB BLD-MCNC: 12.8 G/DL (ref 12–16)
IMM GRANULOCYTES # BLD AUTO: 0.05 K/UL (ref 0–0.04)
IMM GRANULOCYTES NFR BLD AUTO: 0.6 % (ref 0–0.5)
LYMPHOCYTES # BLD AUTO: 2.5 K/UL (ref 1–4.8)
LYMPHOCYTES NFR BLD: 28.6 % (ref 18–48)
MCH RBC QN AUTO: 27.1 PG (ref 27–31)
MCHC RBC AUTO-ENTMCNC: 30.6 G/DL (ref 32–36)
MCV RBC AUTO: 89 FL (ref 82–98)
MONOCYTES # BLD AUTO: 0.7 K/UL (ref 0.3–1)
MONOCYTES NFR BLD: 7.5 % (ref 4–15)
NEUTROPHILS # BLD AUTO: 5.1 K/UL (ref 1.8–7.7)
NEUTROPHILS NFR BLD: 57 % (ref 38–73)
NRBC BLD-RTO: 0 /100 WBC
PLATELET # BLD AUTO: 327 K/UL (ref 150–450)
PMV BLD AUTO: 9.8 FL (ref 9.2–12.9)
POTASSIUM SERPL-SCNC: 5 MMOL/L (ref 3.5–5.1)
PROT SERPL-MCNC: 7.3 G/DL (ref 6–8.4)
RBC # BLD AUTO: 4.72 M/UL (ref 4–5.4)
SODIUM SERPL-SCNC: 141 MMOL/L (ref 136–145)
TSH SERPL DL<=0.005 MIU/L-ACNC: 3.12 UIU/ML (ref 0.4–4)
WBC # BLD AUTO: 8.85 K/UL (ref 3.9–12.7)

## 2025-01-30 PROCEDURE — 99215 OFFICE O/P EST HI 40 MIN: CPT | Mod: PBBFAC | Performed by: PHYSICIAN ASSISTANT

## 2025-01-30 PROCEDURE — 99999 PR PBB SHADOW E&M-EST. PATIENT-LVL V: CPT | Mod: PBBFAC,,, | Performed by: PHYSICIAN ASSISTANT

## 2025-01-30 PROCEDURE — 85025 COMPLETE CBC W/AUTO DIFF WBC: CPT | Performed by: PHYSICIAN ASSISTANT

## 2025-01-30 PROCEDURE — 83036 HEMOGLOBIN GLYCOSYLATED A1C: CPT | Performed by: PHYSICIAN ASSISTANT

## 2025-01-30 PROCEDURE — 80053 COMPREHEN METABOLIC PANEL: CPT | Performed by: PHYSICIAN ASSISTANT

## 2025-01-30 PROCEDURE — G2211 COMPLEX E/M VISIT ADD ON: HCPCS | Mod: S$PBB,,, | Performed by: PHYSICIAN ASSISTANT

## 2025-01-30 PROCEDURE — 84443 ASSAY THYROID STIM HORMONE: CPT | Performed by: PHYSICIAN ASSISTANT

## 2025-01-30 PROCEDURE — 99214 OFFICE O/P EST MOD 30 MIN: CPT | Mod: S$PBB,,, | Performed by: PHYSICIAN ASSISTANT

## 2025-01-30 PROCEDURE — 36415 COLL VENOUS BLD VENIPUNCTURE: CPT | Performed by: PHYSICIAN ASSISTANT

## 2025-01-30 RX ORDER — BUDESONIDE 0.25 MG/2ML
0.25 INHALANT ORAL DAILY
Qty: 180 ML | Refills: 1 | Status: SHIPPED | OUTPATIENT
Start: 2025-01-30 | End: 2026-01-30

## 2025-01-30 RX ORDER — ZOLPIDEM TARTRATE 10 MG/1
10 TABLET ORAL NIGHTLY PRN
Qty: 30 TABLET | Refills: 1 | Status: SHIPPED | OUTPATIENT
Start: 2025-01-30 | End: 2025-03-31

## 2025-01-30 RX ORDER — GLIMEPIRIDE 2 MG/1
2 TABLET ORAL
Qty: 90 TABLET | Refills: 3 | Status: SHIPPED | OUTPATIENT
Start: 2025-01-30 | End: 2026-01-30

## 2025-01-30 RX ORDER — IPRATROPIUM BROMIDE AND ALBUTEROL SULFATE 2.5; .5 MG/3ML; MG/3ML
3 SOLUTION RESPIRATORY (INHALATION) EVERY 6 HOURS PRN
Qty: 180 ML | Refills: 3 | Status: SHIPPED | OUTPATIENT
Start: 2025-01-30 | End: 2026-01-30

## 2025-01-30 RX ORDER — TIOTROPIUM BROMIDE AND OLODATEROL 3.124; 2.736 UG/1; UG/1
2 SPRAY, METERED RESPIRATORY (INHALATION) DAILY
Qty: 1 G | Refills: 5 | Status: SHIPPED | OUTPATIENT
Start: 2025-01-30

## 2025-01-30 RX ORDER — LOSARTAN POTASSIUM 25 MG/1
25 TABLET ORAL DAILY
Qty: 90 TABLET | Refills: 3 | Status: SHIPPED | OUTPATIENT
Start: 2025-01-30

## 2025-01-30 RX ORDER — ROSUVASTATIN CALCIUM 5 MG/1
5 TABLET, COATED ORAL DAILY
Qty: 90 TABLET | Refills: 3 | Status: SHIPPED | OUTPATIENT
Start: 2025-01-30

## 2025-01-30 RX ORDER — METOPROLOL SUCCINATE 100 MG/1
100 TABLET, EXTENDED RELEASE ORAL 2 TIMES DAILY
Qty: 180 TABLET | Refills: 3 | Status: SHIPPED | OUTPATIENT
Start: 2025-01-30 | End: 2026-01-25

## 2025-01-30 RX ORDER — PANTOPRAZOLE SODIUM 40 MG/1
40 TABLET, DELAYED RELEASE ORAL 2 TIMES DAILY
Qty: 180 TABLET | Refills: 3 | Status: SHIPPED | OUTPATIENT
Start: 2025-01-30 | End: 2026-01-30

## 2025-01-30 NOTE — PROGRESS NOTES
Subjective:      Patient ID: Kelsie Bustamante is a 74 y.o. female.    Chief Complaint: Insomnia    HPI  History of Present Illness    CHIEF COMPLAINT:  Ms. Bustamante presents today for medication review and follow up    DIABETES:  She reports experiencing fatigue, increased thirst, and frequent urination. She has not checked blood sugar regularly in some time. She expresses strong aversion to insulin injections despite multiple attempts and is open to alternative oral medications for management. She also does not check her blood sugar because she is unable to stick herself.     GERD:  She reports severe reflux with regurgitation. She missed scheduled follow-up visit with GI specialist in [place] where a repeat EGD and timed barrier swallow study were to be performed.    SLEEP DISORDER:  She reports difficulty with both falling and staying asleep. She has been using her spouse's Ambien 10mg for approximately 10 years and states that without medication, she experiences no sleep for days. She denies hallucinations or adverse effects from the medication. Over-the-counter sleep aids were ineffective including melatonin.  We tried trazodone which has also been ineffective. She denies snoring or stopping breathing while sleeping at night.    CARDIAC:  She has heart arrhythmia managed with Toprolol 100mg twice daily and flecainide.    RESPIRATORY:  She reports continued use of inhalers which are currently running low.    Medications were reviewed  [facility]        Patient Active Problem List   Diagnosis    Hypertension associated with diabetes    Type 2 diabetes mellitus with diabetic polyneuropathy, without long-term current use of insulin    Peripheral neuropathy    Carpal tunnel syndrome    Localized primary carpometacarpal osteoarthritis    Gastroesophageal reflux disease without esophagitis    Thyroid nodule    Premature menopause (surgery in 30s)     "Palpitation    Other insomnia    Normocytic anemia    PAF (paroxysmal atrial fibrillation)    Maxillary cyst    Personal history of pneumonia (recurrent)    Personal history of urinary (tract) infections    Fibromyalgia    Hyperlipidemia associated with type 2 diabetes mellitus    Odynophagia    Dysphagia    Achalasia of esophagus    Chronic anticoagulation- Eliquis    Pacemaker    Other thrombophilia    Chronic cough    Uncontrolled type 2 diabetes mellitus with hyperglycemia    Type 2 diabetes mellitus with hypertriglyceridemia    Mixed simple and mucopurulent chronic bronchitis    Aortic atherosclerosis    Possible Memory changes    DM cataract bilateral with bilateral extractions    History of colon polyps         Current Outpatient Medications:     estrogens, conjugated, (PREMARIN) 0.625 MG tablet, Take 1 tablet (0.625 mg total) by mouth once daily., Disp: 90 tablet, Rfl: 3    metFORMIN (GLUCOPHAGE-XR) 500 MG ER 24hr tablet, Take 2 tablets (1,000 mg total) by mouth 2 (two) times daily with meals., Disp: 360 tablet, Rfl: 3    nebulizer and compressor La, Use every 6 hours as needed for wheezing, Disp: 1 each, Rfl: 0    pen needle, diabetic 32 gauge x 5/32" Ndle, Use to dispense insulin once daily, Disp: 100 each, Rfl: 0    albuterol-ipratropium (DUO-NEB) 2.5 mg-0.5 mg/3 mL nebulizer solution, Take 3 mLs by nebulization every 6 (six) hours as needed for Wheezing. Rescue, Disp: 180 mL, Rfl: 3    apixaban (ELIQUIS) 5 mg Tab, Take 1 tablet (5 mg total) by mouth 2 (two) times daily., Disp: 180 tablet, Rfl: 4    budesonide (PULMICORT) 0.25 mg/2 mL nebulizer solution, Take 2 mLs (0.25 mg total) by nebulization once daily. Controller, Disp: 180 mL, Rfl: 1    empagliflozin (JARDIANCE) 10 mg tablet, Take 1 tablet (10 mg total) by mouth once daily., Disp: 90 tablet, Rfl: 3    glimepiride (AMARYL) 2 MG tablet, Take 1 tablet (2 mg total) by mouth daily with breakfast., Disp: 90 tablet, Rfl: 3    losartan (COZAAR) 25 MG " tablet, Take 1 tablet (25 mg total) by mouth once daily., Disp: 90 tablet, Rfl: 3    metoprolol succinate (TOPROL-XL) 100 MG 24 hr tablet, Take 1 tablet (100 mg total) by mouth 2 (two) times daily. 100mg twice daily, Disp: 180 tablet, Rfl: 3    pantoprazole (PROTONIX) 40 MG tablet, Take 1 tablet (40 mg total) by mouth 2 (two) times daily., Disp: 180 tablet, Rfl: 3    rosuvastatin (CRESTOR) 5 MG tablet, Take 1 tablet (5 mg total) by mouth once daily., Disp: 90 tablet, Rfl: 3    tiotropium-olodateroL (STIOLTO RESPIMAT) 2.5-2.5 mcg/actuation Mist, Inhale 2 puffs into the lungs once daily. Controller, Disp: 1 g, Rfl: 5    zolpidem (AMBIEN) 10 mg Tab, Take 1 tablet (10 mg total) by mouth nightly as needed (insomnia)., Disp: 30 tablet, Rfl: 1    Review of Systems   Constitutional:  Positive for fatigue. Negative for activity change, appetite change, chills, diaphoresis, fever and unexpected weight change.   HENT: Negative.  Negative for congestion, hearing loss, postnasal drip, rhinorrhea, sore throat, trouble swallowing and voice change.    Eyes: Negative.  Negative for visual disturbance.   Respiratory: Negative.  Negative for cough, choking, chest tightness and shortness of breath.    Cardiovascular:  Negative for chest pain, palpitations and leg swelling.   Gastrointestinal:  Negative for abdominal distention, abdominal pain, blood in stool, constipation, diarrhea, nausea and vomiting.   Endocrine: Positive for polydipsia and polyuria. Negative for cold intolerance, heat intolerance and polyphagia.   Genitourinary:  Positive for frequency. Negative for difficulty urinating.   Musculoskeletal:  Negative for arthralgias, back pain, gait problem, joint swelling and myalgias.   Skin:  Negative for color change, pallor, rash and wound.   Neurological:  Negative for dizziness, tremors, weakness, light-headedness, numbness and headaches.   Hematological:  Negative for adenopathy.   Psychiatric/Behavioral:  Positive for sleep  "disturbance. Negative for behavioral problems, confusion, self-injury and suicidal ideas. The patient is not nervous/anxious.      Objective:   /64 (BP Location: Right arm, Patient Position: Sitting)   Pulse 76   Temp 96.8 °F (36 °C) (Tympanic)   Ht 5' 3" (1.6 m)   Wt 74.3 kg (163 lb 12.8 oz)   SpO2 97%   BMI 29.02 kg/m²     Physical Exam  Vitals and nursing note reviewed.   Constitutional:       General: She is not in acute distress.     Appearance: Normal appearance. She is well-developed. She is not ill-appearing, toxic-appearing or diaphoretic.   HENT:      Head: Normocephalic and atraumatic.   Cardiovascular:      Rate and Rhythm: Normal rate. Rhythm irregular.      Heart sounds: Normal heart sounds. No murmur heard.     No friction rub. No gallop.   Pulmonary:      Effort: Pulmonary effort is normal. No respiratory distress.      Breath sounds: Normal breath sounds. No wheezing or rales.   Musculoskeletal:         General: Normal range of motion.   Skin:     General: Skin is warm.      Capillary Refill: Capillary refill takes less than 2 seconds.      Findings: No rash.   Neurological:      Mental Status: She is alert and oriented to person, place, and time.      Motor: No weakness.      Gait: Gait normal.   Psychiatric:         Mood and Affect: Mood normal.         Behavior: Behavior normal.         Thought Content: Thought content normal.         Judgment: Judgment normal.         Assessment:     1. Primary insomnia    2. Uncontrolled type 2 diabetes mellitus with hyperglycemia    3. Type 2 diabetes mellitus with diabetic polyneuropathy, without long-term current use of insulin    4. Hyperlipidemia associated with type 2 diabetes mellitus    5. DM cataract bilateral with bilateral extractions    6. Type 2 diabetes mellitus with hypertriglyceridemia    7. Type 2 diabetes mellitus with hyperglycemia, without long-term current use of insulin    8. Hypertension associated with diabetes    9. PAF " (paroxysmal atrial fibrillation)    10. Pacemaker    11. Chronic anticoagulation- Eliquis    12. Aortic atherosclerosis    13. Screen for colon cancer    14. Mixed simple and mucopurulent chronic bronchitis    15. Achalasia of esophagus    16. Gastroesophageal reflux disease, unspecified whether esophagitis present    17. Dysphagia, unspecified type      Plan:   Primary insomnia  - Evaluated the patient's long-term use of Ambien 10mg for approximately 10 years.  - Educated the patient on risks of long-term Ambien use, including dependence   - Prescribed Ambien 10 mg at bedtime as needed for insomnia, acknowledging it's above the recommended dose.  - Discontinued Trazodone since ineffective.  - Suggested a sleep study.  - Scheduled follow-up every 3 months  -risk of ambien use discussed in detail including death while sleeping if aspiration. Pt states that she has been on the medication nightly for years and has not had any problems or side effects from the medication. She states that she cannot sleep without taking it.     Uncontrolled type 2 diabetes mellitus with hyperglycemia  -     glimepiride (AMARYL) 2 MG tablet; Take 1 tablet (2 mg total) by mouth daily with breakfast.  Dispense: 90 tablet; Refill: 3  - Evaluated the patient's diabetes control based on reported symptoms and high A1C.  - Educated the patient about the connection between elevated blood sugar and symptoms like fatigue, thirst, and frequent urination.  - Discussed the importance of annual eye exams for diabetic patients to prevent blindness.  - Instructed the patient to check blood sugar regularly.  - Prescribed Glimepiride 2 mg to be taken with breakfast for diabetes management.  -added jardiance. Will monitor for urinary symptoms.   - Ordered repeat labs to check diabetes control.  - Referred the patient to diabetes clinic for management of uncontrolled diabetes.  - Scheduled an eye exam for February 7th at 3:15 PM.    Type 2 diabetes mellitus  with diabetic polyneuropathy, without long-term current use of insulin    Hyperlipidemia associated with type 2 diabetes mellitus  -     rosuvastatin (CRESTOR) 5 MG tablet; Take 1 tablet (5 mg total) by mouth once daily.  Dispense: 90 tablet; Refill: 3    DM cataract bilateral with bilateral extractions    Type 2 diabetes mellitus with hypertriglyceridemia    Type 2 diabetes mellitus with hyperglycemia, without long-term current use of insulin  -     empagliflozin (JARDIANCE) 10 mg tablet; Take 1 tablet (10 mg total) by mouth once daily.  Dispense: 90 tablet; Refill: 3  -     Ambulatory referral/consult to Diabetes Education; Future; Expected date: 02/06/2025  -     Ambulatory referral/consult to Diabetic Advanced Practice Providers (Medical Management); Future; Expected date: 02/06/2025  -     glimepiride (AMARYL) 2 MG tablet; Take 1 tablet (2 mg total) by mouth daily with breakfast.  Dispense: 90 tablet; Refill: 3  -     Cancel: Ambulatory referral/consult to Optometry; Future; Expected date: 02/06/2025  -     Hemoglobin A1C; Future; Expected date: 01/30/2025  -     Comprehensive Metabolic Panel; Future  -     CBC Auto Differential; Future; Expected date: 01/30/2025  -     TSH; Future    Hypertension associated with diabetes  -     losartan (COZAAR) 25 MG tablet; Take 1 tablet (25 mg total) by mouth once daily.  Dispense: 90 tablet; Refill: 3  - Modified Metoprolol dosage from two 50 mg tablets to one 100 mg tablet twice daily.  - Noted that cardiology needs to approve any changes to heart medication.  - Reviewed previous notes mentioning palpitations.  - Continued medication for blood pressure and kidney protection.    PAF (paroxysmal atrial fibrillation)  -     apixaban (ELIQUIS) 5 mg Tab; Take 1 tablet (5 mg total) by mouth 2 (two) times daily.  Dispense: 180 tablet; Refill: 4  -     metoprolol succinate (TOPROL-XL) 100 MG 24 hr tablet; Take 1 tablet (100 mg total) by mouth 2 (two) times daily. 100mg twice  daily  Dispense: 180 tablet; Refill: 3    Pacemaker  -     apixaban (ELIQUIS) 5 mg Tab; Take 1 tablet (5 mg total) by mouth 2 (two) times daily.  Dispense: 180 tablet; Refill: 4    Chronic anticoagulation- Eliquis  -     apixaban (ELIQUIS) 5 mg Tab; Take 1 tablet (5 mg total) by mouth 2 (two) times daily.  Dispense: 180 tablet; Refill: 4    Aortic atherosclerosis    Screen for colon cancer  -     Ambulatory referral/consult to Endo Procedure ; Future; Expected date: 01/31/2025    Mixed simple and mucopurulent chronic bronchitis  -     tiotropium-olodateroL (STIOLTO RESPIMAT) 2.5-2.5 mcg/actuation Mist; Inhale 2 puffs into the lungs once daily. Controller  Dispense: 1 g; Refill: 5  -     budesonide (PULMICORT) 0.25 mg/2 mL nebulizer solution; Take 2 mLs (0.25 mg total) by nebulization once daily. Controller  Dispense: 180 mL; Refill: 1  -     albuterol-ipratropium (DUO-NEB) 2.5 mg-0.5 mg/3 mL nebulizer solution; Take 3 mLs by nebulization every 6 (six) hours as needed for Wheezing. Rescue  Dispense: 180 mL; Refill: 3    Achalasia of esophagus  -     pantoprazole (PROTONIX) 40 MG tablet; Take 1 tablet (40 mg total) by mouth 2 (two) times daily.  Dispense: 180 tablet; Refill: 3  - Evaluated that current treatment is not effective for severe reflux symptoms, including nocturnal vomiting.  - Continued Pantoprazole 40 mg twice daily for reflux management.  - Ordered a Timed Barrier Swallow Study.  - Ordered a repeat EGD (esophagogastroduodenoscopy).  - follow up with GI specialist Dr. Willis in [place] for ongoing reflux issues and to reschedule missed appointment.  - Instructed the patient to schedule and attend colonoscopy, to be done concurrently with endoscopy if possible.    Gastroesophageal reflux disease, unspecified whether esophagitis present  -     pantoprazole (PROTONIX) 40 MG tablet; Take 1 tablet (40 mg total) by mouth 2 (two) times daily.  Dispense: 180 tablet; Refill: 3    Dysphagia,  unspecified type       Dr. Willis in [place] call to schedule [phone] the missed procedure and follow up  -continue protonix BID    RESPIRATORY ISSUES:  - Refilled inhalers, pending alternative recommendations from pulmonologist due to cost concerns.  - Instructed the patient to attend scheduled pulmonology appointment in March.  - Advised the patient to ask pulmonologist for alternative inhaler options due to cost issues.       This note was generated with the assistance of ambient listening technology. Verbal consent was obtained by the patient and accompanying visitor(s) for the recording of patient appointment to facilitate this note. I attest to having reviewed and edited the generated note for accuracy, though some syntax or spelling errors may persist. Please contact the author of this note for any clarification.    Follow up in about 4 weeks (around 2/27/2025), or if symptoms worsen or fail to improve.

## 2025-02-07 ENCOUNTER — HOSPITAL ENCOUNTER (OUTPATIENT)
Dept: PREADMISSION TESTING | Facility: HOSPITAL | Age: 75
Discharge: HOME | End: 2025-02-07
Attending: PHYSICIAN ASSISTANT
Payer: MEDICARE

## 2025-02-07 ENCOUNTER — PATIENT MESSAGE (OUTPATIENT)
Dept: URGENT CARE | Facility: CLINIC | Age: 75
End: 2025-02-07
Payer: MEDICARE

## 2025-02-07 DIAGNOSIS — Z12.11 SCREEN FOR COLON CANCER: ICD-10-CM

## 2025-02-12 ENCOUNTER — TELEPHONE (OUTPATIENT)
Dept: DIABETES | Facility: CLINIC | Age: 75
End: 2025-02-12
Payer: MEDICARE

## 2025-02-13 ENCOUNTER — TELEPHONE (OUTPATIENT)
Dept: DIABETES | Facility: CLINIC | Age: 75
End: 2025-02-13
Payer: MEDICARE

## 2025-02-14 RX ORDER — TRAZODONE HYDROCHLORIDE 50 MG/1
50 TABLET ORAL NIGHTLY
Qty: 30 TABLET | Refills: 0 | OUTPATIENT
Start: 2025-02-14

## 2025-02-14 NOTE — TELEPHONE ENCOUNTER
No care due was identified.  [facility] Embedded Care Due Messages. Reference number: [id number].   2/14/2025 6:02:08 AM CST

## 2025-02-27 ENCOUNTER — TELEPHONE (OUTPATIENT)
Dept: INTERNAL MEDICINE | Facility: CLINIC | Age: 75
End: 2025-02-27
Payer: MEDICARE

## 2025-03-03 DIAGNOSIS — I15.2 HYPERTENSION ASSOCIATED WITH DIABETES: Primary | ICD-10-CM

## 2025-03-03 DIAGNOSIS — R00.2 PALPITATION: ICD-10-CM

## 2025-03-03 DIAGNOSIS — E11.59 HYPERTENSION ASSOCIATED WITH DIABETES: Primary | ICD-10-CM

## 2025-03-05 ENCOUNTER — CLINICAL SUPPORT (OUTPATIENT)
Dept: CARDIOLOGY | Facility: HOSPITAL | Age: 75
End: 2025-03-05
Payer: MEDICARE

## 2025-03-05 ENCOUNTER — CLINICAL SUPPORT (OUTPATIENT)
Dept: CARDIOLOGY | Facility: HOSPITAL | Age: 75
End: 2025-03-05
Attending: INTERNAL MEDICINE
Payer: MEDICARE

## 2025-03-05 DIAGNOSIS — Z95.0 PRESENCE OF CARDIAC PACEMAKER: ICD-10-CM

## 2025-03-05 DIAGNOSIS — E11.9 TYPE 2 DIABETES MELLITUS WITHOUT COMPLICATION: ICD-10-CM

## 2025-03-05 PROCEDURE — 93294 REM INTERROG EVL PM/LDLS PM: CPT | Mod: S$GLB,,, | Performed by: INTERNAL MEDICINE

## 2025-03-05 PROCEDURE — 93296 REM INTERROG EVL PM/IDS: CPT | Performed by: INTERNAL MEDICINE

## 2025-03-10 ENCOUNTER — TELEPHONE (OUTPATIENT)
Dept: DIABETES | Facility: CLINIC | Age: 75
End: 2025-03-10
Payer: MEDICARE

## 2025-03-10 NOTE — TELEPHONE ENCOUNTER
I attempted to call and assist  scheduling a appointment with the [facility] but received no answer so I left a detailed voicemail with my name and the [facility] callback number. I will attempt to call again at a later time.

## 2025-03-18 ENCOUNTER — TELEPHONE (OUTPATIENT)
Dept: DIABETES | Facility: CLINIC | Age: 75
End: 2025-03-18
Payer: MEDICARE

## 2025-03-18 ENCOUNTER — OFFICE VISIT (OUTPATIENT)
Dept: INTERNAL MEDICINE | Facility: CLINIC | Age: 75
End: 2025-03-18
Payer: MEDICARE

## 2025-03-18 ENCOUNTER — LAB VISIT (OUTPATIENT)
Dept: LAB | Facility: HOSPITAL | Age: 75
End: 2025-03-18
Attending: FAMILY MEDICINE
Payer: MEDICARE

## 2025-03-18 VITALS
WEIGHT: 166 LBS | TEMPERATURE: 97 F | OXYGEN SATURATION: 96 % | HEART RATE: 72 BPM | SYSTOLIC BLOOD PRESSURE: 124 MMHG | DIASTOLIC BLOOD PRESSURE: 80 MMHG | HEIGHT: 63 IN | BODY MASS INDEX: 29.41 KG/M2

## 2025-03-18 DIAGNOSIS — R06.83 SNORING: ICD-10-CM

## 2025-03-18 DIAGNOSIS — E66.3 OVERWEIGHT (BMI 25.0-29.9): ICD-10-CM

## 2025-03-18 DIAGNOSIS — N18.31 TYPE 2 DIABETES MELLITUS WITH STAGE 3A CHRONIC KIDNEY DISEASE, WITHOUT LONG-TERM CURRENT USE OF INSULIN: ICD-10-CM

## 2025-03-18 DIAGNOSIS — E11.65 UNCONTROLLED TYPE 2 DIABETES MELLITUS WITH HYPERGLYCEMIA: Primary | ICD-10-CM

## 2025-03-18 DIAGNOSIS — G47.00 INSOMNIA, UNSPECIFIED TYPE: ICD-10-CM

## 2025-03-18 DIAGNOSIS — E11.36 DM CATARACT: ICD-10-CM

## 2025-03-18 DIAGNOSIS — E11.22 TYPE 2 DIABETES MELLITUS WITH STAGE 3A CHRONIC KIDNEY DISEASE, WITHOUT LONG-TERM CURRENT USE OF INSULIN: ICD-10-CM

## 2025-03-18 DIAGNOSIS — E11.9 TYPE 2 DIABETES MELLITUS WITHOUT COMPLICATION: ICD-10-CM

## 2025-03-18 DIAGNOSIS — F51.01 PRIMARY INSOMNIA: ICD-10-CM

## 2025-03-18 LAB — GLUCOSE SERPL-MCNC: 154 MG/DL (ref 70–110)

## 2025-03-18 PROCEDURE — 1101F PT FALLS ASSESS-DOCD LE1/YR: CPT | Mod: CPTII,S$GLB,, | Performed by: PHYSICIAN ASSISTANT

## 2025-03-18 PROCEDURE — 1160F RVW MEDS BY RX/DR IN RCRD: CPT | Mod: CPTII,S$GLB,, | Performed by: PHYSICIAN ASSISTANT

## 2025-03-18 PROCEDURE — 1126F AMNT PAIN NOTED NONE PRSNT: CPT | Mod: CPTII,S$GLB,, | Performed by: PHYSICIAN ASSISTANT

## 2025-03-18 PROCEDURE — 3052F HG A1C>EQUAL 8.0%<EQUAL 9.0%: CPT | Mod: CPTII,S$GLB,, | Performed by: PHYSICIAN ASSISTANT

## 2025-03-18 PROCEDURE — 80061 LIPID PANEL: CPT | Performed by: FAMILY MEDICINE

## 2025-03-18 PROCEDURE — 1159F MED LIST DOCD IN RCRD: CPT | Mod: CPTII,S$GLB,, | Performed by: PHYSICIAN ASSISTANT

## 2025-03-18 PROCEDURE — 99999 PR PBB SHADOW E&M-EST. PATIENT-LVL V: CPT | Mod: PBBFAC,,, | Performed by: PHYSICIAN ASSISTANT

## 2025-03-18 PROCEDURE — 3079F DIAST BP 80-89 MM HG: CPT | Mod: CPTII,S$GLB,, | Performed by: PHYSICIAN ASSISTANT

## 2025-03-18 PROCEDURE — 3008F BODY MASS INDEX DOCD: CPT | Mod: CPTII,S$GLB,, | Performed by: PHYSICIAN ASSISTANT

## 2025-03-18 PROCEDURE — 4010F ACE/ARB THERAPY RXD/TAKEN: CPT | Mod: CPTII,S$GLB,, | Performed by: PHYSICIAN ASSISTANT

## 2025-03-18 PROCEDURE — 82962 GLUCOSE BLOOD TEST: CPT | Mod: S$GLB,,, | Performed by: PHYSICIAN ASSISTANT

## 2025-03-18 PROCEDURE — 36415 COLL VENOUS BLD VENIPUNCTURE: CPT | Performed by: FAMILY MEDICINE

## 2025-03-18 PROCEDURE — 99214 OFFICE O/P EST MOD 30 MIN: CPT | Mod: S$GLB,,, | Performed by: PHYSICIAN ASSISTANT

## 2025-03-18 PROCEDURE — 3074F SYST BP LT 130 MM HG: CPT | Mod: CPTII,S$GLB,, | Performed by: PHYSICIAN ASSISTANT

## 2025-03-18 PROCEDURE — 3288F FALL RISK ASSESSMENT DOCD: CPT | Mod: CPTII,S$GLB,, | Performed by: PHYSICIAN ASSISTANT

## 2025-03-18 RX ORDER — ZOLPIDEM TARTRATE 6.25 MG/1
6.25 TABLET, FILM COATED, EXTENDED RELEASE ORAL NIGHTLY PRN
Qty: 30 TABLET | Refills: 0 | Status: SHIPPED | OUTPATIENT
Start: 2025-03-18 | End: 2025-09-16

## 2025-03-18 NOTE — TELEPHONE ENCOUNTER
Assisted pt to schedule an initial appt with Meghan. She denies having an emergent issue, cgm, or pump. She reports having high blood sugars. Appt scheduled 5/1 @ 1600 (first available)

## 2025-03-18 NOTE — PROGRESS NOTES
Subjective:      Patient ID: Kelsie Bustamante is a 74 y.o. female.    Chief Complaint: Follow-up    HPI  History of Present Illness    CHIEF COMPLAINT:  Ms. Bustamante presents today for follow-up of diabetes management  DM not controlled. Recent A1c 8.6  Colonoscopy in process of getting scheduled.    Diabetic eye exam due.     DIABETES:  She is currently taking Metformin 1000 mg twice daily, Glimepiride 10 mg daily, and jardiance 10mg. She reports difficulty coordinating an appointment with the [facility] despite multiple attempts from the clinic.    SLEEP DISORDERS:  She reports severe insomnia, stating she is not sleeping at all. Ambien 10 mg is not providing effective relief. She experiences mouth breathing at night leading to severe dry mouth requiring nocturnal water intake. She denies awareness of snoring but reports significant daytime fatigue. No headaches reported.    Medications were reviewed            Problem List[1]    Current Medications[2]    Review of Systems   Constitutional:  Positive for fatigue. Negative for activity change, appetite change, chills, diaphoresis, fever and unexpected weight change.   HENT: Negative.  Negative for congestion, hearing loss, postnasal drip, rhinorrhea, sore throat, trouble swallowing and voice change.    Eyes: Negative.  Negative for visual disturbance.   Respiratory: Negative.  Negative for cough, choking, chest tightness and shortness of breath.    Cardiovascular:  Negative for chest pain, palpitations and leg swelling.   Gastrointestinal:  Negative for abdominal distention, abdominal pain, blood in stool, constipation, diarrhea, nausea and vomiting.   Endocrine: Negative for cold intolerance, heat intolerance, polydipsia and polyuria.   Genitourinary: Negative.  Negative for difficulty urinating and frequency.   Musculoskeletal:  Negative for arthralgias, back pain, gait problem, joint swelling and myalgias.   Skin:  Negative for color change,  "pallor, rash and wound.   Neurological:  Negative for dizziness, tremors, weakness, light-headedness, numbness and headaches.   Hematological:  Negative for adenopathy.   Psychiatric/Behavioral:  Positive for sleep disturbance. Negative for behavioral problems, confusion, self-injury and suicidal ideas. The patient is not nervous/anxious.      Objective:   /80 (BP Location: Right arm, Patient Position: Sitting)   Pulse 72   Temp 97 °F (36.1 °C) (Tympanic)   Ht 5' 3" (1.6 m)   Wt 75.3 kg (166 lb 0.1 oz)   SpO2 96%   BMI 29.41 kg/m²     Physical Exam  Vitals and nursing note reviewed.   Constitutional:       General: She is not in acute distress.     Appearance: Normal appearance. She is well-developed. She is not ill-appearing, toxic-appearing or diaphoretic.   HENT:      Head: Normocephalic and atraumatic.   Cardiovascular:      Rate and Rhythm: Normal rate and regular rhythm.      Heart sounds: Normal heart sounds. No murmur heard.     No friction rub. No gallop.   Pulmonary:      Effort: Pulmonary effort is normal. No respiratory distress.      Breath sounds: Normal breath sounds. No wheezing or rales.   Musculoskeletal:         General: Normal range of motion.   Skin:     General: Skin is warm.      Capillary Refill: Capillary refill takes less than 2 seconds.      Findings: No rash.   Neurological:      Mental Status: She is alert and oriented to person, place, and time.      Motor: No weakness.      Gait: Gait normal.   Psychiatric:         Mood and Affect: Mood normal.         Behavior: Behavior normal.         Thought Content: Thought content normal.         Judgment: Judgment normal.       Lab Results   Component Value Date    HGBA1C 8.6 (H) 01/30/2025     CMP  Sodium   Date Value Ref Range Status   01/30/2025 141 136 - 145 mmol/L Final     Potassium   Date Value Ref Range Status   01/30/2025 5.0 3.5 - 5.1 mmol/L Final     Chloride   Date Value Ref Range Status   01/30/2025 107 95 - 110 mmol/L " Final     CO2   Date Value Ref Range Status   01/30/2025 23 23 - 29 mmol/L Final     Glucose   Date Value Ref Range Status   01/30/2025 235 (H) 70 - 110 mg/dL Final     BUN   Date Value Ref Range Status   01/30/2025 12 8 - 23 mg/dL Final     Creatinine   Date Value Ref Range Status   01/30/2025 1.2 0.5 - 1.4 mg/dL Final     Calcium   Date Value Ref Range Status   01/30/2025 9.7 8.7 - 10.5 mg/dL Final     Total Protein   Date Value Ref Range Status   01/30/2025 7.3 6.0 - 8.4 g/dL Final     Albumin   Date Value Ref Range Status   01/30/2025 3.6 3.5 - 5.2 g/dL Final     Total Bilirubin   Date Value Ref Range Status   01/30/2025 0.2 0.1 - 1.0 mg/dL Final     Comment:     For infants and newborns, interpretation of results should be based  on gestational age, weight and in agreement with clinical  observations.    Premature Infant recommended reference ranges:  Up to 24 hours.............<8.0 mg/dL  Up to 48 hours............<12.0 mg/dL  3-5 days..................<15.0 mg/dL  6-29 days.................<15.0 mg/dL       Alkaline Phosphatase   Date Value Ref Range Status   01/30/2025 86 40 - 150 U/L Final     AST   Date Value Ref Range Status   01/30/2025 24 10 - 40 U/L Final     ALT   Date Value Ref Range Status   01/30/2025 24 10 - 44 U/L Final     Anion Gap   Date Value Ref Range Status   01/30/2025 11 8 - 16 mmol/L Final     eGFR   Date Value Ref Range Status   01/30/2025 47.8 (A) >60 mL/min/1.73 m^2 Final       Assessment:     1. Uncontrolled type 2 diabetes mellitus with hyperglycemia    2. Type 2 diabetes mellitus with stage 3a chronic kidney disease, without long-term current use of insulin    3. DM cataract bilateral with bilateral extractions    4. Insomnia, unspecified type    5. Overweight (BMI 25.0-29.9)    6. Snoring    7. Primary insomnia      Plan:   Uncontrolled type 2 diabetes mellitus with hyperglycemia  -     POCT Glucose, Hand-Held Device  -     Hemoglobin A1C; Future; Expected date:  04/18/2025  -recheck A1c next month    Type 2 diabetes mellitus with stage 3a chronic kidney disease, without long-term current use of insulin  -not controlled. Will coordinate with diabetes to get her scheduled asap    DM cataract bilateral with bilateral extractions  -     Ambulatory referral/consult to Optometry; Future; Expected date: 03/25/2025    Insomnia, unspecified type  -     Ambulatory referral/consult to Pulmonology; Future; Expected date: 03/25/2025  -     Home Sleep Study; Future    Overweight (BMI 25.0-29.9)  -     Home Sleep Study; Future    Snoring  -     Home Sleep Study; Future  -refer to sleep dept for insomnia.     Primary insomnia  -     Home Sleep Study; Future  -     zolpidem (AMBIEN CR) 6.25 MG CR tablet; Take 1 tablet (6.25 mg total) by mouth nightly as needed for Insomnia.  Dispense: 30 tablet; Refill: 0    Diabetes not well-controlled, likely requiring insulin initiation.  Sleep issues persist despite Ambien use  Renal function potentially affected by uncontrolled diabetes.  .    TYPE 2 DIABETES MELLITUS:  - Evaluated that the patient's diabetes is not well-controlled and is affecting the kidneys.  - Ordered microalbumin urine test and cholesterol test to assess diabetes control.  - [facility] to [facility] for possible insulin initiation and diabetes education.  - Scheduled a follow-up visit in 1 month to monitor diabetes control.  - Referred the patient for an eye exam on the 3rd floor due to diabetes.    INSOMNIA:  -reports excessive daytime sleepiness. Ambien isn't working for her. Still waking up in the middle of the night and unable to go back to sleep.   - Ordered a sleep study to assess for sleep apnea.  - Noted that the patient denies snoring but reports not sleeping enough to know.  - Noted that the patient reports breathing through mouth and experiencing dry mouth during the night.  - Prescribed controlled release Ambien for sustained sleep throughout  the night.    DRY MOUTH:  - Noted that the patient reports breathing through mouth and experiencing dry mouth during the night.       This note was generated with the assistance of ambient listening technology. Verbal consent was obtained by the patient and accompanying visitor(s) for the recording of patient appointment to facilitate this note. I attest to having reviewed and edited the generated note for accuracy, though some syntax or spelling errors may persist. Please contact the author of this note for any clarification.      Follow up in about 4 weeks (around 4/15/2025), or if symptoms worsen or fail to improve.           [1]   Patient Active Problem List  Diagnosis    Hypertension associated with diabetes    Type 2 diabetes mellitus with diabetic polyneuropathy, without long-term current use of insulin    Peripheral neuropathy    Carpal tunnel syndrome    Localized primary carpometacarpal osteoarthritis    Gastroesophageal reflux disease without esophagitis    Thyroid nodule    Premature menopause (surgery in 30s)    Palpitation    Other insomnia    Normocytic anemia    PAF (paroxysmal atrial fibrillation)    Maxillary cyst    Personal history of pneumonia (recurrent)    Personal history of urinary (tract) infections    Fibromyalgia    Hyperlipidemia associated with type 2 diabetes mellitus    Odynophagia    Dysphagia    Achalasia of esophagus    Chronic anticoagulation- Eliquis    Pacemaker    Other thrombophilia    Chronic cough    Uncontrolled type 2 diabetes mellitus with hyperglycemia    Type 2 diabetes mellitus with hypertriglyceridemia    Mixed simple and mucopurulent chronic bronchitis    Aortic atherosclerosis    Possible Memory changes    DM cataract bilateral with bilateral extractions    History of colon polyps   [2]   Current Outpatient Medications:     albuterol-ipratropium (DUO-NEB) 2.5 mg-0.5 mg/3 mL nebulizer solution, Take 3 mLs by nebulization every 6 (six) hours as needed for Wheezing.  "Rescue, Disp: 180 mL, Rfl: 3    apixaban (ELIQUIS) 5 mg Tab, Take 1 tablet (5 mg total) by mouth 2 (two) times daily., Disp: 180 tablet, Rfl: 4    budesonide (PULMICORT) 0.25 mg/2 mL nebulizer solution, Take 2 mLs (0.25 mg total) by nebulization once daily. Controller, Disp: 180 mL, Rfl: 1    estrogens, conjugated, (PREMARIN) 0.625 MG tablet, Take 1 tablet (0.625 mg total) by mouth once daily., Disp: 90 tablet, Rfl: 3    glimepiride (AMARYL) 2 MG tablet, Take 1 tablet (2 mg total) by mouth daily with breakfast., Disp: 90 tablet, Rfl: 3    losartan (COZAAR) 25 MG tablet, Take 1 tablet (25 mg total) by mouth once daily., Disp: 90 tablet, Rfl: 3    metFORMIN (GLUCOPHAGE-XR) 500 MG ER 24hr tablet, Take 2 tablets (1,000 mg total) by mouth 2 (two) times daily with meals., Disp: 360 tablet, Rfl: 3    metoprolol succinate (TOPROL-XL) 100 MG 24 hr tablet, Take 1 tablet (100 mg total) by mouth 2 (two) times daily. 100mg twice daily, Disp: 180 tablet, Rfl: 3    nebulizer and compressor La, Use every 6 hours as needed for wheezing, Disp: 1 each, Rfl: 0    pantoprazole (PROTONIX) 40 MG tablet, Take 1 tablet (40 mg total) by mouth 2 (two) times daily., Disp: 180 tablet, Rfl: 3    pen needle, diabetic 32 gauge x 5/32" Ndle, Use to dispense insulin once daily, Disp: 100 each, Rfl: 0    rosuvastatin (CRESTOR) 5 MG tablet, Take 1 tablet (5 mg total) by mouth once daily., Disp: 90 tablet, Rfl: 3    tiotropium-olodateroL (STIOLTO RESPIMAT) 2.5-2.5 mcg/actuation Mist, Inhale 2 puffs into the lungs once daily. Controller, Disp: 1 g, Rfl: 5    empagliflozin (JARDIANCE) 10 mg tablet, Take 1 tablet (10 mg total) by mouth once daily. (Patient not taking: Reported on 3/18/2025), Disp: 90 tablet, Rfl: 3    zolpidem (AMBIEN CR) 6.25 MG CR tablet, Take 1 tablet (6.25 mg total) by mouth nightly as needed for Insomnia., Disp: 30 tablet, Rfl: 0    "

## 2025-03-19 ENCOUNTER — TELEPHONE (OUTPATIENT)
Dept: SLEEP MEDICINE | Facility: CLINIC | Age: 75
End: 2025-03-19
Payer: MEDICARE

## 2025-03-19 LAB
CHOLEST SERPL-MCNC: 152 MG/DL (ref 120–199)
CHOLEST/HDLC SERPL: 2.6 {RATIO} (ref 2–5)
HDLC SERPL-MCNC: 58 MG/DL (ref 40–75)
HDLC SERPL: 38.2 % (ref 20–50)
LDLC SERPL CALC-MCNC: 53 MG/DL (ref 63–159)
NONHDLC SERPL-MCNC: 94 MG/DL
TRIGL SERPL-MCNC: 205 MG/DL (ref 30–150)

## 2025-03-24 LAB
OHS CV AF BURDEN PERCENT: 2
OHS CV DC REMOTE DEVICE TYPE: NORMAL
OHS CV RV PACING PERCENT: 1 %

## 2025-03-28 ENCOUNTER — TELEPHONE (OUTPATIENT)
Dept: DIABETES | Facility: CLINIC | Age: 75
End: 2025-03-28
Payer: MEDICARE

## 2025-04-02 ENCOUNTER — HOSPITAL ENCOUNTER (OUTPATIENT)
Dept: PREADMISSION TESTING | Facility: HOSPITAL | Age: 75
Discharge: HOME | End: 2025-04-02
Attending: COLON & RECTAL SURGERY
Payer: MEDICARE

## 2025-04-10 ENCOUNTER — PATIENT OUTREACH (OUTPATIENT)
Dept: ADMINISTRATIVE | Facility: HOSPITAL | Age: 75
End: 2025-04-10
Payer: MEDICARE

## 2025-04-10 NOTE — PROGRESS NOTES
[facility] OUTREACH: per chart review pt is OVERDUE for Colon cancer screen-Pre admit/Virtual visit scheduled 5.7.25, DM Foot exam due-added to appt schedule 04.28.25, pt had OV with PA on 3.18.25, DM eye exam due and scheduled 5.7.25.

## 2025-04-29 DIAGNOSIS — I48.0 PAF (PAROXYSMAL ATRIAL FIBRILLATION): Chronic | ICD-10-CM

## 2025-04-30 ENCOUNTER — PATIENT MESSAGE (OUTPATIENT)
Dept: ADMINISTRATIVE | Facility: HOSPITAL | Age: 75
End: 2025-04-30
Payer: MEDICARE

## 2025-05-01 ENCOUNTER — PATIENT OUTREACH (OUTPATIENT)
Dept: ADMINISTRATIVE | Facility: HOSPITAL | Age: 75
End: 2025-05-01
Payer: MEDICARE

## 2025-05-01 RX ORDER — METOPROLOL SUCCINATE 100 MG/1
100 TABLET, EXTENDED RELEASE ORAL 2 TIMES DAILY
Qty: 180 TABLET | Refills: 3 | Status: SHIPPED | OUTPATIENT
Start: 2025-05-01 | End: 2026-04-26

## 2025-05-01 NOTE — PROGRESS NOTES
Replying to Campaign Questionnaire for Overdue HM:   Pre admission appointment already scheduled.  A1c order already in Epic. Lab appointment scheduled.  Portal message sent to patient.

## 2025-05-05 ENCOUNTER — OFFICE VISIT (OUTPATIENT)
Dept: INTERNAL MEDICINE | Facility: CLINIC | Age: 75
End: 2025-05-05
Payer: MEDICARE

## 2025-05-05 ENCOUNTER — LAB VISIT (OUTPATIENT)
Dept: LAB | Facility: HOSPITAL | Age: 75
End: 2025-05-05
Attending: PHYSICIAN ASSISTANT
Payer: MEDICARE

## 2025-05-05 VITALS
SYSTOLIC BLOOD PRESSURE: 126 MMHG | HEIGHT: 63 IN | TEMPERATURE: 97 F | WEIGHT: 172.38 LBS | BODY MASS INDEX: 30.54 KG/M2 | HEART RATE: 82 BPM | DIASTOLIC BLOOD PRESSURE: 78 MMHG | OXYGEN SATURATION: 96 %

## 2025-05-05 DIAGNOSIS — R41.89 COGNITIVE IMPAIRMENT: ICD-10-CM

## 2025-05-05 DIAGNOSIS — E04.1 THYROID NODULE: ICD-10-CM

## 2025-05-05 DIAGNOSIS — E11.65 UNCONTROLLED TYPE 2 DIABETES MELLITUS WITH HYPERGLYCEMIA: Primary | ICD-10-CM

## 2025-05-05 DIAGNOSIS — G47.00 INSOMNIA, UNSPECIFIED TYPE: ICD-10-CM

## 2025-05-05 DIAGNOSIS — E11.36 DM CATARACT: ICD-10-CM

## 2025-05-05 DIAGNOSIS — R41.3 MEMORY LOSS: ICD-10-CM

## 2025-05-05 DIAGNOSIS — E11.65 UNCONTROLLED TYPE 2 DIABETES MELLITUS WITH HYPERGLYCEMIA: ICD-10-CM

## 2025-05-05 LAB
EAG (OHS): 209 MG/DL (ref 68–131)
HBA1C MFR BLD: 8.9 % (ref 4–5.6)

## 2025-05-05 PROCEDURE — 3074F SYST BP LT 130 MM HG: CPT | Mod: CPTII,S$GLB,, | Performed by: PHYSICIAN ASSISTANT

## 2025-05-05 PROCEDURE — 83036 HEMOGLOBIN GLYCOSYLATED A1C: CPT

## 2025-05-05 PROCEDURE — 3008F BODY MASS INDEX DOCD: CPT | Mod: CPTII,S$GLB,, | Performed by: PHYSICIAN ASSISTANT

## 2025-05-05 PROCEDURE — 1159F MED LIST DOCD IN RCRD: CPT | Mod: CPTII,S$GLB,, | Performed by: PHYSICIAN ASSISTANT

## 2025-05-05 PROCEDURE — 99214 OFFICE O/P EST MOD 30 MIN: CPT | Mod: S$GLB,,, | Performed by: PHYSICIAN ASSISTANT

## 2025-05-05 PROCEDURE — 99999 PR PBB SHADOW E&M-EST. PATIENT-LVL V: CPT | Mod: PBBFAC,,, | Performed by: PHYSICIAN ASSISTANT

## 2025-05-05 PROCEDURE — 3052F HG A1C>EQUAL 8.0%<EQUAL 9.0%: CPT | Mod: CPTII,S$GLB,, | Performed by: PHYSICIAN ASSISTANT

## 2025-05-05 PROCEDURE — 3078F DIAST BP <80 MM HG: CPT | Mod: CPTII,S$GLB,, | Performed by: PHYSICIAN ASSISTANT

## 2025-05-05 PROCEDURE — 3066F NEPHROPATHY DOC TX: CPT | Mod: CPTII,S$GLB,, | Performed by: PHYSICIAN ASSISTANT

## 2025-05-05 PROCEDURE — 3061F NEG MICROALBUMINURIA REV: CPT | Mod: CPTII,S$GLB,, | Performed by: PHYSICIAN ASSISTANT

## 2025-05-05 PROCEDURE — 1160F RVW MEDS BY RX/DR IN RCRD: CPT | Mod: CPTII,S$GLB,, | Performed by: PHYSICIAN ASSISTANT

## 2025-05-05 PROCEDURE — 36415 COLL VENOUS BLD VENIPUNCTURE: CPT

## 2025-05-05 PROCEDURE — 4010F ACE/ARB THERAPY RXD/TAKEN: CPT | Mod: CPTII,S$GLB,, | Performed by: PHYSICIAN ASSISTANT

## 2025-05-05 PROCEDURE — 3288F FALL RISK ASSESSMENT DOCD: CPT | Mod: CPTII,S$GLB,, | Performed by: PHYSICIAN ASSISTANT

## 2025-05-05 PROCEDURE — 1126F AMNT PAIN NOTED NONE PRSNT: CPT | Mod: CPTII,S$GLB,, | Performed by: PHYSICIAN ASSISTANT

## 2025-05-05 PROCEDURE — 1101F PT FALLS ASSESS-DOCD LE1/YR: CPT | Mod: CPTII,S$GLB,, | Performed by: PHYSICIAN ASSISTANT

## 2025-05-05 NOTE — PROGRESS NOTES
Subjective:      Patient ID: Kelsie Bustamante is a 74 y.o. female.    Chief Complaint: Medication Refill and Memory Loss    HPI  History of Present Illness    CHIEF COMPLAINT:  Ms. Bustamante presents today for follow up of diabetes management. She does not check her blood sugar at home.     DIABETES MANAGEMENT:  She takes Metformin daily and Glimepiride as prescribed. She is not sure if she is taking Jardiance.    SLEEP:  She reports poor sleep despite taking extended-release Ambien.    COGNITIVE FUNCTION:  She has difficulty remembering directions to familiar places and requires GPS assistance for frequently visited locations.     MEDICATIONS:  She reports taking 10 different medications including Metformin, Glimepiride, Jardiance (not taking as prescribed), and extended-release Ambien.    Medications were reviewed      Scheduled to see diabetes on Wednesday.       Problem List[1]    Current Medications[2]    Review of Systems   Constitutional:  Positive for fatigue. Negative for activity change, appetite change, chills, diaphoresis, fever and unexpected weight change.   HENT: Negative.  Negative for congestion, hearing loss, postnasal drip, rhinorrhea, sore throat, trouble swallowing and voice change.    Eyes: Negative.  Negative for visual disturbance.   Respiratory: Negative.  Negative for cough, choking, chest tightness and shortness of breath.    Cardiovascular:  Negative for chest pain, palpitations and leg swelling.   Gastrointestinal:  Negative for abdominal distention, abdominal pain, blood in stool, constipation, diarrhea, nausea and vomiting.   Endocrine: Negative for cold intolerance, heat intolerance, polydipsia and polyuria.   Genitourinary: Negative.  Negative for difficulty urinating and frequency.   Musculoskeletal:  Negative for arthralgias, back pain, gait problem, joint swelling and myalgias.   Skin:  Negative for color change, pallor, rash and wound.   Neurological:  Negative for dizziness,  "tremors, weakness, light-headedness, numbness and headaches.   Hematological:  Negative for adenopathy.   Psychiatric/Behavioral:  Positive for confusion, decreased concentration and sleep disturbance. Negative for agitation, behavioral problems, dysphoric mood, hallucinations, self-injury and suicidal ideas. The patient is not nervous/anxious and is not hyperactive.      Objective:   /78 (BP Location: Right arm, Patient Position: Sitting)   Pulse 82   Temp 97 °F (36.1 °C) (Tympanic)   Ht 5' 3" (1.6 m)   Wt 78.2 kg (172 lb 6.4 oz)   SpO2 96%   BMI 30.54 kg/m²     Physical Exam  Vitals reviewed.   Constitutional:       General: She is not in acute distress.     Appearance: Normal appearance. She is well-developed. She is not ill-appearing, toxic-appearing or diaphoretic.   HENT:      Head: Normocephalic and atraumatic.      Right Ear: External ear normal.      Left Ear: External ear normal.      Nose: Nose normal.   Eyes:      Conjunctiva/sclera: Conjunctivae normal.      Pupils: Pupils are equal, round, and reactive to light.   Cardiovascular:      Rate and Rhythm: Normal rate and regular rhythm.      Heart sounds: Normal heart sounds. No murmur heard.     No friction rub. No gallop.   Pulmonary:      Effort: Pulmonary effort is normal. No respiratory distress.      Breath sounds: Normal breath sounds. No wheezing or rales.   Chest:      Chest wall: No tenderness.   Abdominal:      General: There is no distension.      Palpations: Abdomen is soft.      Tenderness: There is no abdominal tenderness.   Musculoskeletal:         General: Normal range of motion.      Cervical back: Normal range of motion and neck supple.   Lymphadenopathy:      Cervical: No cervical adenopathy.   Skin:     General: Skin is warm and dry.      Capillary Refill: Capillary refill takes less than 2 seconds.      Findings: No rash.   Neurological:      Mental Status: She is alert and oriented to person, place, and time.      Motor: " No weakness.      Coordination: Coordination normal.      Gait: Gait normal.   Psychiatric:         Mood and Affect: Mood normal.         Behavior: Behavior normal.         Thought Content: Thought content normal.         Judgment: Judgment normal.       Lab Results   Component Value Date    HGBA1C 8.6 (H) 01/30/2025     2 minute recall: able to remember 2/3 words    Assessment:     1. Uncontrolled type 2 diabetes mellitus with hyperglycemia    2. Cognitive impairment    3. Memory loss    4. DM cataract bilateral with bilateral extractions    5. Thyroid nodule    6. Insomnia, unspecified type      Plan:   Uncontrolled type 2 diabetes mellitus with hyperglycemia  - Monitored medication compliance and noted patient is taking Metformin daily but not Jardiance.  - Evaluated renal function and noted changes consistent with diabetic nephropathy.  - Explained importance of Jardiance and Losartan in protecting kidneys from diabetic renal disease.  - Will continue insulin therapy for glycemic control along with oral hypoglycemic regimen of Metformin and Glimepiride.  - Prescribed Jardiance for additional glycemic control and renoprotective effects.    Cognitive impairment  - Monitored cognitive status and noted reports of memory issues, including forgetting directions and requiring navigation assistance.  - Discussed potential correlation between diabetes and cognitive decline, including early dementia.  - Ordered vitamin B12 level to evaluate for deficiency as a potential contributing factor.  - Recommend neuropsychological testing for comprehensive cognitive assessment and considered referral to a neurologist specializing in memory disorders.    Memory loss  -     Ambulatory referral/consult to Neurology; Future; Expected date: 05/12/2025  -     Urinalysis, Reflex to Urine Culture; Future; Expected date: 05/05/2025  -     Vitamin B12; Future; Expected date: 05/05/2025  -     Folate; Future; Expected date: 05/05/2025  -  Ordered urinalysis to check for urinary tract infection.    DM cataract bilateral with bilateral extractions  -scheduled for wednesday    Thyroid nodule  -us scheduled for Wednesday    Insomnia, unspecified type  -cont CR ambien  - Monitored sleep patterns and noted continued insomnia despite extended-release zolpidem therapy.  - Explained that extended-release zolpidem should provide more effective and sustained hypnotic effect compared to previous prescription.    Scheduled for phone call with endoscopy dept on wednesday to get her colonoscopy scheduled.     Concerned about diabetes management and potential cognitive decline.  Suspect patient may not be taking all prescribed medications, particularly Jardiance for diabetic renal disease protection.  Considered memory issues could be related to diabetes, potentially leading to early dementia.  Planning to assess for other potential causes of cognitive decline, including UTI and vitamin B12 deficiency.  Performed foot exam to check for diabetic neuropathy.  Conducted brief cognitive assessment using clock drawing test and word recall.          - Instructed patient to bring all medications to next appointment for comprehensive medication review to ensure proper adherence.  - Follow-up scheduled for Wednesday, May 7th at 4:00 PM for diabetes management, thyroid ultrasound, and eye exam.  - Will conduct virtual audio visit after in-person appointment on May 7th to discuss colonoscopy scheduling.       This note was generated with the assistance of ambient listening technology. Verbal consent was obtained by the patient and accompanying visitor(s) for the recording of patient appointment to facilitate this note. I attest to having reviewed and edited the generated note for accuracy, though some syntax or spelling errors may persist. Please contact the author of this note for any clarification.      Follow up if symptoms worsen or fail to improve.           [1]   Patient  Active Problem List  Diagnosis    Hypertension associated with diabetes    Type 2 diabetes mellitus with diabetic polyneuropathy, without long-term current use of insulin    Peripheral neuropathy    Carpal tunnel syndrome    Localized primary carpometacarpal osteoarthritis    Gastroesophageal reflux disease without esophagitis    Thyroid nodule    Premature menopause (surgery in 30s)    Palpitation    Other insomnia    Normocytic anemia    PAF (paroxysmal atrial fibrillation)    Maxillary cyst    Personal history of pneumonia (recurrent)    Personal history of urinary (tract) infections    Fibromyalgia    Hyperlipidemia associated with type 2 diabetes mellitus    Odynophagia    Dysphagia    Achalasia of esophagus    Chronic anticoagulation- Eliquis    Pacemaker    Other thrombophilia    Chronic cough    Uncontrolled type 2 diabetes mellitus with hyperglycemia    Type 2 diabetes mellitus with hypertriglyceridemia    Mixed simple and mucopurulent chronic bronchitis    Aortic atherosclerosis    Possible Memory changes    DM cataract bilateral with bilateral extractions    History of colon polyps   [2]   Current Outpatient Medications:     albuterol-ipratropium (DUO-NEB) 2.5 mg-0.5 mg/3 mL nebulizer solution, Take 3 mLs by nebulization every 6 (six) hours as needed for Wheezing. Rescue, Disp: 180 mL, Rfl: 3    apixaban (ELIQUIS) 5 mg Tab, Take 1 tablet (5 mg total) by mouth 2 (two) times daily., Disp: 180 tablet, Rfl: 4    budesonide (PULMICORT) 0.25 mg/2 mL nebulizer solution, Take 2 mLs (0.25 mg total) by nebulization once daily. Controller, Disp: 180 mL, Rfl: 1    estrogens, conjugated, (PREMARIN) 0.625 MG tablet, Take 1 tablet (0.625 mg total) by mouth once daily., Disp: 90 tablet, Rfl: 3    glimepiride (AMARYL) 2 MG tablet, Take 1 tablet (2 mg total) by mouth daily with breakfast., Disp: 90 tablet, Rfl: 3    metFORMIN (GLUCOPHAGE-XR) 500 MG ER 24hr tablet, Take 2 tablets (1,000 mg total) by mouth 2 (two) times  "daily with meals., Disp: 360 tablet, Rfl: 3    metoprolol succinate (TOPROL-XL) 100 MG 24 hr tablet, Take 1 tablet (100 mg total) by mouth 2 (two) times daily. 100mg twice daily, Disp: 180 tablet, Rfl: 3    nebulizer and compressor La, Use every 6 hours as needed for wheezing, Disp: 1 each, Rfl: 0    pantoprazole (PROTONIX) 40 MG tablet, Take 1 tablet (40 mg total) by mouth 2 (two) times daily., Disp: 180 tablet, Rfl: 3    pen needle, diabetic 32 gauge x 5/32" Ndle, Use to dispense insulin once daily, Disp: 100 each, Rfl: 0    rosuvastatin (CRESTOR) 5 MG tablet, Take 1 tablet (5 mg total) by mouth once daily., Disp: 90 tablet, Rfl: 3    tiotropium-olodateroL (STIOLTO RESPIMAT) 2.5-2.5 mcg/actuation Mist, Inhale 2 puffs into the lungs once daily. Controller, Disp: 1 g, Rfl: 5    zolpidem (AMBIEN CR) 6.25 MG CR tablet, Take 1 tablet (6.25 mg total) by mouth nightly as needed for Insomnia., Disp: 30 tablet, Rfl: 0    empagliflozin (JARDIANCE) 10 mg tablet, Take 1 tablet (10 mg total) by mouth once daily. (Patient not taking: Reported on 5/5/2025), Disp: 90 tablet, Rfl: 3    losartan (COZAAR) 25 MG tablet, Take 1 tablet (25 mg total) by mouth once daily. (Patient not taking: Reported on 5/5/2025), Disp: 90 tablet, Rfl: 3    "

## 2025-05-07 ENCOUNTER — HOSPITAL ENCOUNTER (OUTPATIENT)
Dept: PREADMISSION TESTING | Facility: HOSPITAL | Age: 75
Discharge: HOME | End: 2025-05-07
Attending: FAMILY MEDICINE
Payer: MEDICARE

## 2025-05-07 ENCOUNTER — OFFICE VISIT (OUTPATIENT)
Dept: INTERNAL MEDICINE | Facility: CLINIC | Age: 75
End: 2025-05-07
Payer: MEDICARE

## 2025-05-07 ENCOUNTER — RESULTS FOLLOW-UP (OUTPATIENT)
Dept: INTERNAL MEDICINE | Facility: CLINIC | Age: 75
End: 2025-05-07

## 2025-05-07 VITALS
WEIGHT: 171.06 LBS | SYSTOLIC BLOOD PRESSURE: 128 MMHG | OXYGEN SATURATION: 96 % | HEART RATE: 77 BPM | BODY MASS INDEX: 30.31 KG/M2 | TEMPERATURE: 97 F | HEIGHT: 63 IN | DIASTOLIC BLOOD PRESSURE: 74 MMHG

## 2025-05-07 DIAGNOSIS — Z95.0 PACEMAKER: ICD-10-CM

## 2025-05-07 DIAGNOSIS — E11.69 TYPE 2 DIABETES MELLITUS WITH HYPERTRIGLYCERIDEMIA: ICD-10-CM

## 2025-05-07 DIAGNOSIS — I15.2 HYPERTENSION ASSOCIATED WITH DIABETES: ICD-10-CM

## 2025-05-07 DIAGNOSIS — E04.1 THYROID NODULE: ICD-10-CM

## 2025-05-07 DIAGNOSIS — Z12.11 SCREEN FOR COLON CANCER: Primary | ICD-10-CM

## 2025-05-07 DIAGNOSIS — K22.0 ACHALASIA OF ESOPHAGUS: ICD-10-CM

## 2025-05-07 DIAGNOSIS — E11.65 UNCONTROLLED TYPE 2 DIABETES MELLITUS WITH HYPERGLYCEMIA: Primary | ICD-10-CM

## 2025-05-07 DIAGNOSIS — E11.69 HYPERLIPIDEMIA ASSOCIATED WITH TYPE 2 DIABETES MELLITUS: ICD-10-CM

## 2025-05-07 DIAGNOSIS — K21.9 GASTROESOPHAGEAL REFLUX DISEASE WITHOUT ESOPHAGITIS: ICD-10-CM

## 2025-05-07 DIAGNOSIS — E11.36 DM CATARACT: ICD-10-CM

## 2025-05-07 DIAGNOSIS — G47.09 OTHER INSOMNIA: ICD-10-CM

## 2025-05-07 DIAGNOSIS — I48.0 PAF (PAROXYSMAL ATRIAL FIBRILLATION): Chronic | ICD-10-CM

## 2025-05-07 DIAGNOSIS — I70.0 AORTIC ATHEROSCLEROSIS: ICD-10-CM

## 2025-05-07 DIAGNOSIS — E78.5 HYPERLIPIDEMIA ASSOCIATED WITH TYPE 2 DIABETES MELLITUS: ICD-10-CM

## 2025-05-07 DIAGNOSIS — E78.1 TYPE 2 DIABETES MELLITUS WITH HYPERTRIGLYCERIDEMIA: ICD-10-CM

## 2025-05-07 DIAGNOSIS — E11.59 HYPERTENSION ASSOCIATED WITH DIABETES: ICD-10-CM

## 2025-05-07 PROCEDURE — 4010F ACE/ARB THERAPY RXD/TAKEN: CPT | Mod: CPTII,S$GLB,, | Performed by: PHYSICIAN ASSISTANT

## 2025-05-07 PROCEDURE — 99999 PR PBB SHADOW E&M-EST. PATIENT-LVL IV: CPT | Mod: PBBFAC,,, | Performed by: PHYSICIAN ASSISTANT

## 2025-05-07 PROCEDURE — 3066F NEPHROPATHY DOC TX: CPT | Mod: CPTII,S$GLB,, | Performed by: PHYSICIAN ASSISTANT

## 2025-05-07 PROCEDURE — 3074F SYST BP LT 130 MM HG: CPT | Mod: CPTII,S$GLB,, | Performed by: PHYSICIAN ASSISTANT

## 2025-05-07 PROCEDURE — 3052F HG A1C>EQUAL 8.0%<EQUAL 9.0%: CPT | Mod: CPTII,S$GLB,, | Performed by: PHYSICIAN ASSISTANT

## 2025-05-07 PROCEDURE — 3061F NEG MICROALBUMINURIA REV: CPT | Mod: CPTII,S$GLB,, | Performed by: PHYSICIAN ASSISTANT

## 2025-05-07 PROCEDURE — 99214 OFFICE O/P EST MOD 30 MIN: CPT | Mod: S$GLB,,, | Performed by: PHYSICIAN ASSISTANT

## 2025-05-07 PROCEDURE — 3008F BODY MASS INDEX DOCD: CPT | Mod: CPTII,S$GLB,, | Performed by: PHYSICIAN ASSISTANT

## 2025-05-07 PROCEDURE — 1126F AMNT PAIN NOTED NONE PRSNT: CPT | Mod: CPTII,S$GLB,, | Performed by: PHYSICIAN ASSISTANT

## 2025-05-07 PROCEDURE — 3078F DIAST BP <80 MM HG: CPT | Mod: CPTII,S$GLB,, | Performed by: PHYSICIAN ASSISTANT

## 2025-05-07 PROCEDURE — 1101F PT FALLS ASSESS-DOCD LE1/YR: CPT | Mod: CPTII,S$GLB,, | Performed by: PHYSICIAN ASSISTANT

## 2025-05-07 PROCEDURE — 1159F MED LIST DOCD IN RCRD: CPT | Mod: CPTII,S$GLB,, | Performed by: PHYSICIAN ASSISTANT

## 2025-05-07 PROCEDURE — 3288F FALL RISK ASSESSMENT DOCD: CPT | Mod: CPTII,S$GLB,, | Performed by: PHYSICIAN ASSISTANT

## 2025-05-07 RX ORDER — RAMELTEON 8 MG/1
8 TABLET ORAL NIGHTLY
COMMUNITY

## 2025-05-07 RX ORDER — FLECAINIDE ACETATE 100 MG/1
100 TABLET ORAL EVERY 12 HOURS
COMMUNITY

## 2025-05-07 RX ORDER — SODIUM, POTASSIUM,MAG SULFATES 17.5-3.13G
1 SOLUTION, RECONSTITUTED, ORAL ORAL DAILY
Qty: 1 KIT | Refills: 0 | Status: SHIPPED | OUTPATIENT
Start: 2025-05-07 | End: 2025-05-09

## 2025-05-07 NOTE — PROGRESS NOTES
Subjective:      Patient ID: Kelsie Bustamante is a 74 y.o. female.    Chief Complaint: No chief complaint on file.    HPI  History of Present Illness    CHIEF COMPLAINT:  Ms. Bustamante presents today for follow up to review all her home meds. She brought in all her meds for review today.     DYSPHAGIA:  She reports a history of trouble swallowing and choking on foods, though symptoms have been improving and are less severe than previously.  Uncontrolled diabetes: Scheduled to see diabetes management today. Eye exam also scheduled for today.   Colonoscopy scheduling apt scheduled for today        Medications were reviewed          Problem List[1]    Current Medications[2]    Review of Systems   Constitutional:  Negative for activity change, appetite change, chills, diaphoresis, fatigue, fever and unexpected weight change.   HENT: Negative.  Negative for congestion, hearing loss, postnasal drip, rhinorrhea, sore throat, trouble swallowing and voice change.    Eyes: Negative.  Negative for visual disturbance.   Respiratory: Negative.  Negative for cough, choking, chest tightness and shortness of breath.    Cardiovascular:  Negative for chest pain, palpitations and leg swelling.   Gastrointestinal:  Negative for abdominal distention, abdominal pain, blood in stool, constipation, diarrhea, nausea and vomiting.   Endocrine: Negative for cold intolerance, heat intolerance, polydipsia and polyuria.   Genitourinary: Negative.  Negative for difficulty urinating and frequency.   Musculoskeletal:  Negative for arthralgias, back pain, gait problem, joint swelling and myalgias.   Skin:  Negative for color change, pallor, rash and wound.   Neurological:  Negative for dizziness, tremors, weakness, light-headedness, numbness and headaches.   Hematological:  Negative for adenopathy.   Psychiatric/Behavioral:  Negative for behavioral problems, confusion, self-injury, sleep disturbance and suicidal ideas. The patient is not  "nervous/anxious.      Objective:   /74 (BP Location: Right arm, Patient Position: Sitting)   Pulse 77   Temp 97 °F (36.1 °C) (Tympanic)   Ht 5' 3" (1.6 m)   Wt 77.6 kg (171 lb 1.2 oz)   SpO2 96%   BMI 30.30 kg/m²     Physical Exam  Vitals and nursing note reviewed.   Constitutional:       General: She is not in acute distress.     Appearance: Normal appearance. She is well-developed. She is not ill-appearing, toxic-appearing or diaphoretic.   HENT:      Head: Normocephalic and atraumatic.   Cardiovascular:      Rate and Rhythm: Normal rate and regular rhythm.      Heart sounds: Normal heart sounds. No murmur heard.     No friction rub. No gallop.   Pulmonary:      Effort: Pulmonary effort is normal. No respiratory distress.      Breath sounds: Normal breath sounds. No wheezing or rales.   Musculoskeletal:         General: Normal range of motion.   Skin:     General: Skin is warm.      Capillary Refill: Capillary refill takes less than 2 seconds.      Findings: No rash.   Neurological:      Mental Status: She is alert and oriented to person, place, and time.      Motor: No weakness.      Gait: Gait normal.   Psychiatric:         Mood and Affect: Mood normal.         Behavior: Behavior normal.         Thought Content: Thought content normal.         Judgment: Judgment normal.         Assessment:     1. Uncontrolled type 2 diabetes mellitus with hyperglycemia    2. Type 2 diabetes mellitus with hypertriglyceridemia    3. Thyroid nodule    4. Other insomnia    5. Hyperlipidemia associated with type 2 diabetes mellitus    6. Hypertension associated with diabetes    7. Gastroesophageal reflux disease without esophagitis    8. Aortic atherosclerosis    9. Achalasia of esophagus    10. DM cataract bilateral with bilateral extractions    11. Pacemaker    12. PAF (paroxysmal atrial fibrillation)      Plan:   Noted importance of Jardiance for kidney protection and diabetes management.  Awaiting lab results to " further inform diabetes management plan.  Clarified that Losartan is for kidney protection and BP management    Uncontrolled type 2 diabetes mellitus with hyperglycemia  -     Hemoglobin A1C; Future; Expected date: 08/07/2025  -     Basic Metabolic Panel; Future; Expected date: 08/07/2025  - Ms. Bustamante is currently on Metformin twice daily and Jardiance for diabetes management and kidney protection.  - Labs are in progress to assess current glycemic control.  - Discussed potential need for insulin if diabetes remains uncontrolled.  - Diabetes appointment scheduled today    Type 2 diabetes mellitus with hypertriglyceridemia  -see diabetes today for change in treatment    Thyroid nodule  -THYROID ultrasound scheduled for tomorrow at 11:00 AM in [facility].    Other insomnia  -cont CR ambien    Hyperlipidemia associated with type 2 diabetes mellitus  -cont crestor    Hypertension associated with diabetes  -cont metoprolol and losartan    Gastroesophageal reflux disease without esophagitis  -stable on protonix    Aortic atherosclerosis  -cont eliquis and crestor    Achalasia of esophagus  - Continue Protonix for reflux management.  -PT reports that her symptoms have been stable    DM cataract bilateral with bilateral extractions  -eye exam scheduled for today    Pacemaker  -cont eliquis    PAF (paroxysmal atrial fibrillation)  -CONT ELIQUIS AND METOPROLOL    FOLLOW-UP:  - Ms. Bustamante to contact office when lab results become available.  - Follow up in 3 months.  - Today's appointments include: phone call at 11:00 AM to discuss colonoscopy and eye appointment at 2:45 PM (possibly earlier if available on 3rd floor).         -CONFIRMED TODAY THAT PT IS TAKING ALL THE APPROPRIATE MEDICATIONS.     This note was generated with the assistance of ambient listening technology. Verbal consent was obtained by the patient and accompanying visitor(s) for the recording of patient appointment to facilitate this note. I attest to  having reviewed and edited the generated note for accuracy, though some syntax or spelling errors may persist. Please contact the author of this note for any clarification.      Follow up in about 3 months (around 8/7/2025), or if symptoms worsen or fail to improve.           [1]   Patient Active Problem List  Diagnosis    Hypertension associated with diabetes    Type 2 diabetes mellitus with diabetic polyneuropathy, without long-term current use of insulin    Peripheral neuropathy    Carpal tunnel syndrome    Localized primary carpometacarpal osteoarthritis    Gastroesophageal reflux disease without esophagitis    Thyroid nodule    Premature menopause (surgery in 30s)    Palpitation    Other insomnia    Normocytic anemia    PAF (paroxysmal atrial fibrillation)    Maxillary cyst    Personal history of pneumonia (recurrent)    Personal history of urinary (tract) infections    Fibromyalgia    Hyperlipidemia associated with type 2 diabetes mellitus    Odynophagia    Dysphagia    Achalasia of esophagus    Chronic anticoagulation- Eliquis    Pacemaker    Other thrombophilia    Chronic cough    Uncontrolled type 2 diabetes mellitus with hyperglycemia    Type 2 diabetes mellitus with hypertriglyceridemia    Mixed simple and mucopurulent chronic bronchitis    Aortic atherosclerosis    Possible Memory changes    DM cataract bilateral with bilateral extractions    History of colon polyps   [2]   Current Outpatient Medications:     albuterol-ipratropium (DUO-NEB) 2.5 mg-0.5 mg/3 mL nebulizer solution, Take 3 mLs by nebulization every 6 (six) hours as needed for Wheezing. Rescue, Disp: 180 mL, Rfl: 3    apixaban (ELIQUIS) 5 mg Tab, Take 1 tablet (5 mg total) by mouth 2 (two) times daily., Disp: 180 tablet, Rfl: 4    budesonide (PULMICORT) 0.25 mg/2 mL nebulizer solution, Take 2 mLs (0.25 mg total) by nebulization once daily. Controller, Disp: 180 mL, Rfl: 1    empagliflozin (JARDIANCE) 10 mg tablet, Take 1 tablet (10 mg total)  "by mouth once daily., Disp: 90 tablet, Rfl: 3    estrogens, conjugated, (PREMARIN) 0.625 MG tablet, Take 1 tablet (0.625 mg total) by mouth once daily., Disp: 90 tablet, Rfl: 3    flecainide (TAMBOCOR) 100 MG Tab, Take 100 mg by mouth every 12 (twelve) hours., Disp: , Rfl:     glimepiride (AMARYL) 2 MG tablet, Take 1 tablet (2 mg total) by mouth daily with breakfast., Disp: 90 tablet, Rfl: 3    losartan (COZAAR) 25 MG tablet, Take 1 tablet (25 mg total) by mouth once daily., Disp: 90 tablet, Rfl: 3    metFORMIN (GLUCOPHAGE-XR) 500 MG ER 24hr tablet, Take 2 tablets (1,000 mg total) by mouth 2 (two) times daily with meals., Disp: 360 tablet, Rfl: 3    metoprolol succinate (TOPROL-XL) 100 MG 24 hr tablet, Take 1 tablet (100 mg total) by mouth 2 (two) times daily. 100mg twice daily, Disp: 180 tablet, Rfl: 3    nebulizer and compressor La, Use every 6 hours as needed for wheezing, Disp: 1 each, Rfl: 0    pantoprazole (PROTONIX) 40 MG tablet, Take 1 tablet (40 mg total) by mouth 2 (two) times daily., Disp: 180 tablet, Rfl: 3    pen needle, diabetic 32 gauge x 5/32" Ndle, Use to dispense insulin once daily, Disp: 100 each, Rfl: 0    ramelteon (ROZEREM) 8 mg tablet, Take 8 mg by mouth every evening., Disp: , Rfl:     rosuvastatin (CRESTOR) 5 MG tablet, Take 1 tablet (5 mg total) by mouth once daily., Disp: 90 tablet, Rfl: 3    tiotropium-olodateroL (STIOLTO RESPIMAT) 2.5-2.5 mcg/actuation Mist, Inhale 2 puffs into the lungs once daily. Controller, Disp: 1 g, Rfl: 5    zolpidem (AMBIEN CR) 6.25 MG CR tablet, Take 1 tablet (6.25 mg total) by mouth nightly as needed for Insomnia., Disp: 30 tablet, Rfl: 0    "

## 2025-05-08 ENCOUNTER — HOSPITAL ENCOUNTER (OUTPATIENT)
Dept: RADIOLOGY | Facility: HOSPITAL | Age: 75
Discharge: HOME | End: 2025-05-08
Attending: PHYSICIAN ASSISTANT
Payer: MEDICARE

## 2025-05-08 ENCOUNTER — RESULTS FOLLOW-UP (OUTPATIENT)
Dept: INTERNAL MEDICINE | Facility: CLINIC | Age: 75
End: 2025-05-08

## 2025-05-08 DIAGNOSIS — E04.1 THYROID NODULE: ICD-10-CM

## 2025-05-08 PROCEDURE — 76536 US EXAM OF HEAD AND NECK: CPT | Mod: TC,PO

## 2025-05-08 PROCEDURE — 76536 US EXAM OF HEAD AND NECK: CPT | Mod: 26,,, | Performed by: RADIOLOGY

## 2025-05-15 ENCOUNTER — OFFICE VISIT (OUTPATIENT)
Dept: DIABETES | Facility: CLINIC | Age: 75
End: 2025-05-15
Payer: OTHER GOVERNMENT

## 2025-05-15 VITALS
HEART RATE: 79 BPM | SYSTOLIC BLOOD PRESSURE: 145 MMHG | BODY MASS INDEX: 30.66 KG/M2 | WEIGHT: 173.06 LBS | DIASTOLIC BLOOD PRESSURE: 69 MMHG

## 2025-05-15 DIAGNOSIS — E78.5 HYPERLIPIDEMIA ASSOCIATED WITH TYPE 2 DIABETES MELLITUS: ICD-10-CM

## 2025-05-15 DIAGNOSIS — I15.2 HYPERTENSION ASSOCIATED WITH DIABETES: ICD-10-CM

## 2025-05-15 DIAGNOSIS — E11.42 TYPE 2 DIABETES MELLITUS WITH DIABETIC POLYNEUROPATHY, WITHOUT LONG-TERM CURRENT USE OF INSULIN: ICD-10-CM

## 2025-05-15 DIAGNOSIS — E11.69 HYPERLIPIDEMIA ASSOCIATED WITH TYPE 2 DIABETES MELLITUS: ICD-10-CM

## 2025-05-15 DIAGNOSIS — K22.0 ACHALASIA OF ESOPHAGUS: ICD-10-CM

## 2025-05-15 DIAGNOSIS — E11.59 HYPERTENSION ASSOCIATED WITH DIABETES: ICD-10-CM

## 2025-05-15 DIAGNOSIS — E11.65 TYPE 2 DIABETES MELLITUS WITH HYPERGLYCEMIA, WITHOUT LONG-TERM CURRENT USE OF INSULIN: Primary | ICD-10-CM

## 2025-05-15 LAB — GLUCOSE SERPL-MCNC: 268 MG/DL (ref 70–110)

## 2025-05-15 PROCEDURE — 99999 PR PBB SHADOW E&M-EST. PATIENT-LVL V: CPT | Mod: PBBFAC,,, | Performed by: NURSE PRACTITIONER

## 2025-05-15 PROCEDURE — 99215 OFFICE O/P EST HI 40 MIN: CPT | Mod: PBBFAC | Performed by: NURSE PRACTITIONER

## 2025-05-15 PROCEDURE — 82962 GLUCOSE BLOOD TEST: CPT | Mod: PBBFAC | Performed by: NURSE PRACTITIONER

## 2025-05-15 PROCEDURE — G2211 COMPLEX E/M VISIT ADD ON: HCPCS | Mod: S$PBB,,, | Performed by: NURSE PRACTITIONER

## 2025-05-15 PROCEDURE — 99999PBSHW POCT GLUCOSE, HAND-HELD DEVICE: Mod: PBBFAC,,,

## 2025-05-15 PROCEDURE — 99214 OFFICE O/P EST MOD 30 MIN: CPT | Mod: S$PBB,,, | Performed by: NURSE PRACTITIONER

## 2025-05-15 RX ORDER — TIRZEPATIDE 2.5 MG/.5ML
2.5 INJECTION, SOLUTION SUBCUTANEOUS
Qty: 4 PEN | Refills: 1 | Status: SHIPPED | OUTPATIENT
Start: 2025-05-15

## 2025-05-15 NOTE — PROGRESS NOTES
Patient ID: Kelsie Bustamante is a 74 y.o. female.  Patient's current PCP is Cody Parks MD.   Collaborating Physician: CR Guardado MD    Chief Complaint: Diabetes Mellitus    HPI  Kelsie Bustamante is a 74 y.o. White female presenting for a new consult for diabetes.       Patient has been diagnosed with type 2 diabetes for > 10 years. S/p gastric sleeve sx 2009  Complications related to diabetes: peripheral neuropathy  Recent diabetes related hospitalizations: none    Previous diabetes education: none  Occupation: ---  LMP: ---    Current diet: Skips breakfast, eats lunch, snack, supper, snack. Drinking 1/2 and 1/2 tea -3 cups/day. Occasional juice.  Current weight: 173 on 5/15/25  Weight at FOV: 173 on 5/15/25  Activity level: none set    Changes made at the last visit: ---    Current issues: Elevated glucose. Having issues with swallowing due to achalasia of esophagus. States never received Jardiance from Mail order pharmacy    Personal history of pancreatitis: denies  Personal history of abdominal surgery: Gastric sleeve 2009   Personal history of thyroid surgery: denies  Family history of pancreatic cancer in first-degree relative: denies  Family history of MTC/MEN/endocrine tumors: denies       Past Medical History:   Diagnosis Date    Achalasia     demetrius    Atrial fibrillation with RVR 05/11/2021    Cataract     Colon polyp     Gastroesophageal reflux     Hiatal hernia     Hx of colonic polyps 08/15/2014    per colonoscopy in      Hyperlipidemia 06/24/2022    Hypertension     Neck pain on right side 10/20/2020    Pacemaker     Peripheral neuropathy     Postmenopausal HRT (hormone replacement therapy)     failed tapering off    SAH (subarachnoid hemorrhage)     from a fall late 23    Sepsis 05/05/2021    Last Assessment & Plan:  Formatting of this note might be different from the original. History & Physical Patient meets sepsis criteria with a white cell count of 15, and tachypnea.  Source of  infection not clear at this time.  No evidence for meningitis.  Cover with broad-spectrum antibiotics especially given invasive dental procedure done recently.  Check blood cultures and monitor for fever.  R    Sinusitis     Thyroid nodule 3/16 subcm; kathleen 2 yrs    Type 2 diabetes mellitus with neurological manifestations     Vasodepressor syncope 08/22/2018     Social History[1]    Review of patient's allergies indicates:   Allergen Reactions    Floxin [ofloxacin] Diarrhea and Nausea And Vomiting    Fluvastatin Other (See Comments)     myalgia    Pravastatin Other (See Comments)     myalgia       CURRENT DM MEDICATIONS:   Diabetes Medications              empagliflozin (JARDIANCE) 10 mg tablet Take 1 tablet (10 mg total) by mouth once daily. Not taking     glimepiride (AMARYL) 2 MG tablet Take 1 tablet (2 mg total) by mouth daily with breakfast.    metFORMIN (GLUCOPHAGE-XR) 500 MG ER 24hr tablet Take 2 tablets (1,000 mg total) by mouth 2 (two) times daily with meals.     Past failed treatment(s) include:   none    Meter/cgm: meter  Blood glucose testing is performed sporadically.   Patient is testing 0-1 times per day.  Any episodes of hypoglycemia? Denies  Glucose trends:   Not available - reports 200s with am lower than pm    CGM data:  ---    Her blood sugar in the clinic today was:   Lab Results   Component Value Date    POCGLU 268 (A) 05/15/2025       Statin: Taking  ACE/ARB: Taking    Labs reviewed and are noted below.    Screening or Prevention Patient's value Goal Complete/Controlled?   HgA1C Testing and Control   Lab Results   Component Value Date    HGBA1C 8.9 (H) 05/05/2025      Annually/Less than 8% No   Lipid profile : 03/18/2025 Annually Yes   LDL control Lab Results   Component Value Date    LDLCALC 53.0 (L) 03/18/2025    Annually/Less than 100 mg/dl  Yes   Nephropathy screening Lab Results   Component Value Date    MICALBCREAT 12.2 03/18/2025     Lab Results   Component Value Date    PROTEINUA  "Negative 05/07/2025    Annually Yes   Blood pressure BP Readings from Last 1 Encounters:   05/15/25 (!) 145/69    Less than 140/90 No   Dilated retinal exam : 11/11/2022 Annually No    Foot exam   : 05/05/2025 Annually Yes     Glucose   Date Value Ref Range Status   01/30/2025 235 (H) 70 - 110 mg/dL Final     Anion Gap   Date Value Ref Range Status   01/30/2025 11 8 - 16 mmol/L Final     eGFR if    Date Value Ref Range Status   06/25/2022 >60 >60 mL/min/1.73 m^2 Final     eGFR    Date Value Ref Range Status   11/03/2022 69 mL/min/1.73mSq Final     Comment:     In accordance with NKF-ASN Task Force recommendation, calculation based on the Chronic Kidney Disease Epidemiology Collaboration (CKD-EPI) equation without adjustment for race. eGFR adjusted for gender and age and calculated in ml/min/1.73mSquared. eGFR cannot be calculated if patient is under 18 years of age.     Reference Range:   >= 60 ml/min/1.73mSquared.     eGFR if non    Date Value Ref Range Status   06/25/2022 57 (A) >60 mL/min/1.73 m^2 Final     Comment:     Calculation used to obtain the estimated glomerular filtration  rate (eGFR) is the CKD-EPI equation.        Lab Results   Component Value Date    FREET4 1.01 08/13/2018    TSH 3.120 01/30/2025     No results found for: "CPEPTIDE"  No results found for: "GLUTAMICACID"    Wt Readings from Last 3 Encounters:   05/15/25 1453 78.5 kg (173 lb 1 oz)   05/07/25 0935 77.6 kg (171 lb 1.2 oz)   05/05/25 1425 78.2 kg (172 lb 6.4 oz)       Review of Systems   Constitutional:  Positive for malaise/fatigue. Negative for weight loss.   Eyes:  Negative for blurred vision and double vision.   Respiratory:  Negative for shortness of breath.    Cardiovascular: Negative.    Gastrointestinal:  Positive for heartburn.   Genitourinary:  Negative for frequency.   Musculoskeletal:  Negative for myalgias.   Neurological: Negative.    Psychiatric/Behavioral: Negative.   "       Physical Exam  Vitals reviewed.   Constitutional:       General: She is not in acute distress.     Appearance: Normal appearance. She is obese.   Eyes:      Conjunctiva/sclera: Conjunctivae normal.      Pupils: Pupils are equal, round, and reactive to light.   Neck:      Thyroid: No thyroid mass, thyromegaly or thyroid tenderness.      Vascular: No carotid bruit.   Cardiovascular:      Rate and Rhythm: Normal rate and regular rhythm.      Pulses: Normal pulses.      Heart sounds: Normal heart sounds.   Pulmonary:      Effort: Pulmonary effort is normal.      Breath sounds: Normal breath sounds.   Abdominal:      General: Bowel sounds are normal.      Palpations: Abdomen is soft.   Musculoskeletal:      Cervical back: Normal range of motion and neck supple.      Right lower leg: No edema.      Left lower leg: No edema.   Skin:     General: Skin is warm and dry.   Neurological:      General: No focal deficit present.      Mental Status: She is alert and oriented to person, place, and time.   Psychiatric:         Mood and Affect: Mood normal.         Behavior: Behavior normal.           Assessment & Plan    Kelsie was seen today for diabetes mellitus.    Diagnoses and all orders for this visit:    Type 2 diabetes mellitus with hyperglycemia, without long-term current use of insulin  -     Ambulatory referral/consult to Diabetic Advanced Practice Providers (Medical Management)  -     POCT Glucose, Hand-Held Device  -     MOUNJARO 2.5 mg/0.5 mL PnIj; Inject 2.5 mg into the skin every 7 days.  -     Stop/decrease Sweet tea  Hold Jardiance  Start Mounjaro 2.5 mg once a week x 4 weeks  Move glimepiride 2 mg to before evening meal  Move Metformin  mg 2 tablets at lunch and 2 tablets at evening meal  Monitor for low blood sugar and notify me if occurs  Prefers to see Dietitian in [place]     Type 2 diabetes mellitus with diabetic polyneuropathy, without long-term current use of insulin  -     Ambulatory  referral/consult to Diabetic Advanced Practice Providers (Medical Management)  -     POCT Glucose, Hand-Held Device  -     MOUNJARO 2.5 mg/0.5 mL PnIj; Inject 2.5 mg into the skin every 7 days.    Achalasia of esophagus - followed by specialist  -     Ambulatory referral/consult to Diabetic Advanced Practice Providers (Medical Management)    Hypertension associated with diabetes - on ARB. > goal, patient states she is anxious    Hyperlipidemia associated with type 2 diabetes mellitus - on statin    - Reviewed with patient:  The basic pathophysiology of Type 2 diabetes  Mechanism of action and action time of medications - reviewed at [facility] possible side effects of GLP-1/GIP. To call asap if any GERD/CONSTIPATION/ OR DIFFICULTY KEEPING FOOD DOWN  Use of home glucose monitor/cgm  Basic diet/carbohydrate counting/avoiding simple sugars/plate method. Rec low fat diet  Proper hydration   Risk of complications and preventive measures  When to call for assistance          - Follow up: 6 weeks    Visit today included increased complexity associated with the care of the episodic problem GLUCOSE CONTROL addressed and managing the longitudinal care of the patient due to the serious and/or complex managed problem(s) DIABETES.                   [1]   Social History  Socioeconomic History    Marital status:     Number of children: 2   Tobacco Use    Smoking status: Never    Smokeless tobacco: Never   Substance and Sexual Activity    Alcohol use: No    Drug use: No    Sexual activity: Never     Social Drivers of Health     Financial Resource Strain: Low Risk  (11/19/2024)    Overall Financial Resource Strain (CARDIA)     Difficulty of Paying Living Expenses: Not very hard   Food Insecurity: No Food Insecurity (11/19/2024)    Hunger Vital Sign     Worried About Running Out of Food in the Last Year: Never true     Ran Out of Food in the Last Year: Never true   Transportation Needs: No Transportation Needs (11/19/2024)     PRAPARE - Transportation     Lack of Transportation (Medical): No     Lack of Transportation (Non-Medical): No   Physical Activity: Inactive (11/19/2024)    Exercise Vital Sign     Days of Exercise per Week: 0 days     Minutes of Exercise per Session: 0 min   Stress: Stress Concern Present (11/19/2024)    [employer] [place] [employer] - Occupational Stress Questionnaire     Feeling of Stress : To some extent   Housing Stability: Unknown (11/19/2024)    Housing Stability Vital Sign     Unable to Pay for Housing in the Last Year: No     Homeless in the Last Year: No

## 2025-05-15 NOTE — PATIENT INSTRUCTIONS
Stop/decrease  Sweet tea    Hold Jardiance    Start Mounjaro 2.5 mg once a week x 4 weeks    Move glimepiride 2 mg to before evening meal    Move Metformin  mg 2 tablets at lunch and 2 tablets at evening meal    Monitor for low blood sugar and notify me if occurs    Dietitian in [place]

## 2025-05-19 ENCOUNTER — TELEPHONE (OUTPATIENT)
Dept: DIABETES | Facility: CLINIC | Age: 75
End: 2025-05-19
Payer: OTHER GOVERNMENT

## 2025-05-19 NOTE — TELEPHONE ENCOUNTER
No previous order found for [facility] . Called patient for f/u , pt did not answer . Left message for pt to give a call back in regards to the request.

## 2025-05-19 NOTE — TELEPHONE ENCOUNTER
----- Message from Diamond sent at 5/19/2025 11:19 AM CDT -----  Contact: [facility]  [facility] calling regarding orders. Sent over a fax requesting orders for a Freestyle itz continuous glucose monitor. Please fax orders to : [phone]. Call back if needed [phone] option 3

## 2025-05-21 ENCOUNTER — TELEPHONE (OUTPATIENT)
Dept: INTERNAL MEDICINE | Facility: CLINIC | Age: 75
End: 2025-05-21
Payer: OTHER GOVERNMENT

## 2025-05-21 NOTE — TELEPHONE ENCOUNTER
----- Message from Carrol sent at 5/21/2025 11:51 AM CDT -----  Type:  Patient Requesting a call back Who Called:Aliza with [facility] What is the call back request regarding?:calling for an update on a fax that was sent, caller states that it's urgent and needs to be in contact with MD or nurse so pt doesn't run out of supplies. They are sending another fax and states if was received already please disregard Would the patient rather a call back or a response via [facility] Back Number:[phone] Fax:[phone] Information:

## 2025-06-04 ENCOUNTER — CLINICAL SUPPORT (OUTPATIENT)
Dept: CARDIOLOGY | Facility: HOSPITAL | Age: 75
End: 2025-06-04
Payer: OTHER GOVERNMENT

## 2025-06-04 ENCOUNTER — CLINICAL SUPPORT (OUTPATIENT)
Dept: CARDIOLOGY | Facility: HOSPITAL | Age: 75
End: 2025-06-04
Attending: INTERNAL MEDICINE
Payer: OTHER GOVERNMENT

## 2025-06-04 DIAGNOSIS — Z95.0 PRESENCE OF CARDIAC PACEMAKER: ICD-10-CM

## 2025-06-04 PROCEDURE — 93296 REM INTERROG EVL PM/IDS: CPT | Performed by: INTERNAL MEDICINE

## 2025-06-04 PROCEDURE — 93294 REM INTERROG EVL PM/LDLS PM: CPT | Mod: ,,, | Performed by: INTERNAL MEDICINE

## 2025-06-05 ENCOUNTER — TELEPHONE (OUTPATIENT)
Dept: DIABETES | Facility: CLINIC | Age: 75
End: 2025-06-05
Payer: OTHER GOVERNMENT

## 2025-06-06 ENCOUNTER — TELEPHONE (OUTPATIENT)
Dept: INTERNAL MEDICINE | Facility: CLINIC | Age: 75
End: 2025-06-06
Payer: OTHER GOVERNMENT

## 2025-06-11 DIAGNOSIS — E11.65 TYPE 2 DIABETES MELLITUS WITH HYPERGLYCEMIA, WITHOUT LONG-TERM CURRENT USE OF INSULIN: ICD-10-CM

## 2025-06-11 DIAGNOSIS — E11.42 TYPE 2 DIABETES MELLITUS WITH DIABETIC POLYNEUROPATHY, WITHOUT LONG-TERM CURRENT USE OF INSULIN: ICD-10-CM

## 2025-06-11 DIAGNOSIS — F51.01 PRIMARY INSOMNIA: ICD-10-CM

## 2025-06-11 RX ORDER — TIRZEPATIDE 2.5 MG/.5ML
2.5 INJECTION, SOLUTION SUBCUTANEOUS
Qty: 4 PEN | Refills: 1 | OUTPATIENT
Start: 2025-06-11

## 2025-06-12 DIAGNOSIS — Z95.0 CARDIAC PACEMAKER IN SITU: Primary | ICD-10-CM

## 2025-06-12 DIAGNOSIS — R00.1 SYMPTOMATIC SINUS BRADYCARDIA: ICD-10-CM

## 2025-06-12 RX ORDER — ZOLPIDEM TARTRATE 6.25 MG/1
6.25 TABLET, FILM COATED, EXTENDED RELEASE ORAL NIGHTLY PRN
Qty: 30 TABLET | Refills: 0 | Status: SHIPPED | OUTPATIENT
Start: 2025-06-12 | End: 2025-12-11

## 2025-06-13 ENCOUNTER — TELEPHONE (OUTPATIENT)
Dept: INTERNAL MEDICINE | Facility: CLINIC | Age: 75
End: 2025-06-13
Payer: OTHER GOVERNMENT

## 2025-06-13 NOTE — TELEPHONE ENCOUNTER
Copied from CRM #[id number]. Topic: General Inquiry - Patient Advice  >> Jun 12, 2025  4:40 PM Eveline wrote:  Type:  Needs Medical Advice    Who Called: Penelope garsia/  MED   Symptoms (please be specific): -   How long has patient had these symptoms:  -  Pharmacy name and phone #:  -  Would the patient rather a call back or a response via [facility]?    Best Call Back Number: [phone]  Additional Information: wants to know if a request for diabetic supplies were received pls call

## 2025-06-16 LAB
OHS CV AF BURDEN PERCENT: 2
OHS CV DC REMOTE DEVICE TYPE: NORMAL
OHS CV RV PACING PERCENT: 1 %

## 2025-06-17 ENCOUNTER — TELEPHONE (OUTPATIENT)
Dept: DIABETES | Facility: CLINIC | Age: 75
End: 2025-06-17
Payer: OTHER GOVERNMENT

## 2025-06-17 NOTE — TELEPHONE ENCOUNTER
Copied from CRM #[id number]. Topic: General Inquiry - Patient Advice  >> Jun 17, 2025  9:05 AM Julián wrote:  .Type:  Needs Medical Advice    Who Called: [facility] [facility]  Would the patient rather a call back or a response via [facility]? Call back  Best Call Back Number: [phone]  Additional Information: calling to find out if this office received a fax they sent over for freestyle itz continuous glucose monitor - states it is urgent needs signed and sent back asap

## 2025-06-20 ENCOUNTER — TELEPHONE (OUTPATIENT)
Dept: DIABETES | Facility: CLINIC | Age: 75
End: 2025-06-20
Payer: OTHER GOVERNMENT

## 2025-06-20 NOTE — TELEPHONE ENCOUNTER
Copied from CRM #[id number]. Topic: General Inquiry - Patient Advice  >> Jun 20, 2025  9:19 AM Genna wrote:  Type:  Patient Returning Call    [facility]  Who Left Message for Patient:  Does the patient know what this is regarding?:Diabetes care  Would the patient rather a call back or a response via [facility]? Callback  Best Call Back Number:[phone] fax [phone]  Additional Information: Please callback to assist fax was sent on 06/12, 06/16, 06/17 and 06/19

## 2025-06-20 NOTE — TELEPHONE ENCOUNTER
Attempted to contact pt to see if Pt is requesting supplies from [facility]. Pt didn't answer, Lvm for pt to return call .

## 2025-06-22 DIAGNOSIS — E11.65 TYPE 2 DIABETES MELLITUS WITH HYPERGLYCEMIA, WITHOUT LONG-TERM CURRENT USE OF INSULIN: ICD-10-CM

## 2025-06-22 DIAGNOSIS — E11.42 TYPE 2 DIABETES MELLITUS WITH DIABETIC POLYNEUROPATHY, WITHOUT LONG-TERM CURRENT USE OF INSULIN: ICD-10-CM

## 2025-06-22 RX ORDER — TIRZEPATIDE 2.5 MG/.5ML
2.5 INJECTION, SOLUTION SUBCUTANEOUS
Qty: 4 PEN | Refills: 1 | Status: CANCELLED | OUTPATIENT
Start: 2025-06-22

## 2025-06-23 NOTE — TELEPHONE ENCOUNTER
----- Message from Nina sent at 6/23/2025 11:22 AM CDT -----  Contact: Maryan garsia/ [facility]  .Type:  Patient Requesting Call    Who Called:Maryan garsia/ [facility]  Does the patient know what this is regarding?:Maryan garsia/ [facility] called in to check the status of fax that was sent over multiple times for patients supplies  Would the patient rather a call back or a response via [facility]? Call back  Best Call Back Number:[phone]  Additional Information:

## 2025-06-23 NOTE — TELEPHONE ENCOUNTER
Unable to reach pt to inquire if she wanted her cgm supplies filled by [facility], no answer, LVM. Contacted spouse Yimi. He states pt sleeps late. No orders provided [place] until this is confirmed with pt. Informed Maryan with [facility]

## 2025-06-26 ENCOUNTER — CLINICAL SUPPORT (OUTPATIENT)
Dept: CARDIOLOGY | Facility: HOSPITAL | Age: 75
End: 2025-06-26
Attending: INTERNAL MEDICINE
Payer: OTHER GOVERNMENT

## 2025-06-26 DIAGNOSIS — Z95.0 CARDIAC PACEMAKER IN SITU: ICD-10-CM

## 2025-06-26 DIAGNOSIS — R00.1 SYMPTOMATIC SINUS BRADYCARDIA: ICD-10-CM

## 2025-06-26 PROCEDURE — 99211 OFF/OP EST MAY X REQ PHY/QHP: CPT | Mod: PBBFAC

## 2025-06-26 PROCEDURE — 99999 PR PBB SHADOW E&M-EST. PATIENT-LVL I: CPT | Mod: PBBFAC,,,

## 2025-06-27 ENCOUNTER — TELEPHONE (OUTPATIENT)
Dept: DIABETES | Facility: CLINIC | Age: 75
End: 2025-06-27
Payer: MEDICARE

## 2025-06-27 NOTE — TELEPHONE ENCOUNTER
Signed order for cgm through [facility] [employer] faxed to [phone]    Order uploaded to media

## 2025-06-30 ENCOUNTER — TELEPHONE (OUTPATIENT)
Dept: OPHTHALMOLOGY | Facility: CLINIC | Age: 75
End: 2025-06-30
Payer: MEDICARE

## 2025-06-30 ENCOUNTER — TELEPHONE (OUTPATIENT)
Dept: DIABETES | Facility: CLINIC | Age: 75
End: 2025-06-30
Payer: MEDICARE

## 2025-06-30 ENCOUNTER — OFFICE VISIT (OUTPATIENT)
Dept: INTERNAL MEDICINE | Facility: CLINIC | Age: 75
End: 2025-06-30
Payer: MEDICARE

## 2025-06-30 VITALS
TEMPERATURE: 98 F | DIASTOLIC BLOOD PRESSURE: 80 MMHG | HEIGHT: 63 IN | SYSTOLIC BLOOD PRESSURE: 124 MMHG | WEIGHT: 168.88 LBS | BODY MASS INDEX: 29.92 KG/M2 | OXYGEN SATURATION: 96 % | HEART RATE: 73 BPM

## 2025-06-30 DIAGNOSIS — E11.65 UNCONTROLLED TYPE 2 DIABETES MELLITUS WITH HYPERGLYCEMIA: Primary | ICD-10-CM

## 2025-06-30 DIAGNOSIS — Z12.11 SCREEN FOR COLON CANCER: ICD-10-CM

## 2025-06-30 DIAGNOSIS — K21.9 GASTROESOPHAGEAL REFLUX DISEASE WITHOUT ESOPHAGITIS: ICD-10-CM

## 2025-06-30 DIAGNOSIS — E11.59 HYPERTENSION ASSOCIATED WITH DIABETES: ICD-10-CM

## 2025-06-30 DIAGNOSIS — I15.2 HYPERTENSION ASSOCIATED WITH DIABETES: ICD-10-CM

## 2025-06-30 DIAGNOSIS — E11.65 TYPE 2 DIABETES MELLITUS WITH HYPERGLYCEMIA, WITHOUT LONG-TERM CURRENT USE OF INSULIN: ICD-10-CM

## 2025-06-30 DIAGNOSIS — K22.0 ACHALASIA OF ESOPHAGUS: ICD-10-CM

## 2025-06-30 DIAGNOSIS — E78.5 HYPERLIPIDEMIA ASSOCIATED WITH TYPE 2 DIABETES MELLITUS: ICD-10-CM

## 2025-06-30 DIAGNOSIS — E11.42 TYPE 2 DIABETES MELLITUS WITH DIABETIC POLYNEUROPATHY, WITHOUT LONG-TERM CURRENT USE OF INSULIN: ICD-10-CM

## 2025-06-30 DIAGNOSIS — E11.69 HYPERLIPIDEMIA ASSOCIATED WITH TYPE 2 DIABETES MELLITUS: ICD-10-CM

## 2025-06-30 PROCEDURE — 99999 PR PBB SHADOW E&M-EST. PATIENT-LVL V: CPT | Mod: PBBFAC,,, | Performed by: PHYSICIAN ASSISTANT

## 2025-06-30 PROCEDURE — 3288F FALL RISK ASSESSMENT DOCD: CPT | Mod: CPTII,S$GLB,, | Performed by: PHYSICIAN ASSISTANT

## 2025-06-30 PROCEDURE — 1101F PT FALLS ASSESS-DOCD LE1/YR: CPT | Mod: CPTII,S$GLB,, | Performed by: PHYSICIAN ASSISTANT

## 2025-06-30 PROCEDURE — 3066F NEPHROPATHY DOC TX: CPT | Mod: CPTII,S$GLB,, | Performed by: PHYSICIAN ASSISTANT

## 2025-06-30 PROCEDURE — 3052F HG A1C>EQUAL 8.0%<EQUAL 9.0%: CPT | Mod: CPTII,S$GLB,, | Performed by: PHYSICIAN ASSISTANT

## 2025-06-30 PROCEDURE — 1160F RVW MEDS BY RX/DR IN RCRD: CPT | Mod: CPTII,S$GLB,, | Performed by: PHYSICIAN ASSISTANT

## 2025-06-30 PROCEDURE — 99214 OFFICE O/P EST MOD 30 MIN: CPT | Mod: S$GLB,,, | Performed by: PHYSICIAN ASSISTANT

## 2025-06-30 PROCEDURE — 3008F BODY MASS INDEX DOCD: CPT | Mod: CPTII,S$GLB,, | Performed by: PHYSICIAN ASSISTANT

## 2025-06-30 PROCEDURE — 3061F NEG MICROALBUMINURIA REV: CPT | Mod: CPTII,S$GLB,, | Performed by: PHYSICIAN ASSISTANT

## 2025-06-30 PROCEDURE — 3074F SYST BP LT 130 MM HG: CPT | Mod: CPTII,S$GLB,, | Performed by: PHYSICIAN ASSISTANT

## 2025-06-30 PROCEDURE — 3079F DIAST BP 80-89 MM HG: CPT | Mod: CPTII,S$GLB,, | Performed by: PHYSICIAN ASSISTANT

## 2025-06-30 PROCEDURE — 1126F AMNT PAIN NOTED NONE PRSNT: CPT | Mod: CPTII,S$GLB,, | Performed by: PHYSICIAN ASSISTANT

## 2025-06-30 PROCEDURE — 1159F MED LIST DOCD IN RCRD: CPT | Mod: CPTII,S$GLB,, | Performed by: PHYSICIAN ASSISTANT

## 2025-06-30 PROCEDURE — 4010F ACE/ARB THERAPY RXD/TAKEN: CPT | Mod: CPTII,S$GLB,, | Performed by: PHYSICIAN ASSISTANT

## 2025-06-30 RX ORDER — TIRZEPATIDE 5 MG/.5ML
5 INJECTION, SOLUTION SUBCUTANEOUS
Qty: 2 ML | Refills: 6 | Status: SHIPPED | OUTPATIENT
Start: 2025-06-30 | End: 2026-01-12

## 2025-06-30 NOTE — TELEPHONE ENCOUNTER
Copied from CRM #[id number]. Topic: Appointments - Appointment Access  >> Jun 30, 2025  3:57 PM Summer wrote:  Type:  Appointment Request    Caller is requesting a appointment.   Name of Caller: Kelsie   When does caller want appointment?  Symptoms/Reason for Visit: Eye exam   Would the patient rather a call back or a response via [facility]? Callback   Best Call Back Number: [phone]  Additional Information:

## 2025-06-30 NOTE — PROGRESS NOTES
Subjective:      Patient ID: Kelsie Bustamante is a 75 y.o. female.    Chief Complaint: Medication Refill    HPI  History of Present Illness    CHIEF COMPLAINT:  Ms. Bustamante presents today for medication management and follow up.    GASTROINTESTINAL:  She reports chronic nocturnal vomiting episodes with associated choking, occurring despite minimal food intake after 6 PM. These episodes require frequent sheet changes and predate Mounjaro initiation. She has a history of achalasia, previously evaluated by a specialist in [place] under Dr. Esparza's referral. She also has a history of colon polyps identified during 2014 colonoscopy.    DIABETES:  She currently takes Mounjaro 2.5 mg and glimepiride for diabetes management, having discontinued Metformin. She is not regularly monitoring glucose levels. She expresses interest in increasing Mounjaro dosage for weight management purposes.    Medications were reviewed      ROS:  General: -fever, -chills, -fatigue, -weight gain, -weight loss  Eyes: -vision changes, -redness, -discharge  ENT: -ear pain, -nasal congestion, -sore throat  Cardiovascular: -chest pain, -palpitations, -lower extremity edema  Respiratory: -cough, -shortness of breath  Gastrointestinal: -abdominal pain, -nausea, +vomiting, -diarrhea, -constipation, -blood in stool, +choking sensation, +appetite changes, +loss of appetite  Genitourinary: -dysuria, -hematuria, -frequency  Musculoskeletal: -joint pain, -muscle pain  Skin: -rash, -lesion  Neurological: -headache, -dizziness, -numbness, -tingling  Psychiatric: -anxiety, -depression, -sleep difficulty       Problem List[1]    Current Medications[2]    Review of Systems   Constitutional:  Negative for activity change, appetite change, chills, diaphoresis, fatigue, fever and unexpected weight change.   HENT: Negative.  Negative for congestion, hearing loss, postnasal drip, rhinorrhea, sore throat, trouble swallowing and voice change.    Eyes: Negative.   "Negative for visual disturbance.   Respiratory: Negative.  Negative for cough, choking, chest tightness and shortness of breath.    Cardiovascular:  Negative for chest pain, palpitations and leg swelling.   Gastrointestinal:  Positive for nausea and vomiting. Negative for abdominal distention, abdominal pain, blood in stool, constipation and diarrhea.   Endocrine: Negative for cold intolerance, heat intolerance, polydipsia and polyuria.   Genitourinary: Negative.  Negative for difficulty urinating and frequency.   Musculoskeletal:  Negative for arthralgias, back pain, gait problem, joint swelling and myalgias.   Skin:  Negative for color change, pallor, rash and wound.   Neurological:  Negative for dizziness, tremors, weakness, light-headedness, numbness and headaches.   Hematological:  Negative for adenopathy.   Psychiatric/Behavioral:  Negative for behavioral problems, confusion, self-injury, sleep disturbance and suicidal ideas. The patient is not nervous/anxious.      Objective:   /80   Pulse 73   Temp 97.7 °F (36.5 °C) (Tympanic)   Ht 5' 3" (1.6 m)   Wt 76.6 kg (168 lb 14 oz)   SpO2 96%   BMI 29.91 kg/m²     Physical Exam  Vitals and nursing note reviewed.   Constitutional:       General: She is not in acute distress.     Appearance: Normal appearance. She is well-developed. She is not ill-appearing, toxic-appearing or diaphoretic.   HENT:      Head: Normocephalic and atraumatic.   Cardiovascular:      Rate and Rhythm: Normal rate and regular rhythm.      Heart sounds: Normal heart sounds. No murmur heard.     No friction rub. No gallop.   Pulmonary:      Effort: Pulmonary effort is normal. No respiratory distress.      Breath sounds: Normal breath sounds. No wheezing or rales.   Musculoskeletal:         General: Normal range of motion.   Skin:     General: Skin is warm.      Capillary Refill: Capillary refill takes less than 2 seconds.      Findings: No rash.   Neurological:      Mental Status: She " is alert and oriented to person, place, and time.      Motor: No weakness.      Gait: Gait normal.   Psychiatric:         Mood and Affect: Mood normal.         Behavior: Behavior normal.         Thought Content: Thought content normal.         Judgment: Judgment normal.         Assessment:     1. Uncontrolled type 2 diabetes mellitus with hyperglycemia    2. Type 2 diabetes mellitus with hyperglycemia, without long-term current use of insulin    3. Type 2 diabetes mellitus with diabetic polyneuropathy, without long-term current use of insulin    4. Hypertension associated with diabetes    5. Hyperlipidemia associated with type 2 diabetes mellitus    6. Screen for colon cancer    7. Achalasia of esophagus    8. Gastroesophageal reflux disease without esophagitis      Plan:   Increased Mounjaro dose from 2.5 mg to 5 mg to improve glycemic control and promote weight loss, starting at lowest effective dose for weight loss with plan to increase when weight loss plateaus or reverses. Considered further dose increase to 7.5 mg in 1 month, pending response and tolerability.  Noted overdue for colonoscopy; last procedure in 2014 showed polyps, requiring repeat colonoscopy rather than Cologuard screening.  Assessed reported nocturnal vomiting as likely related to previously diagnosed achalasia, requiring follow-up with specialist.    Uncontrolled type 2 diabetes mellitus with hyperglycemia  - Ms. Bustamante's A1C is not well controlled at current Mounjaro 2.5 mg dose.  - Plan to increase Mounjaro dose progressively from 2.5 mg to 5 mg, then to 7.5 mg, and eventually to 10 mg for better glycemic control.  - Continuing glimepiride.  - Ordered A1C test for August 5th with virtual follow up in 1 month to adjust Mounjaro dose.    Type 2 diabetes mellitus with hyperglycemia, without long-term current use of insulin  -     tirzepatide (MOUNJARO) 5 mg/0.5 mL PnIj; Inject 5 mg into the skin every 7 days.  Dispense: 2 mL; Refill: 6  -      Ambulatory referral/consult to Optometry; Future; Expected date: 07/07/2025  -     tirzepatide 7.5 mg/0.5 mL PnIj; Inject 7.5 mg into the skin every 7 days.  Dispense: 4 Pen; Refill: 3    Type 2 diabetes mellitus with diabetic polyneuropathy, without long-term current use of insulin  -     tirzepatide (MOUNJARO) 5 mg/0.5 mL PnIj; Inject 5 mg into the skin every 7 days.  Dispense: 2 mL; Refill: 6  -     Ambulatory referral/consult to Optometry; Future; Expected date: 07/07/2025  -     tirzepatide 7.5 mg/0.5 mL PnIj; Inject 7.5 mg into the skin every 7 days.  Dispense: 4 Pen; Refill: 3    Hypertension associated with diabetes  - Blood pressure is well controlled on current losartan regimen.  - Continue medication as prescribed.    Hyperlipidemia associated with type 2 diabetes mellitus    Screen for colon cancer  -     Ambulatory referral/consult to Endo Procedure ; Future; Expected date: 07/01/2025    Achalasia of esophagus  - Ms. Bustamante reports vomiting in bed without associated nausea, requiring sheet changes.  - Advised about risks of nocturnal aspiration.  - Instructed to stop eating by 5 or 6 PM to help manage symptoms.  - Follow up with Dr. Willis for further management.    Gastroesophageal reflux disease without esophagitis  - Continue Protonix (pantoprazole) twice daily for GERD management.  - Ms. Bustamante experiences nocturnal vomiting possibly related to GERD.  - Ms. Bustamante reports long-standing vomiting in bed requiring sheet changes, not related to Mounjaro medication.  - Follow up with Dr. Willis in [place] for achalasia follow-up.      HISTORY OF COLON POLYPS:  - Ms. Bustamante is overdue for colonoscopy, last performed in 2014.  - Referred for colonoscopy due to history of polyps.    FOLLOW-UP:  - Referred patient for eye exam on the third floor.  - Virtual follow up in 1 month for diabetes management.       This note was generated with the assistance of ambient listening technology.  Verbal consent was obtained by the patient and accompanying visitor(s) for the recording of patient appointment to facilitate this note. I attest to having reviewed and edited the generated note for accuracy, though some syntax or spelling errors may persist. Please contact the author of this note for any clarification.      Follow up in about 4 weeks (around 7/28/2025), or if symptoms worsen or fail to improve.           [1]   Patient Active Problem List  Diagnosis    Hypertension associated with diabetes    Type 2 diabetes mellitus with diabetic polyneuropathy, without long-term current use of insulin    Peripheral neuropathy    Carpal tunnel syndrome    Localized primary carpometacarpal osteoarthritis    Gastroesophageal reflux disease without esophagitis    Thyroid nodule    Premature menopause (surgery in 30s)    Palpitation    Other insomnia    Normocytic anemia    PAF (paroxysmal atrial fibrillation)    Maxillary cyst    Personal history of pneumonia (recurrent)    Personal history of urinary (tract) infections    Fibromyalgia    Hyperlipidemia associated with type 2 diabetes mellitus    Odynophagia    Dysphagia    Achalasia of esophagus    Chronic anticoagulation- Eliquis    Pacemaker    Other thrombophilia    Chronic cough    Uncontrolled type 2 diabetes mellitus with hyperglycemia    Type 2 diabetes mellitus with hypertriglyceridemia    Mixed simple and mucopurulent chronic bronchitis    Aortic atherosclerosis    Possible Memory changes    DM cataract bilateral with bilateral extractions    History of colon polyps   [2]   Current Outpatient Medications:     albuterol-ipratropium (DUO-NEB) 2.5 mg-0.5 mg/3 mL nebulizer solution, Take 3 mLs by nebulization every 6 (six) hours as needed for Wheezing. Rescue, Disp: 180 mL, Rfl: 3    apixaban (ELIQUIS) 5 mg Tab, Take 1 tablet (5 mg total) by mouth 2 (two) times daily., Disp: 180 tablet, Rfl: 4    budesonide (PULMICORT) 0.25 mg/2 mL nebulizer solution, Take 2 mLs  "(0.25 mg total) by nebulization once daily. Controller, Disp: 180 mL, Rfl: 1    estrogens, conjugated, (PREMARIN) 0.625 MG tablet, Take 1 tablet (0.625 mg total) by mouth once daily., Disp: 90 tablet, Rfl: 3    flecainide (TAMBOCOR) 100 MG Tab, Take 100 mg by mouth every 12 (twelve) hours., Disp: , Rfl:     glimepiride (AMARYL) 2 MG tablet, Take 1 tablet (2 mg total) by mouth daily with breakfast., Disp: 90 tablet, Rfl: 3    losartan (COZAAR) 25 MG tablet, Take 1 tablet (25 mg total) by mouth once daily., Disp: 90 tablet, Rfl: 3    metoprolol succinate (TOPROL-XL) 100 MG 24 hr tablet, Take 1 tablet (100 mg total) by mouth 2 (two) times daily. 100mg twice daily, Disp: 180 tablet, Rfl: 3    nebulizer and compressor La, Use every 6 hours as needed for wheezing, Disp: 1 each, Rfl: 0    pantoprazole (PROTONIX) 40 MG tablet, Take 1 tablet (40 mg total) by mouth 2 (two) times daily., Disp: 180 tablet, Rfl: 3    pen needle, diabetic 32 gauge x 5/32" Ndle, Use to dispense insulin once daily, Disp: 100 each, Rfl: 0    ramelteon (ROZEREM) 8 mg tablet, Take 8 mg by mouth every evening., Disp: , Rfl:     rosuvastatin (CRESTOR) 5 MG tablet, Take 1 tablet (5 mg total) by mouth once daily., Disp: 90 tablet, Rfl: 3    zolpidem (AMBIEN CR) 6.25 MG CR tablet, Take 1 tablet (6.25 mg total) by mouth nightly as needed for Insomnia., Disp: 30 tablet, Rfl: 0    tiotropium-olodateroL (STIOLTO RESPIMAT) 2.5-2.5 mcg/actuation Mist, Inhale 2 puffs into the lungs once daily. Controller (Patient not taking: Reported on 6/30/2025), Disp: 1 g, Rfl: 5    tirzepatide (MOUNJARO) 5 mg/0.5 mL PnIj, Inject 5 mg into the skin every 7 days., Disp: 2 mL, Rfl: 6    [START ON 7/30/2025] tirzepatide 7.5 mg/0.5 mL PnIj, Inject 7.5 mg into the skin every 7 days., Disp: 4 Pen, Rfl: 3    "

## 2025-07-01 ENCOUNTER — HOSPITAL ENCOUNTER (OUTPATIENT)
Dept: PREADMISSION TESTING | Facility: HOSPITAL | Age: 75
Discharge: HOME | End: 2025-07-01
Attending: COLON & RECTAL SURGERY
Payer: MEDICARE

## 2025-07-01 DIAGNOSIS — Z12.11 SCREEN FOR COLON CANCER: ICD-10-CM

## 2025-07-18 DIAGNOSIS — E11.65 TYPE 2 DIABETES MELLITUS WITH HYPERGLYCEMIA, WITHOUT LONG-TERM CURRENT USE OF INSULIN: ICD-10-CM

## 2025-07-18 DIAGNOSIS — E11.42 TYPE 2 DIABETES MELLITUS WITH DIABETIC POLYNEUROPATHY, WITHOUT LONG-TERM CURRENT USE OF INSULIN: ICD-10-CM

## 2025-07-18 DIAGNOSIS — E11.69 HYPERLIPIDEMIA ASSOCIATED WITH TYPE 2 DIABETES MELLITUS: ICD-10-CM

## 2025-07-18 DIAGNOSIS — F51.01 PRIMARY INSOMNIA: ICD-10-CM

## 2025-07-18 DIAGNOSIS — E78.5 HYPERLIPIDEMIA ASSOCIATED WITH TYPE 2 DIABETES MELLITUS: ICD-10-CM

## 2025-07-18 RX ORDER — TIRZEPATIDE 5 MG/.5ML
5 INJECTION, SOLUTION SUBCUTANEOUS
Qty: 2 ML | Refills: 6 | Status: CANCELLED | OUTPATIENT
Start: 2025-07-18 | End: 2026-01-30

## 2025-07-21 RX ORDER — ZOLPIDEM TARTRATE 6.25 MG/1
6.25 TABLET, FILM COATED, EXTENDED RELEASE ORAL NIGHTLY PRN
Qty: 30 TABLET | Refills: 0 | Status: SHIPPED | OUTPATIENT
Start: 2025-07-21 | End: 2026-01-19

## 2025-07-21 RX ORDER — ROSUVASTATIN CALCIUM 5 MG/1
5 TABLET, COATED ORAL DAILY
Qty: 90 TABLET | Refills: 3 | Status: SHIPPED | OUTPATIENT
Start: 2025-07-21

## 2025-07-29 DIAGNOSIS — E11.65 TYPE 2 DIABETES MELLITUS WITH HYPERGLYCEMIA, WITHOUT LONG-TERM CURRENT USE OF INSULIN: ICD-10-CM

## 2025-07-29 DIAGNOSIS — F51.01 PRIMARY INSOMNIA: ICD-10-CM

## 2025-07-29 DIAGNOSIS — E11.42 TYPE 2 DIABETES MELLITUS WITH DIABETIC POLYNEUROPATHY, WITHOUT LONG-TERM CURRENT USE OF INSULIN: ICD-10-CM

## 2025-07-29 DIAGNOSIS — E11.69 HYPERLIPIDEMIA ASSOCIATED WITH TYPE 2 DIABETES MELLITUS: ICD-10-CM

## 2025-07-29 DIAGNOSIS — E78.5 HYPERLIPIDEMIA ASSOCIATED WITH TYPE 2 DIABETES MELLITUS: ICD-10-CM

## 2025-07-29 DIAGNOSIS — I48.0 PAF (PAROXYSMAL ATRIAL FIBRILLATION): Chronic | ICD-10-CM

## 2025-07-29 RX ORDER — TIRZEPATIDE 5 MG/.5ML
5 INJECTION, SOLUTION SUBCUTANEOUS
Qty: 2 ML | Refills: 6 | Status: CANCELLED | OUTPATIENT
Start: 2025-07-29 | End: 2026-02-10

## 2025-07-29 RX ORDER — ZOLPIDEM TARTRATE 6.25 MG/1
6.25 TABLET, FILM COATED, EXTENDED RELEASE ORAL NIGHTLY PRN
Qty: 30 TABLET | Refills: 0 | Status: CANCELLED | OUTPATIENT
Start: 2025-07-29 | End: 2026-01-27

## 2025-08-04 DIAGNOSIS — E11.65 UNCONTROLLED TYPE 2 DIABETES MELLITUS WITH HYPERGLYCEMIA: Primary | ICD-10-CM

## 2025-08-04 DIAGNOSIS — E11.42 TYPE 2 DIABETES MELLITUS WITH DIABETIC POLYNEUROPATHY, WITHOUT LONG-TERM CURRENT USE OF INSULIN: ICD-10-CM

## 2025-08-04 RX ORDER — TIRZEPATIDE 7.5 MG/.5ML
7.5 INJECTION, SOLUTION SUBCUTANEOUS
Qty: 4 PEN | Refills: 6 | Status: SHIPPED | OUTPATIENT
Start: 2025-08-04 | End: 2025-08-04 | Stop reason: SDUPTHER

## 2025-08-04 RX ORDER — ROSUVASTATIN CALCIUM 5 MG/1
5 TABLET, COATED ORAL DAILY
Qty: 90 TABLET | Refills: 3 | Status: SHIPPED | OUTPATIENT
Start: 2025-08-04

## 2025-08-04 RX ORDER — METOPROLOL SUCCINATE 100 MG/1
100 TABLET, EXTENDED RELEASE ORAL 2 TIMES DAILY
Qty: 180 TABLET | Refills: 3 | Status: SHIPPED | OUTPATIENT
Start: 2025-08-04 | End: 2026-07-30

## 2025-08-04 RX ORDER — TIRZEPATIDE 7.5 MG/.5ML
7.5 INJECTION, SOLUTION SUBCUTANEOUS
Qty: 4 PEN | Refills: 6 | Status: SHIPPED | OUTPATIENT
Start: 2025-08-04 | End: 2025-08-04

## 2025-08-04 RX ORDER — TIRZEPATIDE 7.5 MG/.5ML
7.5 INJECTION, SOLUTION SUBCUTANEOUS
Qty: 4 PEN | Refills: 6 | Status: SHIPPED | OUTPATIENT
Start: 2025-08-04

## 2025-08-12 ENCOUNTER — LAB VISIT (OUTPATIENT)
Dept: LAB | Facility: HOSPITAL | Age: 75
End: 2025-08-12
Attending: FAMILY MEDICINE
Payer: MEDICARE

## 2025-08-12 ENCOUNTER — OFFICE VISIT (OUTPATIENT)
Dept: INTERNAL MEDICINE | Facility: CLINIC | Age: 75
End: 2025-08-12
Payer: OTHER GOVERNMENT

## 2025-08-12 VITALS
SYSTOLIC BLOOD PRESSURE: 120 MMHG | OXYGEN SATURATION: 96 % | BODY MASS INDEX: 28.16 KG/M2 | WEIGHT: 158.94 LBS | HEIGHT: 63 IN | TEMPERATURE: 99 F | DIASTOLIC BLOOD PRESSURE: 76 MMHG | HEART RATE: 90 BPM

## 2025-08-12 DIAGNOSIS — E11.65 UNCONTROLLED TYPE 2 DIABETES MELLITUS WITH HYPERGLYCEMIA: ICD-10-CM

## 2025-08-12 DIAGNOSIS — Z95.0 PACEMAKER: ICD-10-CM

## 2025-08-12 DIAGNOSIS — Z12.11 SCREEN FOR COLON CANCER: ICD-10-CM

## 2025-08-12 DIAGNOSIS — E11.42 TYPE 2 DIABETES MELLITUS WITH DIABETIC POLYNEUROPATHY, WITHOUT LONG-TERM CURRENT USE OF INSULIN: ICD-10-CM

## 2025-08-12 DIAGNOSIS — I15.2 HYPERTENSION ASSOCIATED WITH DIABETES: ICD-10-CM

## 2025-08-12 DIAGNOSIS — E11.42 TYPE 2 DIABETES MELLITUS WITH DIABETIC POLYNEUROPATHY, WITHOUT LONG-TERM CURRENT USE OF INSULIN: Primary | ICD-10-CM

## 2025-08-12 DIAGNOSIS — E11.69 HYPERLIPIDEMIA ASSOCIATED WITH TYPE 2 DIABETES MELLITUS: ICD-10-CM

## 2025-08-12 DIAGNOSIS — E78.5 HYPERLIPIDEMIA ASSOCIATED WITH TYPE 2 DIABETES MELLITUS: ICD-10-CM

## 2025-08-12 DIAGNOSIS — G47.09 OTHER INSOMNIA: ICD-10-CM

## 2025-08-12 DIAGNOSIS — E11.59 HYPERTENSION ASSOCIATED WITH DIABETES: ICD-10-CM

## 2025-08-12 DIAGNOSIS — E04.1 THYROID NODULE: ICD-10-CM

## 2025-08-12 DIAGNOSIS — I48.0 PAF (PAROXYSMAL ATRIAL FIBRILLATION): ICD-10-CM

## 2025-08-12 LAB
ANION GAP (OHS): 9 MMOL/L (ref 8–16)
BUN SERPL-MCNC: 12 MG/DL (ref 8–23)
CALCIUM SERPL-MCNC: 10.1 MG/DL (ref 8.7–10.5)
CHLORIDE SERPL-SCNC: 104 MMOL/L (ref 95–110)
CO2 SERPL-SCNC: 26 MMOL/L (ref 23–29)
CREAT SERPL-MCNC: 1.2 MG/DL (ref 0.5–1.4)
EAG (OHS): 137 MG/DL (ref 68–131)
GFR SERPLBLD CREATININE-BSD FMLA CKD-EPI: 48 ML/MIN/1.73/M2
GLUCOSE SERPL-MCNC: 122 MG/DL (ref 70–110)
HBA1C MFR BLD: 6.4 % (ref 4–5.6)
POTASSIUM SERPL-SCNC: 4.2 MMOL/L (ref 3.5–5.1)
SODIUM SERPL-SCNC: 139 MMOL/L (ref 136–145)

## 2025-08-12 PROCEDURE — 82310 ASSAY OF CALCIUM: CPT

## 2025-08-12 PROCEDURE — 83036 HEMOGLOBIN GLYCOSYLATED A1C: CPT

## 2025-08-12 PROCEDURE — 36415 COLL VENOUS BLD VENIPUNCTURE: CPT

## 2025-08-12 PROCEDURE — G2211 COMPLEX E/M VISIT ADD ON: HCPCS | Mod: ,,, | Performed by: FAMILY MEDICINE

## 2025-08-12 PROCEDURE — 99215 OFFICE O/P EST HI 40 MIN: CPT | Mod: PBBFAC | Performed by: FAMILY MEDICINE

## 2025-08-12 PROCEDURE — 99214 OFFICE O/P EST MOD 30 MIN: CPT | Mod: S$PBB,,, | Performed by: FAMILY MEDICINE

## 2025-08-12 PROCEDURE — 99999 PR PBB SHADOW E&M-EST. PATIENT-LVL V: CPT | Mod: PBBFAC,,, | Performed by: FAMILY MEDICINE

## 2025-08-12 RX ORDER — RAMELTEON 8 MG/1
8 TABLET ORAL NIGHTLY
Qty: 90 TABLET | Refills: 4 | Status: SHIPPED | OUTPATIENT
Start: 2025-08-12

## 2025-08-12 RX ORDER — TRAZODONE HYDROCHLORIDE 50 MG/1
50 TABLET ORAL NIGHTLY PRN
Qty: 90 TABLET | Refills: 4 | Status: SHIPPED | OUTPATIENT
Start: 2025-08-12

## 2025-08-14 ENCOUNTER — HOSPITAL ENCOUNTER (OUTPATIENT)
Dept: PREADMISSION TESTING | Facility: HOSPITAL | Age: 75
Discharge: HOME | End: 2025-08-14
Attending: FAMILY MEDICINE
Payer: MEDICARE

## 2025-08-14 DIAGNOSIS — Z12.11 SCREEN FOR COLON CANCER: ICD-10-CM

## 2025-08-20 ENCOUNTER — TELEPHONE (OUTPATIENT)
Dept: INTERNAL MEDICINE | Facility: CLINIC | Age: 75
End: 2025-08-20
Payer: MEDICARE

## 2025-08-20 RX ORDER — TRAZODONE HYDROCHLORIDE 50 MG/1
TABLET ORAL
Qty: 90 TABLET | Refills: 5 | Status: SHIPPED | OUTPATIENT
Start: 2025-08-20

## 2025-08-27 ENCOUNTER — TELEPHONE (OUTPATIENT)
Dept: INTERNAL MEDICINE | Facility: CLINIC | Age: 75
End: 2025-08-27
Payer: MEDICARE

## 2025-09-05 ENCOUNTER — TELEPHONE (OUTPATIENT)
Dept: INTERNAL MEDICINE | Facility: CLINIC | Age: 75
End: 2025-09-05
Payer: MEDICARE

## (undated) DEVICE — PAD DEFIB CADENCE ADULT R2

## (undated) DEVICE — SCALPEL SZ 10 RETRACTABLE

## (undated) DEVICE — PAD GROUNDING DISPER ELECTRODE

## (undated) DEVICE — NDL PERCUTANEOUS 21G 7CM VASC

## (undated) DEVICE — DRESSING AQUACEL AG ADV 3.5X6

## (undated) DEVICE — SAFESHEATH II 8FR 13CM

## (undated) DEVICE — SYR BULB 60CC IRRIGATION

## (undated) DEVICE — BLADE PLASMA WIDE SPATULA TIP

## (undated) DEVICE — CAUTERY BOVIE PENCIL

## (undated) DEVICE — ADHESIVE DERMABOND ADVANCED

## (undated) DEVICE — SHEATH INTRO PINNACLE 6F 10CM

## (undated) DEVICE — PACK PACER PERMANENT

## (undated) DEVICE — GUIDEWIRE TSCF-35-50-3